# Patient Record
Sex: FEMALE | Race: WHITE | NOT HISPANIC OR LATINO | Employment: OTHER | URBAN - METROPOLITAN AREA
[De-identification: names, ages, dates, MRNs, and addresses within clinical notes are randomized per-mention and may not be internally consistent; named-entity substitution may affect disease eponyms.]

---

## 2017-05-22 ENCOUNTER — GENERIC CONVERSION - ENCOUNTER (OUTPATIENT)
Dept: OTHER | Facility: OTHER | Age: 82
End: 2017-05-22

## 2017-06-23 ENCOUNTER — GENERIC CONVERSION - ENCOUNTER (OUTPATIENT)
Dept: OTHER | Facility: OTHER | Age: 82
End: 2017-06-23

## 2017-09-20 ENCOUNTER — GENERIC CONVERSION - ENCOUNTER (OUTPATIENT)
Dept: OTHER | Facility: OTHER | Age: 82
End: 2017-09-20

## 2017-09-21 ENCOUNTER — GENERIC CONVERSION - ENCOUNTER (OUTPATIENT)
Dept: OTHER | Facility: OTHER | Age: 82
End: 2017-09-21

## 2017-11-27 ENCOUNTER — GENERIC CONVERSION - ENCOUNTER (OUTPATIENT)
Dept: OTHER | Facility: OTHER | Age: 82
End: 2017-11-27

## 2017-12-01 ENCOUNTER — GENERIC CONVERSION - ENCOUNTER (OUTPATIENT)
Dept: OTHER | Facility: OTHER | Age: 82
End: 2017-12-01

## 2018-05-10 ENCOUNTER — OFFICE VISIT (OUTPATIENT)
Dept: INTERNAL MEDICINE CLINIC | Facility: CLINIC | Age: 83
End: 2018-05-10
Payer: MEDICARE

## 2018-05-10 VITALS
HEIGHT: 59 IN | SYSTOLIC BLOOD PRESSURE: 140 MMHG | TEMPERATURE: 98.1 F | WEIGHT: 118.2 LBS | HEART RATE: 68 BPM | OXYGEN SATURATION: 97 % | BODY MASS INDEX: 23.83 KG/M2 | DIASTOLIC BLOOD PRESSURE: 60 MMHG | RESPIRATION RATE: 14 BRPM

## 2018-05-10 DIAGNOSIS — I10 BENIGN ESSENTIAL HYPERTENSION: ICD-10-CM

## 2018-05-10 DIAGNOSIS — I50.31 ACUTE DIASTOLIC CONGESTIVE HEART FAILURE (HCC): Primary | ICD-10-CM

## 2018-05-10 DIAGNOSIS — I35.0 AORTIC STENOSIS, MODERATE: ICD-10-CM

## 2018-05-10 DIAGNOSIS — R60.0 LOCALIZED EDEMA: ICD-10-CM

## 2018-05-10 DIAGNOSIS — R53.83 OTHER FATIGUE: ICD-10-CM

## 2018-05-10 DIAGNOSIS — S81.802A LEG WOUND, LEFT, INITIAL ENCOUNTER: ICD-10-CM

## 2018-05-10 PROCEDURE — 99215 OFFICE O/P EST HI 40 MIN: CPT | Performed by: INTERNAL MEDICINE

## 2018-05-10 RX ORDER — MULTIVIT WITH MINERALS/LUTEIN
1 TABLET ORAL DAILY
COMMUNITY
Start: 2013-08-13 | End: 2019-07-22 | Stop reason: CLARIF

## 2018-05-10 RX ORDER — ACYCLOVIR 50 MG/G
OINTMENT TOPICAL
COMMUNITY
Start: 2016-09-30 | End: 2019-07-22

## 2018-05-10 RX ORDER — ASPIRIN 325 MG
TABLET ORAL
COMMUNITY
End: 2019-02-22 | Stop reason: SDUPTHER

## 2018-05-10 RX ORDER — VERAPAMIL HYDROCHLORIDE 240 MG/1
240 CAPSULE, EXTENDED RELEASE ORAL DAILY
Refills: 3 | COMMUNITY
Start: 2018-03-01 | End: 2018-10-19 | Stop reason: SDUPTHER

## 2018-05-10 RX ORDER — CHOLECALCIFEROL (VITAMIN D3) 125 MCG
CAPSULE ORAL
COMMUNITY
Start: 2013-08-13 | End: 2019-07-22 | Stop reason: CLARIF

## 2018-05-10 RX ORDER — FUROSEMIDE 40 MG/1
40 TABLET ORAL DAILY
Qty: 30 TABLET | Refills: 4 | Status: SHIPPED | OUTPATIENT
Start: 2018-05-10 | End: 2018-06-12 | Stop reason: SDUPTHER

## 2018-05-10 RX ORDER — PYRIDOXINE HCL (VITAMIN B6) 100 MG
1 TABLET ORAL DAILY
COMMUNITY
Start: 2013-08-13 | End: 2022-04-21

## 2018-05-10 NOTE — ASSESSMENT & PLAN NOTE
History of moderate aortic stenosis  Of the patient's murmurs still rather loud so I suspect that she is probably not in a critical stenosis situation at this point  I have ordered an echocardiogram to evaluate the aortic stenosis all along with her mitral and tricuspid insufficiency and left ventricular function

## 2018-05-10 NOTE — ASSESSMENT & PLAN NOTE
Localized leg edema without evidence of pulmonary edema will switch to furosemide 40 milligrams daily request blood work as well as a follow-up in 2 weeks  Patient has compression socks and should elevate her feet as much as possible  Low-salt diet was reviewed with the patient

## 2018-05-10 NOTE — ASSESSMENT & PLAN NOTE
Small approximately 1 inch laceration is noted in the left lower leg I suspect this may simply be a split in the skin due to the 3+ edema of her lower leg  There is no evidence of any for infection at this time the fluid that is emanating from the laceration is clear    Clean dressing was applied will re-evaluate in 2 weeks

## 2018-05-10 NOTE — ASSESSMENT & PLAN NOTE
Significant fatigue without lightheadedness and no chest pain suspect that her congestive heart failure is the primary reason for this but she does have aortic stenosis will evaluate the severity that aortic stenosis with an echocardiogram

## 2018-05-10 NOTE — PROGRESS NOTES
Assessment/Plan:    Benign essential hypertension  Elevated blood pressure in the presence of right-sided diastolic congestive heart failure with a history of aortic stenosis  Will try to put the patient on to 40 milligrams of furosemide daily will discontinue her hydrochlorothiazide dose  She will continue on her verapamil 240 milligrams daily  I have asked her to have a metabolic profile along with CBC and an echocardiogram to further evaluate her metabolic status kidney status especially and the status of her aortic stenosis as well as left ventricular function  Aortic stenosis, moderate  History of moderate aortic stenosis  Of the patient's murmurs still rather loud so I suspect that she is probably not in a critical stenosis situation at this point  I have ordered an echocardiogram to evaluate the aortic stenosis all along with her mitral and tricuspid insufficiency and left ventricular function  Acute diastolic congestive heart failure (HCC)  Acute diastolic congestive heart failure with peripheral edema  The patient has no symptoms of chest pain or palpitations  She does have fatigue along with significant swelling +3 of both lower legs  Will start start her on furosemide 40 milligrams daily and also order CBC as well as a metabolic profile and brain natriuretic peptide study  Echocardiogram was requested to further evaluate left ventricular function  She will return in 2 weeks for re-evaluation she knows to call me sooner if her symptoms worsen  Localized edema  Localized leg edema without evidence of pulmonary edema will switch to furosemide 40 milligrams daily request blood work as well as a follow-up in 2 weeks  Patient has compression socks and should elevate her feet as much as possible  Low-salt diet was reviewed with the patient      Leg wound, left, initial encounter  Small approximately 1 inch laceration is noted in the left lower leg I suspect this may simply be a split in the skin due to the 3+ edema of her lower leg  There is no evidence of any for infection at this time the fluid that is emanating from the laceration is clear  Clean dressing was applied will re-evaluate in 2 weeks    Other fatigue  Significant fatigue without lightheadedness and no chest pain suspect that her congestive heart failure is the primary reason for this but she does have aortic stenosis will evaluate the severity that aortic stenosis with an echocardiogram        Diagnoses and all orders for this visit:    Acute diastolic congestive heart failure (HCC)  -     furosemide (LASIX) 40 mg tablet; Take 1 tablet (40 mg total) by mouth daily  -     Comprehensive metabolic panel; Future  -     CBC and differential; Future  -     NT-BNP PRO; Future  -     Echo complete with contrast if indicated; Future    Aortic stenosis, moderate    Benign essential hypertension    Localized edema    Leg wound, left, initial encounter    Other fatigue    Other orders  -     acyclovir (ZOVIRAX) 5 % ointment; Apply topically  -     aspirin 325 mg tablet; Take by mouth  -     Calcium Carb-Cholecalciferol (CALCIUM 600 + D) 600-200 MG-UNIT TABS; Take 1 tablet by mouth daily  -     Flaxseed, Linseed, (EQL FLAX SEED OIL) 1000 MG CAPS; Take by mouth  -     Multiple Vitamin (MULTIVITAMINS PO); Take 1 capsule by mouth daily  -     verapamil (VERELAN) 240 MG 24 hr capsule; Take 240 mg by mouth daily  -     Cholecalciferol (VITAMIN D3) 2000 units TABS; Take by mouth  -     vitamin E, tocopherol, 1,000 units capsule; Take 1 capsule by mouth daily        Subjective:      Patient ID: Saeid Santos is a 80 y o  female  This 51-year-old female patient presents today after no contact for the past 2 years  She presents with symptoms of increasing swelling in both legs elevated blood pressure as well as fatigue and serous drainage from a small wound in her left lower leg    She indicates that the leg swelling has been gradually increasing over the past several weeks  She denies any chest pains or palpitations or shortness of breath  She does note that even though she takes hydrochlorothiazide as part of her blood pressure medication her urine output has been decreasing over the past several weeks  She has a history of aortic valve stenosis, mitral and tricuspid insufficiency, biatrial enlargement  She also has a history of hypertension  The following portions of the patient's history were reviewed and updated as appropriate:   She  has a past medical history of Anesthesia complication; Anxiety; Arthritis; Cataract, right; Eye hemorrhage; History of shingles (05/2015); Hypertension; and Mitral valve stenosis, mild  She   Patient Active Problem List    Diagnosis Date Noted    Acute diastolic congestive heart failure ([facility] [address] ) 05/10/2018    Localized edema 05/10/2018    Leg wound, left, initial encounter 05/10/2018    Other fatigue 05/10/2018    Cataract 05/13/2016    Aortic stenosis, moderate 05/11/2016    Atrial dilatation, bilateral 05/11/2016    Mild tricuspid insufficiency 05/11/2016    Non-rheumatic mitral regurgitation 05/11/2016    Benign essential hypertension 08/22/2013     She  has a past surgical history that includes Tonsillectomy; Eye surgery (Left); Carpal tunnel release (Left); Cervical cone biopsy; Colonoscopy; Cataract extraction w/ intraocular lens implant (Left, 10/11/2016); and pr remv cataract extracap,insert lens (Right, 5/17/2016)  Her family history includes Arthritis in her mother and sister; Cancer in her brother and sister; GI problems in her father; Heart disease in her brother, maternal aunt, and mother; Irritable bowel syndrome in her sister; Sleep apnea in her sister  She  reports that she has never smoked  She does not have any smokeless tobacco history on file  She reports that she drinks alcohol  She reports that she does not use drugs       Review of Systems   Constitutional: Positive for fatigue  HENT: Negative  Eyes: Negative  Respiratory: Positive for shortness of breath  Cardiovascular: Positive for leg swelling  Gastrointestinal: Negative  Endocrine: Negative  Musculoskeletal: Positive for arthralgias and myalgias  Skin: Positive for wound  Small proximally 1 inch wound of the left lower leg with serous drainage   Allergic/Immunologic: Negative  Neurological: Negative  Hematological: Negative  Psychiatric/Behavioral: Negative  Objective:      /60   Pulse 68   Temp 98 1 °F (36 7 °C)   Resp 14   Ht 4' 11" (1 499 m)   Wt 53 6 kg (118 lb 3 2 oz)   SpO2 97%   BMI 23 87 kg/m²          Physical Exam   Constitutional: She is oriented to person, place, and time  She appears well-developed and well-nourished  No distress  HENT:   Head: Normocephalic and atraumatic  Right Ear: Tympanic membrane and external ear normal    Left Ear: Tympanic membrane and external ear normal    Nose: Nose normal    Mouth/Throat: Uvula is midline, oropharynx is clear and moist and mucous membranes are normal    Eyes: Conjunctivae and EOM are normal  Pupils are equal, round, and reactive to light  Neck: No JVD present  No thyromegaly present  Cardiovascular: Normal rate, regular rhythm and intact distal pulses  Murmur heard  4/6 systolic murmur   Pulmonary/Chest: Effort normal and breath sounds normal  No respiratory distress  She has no wheezes  She has no rales  She exhibits no tenderness  Abdominal: Soft  Bowel sounds are normal    Musculoskeletal: Normal range of motion  She exhibits edema, tenderness and deformity  Arthritic changes of both hands   Lymphadenopathy:     She has no cervical adenopathy  Neurological: She is alert and oriented to person, place, and time  She has normal reflexes  Skin: Skin is warm, dry and intact  No rash noted  She is not diaphoretic  No erythema  No pallor  Psychiatric: She has a normal mood and affect   Her speech is normal and behavior is normal  Judgment and thought content normal

## 2018-05-10 NOTE — ASSESSMENT & PLAN NOTE
Acute diastolic congestive heart failure with peripheral edema  The patient has no symptoms of chest pain or palpitations  She does have fatigue along with significant swelling +3 of both lower legs  Will start start her on furosemide 40 milligrams daily and also order CBC as well as a metabolic profile and brain natriuretic peptide study  Echocardiogram was requested to further evaluate left ventricular function  She will return in 2 weeks for re-evaluation she knows to call me sooner if her symptoms worsen

## 2018-05-22 ENCOUNTER — TRANSCRIBE ORDERS (OUTPATIENT)
Dept: ADMINISTRATIVE | Facility: HOSPITAL | Age: 83
End: 2018-05-22

## 2018-05-22 ENCOUNTER — APPOINTMENT (OUTPATIENT)
Dept: LAB | Facility: HOSPITAL | Age: 83
End: 2018-05-22
Payer: MEDICARE

## 2018-05-22 DIAGNOSIS — I50.31 ACUTE DIASTOLIC CONGESTIVE HEART FAILURE (HCC): ICD-10-CM

## 2018-05-22 LAB
ALBUMIN SERPL BCP-MCNC: 3.6 G/DL (ref 3.5–5)
ALP SERPL-CCNC: 103 U/L (ref 46–116)
ALT SERPL W P-5'-P-CCNC: 22 U/L (ref 12–78)
ANION GAP SERPL CALCULATED.3IONS-SCNC: 8 MMOL/L (ref 4–13)
AST SERPL W P-5'-P-CCNC: 24 U/L (ref 5–45)
BASOPHILS # BLD AUTO: 0.06 THOUSANDS/ΜL (ref 0–0.1)
BASOPHILS NFR BLD AUTO: 1 % (ref 0–1)
BILIRUB SERPL-MCNC: 0.5 MG/DL (ref 0.2–1)
BUN SERPL-MCNC: 29 MG/DL (ref 5–25)
CALCIUM SERPL-MCNC: 9.1 MG/DL (ref 8.3–10.1)
CHLORIDE SERPL-SCNC: 105 MMOL/L (ref 100–108)
CO2 SERPL-SCNC: 28 MMOL/L (ref 21–32)
CREAT SERPL-MCNC: 0.77 MG/DL (ref 0.6–1.3)
EOSINOPHIL # BLD AUTO: 0.09 THOUSAND/ΜL (ref 0–0.61)
EOSINOPHIL NFR BLD AUTO: 2 % (ref 0–6)
ERYTHROCYTE [DISTWIDTH] IN BLOOD BY AUTOMATED COUNT: 13.1 % (ref 11.6–15.1)
GFR SERPL CREATININE-BSD FRML MDRD: 72 ML/MIN/1.73SQ M
GLUCOSE P FAST SERPL-MCNC: 96 MG/DL (ref 65–99)
HCT VFR BLD AUTO: 42.2 % (ref 34.8–46.1)
HGB BLD-MCNC: 13.3 G/DL (ref 11.5–15.4)
IMM GRANULOCYTES # BLD AUTO: 0.01 THOUSAND/UL (ref 0–0.2)
IMM GRANULOCYTES NFR BLD AUTO: 0 % (ref 0–2)
LYMPHOCYTES # BLD AUTO: 1.27 THOUSANDS/ΜL (ref 0.6–4.47)
LYMPHOCYTES NFR BLD AUTO: 21 % (ref 14–44)
MCH RBC QN AUTO: 28.2 PG (ref 26.8–34.3)
MCHC RBC AUTO-ENTMCNC: 31.5 G/DL (ref 31.4–37.4)
MCV RBC AUTO: 90 FL (ref 82–98)
MONOCYTES # BLD AUTO: 0.5 THOUSAND/ΜL (ref 0.17–1.22)
MONOCYTES NFR BLD AUTO: 8 % (ref 4–12)
NEUTROPHILS # BLD AUTO: 4.25 THOUSANDS/ΜL (ref 1.85–7.62)
NEUTS SEG NFR BLD AUTO: 68 % (ref 43–75)
NRBC BLD AUTO-RTO: 0 /100 WBCS
NT-PROBNP SERPL-MCNC: 820 PG/ML
PLATELET # BLD AUTO: 196 THOUSANDS/UL (ref 149–390)
PMV BLD AUTO: 10.6 FL (ref 8.9–12.7)
POTASSIUM SERPL-SCNC: 3.9 MMOL/L (ref 3.5–5.3)
PROT SERPL-MCNC: 6.8 G/DL (ref 6.4–8.2)
RBC # BLD AUTO: 4.71 MILLION/UL (ref 3.81–5.12)
SODIUM SERPL-SCNC: 141 MMOL/L (ref 136–145)
WBC # BLD AUTO: 6.18 THOUSAND/UL (ref 4.31–10.16)

## 2018-05-22 PROCEDURE — 80053 COMPREHEN METABOLIC PANEL: CPT

## 2018-05-22 PROCEDURE — 85025 COMPLETE CBC W/AUTO DIFF WBC: CPT

## 2018-05-22 PROCEDURE — 36415 COLL VENOUS BLD VENIPUNCTURE: CPT

## 2018-05-22 PROCEDURE — 83880 ASSAY OF NATRIURETIC PEPTIDE: CPT

## 2018-05-25 ENCOUNTER — HOSPITAL ENCOUNTER (OUTPATIENT)
Dept: NON INVASIVE DIAGNOSTICS | Facility: HOSPITAL | Age: 83
Discharge: HOME | End: 2018-05-25
Payer: MEDICARE

## 2018-05-25 DIAGNOSIS — I50.31 ACUTE DIASTOLIC CONGESTIVE HEART FAILURE (HCC): ICD-10-CM

## 2018-05-25 PROCEDURE — 93306 TTE W/DOPPLER COMPLETE: CPT | Performed by: INTERNAL MEDICINE

## 2018-05-25 PROCEDURE — 93306 TTE W/DOPPLER COMPLETE: CPT

## 2018-06-12 ENCOUNTER — OFFICE VISIT (OUTPATIENT)
Dept: INTERNAL MEDICINE CLINIC | Facility: CLINIC | Age: 83
End: 2018-06-12
Payer: MEDICARE

## 2018-06-12 VITALS
RESPIRATION RATE: 14 BRPM | SYSTOLIC BLOOD PRESSURE: 148 MMHG | OXYGEN SATURATION: 97 % | WEIGHT: 114.6 LBS | HEIGHT: 59 IN | HEART RATE: 69 BPM | DIASTOLIC BLOOD PRESSURE: 66 MMHG | TEMPERATURE: 98.9 F | BODY MASS INDEX: 23.1 KG/M2

## 2018-06-12 DIAGNOSIS — I50.31 ACUTE DIASTOLIC CONGESTIVE HEART FAILURE (HCC): ICD-10-CM

## 2018-06-12 DIAGNOSIS — I10 BENIGN ESSENTIAL HYPERTENSION: ICD-10-CM

## 2018-06-12 DIAGNOSIS — R60.0 LOCALIZED EDEMA: ICD-10-CM

## 2018-06-12 DIAGNOSIS — Z12.39 BREAST CANCER SCREENING: Primary | ICD-10-CM

## 2018-06-12 DIAGNOSIS — I35.0 AORTIC STENOSIS, MODERATE: ICD-10-CM

## 2018-06-12 DIAGNOSIS — I51.7 ATRIAL DILATATION, BILATERAL: ICD-10-CM

## 2018-06-12 PROCEDURE — 99215 OFFICE O/P EST HI 40 MIN: CPT | Performed by: INTERNAL MEDICINE

## 2018-06-12 RX ORDER — HYDROCHLOROTHIAZIDE 25 MG/1
TABLET ORAL
COMMUNITY
Start: 2018-06-04 | End: 2018-06-12 | Stop reason: ALTCHOICE

## 2018-06-12 RX ORDER — FUROSEMIDE 40 MG/1
80 TABLET ORAL DAILY
Qty: 180 TABLET | Refills: 3 | Status: SHIPPED | OUTPATIENT
Start: 2018-06-12 | End: 2019-06-14 | Stop reason: SDUPTHER

## 2018-06-12 NOTE — ASSESSMENT & PLAN NOTE
Persistent edema I wrapped her legs today in Ace bandages to try to her gently Cokes the fluid back into the vascular space we have also increased her furosemide from 40 milligrams a day to 80 milligrams a day    Will follow up in approximately 4 weeks

## 2018-06-12 NOTE — ASSESSMENT & PLAN NOTE
Hypertension will continue to monitor at home as well as follow-up visit here in 1 month  Will be increasing her diuretic therapy which should decrease her blood pressure reading somewhat  She has no symptoms associated with the blood pressure elevation

## 2018-06-12 NOTE — PROGRESS NOTES
Assessment/Plan:    Benign essential hypertension  Hypertension will continue to monitor at home as well as follow-up visit here in 1 month  Will be increasing her diuretic therapy which should decrease her blood pressure reading somewhat  She has no symptoms associated with the blood pressure elevation  Aortic stenosis, moderate  Echocardiogram shows moderate aortic stenosis which is progressed from her previous echocardiogram several years ago  She has had no syncope nor any shortness of breath but she does have peripheral neuropathy     Acute diastolic congestive heart failure (HCC)  Acute diastolic congestive heart failure with peripheral edema 4 pound weight loss since her last visit but still significant swelling of the legs bilaterally we have doubled her furosemide to 80 milligrams a day and asked her to return in 1 month for follow-up assessment of her symptoms  Localized edema  Persistent edema I wrapped her legs today in Ace bandages to try to her gently Cokes the fluid back into the vascular space we have also increased her furosemide from 40 milligrams a day to 80 milligrams a day  Will follow up in approximately 4 weeks       Diagnoses and all orders for this visit:    Breast cancer screening  -     Mammo screening bilateral w cad; Future    Acute diastolic congestive heart failure (HCC)  -     furosemide (LASIX) 40 mg tablet; Take 2 tablets (80 mg total) by mouth daily    Localized edema    Benign essential hypertension    Aortic stenosis, moderate    Atrial dilatation, bilateral    Other orders  -     Discontinue: hydrochlorothiazide (HYDRODIURIL) 25 mg tablet;         Subjective:      Patient ID: Lucas Garrett is a 80 y o  female      This 77-year-old female patient returns today for follow-up assessment of blood pressure as well as peripheral edema and review of recent blood work and echocardiogram   She continues to have significant bilateral lower leg edema and she is unable to wear her support stockings because of the edema  The previous area of wound on the left leg has healed completely at this time  Her weight is 4 pounds lower than her last visit with us  A review of her home blood pressure readings that she provided us with indicates that her average blood pressure systolic pre is running in the 120s to 697M and diastolically she is running in the 50s to 60s  She does not appear to have any evidence of kidney disease based upon her by sick metabolic profile  She does have a significantly elevated BNP consistent with her congestive heart failure  A review of her echocardiogram shows that she does have moderate aortic valve stenosis as well as milder degrees of valvular insufficiency for the mitral tricuspid and pulmonic valves  She does have grade 1 diastolic dysfunction as well as a left ventricular ejection fraction of approximately 45 percent  The left atrium is mildly enlarged secondary to her mitral valve regurgitation  The following portions of the patient's history were reviewed and updated as appropriate:   She  has a past medical history of Anesthesia complication; Anxiety; Arthritis; Cataract, right; Eye hemorrhage; History of shingles (05/2015); Hypertension; and Mitral valve stenosis, mild  She   Patient Active Problem List    Diagnosis Date Noted    Acute diastolic congestive heart failure ([address] ) 05/10/2018    Localized edema 05/10/2018    Leg wound, left, initial encounter 05/10/2018    Other fatigue 05/10/2018    Cataract 05/13/2016    Aortic stenosis, moderate 05/11/2016    Atrial dilatation, bilateral 05/11/2016    Mild tricuspid insufficiency 05/11/2016    Non-rheumatic mitral regurgitation 05/11/2016    Benign essential hypertension 08/22/2013     She  has a past surgical history that includes Tonsillectomy; Eye surgery (Left); Carpal tunnel release (Left); Cervical cone biopsy; Colonoscopy;  Cataract extraction w/ intraocular lens implant (Left, 10/11/2016); and pr remv cataract extracap,insert lens (Right, 5/17/2016)  Her family history includes Arthritis in her mother and sister; Cancer in her brother and sister; GI problems in her father; Heart disease in her brother, maternal aunt, and mother; Irritable bowel syndrome in her sister; Sleep apnea in her sister  She  reports that she has never smoked  She does not have any smokeless tobacco history on file  She reports that she drinks alcohol  She reports that she does not use drugs       Review of Systems   Constitutional: Positive for fatigue  Respiratory: Negative for cough and shortness of breath  Cardiovascular: Positive for leg swelling  Negative for chest pain and palpitations  Skin: Positive for color change  Inflamed skin over the lower extremities secondary to fluid retention   All other systems reviewed and are negative  Objective:      /66   Pulse 69   Temp 98 9 °F (37 2 °C)   Resp 14   Ht 4' 11" (1 499 m)   Wt 52 kg (114 lb 9 6 oz)   SpO2 97%   BMI 23 15 kg/m²          Physical Exam   Constitutional: She is oriented to person, place, and time  Vital signs are normal  She appears well-developed and well-nourished  She is cooperative  No distress  HENT:   Right Ear: Hearing, tympanic membrane, external ear and ear canal normal    Left Ear: Hearing, tympanic membrane, external ear and ear canal normal    Nose: Nose normal  No mucosal edema  Mouth/Throat: Uvula is midline, oropharynx is clear and moist and mucous membranes are normal    Eyes: Conjunctivae and lids are normal  Pupils are equal, round, and reactive to light  Right eye exhibits no discharge  Left eye exhibits no discharge  Neck: No JVD present  Carotid bruit is not present  No tracheal deviation present  No thyromegaly present  Cardiovascular: Normal rate, regular rhythm and intact distal pulses  Murmur heard    Grade 4-2/0 systolic murmur   Pulmonary/Chest: Effort normal and breath sounds normal  No respiratory distress  She has no wheezes  She has no rales  She exhibits no tenderness  Abdominal: Soft  Normal appearance and bowel sounds are normal  She exhibits no distension and no mass  There is no tenderness  There is no rebound and no guarding  Musculoskeletal: Normal range of motion  She exhibits edema  Lymphadenopathy:     She has no cervical adenopathy  Neurological: She is alert and oriented to person, place, and time  She has normal reflexes  No cranial nerve deficit  Skin: Skin is warm, dry and intact  No rash noted  She is not diaphoretic  There is erythema  No pallor  Skin over the lower legs is red but not warm has suspected is secondary to distention of the skin tissue by the edema  Psychiatric: She has a normal mood and affect  Her speech is normal and behavior is normal  Judgment and thought content normal  Cognition and memory are normal    Vitals reviewed

## 2018-06-12 NOTE — ASSESSMENT & PLAN NOTE
Acute diastolic congestive heart failure with peripheral edema 4 pound weight loss since her last visit but still significant swelling of the legs bilaterally we have doubled her furosemide to 80 milligrams a day and asked her to return in 1 month for follow-up assessment of her symptoms

## 2018-06-12 NOTE — ASSESSMENT & PLAN NOTE
Echocardiogram shows moderate aortic stenosis which is progressed from her previous echocardiogram several years ago    She has had no syncope nor any shortness of breath but she does have peripheral neuropathy

## 2018-06-28 ENCOUNTER — OFFICE VISIT (OUTPATIENT)
Dept: INTERNAL MEDICINE CLINIC | Facility: CLINIC | Age: 83
End: 2018-06-28
Payer: MEDICARE

## 2018-06-28 VITALS
SYSTOLIC BLOOD PRESSURE: 134 MMHG | WEIGHT: 112 LBS | HEART RATE: 66 BPM | OXYGEN SATURATION: 97 % | BODY MASS INDEX: 22.58 KG/M2 | HEIGHT: 59 IN | DIASTOLIC BLOOD PRESSURE: 78 MMHG | RESPIRATION RATE: 12 BRPM | TEMPERATURE: 98.2 F

## 2018-06-28 DIAGNOSIS — I50.31 ACUTE DIASTOLIC CONGESTIVE HEART FAILURE (HCC): ICD-10-CM

## 2018-06-28 DIAGNOSIS — R60.0 LOCALIZED EDEMA: ICD-10-CM

## 2018-06-28 DIAGNOSIS — I35.0 AORTIC STENOSIS, MODERATE: ICD-10-CM

## 2018-06-28 DIAGNOSIS — R53.83 OTHER FATIGUE: ICD-10-CM

## 2018-06-28 DIAGNOSIS — I10 BENIGN ESSENTIAL HYPERTENSION: ICD-10-CM

## 2018-06-28 DIAGNOSIS — I50.33 ACUTE ON CHRONIC DIASTOLIC CONGESTIVE HEART FAILURE (HCC): Primary | ICD-10-CM

## 2018-06-28 PROCEDURE — 99214 OFFICE O/P EST MOD 30 MIN: CPT | Performed by: INTERNAL MEDICINE

## 2018-06-28 NOTE — PROGRESS NOTES
Assessment/Plan:    Acute diastolic congestive heart failure (HCC)  Acute diastolic congestive heart failure gradually improving weight is gradually going down  The patient continues on furosemide 80 mg daily  I have asked her to continue on this dose of diuretic therapy with follow-up visit in 1 month  Will ask her to have a comprehensive metabolic profile prior to her visit to review her electrolytes and kidney function  Aortic stenosis, moderate  Moderate aortic stenosis murmur is unchanged likely a cause of her congestive heart failure as well as her fatigue symptoms explained this to the patient today during her visit that well he can week work on treating the congestive heart failure the fatigue symptoms may not completely resolve in view of this aortic valve stenosis  Benign essential hypertension  History of hypertension with adequate control of the blood pressure continue present medication  Localized edema  Localized edema both lower legs improved somewhat since her last visit weight decrease 2 lb since last visit continue diuretic therapy with reassessment in 1 month she continues to use compression stockings to the knee and has been advised to try to limit her fluid consumption to 32 oz daily  Other fatigue  Fatigue symptoms likely secondary to combination of congestive heart failure as well as aortic stenosis  Discussed with the patient the meaning of both of these diagnosis on the prognosis with both  Diagnoses and all orders for this visit:    Acute on chronic diastolic congestive heart failure ([address] )  -     Comprehensive metabolic panel; Future    Aortic stenosis, moderate    Acute diastolic congestive heart failure (HCC)    Benign essential hypertension    Localized edema        Subjective:      Patient ID: Elvin Perla is a 80 y o  female  This 42-year-old female patient returns today for a follow-up assessment of her congestive heart failure    She continues to have swelling of both legs but denies any chest pain palpitations or shortness of breath  Her weight has declined 2 lb since the 12th of June and 6 lb since the 10th of May  She does note fatigue symptoms indicating that her energy level is sec significantly reduced from what it use to be  The following portions of the patient's history were reviewed and updated as appropriate: She  has a past medical history of Anesthesia complication; Anxiety; Arthritis; Cataract, right; Eye hemorrhage; History of shingles (05/2015); Hypertension; and Mitral valve stenosis, mild  She  has a past surgical history that includes Tonsillectomy; Eye surgery (Left); Carpal tunnel release (Left); Cervical cone biopsy; Colonoscopy; Cataract extraction w/ intraocular lens implant (Left, 10/11/2016); and pr remv cataract extracap,insert lens (Right, 5/17/2016)  Her family history includes Arthritis in her mother and sister; Cancer in her brother and sister; GI problems in her father; Heart disease in her brother, maternal aunt, and mother; Irritable bowel syndrome in her sister; Sleep apnea in her sister  She  reports that she has never smoked  She does not have any smokeless tobacco history on file  She reports that she drinks alcohol  She reports that she does not use drugs       Review of Systems   Constitutional: Positive for fatigue  Negative for chills, diaphoresis and fever  HENT: Negative  Eyes: Negative  Respiratory: Negative for cough, choking and shortness of breath  Cardiovascular: Positive for leg swelling  Negative for chest pain and palpitations  Gastrointestinal: Negative  Endocrine: Negative  Genitourinary: Negative  Musculoskeletal: Negative  Skin: Negative  Allergic/Immunologic: Negative  Neurological: Negative  Hematological: Negative  Psychiatric/Behavioral: Negative            Objective:      /78 (BP Location: Left arm, Patient Position: Sitting, Cuff Size: Large) Pulse 66   Temp 98 2 °F (36 8 °C)   Resp 12   Ht 4' 11" (1 499 m)   Wt 50 8 kg (112 lb)   SpO2 97%   BMI 22 62 kg/m²          Physical Exam   Constitutional: She is oriented to person, place, and time  Vital signs are normal  She appears well-developed and well-nourished  She is cooperative  No distress  HENT:   Head: Normocephalic and atraumatic  Right Ear: Hearing, tympanic membrane, external ear and ear canal normal    Left Ear: Hearing, tympanic membrane, external ear and ear canal normal    Nose: Nose normal  No mucosal edema  Mouth/Throat: Uvula is midline, oropharynx is clear and moist and mucous membranes are normal    Eyes: Conjunctivae and lids are normal  Pupils are equal, round, and reactive to light  Right eye exhibits no discharge  Left eye exhibits no discharge  Neck: No JVD present  Carotid bruit is not present  No thyromegaly present  Cardiovascular: Normal rate, regular rhythm and intact distal pulses  Murmur heard  3/4 systolic   Pulmonary/Chest: Effort normal and breath sounds normal  No respiratory distress  She has no wheezes  She has no rales  She exhibits no tenderness  Abdominal: Soft  Normal appearance and bowel sounds are normal  She exhibits no distension and no mass  There is no tenderness  There is no rebound and no guarding  Musculoskeletal: Normal range of motion  She exhibits edema  Lymphadenopathy:     She has no cervical adenopathy  Neurological: She is alert and oriented to person, place, and time  She has normal reflexes  No cranial nerve deficit  Skin: Skin is warm, dry and intact  No rash noted  She is not diaphoretic  No erythema  No pallor  Psychiatric: She has a normal mood and affect  Her speech is normal and behavior is normal  Judgment and thought content normal  Cognition and memory are normal    Vitals reviewed

## 2018-06-28 NOTE — ASSESSMENT & PLAN NOTE
Acute diastolic congestive heart failure gradually improving weight is gradually going down  The patient continues on furosemide 80 mg daily  I have asked her to continue on this dose of diuretic therapy with follow-up visit in 1 month  Will ask her to have a comprehensive metabolic profile prior to her visit to review her electrolytes and kidney function

## 2018-06-28 NOTE — ASSESSMENT & PLAN NOTE
Localized edema both lower legs improved somewhat since her last visit weight decrease 2 lb since last visit continue diuretic therapy with reassessment in 1 month she continues to use compression stockings to the knee and has been advised to try to limit her fluid consumption to 32 oz daily

## 2018-06-28 NOTE — ASSESSMENT & PLAN NOTE
Moderate aortic stenosis murmur is unchanged likely a cause of her congestive heart failure as well as her fatigue symptoms explained this to the patient today during her visit that well he can week work on treating the congestive heart failure the fatigue symptoms may not completely resolve in view of this aortic valve stenosis

## 2018-10-19 ENCOUNTER — OFFICE VISIT (OUTPATIENT)
Dept: INTERNAL MEDICINE CLINIC | Facility: CLINIC | Age: 83
End: 2018-10-19
Payer: MEDICARE

## 2018-10-19 VITALS
DIASTOLIC BLOOD PRESSURE: 80 MMHG | BODY MASS INDEX: 21.97 KG/M2 | RESPIRATION RATE: 14 BRPM | HEIGHT: 59 IN | SYSTOLIC BLOOD PRESSURE: 138 MMHG | OXYGEN SATURATION: 96 % | TEMPERATURE: 98.2 F | WEIGHT: 109 LBS | HEART RATE: 67 BPM

## 2018-10-19 DIAGNOSIS — Z23 NEED FOR INFLUENZA VACCINATION: Primary | ICD-10-CM

## 2018-10-19 DIAGNOSIS — I10 ESSENTIAL HYPERTENSION: ICD-10-CM

## 2018-10-19 DIAGNOSIS — M19.90 ARTHRITIS: ICD-10-CM

## 2018-10-19 DIAGNOSIS — I35.0 AORTIC STENOSIS, MODERATE: ICD-10-CM

## 2018-10-19 DIAGNOSIS — G44.89 OTHER HEADACHE SYNDROME: ICD-10-CM

## 2018-10-19 DIAGNOSIS — I50.31 ACUTE DIASTOLIC CONGESTIVE HEART FAILURE (HCC): ICD-10-CM

## 2018-10-19 PROCEDURE — G0008 ADMIN INFLUENZA VIRUS VAC: HCPCS | Performed by: INTERNAL MEDICINE

## 2018-10-19 PROCEDURE — 90662 IIV NO PRSV INCREASED AG IM: CPT | Performed by: INTERNAL MEDICINE

## 2018-10-19 PROCEDURE — 99215 OFFICE O/P EST HI 40 MIN: CPT | Performed by: INTERNAL MEDICINE

## 2018-10-19 RX ORDER — VERAPAMIL HYDROCHLORIDE 240 MG/1
240 CAPSULE, EXTENDED RELEASE ORAL DAILY
Qty: 90 CAPSULE | Refills: 3 | Status: SHIPPED | OUTPATIENT
Start: 2018-10-19 | End: 2019-02-22 | Stop reason: CLARIF

## 2018-10-24 ENCOUNTER — TRANSCRIBE ORDERS (OUTPATIENT)
Dept: ADMINISTRATIVE | Facility: HOSPITAL | Age: 83
End: 2018-10-24

## 2018-10-24 ENCOUNTER — HOSPITAL ENCOUNTER (OUTPATIENT)
Dept: RADIOLOGY | Facility: HOSPITAL | Age: 83
Discharge: HOME | End: 2018-10-24
Payer: MEDICARE

## 2018-10-24 DIAGNOSIS — M19.90 ARTHRITIS: ICD-10-CM

## 2018-10-24 PROCEDURE — 73562 X-RAY EXAM OF KNEE 3: CPT

## 2018-10-24 PROCEDURE — 73030 X-RAY EXAM OF SHOULDER: CPT

## 2018-10-31 ENCOUNTER — OFFICE VISIT (OUTPATIENT)
Dept: OBGYN CLINIC | Facility: CLINIC | Age: 83
End: 2018-10-31
Payer: MEDICARE

## 2018-10-31 VITALS
WEIGHT: 111.4 LBS | HEART RATE: 65 BPM | SYSTOLIC BLOOD PRESSURE: 136 MMHG | HEIGHT: 59 IN | BODY MASS INDEX: 22.46 KG/M2 | DIASTOLIC BLOOD PRESSURE: 69 MMHG

## 2018-10-31 DIAGNOSIS — M19.90 ARTHRITIS: ICD-10-CM

## 2018-10-31 DIAGNOSIS — M19.012 PRIMARY OSTEOARTHRITIS OF LEFT SHOULDER: ICD-10-CM

## 2018-10-31 DIAGNOSIS — M17.11 PRIMARY OSTEOARTHRITIS OF RIGHT KNEE: Primary | ICD-10-CM

## 2018-10-31 DIAGNOSIS — M12.812 ROTATOR CUFF ARTHROPATHY OF LEFT SHOULDER: ICD-10-CM

## 2018-10-31 PROBLEM — G44.89 OTHER HEADACHE SYNDROME: Status: ACTIVE | Noted: 2018-10-31

## 2018-10-31 PROBLEM — S81.802A LEG WOUND, LEFT, INITIAL ENCOUNTER: Status: RESOLVED | Noted: 2018-05-10 | Resolved: 2018-10-31

## 2018-10-31 PROCEDURE — 99204 OFFICE O/P NEW MOD 45 MIN: CPT | Performed by: ORTHOPAEDIC SURGERY

## 2018-10-31 PROCEDURE — 20610 DRAIN/INJ JOINT/BURSA W/O US: CPT | Performed by: ORTHOPAEDIC SURGERY

## 2018-10-31 RX ORDER — DEXAMETHASONE SODIUM PHOSPHATE 100 MG/10ML
40 INJECTION INTRAMUSCULAR; INTRAVENOUS
Status: COMPLETED | OUTPATIENT
Start: 2018-10-31 | End: 2018-10-31

## 2018-10-31 RX ORDER — BUPIVACAINE HYDROCHLORIDE 2.5 MG/ML
4 INJECTION, SOLUTION INFILTRATION; PERINEURAL
Status: COMPLETED | OUTPATIENT
Start: 2018-10-31 | End: 2018-10-31

## 2018-10-31 RX ADMIN — BUPIVACAINE HYDROCHLORIDE 4 ML: 2.5 INJECTION, SOLUTION INFILTRATION; PERINEURAL at 10:01

## 2018-10-31 RX ADMIN — DEXAMETHASONE SODIUM PHOSPHATE 40 MG: 100 INJECTION INTRAMUSCULAR; INTRAVENOUS at 10:01

## 2018-10-31 NOTE — ASSESSMENT & PLAN NOTE
Arthritic complaints in both the right knee as well as left shoulder  On examination today there appear to be changes consistent with arthritis of both these locations of requested off x-rays of both the right knee as well as left shoulder to evaluate further  In addition we have provided the patient with a referral to orthopedic services at [facility] for further evaluation of the symptoms

## 2018-10-31 NOTE — ASSESSMENT & PLAN NOTE
Patient has a history of acute diastolic congestive heart failure which appears to be stable on today's examination  She is not have any significant peripheral edema she denies any chest pains palpitations or shortness of breath  She continues on furosemide 80 mg daily for control of her right-sided congestive heart failure

## 2018-10-31 NOTE — ASSESSMENT & PLAN NOTE
Headache frontal in location is seems to be allergic in nature  She does have some increased sinus congestion as well as postnasal drainage  The mucus in her sinuses is clear  We have recommended the use of Ocean spray as well as antihistamine such as Claritin for symptomatic relief

## 2018-10-31 NOTE — ASSESSMENT & PLAN NOTE
History of moderate aortic stenosis  Murmur appears to be stable she has no symptoms to indicate worsening aortic valve stenosis recommend continued follow-up with Cardiology as well as the this this office

## 2018-10-31 NOTE — PROGRESS NOTES
Assessment/Plan:  1  Primary osteoarthritis of right knee  Ambulatory referral to Physical Therapy    Large joint arthrocentesis    CANCELED: Ambulatory referral to Physical Therapy   2  Rotator cuff arthropathy of left shoulder  Ambulatory referral to Physical Therapy   3  Primary osteoarthritis of left shoulder  Ambulatory referral to Physical Therapy    CANCELED: Ambulatory referral to Physical Therapy   4  Arthritis  Ambulatory referral to Orthopedic Surgery       Scribe Attestation    I,:   [place] am acting as a scribe while in the presence of the attending physician :        I,:   Sebastian Read MD personally performed the services described in this documentation    as scribed in my presence :              Wallace Cordero upon examination and x-rays of her left shoulder demonstrates signs and symptoms rotator cuff arthropathy  I do have concern that she does have a tear to the rotator cuff as she demonstrates significant weakness and pain with abduction  X-rays also demonstrate a high riding humeral head indicating a tear to the rotator cuff  I did suggest conservative measures as she noted that she cares for her  such as cortisone injections and physical therapy  I would like to try physical therapy prior to injection to help her regain strength range of motion her shoulder  In regards to her right knee she does demonstrate tricompartmental osteoarthritis  I also discussed conservative measures such as cortisone injection, Euflexxa injections, and physical therapy  Still opted for the cortisone injection and physical therapy today  I provided cortisone injection to the anterior lateral aspect of the knee she tolerated this well  I did provide her with a physical therapy prescription for her left shoulder as well as her right knee  I would like Wallace Cordero to try to get the physical therapy at least 2 times a week over the next 4-6 weeks    Wallace Cordero is to follow up with me in 6 weeks for repeat evaluation  Large joint arthrocentesis  Date/Time: 10/31/2018 10:01 AM  Consent given by: patient  Site marked: site marked  Timeout: Immediately prior to procedure a time out was called to verify the correct patient, procedure, equipment, support staff and site/side marked as required   Supporting Documentation  Indications: pain   Procedure Details  Location: knee - R knee  Needle size: 22 G  Ultrasound guidance: no  Approach: anterolateral  Medications administered: 4 mL bupivacaine 0 25 %; 40 mg dexamethasone 100 mg/10 mL              Subjective:   Jean Paul Peguero is a 80 y o  female who presents for initial evaluation of her left shoulder and right knee  She states that she began to experience intermittent and moderate sharp pain about the superior aspect of her left shoulder  She denies any trauma to her shoulder  However notes that she is caring for her disabled  does lift him often  She states that her pain is exacerbated with overhead activities and is better at rest   She has not undergone any formal treatment for her shoulder  In regards to her right knee she states that she was experiencing pain discomfort with knee flexion the posterior aspect of her knee this was a few years ago  She states that that pain in the posterior aspect of her knee has subsided however she states that she is experiencing intermittent and mild to moderate sharp pain about the anterior medial aspect of her knee she states that this pain is exacerbated with weight-bearing activities and is pain-free at rest   She does note intermittent swelling in the anterior aspect of her knee however she also notes swelling into the lower extremities due to a heart condition  Today she denies any distal paresthesias  Review of Systems   Constitutional: Negative for chills, fever and unexpected weight change  HENT: Negative for hearing loss, nosebleeds and sore throat      Eyes: Negative for pain, redness and visual disturbance  Respiratory: Negative for cough, shortness of breath and wheezing  Cardiovascular: Negative for chest pain, palpitations and leg swelling  Gastrointestinal: Negative for abdominal pain, nausea and vomiting  Endocrine: Negative for polydipsia and polyuria  Genitourinary: Negative for dysuria and hematuria  Musculoskeletal: Positive for arthralgias and myalgias  See HPI   Skin: Negative for rash and wound  Neurological: Negative for dizziness, numbness and headaches  Psychiatric/Behavioral: Negative for decreased concentration and suicidal ideas  The patient is not nervous/anxious  Past Medical History:   Diagnosis Date    Anesthesia complication       Yrs ago had "anxiety" reaction not sure while sedated, pt states sensitive to anesthesia effects    Anxiety     stress at home    Arthritis     Cataract, right     Last Assessed:16    Eye hemorrhage     left eye currently 16    History of shingles 2015    Hypertension     Mitral valve stenosis, mild        Past Surgical History:   Procedure Laterality Date    CARPAL TUNNEL RELEASE Left     CATARACT EXTRACTION W/ INTRAOCULAR LENS IMPLANT Left 10/11/2016    Procedure: EXTRACTION EXTRACAPSULAR CATARACT PHACO INTRAOCULAR LENS (IOL); Surgeon: Kin Preston MD;  Location: [facility] MAIN OR;  Service:     CERVICAL CONE BIOPSY      COLONOSCOPY      EYE SURGERY Left     muscle repair    NE REMV CATARACT EXTRACAP,INSERT LENS Right 2016    Procedure: EXTRACTION EXTRACAPSULAR CATARACT PHACO INTRAOCULAR LENS (IOL);   Surgeon: Kin Preston MD;  Location: [facility] MAIN OR;  Service: Ophthalmology    TONSILLECTOMY         Family History   Problem Relation Age of Onset    Heart disease Mother         MI  at age 71   Georgie Mcrae Arthritis Mother     GI problems Father         bleeding ulcer    Arthritis Sister     Sleep apnea Sister     Irritable bowel syndrome Sister     Cancer Sister         skin cancer  Heart disease Brother         CABG (5)    Cancer Brother         skin cancer    Heart disease Maternal Aunt        Social History     Occupational History    Not on file  Social History Main Topics    Smoking status: Never Smoker    Smokeless tobacco: Never Used    Alcohol use Yes      Comment: rarely/occasional glass of wine    Drug use: No    Sexual activity: Not on file         Current Outpatient Prescriptions:     acyclovir (ZOVIRAX) 5 % ointment, Apply topically, Disp: , Rfl:     Ascorbic Acid (VITAMIN C) 1000 MG tablet, Take 1,000 mg by mouth every morning , Disp: , Rfl:     aspirin 325 mg tablet, Take by mouth, Disp: , Rfl:     BIOTIN PO, Take 1,000 mcg by mouth every morning , Disp: , Rfl:     Calcium Carb-Cholecalciferol (CALCIUM 600 + D) 600-200 MG-UNIT TABS, Take 1 tablet by mouth daily, Disp: , Rfl:     Cholecalciferol (VITAMIN D3) 2000 units TABS, Take by mouth, Disp: , Rfl:     CINNAMON PO, Take 1,000 mg by mouth every morning , Disp: , Rfl:     Flax OIL, Take by mouth every morning , Disp: , Rfl:     Flaxseed, Linseed, (EQL FLAX SEED OIL) 1000 MG CAPS, Take by mouth, Disp: , Rfl:     folic acid (FOLVITE) 114 MCG tablet, Take 400 mcg by mouth every morning , Disp: , Rfl:     furosemide (LASIX) 40 mg tablet, Take 2 tablets (80 mg total) by mouth daily, Disp: 180 tablet, Rfl: 3    glucosamine-chondroitin 500-400 MG tablet, Take 1 tablet by mouth every morning , Disp: , Rfl:     Multiple Vitamin (MULTIVITAMINS PO), Take 1 capsule by mouth daily, Disp: , Rfl:     multivitamin (THERAGRAN) TABS, Take 1 tablet by mouth every morning , Disp: , Rfl:     Naproxen Sodium (ALEVE PO), Take 2 tablets by mouth daily at bedtime  , Disp: , Rfl:     Omega-3 Fatty Acids (FISH OIL PO), Take 1,400 mg by mouth every morning , Disp: , Rfl:     verapamil (VERELAN) 240 MG 24 hr capsule, Take 1 capsule (240 mg total) by mouth daily, Disp: 90 capsule, Rfl: 3    Vitamin D, Cholecalciferol, 1000 UNITS CAPS, Take by mouth every morning , Disp: , Rfl:     vitamin E, tocopherol, 1,000 units capsule, Take 1 capsule by mouth daily, Disp: , Rfl:     vitamin E, tocopherol, 400 units capsule, Take 400 Units by mouth every morning , Disp: , Rfl:     aspirin 325 mg tablet, Take 325 mg by mouth as needed for mild pain , Disp: , Rfl:     Allergies   Allergen Reactions    Indocin [Indomethacin]      hypertension       Objective:  Vitals:    10/31/18 0912   BP: 136/69   Pulse: 65       Right Knee Exam     Tenderness   The patient is experiencing tenderness in the medial joint line  Range of Motion   Right knee extension: -3    Flexion: 140     Tests   Drawer:       Anterior - negative    Posterior - negative  Varus: negative  Valgus: negative    Other   Erythema: absent  Scars: absent  Sensation: normal  Pulse: present  Swelling: mild      Left Shoulder Exam     Tenderness   The patient is experiencing no tenderness  Range of Motion   Active Abduction: 150   Passive Abduction: 160   External Rotation: 60   Internal Rotation 0 degrees: L3     Muscle Strength   Abduction: 3/5   Internal Rotation: 5/5   External Rotation: 4/5     Tests   Drop Arm: positive  Hawkin's test: negative    Other   Erythema: absent  Scars: absent  Sensation: normal  Pulse: present             Physical Exam   Constitutional: She is oriented to person, place, and time  She appears well-developed and well-nourished  HENT:   Head: Normocephalic and atraumatic  Eyes: Conjunctivae are normal    Neck: Normal range of motion  Cardiovascular: Normal rate  Pulmonary/Chest: Effort normal    Musculoskeletal:   As noted in HPI   Neurological: She is alert and oriented to person, place, and time  Skin: Skin is warm and dry  Psychiatric: She has a normal mood and affect  Her behavior is normal  Judgment and thought content normal    Nursing note and vitals reviewed        I have personally reviewed pertinent films in PACS and my interpretation is as follows:  X-rays of both the left shoulder demonstrates severe glenohumeral joint osteoarthritis  The humeral head appears to ride high indicating rotator cuff arthropathy    X-rays of the right knee demonstrates severe tricompartmental osteoarthritis  With calcific formations medial to the medial femoral condyle

## 2018-10-31 NOTE — PROGRESS NOTES
Assessment/Plan:    Acute diastolic congestive heart failure ([address] [address] [address] )  Patient has a history of acute diastolic congestive heart failure which appears to be stable on today's examination  She is not have any significant peripheral edema she denies any chest pains palpitations or shortness of breath  She continues on furosemide 80 mg daily for control of her right-sided congestive heart failure  Aortic stenosis, moderate  History of moderate aortic stenosis  Murmur appears to be stable she has no symptoms to indicate worsening aortic valve stenosis recommend continued follow-up with Cardiology as well as the this this office  Essential hypertension  History of hypertension is noted with this patient her blood pressure today is 138/80 will continue on present blood pressure management follow-up requested in 4 months  Other headache syndrome  Headache frontal in location is seems to be allergic in nature  She does have some increased sinus congestion as well as postnasal drainage  The mucus in her sinuses is clear  We have recommended the use of Ocean spray as well as antihistamine such as Claritin for symptomatic relief  Arthritis  Arthritic complaints in both the right knee as well as left shoulder  On examination today there appear to be changes consistent with arthritis of both these locations of requested off x-rays of both the right knee as well as left shoulder to evaluate further  In addition we have provided the patient with a referral to orthopedic services at [facility] for further evaluation of the symptoms  Diagnoses and all orders for this visit:    Need for influenza vaccination  -     influenza vaccine, [id number], high-dose, PF 0 5 mL, for patients 65 yr+ (FLUZONE HIGH-DOSE)    Arthritis  -     XR knee 3 vw right non injury; Future  -     XR shoulder 2+ vw left; Future  -     Ambulatory referral to Orthopedic Surgery;  Future    Essential hypertension  -     verapamil (VERELAN) 240 MG 24 hr capsule; Take 1 capsule (240 mg total) by mouth daily    Acute diastolic congestive heart failure (HCC)    Aortic stenosis, moderate    Other headache syndrome        Subjective:      Patient ID: Pop Lennon is a 80 y o  female  This is a routine follow-up visit for this 25-year-old female patient  She presents today with several concerns  Her 1st is that regarding persistent arthritic type discomfort in her right knee  She has stiffness swelling and increased warmth emanating from the right knee  She denies any recent falls or trauma to the knee  She also has concerns about arthritic type symptoms in her left shoulder with decreased range of motion and increased discomfort in the shoulder again she denies any history of trauma to the shoulder region  She also has symptoms of mild frontal headache with sinus congestion and drainage and postnasal drainage  The symptoms have been present for several weeks  She denies any fevers or chills associated with the symptoms  The following portions of the patient's history were reviewed and updated as appropriate:   She  has a past medical history of Anesthesia complication; Anxiety; Arthritis; Cataract, right; Eye hemorrhage; History of shingles (05/2015); Hypertension; and Mitral valve stenosis, mild  She   Patient Active Problem List    Diagnosis Date Noted    Other headache syndrome 10/31/2018    Arthritis 10/19/2018    Acute diastolic congestive heart failure ([address] ) 05/10/2018    Localized edema 05/10/2018    Other fatigue 05/10/2018    Cataract 05/13/2016    Aortic stenosis, moderate 05/11/2016    Atrial dilatation, bilateral 05/11/2016    Mild tricuspid insufficiency 05/11/2016    Non-rheumatic mitral regurgitation 05/11/2016    Essential hypertension 08/22/2013     She  has a past surgical history that includes Tonsillectomy; Eye surgery (Left); Carpal tunnel release (Left); Cervical cone biopsy; Colonoscopy;  Cataract extraction w/ intraocular lens implant (Left, 10/11/2016); and pr remv cataract extracap,insert lens (Right, 5/17/2016)  Her family history includes Arthritis in her mother and sister; Cancer in her brother and sister; GI problems in her father; Heart disease in her brother, maternal aunt, and mother; Irritable bowel syndrome in her sister; Sleep apnea in her sister  She  reports that she has never smoked  She has never used smokeless tobacco  She reports that she drinks alcohol  She reports that she does not use drugs  Current Outpatient Prescriptions   Medication Sig Dispense Refill    acyclovir (ZOVIRAX) 5 % ointment Apply topically      Ascorbic Acid (VITAMIN C) 1000 MG tablet Take 1,000 mg by mouth every morning   aspirin 325 mg tablet Take 325 mg by mouth as needed for mild pain   aspirin 325 mg tablet Take by mouth      BIOTIN PO Take 1,000 mcg by mouth every morning   Calcium Carb-Cholecalciferol (CALCIUM 600 + D) 600-200 MG-UNIT TABS Take 1 tablet by mouth daily      Cholecalciferol (VITAMIN D3) 2000 units TABS Take by mouth      CINNAMON PO Take 1,000 mg by mouth every morning   Flax OIL Take by mouth every morning   Flaxseed, Linseed, (EQL FLAX SEED OIL) 1000 MG CAPS Take by mouth      folic acid (FOLVITE) 073 MCG tablet Take 400 mcg by mouth every morning   furosemide (LASIX) 40 mg tablet Take 2 tablets (80 mg total) by mouth daily 180 tablet 3    glucosamine-chondroitin 500-400 MG tablet Take 1 tablet by mouth every morning   Multiple Vitamin (MULTIVITAMINS PO) Take 1 capsule by mouth daily      multivitamin (THERAGRAN) TABS Take 1 tablet by mouth every morning   Naproxen Sodium (ALEVE PO) Take 2 tablets by mouth daily at bedtime   Omega-3 Fatty Acids (FISH OIL PO) Take 1,400 mg by mouth every morning        verapamil (VERELAN) 240 MG 24 hr capsule Take 1 capsule (240 mg total) by mouth daily 90 capsule 3    Vitamin D, Cholecalciferol, 1000 UNITS CAPS Take by mouth every morning   vitamin E, tocopherol, 1,000 units capsule Take 1 capsule by mouth daily      vitamin E, tocopherol, 400 units capsule Take 400 Units by mouth every morning  No current facility-administered medications for this visit       Review of Systems   Constitutional: Negative for chills, diaphoresis, fatigue and fever  HENT: Positive for rhinorrhea and sinus pressure  Musculoskeletal: Positive for arthralgias and myalgias  All other systems reviewed and are negative  Objective:      /80 (BP Location: Right arm, Patient Position: Sitting, Cuff Size: Large)   Pulse 67   Temp 98 2 °F (36 8 °C)   Resp 14   Ht 4' 11" (1 499 m)   Wt 49 4 kg (109 lb)   SpO2 96%   BMI 22 02 kg/m²          Physical Exam   Constitutional: She is oriented to person, place, and time  Vital signs are normal  She appears well-developed and well-nourished  She is cooperative  HENT:   Head: Atraumatic  Right Ear: Hearing, tympanic membrane, external ear and ear canal normal    Left Ear: Hearing, tympanic membrane, external ear and ear canal normal    Nose: Mucosal edema and rhinorrhea present  Right sinus exhibits no maxillary sinus tenderness and no frontal sinus tenderness  Left sinus exhibits no maxillary sinus tenderness and no frontal sinus tenderness  Mouth/Throat: Uvula is midline and mucous membranes are normal  Posterior oropharyngeal erythema present  No oropharyngeal exudate, posterior oropharyngeal edema or tonsillar abscesses  Eyes: Pupils are equal, round, and reactive to light  Conjunctivae and lids are normal    Neck: No JVD present  Carotid bruit is not present  No thyromegaly present  Cardiovascular: Normal rate, regular rhythm and intact distal pulses  Murmur heard  A grade 3/6 systolic murmur   Pulmonary/Chest: Effort normal and breath sounds normal  No respiratory distress  Abdominal: Soft   Normal appearance and bowel sounds are normal  Musculoskeletal: Normal range of motion  She exhibits no edema  Mild decrease in range of motion of the left shoulder no point tenderness in the rotator cuff region nor any tenderness in the biceps or type triceps tendon region  No increased swelling or warmth emanating from the shoulder joint on the left side  Typical arthritic changes of the right knee with her mild effusion as well as tenderness  Mild increase in warmth emanating from the joint  Lymphadenopathy:     She has no cervical adenopathy  Neurological: She is alert and oriented to person, place, and time  She has normal reflexes  Skin: Skin is warm, dry and intact  Psychiatric: She has a normal mood and affect  Her speech is normal and behavior is normal  Judgment and thought content normal  Cognition and memory are normal    Vitals reviewed

## 2018-10-31 NOTE — ASSESSMENT & PLAN NOTE
History of hypertension is noted with this patient her blood pressure today is 138/80 will continue on present blood pressure management follow-up requested in 4 months

## 2018-11-09 ENCOUNTER — TELEPHONE (OUTPATIENT)
Dept: INTERNAL MEDICINE CLINIC | Facility: CLINIC | Age: 83
End: 2018-11-09

## 2018-11-09 NOTE — TELEPHONE ENCOUNTER
Verapamil is no longer on her formulary, should a prior auth be done to see if they will approve it or should she substitute it with something else

## 2018-11-09 NOTE — TELEPHONE ENCOUNTER
Spoke with patient  She has been on verapamil for many years, believe she was tried on another medication in the past prior to verapamil but does not recall the name  Please push for prior auth and will see what insurance says    Thanks

## 2018-11-12 NOTE — TELEPHONE ENCOUNTER
Does she need a prior auth then? Vicenta, I thought you said it was not a prior auth issue, it was just ordered to early  Ill start one if needed

## 2018-11-12 NOTE — TELEPHONE ENCOUNTER
JEIMY for patient to call back, I called the pharmacy and the patient received her Verapamil on September 19, which was a 90 day supply, she is not due to get a refill until December 3, she should only be taking one pill per day please verify this with the patient and let her know that it is way too early for the refill

## 2018-11-13 ENCOUNTER — EVALUATION (OUTPATIENT)
Dept: PHYSICAL THERAPY | Facility: CLINIC | Age: 83
End: 2018-11-13
Payer: MEDICARE

## 2018-11-13 DIAGNOSIS — M19.012 PRIMARY OSTEOARTHRITIS OF LEFT SHOULDER: Primary | ICD-10-CM

## 2018-11-13 DIAGNOSIS — M17.11 PRIMARY OSTEOARTHRITIS OF RIGHT KNEE: ICD-10-CM

## 2018-11-13 DIAGNOSIS — M12.812 ROTATOR CUFF ARTHROPATHY OF LEFT SHOULDER: ICD-10-CM

## 2018-11-13 PROCEDURE — G8985 CARRY GOAL STATUS: HCPCS | Performed by: PHYSICAL THERAPIST

## 2018-11-13 PROCEDURE — G8984 CARRY CURRENT STATUS: HCPCS | Performed by: PHYSICAL THERAPIST

## 2018-11-13 PROCEDURE — 97162 PT EVAL MOD COMPLEX 30 MIN: CPT | Performed by: PHYSICAL THERAPIST

## 2018-11-19 ENCOUNTER — OFFICE VISIT (OUTPATIENT)
Dept: PHYSICAL THERAPY | Facility: CLINIC | Age: 83
End: 2018-11-19
Payer: MEDICARE

## 2018-11-19 DIAGNOSIS — M17.11 PRIMARY OSTEOARTHRITIS OF RIGHT KNEE: ICD-10-CM

## 2018-11-19 DIAGNOSIS — M12.812 ROTATOR CUFF ARTHROPATHY OF LEFT SHOULDER: ICD-10-CM

## 2018-11-19 DIAGNOSIS — M19.012 PRIMARY OSTEOARTHRITIS OF LEFT SHOULDER: Primary | ICD-10-CM

## 2018-11-19 PROCEDURE — 97110 THERAPEUTIC EXERCISES: CPT

## 2018-11-19 NOTE — PROGRESS NOTES
Daily Note     Today's date: 2018  Patient name: Kaleigh Baxter  : 1935  MRN: [id number]  Referring provider: Shameka Early MD  Dx:   Encounter Diagnosis     ICD-10-CM    1  Primary osteoarthritis of left shoulder M19 012    2  Primary osteoarthritis of right knee M17 11    3  Rotator cuff arthropathy of left shoulder M12 812        Start Time: 1400  Stop Time: 1445  Total time in clinic (min): 45 minutes    Subjective: Pt reports no pain in L shld prior to session  Pt reports 5/10 pain in R knee, she reports her pain is increased with weight bearing  Objective: See treatment diary below    Precautions: Standard  B shoulder and knee pain    Daily Treatment Diary     Manual             L shld PROM  5'                                                                   Exercise Diary  1 2            IE Only            Nustep  6'           Pulleys  3'           Sup OH str c stick  20x           Shld shrugs  20x           Scap ret  20x                                     LAQ  2x10           Hip abd c band  Red  20x           Hip add c ball  20x           Gastroc str c SOS  Seated 5x10s           QS  5s10x                                                                                                      HEP TBI Rev           Time  40'               Modalities                                                             Assessment: Tolerated treatment well  Patient demonstrated fatigue post treatment, exhibited good technique with therapeutic exercises and would benefit from continued PT  Pt able to perform exercises with good form, she did not experience any inc pain with off loading exercises  Plan: Continue per plan of care  Progress treatment as tolerated

## 2018-11-21 ENCOUNTER — OFFICE VISIT (OUTPATIENT)
Dept: PHYSICAL THERAPY | Facility: CLINIC | Age: 83
End: 2018-11-21
Payer: MEDICARE

## 2018-11-21 DIAGNOSIS — M17.11 PRIMARY OSTEOARTHRITIS OF RIGHT KNEE: ICD-10-CM

## 2018-11-21 DIAGNOSIS — M19.012 PRIMARY OSTEOARTHRITIS OF LEFT SHOULDER: Primary | ICD-10-CM

## 2018-11-21 DIAGNOSIS — M12.812 ROTATOR CUFF ARTHROPATHY OF LEFT SHOULDER: ICD-10-CM

## 2018-11-21 PROCEDURE — 97110 THERAPEUTIC EXERCISES: CPT

## 2018-11-21 NOTE — PROGRESS NOTES
Daily Note     Today's date: 2018  Patient name: Ashok Perera  : 1935  MRN: [id number]  Referring provider: Andreina Spencer MD  Dx:   Encounter Diagnosis     ICD-10-CM    1  Primary osteoarthritis of left shoulder M19 012    2  Primary osteoarthritis of right knee M17 11    3  Rotator cuff arthropathy of left shoulder M12 812        Start Time: 1730  Stop Time: 1815  Total time in clinic (min): 45 minutes    Subjective: Pt reports her L shld "has not been bothersome"  She reports 8-9/10 pain in R knee with weight bearing  Objective: See treatment diary below    Precautions: Standard  B shoulder and knee pain    Daily Treatment Diary     Manual            L shld PROM  5' 5'                                                                  Exercise Diary  1 2 3           IE Only            Nustep  6' 6'          Pulleys  3' 3'          Sup OH str c stick  20x 20x          Shld shrugs  20x 20x          Scap ret  20x 20x                                    LAQ  2x10 2x10          Hip abd c band  Red  20x Red  20x          Hip add c ball  20x 20x          Gastroc str c SOS  Seated 5x10s seated  5x10s          QS  5s10x 5s10x                                                                                                     HEP TBI Rev Rev          Time  36' 40'              Modalities                                                             Assessment: Tolerated treatment well  Patient demonstrated fatigue post treatment, exhibited good technique with therapeutic exercises and would benefit from continued PT  Pt demo good activity tolerance with shoulder exercises  She reports difficulty during LE exercises due to inc knee pain  Plan: Continue per plan of care  Progress treatment as tolerated

## 2018-11-28 ENCOUNTER — OFFICE VISIT (OUTPATIENT)
Dept: PHYSICAL THERAPY | Facility: CLINIC | Age: 83
End: 2018-11-28
Payer: MEDICARE

## 2018-11-28 DIAGNOSIS — M17.11 PRIMARY OSTEOARTHRITIS OF RIGHT KNEE: ICD-10-CM

## 2018-11-28 DIAGNOSIS — M12.812 ROTATOR CUFF ARTHROPATHY OF LEFT SHOULDER: ICD-10-CM

## 2018-11-28 DIAGNOSIS — M19.012 PRIMARY OSTEOARTHRITIS OF LEFT SHOULDER: Primary | ICD-10-CM

## 2018-11-28 PROCEDURE — 97110 THERAPEUTIC EXERCISES: CPT

## 2018-11-28 NOTE — PROGRESS NOTES
Daily Note     Today's date: 2018  Patient name: Antonio Mcghee  : 1935  MRN: [id number]  Referring provider: Tori Ferrara MD  Dx:   Encounter Diagnosis     ICD-10-CM    1  Primary osteoarthritis of left shoulder M19 012    2  Primary osteoarthritis of right knee M17 11    3  Rotator cuff arthropathy of left shoulder M12 812        Start Time: 1400  Stop Time: 1445  Total time in clinic (min): 45 minutes    Subjective: Pt reports her L shld "is pretty much the same, doesn't bother me as much as the knee "      Objective: See treatment diary below    Precautions: Standard  B shoulder and knee pain    Daily Treatment Diary     Manual           L tarasld PROM  5' 5' 5'                                                                 Exercise Diary  1 2 3 4          IE Only            Nustep  6' 6' 8'         Pulleys  3' 3' 3' flex         Sup OH str c stick  20x 20x 20x         Shld shrugs  20x 20x 20x         Scap ret  20x 20x 20x                                   LAQ  2x10 2x10 2lb  2x10         Hip abd c band  Red  20x Red  20x Red  20x         Hip add c ball  20x 20x 20x         Gastroc str c SOS  Seated 5x10s seated  5x10s Seated  5x10s         QS  5s10x 5s10x --         SAQ    2lb  20x                                                                                       HEP TBI Rev Rev Rev         Time  36' 40' 45'             Modalities                                                             Assessment: Tolerated treatment well  Patient demonstrated fatigue post treatment, exhibited good technique with therapeutic exercises and would benefit from continued PT  Pt demo good activity tolerance with shoulder exercises  She was able to perform SAQ this session with no inc pain  Pt responded well to manual PROM to L shld  Plan: Continue per plan of care  Progress treatment as tolerated

## 2018-12-03 ENCOUNTER — APPOINTMENT (OUTPATIENT)
Dept: PHYSICAL THERAPY | Facility: CLINIC | Age: 83
End: 2018-12-03
Payer: MEDICARE

## 2018-12-05 ENCOUNTER — OFFICE VISIT (OUTPATIENT)
Dept: PHYSICAL THERAPY | Facility: CLINIC | Age: 83
End: 2018-12-05
Payer: MEDICARE

## 2018-12-05 DIAGNOSIS — M12.812 ROTATOR CUFF ARTHROPATHY OF LEFT SHOULDER: ICD-10-CM

## 2018-12-05 DIAGNOSIS — M17.11 PRIMARY OSTEOARTHRITIS OF RIGHT KNEE: ICD-10-CM

## 2018-12-05 DIAGNOSIS — M19.012 PRIMARY OSTEOARTHRITIS OF LEFT SHOULDER: Primary | ICD-10-CM

## 2018-12-05 PROCEDURE — 97110 THERAPEUTIC EXERCISES: CPT

## 2018-12-05 NOTE — PROGRESS NOTES
Daily Note     Today's date: 2018  Patient name: Bryson Iverson  : 1935  MRN: [id number]  Referring provider: Kiana Michele MD  Dx:   Encounter Diagnosis     ICD-10-CM    1  Primary osteoarthritis of left shoulder M19 012    2  Primary osteoarthritis of right knee M17 11    3  Rotator cuff arthropathy of left shoulder M12 812        Start Time: 1355  Stop Time: 1450  Total time in clinic (min): 55 minutes    Subjective: Pt reports she has been sleeping on her L side and wakes up with shld pain  She was educated to attempt to sleep on R, however she states "Its hard for me to do"      Objective: See treatment diary below    Precautions: Standard  B shoulder and knee pain    Daily Treatment Diary     Manual          L shld PROM  5' 5' 5'                                                                 Exercise Diary  1 2 3 4 5         IE Only            Nustep  6' 6' 8' 5'        Pulleys  3' 3' 3' flex 5' flex        Sup OH str c stick  20x 20x 20x         Shld shrugs  20x 20x 20x 30x        Scap ret  20x 20x 20x 30x        Rows     Red 20x                                               LAQ  2x10 2x10 2lb  2x10 2lb  2x10        Hip abd c band  Red  20x Red  20x Red  20x Red  20x        Hip add c ball  20x 20x 20x 20x        Gastroc str c SOS  Seated 5x10s seated  5x10s Seated  5x10s Seated  5x10s        QS  5s10x 5s10x --         SAQ    2lb  20x                                                                                       HEP TBI Rev Rev Rev Rev        Time  36' 40' 45' 45'            Modalities                                                             Assessment: Tolerated treatment well  Patient demonstrated fatigue post treatment, exhibited good technique with therapeutic exercises and would benefit from continued PT  Pt performed seated exercises this session due to inc knee pain  Plan: Continue per plan of care  Progress treatment as tolerated

## 2018-12-11 ENCOUNTER — EVALUATION (OUTPATIENT)
Dept: PHYSICAL THERAPY | Facility: CLINIC | Age: 83
End: 2018-12-11
Payer: MEDICARE

## 2018-12-11 DIAGNOSIS — M17.11 PRIMARY OSTEOARTHRITIS OF RIGHT KNEE: ICD-10-CM

## 2018-12-11 DIAGNOSIS — M19.012 PRIMARY OSTEOARTHRITIS OF LEFT SHOULDER: Primary | ICD-10-CM

## 2018-12-11 DIAGNOSIS — M12.812 ROTATOR CUFF ARTHROPATHY OF LEFT SHOULDER: ICD-10-CM

## 2018-12-11 PROCEDURE — 97110 THERAPEUTIC EXERCISES: CPT

## 2018-12-11 PROCEDURE — G8986 CARRY D/C STATUS: HCPCS | Performed by: PHYSICAL THERAPIST

## 2018-12-11 PROCEDURE — G8984 CARRY CURRENT STATUS: HCPCS | Performed by: PHYSICAL THERAPIST

## 2018-12-11 PROCEDURE — G8985 CARRY GOAL STATUS: HCPCS | Performed by: PHYSICAL THERAPIST

## 2018-12-11 NOTE — PROGRESS NOTES
Daily Note     Today's date: 2018  Patient name: Rosa Palafox  : 1935  MRN: [id number]  Referring provider: Maninder Cisneros MD  Dx:   Encounter Diagnosis     ICD-10-CM    1  Primary osteoarthritis of left shoulder M19 012    2  Primary osteoarthritis of right knee M17 11    3  Rotator cuff arthropathy of left shoulder M12 812        Start Time: 1100  Stop Time: 1145  Total time in clinic (min): 45 minutes    Subjective: Pt reports 8/10 pain in R knee with weight bearing, she reports no pain when sitting  She reports her L shld has improved however she reports she is still sleeping on L side  Objective: See treatment diary below    Precautions: Standard  B shoulder and knee pain    Daily Treatment Diary     Manual         L shld PROM  5' 5' 5'                                                                 Exercise Diary  1 2 3 4 5 6        IE Only            Nustep  6' 6' 8' 5' 8'       Pulleys  3' 3' 3' flex 5' flex 5' flex       Sup OH str c stick  20x 20x 20x         Shld shrugs  20x 20x 20x 30x 30x       Scap ret  20x 20x 20x 30x 30x         Rows     Red 20x Red  20x                                              LAQ  2x10 2x10 2lb  2x10 2lb  2x10 2lb  2x10       Hip abd c band  Red  20x Red  20x Red  20x Red  20x Red  20x       Hip add c ball  20x 20x 20x 20x 20x       Gastroc str c SOS  Seated 5x10s seated  5x10s Seated  5x10s Seated  5x10s Seated  5x10s       QS  5s10x 5s10x --         SAQ    2lb  20x                                                                                       HEP TBI Rev Rev Rev Rev Rev       Time  36' 40' 45' 45' 45'           Modalities                                                             Assessment: Tolerated treatment well  Patient demonstrated fatigue post treatment, exhibited good technique with therapeutic exercises and would benefit from continued PT   Pt performed seated exercises this session due to inc knee pain        Plan: Continue per plan of care  Progress treatment as tolerated

## 2018-12-11 NOTE — PROGRESS NOTES
PT Re-Evaluation /Discharge    Today's date: 2019  Patient name: Jareth Guadalupe  : 1935  MRN: [id number]  Referring provider: Yudy Graf MD  Dx:   Encounter Diagnosis     ICD-10-CM    1  Primary osteoarthritis of left shoulder M19 012 PT plan of care cert/re-cert   2  Primary osteoarthritis of right knee M17 11    3  Rotator cuff arthropathy of left shoulder M12 812        Start Time: 1100  Stop Time: 1145  Total time in clinic (min): 45 minutes    Assessment  Assessment details: Jareth Guadalupe is a 80 y o  female who has been participating in skilled PT since time of IE and while she remains below her PLOF she demo improvements toward LTGs primarily pertaining to her shoulder status  Pt denies any improvements in her knee at this time despite therapy and not having to transfer her ill  at home (he has been hospitalized since beginning of her care)  Pt demo inc in shoulder motion and strength as well as pain despite sleep disturbance  Knee status remains unchanged at this time and may benefit from further MD observation  At this time pt would benefit from ongoing skilled PT to maximize her return to [facility] and promote self management of HEP    Impairments: abnormal gait, abnormal or restricted ROM, activity intolerance, impaired physical strength, lacks appropriate home exercise program, pain with function, weight-bearing intolerance, poor posture  and poor body mechanics    Symptom irritability: moderateUnderstanding of Dx/Px/POC: good   Prognosis: good    Goals  Short Term Goals to be accomplished in 3 weeks: -partially met/ongoing  STG1: Pt will be I with HEP  STG2: Pt will demo 50% inc in knee AROM  STG3: Pt will demo 1/2 MMT strength in knee   STG4: Pt will amb community distance without gait deviates due to pain     Long Term Goals to be accomplished in 6 weeks: -ongoing  LTG1: Pt will demo knee strength WNL to return to PLOF  LTG2: Pt will demo knee AROM WNL to minimize gait deviations  LTG3: Pt will return to household ADLs and work duties pain free as per [address] details: Cont HEP development, stretching, strengthening, A/AA/PROM, joint mobilizations, posture education, STM/MI as needed to reduce muscle tension, muscle reeducation, PLOC discussed and agreed upon with patient  Patient would benefit from: PT eval and skilled physical therapy  Planned modality interventions: cryotherapy and thermotherapy: hydrocollator packs  Planned therapy interventions: manual therapy, neuromuscular re-education, self care, therapeutic activities, therapeutic exercise and home exercise program  Frequency: 2x week (1-2x/week)  Duration in weeks: 6  Treatment plan discussed with: patient        Subjective Evaluation    History of Present Illness  Mechanism of injury: Pt reports primary concerns with L shoulder and R knee  Pertaining to R knee, chronic in nature  Knee pain only felt when in weight bearing, 0/10 in sitting or sleeping in knee  Pt reports she is unable to have surgery as she has a disabled  who requires extensive care  She had an injection, she reports nil relief  Pt is primarily responsible for community errands and shopping, household cleaning and cooking  Pt is also driving herself  Pain at worst in knee 8/10 when walking  "I feel like I can't walk normally " Pt reports she has a rap in back of home, 1 FOS to basement and second floor which she negotiates regularly, however primarily "going up on all fours" however "I go down one at a time "    Pertaining to shoulder pain, she notices her symptoms fluctuate with "good days and bad days" and indicates a "knot" which she noticed previously in her life when she "had a neck problem" and that is primarily her limited pain source  Certain days she feels she is lacking strength  Reaching is primary difficulty  Pt's  utilizes wheelchair and sit to stand jackson lift so she is required to push/pull heavy loads  She does feel her symptoms have worsened in last several weeks  Shoulder pain 0/10 at best while resting, however intermittent disturbed sleep due to pain in left shoulder  Pain at worst reaches 8/10  Pt reports numbness down entirety of L UE and hand occasionally, she finds this while driving with hand son steering wheel  Pt reports her R shoulder is less problematic than L  Pt is hopeful to be able to "set" her hair with curlers regularly wihtout having to utilize R UE to support the L  Pt reports last several weeks her  has been in rehabilitation care and she has not been working as hard ot lift and carry him for his assistance needs and is wondering if her lack of "exercise" is what's provoking her pain increase  At time of RE-EVAL 18: Pt reports she only has knee pain when she is "on it" however denies any knee pain while resting  Pt reports shoulder discomfort only felt when she is sleeping as she lays on that shoulder, however does not feel pain, only a "numbness " Pt is pleased with progress of her shoulder and feels that is nearly resolved (>80% better reportedly) however feels her knee pain and limitations have not changed  Pt reports very intermittent performance of HEP  Quality of life: good    Pain  Current pain ratin  At best pain ratin  At worst pain ratin    Treatments  Previous treatment: physical therapy  Patient Goals  Patient goals for therapy: increased strength, independence with ADLs/IADLs, increased motion and decreased pain          Objective     Observations     Additional Observation Details  Mild swelling R knee joint    Palpation     Additional Palpation Details  Major sensitivity to touch, discomfort with light palpation R knee joint    Cervical/Thoracic Screen   Cervical range of motion within normal limits with the following exceptions: C/S AROM:    Flexion: WNL  Extension: Min loss  R rot:  Mod loss  L rot: Min loss   R SB: Major loss painfree  L SB: WNL pain free    Active Range of Motion   Left Shoulder   Flexion: 165 degrees   Abduction: 160 degrees   External rotation 0°: 40 degrees   Internal rotation BTB: L5     Right Shoulder   Flexion: 170 degrees   Abduction: 170 degrees   External rotation 0°: 40 degrees   Internal rotation BTB: L2   Left Knee   Flexion: 130 degrees   Extension: 0 degrees     Right Knee   Flexion: 115 degrees with pain  Extensor la degrees     Strength/Myotome Testing     Left Knee   Flexion: 4  Extension: 4    Right Knee   Flexion: 3+  Extension: 3+    Additional Strength Details  3+ to 4-/5 MMT throughout B shoulders and elbows    General Comments     Knee Comments  Gait: Nil TKE in stance with major inc trunk sway and slow addy  Dec push off and heel strike with inc knee flexion throughout    Stairs: Crawling up stairs, Step to stair pattern with B hands on railing to escend      Flowsheet Rows      Most Recent Value   PT/OT G-Codes   Current Score  34   Projected Score  47   Assessment Type  Discharge   G code set  Carrying, [address], Moving and Handling Objects Current Status ()  CJ   Carrying, Moving and Handling Objects Goal Status ()  CI   Carrying, Moving and Handling Objects Discharge Status ()  CJ          Precautions: Standard   B shoulder and knee pain

## 2018-12-12 ENCOUNTER — OFFICE VISIT (OUTPATIENT)
Dept: OBGYN CLINIC | Facility: CLINIC | Age: 83
End: 2018-12-12
Payer: MEDICARE

## 2018-12-12 VITALS
DIASTOLIC BLOOD PRESSURE: 63 MMHG | HEART RATE: 68 BPM | HEIGHT: 59 IN | BODY MASS INDEX: 22.18 KG/M2 | WEIGHT: 110 LBS | SYSTOLIC BLOOD PRESSURE: 145 MMHG

## 2018-12-12 DIAGNOSIS — M17.11 PRIMARY OSTEOARTHRITIS OF RIGHT KNEE: Primary | ICD-10-CM

## 2018-12-12 PROCEDURE — 20610 DRAIN/INJ JOINT/BURSA W/O US: CPT | Performed by: ORTHOPAEDIC SURGERY

## 2018-12-12 PROCEDURE — 99214 OFFICE O/P EST MOD 30 MIN: CPT | Performed by: ORTHOPAEDIC SURGERY

## 2018-12-12 RX ORDER — HYALURONATE SODIUM 10 MG/ML
20 SYRINGE (ML) INTRAARTICULAR
Status: COMPLETED | OUTPATIENT
Start: 2018-12-12 | End: 2018-12-12

## 2018-12-12 RX ADMIN — Medication 20 MG: at 15:10

## 2018-12-12 NOTE — PROGRESS NOTES
Assessment/Plan:  1  Primary osteoarthritis of right knee         Scribe Attestation    I,:   Jose De Jesus Duenas am acting as a scribe while in the presence of the attending physician :        I,:   Sarah Watts MD personally performed the services described in this documentation    as scribed in my presence :              Diandra Gaines upon examination of her right knee does appear to continue to suffer from painful symptoms of tricompartmental osteoarthritis  However previous treatment of physical therapy and cortisone injection did not provide her with relief  She is still experiencing intermittent mild to moderate sharp pain about the anterior medial aspect of her right knee with weight-bearing and is better at rest   I did discuss with her the next step in conservative treatments such as Euflexxa series injections  I noted to her that the goal of these injections is to provide hyaluronate acid to help hydrate the knee joint  I noted that as osteoarthritis progresses in the joint hydration and cushioning is decreased thus resulting in advancement and increase in painful arthritic symptoms  Diandra Gaines opted for this option today as she notes that she is unable to have her knees replaced she does provide care for her   I did provide the 1st of 3 injections today in the office  She tolerated the injection well  I would like to see Diandra Gaines back next week for her 2nd injection  I will see Diandra Eder back in 1 week  She does also demonstrate osteoarthritis of the hands  I do not believe that her deformity in her hands is a result of rheumatoid arthritis as she demonstrates heberdens nodes and a small [place]'s on the right PIP joint of the long finger  She does demonstrate ulnar drift of the fingers in both hands      Large joint arthrocentesis  Date/Time: 12/12/2018 3:10 PM  Consent given by: patient  Supporting Documentation  Indications: pain   Procedure Details  Location: knee - R knee  Preparation: Patient was prepped and draped in the usual sterile fashion  Needle size: 22 G  Ultrasound guidance: no  Approach: anterolateral  Medications administered: 20 mg Sodium Hyaluronate 20 MG/2ML    Patient tolerance: patient tolerated the procedure well with no immediate complications  Dressing:  Sterile dressing applied        Subjective:   Jean Paul Peguero is a 80 y o  female who presents for follow-up evaluation of her right knee  She states she has not seen any significant improvement with the cortisone injection or physical therapy  She note that the cortisone injection did not provide any relief at all  She is still experiencing intermittent mild moderate discomfort about the anterior medial aspect of her knee while weight-bearing  She notes that she is pain-free while at rest   She does appreciate crepitus with range of motion of the knee  However, does not appreciate any instability in the knee  She does note mild swelling in the anterior aspect of her knee when compared to her left knee  She also notes she does have a heart condition does have lower extremity swelling  Today she denies any distal paresthesias  Review of Systems   Constitutional: Negative for chills, fever and unexpected weight change  HENT: Negative for hearing loss, nosebleeds and sore throat  Eyes: Negative for pain, redness and visual disturbance  Respiratory: Negative for cough, shortness of breath and wheezing  Cardiovascular: Negative for chest pain, palpitations and leg swelling  Gastrointestinal: Negative for abdominal pain, nausea and vomiting  Endocrine: Negative for polydipsia and polyuria  Genitourinary: Negative for dysuria and hematuria  Musculoskeletal: Positive for arthralgias, joint swelling and myalgias  See HPI   Skin: Negative for rash and wound  Neurological: Positive for numbness  Negative for dizziness and headaches     Psychiatric/Behavioral: Negative for decreased concentration and suicidal ideas  The patient is not nervous/anxious  Past Medical History:   Diagnosis Date    Anesthesia complication       Yrs ago had "anxiety" reaction not sure while sedated, pt states sensitive to anesthesia effects    Anxiety     stress at home    Arthritis     Cataract, right     Last Assessed:16    Eye hemorrhage     left eye currently 16    History of shingles 2015    Hypertension     Mitral valve stenosis, mild        Past Surgical History:   Procedure Laterality Date    CARPAL TUNNEL RELEASE Left     CATARACT EXTRACTION W/ INTRAOCULAR LENS IMPLANT Left 10/11/2016    Procedure: EXTRACTION EXTRACAPSULAR CATARACT PHACO INTRAOCULAR LENS (IOL); Surgeon: Celestine Gutierrez MD;  Location: [facility] MAIN OR;  Service:     CERVICAL CONE BIOPSY      COLONOSCOPY      EYE SURGERY Left     muscle repair    OR REMV CATARACT EXTRACAP,INSERT LENS Right 2016    Procedure: EXTRACTION EXTRACAPSULAR CATARACT PHACO INTRAOCULAR LENS (IOL); Surgeon: Celestine Gutierrez MD;  Location: [facility] MAIN OR;  Service: Ophthalmology    TONSILLECTOMY         Family History   Problem Relation Age of Onset    Heart disease Mother         MI  at age 71   [employer] Arthritis Mother     GI problems Father         bleeding ulcer    Arthritis Sister     Sleep apnea Sister     Irritable bowel syndrome Sister     Cancer Sister         skin cancer    Heart disease Brother         CABG (5)    Cancer Brother         skin cancer    Heart disease Maternal Aunt        Social History     Occupational History    Not on file       Social History Main Topics    Smoking status: Never Smoker    Smokeless tobacco: Never Used    Alcohol use Yes      Comment: rarely/occasional glass of wine    Drug use: No    Sexual activity: Not on file         Current Outpatient Prescriptions:     acyclovir (ZOVIRAX) 5 % ointment, Apply topically, Disp: , Rfl:     Ascorbic Acid (VITAMIN C) 1000 MG tablet, Take 1,000 mg by mouth every morning , Disp: , Rfl:     aspirin 325 mg tablet, Take 325 mg by mouth as needed for mild pain , Disp: , Rfl:     aspirin 325 mg tablet, Take by mouth, Disp: , Rfl:     BIOTIN PO, Take 1,000 mcg by mouth every morning , Disp: , Rfl:     Calcium Carb-Cholecalciferol (CALCIUM 600 + D) 600-200 MG-UNIT TABS, Take 1 tablet by mouth daily, Disp: , Rfl:     Cholecalciferol (VITAMIN D3) 2000 units TABS, Take by mouth, Disp: , Rfl:     CINNAMON PO, Take 1,000 mg by mouth every morning , Disp: , Rfl:     Flax OIL, Take by mouth every morning , Disp: , Rfl:     Flaxseed, Linseed, (EQL FLAX SEED OIL) 1000 MG CAPS, Take by mouth, Disp: , Rfl:     folic acid (FOLVITE) 009 MCG tablet, Take 400 mcg by mouth every morning , Disp: , Rfl:     furosemide (LASIX) 40 mg tablet, Take 2 tablets (80 mg total) by mouth daily, Disp: 180 tablet, Rfl: 3    glucosamine-chondroitin 500-400 MG tablet, Take 1 tablet by mouth every morning , Disp: , Rfl:     Multiple Vitamin (MULTIVITAMINS PO), Take 1 capsule by mouth daily, Disp: , Rfl:     multivitamin (THERAGRAN) TABS, Take 1 tablet by mouth every morning , Disp: , Rfl:     Naproxen Sodium (ALEVE PO), Take 2 tablets by mouth daily at bedtime  , Disp: , Rfl:     Omega-3 Fatty Acids (FISH OIL PO), Take 1,400 mg by mouth every morning , Disp: , Rfl:     verapamil (VERELAN) 240 MG 24 hr capsule, Take 1 capsule (240 mg total) by mouth daily, Disp: 90 capsule, Rfl: 3    Vitamin D, Cholecalciferol, 1000 UNITS CAPS, Take by mouth every morning , Disp: , Rfl:     vitamin E, tocopherol, 1,000 units capsule, Take 1 capsule by mouth daily, Disp: , Rfl:     vitamin E, tocopherol, 400 units capsule, Take 400 Units by mouth every morning , Disp: , Rfl:     Allergies   Allergen Reactions    Indocin [Indomethacin]      hypertension       Objective:  Vitals:    12/12/18 1432   BP: 145/63   Pulse: 68       Right Knee Exam     Tenderness   The patient is experiencing no tenderness           Range of Motion   Extension: 0   Flexion: 150     Tests   Drawer:       Anterior - negative    Posterior - negative  Varus: negative  Valgus: positive    Other   Erythema: absent  Scars: absent  Sensation: normal  Pulse: present  Swelling: moderate      Right Hand Exam     Comments:  Ulnar drift of the fingers  heberdens nodes        Left Hand Exam     Comments:  Ulnar drift of the fingers  heberden's nodes            Physical Exam   Constitutional: She is oriented to person, place, and time  She appears well-developed and well-nourished  HENT:   Head: Normocephalic and atraumatic  Eyes: Conjunctivae are normal  Right eye exhibits no discharge  Left eye exhibits no discharge  Neck: Normal range of motion  Neck supple  Cardiovascular: Normal rate  Pulmonary/Chest: Effort normal  No respiratory distress  Musculoskeletal:   As noted in HPI   Neurological: She is alert and oriented to person, place, and time  Skin: Skin is warm and dry  Psychiatric: She has a normal mood and affect  Her behavior is normal  Judgment and thought content normal    Nursing note and vitals reviewed

## 2018-12-13 ENCOUNTER — APPOINTMENT (OUTPATIENT)
Dept: PHYSICAL THERAPY | Facility: CLINIC | Age: 83
End: 2018-12-13
Payer: MEDICARE

## 2018-12-19 ENCOUNTER — OFFICE VISIT (OUTPATIENT)
Dept: OBGYN CLINIC | Facility: CLINIC | Age: 83
End: 2018-12-19
Payer: MEDICARE

## 2018-12-19 VITALS
HEART RATE: 64 BPM | DIASTOLIC BLOOD PRESSURE: 70 MMHG | BODY MASS INDEX: 22.18 KG/M2 | HEIGHT: 59 IN | WEIGHT: 110 LBS | SYSTOLIC BLOOD PRESSURE: 148 MMHG

## 2018-12-19 DIAGNOSIS — M17.11 PRIMARY OSTEOARTHRITIS OF RIGHT KNEE: Primary | ICD-10-CM

## 2018-12-19 PROCEDURE — 20610 DRAIN/INJ JOINT/BURSA W/O US: CPT | Performed by: PHYSICIAN ASSISTANT

## 2018-12-19 RX ORDER — HYALURONATE SODIUM 10 MG/ML
20 SYRINGE (ML) INTRAARTICULAR
Status: COMPLETED | OUTPATIENT
Start: 2018-12-19 | End: 2018-12-19

## 2018-12-19 RX ADMIN — Medication 20 MG: at 14:00

## 2018-12-19 NOTE — PROGRESS NOTES
Assessment/Plan:  1  Primary osteoarthritis of right knee         Follow-up 1 week  Subjective:   Pop Lennon is a 80 y o  female who presents today for euflexxa #2 right knee  Review of Systems      Past Medical History:   Diagnosis Date    Anesthesia complication       Yrs ago had "anxiety" reaction not sure while sedated, pt states sensitive to anesthesia effects    Anxiety     stress at home    Arthritis     Cataract, right     Last Assessed:16    Eye hemorrhage     left eye currently 16    History of shingles 2015    Hypertension     Mitral valve stenosis, mild        Past Surgical History:   Procedure Laterality Date    CARPAL TUNNEL RELEASE Left     CATARACT EXTRACTION W/ INTRAOCULAR LENS IMPLANT Left 10/11/2016    Procedure: EXTRACTION EXTRACAPSULAR CATARACT PHACO INTRAOCULAR LENS (IOL); Surgeon: Mary Gray MD;  Location: [facility] MAIN OR;  Service:     CERVICAL CONE BIOPSY      COLONOSCOPY      EYE SURGERY Left     muscle repair    NC REMV CATARACT EXTRACAP,INSERT LENS Right 2016    Procedure: EXTRACTION EXTRACAPSULAR CATARACT PHACO INTRAOCULAR LENS (IOL); Surgeon: Mary Gray MD;  Location: [facility] MAIN OR;  Service: Ophthalmology    TONSILLECTOMY         Family History   Problem Relation Age of Onset    Heart disease Mother         MI  at age 71   Tomsandra Coop Arthritis Mother     GI problems Father         bleeding ulcer    Arthritis Sister     Sleep apnea Sister     Irritable bowel syndrome Sister     Cancer Sister         skin cancer    Heart disease Brother         CABG (5)    Cancer Brother         skin cancer    Heart disease Maternal Aunt        Social History     Occupational History    Not on file       Social History Main Topics    Smoking status: Never Smoker    Smokeless tobacco: Never Used    Alcohol use Yes      Comment: rarely/occasional glass of wine    Drug use: No    Sexual activity: Not on file         Current Outpatient Prescriptions:     acyclovir (ZOVIRAX) 5 % ointment, Apply topically, Disp: , Rfl:     Ascorbic Acid (VITAMIN C) 1000 MG tablet, Take 1,000 mg by mouth every morning , Disp: , Rfl:     aspirin 325 mg tablet, Take 325 mg by mouth as needed for mild pain , Disp: , Rfl:     aspirin 325 mg tablet, Take by mouth, Disp: , Rfl:     BIOTIN PO, Take 1,000 mcg by mouth every morning , Disp: , Rfl:     Calcium Carb-Cholecalciferol (CALCIUM 600 + D) 600-200 MG-UNIT TABS, Take 1 tablet by mouth daily, Disp: , Rfl:     Cholecalciferol (VITAMIN D3) 2000 units TABS, Take by mouth, Disp: , Rfl:     CINNAMON PO, Take 1,000 mg by mouth every morning , Disp: , Rfl:     Flax OIL, Take by mouth every morning , Disp: , Rfl:     Flaxseed, Linseed, (EQL FLAX SEED OIL) 1000 MG CAPS, Take by mouth, Disp: , Rfl:     folic acid (FOLVITE) 162 MCG tablet, Take 400 mcg by mouth every morning , Disp: , Rfl:     furosemide (LASIX) 40 mg tablet, Take 2 tablets (80 mg total) by mouth daily, Disp: 180 tablet, Rfl: 3    glucosamine-chondroitin 500-400 MG tablet, Take 1 tablet by mouth every morning , Disp: , Rfl:     Multiple Vitamin (MULTIVITAMINS PO), Take 1 capsule by mouth daily, Disp: , Rfl:     multivitamin (THERAGRAN) TABS, Take 1 tablet by mouth every morning , Disp: , Rfl:     Naproxen Sodium (ALEVE PO), Take 2 tablets by mouth daily at bedtime  , Disp: , Rfl:     Omega-3 Fatty Acids (FISH OIL PO), Take 1,400 mg by mouth every morning , Disp: , Rfl:     verapamil (VERELAN) 240 MG 24 hr capsule, Take 1 capsule (240 mg total) by mouth daily, Disp: 90 capsule, Rfl: 3    Vitamin D, Cholecalciferol, 1000 UNITS CAPS, Take by mouth every morning , Disp: , Rfl:     vitamin E, tocopherol, 1,000 units capsule, Take 1 capsule by mouth daily, Disp: , Rfl:     vitamin E, tocopherol, 400 units capsule, Take 400 Units by mouth every morning , Disp: , Rfl:     Allergies   Allergen Reactions    Indocin [Indomethacin]      hypertension Objective:  Vitals:    12/19/18 1347   BP: 148/70   Pulse: 64       Ortho Exam    Physical Exam    Large joint arthrocentesis  Date/Time: 12/19/2018 2:00 PM  Consent given by: patient  Site marked: site marked  Supporting Documentation  Indications: pain   Procedure Details  Location: knee - R knee  Needle size: 22 G  Ultrasound guidance: no  Approach: anterolateral  Medications administered: 20 mg Sodium Hyaluronate 20 MG/2ML    Patient tolerance: patient tolerated the procedure well with no immediate complications  Dressing:  Sterile dressing applied

## 2019-01-02 ENCOUNTER — OFFICE VISIT (OUTPATIENT)
Dept: OBGYN CLINIC | Facility: CLINIC | Age: 84
End: 2019-01-02
Payer: MEDICARE

## 2019-01-02 DIAGNOSIS — M17.11 PRIMARY OSTEOARTHRITIS OF RIGHT KNEE: Primary | ICD-10-CM

## 2019-01-02 PROCEDURE — 20610 DRAIN/INJ JOINT/BURSA W/O US: CPT | Performed by: ORTHOPAEDIC SURGERY

## 2019-01-02 RX ORDER — HYALURONATE SODIUM 10 MG/ML
20 SYRINGE (ML) INTRAARTICULAR
Status: COMPLETED | OUTPATIENT
Start: 2019-01-02 | End: 2019-01-02

## 2019-01-02 RX ADMIN — Medication 20 MG: at 13:12

## 2019-01-02 NOTE — PROGRESS NOTES
Assessment/Plan:  1  Primary osteoarthritis of right knee         Follow-up prn  Discussed repeat injections in 6 months if she gets improvement with this series  Subjective:   Claudeen Berry is a 80 y o  female who presents today for euflexxa #3 right knee  Review of Systems      Past Medical History:   Diagnosis Date    Anesthesia complication       Yrs ago had "anxiety" reaction not sure while sedated, pt states sensitive to anesthesia effects    Anxiety     stress at home    Arthritis     Cataract, right     Last Assessed:16    Eye hemorrhage     left eye currently 16    History of shingles 2015    Hypertension     Mitral valve stenosis, mild        Past Surgical History:   Procedure Laterality Date    CARPAL TUNNEL RELEASE Left     CATARACT EXTRACTION W/ INTRAOCULAR LENS IMPLANT Left 10/11/2016    Procedure: EXTRACTION EXTRACAPSULAR CATARACT PHACO INTRAOCULAR LENS (IOL); Surgeon: Ben Diaz MD;  Location: [facility] MAIN OR;  Service:     CERVICAL CONE BIOPSY      COLONOSCOPY      EYE SURGERY Left     muscle repair    MO REMV CATARACT EXTRACAP,INSERT LENS Right 2016    Procedure: EXTRACTION EXTRACAPSULAR CATARACT PHACO INTRAOCULAR LENS (IOL); Surgeon: Ben Diaz MD;  Location: [facility] MAIN OR;  Service: Ophthalmology    TONSILLECTOMY         Family History   Problem Relation Age of Onset    Heart disease Mother         MI  at age 71   [facility] Arthritis Mother     GI problems Father         bleeding ulcer    Arthritis Sister     Sleep apnea Sister     Irritable bowel syndrome Sister     Cancer Sister         skin cancer    Heart disease Brother         CABG (5)    Cancer Brother         skin cancer    Heart disease Maternal Aunt        Social History     Occupational History    Not on file       Social History Main Topics    Smoking status: Never Smoker    Smokeless tobacco: Never Used    Alcohol use Yes      Comment: rarely/occasional glass of wine  Drug use: No    Sexual activity: Not on file         Current Outpatient Prescriptions:     acyclovir (ZOVIRAX) 5 % ointment, Apply topically, Disp: , Rfl:     Ascorbic Acid (VITAMIN C) 1000 MG tablet, Take 1,000 mg by mouth every morning , Disp: , Rfl:     aspirin 325 mg tablet, Take 325 mg by mouth as needed for mild pain , Disp: , Rfl:     aspirin 325 mg tablet, Take by mouth, Disp: , Rfl:     BIOTIN PO, Take 1,000 mcg by mouth every morning , Disp: , Rfl:     Calcium Carb-Cholecalciferol (CALCIUM 600 + D) 600-200 MG-UNIT TABS, Take 1 tablet by mouth daily, Disp: , Rfl:     Cholecalciferol (VITAMIN D3) 2000 units TABS, Take by mouth, Disp: , Rfl:     CINNAMON PO, Take 1,000 mg by mouth every morning , Disp: , Rfl:     Flax OIL, Take by mouth every morning , Disp: , Rfl:     Flaxseed, Linseed, (EQL FLAX SEED OIL) 1000 MG CAPS, Take by mouth, Disp: , Rfl:     folic acid (FOLVITE) 309 MCG tablet, Take 400 mcg by mouth every morning , Disp: , Rfl:     furosemide (LASIX) 40 mg tablet, Take 2 tablets (80 mg total) by mouth daily, Disp: 180 tablet, Rfl: 3    glucosamine-chondroitin 500-400 MG tablet, Take 1 tablet by mouth every morning , Disp: , Rfl:     Multiple Vitamin (MULTIVITAMINS PO), Take 1 capsule by mouth daily, Disp: , Rfl:     multivitamin (THERAGRAN) TABS, Take 1 tablet by mouth every morning , Disp: , Rfl:     Naproxen Sodium (ALEVE PO), Take 2 tablets by mouth daily at bedtime  , Disp: , Rfl:     Omega-3 Fatty Acids (FISH OIL PO), Take 1,400 mg by mouth every morning , Disp: , Rfl:     verapamil (VERELAN) 240 MG 24 hr capsule, Take 1 capsule (240 mg total) by mouth daily, Disp: 90 capsule, Rfl: 3    Vitamin D, Cholecalciferol, 1000 UNITS CAPS, Take by mouth every morning , Disp: , Rfl:     vitamin E, tocopherol, 1,000 units capsule, Take 1 capsule by mouth daily, Disp: , Rfl:     vitamin E, tocopherol, 400 units capsule, Take 400 Units by mouth every morning , Disp: , Rfl: Allergies   Allergen Reactions    Indocin [Indomethacin]      hypertension       Objective: There were no vitals filed for this visit      Ortho Exam    Physical Exam    Large joint arthrocentesis  Date/Time: 1/2/2019 1:12 PM  Consent given by: patient  Site marked: site marked  Supporting Documentation  Indications: pain   Procedure Details  Location: knee - R knee  Needle size: 22 G  Ultrasound guidance: no  Approach: anterolateral  Medications administered: 20 mg Sodium Hyaluronate 20 MG/2ML    Patient tolerance: patient tolerated the procedure well with no immediate complications  Dressing:  Sterile dressing applied

## 2019-02-22 ENCOUNTER — OFFICE VISIT (OUTPATIENT)
Dept: INTERNAL MEDICINE CLINIC | Facility: CLINIC | Age: 84
End: 2019-02-22
Payer: MEDICARE

## 2019-02-22 VITALS
HEART RATE: 65 BPM | BODY MASS INDEX: 22.46 KG/M2 | DIASTOLIC BLOOD PRESSURE: 62 MMHG | SYSTOLIC BLOOD PRESSURE: 148 MMHG | HEIGHT: 59 IN | TEMPERATURE: 98.3 F | OXYGEN SATURATION: 98 % | RESPIRATION RATE: 14 BRPM | WEIGHT: 111.4 LBS

## 2019-02-22 DIAGNOSIS — I10 ESSENTIAL HYPERTENSION: ICD-10-CM

## 2019-02-22 DIAGNOSIS — M19.90 ARTHRITIS: ICD-10-CM

## 2019-02-22 DIAGNOSIS — R60.0 LOCALIZED EDEMA: ICD-10-CM

## 2019-02-22 DIAGNOSIS — Z23 ENCOUNTER FOR IMMUNIZATION: Primary | ICD-10-CM

## 2019-02-22 DIAGNOSIS — I50.32 CHRONIC DIASTOLIC CONGESTIVE HEART FAILURE (HCC): ICD-10-CM

## 2019-02-22 DIAGNOSIS — E78.5 HYPERLIPIDEMIA, UNSPECIFIED HYPERLIPIDEMIA TYPE: ICD-10-CM

## 2019-02-22 DIAGNOSIS — I35.0 AORTIC STENOSIS, MODERATE: ICD-10-CM

## 2019-02-22 DIAGNOSIS — R53.83 OTHER FATIGUE: ICD-10-CM

## 2019-02-22 PROBLEM — G44.89 OTHER HEADACHE SYNDROME: Status: RESOLVED | Noted: 2018-10-31 | Resolved: 2019-02-22

## 2019-02-22 PROCEDURE — 99215 OFFICE O/P EST HI 40 MIN: CPT | Performed by: INTERNAL MEDICINE

## 2019-02-22 PROCEDURE — 90670 PCV13 VACCINE IM: CPT | Performed by: INTERNAL MEDICINE

## 2019-02-22 PROCEDURE — G0009 ADMIN PNEUMOCOCCAL VACCINE: HCPCS | Performed by: INTERNAL MEDICINE

## 2019-02-22 RX ORDER — METOPROLOL SUCCINATE 50 MG/1
50 TABLET, EXTENDED RELEASE ORAL DAILY
Qty: 90 TABLET | Refills: 2 | Status: SHIPPED | OUTPATIENT
Start: 2019-02-22 | End: 2019-07-22 | Stop reason: CLARIF

## 2019-02-22 NOTE — PROGRESS NOTES
Assessment/Plan: Aortic stenosis, moderate  History of moderate aortic stenosis  The patient's murmur has not changed significantly  She does have some fatigue symptoms which I suspect are related to her ear Eliel stenosis she denies any lightheadedness or syncopal episodes  Will continue to monitor this moving forward  Chronic diastolic congestive heart failure (HCC)  Chronic diastolic congestive heart failure with peripheral edema but no shortness of breath  The patient is presently on 80 mg of furosemide daily recommend continuation of this dosing of medication with a follow-up comprehensive metabolic requested  Limiting salt intake and excessive fluid intake was reviewed with the patient today  Essential hypertension  Blood pressure reading today is mildly elevated with a reading of 148/62  Her home blood pressures reflects some variability ranging from the 907P to 271 systolic we  She informs me that her prescription plan will no longer cover her verapamil medication and we will change that to metoprolol succinate 50 mg daily  A follow-up evaluation of her heart rate as well as blood pressure has been requested in 3-4 weeks after initiating her metoprolol medication  Arthritis  Advanced arthritis symptoms of the right knee  The patient is not able to consider surgical replacement of the joint due to the medical condition of her   We discussed the fact that she has been through all the conservative medical management and injection management as possible  I did review with the patient today her x-ray of the knee indicating the areas where she has most advanced arthritis  She is taking glucosamine chondroitin to try to re-establish some cartilage in the joint and also she is taking Aleve 2 tablets on an as-needed basis to reduce her discomfort  She is cautioned about constant use of this medication and also cautioned to take it with food at all times         Diagnoses and all orders for this visit:    Encounter for immunization  -     PNEUMOCOCCAL CONJUGATE VACCINE 13-VALENT GREATER THAN 6 MONTHS    Essential hypertension  -     metoprolol succinate (TOPROL-XL) 50 mg 24 hr tablet; Take 1 tablet (50 mg total) by mouth daily  -     Comprehensive metabolic panel; Future    Acute diastolic congestive heart failure (HCC)    Hyperlipidemia, unspecified hyperlipidemia type  -     Lipid panel; Future    Aortic stenosis, moderate    Arthritis    Localized edema    Other fatigue        Subjective:      Patient ID: Charlie Osborne is a 80 y o  female  This 55-year-old female patient returns today for routine visit  She has several issues to discuss on today's visit  Her 1st concern is regarding her persistent right knee pain  She has seen Orthopedics and undergone injections with steroids as well as highly again  Unfortunately these treatments have not proven to be very beneficial for the patient's pain  We reviewed the x-rays that her previously been performed of her right knee  It does show advanced osteoarthritis changes  The patient also has symptoms of pins and needles and burning sensation that radiates from her left shoulder down into her arm  Indicated this is likely due to cervical nerve irritation  The patient does have a history of discomfort in her left shoulder of arthritic nature  She has some tenderness across the top of her shoulder on the left side  She declines any x-rays of her neck to evaluate the condition of her discs and cervical spine at this time  She also has of persistent peripheral edema but no symptoms of shortness of breath or chest pain  The patient informs me today that her prescription plan will no longer cover her verapamil extended release medication which is part of our blood pressure control program for this patient        The following portions of the patient's history were reviewed and updated as appropriate:   She  has a past medical history of Anesthesia complication, Anxiety, Arthritis, Cataract, right, Eye hemorrhage, History of shingles (05/2015), Hypertension, and Mitral valve stenosis, mild  She   Patient Active Problem List    Diagnosis Date Noted    Arthritis 10/19/2018    Chronic diastolic congestive heart failure ([address] ) 05/10/2018    Localized edema 05/10/2018    Other fatigue 05/10/2018    Cataract 05/13/2016    Aortic stenosis, moderate 05/11/2016    Atrial dilatation, bilateral 05/11/2016    Mild tricuspid insufficiency 05/11/2016    Non-rheumatic mitral regurgitation 05/11/2016    Essential hypertension 08/22/2013     She  has a past surgical history that includes Tonsillectomy; Eye surgery (Left); Carpal tunnel release (Left); Cervical cone biopsy; Colonoscopy; Cataract extraction w/ intraocular lens implant (Left, 10/11/2016); and pr remv cataract extracap,insert lens (Right, 5/17/2016)  Her family history includes Arthritis in her mother and sister; Cancer in her brother and sister; GI problems in her father; Heart disease in her brother, maternal aunt, and mother; Irritable bowel syndrome in her sister; Sleep apnea in her sister  She  reports that she has never smoked  She has never used smokeless tobacco  She reports that she drinks alcohol  She reports that she does not use drugs  Current Outpatient Medications   Medication Sig Dispense Refill    Ascorbic Acid (VITAMIN C) 1000 MG tablet Take 1,000 mg by mouth every morning   BIOTIN PO Take 1,000 mcg by mouth every morning   Calcium Carb-Cholecalciferol (CALCIUM 600 + D) 600-200 MG-UNIT TABS Take 1 tablet by mouth daily      Cholecalciferol (VITAMIN D3) 2000 units TABS Take by mouth      CINNAMON PO Take 1,000 mg by mouth every morning   Flax OIL Take by mouth every morning   Flaxseed, Linseed, (EQL FLAX SEED OIL) 1000 MG CAPS Take by mouth      folic acid (FOLVITE) 939 MCG tablet Take 400 mcg by mouth every morning        furosemide (LASIX) 40 mg tablet Take 2 tablets (80 mg total) by mouth daily 180 tablet 3    glucosamine-chondroitin 500-400 MG tablet Take 1 tablet by mouth every morning   Multiple Vitamin (MULTIVITAMINS PO) Take 1 capsule by mouth daily      Naproxen Sodium (ALEVE PO) Take 2 tablets by mouth daily at bedtime   Omega-3 Fatty Acids (FISH OIL PO) Take 1,400 mg by mouth every morning   Vitamin D, Cholecalciferol, 1000 UNITS CAPS Take by mouth every morning   vitamin E, tocopherol, 1,000 units capsule Take 1 capsule by mouth daily      acyclovir (ZOVIRAX) 5 % ointment Apply topically      aspirin 325 mg tablet Take 325 mg by mouth as needed for mild pain   metoprolol succinate (TOPROL-XL) 50 mg 24 hr tablet Take 1 tablet (50 mg total) by mouth daily 90 tablet 2     No current facility-administered medications for this visit       Review of Systems   Constitutional: Positive for fatigue  Respiratory: Positive for shortness of breath  Cardiovascular: Positive for leg swelling  Musculoskeletal: Positive for arthralgias, joint swelling and myalgias  Neurological: Positive for numbness  Pins and needle sensation in a left cervical radicular pattern radiating from the left shoulder down into the arm  All other systems reviewed and are negative  Objective:      /62   Pulse 65   Temp 98 3 °F (36 8 °C)   Resp 14   Ht 4' 11" (1 499 m)   Wt 50 5 kg (111 lb 6 4 oz)   SpO2 98%   BMI 22 50 kg/m²          Physical Exam   Constitutional: She is oriented to person, place, and time  Vital signs are normal  She appears well-developed and well-nourished  She is cooperative  HENT:   Right Ear: Hearing, tympanic membrane, external ear and ear canal normal    Left Ear: Hearing, tympanic membrane, external ear and ear canal normal    Nose: Nose normal  No mucosal edema     Mouth/Throat: Uvula is midline, oropharynx is clear and moist and mucous membranes are normal    Eyes: Pupils are equal, round, and reactive to light  Conjunctivae and lids are normal    Neck: No JVD present  Carotid bruit is not present  No thyromegaly present  Cardiovascular: Normal rate, regular rhythm and intact distal pulses  Murmur heard  3/6 systolic murmur   Pulmonary/Chest: Effort normal and breath sounds normal  No stridor  No respiratory distress  She has no wheezes  She has no rales  She exhibits no tenderness  Abdominal: Soft  Normal appearance and bowel sounds are normal    Musculoskeletal: Normal range of motion  She exhibits edema  Plus one pitting edema both lower legs, arthritic changes of the right knee including small effusion and increased warmth  Lymphadenopathy:     She has no cervical adenopathy  Neurological: She is alert and oriented to person, place, and time  She has normal reflexes  She displays normal reflexes  Skin: Skin is warm, dry and intact  Psychiatric: She has a normal mood and affect  Her speech is normal and behavior is normal  Judgment and thought content normal  Cognition and memory are normal    Vitals reviewed

## 2019-02-23 NOTE — ASSESSMENT & PLAN NOTE
History of moderate aortic stenosis  The patient's murmur has not changed significantly  She does have some fatigue symptoms which I suspect are related to her ear Eliel stenosis she denies any lightheadedness or syncopal episodes  Will continue to monitor this moving forward

## 2019-02-23 NOTE — ASSESSMENT & PLAN NOTE
Chronic diastolic congestive heart failure with peripheral edema but no shortness of breath  The patient is presently on 80 mg of furosemide daily recommend continuation of this dosing of medication with a follow-up comprehensive metabolic requested  Limiting salt intake and excessive fluid intake was reviewed with the patient today

## 2019-02-23 NOTE — ASSESSMENT & PLAN NOTE
Advanced arthritis symptoms of the right knee  The patient is not able to consider surgical replacement of the joint due to the medical condition of her   We discussed the fact that she has been through all the conservative medical management and injection management as possible  I did review with the patient today her x-ray of the knee indicating the areas where she has most advanced arthritis  She is taking glucosamine chondroitin to try to re-establish some cartilage in the joint and also she is taking Aleve 2 tablets on an as-needed basis to reduce her discomfort  She is cautioned about constant use of this medication and also cautioned to take it with food at all times

## 2019-02-23 NOTE — ASSESSMENT & PLAN NOTE
Blood pressure reading today is mildly elevated with a reading of 148/62  Her home blood pressures reflects some variability ranging from the 963F to 713 systolic we  She informs me that her prescription plan will no longer cover her verapamil medication and we will change that to metoprolol succinate 50 mg daily  A follow-up evaluation of her heart rate as well as blood pressure has been requested in 3-4 weeks after initiating her metoprolol medication

## 2019-03-25 ENCOUNTER — APPOINTMENT (OUTPATIENT)
Dept: LAB | Facility: HOSPITAL | Age: 84
End: 2019-03-25
Payer: MEDICARE

## 2019-03-25 ENCOUNTER — TRANSCRIBE ORDERS (OUTPATIENT)
Dept: ADMINISTRATIVE | Facility: HOSPITAL | Age: 84
End: 2019-03-25

## 2019-03-25 DIAGNOSIS — I10 ESSENTIAL HYPERTENSION: ICD-10-CM

## 2019-03-25 DIAGNOSIS — E78.5 HYPERLIPIDEMIA, UNSPECIFIED HYPERLIPIDEMIA TYPE: ICD-10-CM

## 2019-03-25 DIAGNOSIS — I10 ESSENTIAL HYPERTENSION, MALIGNANT: Primary | ICD-10-CM

## 2019-03-25 LAB
CHOLEST SERPL-MCNC: 178 MG/DL (ref 50–200)
HDLC SERPL-MCNC: 53 MG/DL (ref 40–60)
LDLC SERPL CALC-MCNC: 114 MG/DL (ref 0–100)
NONHDLC SERPL-MCNC: 125 MG/DL
TRIGL SERPL-MCNC: 55 MG/DL

## 2019-03-25 PROCEDURE — 80061 LIPID PANEL: CPT

## 2019-03-25 PROCEDURE — 36415 COLL VENOUS BLD VENIPUNCTURE: CPT

## 2019-04-02 ENCOUNTER — OFFICE VISIT (OUTPATIENT)
Dept: INTERNAL MEDICINE CLINIC | Facility: CLINIC | Age: 84
End: 2019-04-02
Payer: MEDICARE

## 2019-04-02 VITALS
OXYGEN SATURATION: 97 % | RESPIRATION RATE: 14 BRPM | BODY MASS INDEX: 22.78 KG/M2 | TEMPERATURE: 98.1 F | SYSTOLIC BLOOD PRESSURE: 126 MMHG | HEIGHT: 59 IN | DIASTOLIC BLOOD PRESSURE: 64 MMHG | WEIGHT: 113 LBS | HEART RATE: 64 BPM

## 2019-04-02 DIAGNOSIS — I35.0 AORTIC STENOSIS, MODERATE: Primary | ICD-10-CM

## 2019-04-02 DIAGNOSIS — R53.83 OTHER FATIGUE: ICD-10-CM

## 2019-04-02 DIAGNOSIS — I10 ESSENTIAL HYPERTENSION: ICD-10-CM

## 2019-04-02 DIAGNOSIS — I50.32 CHRONIC DIASTOLIC CONGESTIVE HEART FAILURE (HCC): ICD-10-CM

## 2019-04-02 PROCEDURE — 99215 OFFICE O/P EST HI 40 MIN: CPT | Performed by: INTERNAL MEDICINE

## 2019-04-02 RX ORDER — VERAPAMIL HYDROCHLORIDE 240 MG/1
240 CAPSULE, EXTENDED RELEASE ORAL DAILY
Refills: 3 | COMMUNITY
Start: 2019-03-26 | End: 2019-04-02 | Stop reason: ALTCHOICE

## 2019-04-17 ENCOUNTER — APPOINTMENT (OUTPATIENT)
Dept: LAB | Facility: HOSPITAL | Age: 84
End: 2019-04-17
Payer: MEDICARE

## 2019-04-17 LAB
ALBUMIN SERPL BCP-MCNC: 3.9 G/DL (ref 3.5–5)
ALP SERPL-CCNC: 102 U/L (ref 46–116)
ALT SERPL W P-5'-P-CCNC: 12 U/L (ref 12–78)
ANION GAP SERPL CALCULATED.3IONS-SCNC: 8 MMOL/L (ref 4–13)
AST SERPL W P-5'-P-CCNC: 26 U/L (ref 5–45)
BILIRUB SERPL-MCNC: 0.7 MG/DL (ref 0.2–1)
BUN SERPL-MCNC: 26 MG/DL (ref 5–25)
CALCIUM SERPL-MCNC: 9.5 MG/DL (ref 8.3–10.1)
CHLORIDE SERPL-SCNC: 104 MMOL/L (ref 100–108)
CO2 SERPL-SCNC: 30 MMOL/L (ref 21–32)
CREAT SERPL-MCNC: 0.78 MG/DL (ref 0.6–1.3)
GFR SERPL CREATININE-BSD FRML MDRD: 71 ML/MIN/1.73SQ M
GLUCOSE SERPL-MCNC: 90 MG/DL (ref 65–140)
POTASSIUM SERPL-SCNC: 3.8 MMOL/L (ref 3.5–5.3)
PROT SERPL-MCNC: 7.2 G/DL (ref 6.4–8.2)
SODIUM SERPL-SCNC: 142 MMOL/L (ref 136–145)

## 2019-04-17 PROCEDURE — 36415 COLL VENOUS BLD VENIPUNCTURE: CPT

## 2019-04-17 PROCEDURE — 80053 COMPREHEN METABOLIC PANEL: CPT

## 2019-04-29 ENCOUNTER — TELEPHONE (OUTPATIENT)
Dept: INTERNAL MEDICINE CLINIC | Facility: CLINIC | Age: 84
End: 2019-04-29

## 2019-04-29 DIAGNOSIS — I35.0 AORTIC STENOSIS, MODERATE: Primary | ICD-10-CM

## 2019-05-14 ENCOUNTER — HOSPITAL ENCOUNTER (OUTPATIENT)
Dept: NON INVASIVE DIAGNOSTICS | Facility: HOSPITAL | Age: 84
Discharge: HOME | End: 2019-05-14
Payer: MEDICARE

## 2019-05-14 DIAGNOSIS — I35.0 AORTIC STENOSIS, MODERATE: ICD-10-CM

## 2019-05-14 PROCEDURE — 93306 TTE W/DOPPLER COMPLETE: CPT

## 2019-05-15 PROCEDURE — 93306 TTE W/DOPPLER COMPLETE: CPT | Performed by: INTERNAL MEDICINE

## 2019-06-14 DIAGNOSIS — I50.31 ACUTE DIASTOLIC CONGESTIVE HEART FAILURE (HCC): ICD-10-CM

## 2019-06-14 RX ORDER — FUROSEMIDE 40 MG/1
TABLET ORAL
Qty: 180 TABLET | Refills: 3 | Status: SHIPPED | OUTPATIENT
Start: 2019-06-14 | End: 2019-09-17

## 2019-07-22 ENCOUNTER — HOSPITAL ENCOUNTER (INPATIENT)
Facility: HOSPITAL | Age: 84
LOS: 3 days | DRG: 481 | End: 2019-07-25
Attending: EMERGENCY MEDICINE | Admitting: INTERNAL MEDICINE
Payer: MEDICARE

## 2019-07-22 ENCOUNTER — APPOINTMENT (EMERGENCY)
Dept: RADIOLOGY | Facility: HOSPITAL | Age: 84
DRG: 481 | End: 2019-07-22
Payer: MEDICARE

## 2019-07-22 DIAGNOSIS — S72.001A CLOSED RIGHT HIP FRACTURE, INITIAL ENCOUNTER (HCC): Primary | ICD-10-CM

## 2019-07-22 DIAGNOSIS — L60.2 OVERGROWN TOENAILS: ICD-10-CM

## 2019-07-22 PROBLEM — S72.141A INTERTROCHANTERIC FRACTURE OF RIGHT FEMUR (HCC): Status: ACTIVE | Noted: 2019-07-22

## 2019-07-22 PROBLEM — D72.829 LEUKOCYTOSIS: Status: ACTIVE | Noted: 2019-07-22

## 2019-07-22 LAB
ALBUMIN SERPL BCP-MCNC: 3.8 G/DL (ref 3.5–5)
ALP SERPL-CCNC: 98 U/L (ref 46–116)
ALT SERPL W P-5'-P-CCNC: 20 U/L (ref 12–78)
ANION GAP SERPL CALCULATED.3IONS-SCNC: 12 MMOL/L (ref 4–13)
APTT PPP: 25 SECONDS (ref 23–37)
AST SERPL W P-5'-P-CCNC: 28 U/L (ref 5–45)
BASOPHILS # BLD AUTO: 0.05 THOUSANDS/ΜL (ref 0–0.1)
BASOPHILS NFR BLD AUTO: 0 % (ref 0–1)
BILIRUB SERPL-MCNC: 0.8 MG/DL (ref 0.2–1)
BILIRUB UR QL STRIP: NEGATIVE
BUN SERPL-MCNC: 30 MG/DL (ref 5–25)
CALCIUM SERPL-MCNC: 9.2 MG/DL (ref 8.3–10.1)
CHLORIDE SERPL-SCNC: 102 MMOL/L (ref 100–108)
CLARITY UR: CLEAR
CO2 SERPL-SCNC: 28 MMOL/L (ref 21–32)
COLOR UR: NORMAL
CREAT SERPL-MCNC: 1.06 MG/DL (ref 0.6–1.3)
EOSINOPHIL # BLD AUTO: 0 THOUSAND/ΜL (ref 0–0.61)
EOSINOPHIL NFR BLD AUTO: 0 % (ref 0–6)
ERYTHROCYTE [DISTWIDTH] IN BLOOD BY AUTOMATED COUNT: 12.8 % (ref 11.6–15.1)
GFR SERPL CREATININE-BSD FRML MDRD: 48 ML/MIN/1.73SQ M
GLUCOSE SERPL-MCNC: 145 MG/DL (ref 65–140)
GLUCOSE UR STRIP-MCNC: NEGATIVE MG/DL
HCT VFR BLD AUTO: 37.7 % (ref 34.8–46.1)
HGB BLD-MCNC: 12 G/DL (ref 11.5–15.4)
HGB UR QL STRIP.AUTO: NEGATIVE
IMM GRANULOCYTES # BLD AUTO: 0.1 THOUSAND/UL (ref 0–0.2)
IMM GRANULOCYTES NFR BLD AUTO: 1 % (ref 0–2)
INR PPP: 1.02 (ref 0.86–1.16)
KETONES UR STRIP-MCNC: NEGATIVE MG/DL
LEUKOCYTE ESTERASE UR QL STRIP: NEGATIVE
LYMPHOCYTES # BLD AUTO: 0.71 THOUSANDS/ΜL (ref 0.6–4.47)
LYMPHOCYTES NFR BLD AUTO: 4 % (ref 14–44)
MCH RBC QN AUTO: 28.6 PG (ref 26.8–34.3)
MCHC RBC AUTO-ENTMCNC: 31.8 G/DL (ref 31.4–37.4)
MCV RBC AUTO: 90 FL (ref 82–98)
MONOCYTES # BLD AUTO: 1.01 THOUSAND/ΜL (ref 0.17–1.22)
MONOCYTES NFR BLD AUTO: 5 % (ref 4–12)
NEUTROPHILS # BLD AUTO: 17.36 THOUSANDS/ΜL (ref 1.85–7.62)
NEUTS SEG NFR BLD AUTO: 90 % (ref 43–75)
NITRITE UR QL STRIP: NEGATIVE
NRBC BLD AUTO-RTO: 0 /100 WBCS
PH UR STRIP.AUTO: 6.5 [PH]
PLATELET # BLD AUTO: 219 THOUSANDS/UL (ref 149–390)
PMV BLD AUTO: 11 FL (ref 8.9–12.7)
POTASSIUM SERPL-SCNC: 3.5 MMOL/L (ref 3.5–5.3)
PROT SERPL-MCNC: 6.9 G/DL (ref 6.4–8.2)
PROT UR STRIP-MCNC: NEGATIVE MG/DL
PROTHROMBIN TIME: 10.7 SECONDS (ref 9.4–11.7)
RBC # BLD AUTO: 4.2 MILLION/UL (ref 3.81–5.12)
SODIUM SERPL-SCNC: 142 MMOL/L (ref 136–145)
SP GR UR STRIP.AUTO: 1.01 (ref 1–1.03)
UROBILINOGEN UR QL STRIP.AUTO: 0.2 E.U./DL
WBC # BLD AUTO: 19.23 THOUSAND/UL (ref 4.31–10.16)

## 2019-07-22 PROCEDURE — 36415 COLL VENOUS BLD VENIPUNCTURE: CPT | Performed by: EMERGENCY MEDICINE

## 2019-07-22 PROCEDURE — 1124F ACP DISCUSS-NO DSCNMKR DOCD: CPT

## 2019-07-22 PROCEDURE — 93005 ELECTROCARDIOGRAM TRACING: CPT

## 2019-07-22 PROCEDURE — 87147 CULTURE TYPE IMMUNOLOGIC: CPT | Performed by: NURSE PRACTITIONER

## 2019-07-22 PROCEDURE — 71045 X-RAY EXAM CHEST 1 VIEW: CPT

## 2019-07-22 PROCEDURE — 73502 X-RAY EXAM HIP UNI 2-3 VIEWS: CPT

## 2019-07-22 PROCEDURE — 96374 THER/PROPH/DIAG INJ IV PUSH: CPT

## 2019-07-22 PROCEDURE — 80053 COMPREHEN METABOLIC PANEL: CPT | Performed by: EMERGENCY MEDICINE

## 2019-07-22 PROCEDURE — 85610 PROTHROMBIN TIME: CPT | Performed by: EMERGENCY MEDICINE

## 2019-07-22 PROCEDURE — 99285 EMERGENCY DEPT VISIT HI MDM: CPT

## 2019-07-22 PROCEDURE — 85730 THROMBOPLASTIN TIME PARTIAL: CPT | Performed by: EMERGENCY MEDICINE

## 2019-07-22 PROCEDURE — 99222 1ST HOSP IP/OBS MODERATE 55: CPT | Performed by: NURSE PRACTITIONER

## 2019-07-22 PROCEDURE — 81003 URINALYSIS AUTO W/O SCOPE: CPT | Performed by: NURSE PRACTITIONER

## 2019-07-22 PROCEDURE — 85025 COMPLETE CBC W/AUTO DIFF WBC: CPT | Performed by: EMERGENCY MEDICINE

## 2019-07-22 PROCEDURE — 87081 CULTURE SCREEN ONLY: CPT | Performed by: NURSE PRACTITIONER

## 2019-07-22 RX ORDER — MORPHINE SULFATE 4 MG/ML
4 INJECTION, SOLUTION INTRAMUSCULAR; INTRAVENOUS ONCE
Status: COMPLETED | OUTPATIENT
Start: 2019-07-22 | End: 2019-07-22

## 2019-07-22 RX ORDER — ONDANSETRON 2 MG/ML
4 INJECTION INTRAMUSCULAR; INTRAVENOUS EVERY 6 HOURS PRN
Status: DISCONTINUED | OUTPATIENT
Start: 2019-07-22 | End: 2019-07-25 | Stop reason: HOSPADM

## 2019-07-22 RX ORDER — HEPARIN SODIUM 5000 [USP'U]/ML
5000 INJECTION, SOLUTION INTRAVENOUS; SUBCUTANEOUS EVERY 8 HOURS SCHEDULED
Status: DISCONTINUED | OUTPATIENT
Start: 2019-07-22 | End: 2019-07-23

## 2019-07-22 RX ORDER — FUROSEMIDE 80 MG
80 TABLET ORAL DAILY
Status: DISCONTINUED | OUTPATIENT
Start: 2019-07-23 | End: 2019-07-25 | Stop reason: HOSPADM

## 2019-07-22 RX ADMIN — MORPHINE SULFATE 2 MG: 2 INJECTION, SOLUTION INTRAMUSCULAR; INTRAVENOUS at 22:56

## 2019-07-22 RX ADMIN — MORPHINE SULFATE 4 MG: 4 INJECTION INTRAVENOUS at 17:22

## 2019-07-22 RX ADMIN — HEPARIN SODIUM 5000 UNITS: 5000 INJECTION INTRAVENOUS; SUBCUTANEOUS at 22:50

## 2019-07-22 NOTE — ED PROVIDER NOTES
History  Chief Complaint   Patient presents with    Fall     patient slipped and fell in the shower  C/o right hip pain, noted to be externally rotated  Denies hitting head, no blood thinners  S/p slip/fall  No head trauma  C/o rt hip pain  Rt leg shortened      Hip Pain   Location:  RT HIP  Onset quality:  Sudden  Duration:  1 hour  Timing:  Constant  Chronicity:  New      Prior to Admission Medications   Prescriptions Last Dose Informant Patient Reported? Taking? Ascorbic Acid (VITAMIN C) 1000 MG tablet Not Taking at Unknown time  Yes No   Sig: Take 1,000 mg by mouth every morning  BIOTIN PO Not Taking at Unknown time  Yes No   Sig: Take 1,000 mcg by mouth every morning  CINNAMON PO Not Taking at Unknown time  Yes No   Sig: Take 1,000 mg by mouth every morning  Calcium Carb-Cholecalciferol (CALCIUM 600 + D) 600-200 MG-UNIT TABS Not Taking at Unknown time  Yes No   Sig: Take 1 tablet by mouth daily   Cholecalciferol (VITAMIN D3) 2000 units TABS Not Taking at Unknown time  Yes No   Sig: Take by mouth   Flax OIL Not Taking at Unknown time  Yes No   Sig: Take by mouth every morning  Flaxseed, Linseed, (EQL FLAX SEED OIL) 1000 MG CAPS Not Taking at Unknown time  Yes No   Sig: Take by mouth   Multiple Vitamin (MULTIVITAMINS PO) Not Taking at Unknown time  Yes No   Sig: Take 1 capsule by mouth daily   Naproxen Sodium (ALEVE PO) 7/22/2019 at 0830  Yes Yes   Sig: Take 2 tablets by mouth daily    Omega-3 Fatty Acids (FISH OIL PO) Not Taking at Unknown time  Yes No   Sig: Take 1,400 mg by mouth every morning  VERAPAMIL HCL PO 7/22/2019 at 0830  Yes Yes   Sig: Take by mouth   Vitamin D, Cholecalciferol, 1000 UNITS CAPS Not Taking at Unknown time  Yes No   Sig: Take by mouth every morning  folic acid (FOLVITE) 199 MCG tablet Not Taking at Unknown time  Yes No   Sig: Take 400 mcg by mouth every morning     furosemide (LASIX) 40 mg tablet 7/22/2019 at 0830  No Yes   Sig: TAKE 2 TABLETS BY MOUTH EVERY DAY glucosamine-chondroitin 500-400 MG tablet Not Taking at Unknown time  Yes No   Sig: Take 1 tablet by mouth every morning  metoprolol succinate (TOPROL-XL) 50 mg 24 hr tablet Not Taking at Unknown time  No No   Sig: Take 1 tablet (50 mg total) by mouth daily   Patient not taking: Reported on 2019   vitamin E, tocopherol, 1,000 units capsule Not Taking at Unknown time  Yes No   Sig: Take 1 capsule by mouth daily      Facility-Administered Medications: None       Past Medical History:   Diagnosis Date    Anesthesia complication       Yrs ago had "anxiety" reaction not sure while sedated, pt states sensitive to anesthesia effects    Anxiety     stress at home    Arthritis     Cataract, right     Last Assessed:16    Eye hemorrhage     left eye currently 16    History of shingles 2015    Hypertension     Mitral valve stenosis, mild        Past Surgical History:   Procedure Laterality Date    CARPAL TUNNEL RELEASE Left     CATARACT EXTRACTION W/ INTRAOCULAR LENS IMPLANT Left 10/11/2016    Procedure: EXTRACTION EXTRACAPSULAR CATARACT PHACO INTRAOCULAR LENS (IOL); Surgeon: Ashok Taylor MD;  Location: [facility] MAIN OR;  Service:     CERVICAL CONE BIOPSY      COLONOSCOPY      EYE SURGERY Left     muscle repair    ME REMV CATARACT EXTRACAP,INSERT LENS Right 2016    Procedure: EXTRACTION EXTRACAPSULAR CATARACT PHACO INTRAOCULAR LENS (IOL);   Surgeon: Ashok Taylor MD;  Location: [facility] MAIN OR;  Service: Ophthalmology    TONSILLECTOMY         Family History   Problem Relation Age of Onset    Heart disease Mother         MI  at age 71   Ericjermain Patinoscott Arthritis Mother     GI problems Father         bleeding ulcer    Arthritis Sister     Sleep apnea Sister     Irritable bowel syndrome Sister     Cancer Sister         skin cancer    Heart disease Brother         CABG (5)    Cancer Brother         skin cancer    Heart disease Maternal Aunt      I have reviewed and agree with the history as documented  Social History     Tobacco Use    Smoking status: Never Smoker    Smokeless tobacco: Never Used   Substance Use Topics    Alcohol use: Yes     Comment: rarely    Drug use: No        Review of Systems   Musculoskeletal: Positive for arthralgias  All other systems reviewed and are negative  Physical Exam  Physical Exam   Constitutional: She is oriented to person, place, and time  She appears well-developed and well-nourished  No distress  HENT:   Head: Normocephalic and atraumatic  Neck: Normal range of motion  Cardiovascular: Normal rate and regular rhythm  Pulmonary/Chest: Effort normal and breath sounds normal  She exhibits no tenderness  Abdominal: Soft  There is no tenderness  Musculoskeletal: She exhibits tenderness and deformity  Right hip: She exhibits decreased range of motion, tenderness, bony tenderness and deformity  Neurological: She is alert and oriented to person, place, and time  Skin: Skin is warm  She is not diaphoretic  Nursing note and vitals reviewed        Vital Signs  ED Triage Vitals   Temperature Pulse Respirations Blood Pressure SpO2   07/22/19 1643 07/22/19 1643 07/22/19 1646 07/22/19 1646 07/22/19 1643   (!) 97 4 °F (36 3 °C) 67 16 (!) 207/84 100 %      Temp Source Heart Rate Source Patient Position - Orthostatic VS BP Location FiO2 (%)   07/22/19 1643 07/22/19 1643 07/22/19 1646 07/22/19 1646 --   Oral Monitor Lying Right arm       Pain Score       07/22/19 1643       5           Vitals:    07/22/19 1643 07/22/19 1646   BP:  (!) 207/84   Pulse: 67    Patient Position - Orthostatic VS:  Lying         Visual Acuity      ED Medications  Medications   morphine (PF) 4 mg/mL injection 4 mg (4 mg Intravenous Given 7/22/19 1722)       Diagnostic Studies  Results Reviewed     Procedure Component Value Units Date/Time    CBC and differential [[id number]]  (Abnormal) Collected:  07/22/19 1721    Lab Status:  Preliminary result Specimen: Blood from Arm, Right Updated:  07/22/19 1802     WBC 19 23 Thousand/uL      RBC 4 20 Million/uL      Hemoglobin 12 0 g/dL      Hematocrit 37 7 %      MCV 90 fL      MCH 28 6 pg      MCHC 31 8 g/dL      RDW 12 8 %      MPV 11 0 fL      Platelets 163 Thousands/uL     APTT [[id number]] Collected:  07/22/19 1721    Lab Status: In process Specimen:  Blood from Arm, Right Updated:  07/22/19 1758    Protime-INR [[id number]] Collected:  07/22/19 1721    Lab Status: In process Specimen:  Blood from Arm, Right Updated:  07/22/19 1758    Comprehensive metabolic panel [[id number]] Collected:  07/22/19 1721    Lab Status: In process Specimen:  Blood from Arm, Right Updated:  07/22/19 1758                 XR hip/pelv 2-3 vws right if performed    (Results Pending)   XR chest 1 view    (Results Pending)              Procedures  Procedures       ED Course                               MDM    Disposition  Final diagnoses:   Closed right hip fracture, initial encounter [facility])     Time reflects when diagnosis was documented in both MDM as applicable and the Disposition within this note     Time User Action Codes Description Comment    7/22/2019  6:16 PM Jose G Wall Add [[id number]] Closed right hip fracture, initial encounter [facility])       ED Disposition     ED Disposition Condition Date/Time Comment    Admit Stable Mon Jul 22, 2019  6:16 PM Case was discussed with HOSPITALIST and the patient's admission status was agreed to be Admission Status: inpatient status to the service of Dr Jimmie Morales   Follow-up Information    None         Patient's Medications   Discharge Prescriptions    No medications on file     No discharge procedures on file      ED Provider  Electronically Signed by           Dorota Diaz MD  07/22/19 5577

## 2019-07-22 NOTE — ASSESSMENT & PLAN NOTE
Patient presented to the ED post mechnical fall in the shower  She fell on her right side, no head injury or LOC  Xray in the ER revealed an acute right femoral intertrochanteric fracture with varus angulation and mild displacement      · Admit to medicine  · Pain management  · Ortho consult  · Cardio consult for clearance  · NPO after midnight  · Echo 5/19 revealed an EF of 60%, moderate concentric hypertrophy, elevated mean left filling pressures

## 2019-07-22 NOTE — ASSESSMENT & PLAN NOTE
· Continue lasix  · Echo revealed a normal EF, moderate concentric hypertrophy, mild MR, moderate aortic stenosis, moderate TR  · Cardiology consultation pending  · Daily Weight  · Intake and output  · Low sodium diet

## 2019-07-22 NOTE — ASSESSMENT & PLAN NOTE
WBC 19 2, likely reactive, no signs of infection  Denies cough, fevers, chills, nausea, vomiting, abdominal pain, hematuria, dysuria      · Urinalysis and CXR pending  · Repeat CBC in AM

## 2019-07-22 NOTE — ED NOTES
Pt voids approx 350cc clear yellow urine  Urine sent for UA as ordered         Mojgan Medrano, JUSTUS  07/22/19 1942

## 2019-07-22 NOTE — H&P
H&P- Zaire Runner 1935, 80 y o  female MRN: [id number]    Unit/Bed#: ED 12 Encounter: [id number]    Primary Care Provider: Guille Solitairo MD   Date and time admitted to hospital: 7/22/2019  4:41 PM        * Intertrochanteric fracture of right femur [facility])  Assessment & Plan  Patient presented to the ED post mechnical fall in the shower  She fell on her right side, no head injury or LOC  Xray in the ER revealed an acute right femoral intertrochanteric fracture with varus angulation and mild displacement  · Admit to medicine  · Pain management  · Ortho consult  · Cardio consult for clearance  · NPO after midnight  · Echo 5/19 revealed an EF of 60%, moderate concentric hypertrophy, elevated mean left filling pressures     Leukocytosis  Assessment & Plan  WBC 19 2, likely reactive, no signs of infection  Denies cough, fevers, chills, nausea, vomiting, abdominal pain, hematuria, dysuria  · Urinalysis and CXR pending  · Repeat CBC in AM    Chronic diastolic congestive heart failure (HCC)  Assessment & Plan  · Continue lasix  · Echo revealed a normal EF, moderate concentric hypertrophy, mild MR, moderate aortic stenosis, moderate TR  · Cardiology consultation pending  · Daily Weight  · Intake and output  · Low sodium diet    Aortic stenosis, moderate  Assessment & Plan  Mildly worsened from prior echo  Cardiology clearance pending    Essential hypertension  Assessment & Plan  Continue verapamil     Hypertensionresolved as of 7/22/2019  Assessment & Plan  Continue verapamil    VTE Prophylaxis: Heparin  / sequential compression device   Code Status: No Order    Anticipated Length of Stay:  Patient will be admitted on an Inpatient basis with an anticipated length of stay of greater than 2 midnights  Justification for Hospital Stay: right femur fracture    Total Time for Visit, including Counseling / Coordination of Care: 20 minutes    Greater than 50% of this total time spent on direct patient counseling and coordination of care  Chief Complaint:   Fall (patient slipped and fell in the shower  C/o right hip pain, noted to be externally rotated  Denies hitting head, no blood thinners )      History of Present Illness:    Joe Cloud is a 80 y o  female with a PMH of congestive heart failure, hypertension, aortic stenosis who presents with mechanical fall  Patient states she has been in her usual state of health over the past couple of days  This evening she was taking a shower and she thinks there was some sub on the ground  She sustained a mechanical fall falling on her right side  She denies any head injury or LOC  Her only pain is to her right hip  She came to the emergency room where she was found to have an acute right femoral intertrochanteric fracture  Labs were significant for a white count of 57049  Chest x-ray was negative  Urinalysis pending  Patient is admitted for further management  Review of Systems:    Review of Systems   Constitutional: Negative  HENT: Negative  Eyes: Negative  Respiratory: Negative  Cardiovascular: Negative  Gastrointestinal: Negative  Endocrine: Negative  Genitourinary: Negative  Musculoskeletal: Positive for gait problem  Skin: Negative  Allergic/Immunologic: Negative  Hematological: Negative  Psychiatric/Behavioral: Negative  Past Medical and Surgical History:     Past Medical History:   Diagnosis Date    Anesthesia complication       Yrs ago had "anxiety" reaction not sure while sedated, pt states sensitive to anesthesia effects    Anxiety     stress at home    Arthritis     Cataract, right     Last Assessed:5/11/16    Eye hemorrhage     left eye currently 5/12/16    History of shingles 05/2015    Hypertension     Mitral valve stenosis, mild        Past Surgical History:   Procedure Laterality Date    CARPAL TUNNEL RELEASE Left     CATARACT EXTRACTION W/ INTRAOCULAR LENS IMPLANT Left 10/11/2016 Procedure: EXTRACTION EXTRACAPSULAR CATARACT PHACO INTRAOCULAR LENS (IOL); Surgeon: Mark Loja MD;  Location: [facility] MAIN OR;  Service:     CERVICAL CONE BIOPSY      COLONOSCOPY      EYE SURGERY Left     muscle repair    [place] REMV CATARACT EXTRACAP,INSERT LENS Right 5/17/2016    Procedure: EXTRACTION EXTRACAPSULAR CATARACT PHACO INTRAOCULAR LENS (IOL); Surgeon: Mark Loja MD;  Location: [facility] MAIN OR;  Service: Ophthalmology    TONSILLECTOMY         Meds/Allergies:    Prior to Admission medications    Medication Sig Start Date End Date Taking? Authorizing Provider   furosemide (LASIX) 40 mg tablet TAKE 2 TABLETS BY MOUTH EVERY DAY 6/14/19  Yes Kolby Whittaker MD   Multiple Vitamins-Minerals (PRESERVISION AREDS PO) Take by mouth   Yes Historical Provider, MD   Naproxen Sodium (ALEVE PO) Take 2 tablets by mouth daily    Yes Historical Provider, MD   VERAPAMIL HCL PO Take by mouth   Yes Historical Provider, MD   Calcium Carb-Cholecalciferol (CALCIUM 600 + D) 600-200 MG-UNIT TABS Take 1 tablet by mouth daily 8/13/13   Historical Provider, MD   acyclovir (ZOVIRAX) 5 % ointment Apply topically 9/30/16 7/22/19  Historical Provider, MD   Ascorbic Acid (VITAMIN C) 1000 MG tablet Take 1,000 mg by mouth every morning   7/22/19  Historical Provider, MD   aspirin 325 mg tablet Take 325 mg by mouth as needed for mild pain    7/22/19  Historical Provider, MD   BIOTIN PO Take 1,000 mcg by mouth every morning   7/22/19  Historical Provider, MD   Cholecalciferol (VITAMIN D3) 2000 units TABS Take by mouth 8/13/13 7/22/19  Historical Provider, MD   CINNAMON PO Take 1,000 mg by mouth every morning   7/22/19  Historical Provider, MD   Flax OIL Take by mouth every morning   7/22/19  Historical Provider, MD   Flaxseed, Linseed, (EQL FLAX SEED OIL) 1000 MG CAPS Take by mouth 8/13/13 7/22/19  Historical Provider, MD   folic acid (FOLVITE) 016 MCG tablet Take 400 mcg by mouth every morning   7/22/19  Historical Provider, MD   glucosamine-chondroitin 500-400 MG tablet Take 1 tablet by mouth every morning   19  Historical Provider, MD   metoprolol succinate (TOPROL-XL) 50 mg 24 hr tablet Take 1 tablet (50 mg total) by mouth daily  Patient not taking: Reported on 2019  Red Lopez MD   Multiple Vitamin (MULTIVITAMINS PO) Take 1 capsule by mouth daily 13  Historical Provider, MD   Omega-3 Fatty Acids (FISH OIL PO) Take 1,400 mg by mouth every morning   19  Historical Provider, MD   Vitamin D, Cholecalciferol, 1000 UNITS CAPS Take by mouth every morning   19  Historical Provider, MD   vitamin E, tocopherol, 1,000 units capsule Take 1 capsule by mouth daily 13  Historical Provider, MD       Allergies:    Allergies   Allergen Reactions    Indocin [Indomethacin]      hypertension       Social History:     Marital Status: /Civil Union   Substance Use History:   Social History     Substance and Sexual Activity   Alcohol Use Yes    Comment: rarely     Social History     Tobacco Use   Smoking Status Never Smoker   Smokeless Tobacco Never Used     Social History     Substance and Sexual Activity   Drug Use No       Family History:    Family History   Problem Relation Age of Onset    Heart disease Mother         MI  at age 71   Winsome Kowalski Arthritis Mother     GI problems Father         bleeding ulcer    Arthritis Sister     Sleep apnea Sister     Irritable bowel syndrome Sister     Cancer Sister         skin cancer    Heart disease Brother         CABG (5)    Cancer Brother         skin cancer    Heart disease Maternal Aunt        Physical Exam:     Vitals:   Blood Pressure: 153/72 (19)  Pulse: 69 (19)  Temperature: 98 3 °F (36 8 °C) (19)  Temp Source: Oral (19)  Respirations: 16 (19)  Height: 5' 4" (162 6 cm) (19)  Weight - Scale: 50 8 kg (112 lb) (19)  SpO2: 98 % (19 46)    Physical Exam   Constitutional: She is oriented to person, place, and time  She appears well-developed and well-nourished  HENT:   Head: Normocephalic and atraumatic  Eyes: Pupils are equal, round, and reactive to light  Neck: Normal range of motion  Cardiovascular: Normal rate and regular rhythm  Murmur heard  Pulmonary/Chest: Effort normal and breath sounds normal  No respiratory distress  Abdominal: Soft  Bowel sounds are normal  She exhibits no distension  There is no tenderness  Musculoskeletal: She exhibits edema (2+ edema bilaterally)  Right foot short and internally rotated, no sensation disturbance, 2+ pulses   Neurological: She is alert and oriented to person, place, and time  Skin: Skin is warm and dry  Psychiatric: She has a normal mood and affect  Her behavior is normal    Nursing note and vitals reviewed  Additional Data:     Lab Results: I have personally reviewed pertinent reports  Results from last 7 days   Lab Units 07/22/19  1721   WBC Thousand/uL 19 23*   HEMOGLOBIN g/dL 12 0   HEMATOCRIT % 37 7   PLATELETS Thousands/uL 219   NEUTROS PCT % 90*     Results from last 7 days   Lab Units 07/22/19  1721   SODIUM mmol/L 142   POTASSIUM mmol/L 3 5   CHLORIDE mmol/L 102   CO2 mmol/L 28   BUN mg/dL 30*   CREATININE mg/dL 1 06   CALCIUM mg/dL 9 2   TOTAL BILIRUBIN mg/dL 0 80   ALK PHOS U/L 98   ALT U/L 20   AST U/L 28     Results from last 7 days   Lab Units 07/22/19  1721   INR  1 02                   Imaging: I have personally reviewed pertinent reports  XR hip/pelv 2-3 vws right if performed   Final Result by Katherine La MD (07/22 1831)      Acute right femoral intertrochanteric fracture with varus angulation and mild displacement  The study was marked in [facility] for immediate notification  Workstation performed: [id number]         XR chest 1 view   Final Result by Katherine La MD (07/22 1831)      No acute cardiopulmonary disease  Workstation performed: [id number]             XR hip/pelv 2-3 vws right if performed   Final Result      Acute right femoral intertrochanteric fracture with varus angulation and mild displacement  The study was marked in [facility] for immediate notification  Workstation performed: [id number]         XR chest 1 view   Final Result      No acute cardiopulmonary disease  Workstation performed: [id number]             EKG, Pathology, and Other Studies Reviewed on Admission:   · EKG: NSR with septal q wave    Allscripts / Epic Records Reviewed: Yes     ** Please Note: This note has been constructed using a voice recognition system   **

## 2019-07-23 ENCOUNTER — ANESTHESIA (INPATIENT)
Dept: PERIOP | Facility: HOSPITAL | Age: 84
DRG: 481 | End: 2019-07-23
Payer: MEDICARE

## 2019-07-23 ENCOUNTER — ANESTHESIA EVENT (INPATIENT)
Dept: PERIOP | Facility: HOSPITAL | Age: 84
DRG: 481 | End: 2019-07-23
Payer: MEDICARE

## 2019-07-23 ENCOUNTER — APPOINTMENT (INPATIENT)
Dept: RADIOLOGY | Facility: HOSPITAL | Age: 84
DRG: 481 | End: 2019-07-23
Payer: MEDICARE

## 2019-07-23 PROBLEM — D72.829 LEUKOCYTOSIS: Status: RESOLVED | Noted: 2019-07-22 | Resolved: 2019-07-23

## 2019-07-23 LAB
ABO GROUP BLD: NORMAL
ANION GAP SERPL CALCULATED.3IONS-SCNC: 4 MMOL/L (ref 4–13)
BLD GP AB SCN SERPL QL: NEGATIVE
BUN SERPL-MCNC: 29 MG/DL (ref 5–25)
CALCIUM SERPL-MCNC: 8.7 MG/DL (ref 8.3–10.1)
CHLORIDE SERPL-SCNC: 104 MMOL/L (ref 100–108)
CO2 SERPL-SCNC: 33 MMOL/L (ref 21–32)
CREAT SERPL-MCNC: 0.98 MG/DL (ref 0.6–1.3)
ERYTHROCYTE [DISTWIDTH] IN BLOOD BY AUTOMATED COUNT: 13 % (ref 11.6–15.1)
GFR SERPL CREATININE-BSD FRML MDRD: 53 ML/MIN/1.73SQ M
GLUCOSE SERPL-MCNC: 117 MG/DL (ref 65–140)
HCT VFR BLD AUTO: 30.8 % (ref 34.8–46.1)
HGB BLD-MCNC: 9.7 G/DL (ref 11.5–15.4)
MCH RBC QN AUTO: 28.8 PG (ref 26.8–34.3)
MCHC RBC AUTO-ENTMCNC: 31.5 G/DL (ref 31.4–37.4)
MCV RBC AUTO: 91 FL (ref 82–98)
PLATELET # BLD AUTO: 172 THOUSANDS/UL (ref 149–390)
PMV BLD AUTO: 11 FL (ref 8.9–12.7)
POTASSIUM SERPL-SCNC: 3.9 MMOL/L (ref 3.5–5.3)
RBC # BLD AUTO: 3.37 MILLION/UL (ref 3.81–5.12)
RH BLD: NEGATIVE
SODIUM SERPL-SCNC: 141 MMOL/L (ref 136–145)
SPECIMEN EXPIRATION DATE: NORMAL
WBC # BLD AUTO: 8.33 THOUSAND/UL (ref 4.31–10.16)

## 2019-07-23 PROCEDURE — 86901 BLOOD TYPING SEROLOGIC RH(D): CPT | Performed by: ORTHOPAEDIC SURGERY

## 2019-07-23 PROCEDURE — 73551 X-RAY EXAM OF FEMUR 1: CPT

## 2019-07-23 PROCEDURE — 88304 TISSUE EXAM BY PATHOLOGIST: CPT | Performed by: PATHOLOGY

## 2019-07-23 PROCEDURE — 0QS636Z REPOSITION RIGHT UPPER FEMUR WITH INTRAMEDULLARY INTERNAL FIXATION DEVICE, PERCUTANEOUS APPROACH: ICD-10-PCS | Performed by: ORTHOPAEDIC SURGERY

## 2019-07-23 PROCEDURE — 80048 BASIC METABOLIC PNL TOTAL CA: CPT | Performed by: NURSE PRACTITIONER

## 2019-07-23 PROCEDURE — 94664 DEMO&/EVAL PT USE INHALER: CPT

## 2019-07-23 PROCEDURE — 86850 RBC ANTIBODY SCREEN: CPT | Performed by: ORTHOPAEDIC SURGERY

## 2019-07-23 PROCEDURE — C1713 ANCHOR/SCREW BN/BN,TIS/BN: HCPCS | Performed by: ORTHOPAEDIC SURGERY

## 2019-07-23 PROCEDURE — C1769 GUIDE WIRE: HCPCS | Performed by: ORTHOPAEDIC SURGERY

## 2019-07-23 PROCEDURE — 99233 SBSQ HOSP IP/OBS HIGH 50: CPT | Performed by: STUDENT IN AN ORGANIZED HEALTH CARE EDUCATION/TRAINING PROGRAM

## 2019-07-23 PROCEDURE — 85027 COMPLETE CBC AUTOMATED: CPT | Performed by: NURSE PRACTITIONER

## 2019-07-23 PROCEDURE — 99223 1ST HOSP IP/OBS HIGH 75: CPT | Performed by: ORTHOPAEDIC SURGERY

## 2019-07-23 PROCEDURE — 27245 TREAT THIGH FRACTURE: CPT | Performed by: ORTHOPAEDIC SURGERY

## 2019-07-23 PROCEDURE — 99024 POSTOP FOLLOW-UP VISIT: CPT | Performed by: ORTHOPAEDIC SURGERY

## 2019-07-23 PROCEDURE — 86900 BLOOD TYPING SEROLOGIC ABO: CPT | Performed by: ORTHOPAEDIC SURGERY

## 2019-07-23 PROCEDURE — 27245 TREAT THIGH FRACTURE: CPT | Performed by: PHYSICIAN ASSISTANT

## 2019-07-23 DEVICE — 11MM/130 DEG TI CANN TROCH FIXATION NAIL 380MM/RIGHT-STER: Type: IMPLANTABLE DEVICE | Site: HIP | Status: FUNCTIONAL

## 2019-07-23 DEVICE — 11.0MM TI HELICAL BLADE 95MM-STERILE: Type: IMPLANTABLE DEVICE | Site: HIP | Status: FUNCTIONAL

## 2019-07-23 DEVICE — 5.0MM TI LOCKING SCREW W/T25 STARDRIVE 42MM F/IM NAIL-STER: Type: IMPLANTABLE DEVICE | Site: HIP | Status: FUNCTIONAL

## 2019-07-23 RX ORDER — FENTANYL CITRATE 50 UG/ML
INJECTION, SOLUTION INTRAMUSCULAR; INTRAVENOUS AS NEEDED
Status: DISCONTINUED | OUTPATIENT
Start: 2019-07-23 | End: 2019-07-23 | Stop reason: SURG

## 2019-07-23 RX ORDER — ROCURONIUM BROMIDE 10 MG/ML
INJECTION, SOLUTION INTRAVENOUS AS NEEDED
Status: DISCONTINUED | OUTPATIENT
Start: 2019-07-23 | End: 2019-07-23 | Stop reason: SURG

## 2019-07-23 RX ORDER — SODIUM CHLORIDE, SODIUM LACTATE, POTASSIUM CHLORIDE, CALCIUM CHLORIDE 600; 310; 30; 20 MG/100ML; MG/100ML; MG/100ML; MG/100ML
INJECTION, SOLUTION INTRAVENOUS CONTINUOUS PRN
Status: DISCONTINUED | OUTPATIENT
Start: 2019-07-23 | End: 2019-07-23 | Stop reason: SURG

## 2019-07-23 RX ORDER — LIDOCAINE HYDROCHLORIDE 10 MG/ML
INJECTION, SOLUTION INFILTRATION; PERINEURAL AS NEEDED
Status: DISCONTINUED | OUTPATIENT
Start: 2019-07-23 | End: 2019-07-23 | Stop reason: SURG

## 2019-07-23 RX ORDER — OXYCODONE HYDROCHLORIDE 5 MG/1
2.5 TABLET ORAL EVERY 4 HOURS PRN
Status: DISCONTINUED | OUTPATIENT
Start: 2019-07-23 | End: 2019-07-25 | Stop reason: HOSPADM

## 2019-07-23 RX ORDER — SODIUM CHLORIDE, SODIUM LACTATE, POTASSIUM CHLORIDE, CALCIUM CHLORIDE 600; 310; 30; 20 MG/100ML; MG/100ML; MG/100ML; MG/100ML
80 INJECTION, SOLUTION INTRAVENOUS CONTINUOUS
Status: CANCELLED | OUTPATIENT
Start: 2019-07-23

## 2019-07-23 RX ORDER — OXYCODONE HYDROCHLORIDE 5 MG/1
5 TABLET ORAL EVERY 4 HOURS PRN
Status: DISCONTINUED | OUTPATIENT
Start: 2019-07-23 | End: 2019-07-25 | Stop reason: HOSPADM

## 2019-07-23 RX ORDER — POLYETHYLENE GLYCOL 3350 17 G/17G
17 POWDER, FOR SOLUTION ORAL DAILY PRN
Status: DISCONTINUED | OUTPATIENT
Start: 2019-07-23 | End: 2019-07-25 | Stop reason: HOSPADM

## 2019-07-23 RX ORDER — GLYCOPYRROLATE 0.2 MG/ML
INJECTION INTRAMUSCULAR; INTRAVENOUS AS NEEDED
Status: DISCONTINUED | OUTPATIENT
Start: 2019-07-23 | End: 2019-07-23 | Stop reason: SURG

## 2019-07-23 RX ORDER — HYDROMORPHONE HYDROCHLORIDE 2 MG/ML
INJECTION, SOLUTION INTRAMUSCULAR; INTRAVENOUS; SUBCUTANEOUS AS NEEDED
Status: DISCONTINUED | OUTPATIENT
Start: 2019-07-23 | End: 2019-07-23 | Stop reason: SURG

## 2019-07-23 RX ORDER — PANTOPRAZOLE SODIUM 40 MG/1
40 TABLET, DELAYED RELEASE ORAL
Status: DISCONTINUED | OUTPATIENT
Start: 2019-07-24 | End: 2019-07-25 | Stop reason: HOSPADM

## 2019-07-23 RX ORDER — CEFAZOLIN SODIUM 2 G/50ML
2000 SOLUTION INTRAVENOUS EVERY 8 HOURS
Status: COMPLETED | OUTPATIENT
Start: 2019-07-23 | End: 2019-07-24

## 2019-07-23 RX ORDER — ONDANSETRON 2 MG/ML
INJECTION INTRAMUSCULAR; INTRAVENOUS AS NEEDED
Status: DISCONTINUED | OUTPATIENT
Start: 2019-07-23 | End: 2019-07-23 | Stop reason: SURG

## 2019-07-23 RX ORDER — MAGNESIUM HYDROXIDE 1200 MG/15ML
LIQUID ORAL AS NEEDED
Status: DISCONTINUED | OUTPATIENT
Start: 2019-07-23 | End: 2019-07-23 | Stop reason: HOSPADM

## 2019-07-23 RX ORDER — DIPHENHYDRAMINE HYDROCHLORIDE 50 MG/ML
12.5 INJECTION INTRAMUSCULAR; INTRAVENOUS ONCE AS NEEDED
Status: DISCONTINUED | OUTPATIENT
Start: 2019-07-23 | End: 2019-07-23 | Stop reason: HOSPADM

## 2019-07-23 RX ORDER — ACETAMINOPHEN 325 MG/1
975 TABLET ORAL EVERY 8 HOURS SCHEDULED
Status: DISCONTINUED | OUTPATIENT
Start: 2019-07-23 | End: 2019-07-25 | Stop reason: HOSPADM

## 2019-07-23 RX ORDER — EPHEDRINE SULFATE 50 MG/ML
INJECTION INTRAVENOUS AS NEEDED
Status: DISCONTINUED | OUTPATIENT
Start: 2019-07-23 | End: 2019-07-23 | Stop reason: SURG

## 2019-07-23 RX ORDER — ONDANSETRON 2 MG/ML
4 INJECTION INTRAMUSCULAR; INTRAVENOUS ONCE AS NEEDED
Status: DISCONTINUED | OUTPATIENT
Start: 2019-07-23 | End: 2019-07-23 | Stop reason: HOSPADM

## 2019-07-23 RX ORDER — NEOSTIGMINE METHYLSULFATE 1 MG/ML
INJECTION INTRAVENOUS AS NEEDED
Status: DISCONTINUED | OUTPATIENT
Start: 2019-07-23 | End: 2019-07-23 | Stop reason: SURG

## 2019-07-23 RX ORDER — HYDROMORPHONE HCL/PF 1 MG/ML
0.2 SYRINGE (ML) INJECTION EVERY 4 HOURS PRN
Status: DISCONTINUED | OUTPATIENT
Start: 2019-07-23 | End: 2019-07-25 | Stop reason: HOSPADM

## 2019-07-23 RX ORDER — HYDROMORPHONE HCL/PF 1 MG/ML
0.5 SYRINGE (ML) INJECTION
Status: DISCONTINUED | OUTPATIENT
Start: 2019-07-23 | End: 2019-07-23 | Stop reason: HOSPADM

## 2019-07-23 RX ORDER — CEFAZOLIN SODIUM 2 G/50ML
2000 SOLUTION INTRAVENOUS ONCE
Status: COMPLETED | OUTPATIENT
Start: 2019-07-23 | End: 2019-07-23

## 2019-07-23 RX ORDER — PROPOFOL 10 MG/ML
INJECTION, EMULSION INTRAVENOUS AS NEEDED
Status: DISCONTINUED | OUTPATIENT
Start: 2019-07-23 | End: 2019-07-23 | Stop reason: SURG

## 2019-07-23 RX ORDER — HEPARIN SODIUM 5000 [USP'U]/ML
5000 INJECTION, SOLUTION INTRAVENOUS; SUBCUTANEOUS EVERY 8 HOURS SCHEDULED
Status: DISCONTINUED | OUTPATIENT
Start: 2019-07-23 | End: 2019-07-23

## 2019-07-23 RX ORDER — DEXAMETHASONE SODIUM PHOSPHATE 10 MG/ML
INJECTION, SOLUTION INTRAMUSCULAR; INTRAVENOUS AS NEEDED
Status: DISCONTINUED | OUTPATIENT
Start: 2019-07-23 | End: 2019-07-23 | Stop reason: SURG

## 2019-07-23 RX ORDER — SODIUM CHLORIDE 9 MG/ML
INJECTION, SOLUTION INTRAVENOUS CONTINUOUS PRN
Status: DISCONTINUED | OUTPATIENT
Start: 2019-07-23 | End: 2019-07-23 | Stop reason: SURG

## 2019-07-23 RX ORDER — BUPIVACAINE HYDROCHLORIDE 5 MG/ML
INJECTION, SOLUTION PERINEURAL AS NEEDED
Status: DISCONTINUED | OUTPATIENT
Start: 2019-07-23 | End: 2019-07-23 | Stop reason: HOSPADM

## 2019-07-23 RX ORDER — DOCUSATE SODIUM 100 MG/1
100 CAPSULE, LIQUID FILLED ORAL 2 TIMES DAILY
Status: DISCONTINUED | OUTPATIENT
Start: 2019-07-23 | End: 2019-07-25 | Stop reason: HOSPADM

## 2019-07-23 RX ORDER — SODIUM CHLORIDE 9 MG/ML
75 INJECTION, SOLUTION INTRAVENOUS CONTINUOUS
Status: DISPENSED | OUTPATIENT
Start: 2019-07-23 | End: 2019-07-24

## 2019-07-23 RX ADMIN — FUROSEMIDE 80 MG: 80 TABLET ORAL at 14:19

## 2019-07-23 RX ADMIN — EPHEDRINE SULFATE 10 MG: 50 INJECTION, SOLUTION INTRAVENOUS at 10:35

## 2019-07-23 RX ADMIN — HYDROMORPHONE HYDROCHLORIDE 0.4 MG: 2 INJECTION, SOLUTION INTRAMUSCULAR; INTRAVENOUS; SUBCUTANEOUS at 10:44

## 2019-07-23 RX ADMIN — SODIUM CHLORIDE: 0.9 INJECTION, SOLUTION INTRAVENOUS at 09:20

## 2019-07-23 RX ADMIN — PROPOFOL 70 MG: 10 INJECTION, EMULSION INTRAVENOUS at 09:13

## 2019-07-23 RX ADMIN — VERAPAMIL HYDROCHLORIDE 240 MG: 120 TABLET, FILM COATED, EXTENDED RELEASE ORAL at 14:18

## 2019-07-23 RX ADMIN — ACETAMINOPHEN 975 MG: 325 TABLET, FILM COATED ORAL at 23:07

## 2019-07-23 RX ADMIN — ENOXAPARIN SODIUM 40 MG: 40 INJECTION SUBCUTANEOUS at 23:07

## 2019-07-23 RX ADMIN — CEFAZOLIN SODIUM 2000 MG: 2 SOLUTION INTRAVENOUS at 17:41

## 2019-07-23 RX ADMIN — DOCUSATE SODIUM 100 MG: 100 CAPSULE, LIQUID FILLED ORAL at 17:42

## 2019-07-23 RX ADMIN — CEFAZOLIN SODIUM 2000 MG: 2 SOLUTION INTRAVENOUS at 09:44

## 2019-07-23 RX ADMIN — EPHEDRINE SULFATE 10 MG: 50 INJECTION, SOLUTION INTRAVENOUS at 10:22

## 2019-07-23 RX ADMIN — ROCURONIUM BROMIDE 30 MG: 10 INJECTION, SOLUTION INTRAVENOUS at 09:13

## 2019-07-23 RX ADMIN — MORPHINE SULFATE 2 MG: 2 INJECTION, SOLUTION INTRAMUSCULAR; INTRAVENOUS at 03:23

## 2019-07-23 RX ADMIN — EPHEDRINE SULFATE 10 MG: 50 INJECTION, SOLUTION INTRAVENOUS at 09:40

## 2019-07-23 RX ADMIN — FENTANYL CITRATE 50 MCG: 50 INJECTION, SOLUTION INTRAMUSCULAR; INTRAVENOUS at 09:13

## 2019-07-23 RX ADMIN — GLYCOPYRROLATE 0.8 MG: 0.2 INJECTION, SOLUTION INTRAMUSCULAR; INTRAVENOUS at 11:04

## 2019-07-23 RX ADMIN — ROCURONIUM BROMIDE 20 MG: 10 INJECTION, SOLUTION INTRAVENOUS at 09:52

## 2019-07-23 RX ADMIN — FENTANYL CITRATE 50 MCG: 50 INJECTION, SOLUTION INTRAMUSCULAR; INTRAVENOUS at 09:09

## 2019-07-23 RX ADMIN — EPHEDRINE SULFATE 10 MG: 50 INJECTION, SOLUTION INTRAVENOUS at 10:24

## 2019-07-23 RX ADMIN — ACETAMINOPHEN 975 MG: 325 TABLET, FILM COATED ORAL at 14:18

## 2019-07-23 RX ADMIN — SODIUM CHLORIDE, SODIUM LACTATE, POTASSIUM CHLORIDE, AND CALCIUM CHLORIDE: .6; .31; .03; .02 INJECTION, SOLUTION INTRAVENOUS at 09:00

## 2019-07-23 RX ADMIN — SODIUM CHLORIDE 75 ML/HR: 0.9 INJECTION, SOLUTION INTRAVENOUS at 14:17

## 2019-07-23 RX ADMIN — NEOSTIGMINE METHYLSULFATE 4 MG: 1 INJECTION INTRAVENOUS at 11:04

## 2019-07-23 RX ADMIN — DEXAMETHASONE SODIUM PHOSPHATE 4 MG: 10 INJECTION, SOLUTION INTRAMUSCULAR; INTRAVENOUS at 09:30

## 2019-07-23 RX ADMIN — LIDOCAINE HYDROCHLORIDE 50 MG: 10 INJECTION, SOLUTION INFILTRATION; PERINEURAL at 09:13

## 2019-07-23 RX ADMIN — HYDROMORPHONE HYDROCHLORIDE 0.6 MG: 2 INJECTION, SOLUTION INTRAMUSCULAR; INTRAVENOUS; SUBCUTANEOUS at 09:54

## 2019-07-23 RX ADMIN — ONDANSETRON 4 MG: 2 INJECTION INTRAMUSCULAR; INTRAVENOUS at 09:30

## 2019-07-23 RX ADMIN — EPHEDRINE SULFATE 10 MG: 50 INJECTION, SOLUTION INTRAVENOUS at 09:37

## 2019-07-23 NOTE — CONSULTS
Consultation - Katerina Urbina 80 y o  female MRN: [id number]  Unit/Bed#: 2 [address] Encounter: [id number]      Assessment/Plan     Assessment:  1) closed intertrochanteric fracture right hip    Plan:  Patient seen examined this morning  Treatment options were discussed with the patient  Both non operative and operative treatment options were discussed and the patient chose to pursue surgical intervention  Surgical intervention the form of cephalomedullary nailing of the right hip was discussed  The risks pertaining to this procedure were discussed at length and consents were signed and placed into the chart  Please see risk discussion below  All of her questions were addressed this morning  We will proceed to OR for surgical fixation of her right hip once medically cleared  NPO  Labs reviewed, hemoglobin 9 7 however likely hemodilution after fluid intervention overnight  Stat type and screen ordered in preparation for OR this morning  X-rays reviewed  Bed rest  Analgesia p r n  Cardiology consult pending, last echo was performed on 05/19 and demonstrated an EF of 60 - discussed need for cardiovascular clearance with the hospitalist with above data already available  Hospitalist will evaluate the patient and determined the need for cardiovascular consultation preoperatively  Consents signed and placed into the chart  Medical management per primary team  Will proceed to OR for right hip TFN once patient is cleared  Risks and benefits of the surgery were discussed  Risks such as: bleeding, infection, neurovascular damage, non-union, malunion, avascular necrosis, fracture, need for reoperation, persistent pain, persistent limp, scaring, hardware failure, blood clot, pulmonary embolism, death, heart attack, and stroke were discussed  Pt demonstrated understanding of these risks and wished to proceed to surgery  Consents were signed and placed into the chart       History of Present Illness   Physician Requesting Consult: Gwen Acevedo MD  Reason for Consult / Principal Problem: Right hip pain  HPI: Anais Murcia is a 80y o  year old female who presents with right hip pain status post fall  Patient states that on 7/22/2019 the patient was taking a shower when she slipped and fell and shower and fell onto her right side  She had a sudden increase in right lower extremity pain associated with inability to weightbear  She denies hitting her head or loss of consciousness  She denies any other complaints of pain or trauma to her other extremities  She was transported to the emergency department and found to have a intertrochanteric fracture of her right hip  She states the pain is severe and the right hip and groin area  She is unable to actively range her right hip due to pain  She denies any paresthesias down the right lower extremity  Currently she denies pain in her other extremities  Inpatient consult to Orthopedic Surgery  Consult performed by: Lauren Kimball DO  Consult ordered by: JUAN Hough          Review of Systems   Constitutional: Positive for activity change  Negative for fatigue and fever  HENT: Negative  Negative for congestion, facial swelling and sore throat  Eyes: Negative  Negative for pain and itching  Respiratory: Negative for apnea, chest tightness and shortness of breath  Cardiovascular: Negative  Negative for chest pain and leg swelling  Gastrointestinal: Negative  Negative for nausea and vomiting  Endocrine: Negative for cold intolerance and heat intolerance  Musculoskeletal: Positive for arthralgias and gait problem  Skin: Negative  Negative for color change and pallor  Neurological: Negative for dizziness, light-headedness and numbness  Psychiatric/Behavioral: Negative  Negative for agitation and confusion  Historical Information   Past Medical History:   Diagnosis Date    Anesthesia complication       Yrs ago had "anxiety" reaction not sure while sedated, pt states sensitive to anesthesia effects    Anxiety     stress at home    Arthritis     Cataract, right     Last Assessed:5/11/16    Eye hemorrhage     left eye currently 5/12/16    History of shingles 05/2015    Hypertension     Mitral valve stenosis, mild      Past Surgical History:   Procedure Laterality Date    CARPAL TUNNEL RELEASE Left     CATARACT EXTRACTION W/ INTRAOCULAR LENS IMPLANT Left 10/11/2016    Procedure: EXTRACTION EXTRACAPSULAR CATARACT PHACO INTRAOCULAR LENS (IOL); Surgeon: Tammie Pantoja MD;  Location: [facility] MAIN OR;  Service:     CERVICAL CONE BIOPSY      COLONOSCOPY      EYE SURGERY Left     muscle repair    [place] REMV CATARACT EXTRACAP,INSERT LENS Right 5/17/2016    Procedure: EXTRACTION EXTRACAPSULAR CATARACT PHACO INTRAOCULAR LENS (IOL); Surgeon: Tammie Pantoja MD;  Location: [facility] MAIN OR;  Service: Ophthalmology    TONSILLECTOMY       Social History   Social History     Substance and Sexual Activity   Alcohol Use Yes    Comment: rarely     Social History     Substance and Sexual Activity   Drug Use No     Social History     Tobacco Use   Smoking Status Never Smoker   Smokeless Tobacco Never Used     Family History: non-contributory    Meds/Allergies   current meds:   Current Facility-Administered Medications   Medication Dose Route Frequency    furosemide (LASIX) tablet 80 mg  80 mg Oral Daily    morphine injection 2 mg  2 mg Intravenous Q3H PRN    ondansetron (ZOFRAN) injection 4 mg  4 mg Intravenous Q6H PRN    verapamil (CALAN-SR) CR tablet 240 mg  240 mg Oral Daily     Allergies   Allergen Reactions    Indocin [Indomethacin]      hypertension       Objective   Vitals: Blood pressure (!) 121/48, pulse 62, temperature 98 4 °F (36 9 °C), temperature source Oral, resp  rate 18, height 5' (1 524 m), weight 50 8 kg (111 lb 15 9 oz), SpO2 99 %  ,Body mass index is 21 87 kg/m²      No intake or output data in the 24 hours ending 07/23/19 0711  No intake/output data recorded  Invasive Devices     Peripheral Intravenous Line            Peripheral IV 07/22/19 Right Forearm less than 1 day                Physical Exam   Constitutional: She is oriented to person, place, and time  She appears well-developed and well-nourished  HENT:   Head: Normocephalic and atraumatic  Eyes: Pupils are equal, round, and reactive to light  EOM are normal    Neck: Neck supple  Cardiovascular: Normal rate  Pulmonary/Chest: Effort normal    Abdominal: Soft  Musculoskeletal:   See orthopedic exam   Neurological: She is alert and oriented to person, place, and time  Skin: Skin is warm and dry  Psychiatric: She has a normal mood and affect  Nursing note and vitals reviewed  Right Hip Exam     Tenderness   The patient is experiencing tenderness in the anterior, lateral and greater trochanter  Range of Motion   Abduction: abnormal   Adduction: abnormal   Extension: abnormal   Flexion: abnormal   External rotation: abnormal   Internal rotation: abnormal     Muscle Strength   Right hip normal muscle strength: Deferred secondary to injury  Other   Erythema: absent  Scars: absent  Sensation: normal  Pulse: present    Comments:  Secondary survey:  All other extremities without pain on active and passive range of motion and no sign of acute trauma      Right Lower extremity:  Skin diffusely intact around hip, thigh soft compressible, + shortened externally rotated, + logroll, no tenderness to palpation around knee, +L2-S1 SILT, +PF/DF/EHL, DP 2+,  no tenderness palpation right knee              Lab Results:   CBC:   Lab Results   Component Value Date    WBC 8 33 07/23/2019    HGB 9 7 (L) 07/23/2019    HCT 30 8 (L) 07/23/2019    MCV 91 07/23/2019     07/23/2019    MCH 28 8 07/23/2019    MCHC 31 5 07/23/2019    RDW 13 0 07/23/2019    MPV 11 0 07/23/2019    NRBC 0 07/22/2019     CMP:   Lab Results   Component Value Date    SODIUM 142 07/22/2019  07/22/2019    CO2 28 07/22/2019    BUN 30 (H) 07/22/2019    CREATININE 1 06 07/22/2019    CALCIUM 9 2 07/22/2019    AST 28 07/22/2019    ALT 20 07/22/2019    ALKPHOS 98 07/22/2019    EGFR 48 07/22/2019     PT/INR:   Lab Results   Component Value Date    INR 1 02 07/22/2019     Imaging Studies: I have personally reviewed pertinent films in PACS XR pelvis and right hip demonstrate stable 2 part intertrochanteric fracture of the right hip  No lytic or blastic lesion  No other fracture appreciated  VTE Prophylaxis: on hold of OR    Code Status: Level 1 - Full Code  Advance Directive and Living Will:      Power of :    POLST:      Counseling / Coordination of Care  Total floor / unit time spent today 30 minutes  Greater than 50% of total time was spent with the patient and / or family counseling and / or coordination of care  A description of the counseling / coordination of care:  Chart review, review of imaging, history and physical, discussion of plan of care, obtaining verbal consent  Marco LOGAN    Division of Adult Reconstruction  Department of Orthopaedic Surgery  [facility]

## 2019-07-23 NOTE — ANESTHESIA POSTPROCEDURE EVALUATION
Post-Op Assessment Note    CV Status:  Stable  Pain Score: 0    Pain management: adequate     Mental Status:  Sleepy and arousable   Hydration Status:  Stable   PONV Controlled:  None   Airway Patency:  Patent   Post Op Vitals Reviewed: Yes      Staff: Anesthesiologist, CRNA   Comments: HOB @ 30 degrees, spontaneously breathing, vss, fully endorsed to recovery RN without any AC          BP   144/67   Temp      Pulse  59   Resp   12   SpO2   100

## 2019-07-23 NOTE — PLAN OF CARE
Problem: Prexisting or High Potential for Compromised Skin Integrity  Goal: Skin integrity is maintained or improved  Description  INTERVENTIONS:  - Identify patients at risk for skin breakdown  - Assess and monitor skin integrity  - Assess and monitor nutrition and hydration status  - Monitor labs (i e  albumin)  - Assess for incontinence   - Turn and reposition patient  - Assist with mobility/ambulation  - Relieve pressure over bony prominences  - Avoid friction and shearing  - Provide appropriate hygiene as needed including keeping skin clean and dry  - Evaluate need for skin moisturizer/barrier cream  - Collaborate with interdisciplinary team (i e  Nutrition, Rehabilitation, etc )   - Patient/family teaching  Note:   Patient admitted without evidence of skin breakdown but limited mobility due to right femoral fracture makes patient a high risk to develop skin breakdown  Problem: PAIN - ADULT  Goal: Verbalizes/displays adequate comfort level or baseline comfort level  Description  Interventions:  - Encourage patient to monitor pain and request assistance  - Assess pain using appropriate pain scale - numeric pain rating scale  - Administer analgesics based on type and severity of pain and evaluate response  - Implement non-pharmacological measures as appropriate and evaluate response  - Consider cultural and social influences on pain and pain management  - Notify physician/advanced practitioner if interventions unsuccessful or patient reports new pain   Note:   Pain managed with IV morphine and pillow/foam wedge support to right leg

## 2019-07-23 NOTE — PLAN OF CARE
Problem: Prexisting or High Potential for Compromised Skin Integrity  Goal: Skin integrity is maintained or improved  Description  INTERVENTIONS:  - Identify patients at risk for skin breakdown  - Assess and monitor skin integrity  - Assess and monitor nutrition and hydration status  - Monitor labs (i e  albumin)  - Assess for incontinence   - Turn and reposition patient  - Assist with mobility/ambulation  - Relieve pressure over bony prominences  - Avoid friction and shearing  - Provide appropriate hygiene as needed including keeping skin clean and dry  - Evaluate need for skin moisturizer/barrier cream  - Collaborate with interdisciplinary team (i e  Nutrition, Rehabilitation, etc )   - Patient/family teaching  Outcome: Progressing     Problem: PAIN - ADULT  Goal: Verbalizes/displays adequate comfort level or baseline comfort level  Description  Interventions:  - Encourage patient to monitor pain and request assistance  - Assess pain using appropriate pain scale - numeric pain rating scale  - Administer analgesics based on type and severity of pain and evaluate response  - Implement non-pharmacological measures as appropriate and evaluate response  - Consider cultural and social influences on pain and pain management  - Notify physician/advanced practitioner if interventions unsuccessful or patient reports new pain   Outcome: Progressing     Problem: SAFETY ADULT  Goal: Patient will remain free of falls  Description  INTERVENTIONS:  - Assess patient frequently for physical needs  -  Identify cognitive and physical deficits and behaviors that affect risk of falls    -  [place] fall precautions as indicated by assessment   - Educate patient/family on patient safety including physical limitations  - Instruct patient to call for assistance with activity based on assessment  - Modify environment to reduce risk of injury  - Consider OT/PT consult to assist with strengthening/mobility  Outcome: Progressing  Goal: Maintain or return to baseline ADL function  Description  INTERVENTIONS:  -  Assess patient's ability to carry out ADLs; assess patient's baseline for ADL function and identify physical deficits which impact ability to perform ADLs (bathing, care of mouth/teeth, toileting, grooming, dressing, etc )  - Assess/evaluate cause of self-care deficits   - Assess range of motion  - Assess patient's mobility; develop plan if impaired  - Assess patient's need for assistive devices and provide as appropriate  - Encourage maximum independence but intervene and supervise when necessary  ¯ Involve family in performance of ADLs  ¯ Assess for home care needs following discharge   ¯ Request OT consult to assist with ADL evaluation and planning for discharge  ¯ Provide patient education as appropriate  Outcome: Progressing  Goal: Maintain or return mobility status to optimal level  Description  INTERVENTIONS:  - Assess patient's baseline mobility status (ambulation, transfers, stairs, etc )    - Identify cognitive and physical deficits and behaviors that affect mobility  - Identify mobility aids required to assist with transfers and/or ambulation (gait belt, sit-to-stand, lift, walker, cane, etc )  - [place] fall precautions as indicated by assessment  - Record patient progress and toleration of activity level on Mobility SBAR; progress patient to next Phase/Stage  - Instruct patient to call for assistance with activity based on assessment  - Request Rehabilitation consult to assist with strengthening/weightbearing, etc   Outcome: Progressing

## 2019-07-23 NOTE — ASSESSMENT & PLAN NOTE
WBC 19 2, likely reactive, no signs of infection  Denies cough, fevers, chills, nausea, vomiting, abdominal pain, hematuria, dysuria      · UA and CXR with no signs of infection  · resolved

## 2019-07-23 NOTE — ASSESSMENT & PLAN NOTE
Echo showed EF 60%, moderate AS  Patient with some pitting edema but no evidence of acute overload  Clinically stable  Ok to proceed with surgery, moderate cardiac risk  Patient aware     Monitor I/Os intraop/postop  Will need to establish care with cardio after discharge

## 2019-07-23 NOTE — OP NOTE
OPERATIVE REPORT  PATIENT NAME: Lucas Garrett    :  1935  MRN: [id number]  Pt Location: [facility] OR ROOM 03    SURGERY DATE: 2019    Surgeon(s) and Role:     * Christopher Hopkins DO - Primary     * Marely Gallego PA-C - Assisting, no qualified resident was available an assistant was needed for patient positioning, soft tissue retraction, and to complete the case safely  Preop Diagnosis:  Closed displaced intertrochanteric fracture right hip    Post-Op Diagnosis Codes:  Closed displaced intertrochanteric fracture right hip    Procedure:  Cephalomedullary nailing right hip    Specimen(s):  Proximal femoral bone reamings sent for specimen  ID Type Source Tests Collected by Time Destination   1 : FEMORAL REAMINGS RIGHT HIP Tissue Soft Tissue, Other TISSUE EXAM Christopher Hopkins DO 2019 1014        Estimated Blood Loss:   100 mL    Drains:  None  Urethral Catheter Latex 16 Fr  (Active)   Site Assessment Clean;Skin intact 2019 11:45 AM   Collection Container Standard drainage bag 2019 11:45 AM   Securement Method Securing device (Describe) 2019 11:45 AM   Number of days: 0       Anesthesia Type:   General    Antibiotics:  Ancef 2 g    Intravenous fluids:  700 cc    Urine output:  Hackett:  450cc    Implants:  Synthes 380 mm x 11 mm 130 degree titanium trochanteric fixation nail, 11 mm by 95 mm helical blade, 5 mm x 42 mm distal locking screw    Operative Indications:  [facility] right [facility] [S72 001A]  Patient is a very pleasant 80-year-old female that presents emergency department status post fall at home in her shower  After thorough evaluation she was found to have a displaced closed intertrochanteric fracture of her right hip  She was admitted for medical optimization in preparation for further treatment  She was evaluated by the orthopedic team and treatment options were discussed    For her displaced closed intertrochanteric fracture of her right hip she was explained operative and non operative treatment options  The patient elected to pursue operative intervention  The risks and benefits of undergoing operative intervention were explained and consents were signed and placed into the chart  Based off of her x-ray findings she would be a candidate for cephalomedullary nailing of her right hip  The patient was seen evaluated by the medical service and deemed clear for the intended procedure  She was taken for right hip cephalomedullary nailing on 7/23/2019  Operative Findings:  Once patient was positioned on the fracture table and fluoroscopy was used to examine the site of injury the patient had a comminuted 3 part unstable intertrochanteric fracture of the right hip with subtroch extension  It was decided that a long nail would be needed to stabilize her proximal femoral injury  Ultimately a long cephalomedullary nail was successfully implanted without complication  Helical blade was inserted into the femoral head neck in the center center position with a tip to apex distance less than 25 mm the nail construct was then locked distally in place with a distal locking screw through the static hole of the nail  Patient tolerated the procedure well  There were no complications      Complications:   None    Procedure and Technique:  Patient was identified in the preoperative holding area and the right lower extremity was identified and marked by the orthopedic staff  The patient's questions final questions were addressed prior to surgery  The consents were visualized to ensure correct laterality and intended procedure and were deemed to be correct and replaced in the chart  The patient was taken back to the operating room where general anesthesia was administered by the anesthesia staff  The patient was then positioned on the fracture table for the intended procedure  Preoperative antibiotics in the form of Ancef 2 g were administered   The patient was positioned on the fracture table with the right lower extremity in extension and placed in the traction boot  The nonoperative leg was placed into the well leg valenzuela in the flexed and abducted position with all bony prominences well padded  After the patient was positioned, reduction using the fracture table was performed  Appropriate reduction of the fracture in the AP and lateral planes was confirmed using fluoroscopy  During the evaluation reduction of fluoroscopy was found that the patient had an unstable 3 part intertrochanteric fracture of her right hip and there was a subtroch extension making this an unstable fracture pattern  The proximal femoral anatomy was able to be reduced and restored using closed methods  The right lower extremity was prepped and draped in the normal sterile fashion  A timeout was performed  The borders of the proximal femur were delineated using a marking pen and the long axis of the femur was drawn with a marking pen extending proximal to the greater troch  Two finger breaths above the greater trochanter a longitudinal skin incision was made using a scalpel in line with the longitudinal axis of the femur  Deep dissection was carried down through the subcutaneous tissue using a Bovie to ensure hemostasis  The deep fascia was identified and incised using a Bovie  The tip of the greater trochanter was palpated digitally and the starting guidewire was placed upon the tip of the greater trochanter  Using AP and lateral fluoroscopy the appropriate starting position was obtained and the guidewire was inserted down to the level of lesser troch  The opening reamer was then used and carried down through the intratrochanteric region down to the lesser troch while using a tissue protector  The guidewire and the reamer were removed  A long ball-tipped guidewire was then inserted through the proximal femur down past the fracture site in the intramedullary canal to the superior pole of the patella  The length of of the nail was measured off the guidewire was found to be approximately 380 mm  Next reaming began over the ball-tip guidewire started with a 9 was carried up sequentially until a size 12 5  This demonstrated mild chatter at the isthmus of the femur  This was dictated the size of the nail and a 11 mm x 138 mm titanium trochanteric fixation nail was selected and inserted into the proximal femur into position  The nail was impacted into its final position in the proximal femur to allow placement of the helical blade into the appropriate position in the femoral head  The 113 ° helical blade targeting guide was assembled and attached to the insertion arm and the correct area for the distal incision was delineated  Sharp dissection was carried down through the skin  Deep dissection was done with a Bovie to ensure hemostasis  The IT band was identified and incised using a Bovie and the lateral border of the femur was palpated  The targeting triple sleeve guide was inserted and its inner sleeve was carried down to the lateral border of the femur and a guidewire was inserted  The guidewire was inserted into the apex position of the femoral head  The guidewire was placed just below the subchondral bone in both the AP and lateral views in the apex position of the femoral head  Once in the correct position was the guidepin was measured for the length of the helical blade  The lateral cortex was then opened and the helical blade reamer was then set to 95 mm and inserted up through the targeting sleeve to prepare the femoral neck and head for the helical blade  This was done without penetration of the guidewire through the femoral chondral surface and into the acetabulum  The reamer was removed and the 95 mm x 11 mm helical blade was then inserted through the targeting guide and impacted up into its final position into the femoral neck and head    The helical blade was inserted into the center center position with a tip to apex distance less than 25 mm  Is important to note that reamings from the insertion of the helical blade as well as preparation from the long cephalomedullary nail and opening Reamer wall sent for pathology and specimen as she had significantly soft bone  The helical blade was appropriately positioned on both AP and lateral views using fluoroscopy  The proximal locking screw was then tightened down to secure the position of the helical blade  At this point the helical blade insertion device was removed and the 130 ° trochars were also removed  Preparations for the distal locking screw were made by utilizing the perfect [place] technique with fluoroscopy  Once perfect circles were obtained over the static distal locking screw a stab incision was made over the lateral aspect of the distal femur and carried down to bone  The hole for the distal static locking screw was drilled measured and the appropriate length screw was then inserted and appeared to be the appropriate length  This demonstrated good purchase in the distal femur and was confirmed to be within the static locking hole knee AP and lateral views  Final images of the trochanteric nail was obtained in both AP and lateral planes demonstrating an appropriate position  The 3 incisions were copiously irrigated with normal saline solution  The deep fascia of the 2 proximal incisions was closed using an 0 Vicryl in a running locked fashion  The deep subcutaneous tissues of both incisions were reapproximated using 0 Vicryl sutures, the superficial subcutaneous tissues of all 3 incisions were reapproximated using an undyed 2-0 Vicryl, and the skin edges were reapproximated using staples  The leg was cleaned and dried and sterile dressings were applied to both incisions  The drapes were removed and the patient was taken out of positioning on the fracture table  General anesthesia was reversed and the patient was awakened without complication   The patient was transferred back to the hospital bed and was transferred to PACU in stable condition       I was present for the entire procedure    Patient Disposition:  PACU     SIGNATURE: Aamir Carmona DO  DATE: July 23, 2019  TIME: 1:02 PM

## 2019-07-23 NOTE — PROGRESS NOTES
Progress Note - Ruben Mortensen 1935, 80 y o  female MRN: [id number]    Unit/Bed#: 2 [address] Encounter: [id number]    Primary Care Provider: Montana Irwin MD   Date and time admitted to hospital: 7/22/2019  4:41 PM        Closed right hip fracture, initial encounter [facility])  Assessment & Plan  Plan for nailing today      Chronic diastolic congestive heart failure (HCC)  Assessment & Plan  · Continue lasix  · Echo revealed a normal EF, moderate concentric hypertrophy, mild MR, moderate aortic stenosis, moderate TR  · Daily Weight  · Intake and output  · Low sodium diet    Aortic stenosis, moderate  Assessment & Plan  Echo showed EF 60%, moderate AS  Patient with some pitting edema but no evidence of acute overload  Clinically stable  Ok to proceed with surgery, moderate cardiac risk  Patient aware  Monitor I/Os intraop/postop  Will need to establish care with cardio after discharge    Essential hypertension  Assessment & Plan  Continue verapamil     Leukocytosisresolved as of 7/23/2019  Assessment & Plan  WBC 19 2, likely reactive, no signs of infection  Denies cough, fevers, chills, nausea, vomiting, abdominal pain, hematuria, dysuria  · UA and CXR with no signs of infection  · resolved        VTE Pharmacologic Prophylaxis:   Pharmacologic: per ortho post op  Mechanical VTE Prophylaxis in Place: Yes    Patient Centered Rounds: I have performed bedside rounds with nursing staff today  Discussions with Specialists or Other Care Team Provider: Yes  Education and Discussions with Family / Patient:Yes  Time Spent for Care: 30 minutes  More than 50% of total time spent on counseling and coordination of care as described above  Current Length of Stay: 1 day(s)  Current Patient Status: Inpatient     Discharge Plan: pending    Code Status: Level 1 - Full Code      Subjective:   Has some hip pain  No other complaints  Patient laying flat, speaking in full sentenses   Denies dyspnea, orthopnea, chest pain with exertion  Patient takes care of her  and is very active at home  Her only concern with going up and down stairs is pain in the knee  She denies any issues otherwise  Has never had an MI or stroke  Has chronic lower extremity edema which is unchanged for several years  Improves when she elevates her legs  Objective:     Vitals:   Temp (24hrs), Av 2 °F (36 8 °C), Min:97 4 °F (36 3 °C), Max:98 4 °F (36 9 °C)    Temp:  [97 4 °F (36 3 °C)-98 4 °F (36 9 °C)] 98 4 °F (36 9 °C)  HR:  [61-69] 62  Resp:  [16-18] 18  BP: (116-207)/(48-84) 121/48  SpO2:  [98 %-100 %] 99 %  Body mass index is 21 87 kg/m²  Input and Output Summary (last 24 hours):   No intake or output data in the 24 hours ending 19 0578     Physical Exam:     Physical Exam   Constitutional: She is oriented to person, place, and time  She appears well-developed  No distress  HENT:   Head: Normocephalic and atraumatic  Cardiovascular: Normal rate, regular rhythm and normal heart sounds  Pulmonary/Chest: Effort normal and breath sounds normal  No respiratory distress  She has no wheezes  She has no rales  Abdominal: Soft  Bowel sounds are normal  She exhibits no distension  There is no tenderness  There is no rebound  Neurological: She is alert and oriented to person, place, and time  Skin: Skin is warm and dry  Psychiatric: She has a normal mood and affect  Her behavior is normal    Nursing note and vitals reviewed        Additional Data:     Labs:    Results from last 7 days   Lab Units 19  0650 19  1721   WBC Thousand/uL 8 33 19 23*   HEMOGLOBIN g/dL 9 7* 12 0   HEMATOCRIT % 30 8* 37 7   PLATELETS Thousands/uL 172 219   NEUTROS PCT %  --  90*     Results from last 7 days   Lab Units 19  0650 19  1721   SODIUM mmol/L 141 142   POTASSIUM mmol/L 3 9 3 5   CHLORIDE mmol/L 104 102   CO2 mmol/L 33* 28   BUN mg/dL 29* 30*   CREATININE mg/dL 0 98 1 06   CALCIUM mg/dL 8 7 9 2   TOTAL BILIRUBIN mg/dL  --  0 80   ALK PHOS U/L  --  98   ALT U/L  --  20   AST U/L  --  28     Results from last 7 days   Lab Units 07/22/19  1721   INR  1 02         No results found for: HGBA1C            * I Have Reviewed All Lab Data Listed Above  * Additional Pertinent Lab Tests Reviewed: All Labs Within Last 24 Hours Reviewed    Imaging:     XR hip/pelv 2-3 vws right if performed   Final Result by Kwesi Butler MD (07/22 1831)      Acute right femoral intertrochanteric fracture with varus angulation and mild displacement  The study was marked in [facility] for immediate notification  Workstation performed: [id number]         XR chest 1 view   Final Result by Kwesi Butler MD (07/22 1831)      No acute cardiopulmonary disease  Workstation performed: [id number]           Imaging Reports Reviewed by myself    Cultures:   Blood Culture: No results found for: BLOODCX  Urine Culture: No results found for: URINECX  Sputum Culture: No components found for: SPUTUMCX  Wound Culture: No results found for: WOUNDCULT    Last 24 Hours Medication List:     Current Facility-Administered Medications:  furosemide 80 mg Oral Daily Waylan Molt, CRNP   morphine injection 2 mg Intravenous Q3H PRN Waylan Molt, CRNP   ondansetron 4 mg Intravenous Q6H PRN Waylan Molt, CRNP   verapamil 240 mg Oral Daily Waylan Molt, CRNP        Today, Patient Was Seen By: Ovidio Rubio MD    ** Please Note: Dragon 360 Dictation voice to text software may have been used in the creation of this document   **

## 2019-07-23 NOTE — UTILIZATION REVIEW
Initial Clinical Review    Admission: Date/Time/Statement: 7/22/19 @ 1817     Orders Placed This Encounter   Procedures    Inpatient Admission (expected length of stay for this patient Order details is greater than two midnights)     Standing Status:   Standing     Number of Occurrences:   1     Order Specific Question:   Admitting Physician     Answer:   Tika Beckwith [[id number]]     Order Specific Question:   Level of Care     Answer:   Med Surg [16]     Order Specific Question:   Estimated length of stay     Answer:   More than 2 Midnights     Order Specific Question:   Certification     Answer:   I certify that inpatient services are medically necessary for this patient for a duration of greater than two midnights  See H&P and MD Progress Notes for additional information about the patient's course of treatment  ED Arrival Information     Expected Arrival Acuity Means of Arrival Escorted By Service Admission Type    - [facility]22[facility]2019 16:[facility]    Arrival Complaint    -        Chief Complaint   Patient presents with    Fall     patient slipped and fell in the shower  C/o right hip pain, noted to be externally rotated  Denies hitting head, no blood thinners  Assessment/Plan:   S/p slip/fall  No head trauma  C/o rt hip pain  Rt leg shortened  Intertrochanteric fracture of right femur [facility])  Assessment & Plan  Patient presented to the ED post mechnical fall in the shower  She fell on her right side, no head injury or LOC  Xray in the ER revealed an acute right femoral intertrochanteric fracture with varus angulation and mild displacement  · Admit to medicine  · Pain management  · Ortho consult  · Cardio consult for clearance  · NPO after midnight  · Echo 5/19 revealed an EF of 60%, moderate concentric hypertrophy, elevated mean left filling pressures      Leukocytosis  Assessment & Plan  WBC 19 2, likely reactive, no signs of infection   Denies cough, fevers, chills, nausea, vomiting, abdominal pain, hematuria, dysuria      · Urinalysis and CXR pending  · Repeat CBC in AM    ED Triage Vitals   Temperature Pulse Respirations Blood Pressure SpO2   07/22/19 1643 07/22/19 1643 07/22/19 1646 07/22/19 1646 07/22/19 1643   (!) 97 4 °F (36 3 °C) 67 16 (!) 207/84 100 %      Temp Source Heart Rate Source Patient Position - Orthostatic VS BP Location FiO2 (%)   07/22/19 1643 07/22/19 1643 07/22/19 1646 07/22/19 1646 --   Oral Monitor Lying Right arm       Pain Score       07/22/19 1643       5        Wt Readings from Last 1 Encounters:   07/23/19 50 8 kg (111 lb 15 9 oz)     Additional Vital Signs:   07/23/19 0313  98 4 °F (36 9 °C)  62  18  121/48Abnormal   99 %  None (Room air)    Lying   07/23/19 0005  98 4 °F (36 9 °C)    17  116/49Abnormal         Lying   07/22/19 2206  98 4 °F (36 9 °C)  61  17  132/50  98 %  None (Room air)    Lying   07/22/19 1915    62      100 %         07/22/19 1847  98 3 °F (36 8 °C)  69  16  153/72  98 %  None (Room air)    Lying   07/22/19 1646      16  207/84Abnormal         Lying   07/22/19 1643  97 4 °F (36 3 °C)Abnormal   67      100 %         Pertinent Labs/Diagnostic Test Results:   Results from last 7 days   Lab Units 07/23/19  0650 07/22/19  1721   WBC Thousand/uL 8 33 19 23*   HEMOGLOBIN g/dL 9 7* 12 0   HEMATOCRIT % 30 8* 37 7   PLATELETS Thousands/uL 172 219   NEUTROS ABS Thousands/µL  --  17 36*     Results from last 7 days   Lab Units 07/23/19  0650 07/22/19  1721   SODIUM mmol/L 141 142   POTASSIUM mmol/L 3 9 3 5   CHLORIDE mmol/L 104 102   CO2 mmol/L 33* 28   ANION GAP mmol/L 4 12   BUN mg/dL 29* 30*   CREATININE mg/dL 0 98 1 06   EGFR ml/min/1 73sq m 53 48   CALCIUM mg/dL 8 7 9 2     Results from last 7 days   Lab Units 07/22/19  1721   AST U/L 28   ALT U/L 20   ALK PHOS U/L 98   TOTAL PROTEIN g/dL 6 9   ALBUMIN g/dL 3 8   TOTAL BILIRUBIN mg/dL 0 80         Results from last 7 days   Lab Units 07/23/19  0650 07/22/19  1721   GLUCOSE RANDOM mg/dL 117 145*     Results from last 7 days   Lab Units 07/22/19  1721   PROTIME seconds 10 7   INR  1 02   PTT seconds 25     Results from last 7 days   Lab Units 07/22/19  1943   CLARITY UA  Clear   COLOR UA  Light Yellow   SPEC GRAV UA  1 010   PH UA  6 5   GLUCOSE UA mg/dl Negative   KETONES UA mg/dl Negative   BLOOD UA  Negative   PROTEIN UA mg/dl Negative   NITRITE UA  Negative   BILIRUBIN UA  Negative   UROBILINOGEN UA E U /dl 0 2   LEUKOCYTES UA  Negative     Cxr=No acute cardiopulmonary disease  r hip/pelvis xray=Acute right femoral intertrochanteric fracture with varus angulation and mild displacement  r femur xray= Fluoroscopic guidance for ORIF of intertrochanteric fracture with near-anatomic alignment  Please refer to the separate procedure notes for additional details  ED Treatment:   Medication Administration from 07/22/2019 1638 to 07/22/2019 2023       Date/Time Order Dose Route Action Action by Comments     07/22/2019 1722 morphine (PF) 4 mg/mL injection 4 mg 4 mg Intravenous Given Jazmyn Ruelas RN         Past Medical History:   Diagnosis Date    Anesthesia complication       Yrs ago had "anxiety" reaction not sure while sedated, pt states sensitive to anesthesia effects    Anxiety     stress at home    Arthritis     Cataract, right     Last Assessed:5/11/16    Eye hemorrhage     left eye currently 5/12/16    History of shingles 05/2015    Hypertension     Mitral valve stenosis, mild      Present on Admission:   Intertrochanteric fracture of right femur (HCC)   (Resolved) Leukocytosis   (Resolved) Hypertension   Essential hypertension   Aortic stenosis, moderate   Chronic diastolic congestive heart failure (HCC)  Admitting Diagnosis: Leg injury [S89 90XA]  [facility] [S72 001A]  Age/Sex: 80 y o  female  Admission Orders:  MED SURG  CONSULT ORHTO  PT/OT EVAL & [address]    Current Facility-Administered Medications:  acetaminophen 975 mg Oral Q8H [address] [facility], RAL[facility]   cefazolin 2,000 mg Intravenous Q8H Yogesh Liz, RALF   docusate sodium 100 mg Oral BID [facility], [facility]   enoxaparin 40 mg Subcutaneous Daily [facility], [facility]   furosemide 80 mg Oral Daily [facility] [place]   HYDROmorphone 0 2 mg Intravenous Q4H PRN Orien Nhan, RALF   naloxone 0 04 mg Intravenous Q1MIN PRN Orien [place], RALF   ondansetron 4 mg Intravenous Q6H PRN [facility], [facility]   oxyCODONE 2 5 mg Oral Q4H PRN [facility], [facility]   [facility]ODONE 5 [facility]   [START ON 7/24/2019] pantoprazole 40 mg Oral Daily Before Breakfast [facility]   polyethylene glycol 17 g Oral Daily PRN [facility], RAL[facility]   sodium chloride 75 mL/hr Intravenous Continuous [facility], RAL[facility]   verapamil 240 mg [facility]     Network Utilization Review Department  Phone: [phone]; Fax [phone]  [email or web address]  ATTENTION: Please call with any questions or concerns to [phone]  and carefully listen to the prompts so that you are directed to the right person  Send all requests for admission clinical reviews, approved or denied determinations and any other requests to fax [phone]   All voicemails are confidential

## 2019-07-23 NOTE — ANESTHESIA PREPROCEDURE EVALUATION
Review of Systems/Medical History  Patient summary reviewed  Chart reviewed      Cardiovascular  Hypertension , Valvular heart disease , aortic valve stenosis and tricuspid regurgitation, CHF heart failure unspecified,   Pulmonary hypertension (Estimated peak PA pressure was 34 mmHg ) Pulmonary       GI/Hepatic            Endo/Other     GYN       Hematology   Musculoskeletal  Osteoarthritis,   Arthritis     Neurology   Psychology   Anxiety,              Physical Exam    Airway    Mallampati score: II  TM Distance: >3 FB  Neck ROM: full     Dental       Cardiovascular      Pulmonary      Other Findings        Anesthesia Plan  ASA Score- 3 Emergent    Anesthesia Type- general and regional with ASA Monitors  Additional Monitors: arterial line  Airway Plan: ETT  Plan Factors-    Induction- intravenous  Postoperative Plan-     Informed Consent- Anesthetic plan and risks discussed with patient  I personally reviewed this patient with the CRNA  Discussed and agreed on the Anesthesia Plan with the CRNA  Tom Beckwith

## 2019-07-23 NOTE — ASSESSMENT & PLAN NOTE
· Continue lasix  · Echo revealed a normal EF, moderate concentric hypertrophy, mild MR, moderate aortic stenosis, moderate TR  · Daily Weight  · Intake and output  · Low sodium diet

## 2019-07-23 NOTE — ANESTHESIA PROCEDURE NOTES
Arterial Line Insertion  Performed by: Landon Headley CRNA  Authorized by: Andrea Cancino MD   Consent: Verbal consent obtained  Written consent obtained  Risks and benefits discussed: discussed with patient by Dr Shaun Hunter  Consent given by: patient  Patient understanding: patient states understanding of the procedure being performed  Patient consent: the patient's understanding of the procedure matches consent given  Procedure consent: procedure consent matches procedure scheduled  Relevant documents: relevant documents present and verified  Required items: required blood products, implants, devices, and special equipment available  Time out: Immediately prior to procedure a "time out" was called to verify the correct patient, procedure, equipment, support staff and site/side marked as required  Preparation: Patient was prepped and draped in the usual sterile fashion  Indications: hemodynamic monitoring  Orientation:  Left  Location: radial artery  Sedation:  Patient sedated: yes  Vitals: Vital signs were monitored during sedation      Procedure Details:  Lewis's test normal: yes  Needle gauge: 20  Seldinger technique: Seldinger technique used  Number of attempts: 1    Post-procedure:  Post-procedure: dressing applied  Waveform: good waveform and waveform confirmed  Patient tolerance: Patient tolerated the procedure well with no immediate complications

## 2019-07-23 NOTE — PROGRESS NOTES
Progress Note - Orthopedics   Saeid Santos 80 y o  female MRN: [id number]  Unit/Bed#: [address] [place] [address] Encounter: [id number]    Assessment:  1) POD#0 s/p R hip TFN    Plan:  Ancef 2 g IV x 2 for 24 hours postop  DVT prophylaxis:  Lovenox 40 mg subcu q day/SCD's/Ambulation  WBAT  PT/OT- WBAT  Analgesia PRN  Follow up AM labs  May discontinue Hackett at the discretion of medical team once medically stable postoperatively  Medical management per primary team  Dressing- monitor for drainage  Discharge planning - disposition pending progress with postoperative physical therapy    Weight bearing: WBAT RLE    VTE Pharmacologic Prophylaxis: Enoxaparin (Lovenox)  VTE Mechanical Prophylaxis: sequential compression device    Subjective:  Patient seen examined  Patient resting comfortably in her bed  Pain controlled  Events of surgery discussed with the patient  The patient's son Danish Barbosa was called postoperatively in notified of the outcome of surgery and that all went well  Vitals: Blood pressure 134/56, pulse 60, temperature 97 7 °F (36 5 °C), temperature source Oral, resp  rate 18, height 5' (1 524 m), weight 50 8 kg (111 lb 15 9 oz), SpO2 98 %  ,Body mass index is 21 87 kg/m²  Intake/Output Summary (Last 24 hours) at 7/23/2019 1338  Last data filed at 7/23/2019 1307  Gross per 24 hour   Intake 650 ml   Output 675 ml   Net -25 ml       Invasive Devices     Peripheral Intravenous Line            Peripheral IV 07/22/19 Right Forearm less than 1 day    Peripheral IV 07/23/19 Left Arm less than 1 day          Arterial Line            Arterial Line 07/23/19 Left Radial less than 1 day          Drain            Urethral Catheter Latex 16 Fr  less than 1 day                Physical Exam: NAD  Ortho Exam: RLE: Dsg c/d/i, thigh soft, +DF/PF/EHL, +L3-S1 SILT, DP2+, foot warm    Lab, Imaging and other studies:  Intraoperative fluoroscopy images of the right femur demonstrate adequate placement of a long cephalomedullary nail    No intraoperative fractures appreciated  Chrissie LOGAN    Division of Adult Reconstruction  Department of Orthopaedic Surgery  [facility]

## 2019-07-24 LAB
ANION GAP SERPL CALCULATED.3IONS-SCNC: 4 MMOL/L (ref 4–13)
BASOPHILS # BLD AUTO: 0.01 THOUSANDS/ΜL (ref 0–0.1)
BASOPHILS NFR BLD AUTO: 0 % (ref 0–1)
BUN SERPL-MCNC: 24 MG/DL (ref 5–25)
CALCIUM SERPL-MCNC: 7.9 MG/DL (ref 8.3–10.1)
CHLORIDE SERPL-SCNC: 105 MMOL/L (ref 100–108)
CO2 SERPL-SCNC: 31 MMOL/L (ref 21–32)
CREAT SERPL-MCNC: 0.91 MG/DL (ref 0.6–1.3)
EOSINOPHIL # BLD AUTO: 0 THOUSAND/ΜL (ref 0–0.61)
EOSINOPHIL NFR BLD AUTO: 0 % (ref 0–6)
ERYTHROCYTE [DISTWIDTH] IN BLOOD BY AUTOMATED COUNT: 13.2 % (ref 11.6–15.1)
GFR SERPL CREATININE-BSD FRML MDRD: 58 ML/MIN/1.73SQ M
GLUCOSE SERPL-MCNC: 121 MG/DL (ref 65–140)
HCT VFR BLD AUTO: 26.2 % (ref 34.8–46.1)
HGB BLD-MCNC: 8.2 G/DL (ref 11.5–15.4)
IMM GRANULOCYTES # BLD AUTO: 0.05 THOUSAND/UL (ref 0–0.2)
IMM GRANULOCYTES NFR BLD AUTO: 1 % (ref 0–2)
LYMPHOCYTES # BLD AUTO: 0.99 THOUSANDS/ΜL (ref 0.6–4.47)
LYMPHOCYTES NFR BLD AUTO: 10 % (ref 14–44)
MCH RBC QN AUTO: 28.8 PG (ref 26.8–34.3)
MCHC RBC AUTO-ENTMCNC: 31.3 G/DL (ref 31.4–37.4)
MCV RBC AUTO: 92 FL (ref 82–98)
MONOCYTES # BLD AUTO: 0.8 THOUSAND/ΜL (ref 0.17–1.22)
MONOCYTES NFR BLD AUTO: 8 % (ref 4–12)
MRSA NOSE QL CULT: ABNORMAL
MRSA NOSE QL CULT: ABNORMAL
NEUTROPHILS # BLD AUTO: 8.57 THOUSANDS/ΜL (ref 1.85–7.62)
NEUTS SEG NFR BLD AUTO: 81 % (ref 43–75)
NRBC BLD AUTO-RTO: 0 /100 WBCS
PLATELET # BLD AUTO: 154 THOUSANDS/UL (ref 149–390)
PMV BLD AUTO: 11.1 FL (ref 8.9–12.7)
POTASSIUM SERPL-SCNC: 3.7 MMOL/L (ref 3.5–5.3)
RBC # BLD AUTO: 2.85 MILLION/UL (ref 3.81–5.12)
SODIUM SERPL-SCNC: 140 MMOL/L (ref 136–145)
WBC # BLD AUTO: 10.42 THOUSAND/UL (ref 4.31–10.16)

## 2019-07-24 PROCEDURE — 85025 COMPLETE CBC W/AUTO DIFF WBC: CPT | Performed by: PHYSICIAN ASSISTANT

## 2019-07-24 PROCEDURE — 97163 PT EVAL HIGH COMPLEX 45 MIN: CPT

## 2019-07-24 PROCEDURE — 0HTRXZZ RESECTION OF TOE NAIL, EXTERNAL APPROACH: ICD-10-PCS | Performed by: PODIATRIST

## 2019-07-24 PROCEDURE — G8987 SELF CARE CURRENT STATUS: HCPCS

## 2019-07-24 PROCEDURE — 80048 BASIC METABOLIC PNL TOTAL CA: CPT | Performed by: PHYSICIAN ASSISTANT

## 2019-07-24 PROCEDURE — G8979 MOBILITY GOAL STATUS: HCPCS

## 2019-07-24 PROCEDURE — 0HBRXZZ EXCISION OF TOE NAIL, EXTERNAL APPROACH: ICD-10-PCS | Performed by: PODIATRIST

## 2019-07-24 PROCEDURE — 97167 OT EVAL HIGH COMPLEX 60 MIN: CPT

## 2019-07-24 PROCEDURE — G8988 SELF CARE GOAL STATUS: HCPCS

## 2019-07-24 PROCEDURE — G8978 MOBILITY CURRENT STATUS: HCPCS

## 2019-07-24 PROCEDURE — 97110 THERAPEUTIC EXERCISES: CPT

## 2019-07-24 PROCEDURE — 99232 SBSQ HOSP IP/OBS MODERATE 35: CPT | Performed by: PODIATRIST

## 2019-07-24 PROCEDURE — 99024 POSTOP FOLLOW-UP VISIT: CPT | Performed by: ORTHOPAEDIC SURGERY

## 2019-07-24 PROCEDURE — 99232 SBSQ HOSP IP/OBS MODERATE 35: CPT | Performed by: STUDENT IN AN ORGANIZED HEALTH CARE EDUCATION/TRAINING PROGRAM

## 2019-07-24 RX ADMIN — ACETAMINOPHEN 975 MG: 325 TABLET, FILM COATED ORAL at 22:03

## 2019-07-24 RX ADMIN — PANTOPRAZOLE SODIUM 40 MG: 40 TABLET, DELAYED RELEASE ORAL at 06:30

## 2019-07-24 RX ADMIN — CEFAZOLIN SODIUM 2000 MG: 2 SOLUTION INTRAVENOUS at 01:33

## 2019-07-24 RX ADMIN — OXYCODONE HYDROCHLORIDE 5 MG: 5 TABLET ORAL at 20:35

## 2019-07-24 RX ADMIN — ACETAMINOPHEN 975 MG: 325 TABLET, FILM COATED ORAL at 06:30

## 2019-07-24 RX ADMIN — ACETAMINOPHEN 975 MG: 325 TABLET, FILM COATED ORAL at 14:57

## 2019-07-24 RX ADMIN — ENOXAPARIN SODIUM 40 MG: 40 INJECTION SUBCUTANEOUS at 22:06

## 2019-07-24 NOTE — ASSESSMENT & PLAN NOTE
Patient presented to the ED post mechnical fall in the shower  She fell on her right side, no head injury or LOC  Xray in the ER revealed an acute right femoral intertrochanteric fracture with varus angulation and mild displacement  S/p right hip TFN, POD#2  DVT proph with lovenox 40mg daily  WBAT  DC haskins, void trial done sucessfully  Hemoglobin stable at 8 6, will have follow-up lab work at acute rehab  FU ortho 1 week post op for wound check, dressing to remain on until then  Patient to be discharged to a acute rehab today

## 2019-07-24 NOTE — ASSESSMENT & PLAN NOTE
· Continue lasix  · Echo revealed a normal EF, moderate concentric hypertrophy, mild MR, moderate aortic stenosis, moderate TR  · Daily Weight  · Intake and output  · Low sodium diet  · Discharge to acute rehab today

## 2019-07-24 NOTE — PROGRESS NOTES
Progress Note - Orthopedics   Jeronimo More 80 y o  female MRN: [id number]  Unit/Bed#: 2 [place] [address] Encounter: [id number]    Assessment:  1) POD#1 s/p long Right hip TFN    Plan:  Ancef 2 g IV x 2 for 24 hours postop - completed  DVT prophylaxis:  Lovenox 40 mg subcu q day/SCD's/Ambulation  WBAT  PT/OT- WBAT  Analgesia PRN  Follow up AM labs - H/H 8 2/26 2, no evidence of hematoma, asymptomatic but has not yet worked with PT  May discontinue Hackett at the discretion of medical team once medically stable postoperatively  Medical management per primary team  Dressing- monitor for drainage, Mepilex may remain in place for 7 days post-operatively, or changed if over 50% saturated  Discharge planning - disposition pending progress with postoperative physical therapy    Weight bearing: WBAT RLE    VTE Pharmacologic Prophylaxis: Enoxaparin (Lovenox)  VTE Mechanical Prophylaxis: sequential compression device    Subjective:  Patient seen and examined in bed this morning  Patient resting comfortably in her bed  Pain controlled  Has not yet worked with PT  No overnight events  Denies CP/SOB, parasthesias, dizziness, or lightheadedness    Vitals: Blood pressure (!) 109/41, pulse 68, temperature 98 1 °F (36 7 °C), temperature source Oral, resp  rate 18, height 5' (1 524 m), weight 50 6 kg (111 lb 8 8 oz), SpO2 98 %  ,Body mass index is 21 79 kg/m²        Intake/Output Summary (Last 24 hours) at 7/24/2019 0918  Last data filed at 7/24/2019 0601  Gross per 24 hour   Intake 650 ml   Output 1825 ml   Net -1175 ml       Invasive Devices     Peripheral Intravenous Line            Peripheral IV 07/22/19 Right Forearm 1 day    Peripheral IV 07/23/19 Left Arm 1 day          Arterial Line            Arterial Line 07/23/19 Left Radial less than 1 day                Physical Exam: NAD, A&Ox3, sitting comfortably in bed eating breakfast  Ortho Exam: RLE: right lateral leg dsgs c/d/i, thigh/calf soft, +DF/PF/EHL, +L3-S1 SILT, DP2+, foot warm, SCDs in place, no evidence of hematoma    Lab, Imaging and other studies:      Lab Results   Component Value Date    WBC 10 42 (H) 07/24/2019    HGB 8 2 (L) 07/24/2019    HCT 26 2 (L) 07/24/2019    MCV 92 07/24/2019     07/24/2019     Lab Results   Component Value Date     10/13/2015    SODIUM 140 07/24/2019    K 3 7 07/24/2019     07/24/2019    CO2 31 07/24/2019    ANIONGAP 13 0 10/13/2015    AGAP 4 07/24/2019    BUN 24 07/24/2019    CREATININE 0 91 07/24/2019    GLUC 121 07/24/2019    GLUF 96 05/22/2018    CALCIUM 7 9 (L) 07/24/2019    AST 28 07/22/2019    ALT 20 07/22/2019    ALKPHOS 98 07/22/2019    PROT 6 8 10/13/2015    TP 6 9 07/22/2019    BILITOT 0 6 10/13/2015    TBILI 0 80 07/22/2019    EGFR 58 07/24/2019     Sheldon Noyola PA-C Orthopedics

## 2019-07-24 NOTE — PHYSICAL THERAPY NOTE
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 , who is non-amb  Pt uses a sit to stand lift for her  for all transfers  Aides come in to home 5-7x/wk for 1 5 to 2 hrs to assist with pt's   Pt is w/c bound  Prior Function   Level of [place] Independent with ADLs and functional mobility  (pt amb w/out AD PTA)   Lives With Spouse   Receives Help From Family  ( receives assist from aides)   ADL Assistance Independent   IADLs Independent   Falls in the last 6 months 1 to 4   Restrictions/Precautions   RLE Weight Bearing Per Order WBAT   Other Precautions Pain; Fall Risk;Bed Alarm; Chair Alarm   General   Additional Pertinent History Pt adm with a fall, right intertrochanteric Fx and is s/p TFN 7/23/19     Family/Caregiver Present No   Cognition   Overall Cognitive Status WFL   Arousal/Participation Cooperative   Orientation Level Oriented X4   Following Commands Follows all commands and directions without difficulty   RLE Assessment   RLE Assessment WFL  (hip and knee 2+ to 3-/5, ankle 3+/5)   LLE Assessment   LLE Assessment WFL  (3+ to 4-/5)   Bed Mobility   Supine to Sit   (mod/max A)   Additional items Assist x 1;Verbal cues   Transfers   Sit to Stand 2  Maximal assistance   Additional items Assist x 1;Verbal cues   Stand to Sit 2  Maximal assistance   Additional items Assist x 1;Verbal cues   Ambulation/Elevation   Gait pattern Antalgic   Gait Assistance 3  Moderate assist   Additional items Assist x 2;Verbal cues;Tactile cues   Assistive Device Rolling walker   Distance 5 feet and chair brought up to pt  Pt sat OOB in chair with all needs in reach  CNA aware  Balance   Static Sitting Fair   Static Standing Fair -   Dynamic Standing Poor +   Ambulatory Poor   Activity Tolerance   Activity Tolerance Patient limited by fatigue;Patient limited by pain  (weakness)   Assessment   Prognosis Good   Problem List Decreased strength;Decreased range of motion;Decreased endurance; Impaired balance;Decreased mobility; Decreased coordination;Decreased safety awareness;Pain   Assessment Patient seen for Physical Therapy evaluation  Patient admitted with Intertrochanteric fracture of right femur ([facility] [address] )  Comorbidities affecting patient's physical performance include: HTN, aortic stenosis, CHF, arthritis, mitral valve stenosis  Personal factors affecting patient at time of initial evaluation include: lives in two story house, stairs to enter home, inability to ambulate household distances, inability to navigate community distances, inability to navigate level surfaces without external assistance, inability to perform dynamic tasks in community, limited home support, positive fall history, inability to perform ADLS and inability to perform IADLS   Prior to admission, patient was independent with functional mobility without assistive device, independent with ADLS, independent with IADLS, living with spouse whom pt cares for in a two level home with 4-5 steps to enter, ambulating household distance and ambulating community distances  Please find objective findings from Physical Therapy assessment regarding body systems outlined above with impairments and limitations including weakness, decreased ROM, impaired balance, decreased endurance, impaired coordination, gait deviations, pain, decreased activity tolerance, decreased functional mobility tolerance, impaired judgement and fall risk    The Barthel Index was used as a functional outcome tool presenting with a score of 40 today indicating marked limitations of functional mobility and ADLS  Patient's clinical presentation is currently unstable/unpredictable as seen in patient's presentation of vital sign response, changing level of pain, increased fall risk, new onset of impairment of functional mobility, decreased endurance and new onset of weakness  Pt would benefit from continued Physical Therapy treatment to address deficits as defined above and maximize level of functional mobility  As demonstrated by objective findings, the assigned level of complexity for this evaluation is high  Goals   Patient Goals go to rehab   STG Expiration Date 07/31/19   Short Term Goal #1 bed mob - mod/min A; trans - mod/min A   Short Term Goal #2 pt will amb with RW functional distances at rehab - min A; balance with RW - increase to F-/F for safe mobility and to decrease fall risk   LTG Expiration Date 08/07/19   Long Term Goal #1 bed mob - I; trans - I; up/down 4-5 steps with rail - S/I   Long Term Goal #2 pt will amb with RW functional rehab distances - I; balance with RW - F+/G for safe mobility and to decrease fall risk; strength RLE - increase to 3 to 3+/5   Plan   Treatment/Interventions ADL retraining;Functional transfer training;LE strengthening/ROM; Elevations; Therapeutic exercise; Endurance training;Patient/family training;Equipment eval/education; Bed mobility;Gait training; Compensatory technique education   PT Frequency Once a day   Recommendation   Recommendation Short-term skilled PT   Equipment Recommended   (pt will need a RW for dc)   Barthel Index   Feeding 10   Bathing 0   Grooming Score 0   Dressing Score 0   Bladder Score 10   Bowels Score 10   Toilet Use Score 5   Transfers (Bed/Chair) Score 5   Mobility (Level Surface) Score 0   Stairs Score 0   Barthel Index Score 36   Licensure   [place] License Number  Taras Sanchez [id number]       Time AB:3440  Time XAW:7420  Total Time: 15 mins      S:  7/10 RLE pain  O:  Pt performed AA heel slides, hip abd, arom APs, quad and glut sets x 10 reps supine  A:  Good tolerance to RLE ex  Pt will cont to benefit from skilled PT services to increase pt's strength and mobility  P:  Cont per PT POC  DCP - short term skilled PT services      Criss Gamino, [place]   [id number]

## 2019-07-24 NOTE — PLAN OF CARE
Problem: Prexisting or High Potential for Compromised Skin Integrity  Goal: Skin integrity is maintained or improved  Description  INTERVENTIONS:  - Identify patients at risk for skin breakdown  - Assess and monitor skin integrity  - Assess and monitor nutrition and hydration status  - Monitor labs (i e  albumin)  - Assess for incontinence   - Turn and reposition patient  - Assist with mobility/ambulation  - Relieve pressure over bony prominences  - Avoid friction and shearing  - Provide appropriate hygiene as needed including keeping skin clean and dry  - Evaluate need for skin moisturizer/barrier cream  - Collaborate with interdisciplinary team (i e  Nutrition, Rehabilitation, etc )   - Patient/family teaching  Outcome: Progressing     Problem: PAIN - ADULT  Goal: Verbalizes/displays adequate comfort level or baseline comfort level  Description  Interventions:  - Encourage patient to monitor pain and request assistance  - Assess pain using appropriate pain scale - numeric pain rating scale  - Administer analgesics based on type and severity of pain and evaluate response  - Implement non-pharmacological measures as appropriate and evaluate response  - Consider cultural and social influences on pain and pain management  - Notify physician/advanced practitioner if interventions unsuccessful or patient reports new pain   Outcome: Progressing     Problem: SAFETY ADULT  Goal: Patient will remain free of falls  Description  INTERVENTIONS:  - Assess patient frequently for physical needs  -  Identify cognitive and physical deficits and behaviors that affect risk of falls    -  [place] fall precautions as indicated by assessment   - Educate patient/family on patient safety including physical limitations  - Instruct patient to call for assistance with activity based on assessment  - Modify environment to reduce risk of injury  - Consider OT/PT consult to assist with strengthening/mobility  Outcome: Progressing  Goal: Maintain or return to baseline ADL function  Description  INTERVENTIONS:  -  Assess patient's ability to carry out ADLs; assess patient's baseline for ADL function and identify physical deficits which impact ability to perform ADLs (bathing, care of mouth/teeth, toileting, grooming, dressing, etc )  - Assess/evaluate cause of self-care deficits   - Assess range of motion  - Assess patient's mobility; develop plan if impaired  - Assess patient's need for assistive devices and provide as appropriate  - Encourage maximum independence but intervene and supervise when necessary  ¯ Involve family in performance of ADLs  ¯ Assess for home care needs following discharge   ¯ Request OT consult to assist with ADL evaluation and planning for discharge  ¯ Provide patient education as appropriate  Outcome: Progressing  Goal: Maintain or return mobility status to optimal level  Description  INTERVENTIONS:  - Assess patient's baseline mobility status (ambulation, transfers, stairs, etc )    - Identify cognitive and physical deficits and behaviors that affect mobility  - Identify mobility aids required to assist with transfers and/or ambulation (gait belt, sit-to-stand, lift, walker, cane, etc )  - [place] fall precautions as indicated by assessment  - Record patient progress and toleration of activity level on Mobility SBAR; progress patient to next Phase/Stage  - Instruct patient to call for assistance with activity based on assessment  - Request Rehabilitation consult to assist with strengthening/weightbearing, etc   Outcome: Progressing     Problem: Potential for Falls  Goal: Patient will remain free of falls  Description  INTERVENTIONS:  - Assess patient frequently for physical needs  -  Identify cognitive and physical deficits and behaviors that affect risk of falls    -  [place] fall precautions as indicated by assessment   - Educate patient/family on patient safety including physical limitations  - Instruct patient to call for assistance with activity based on assessment  - Modify environment to reduce risk of injury  - Consider OT/PT consult to assist with strengthening/mobility  Outcome: Progressing

## 2019-07-24 NOTE — SOCIAL WORK
LOS - 3 days    SW met with pt to assess needs and discuss plans  Discussed goals of making sure pt's needs are met upon discharge  Pt lives at home with her  and pt is 's primary caregiver  Pt has home care services and private care for  as well but not 24/7 care  Pt did not need to use any assistive device prior to now  STR placement is being recommended for pt  Pt acknowledges her need for rehab in order to recover to the point of being able to help her  again  However she is overwhelmed with trying to arrange help for him while she is admitted  Pt is currently relying on her son and her sister to fill in gaps of the home care and private duty care  SW discussed rehab plans and facility options with pt  Discussed both acute and skilled level of rehab  Provided list of local rehab facilities to consider  Pt chose to have referrals made to [address] [place] [address] [facility]  Pt's  was in [facility] before with good results  Referrals will be made  Pt will continue reviewing list and will discuss options with son later today  SW will continue to follow to assist with planning as needed

## 2019-07-24 NOTE — OCCUPATIONAL THERAPY NOTE
OT EVALUATION       07/24/19 0940   Note Type   Note type Eval only   Restrictions/Precautions   Weight Bearing Precautions Per Order Yes   RLE Weight Bearing Per Order WBAT   Other Precautions Chair Alarm; Bed Alarm; Fall Risk;Pain   Pain Assessment   Pain Assessment 0-10   Pain Score 7   Pain Type Acute pain;Surgical pain   Pain Location Leg;Hip   Pain Orientation Right   Home Living   Type of Home House  (4-5 CB in front, ramped entrance in back)   Home Layout Two level;Bed/bath upstairs;1/2 bath on main level   Bathroom Shower/Tub Tub/shower unit   [employer]   (None)   Home Equipment   (None)   Additional Comments Pt ambulating independently without AD prior to admission  Prior Function   Level of [place] Independent with ADLs and functional mobility   Lives With Spouse   Receives Help From Family   ADL Assistance Independent   IADLs Independent   Falls in the last 6 months 1 to 4   Comments Pt reports that she was independent with ADLs and IADLs prior to admission  Pt does not drive but is able to manage transportation  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 , who is non-ambulatory  Pt uses a sit to stand lift for her  for all transfers  Aides come in to home 5-7x/wk for 1 5 to 2 hrs to assist with pt's   Subjective   Subjective "I am going to need rehab "   ADL   Eating Assistance 7  Independent   Grooming Assistance 5  Supervision/Setup  (Seated at sink)   UB Bathing Assistance 4  Minimal Assistance  (Seated on shower chair)   LB Bathing Assistance 2  Maximal Assistance  (Seated on shower chair)   [address] 5  Supervision/Setup   LB Dressing Assistance 2  Maximal [address]  3  Moderate Assistance   Bed Mobility   Supine to Sit 2  Maximal assistance   Additional items Assist x 1;Verbal cues;LE management   Transfers   Sit to Stand 2  Maximal assistance   Additional items Assist x 1;Verbal cues; Increased time required  (RW, verbal cues for hand placement)   Stand to Sit 2  Maximal assistance   Additional items Assist x 1;Verbal cues; Increased time required  (RW, verbal cues for hand placement)   Functional Mobility   Functional Mobility 3  Moderate assistance   Additional Comments Pt required mod a x2 to take 5 steps forward  Required mod a to advance R leg with verbal cues to put weight through arm when stepping with right  Additional items Rolling walker   Balance   Static Sitting Fair   Dynamic Sitting Fair -   Static Standing Fair -   Dynamic Standing Poor +   Activity Tolerance   Activity Tolerance Patient limited by fatigue;Patient limited by pain   Medical Staff Made Aware Pt left with Dr Jeff Hahn present in room  RUE Assessment   RUE Assessment WFL  (3+/5 grossly)   LUE Assessment   LUE Assessment WFL  (3+/5 grossly)   Hand Function   Gross Motor Coordination Functional   Fine Motor Coordination Functional   Cognition   Overall Cognitive Status WFL   Arousal/Participation Alert; Responsive; Cooperative   Attention Within functional limits   Orientation Level Oriented X4   Memory Within functional limits   Following Commands Follows all commands and directions without difficulty   Assessment   Limitation Decreased ADL status; Decreased UE strength;Decreased endurance;Decreased self-care trans;Decreased high-level ADLs   Prognosis Good   Assessment Patient evaluated by Occupational Therapy  Patient admitted with Intertrochanteric fracture of right femur ([facility] [address] )  The patients occupational profile, medical and therapy history includes a extensive additional review of physical, cognitive, or psychosocial history related to current functional performance  Comorbidities affecting functional mobility and ADLS include: aortic stenosis, chronic diastolic congestive heart failure, and closed R hip fracture  Prior to admission, patient was independent with ADLS and independent with IADLS    The evaluation identifies the following performance deficits: weakness, impaired balance, decreased endurance, increased fall risk, new onset of impairment of functional mobility, decreased ADLS, decreased IADLS, decreased activity tolerance, decreased safety awareness and ortheopedic restrictions, that result in activity limitations and/or participation restrictions  This evaluation requires clinical decision making of high complexity, because the patient presents with comorbidites that affect occupational performance and required significant modification of tasks or assistance with consideration of multiple treatment options  The Barthel Index was used as a functional outcome tool presenting with a score of 40, indicating marked limitations of functional mobility and ADLS  Patient will benefit from skilled Occupational Therapy services to address above deficits and facilitate a safe return to prior level of function  Goals   Patient Goals To go to rehab   STG Time Frame   (1-7 days)   Short Term Goal  Goals established to promote patient goal of going to rehab:  Patient will increase standing tolerance to 3 minutes during ADL task to decrease assistance level and decrease fall risk; Patient will increase bed mobility to mod assist in preparation for ADLS and transfers;  Patient will increase functional mobility to and from bedside commode with rolling walker with mod assist to increase performance with ADLS and to use a toilet; Patient will tolerate 10 minutes of UE ROM/strengthening to increase general activity tolerance and performance in ADLS/IADLS; Patient will improve functional activity tolerance to 15 minutes of sustained functional tasks to increase participation in basic self-care and decrease assistance level;  Patient will be able to to verbalize understanding and perform proper body mechanics during ADLS and functional mobility at least 90% of the time with minimal cueing to decrease signs of fatigue and increase stamina to return to prior level of function; Patient will increase static sitting balance to fair+ and dynamic sitting balance to fair to improve the ability to sit at edge of bed or on a chair for ADLS;  Patient will increase static standing balance to fair and dynamic standing balance to fair- to improve postural stability and decrease fall risk during standing ADLS and transfers  LTG Time Frame   (8-14 days)   Long Term Goal Goals established to promote patient goal of going to rehab:  Patient will increase standing tolerance to 5 minutes during ADL task to decrease assistance level and decrease fall risk; Patient will increase bed mobility to min assist in preparation for ADLS and transfers; Patient will increase functional mobility to and from bedside commode with rolling walker with min assist to increase performance with ADLS and to use a toilet; Patient will tolerate 15 minutes of UE ROM/strengthening to increase general activity tolerance and performance in ADLS/IADLS; Patient will improve functional activity tolerance to 20 minutes of sustained functional tasks to increase participation in basic self-care and decrease assistance level;  Patient will be able to to verbalize understanding and perform proper body mechanics during ADLS and functional mobility at least 90% of the time with no cueing to decrease signs of fatigue and increase stamina to return to prior level of function; Patient will increase static sitting balance to good and dynamic sitting balance to fair+ to improve the ability to sit at edge of bed or on a chair for ADLS;  Patient will increase static standing balance to fair+ and dynamic standing balance to fair to improve postural stability and decrease fall risk during standing ADLS and transfers       Functional Transfer Goals   Pt Will Perform All Functional Transfers   (STG mod assist, LTG min assist with RW)   ADL Goals   Pt Will Perform Grooming   (STG independent seated at sink)   Pt Will Perform Bathing   (STG mod assist, LTG min assist)   Pt Will Perform UE Dressing   (STG independent seated EOB)   Pt Will Perform LE Dressing   (STG mod assist  LTG min assist with LRAD)   Pt Will Perform Toileting   (STG min assist, LTG supervision)   Plan   Treatment Interventions ADL retraining;Functional transfer training;UE strengthening/ROM; Endurance training;Patient/family training;Equipment evaluation/education; Compensatory technique education; Energy conservation; Activityengagement   Goal Expiration Date 08/07/19   OT Frequency 3-5x/wk   Recommendation   OT Discharge Recommendation Short Term Rehab   Barthel Index   Feeding 10   Bathing 0   Grooming Score 0   Dressing Score 0   Bladder Score 10   Bowels Score 10   Toilet Use Score 5   Transfers (Bed/Chair) Score 5   Mobility (Level Surface) Score 0   Stairs Score 0   Barthel Index Score 36   Licensure   [place] License Number  Domo Siu Yonatan [address], OTR/L [id number]

## 2019-07-24 NOTE — ASSESSMENT & PLAN NOTE
Echo showed EF 60%, moderate AS  Patient with some pitting edema but no evidence of acute overload  Clinically stable  Monitor I/Os  Will need to establish care with cardio after discharge  Discharged to acute rehab today

## 2019-07-24 NOTE — PROGRESS NOTES
Progress Note - Luisa Kilpatrick 1935, 80 y o  female MRN: [id number]    Unit/Bed#: [address] [place] [address] Encounter: [id number]    Primary Care Provider: Snehal Anna MD   Date and time admitted to hospital: 7/22/2019  4:41 PM        Intertrochanteric fracture of right femur [facility])  Assessment & Plan        Closed right hip fracture, initial encounter [facility])  Assessment & Plan  Patient presented to the ED post mechnical fall in the shower  She fell on her right side, no head injury or LOC  Xray in the ER revealed an acute right femoral intertrochanteric fracture with varus angulation and mild displacement  S/p right hip TFN  DVT proph with lovenox 40mg daily  WBAT  DC haskins, void trial  FU ortho 1 week post op for wound check, dressing to remain on until then      Leg foot wound- 3rd digit  Assessment & Plan  Open wound of the left 3rd digit  Consulted podiatry  S/p debridement  Local wound care daily  Follow up OP podiatry     Chronic diastolic congestive heart failure (HCC)  Assessment & Plan  · Continue lasix  · Echo revealed a normal EF, moderate concentric hypertrophy, mild MR, moderate aortic stenosis, moderate TR  · Daily Weight  · Intake and output  · Low sodium diet    Aortic stenosis, moderate  Assessment & Plan  Echo showed EF 60%, moderate AS  Patient with some pitting edema but no evidence of acute overload  Clinically stable  Monitor I/Os  Will need to establish care with cardio after discharge    Essential hypertension  Assessment & Plan  Continue verapamil, lasix  BP borderline, holding parameters on antihypertensives        VTE Pharmacologic Prophylaxis:   Pharmacologic: per ortho post op  Mechanical VTE Prophylaxis in Place: Yes    Patient Centered Rounds: I have performed bedside rounds with nursing staff today  Discussions with Specialists or Other Care Team Provider: Yes  Education and Discussions with Family / Patient:Yes  Time Spent for Care: 30 minutes    More than 50% of total time spent on counseling and coordination of care as described above  Current Length of Stay: 2 day(s)  Current Patient Status: Inpatient     Discharge Plan: pending    Code Status: Level 1 - Full Code      Subjective:   Has some hip pain post op, but not bad  Walked with PT  Denies SOB  Appetite is good    Objective:     Vitals:   Temp (24hrs), Av 8 °F (36 6 °C), Min:97 7 °F (36 5 °C), Max:98 1 °F (36 7 °C)    Temp:  [97 7 °F (36 5 °C)-98 1 °F (36 7 °C)] 98 1 °F (36 7 °C)  HR:  [61-68] 68  Resp:  [18] 18  BP: (109-110)/(41) 109/41  SpO2:  [94 %-98 %] 98 %  Body mass index is 21 79 kg/m²  Input and Output Summary (last 24 hours): Intake/Output Summary (Last 24 hours) at 2019 1641  Last data filed at 2019 0601  Gross per 24 hour   Intake    Output 1150 ml   Net -1150 ml        Physical Exam:     Physical Exam   Constitutional: She is oriented to person, place, and time  She appears well-developed  No distress  HENT:   Head: Normocephalic and atraumatic  Cardiovascular: Normal rate, regular rhythm and normal heart sounds  Pulmonary/Chest: Effort normal and breath sounds normal  No respiratory distress  She has no wheezes  She has no rales  Abdominal: Soft  Bowel sounds are normal  She exhibits no distension  There is no tenderness  There is no rebound  Musculoskeletal:   Right hip TTP, dressing in place C/D   Neurological: She is alert and oriented to person, place, and time  Skin: Skin is warm and dry  Psychiatric: She has a normal mood and affect  Her behavior is normal    Nursing note and vitals reviewed        Additional Data:     Labs:    Results from last 7 days   Lab Units 19  0647 19  0650 19  1721   WBC Thousand/uL 10 42* 8 33 19 23*   HEMOGLOBIN g/dL 8 2* 9 7* 12 0   HEMATOCRIT % 26 2* 30 8* 37 7   PLATELETS Thousands/uL 154 172 219   NEUTROS PCT % 81*  --  90*     Results from last 7 days   Lab Units 19  0647 19  0650 19  1721 SODIUM mmol/L 140 141 142   POTASSIUM mmol/L 3 7 3 9 3 5   CHLORIDE mmol/L 105 104 102   CO2 mmol/L 31 33* 28   BUN mg/dL 24 29* 30*   CREATININE mg/dL 0 91 0 98 1 06   CALCIUM mg/dL 7 9* 8 7 9 2   TOTAL BILIRUBIN mg/dL  --   --  0 80   ALK PHOS U/L  --   --  98   ALT U/L  --   --  20   AST U/L  --   --  28     Results from last 7 days   Lab Units 07/22/19  1721   INR  1 02         No results found for: HGBA1C            * I Have Reviewed All Lab Data Listed Above  * Additional Pertinent Lab Tests Reviewed: All Labs Within Last 24 Hours Reviewed    Imaging:     XR femur 1 vw right   Final Result by Sara Garcia MD (07/23 1201)      Fluoroscopic guidance for ORIF of intertrochanteric fracture with near-anatomic alignment  Please refer to the separate procedure notes for additional details  Workstation performed: [id number]         XR hip/pelv 2-3 vws right if performed   Final Result by Baker Osgood, MD (07/22 1831)      Acute right femoral intertrochanteric fracture with varus angulation and mild displacement  The study was marked in [facility] for immediate notification  Workstation performed: [id number]         XR chest 1 view   Final Result by Baker Osgood, MD (07/22 1831)      No acute cardiopulmonary disease              Workstation performed: [id number]           Imaging Reports Reviewed by myself    Cultures:   Blood Culture: No results found for: BLOODCX  Urine Culture: No results found for: URINECX  Sputum Culture: No components found for: SPUTUMCX  Wound Culture: No results found for: WOUNDCULT    Last 24 Hours Medication List:     Current Facility-Administered Medications:  acetaminophen 975 mg Oral Q8H [address] Yogesh DIMITRIOS Martell, PA-C   docusate sodium 100 mg Oral BID Carey Franco PA-C   enoxaparin 40 mg Subcutaneous Daily [place] FARHAD Franco-DIMITRIOS   furosemide 80 mg Oral Daily FARHAD Goldsmith-DIMITRIOS   HYDROmorphone 0 2 mg Intravenous Q4H PRN FARHAD Goldsmith-DIMITRIOS   naloxone 0 04 mg Intravenous Q1MIN PRN Lisa Grabiel, PA-C   ondansetron 4 mg Intravenous Q6H PRN Lisa Grabiel, PA-C   oxyCODONE 2 5 mg Oral Q4H PRN Lisa Grabiel, PA-C   oxyCODONE 5 mg Oral Q4H PRN Lisa Grabiel, PA-C   pantoprazole 40 mg Oral Daily Before Breakfast Lisa Spain, PA-DIMITRIOS   polyethylene glycol 17 g Oral Daily PRN Lisa Grabiel, PA-C   verapamil 240 mg Oral Daily Lisa Spain, RALF        Today, Patient Was Seen By: Precious Echols MD    ** Please Note: Dragon 360 Dictation voice to text software may have been used in the creation of this document   **

## 2019-07-24 NOTE — ASSESSMENT & PLAN NOTE
Open wound of the left 3rd digit  Consulted podiatry  S/p debridement  Local wound care daily  Follow up OP podiatry   Discharge to acute rehab today

## 2019-07-24 NOTE — CONSULTS
Consult - Podiatry   Soumya Briones 80 y o  female MRN: [id number]  Unit/Bed#: [address] [place] [address] Encounter: [id number]    Assessment/Plan     Assessment:  Open wound left 3rd digit, subungual hematoma right hallux, onychomycosis, foot pain  Plan:  Aseptic debridement and planning of nails x10 and manually and mechanically  Discussed likelihood that patient will lose the right big toenail  Removal of ingrown nail of the 3rd digit left wound from the nail digging in  Applied Betadine and dressing  Bacitracin and dressing daily  Patient will follow-up after discharge for routine foot care    History of Present Illness     HPI:  Soumya Briones is a 80 y o  female who presents with patient slipped in the shower 2 days ago  Patient fractured her right hip and had surgical repair of right hip yesterday  Right big toe and the right big toenail is loose but there is no pain in the toe itself or the rest of the foot  Patient does have extremely elongated nails nails have not been cut in many months    Patient has a wound on the base of the toenail of the left 3rd digit where the nail is digging into the skin and there is dried blood underneath the skin there is no redness or drainage no signs of infection  Patient is having some pain in surgery site but denies any calf tenderness or shortness of breath  Patient is mostly homebound patient was at home for several hours before she was able to make a call and may have bumped her feet when she was dragging herself in order to get to the phone    Consults  DP and PT pulses palpable bilateral, capillary fill time 3 seconds times 10 temperature grade within normal limits  All nails are thick, discolored, dystrophic  Nails are extremely elongated incurvated  Right hallux nail is loose there is some blood beneath the nail but no open wound or signs of infection  Left 3rd digit has curved back into the skin at the proximal base of the nail and there is dried blood and eschar no exudate no signs of infection no redness no drainage  Plus one edema bilateral feet and ankles  Negative calf tenderness  Negative erythema no signs of infection    Historical Information   Past Medical History:   Diagnosis Date    Anesthesia complication       Yrs ago had "anxiety" reaction not sure while sedated, pt states sensitive to anesthesia effects    Anxiety     stress at home    Arthritis     Cataract, right     Last Assessed:16    Eye hemorrhage     left eye currently 16    History of shingles 2015    Hypertension     Mitral valve stenosis, mild      Past Surgical History:   Procedure Laterality Date    CARPAL TUNNEL RELEASE Left     CATARACT EXTRACTION W/ INTRAOCULAR LENS IMPLANT Left 10/11/2016    Procedure: EXTRACTION EXTRACAPSULAR CATARACT PHACO INTRAOCULAR LENS (IOL); Surgeon: Daniel Hanson MD;  Location: [facility] MAIN OR;  Service:     CERVICAL CONE BIOPSY      COLONOSCOPY      EYE SURGERY Left     muscle repair    MT REMV CATARACT EXTRACAP,INSERT LENS Right 2016    Procedure: EXTRACTION EXTRACAPSULAR CATARACT PHACO INTRAOCULAR LENS (IOL);   Surgeon: Daniel Hanson MD;  Location: [facility] MAIN OR;  Service: Ophthalmology    TONSILLECTOMY       Social History   Social History     Substance and Sexual Activity   Alcohol Use Yes    Comment: rarely     Social History     Substance and Sexual Activity   Drug Use No     Social History     Tobacco Use   Smoking Status Never Smoker   Smokeless Tobacco Never Used     Family History:   Family History   Problem Relation Age of Onset    Heart disease Mother         MI  at age 71   [employer] Arthritis Mother     GI problems Father         bleeding ulcer    Arthritis Sister     Sleep apnea Sister     Irritable bowel syndrome Sister     Cancer Sister         skin cancer    Heart disease Brother         CABG (5)    Cancer Brother         skin cancer    Heart disease Maternal Aunt        Meds/Allergies   Medications Prior to Admission Medication    furosemide (LASIX) 40 mg tablet    Multiple Vitamins-Minerals (PRESERVISION AREDS PO)    Naproxen Sodium (ALEVE PO)    VERAPAMIL HCL PO    Calcium Carb-Cholecalciferol (CALCIUM 600 + D) 600-200 MG-UNIT TABS     Allergies   Allergen Reactions    Indocin [Indomethacin]      hypertension       Objective   First Vitals:   Blood Pressure: (!) 207/84 (07/22/19 1646)  Pulse: 67 (07/22/19 1643)  Temperature: (!) 97 4 °F (36 3 °C) (07/22/19 1643)  Temp Source: Oral (07/22/19 1643)  Respirations: 16 (07/22/19 1646)  Height: 5' 4" (162 6 cm) (07/22/19 1643)  Weight - Scale: 50 8 kg (112 lb) (07/22/19 1643)  SpO2: 100 % (07/22/19 1643)    Current Vitals:   Blood Pressure: (!) 109/41 (07/24/19 0831)  Pulse: 68 (07/24/19 0831)  Temperature: 98 1 °F (36 7 °C) (07/24/19 0831)  Temp Source: Oral (07/24/19 0831)  Respirations: 18 (07/24/19 0831)  Height: 5' (152 4 cm) (07/22/19 2210)  Weight - Scale: 50 6 kg (111 lb 8 8 oz) (07/24/19 0600)  SpO2: 98 % (07/24/19 0831)        BP (!) 109/41 (BP Location: Right arm)   Pulse 68   Temp 98 1 °F (36 7 °C) (Oral)   Resp 18   Ht 5' (1 524 m)   Wt 50 6 kg (111 lb 8 8 oz)   SpO2 98%   BMI 21 79 kg/m²   Physical Exam:  General:    Alert, cooperative, no distress   Head:     Normocephalic, without obvious abnormality, atraumatic                   Skin:          Lungs:     Respirations unlabored   Chest wall:    No tenderness or deformity   Heart/vasc:    Regular rate and rhythm      Abdomen:     Soft, non-tender, no distention           Lower MSK:      Psychiatric:  AAOx3, no depression           Neurologic:        Lab Results:   Admission on 07/22/2019   Component Date Value    WBC 07/22/2019 19 23*    RBC 07/22/2019 4 20     Hemoglobin 07/22/2019 12 0     Hematocrit 07/22/2019 37 7     MCV 07/22/2019 90     MCH 07/22/2019 28 6     MCHC 07/22/2019 31 8     RDW 07/22/2019 12 8     MPV 07/22/2019 11 0     Platelets 71/67/8261 219     nRBC 07/22/2019 0     Neutrophils Relative 07/22/2019 90*    Immat GRANS % 07/22/2019 1     Lymphocytes Relative 07/22/2019 4*    Monocytes Relative 07/22/2019 5     Eosinophils Relative 07/22/2019 0     Basophils Relative 07/22/2019 0     Neutrophils Absolute 07/22/2019 17 36*    Immature Grans Absolute 07/22/2019 0 10     Lymphocytes Absolute 07/22/2019 0 71     Monocytes Absolute 07/22/2019 1 01     Eosinophils Absolute 07/22/2019 0 00     Basophils Absolute 07/22/2019 0 05     Sodium 07/22/2019 142     Potassium 07/22/2019 3 5     Chloride 07/22/2019 102     CO2 07/22/2019 28     ANION GAP 07/22/2019 12     BUN 07/22/2019 30*    Creatinine 07/22/2019 1 06     Glucose 07/22/2019 145*    Calcium 07/22/2019 9 2     AST 07/22/2019 28     ALT 07/22/2019 20     Alkaline Phosphatase 07/22/2019 98     Total Protein 07/22/2019 6 9     Albumin 07/22/2019 3 8     Total Bilirubin 07/22/2019 0 80     eGFR 07/22/2019 48     PTT 07/22/2019 25     Protime 07/22/2019 10 7     INR 07/22/2019 1 02     Color, UA 07/22/2019 Light Yellow     Clarity, UA 07/22/2019 Clear     Specific Gravity, UA 07/22/2019 1 010     pH, UA 07/22/2019 6 5     Leukocytes, UA 07/22/2019 Negative     Nitrite, UA 07/22/2019 Negative     Protein, UA 07/22/2019 Negative     Glucose, UA 07/22/2019 Negative     Ketones, UA 07/22/2019 Negative     Urobilinogen, UA 07/22/2019 0 2     Bilirubin, UA 07/22/2019 Negative     Blood, UA 07/22/2019 Negative     MRSA Culture Only 07/22/2019 Methicillin Resistant Staphylococcus aureus isolated*    MRSA Culture Only 07/22/2019 Please note: This patient requires contact precautions       Sodium 07/23/2019 141     Potassium 07/23/2019 3 9     Chloride 07/23/2019 104     CO2 07/23/2019 33*    ANION GAP 07/23/2019 4     BUN 07/23/2019 29*    Creatinine 07/23/2019 0 98     Glucose 07/23/2019 117     Calcium 07/23/2019 8 7     eGFR 07/23/2019 53     WBC 07/23/2019 8 33     RBC 07/23/2019 3 37*  Hemoglobin 07/23/2019 9 7*    Hematocrit 07/23/2019 30 8*    MCV 07/23/2019 91     MCH 07/23/2019 28 8     MCHC 07/23/2019 31 5     RDW 07/23/2019 13 0     Platelets 41/46/4413 172     MPV 07/23/2019 11 0     ABO Grouping 07/23/2019 O     Rh Factor 07/23/2019 Negative     Antibody Screen 07/23/2019 Negative     Specimen Expiration Date 07/23/2019 [id number]     Sodium 07/24/2019 140     Potassium 07/24/2019 3 7     Chloride 07/24/2019 105     CO2 07/24/2019 31     ANION GAP 07/24/2019 4     BUN 07/24/2019 24     Creatinine 07/24/2019 0 91     Glucose 07/24/2019 121     Calcium 07/24/2019 7 9*    eGFR 07/24/2019 58     WBC 07/24/2019 10 42*    RBC 07/24/2019 2 85*    Hemoglobin 07/24/2019 8 2*    Hematocrit 07/24/2019 26 2*    MCV 07/24/2019 92     MCH 07/24/2019 28 8     MCHC 07/24/2019 31 3*    RDW 07/24/2019 13 2     MPV 07/24/2019 11 1     Platelets 17/28/6139 154     nRBC 07/24/2019 0     Neutrophils Relative 07/24/2019 81*    Immat GRANS % 07/24/2019 1     Lymphocytes Relative 07/24/2019 10*    Monocytes Relative 07/24/2019 8     Eosinophils Relative 07/24/2019 0     Basophils Relative 07/24/2019 0     Neutrophils Absolute 07/24/2019 8 57*    Immature Grans Absolute 07/24/2019 0 05     Lymphocytes Absolute 07/24/2019 0 99     Monocytes Absolute 07/24/2019 0 80     Eosinophils Absolute 07/24/2019 0 00     Basophils Absolute 07/24/2019 0 01                            Imaging: I have personally reviewed pertinent films in PACS      Code Status: Level 1 - Full Code  Advance Directive and Living Will:      Power of :    POLST:        Portions of the record may have been created with voice recognition software  Occasional wrong word or "sound a like" substitutions may have occurred due to the inherent limitations of voice recognition software  Read the chart carefully and recognize, using context, where substitutions have occurred

## 2019-07-25 ENCOUNTER — HOSPITAL ENCOUNTER (INPATIENT)
Facility: HOSPITAL | Age: 84
LOS: 17 days | Discharge: HOME HEALTH | DRG: 560 | End: 2019-08-11
Attending: PHYSICAL MEDICINE & REHABILITATION | Admitting: PHYSICAL MEDICINE & REHABILITATION
Payer: MEDICARE

## 2019-07-25 VITALS
OXYGEN SATURATION: 99 % | BODY MASS INDEX: 21.77 KG/M2 | HEIGHT: 60 IN | SYSTOLIC BLOOD PRESSURE: 135 MMHG | DIASTOLIC BLOOD PRESSURE: 61 MMHG | RESPIRATION RATE: 14 BRPM | TEMPERATURE: 97.8 F | HEART RATE: 80 BPM | WEIGHT: 110.89 LBS

## 2019-07-25 DIAGNOSIS — S72.001A CLOSED RIGHT HIP FRACTURE, INITIAL ENCOUNTER (HCC): Primary | ICD-10-CM

## 2019-07-25 DIAGNOSIS — R60.9 SWELLING: ICD-10-CM

## 2019-07-25 DIAGNOSIS — E87.6 HYPOKALEMIA: ICD-10-CM

## 2019-07-25 LAB
ANION GAP SERPL CALCULATED.3IONS-SCNC: 4 MMOL/L (ref 4–13)
BUN SERPL-MCNC: 21 MG/DL (ref 5–25)
CALCIUM SERPL-MCNC: 8.2 MG/DL (ref 8.3–10.1)
CHLORIDE SERPL-SCNC: 106 MMOL/L (ref 100–108)
CO2 SERPL-SCNC: 32 MMOL/L (ref 21–32)
CREAT SERPL-MCNC: 0.84 MG/DL (ref 0.6–1.3)
ERYTHROCYTE [DISTWIDTH] IN BLOOD BY AUTOMATED COUNT: 13.2 % (ref 11.6–15.1)
GFR SERPL CREATININE-BSD FRML MDRD: 64 ML/MIN/1.73SQ M
GLUCOSE SERPL-MCNC: 94 MG/DL (ref 65–140)
HCT VFR BLD AUTO: 27.8 % (ref 34.8–46.1)
HGB BLD-MCNC: 8.6 G/DL (ref 11.5–15.4)
MAGNESIUM SERPL-MCNC: 2.2 MG/DL (ref 1.6–2.6)
MCH RBC QN AUTO: 28.8 PG (ref 26.8–34.3)
MCHC RBC AUTO-ENTMCNC: 30.9 G/DL (ref 31.4–37.4)
MCV RBC AUTO: 93 FL (ref 82–98)
PLATELET # BLD AUTO: 169 THOUSANDS/UL (ref 149–390)
PMV BLD AUTO: 11.5 FL (ref 8.9–12.7)
POTASSIUM SERPL-SCNC: 3.7 MMOL/L (ref 3.5–5.3)
RBC # BLD AUTO: 2.99 MILLION/UL (ref 3.81–5.12)
SODIUM SERPL-SCNC: 142 MMOL/L (ref 136–145)
WBC # BLD AUTO: 8.23 THOUSAND/UL (ref 4.31–10.16)

## 2019-07-25 PROCEDURE — NC001 PR NO CHARGE

## 2019-07-25 PROCEDURE — 83735 ASSAY OF MAGNESIUM: CPT | Performed by: STUDENT IN AN ORGANIZED HEALTH CARE EDUCATION/TRAINING PROGRAM

## 2019-07-25 PROCEDURE — 97530 THERAPEUTIC ACTIVITIES: CPT

## 2019-07-25 PROCEDURE — 99239 HOSP IP/OBS DSCHRG MGMT >30: CPT | Performed by: NURSE PRACTITIONER

## 2019-07-25 PROCEDURE — 99024 POSTOP FOLLOW-UP VISIT: CPT | Performed by: ORTHOPAEDIC SURGERY

## 2019-07-25 PROCEDURE — 97116 GAIT TRAINING THERAPY: CPT

## 2019-07-25 PROCEDURE — 80048 BASIC METABOLIC PNL TOTAL CA: CPT | Performed by: PHYSICIAN ASSISTANT

## 2019-07-25 PROCEDURE — 85027 COMPLETE CBC AUTOMATED: CPT | Performed by: STUDENT IN AN ORGANIZED HEALTH CARE EDUCATION/TRAINING PROGRAM

## 2019-07-25 RX ORDER — OXYCODONE HYDROCHLORIDE 5 MG/1
5 TABLET ORAL EVERY 4 HOURS PRN
Qty: 30 TABLET | Refills: 0 | Status: SHIPPED | OUTPATIENT
Start: 2019-07-25 | End: 2019-08-11 | Stop reason: HOSPADM

## 2019-07-25 RX ORDER — OXYCODONE HYDROCHLORIDE 5 MG/1
2.5 TABLET ORAL EVERY 4 HOURS PRN
Qty: 30 TABLET | Refills: 0 | Status: SHIPPED | OUTPATIENT
Start: 2019-07-25 | End: 2019-08-11 | Stop reason: HOSPADM

## 2019-07-25 RX ORDER — OXYCODONE HYDROCHLORIDE 5 MG/1
5 TABLET ORAL EVERY 4 HOURS PRN
Status: DISCONTINUED | OUTPATIENT
Start: 2019-07-25 | End: 2019-07-26

## 2019-07-25 RX ORDER — PANTOPRAZOLE SODIUM 40 MG/1
40 TABLET, DELAYED RELEASE ORAL
Qty: 30 TABLET | Refills: 0
Start: 2019-07-26 | End: 2019-08-12 | Stop reason: ALTCHOICE

## 2019-07-25 RX ORDER — ACETAMINOPHEN 325 MG/1
975 TABLET ORAL EVERY 8 HOURS SCHEDULED
Status: DISCONTINUED | OUTPATIENT
Start: 2019-07-25 | End: 2019-08-11 | Stop reason: HOSPADM

## 2019-07-25 RX ORDER — PANTOPRAZOLE SODIUM 40 MG/1
40 TABLET, DELAYED RELEASE ORAL
Status: DISCONTINUED | OUTPATIENT
Start: 2019-07-26 | End: 2019-08-11 | Stop reason: HOSPADM

## 2019-07-25 RX ORDER — FUROSEMIDE 80 MG
80 TABLET ORAL DAILY
Status: DISCONTINUED | OUTPATIENT
Start: 2019-07-26 | End: 2019-08-11 | Stop reason: HOSPADM

## 2019-07-25 RX ORDER — POLYETHYLENE GLYCOL 3350 17 G/17G
17 POWDER, FOR SOLUTION ORAL DAILY PRN
Status: DISCONTINUED | OUTPATIENT
Start: 2019-07-25 | End: 2019-08-11 | Stop reason: HOSPADM

## 2019-07-25 RX ORDER — DOCUSATE SODIUM 100 MG/1
100 CAPSULE, LIQUID FILLED ORAL 2 TIMES DAILY
Qty: 10 CAPSULE | Refills: 0 | Status: SHIPPED | OUTPATIENT
Start: 2019-07-25 | End: 2019-08-11 | Stop reason: HOSPADM

## 2019-07-25 RX ORDER — ACETAMINOPHEN 325 MG/1
975 TABLET ORAL EVERY 8 HOURS SCHEDULED
Qty: 30 TABLET | Refills: 0 | Status: SHIPPED | OUTPATIENT
Start: 2019-07-25 | End: 2020-07-07 | Stop reason: ALTCHOICE

## 2019-07-25 RX ORDER — VERAPAMIL HYDROCHLORIDE 240 MG/1
240 TABLET, FILM COATED, EXTENDED RELEASE ORAL DAILY
Status: DISCONTINUED | OUTPATIENT
Start: 2019-07-26 | End: 2019-08-11 | Stop reason: HOSPADM

## 2019-07-25 RX ORDER — POLYETHYLENE GLYCOL 3350 17 G/17G
17 POWDER, FOR SOLUTION ORAL DAILY PRN
Qty: 14 EACH | Refills: 0
Start: 2019-07-25 | End: 2019-08-11 | Stop reason: HOSPADM

## 2019-07-25 RX ORDER — DOCUSATE SODIUM 100 MG/1
100 CAPSULE, LIQUID FILLED ORAL 2 TIMES DAILY
Status: DISCONTINUED | OUTPATIENT
Start: 2019-07-25 | End: 2019-08-11 | Stop reason: HOSPADM

## 2019-07-25 RX ORDER — OXYCODONE HYDROCHLORIDE 5 MG/1
2.5 TABLET ORAL EVERY 4 HOURS PRN
Status: DISCONTINUED | OUTPATIENT
Start: 2019-07-25 | End: 2019-07-26

## 2019-07-25 RX ADMIN — PANTOPRAZOLE SODIUM 40 MG: 40 TABLET, DELAYED RELEASE ORAL at 06:20

## 2019-07-25 RX ADMIN — ENOXAPARIN SODIUM 40 MG: 40 INJECTION SUBCUTANEOUS at 22:04

## 2019-07-25 RX ADMIN — ACETAMINOPHEN 975 MG: 325 TABLET, FILM COATED ORAL at 13:35

## 2019-07-25 RX ADMIN — OXYCODONE HYDROCHLORIDE 5 MG: 5 TABLET ORAL at 13:45

## 2019-07-25 RX ADMIN — VERAPAMIL HYDROCHLORIDE 240 MG: 120 TABLET, FILM COATED, EXTENDED RELEASE ORAL at 08:53

## 2019-07-25 RX ADMIN — ACETAMINOPHEN 975 MG: 325 TABLET, FILM COATED ORAL at 06:20

## 2019-07-25 RX ADMIN — ACETAMINOPHEN 975 MG: 325 TABLET ORAL at 22:02

## 2019-07-25 RX ADMIN — FUROSEMIDE 80 MG: 80 TABLET ORAL at 08:53

## 2019-07-25 NOTE — PLAN OF CARE
Problem: Prexisting or High Potential for Compromised Skin Integrity  Goal: Skin integrity is maintained or improved  Description  INTERVENTIONS:  - Identify patients at risk for skin breakdown  - Assess and monitor skin integrity  - Assess and monitor nutrition and hydration status  - Monitor labs (i e  albumin)  - Assess for incontinence   - Turn and reposition patient  - Assist with mobility/ambulation  - Relieve pressure over bony prominences  - Avoid friction and shearing  - Provide appropriate hygiene as needed including keeping skin clean and dry  - Evaluate need for skin moisturizer/barrier cream  - Collaborate with interdisciplinary team (i e  Nutrition, Rehabilitation, etc )   - Patient/family teaching  Outcome: Progressing     Problem: PAIN - ADULT  Goal: Verbalizes/displays adequate comfort level or baseline comfort level  Description  Interventions:  - Encourage patient to monitor pain and request assistance  - Assess pain using appropriate pain scale  - Administer analgesics based on type and severity of pain and evaluate response  - Implement non-pharmacological measures as appropriate and evaluate response  - Consider cultural and social influences on pain and pain management  - Notify physician/advanced practitioner if interventions unsuccessful or patient reports new pain  Outcome: Progressing     Problem: Potential for Falls  Goal: Patient will remain free of falls  Description  INTERVENTIONS:  - Assess patient frequently for physical needs  -  Identify cognitive and physical deficits and behaviors that affect risk of falls    -  [place] fall precautions as indicated by assessment   - Educate patient/family on patient safety including physical limitations  - Instruct patient to call for assistance with activity based on assessment  - Modify environment to reduce risk of injury  - Consider OT/PT consult to assist with strengthening/mobility  Outcome: Progressing     Problem: INFECTION - ADULT  Goal: Absence or prevention of progression during hospitalization  Description  INTERVENTIONS:  - Assess and monitor for signs and symptoms of infection  - Monitor lab/diagnostic results  - Monitor all insertion sites, i e  indwelling lines, tubes, and drains  - Monitor endotracheal (as able) and nasal secretions for changes in amount and color  - [place] appropriate cooling/warming therapies per order  - Administer medications as ordered  - Instruct and encourage patient and family to use good hand hygiene technique  - Identify and instruct in appropriate isolation precautions for identified infection/condition  Outcome: Progressing  Goal: Absence of fever/infection during neutropenic period  Description  INTERVENTIONS:  - Monitor WBC  - Implement neutropenic guidelines  Outcome: Progressing     Problem: SAFETY ADULT  Goal: Maintain or return to baseline ADL function  Description  INTERVENTIONS:  -  Assess patient's ability to carry out ADLs; assess patient's baseline for ADL function and identify physical deficits which impact ability to perform ADLs (bathing, care of mouth/teeth, toileting, grooming, dressing, etc )  - Assess/evaluate cause of self-care deficits   - Assess range of motion  - Assess patient's mobility; develop plan if impaired  - Assess patient's need for assistive devices and provide as appropriate  - Encourage maximum independence but intervene and supervise when necessary  ¯ Involve family in performance of ADLs  ¯ Assess for home care needs following discharge   ¯ Request OT consult to assist with ADL evaluation and planning for discharge  ¯ Provide patient education as appropriate  Outcome: Progressing  Goal: Maintain or return mobility status to optimal level  Description  INTERVENTIONS:  - Assess patient's baseline mobility status (ambulation, transfers, stairs, etc )    - Identify cognitive and physical deficits and behaviors that affect mobility  - Identify mobility aids required to assist with transfers and/or ambulation (gait belt, sit-to-stand, lift, walker, cane, etc )  - [place] fall precautions as indicated by assessment  - Record patient progress and toleration of activity level on Mobility SBAR; progress patient to next Phase/Stage  - Instruct patient to call for assistance with activity based on assessment  - Request Rehabilitation consult to assist with strengthening/weightbearing, etc   Outcome: Progressing     Problem: DISCHARGE PLANNING  Goal: Discharge to home or other facility with appropriate resources  Description  INTERVENTIONS:  - Identify barriers to discharge w/patient and caregiver  - Arrange for needed discharge resources and transportation as appropriate  - Identify discharge learning needs (meds, wound care, etc )  - Arrange for interpretive services to assist at discharge as needed  - Refer to Case Management Department for coordinating discharge planning if the patient needs post-hospital services based on physician/advanced practitioner order or complex needs related to functional status, cognitive ability, or social support system  Outcome: Progressing     Problem: Knowledge Deficit  Goal: Patient/family/caregiver demonstrates understanding of disease process, treatment plan, medications, and discharge instructions  Description  Complete learning assessment and assess knowledge base    Interventions:  - Provide teaching at level of understanding  - Provide teaching via preferred learning methods  Outcome: Progressing

## 2019-07-25 NOTE — PROGRESS NOTES
Progress Note - Orthopedics   Brian Veloz 80 y o  female MRN: [id number]  Unit/Bed#: [address] [address] Encounter: [id number]    Assessment:  1) POD#2 s/p right hip long TFN    Plan:  Ancef 2 g IV x 2 for 24 hours postop-completed  DVT prophylaxis:  Lovenox 40 milligram subcu q day/SCD's/Ambulation  WBAT  PT/OT- WBAT-continue to mobilize with PT  Analgesia PRN  Follow up AM labs:  H/H:  8 6/27 8 this morning  Hemoglobin is stabilizing  Patient does feel shaky while mobilizing with physical therapy however her vital signs remained stable  Medical management per primary team  Dressing- monitor for drainage, dressings are clean dry and intact  Dressings may stay in place for 7 days postop and then may be removed  Dressings may be changed if they are greater than 50 percent saturated  Discharge planning - patient is progressing slowly with physical therapy  Patient may need short-term rehab after discharge from the hospital     Weight bearing: WBAT RLE    VTE Pharmacologic Prophylaxis: Enoxaparin (Lovenox)  VTE Mechanical Prophylaxis: sequential compression device    Subjective:  Patient seen examined at bedside today  Patient is sitting in a chair comfortably  Patient does report some discomfort while mobilizing with physical therapy and states that she feels shaky while doing so  She denies trouble breathing, chest pain, headache, dizziness, or lightheadedness  Vitals: Blood pressure 140/54, pulse 73, temperature 97 9 °F (36 6 °C), temperature source Oral, resp  rate 16, height 5' (1 524 m), weight 50 3 kg (110 lb 14 3 oz), SpO2 98 %  ,Body mass index is 21 66 kg/m²      No intake or output data in the 24 hours ending 07/25/19 1228    Invasive Devices     Peripheral Intravenous Line            Peripheral IV 07/22/19 Right Forearm 2 days    Peripheral IV 07/23/19 Left Arm 2 days                Physical Exam: NAD  Ortho Exam: RLE: Dsg c/d/i, thigh soft, swelling and right thigh stable and appropriate for stage postoperative course, all compartments soft, +DF/PF/EHL, +L3-S1 SILT, DP2+, foot warm    Yolis LOGAN    Division of Adult Reconstruction  Department of Orthopaedic Surgery  [facility]

## 2019-07-25 NOTE — PHYSICAL THERAPY NOTE
PT TREATMENT     07/25/19 1230   Pain Assessment   Pain Assessment 0-10   Pain Score 7   Pain Location Hip;Leg   Pain Orientation Right   Restrictions/Precautions   Weight Bearing Precautions Per Order Yes   RLE Weight Bearing Per Order WBAT   Other Precautions Chair Alarm; Bed Alarm; Fall Risk;Pain   General   Chart Reviewed Yes   Family/Caregiver Present No   Cognition   Overall Cognitive Status WFL   Arousal/Participation Cooperative   Attention Attends with cues to redirect   Orientation Level Oriented X4   Following Commands Follows multistep commands with increased time or repetition   Subjective   Subjective Pt is concerned about her 's care while she is in rehab   Bed Mobility   Additional Comments presents in chair   Transfers   Sit to Stand 3  Moderate assistance   Additional items Assist x 1;Verbal cues; Increased time required   Stand to Sit 3  Moderate assistance   Additional items Assist x 1;Verbal cues   Stand pivot 3  Moderate assistance   Additional items Assist x 1;Verbal cues; Increased time required   Ambulation/Elevation   Gait pattern Forward Flexion; Short stride  (sliding left foot instead of lifting to step)   Gait Assistance 3  Moderate assist   Additional items Assist x 1;Verbal cues   Assistive Device Rolling walker   Distance 3 feet x 2   Balance   Ambulatory Poor   Activity Tolerance   Activity Tolerance Patient limited by pain; Patient limited by fatigue   Medical Staff Made Aware yes: [facility] training  with use of RW for dynamic and static standing   Assessment   Prognosis Good   Problem List Decreased strength;Decreased endurance; Impaired balance;Decreased mobility; Decreased coordination;Decreased cognition;Decreased skin integrity;Pain   Assessment Pt presents in chair  PT entered to see when therapy could return, however pt states that she needs to use the bathroom urgently, so PT assisted pt     (was unable to premedicate prior to session ), PT prepared room for transfer to commode  Pt transferred sit > stand with Mod A to a RW  Pt stood for a few seconds to get accommodated to standing  Pt amb  3 feet from chair to commode with Mod A and frequent verbal cues as to how to use RW with step sequence to achieve WBAT RLE  Pt stand to sit on commode with Mod A and verbal cues  Close supervision while pt on commode due to uncomfortable sitting on commode with right leg  Pt took extended time to urinate due to "I urinate in intervals"  Pt needs to flex trunk forward to force urine to leave her body   (pre morbid)  Pt given verbal instructions so pt can begin to complete hygiene herself  "Yesterday, they did it for me"  Pt was able to complete her own hygiene with close supervision and verbal cues  Pt transferred sit > stand again with Mod A and took several steps back to chair with mod A and verbal cues   Pt was able to use UEs effectively to allow pt to lift her left foot off of floor to advance herself  Pt positoned in chair; vitals remained stable per Kayleen, but pt reported feeling "funny"  With Pursed lip breathing and cues to relax   pt felt better  RN made aware of same and will check on her again in few minutes  Cont  to progress with PT  Goals   Patient Goals to walk better and take care of   Plan   Treatment/Interventions ADL retraining;Functional transfer training;LE strengthening/ROM; Elevations; Therapeutic exercise; Endurance training;Cognitive reorientation;Equipment eval/education; Bed mobility;Gait training;OT   PT Frequency Once a day   Recommendation   Recommendation Short-term skilled PT   Equipment Recommended   (RW)   Licensure   [place] License Number  [facility] PT  [id number]

## 2019-07-25 NOTE — PROGRESS NOTES
PHYSICAL MEDICINE AND REHABILITATION   PREADMISSION ASSESSMENT     Projected [facility] Diagnoses:  Impairment of mobility, safety and Activities of Daily Living (ADLs) due to Orthopedic Disorders:  08 11  Unilateral Hip Fracture   Etiologic: Closed displaced intertrochanteric fracture right hip  Date of Onset: 7/22/19  Date of surgery: 7/23/19 Cephalomedullary nailing right hip    PATIENT INFORMATION  Name: Lara Marquez Phone #: [phone] (home)   Address: [place] [address]  YOB: 1935 Age: 80 y o  SS#   Marital Status: /Civil Union  Ethnicity:    Employment Status: retired  Extended Emergency Contact Information  Primary Emergency Contact: [address] of [facility] Phone: [phone]  Relation: Son  Secondary Emergency Contact: Deng Salas Giovanna Phone: [phone]  Relation: Sister  Advance Directive: Level 1 Full Code, Unknown Advanced Directive     INSURANCE/COVERAGE:     Primary Payor: MEDICARE / Plan: MEDICARE A AND B / Product Type: Medicare A & B Fee for Service /   Secondary Payer: [employer] PLAN 280   Payer Contact:  Payer Contact:   Contact Phone:  Contact Phone:   Authorization #:   Coverage Dates:  LCD:   MEDICARE #: [id number]  Medicare Days: 60/30/60  Medical Record #: [id number]    REFERRAL SOURCE:   Referring provider: Valentin Morrow MD  Referring facility: [address] [place] [address]   Room: [address] [place] [address]  PCP: Sommer Diaz MD PCP phone number: [phone]    MEDICAL INFORMATION  HPI: Patient is an 80year old female who originally presented to the [address] [place] [address] on 7/22/19 after she slipped and fell in the shower  The patient offered complaints of right hip pain and her RLE was noted to be externally rotated  The patient denies hitting her head, and was not on any blood thinners, however she was unable to bear any weight    X-ray in the ER revealed an acute right femoral intertrochanteric fracture with varus angulation and mild displacement  Upon admission the patients WBC count was noted to be 19 2 however it was felt to likely be reactive as the patient did not display any signs of infection  A chest x-ray was done which was negative  Ortho was consulted and was recommended for surgical intervention  Prior to admission the patients hemoglobin was noted to be 9 7, however it was felt that this was hemodiluated after fluid intervention  The patient did go to the OR on 7/23/19 with Dr Richard Mars for a cephalomedullary nailing of her right hip  Post op the patients hemoglobin was noted to be 8 2  The patient was also placed on Ancef 2g IV x 2 for 24 hours postop  Podiatry was consulted on 7/24/19 as she was noted to have an open would to the left 3rd digit, subungual hematoma of the right hallux, onychomycosis, and foot pain  Per Podiatry, the patient will likely lose her right big toenail  In addition, there was removal of the ingrown nail of the left 3rd digit  Per Podiatry, the patient had extremely elongated nails that have not been cut in many months  At this time the patients attending is clearing her for discharge to rehab and both PT/OT are recommending inpatient acute rehab at this time  The patient will transfer to [address] [place] [address] in [facility] later on today  Past Medical History:   Past Surgical History: Allergies:     Past Medical History:   Diagnosis Date    Anesthesia complication       Yrs ago had "anxiety" reaction not sure while sedated, pt states sensitive to anesthesia effects    Anxiety     stress at home    Arthritis     Cataract, right     Last Assessed:5/11/16    Eye hemorrhage     left eye currently 5/12/16    History of shingles 05/2015    Hypertension     Mitral valve stenosis, mild     Past Surgical History:   Procedure Laterality Date    CARPAL TUNNEL RELEASE Left     CATARACT EXTRACTION W/ INTRAOCULAR LENS IMPLANT Left 10/11/2016    Procedure: EXTRACTION EXTRACAPSULAR CATARACT PHACO INTRAOCULAR LENS (IOL); Surgeon: Cheyanne Trammell MD;  Location: [facility] MAIN OR;  Service:     CERVICAL CONE BIOPSY      COLONOSCOPY      EYE SURGERY Left     muscle repair    TN OPEN RX FEMUR FX+INTRAMED NINI Right 7/23/2019    Procedure: INSERTION NAIL IM FEMUR ANTEGRADE (TROCHANTERIC); Surgeon: Meagan Hernández DO;  Location: [address] [place] [address];  Service: Orthopedics    TN [address] EXTRACAP,INSERT LENS Right 5/17/2016    Procedure: EXTRACTION EXTRACAPSULAR CATARACT PHACO INTRAOCULAR LENS (IOL); Surgeon: Cheyanne Trammell MD;  Location: [facility] MAIN OR;  Service: Ophthalmology    TONSILLECTOMY       Allergies   Allergen Reactions    Indocin [Indomethacin]      hypertension         Comorbidities: hypertension, chronic diastolic congestive heart failure, CONTACT MRSA, moderate aortic stenosis, left foot woud - 3rd digit, leukocytosis    CURRENT VITAL SIGNS:   Temp:  [97 7 °F (36 5 °C)-97 9 °F (36 6 °C)] 97 9 °F (36 6 °C)  HR:  [70-73] 73  Resp:  [16-18] 16  BP: (139-140)/(54) 140/54 No intake or output data in the 24 hours ending 07/25/19 1448     LABORATORY RESULTS:      Lab Results   Component Value Date    HGB 8 6 (L) 07/25/2019    HGB 13 0 10/13/2015    HCT 27 8 (L) 07/25/2019    HCT 40 3 10/13/2015    WBC 8 23 07/25/2019    WBC 6 0 10/13/2015     Lab Results   Component Value Date    BUN 21 07/25/2019    BUN 26 (H) 10/13/2015     10/13/2015    K 3 7 07/25/2019    K 3 8 10/13/2015     07/25/2019     10/13/2015    GLUCOSE 87 10/13/2015    CREATININE 0 84 07/25/2019    CREATININE 0 8 10/13/2015     Lab Results   Component Value Date    PROTIME 10 7 07/22/2019    INR 1 02 07/22/2019        DIAGNOSTIC STUDIES:  Xr Hip/pelv 2-3 Vws Right If Performed    Result Date: 7/22/2019  Impression: Acute right femoral intertrochanteric fracture with varus angulation and mild displacement   The study was marked in [facility] for immediate notification  Workstation performed: [id number]     Xr Chest 1 View    Result Date: 7/22/2019  Impression: No acute cardiopulmonary disease  Workstation performed: [id number]     Xr Femur 1 Vw Right    Result Date: 7/23/2019  Impression: Fluoroscopic guidance for ORIF of intertrochanteric fracture with near-anatomic alignment  Please refer to the separate procedure notes for additional details   Workstation performed: [id number]       PRECAUTIONS/SPECIAL NEEDS:  Tobacco:   Social History     Tobacco Use   Smoking Status Never Smoker   Smokeless Tobacco Never Used   , Alcohol:    Social History     Substance and Sexual Activity   Alcohol Use Yes    Comment: rarely   , Weight Bearing Precautions:  WBAT RLE, Anticoagulation:  lovenox, Edema Management, Safety Concerns, Pain Management, IV: Type: peripheral Location: left arm and right forearm Reason: medications and fluids, Language Preference: English and fall precautions    MEDICATIONS:     Current Facility-Administered Medications:     acetaminophen (TYLENOL) tablet 975 mg, 975 mg, Oral, Q8H [address], Yogesh C FARHAD Martell-DIMITRIOS, 975 mg at 07/25/19 1335    docusate sodium (COLACE) capsule 100 mg, 100 mg, Oral, BID, Juana Justin PA-C, Stopped at 07/24/19 1006    enoxaparin (LOVENOX) subcutaneous injection 40 mg, 40 mg, Subcutaneous, Daily, Juana Justin PA-C, 40 mg at 07/24/19 2206    furosemide (LASIX) tablet 80 mg, 80 mg, Oral, Daily, Juana Justin PA-C, 80 mg at 07/25/19 0853    HYDROmorphone (DILAUDID) injection 0 2 mg, 0 2 mg, Intravenous, Q4H PRN, Juana Justin PA-C    naloxone (NARCAN) 0 04 mg/mL syringe 0 04 mg, 0 04 mg, Intravenous, Q1MIN PRN, Juana Justin PA-C    ondansetron (ZOFRAN) injection 4 mg, 4 mg, Intravenous, Q6H PRN, Juana Justin PA-C    oxyCODONE (ROXICODONE) IR tablet 2 5 mg, 2 5 mg, Oral, Q4H PRN, Orien Allenhurst, PA-C    oxyCODONE (ROXICODONE) IR tablet 5 mg, 5 mg, Oral, Q4H PRN, Maple Mangle Martell, PA-C, 5 mg at 07/25/19 1345    pantoprazole (PROTONIX) EC tablet 40 mg, 40 mg, Oral, Daily Before Breakfast, Gordon Columbus, PA-C, 40 mg at 07/25/19 0620    polyethylene glycol (MIRALAX) packet 17 g, 17 g, Oral, Daily PRN, Gordon Paty, PA-C    verapamil (CALAN-SR) CR tablet 240 mg, 240 mg, Oral, Daily, [facility] Columbus, PA-C, 240 mg at 07/25/19 1626    SKIN INTEGRITY:   incision noted to the right leg - covered with silver dressing, open wound noted to the left 3rd digit per podiatry    PRIOR LEVEL OF FUNCTION:  She lives in a(n) single family home  Dania Maki is  and lives with their spouse (who is disabled and non-ambulatory)  Self Care: Independent, Indoor Mobility: Independent, Stairs (in/outdoor): Independent and Cognition: Independent    Prior to admission the patient was fully independent with both ADLS and IADLs as well as functional mobility  The patient is the primary caregiver to her disabled , who is non-ambulatory  The patient uses a sit to stand lift for her  for all of his transfers  FALLS IN THE LAST 6 MONTHS: 1-4    HOME ENVIRONMENT:  The living area: bedroom on 2nd floor and bathroom on 2nd floor  There is a 1/2 bath on the main level  There are 4-5 steps to enter the home from the front, and a ramp to enter from the back  The patient will not have 24 hour supervision/physical assistance available upon discharge  Aides come in to home 5-7x/wk for 1 5 to 2 hrs to assist with pt's   Currently the patient is relying on her son and her sister to fill in the gaps of home care and provide private duty care for her       PREVIOUS DME:  Equipment in home (previous DME): sit to stand lift - used for     FUNCTIONAL STATUS:  Physical Therapy Occupational Therapy Speech Therapy   7/25/19    Bed Mobility   Additional Comments presents in chair   Transfers   Sit to Stand 3  Moderate assistance   Additional items Assist x 1;Verbal cues;Increased time required   Stand to Sit 3  Moderate assistance   Additional items Assist x 1;Verbal cues   Stand pivot 3  Moderate assistance   Additional items Assist x 1;Verbal cues; Increased time required   Ambulation/Elevation   Gait pattern Forward Flexion; Short stride  (sliding left foot instead of lifting to step)   Gait Assistance 3  Moderate assist   Additional items Assist x 1;Verbal cues   Assistive Device Rolling walker   Distance 3 feet x 2   Balance   Ambulatory Poor   Activity Tolerance   Activity Tolerance Patient limited by pain; Patient limited by fatigue   Medical Staff Made Aware yes: [facility] training  with use of RW for dynamic and static standing   Assessment   Prognosis Good   Problem List Decreased strength;Decreased endurance; Impaired balance;Decreased mobility; Decreased coordination;Decreased cognition;Decreased skin integrity;Pain   Assessment Pt presents in chair  PT entered to see when therapy could return, however pt states that she needs to use the bathroom urgently, so PT assisted pt   (was unable to premedicate prior to session ), PT prepared room for transfer to commode  Pt transferred sit > stand with Mod A to a RW  Pt stood for a few seconds to get accommodated to standing  Pt amb  3 feet from chair to commode with Mod A and frequent verbal cues as to how to use RW with step sequence to achieve WBAT RLE  Pt stand to sit on commode with Mod A and verbal cues  Close supervision while pt on commode due to uncomfortable sitting on commode with right leg  Pt took extended time to urinate due to "I urinate in intervals"  Pt needs to flex trunk forward to force urine to leave her body   (pre morbid)  Pt given verbal instructions so pt can begin to complete hygiene herself  "Yesterday, they did it for me"  Pt was able to complete her own hygiene with close supervision and verbal cues    Pt transferred sit > stand again with Mod A and took several steps back to chair with mod A and verbal cues   Pt was able to use UEs effectively to allow pt to lift her left foot off of floor to advance herself  Pt positoned in chair; vitals remained stable per Kayleen, but pt reported feeling "funny"  With Pursed lip breathing and cues to relax   pt felt better  RN made aware of same and will check on her again in few minutes  Cont  to progress with PT         7/24/19    ADL   Eating Assistance 7  Independent   Grooming Assistance 5  Supervision/Setup  (Seated at sink)   UB Bathing Assistance 4  Minimal Assistance  (Seated on shower chair)   LB Bathing Assistance 2  Maximal Assistance  (Seated on shower chair)   [facility] 5  Supervision/Setup   LB Dressing Assistance 2  Maximal [address]  3  Moderate Assistance   Bed Mobility   Supine to Sit 2  Maximal assistance   Additional items Assist x 1;Verbal cues;LE management   Transfers   Sit to Stand 2  Maximal assistance   Additional items Assist x 1;Verbal cues; Increased time required  (RW, verbal cues for hand placement)   Stand to Sit 2  Maximal assistance   Additional items Assist x 1;Verbal cues; Increased time required  (RW, verbal cues for hand placement)   Functional Mobility   Functional Mobility 3  Moderate assistance   Additional Comments Pt required mod a x2 to take 5 steps forward  Required mod a to advance R leg with verbal cues to put weight through arm when stepping with right  Additional items Rolling walker   Balance   Static Sitting Fair   Dynamic Sitting Fair -   Static Standing Fair -   Dynamic Standing Poor +   Activity Tolerance   Activity Tolerance Patient limited by fatigue;Patient limited by pain   Medical Staff Made Aware Pt left with Dr Shannan Wright present in room     RUE Assessment   RUE Assessment WFL  (3+/5 grossly)   LUE Assessment   LUE Assessment WFL  (3+/5 grossly)   Hand Function   Gross Motor Coordination Functional   Fine Motor Coordination Functional   Cognition Overall Cognitive Status [facility]   Arousal/Participation Alert; Responsive; Cooperative   Attention Within functional limits   Orientation Level Oriented X4   Memory Within functional limits   Following Commands Follows all commands and directions without difficulty   Assessment   Limitation Decreased ADL status; Decreased UE strength;Decreased endurance;Decreased self-care trans;Decreased high-level ADLs   Prognosis Good   Assessment Patient evaluated by Occupational Therapy  Patient admitted with Intertrochanteric fracture of right femur ([facility] [address] )  The patients occupational profile, medical and therapy history includes a extensive additional review of physical, cognitive, or psychosocial history related to current functional performance  Comorbidities affecting functional mobility and ADLS include: aortic stenosis, chronic diastolic congestive heart failure, and closed R hip fracture  Prior to admission, patient was independent with ADLS and independent with IADLS  The evaluation identifies the following performance deficits: weakness, impaired balance, decreased endurance, increased fall risk, new onset of impairment of functional mobility, decreased ADLS, decreased IADLS, decreased activity tolerance, decreased safety awareness and ortheopedic restrictions, that result in activity limitations and/or participation restrictions  This evaluation requires clinical decision making of high complexity, because the patient presents with comorbidites that affect occupational performance and required significant modification of tasks or assistance with consideration of multiple treatment options  The Barthel Index was used as a functional outcome tool presenting with a score of 40, indicating marked limitations of functional mobility and ADLS  Patient will benefit from skilled Occupational Therapy services to address above deficits and facilitate a safe return to prior level of function             CURRENT GAP IN FUNCTION Prior to Admission:     Functional Status: Patient was independent with mobility/ambulation, transfers, ADL's, IADL's  Estimated length of stay: 10 to 14 days    Anticipated Post-Discharge Disposition/Treatment  Disposition: Return to previous home/apartment  Outpatient Services: Physical Therapy (PT) and Occupational Therapy (OT)    BARRIERS TO DISCHARGE  Lovenox, Weakness, Pain, Leukocytosis (elevated white blood count), Balance Difficulty, Fatigue, Home Accessibility, Caregiver Accessibility, Financial Resources, Equipment Needs and Resource Availability    INTERVENTIONS FOR DISCHARGE  Adaptive equipment, Patient/Family/Caregiver Education, [employer], Support Group, Financial Assistance, Arrange DME needs, Home Modifcations, Medication Changes per MD recommendations, Therapy exercises, Center of balance support  and Energy conservation education     REQUIRED THERAPY:  Patient will require PT and OT 90 minutes each per day, five days per week to achieve rehab goals  REQUIRED FUNCTIONAL AND MEDICAL MANAGEMENT FOR INPATIENT REHABILITATION:  Skin:  right hip incision, and open wound noted to the left 3rd digit, Pain Management: Overall pain is well controlled, Deep Vein Thrombosis (DVT) Prophylaxis:  lovenox, and further internal medicine management of additional medical conditions while on the ARC, PT/OT intervention, patient/family education and training and any needed consults PRN    RECOMMENDED LEVEL OF CARE: Patient is an 80year old female who originally presented to the [facility] on 7/22/19 after she slipped and fell in the shower  X-ray revealed an acute right femoral intertrochanteric fracture with varus angulation and mild displacement  The patient did require surgical intervention and rhys to the OR on 7/23/19 for a cephalomedullary nailing of her right hip  Prior to admission the patient was fully independent with both ADLS and IADLs as well as functional mobility    The patient is the primary caregiver to her disabled , who is non-ambulatory  The patient uses a sit to stand lift for her  for all of his transfers  At this time the patient is WBAT to the RLE  She currently requires supervision  min assist with UB ADLS and max assist with LB ADLS  In addition, the patient also requires mod assist x 1 for both transfers and ambulation  With the use of a rolling walker she is able to ambulate 3 feet x 2  At this time close medical management and PM&R management is recommended for the patient while on the ARC to help monitor labs as well as other medical conditions  Nursing management will be required to monitor bowel/bladder function to prevent incontinent episodes and skin breakdown as well as education on medication changes  Inpatient acute rehab is recommended at this time for the patient to maximize overall strength, endurance, self care, and mobility upon discharge to home with the support of her family and friends

## 2019-07-25 NOTE — SOCIAL WORK
SW following to assist with DCP  STR placement is planned  Pt has been accepted by [address] [place] [address] and bed is available  [facility] still considering  SW discussed above with pt  Pt chose to accept bed at [facility]  Notified CRNP of acceptance and availability  Discharge has been ordered  Pt will be transferred to [address] [place] [address] this afternoon  [facility] contacted to arrange stretcher transport  Fowler Danielito scheduled to transport around 3:30 pm   JUSTUS Chadwick), [facility] and pt aware of plan

## 2019-07-25 NOTE — DISCHARGE SUMMARY
Discharge- Alicia Delatorre 1935, 80 y o  female MRN: [id number]    Unit/Bed#: [address] [place] [place] [address] Encounter: [id number]    Primary Care Provider: Cornelius Frank MD   Date and time admitted to hospital: 7/22/2019  4:41 PM        * Closed right hip fracture, initial encounter [facility])  Assessment & Plan  Patient presented to the ED post mechnical fall in the shower  She fell on her right side, no head injury or LOC  Xray in the ER revealed an acute right femoral intertrochanteric fracture with varus angulation and mild displacement  S/p right hip TFN, POD#2  DVT proph with lovenox 40mg daily  WBAT  DC haskins, void trial done sucessfully  Hemoglobin stable at 8 6, will have follow-up lab work at acute rehab  FU ortho 1 week post op for wound check, dressing to remain on until then  Patient to be discharged to a acute rehab today  Leg foot wound- 3rd digit  Assessment & Plan  Open wound of the left 3rd digit  Consulted podiatry  S/p debridement  Local wound care daily  Follow up OP podiatry   Discharge to acute rehab today  Chronic diastolic congestive heart failure (HCC)  Assessment & Plan  · Continue lasix  · Echo revealed a normal EF, moderate concentric hypertrophy, mild MR, moderate aortic stenosis, moderate TR  · Daily Weight  · Intake and output  · Low sodium diet  · Discharge to acute rehab today  Essential hypertension  Assessment & Plan  Continue verapamil, lasix  BP borderline, holding parameters on antihypertensives    Aortic stenosis, moderate  Assessment & Plan  Echo showed EF 60%, moderate AS  Patient with some pitting edema but no evidence of acute overload  Clinically stable  Monitor I/Os  Will need to establish care with cardio after discharge  Discharged to acute rehab today      Intertrochanteric fracture of right femur [facility])  Assessment & Plan                Discharging Physician / Practitioner: JUAN Watson  PCP: Cornelius Frank MD  Admission Date: Admission Orders (From admission, onward)     Ordered        07/22/19 1817  Inpatient Admission (expected length of stay for this patient Order details is greater than two midnights)  Once                   Discharge Date: 07/25/19    Resolved Problems  Date Reviewed: 7/25/2019          Resolved    Hypertension 7/22/2019     Resolved by  JUAN Harmon    Leukocytosis 7/23/2019     Resolved by  Aspen Castro MD          Consultations During Hospital Stay:  · Orthopedics    Procedures Performed:     · Status post right hip long TFN  Significant Findings / Test Results:     · X-ray of right hip and pelvis performed 07/22/2019 shows acute right femoral intertrochanteric fracture with varus angulation and mild displacement  · Chest x-ray performed 07/22/2019 shows no acute cardiopulmonary disease  Incidental Findings:   · None  Test Results Pending at Discharge (will require follow up): · None  Outpatient Tests Requested:  · Repeat CBC at acute rehab  Complications:  None  Reason for Admission:  Mechanical fall in shower  Hospital Course:     Saeid Santos is a 80 y o  female patient past medical history of congestive heart failure, hypertension, aortic stenosis who originally presented to the hospital on 7/22/2019 due to mechanical fall  On the evening of admission patient was taking a shower and sustained a mechanical fall landing on her right side  She denied any head injury or loss of consciousness  The only pain that she had was to her right hip  She presented to the emergency department and there was found to have an acute right femoral intertrochanteric fracture  Patient was found to have a [place] 38[address] in the emergency department  This normalized to 8  23  Chest x-ray was negative  Urinalysis was negative  Orthopedics was consulted and patient was taken for repair of right hip with long TFN    Patient's hemoglobin had dropped to 8 2, remained stable at 8 6  No need for transfusion at this time  Patient also found to have open wound of left 3rd digit, subungual hematoma the right hallux  Podiatry was consulted  Aseptic debridement in planning of toes times 10 manually and mechanically was done  It also had removal of ingrown nail of 3rd digit  Continue with basal trace in and dressing daily  PT/OT evaluation treatment were performed and recommend acute or short-term rehab  Patient's preference is for acute rehab  Patient was accepted at acute rehab center at [facility]  This was discussed with the patient and her  and son  They are agreeable to acute rehab  Patient is being transferred to acute rehab today  Please see above list of diagnoses and related plan for additional information  Condition at Discharge: good     Discharge Day Visit / Exam:     Subjective:  Patient sitting up in chair, had just finished eating her breakfast   She denies headache, dizziness, chest pain, shortness of breath, abdominal pain, nausea, vomiting or diarrhea  She reports that the pain in her right hip is currently at a 6/10  Vitals: Blood Pressure: 140/54 (07/25/19 0851)  Pulse: 73 (07/25/19 0851)  Temperature: 97 9 °F (36 6 °C) (07/25/19 0851)  Temp Source: Oral (07/25/19 0851)  Respirations: 16 (07/25/19 0851)  Height: 5' (152 4 cm) (07/22/19 2210)  Weight - Scale: 50 3 kg (110 lb 14 3 oz) (07/25/19 0600)  SpO2: 98 % (07/25/19 0851)     Exam:   Physical Exam   Constitutional: She is oriented to person, place, and time  She appears well-nourished  No distress  HENT:   Head: Normocephalic  Eyes: Conjunctivae and EOM are normal    Neck: Normal range of motion  Cardiovascular: Normal rate and regular rhythm  Murmur heard  Pulmonary/Chest: Effort normal and breath sounds normal  No respiratory distress  Abdominal: Soft  Bowel sounds are normal  She exhibits no distension  There is no tenderness     Genitourinary:   Genitourinary Comments: Voids spontaneously   Musculoskeletal:   Right hip dressing intact, no drainage noted  Neurological: She is alert and oriented to person, place, and time  Skin: Skin is warm and dry  Capillary refill takes less than 2 seconds  Psychiatric: She has a normal mood and affect  Her behavior is normal  Judgment and thought content normal          Discussion with Family:  I spoke with patient, her  and her son  I have answered all questions to the best of my abilities  Discharge instructions/Information to patient and family:   See after visit summary for information provided to patient and family  Provisions for Follow-Up Care:  See after visit summary for information related to follow-up care and any pertinent home health orders  Disposition:     [address] (see below)    For Discharges to [address]:   · Acute rehab at [facility] - Not Applicable to this Patient    Planned Readmission:  No      Discharge Statement:  I spent greater than 30 minutes discharging the patient  This time was spent on the day of discharge  I had direct contact with the patient on the day of discharge  Greater than 50% of the total time was spent examining patient, answering all patient questions, arranging and discussing plan of care with patient as well as directly providing post-discharge instructions  Additional time then spent on discharge activities  Discharge Medications:  See after visit summary for reconciled discharge medications provided to patient and family        ** Please Note: This note has been constructed using a voice recognition system **

## 2019-07-25 NOTE — PLAN OF CARE
Problem: Prexisting or High Potential for Compromised Skin Integrity  Goal: Skin integrity is maintained or improved  Description  INTERVENTIONS:  - Identify patients at risk for skin breakdown  - Assess and monitor skin integrity  - Assess and monitor nutrition and hydration status  - Monitor labs (i e  albumin)  - Assess for incontinence   - Turn and reposition patient  - Assist with mobility/ambulation  - Relieve pressure over bony prominences  - Avoid friction and shearing  - Provide appropriate hygiene as needed including keeping skin clean and dry  - Evaluate need for skin moisturizer/barrier cream  - Collaborate with interdisciplinary team (i e  Nutrition, Rehabilitation, etc )   - Patient/family teaching  7/25/2019 1750 by Garrick Hernandez RN  Outcome: Completed  7/25/2019 1749 by Garrick Hernandez, RN  Outcome: Progressing  7/25/2019 0959 by Garrick Hernandez, RN  Outcome: Progressing

## 2019-07-25 NOTE — CONSULTS
Consultation - Danny Reilly 80 y o  female MRN: [id number]    Unit/Bed#: -64 Encounter: [id number]        History of Present Illness     HPI: Danny Reilly is a 80y o  year old female with a history of chronic diastolic heart failure, aortic stenosis, HTN and anxiety who was admitted with a right hip fracture after falling in the shower  On 7/23/19, she underwent a TFN with Dr Rosa Maria Mena  She had some acute post-operative blood loss anemia but did not require surgery  Podiatry saw her for left 3rd toe wound and right great toe subungual hematoma  They did remove an ingrown toenail left 3rd toe  They feel that she is likely to lose the nail on the right great toe  Currently, patient has no c/o CP, SOB, dizziness, N/V/D  Appetite is fair  Pain is controlled  She feels her leg edema is baseline  Says it never goes away completely  Last BM was yesterday    ROS:  As in HPI, otherwise negative 12 point ROS       Historical Information   Past Medical History:   Diagnosis Date    Anesthesia complication       Yrs ago had "anxiety" reaction not sure while sedated, pt states sensitive to anesthesia effects    Anxiety     stress at home    Arthritis     Cataract, right     Last Assessed:5/11/16    Eye hemorrhage     left eye currently 5/12/16    History of shingles 05/2015    Hypertension     Mitral valve stenosis, mild      Past Surgical History:   Procedure Laterality Date    CARPAL TUNNEL RELEASE Left     CATARACT EXTRACTION W/ INTRAOCULAR LENS IMPLANT Left 10/11/2016    Procedure: EXTRACTION EXTRACAPSULAR CATARACT PHACO INTRAOCULAR LENS (IOL); Surgeon: Stephanie Rubio MD;  Location: [facility] MAIN OR;  Service:     CERVICAL CONE BIOPSY      COLONOSCOPY      EYE SURGERY Left     muscle repair    MA OPEN RX FEMUR FX+INTRAMED NINI Right 7/23/2019    Procedure: INSERTION NAIL IM FEMUR ANTEGRADE (TROCHANTERIC);   Surgeon: Trudy Paris DO;  Location: [facility];  Service: Orthopedics     [facility] CATARACT EXTRACAP,INSERT LENS Right 2016    Procedure: EXTRACTION EXTRACAPSULAR CATARACT PHACO INTRAOCULAR LENS (IOL);   Surgeon: Pop Mcmahon MD;  Location: [facility] MAIN OR;  Service: Ophthalmology    TONSILLECTOMY       Social History   Social History     Substance and Sexual Activity   Alcohol Use Yes    Comment: rarely     Social History     Substance and Sexual Activity   Drug Use No     Social History     Tobacco Use   Smoking Status Never Smoker   Smokeless Tobacco Never Used     Family History   Problem Relation Age of Onset    Heart disease Mother         MI  at age 71   [de-identified] Arthritis Mother     GI problems Father         bleeding ulcer    Arthritis Sister     Sleep apnea Sister     Irritable bowel syndrome Sister     Cancer Sister         skin cancer    Heart disease Brother         CABG (5)   [de-identified] Cancer Brother         skin cancer    Heart disease Maternal Aunt        Meds/Allergies   current meds:  Current Facility-Administered Medications   Medication Dose Route Frequency    acetaminophen (TYLENOL) tablet 975 mg  975 mg Oral Q8H [address]    docusate sodium (COLACE) capsule 100 mg  100 mg Oral BID    enoxaparin (LOVENOX) subcutaneous injection 40 mg  40 mg Subcutaneous Daily    [START ON 2019] furosemide (LASIX) tablet 80 mg  80 mg Oral Daily    oxyCODONE (ROXICODONE) IR tablet 2 5 mg  2 5 mg Oral Q4H PRN    oxyCODONE (ROXICODONE) IR tablet 5 mg  5 mg Oral Q4H PRN    [START ON 2019] pantoprazole (PROTONIX) EC tablet 40 mg  40 mg Oral Daily Before Breakfast    polyethylene glycol (MIRALAX) packet 17 g  17 g Oral Daily PRN    [START ON 2019] verapamil (CALAN-SR) CR tablet 240 mg  240 mg Oral Daily       PTA meds:   Medications Prior to Admission   Medication    acetaminophen (TYLENOL) 325 mg tablet    Calcium Carb-Cholecalciferol (CALCIUM 600 + D) 600-200 MG-UNIT TABS    docusate sodium (COLACE) 100 mg capsule    furosemide (LASIX) 40 mg tablet    Multiple Vitamins-Minerals (PRESERVISION AREDS PO)    Naproxen Sodium (ALEVE PO)    oxyCODONE (ROXICODONE) 5 mg immediate release tablet    oxyCODONE (ROXICODONE) 5 mg immediate release tablet    [START ON 7/26/2019] pantoprazole (PROTONIX) 40 mg tablet    polyethylene glycol (MIRALAX) 17 g packet    VERAPAMIL HCL PO     Allergies   Allergen Reactions    Indocin [Indomethacin]      hypertension       Objective   Vitals: SpO2 96 %  Physical Exam      General Appearance: NAD, conversive  Eyes: No icterus; conjunctiva normal, PERRLA  HENT: oropharynx clear; mucous membranes moist  Neck: normal ROM  Lungs: CTA, normal respiratory effort, no retractions, expiratory effort normal  CV: regular rate, no rubs/gallops; +murmur  ABD: soft; ND/NT; +BS  EXT: mild or so edema of both lower legs equally; + mild edema of right thigh around incision; dressings on right lateral thigh are dry and appear to have no drainage  Skin: normal turgor, normal texture, no rashes  Psych: affect normal, No anxiety/depression   Neuro: AAOx3    Lab Results:   Results from last 7 days   Lab Units 07/25/19  0623 07/24/19  0647   WBC Thousand/uL 8 23 10 42*   HEMOGLOBIN g/dL 8 6* 8 2*   HEMATOCRIT % 27 8* 26 2*   PLATELETS Thousands/uL 169 154     Results from last 7 days   Lab Units 07/25/19  0623 07/24/19  0647   SODIUM mmol/L 142 140   POTASSIUM mmol/L 3 7 3 7   CHLORIDE mmol/L 106 105   CO2 mmol/L 32 31   BUN mg/dL 21 24   CREATININE mg/dL 0 84 0 91   CALCIUM mg/dL 8 2* 7 9*         Results from last 7 days   Lab Units 07/22/19  1721   INR  1 02             Labs reviewed    Imaging: reviewed  EKG, Pathology, and Other Studies: I have personally reviewed pertinent reports  VTE Prophylaxis: Enoxaparin (Lovenox)    Code Status: Level 1 - Full Code     Reviewed with patient their advanced directive wishes while being in hospital during this encounter   Patient demonstrates understanding of the directives they wish and these are in line with what is noted in the current hospital record  Level 1: Full Code    Assessment/Plan      Right hip fx; s/p TFN 7/23/19:  WBAT; watch incision    ABLA:  stable; will watch  Will get CBC in AM   May need Venofer    Chronic diastolic CHF with LVEF 92%, mild MR/moderate AS and AR/moderate TR:  she will need to go back to see Dr Deena Schmidt since he has been following her for this and can then refer to Cardiology if he and the patient wish  She is to remain on her home dose of Lasix 80mg daily = she feels edema is baseline and that she never has no edema    HTN:  at home, she had been on Verapamil but changed in April to Toprol 50mg daily 2/2 insurance would not pay for the Verapamil; however, since she has a lot of Verpamil left, she has been using them up and then is going to switch back to the Toprol  Will keep Verapamil 240mg qd for now    Left 3rd toe wound and right great toe subungual hematoma:  had been seen by Podiatry; they did remove an ingrown toenail left 3rd toe  They feel that she is likely to lose the nail on the right great toe  Continue local care      Counseling / Coordination of Care  Total time spent: At least 60 minutes, with more than 50% spent counseling/coordinating care  Counseling includes discussion with patient re: progress  and discussion with patient of his/her current medical state/information  Coordination of patient's care was performed in conjunction with primary service  Time invested included review of patient's labs, vitals, and management of their comorbidities with continued monitoring  In addition, this patient was discussed with medical team including physician and advanced extenders  The care of the patient was extensively discussed and appropriate treatment plan was formulated unique for this patient  ** Please Note: Dragon 360 Dictation voice to text software may have been used in the creation of this document   **

## 2019-07-26 PROBLEM — D64.9 ANEMIA: Status: ACTIVE | Noted: 2019-07-26

## 2019-07-26 LAB
ANION GAP SERPL CALCULATED.3IONS-SCNC: 2 MMOL/L (ref 4–13)
BASOPHILS # BLD AUTO: 0.07 THOUSANDS/ΜL (ref 0–0.1)
BASOPHILS NFR BLD AUTO: 1 % (ref 0–1)
BUN SERPL-MCNC: 19 MG/DL (ref 5–25)
CALCIUM SERPL-MCNC: 8.4 MG/DL (ref 8.3–10.1)
CHLORIDE SERPL-SCNC: 108 MMOL/L (ref 100–108)
CO2 SERPL-SCNC: 33 MMOL/L (ref 21–32)
CREAT SERPL-MCNC: 0.65 MG/DL (ref 0.6–1.3)
EOSINOPHIL # BLD AUTO: 0.22 THOUSAND/ΜL (ref 0–0.61)
EOSINOPHIL NFR BLD AUTO: 3 % (ref 0–6)
ERYTHROCYTE [DISTWIDTH] IN BLOOD BY AUTOMATED COUNT: 13.2 % (ref 11.6–15.1)
GFR SERPL CREATININE-BSD FRML MDRD: 82 ML/MIN/1.73SQ M
GLUCOSE P FAST SERPL-MCNC: 93 MG/DL (ref 65–99)
GLUCOSE SERPL-MCNC: 93 MG/DL (ref 65–140)
HCT VFR BLD AUTO: 24 % (ref 34.8–46.1)
HGB BLD-MCNC: 7.5 G/DL (ref 11.5–15.4)
IMM GRANULOCYTES # BLD AUTO: 0.05 THOUSAND/UL (ref 0–0.2)
IMM GRANULOCYTES NFR BLD AUTO: 1 % (ref 0–2)
LYMPHOCYTES # BLD AUTO: 1.5 THOUSANDS/ΜL (ref 0.6–4.47)
LYMPHOCYTES NFR BLD AUTO: 21 % (ref 14–44)
MCH RBC QN AUTO: 28.6 PG (ref 26.8–34.3)
MCHC RBC AUTO-ENTMCNC: 31.3 G/DL (ref 31.4–37.4)
MCV RBC AUTO: 92 FL (ref 82–98)
MONOCYTES # BLD AUTO: 0.67 THOUSAND/ΜL (ref 0.17–1.22)
MONOCYTES NFR BLD AUTO: 9 % (ref 4–12)
NEUTROPHILS # BLD AUTO: 4.79 THOUSANDS/ΜL (ref 1.85–7.62)
NEUTS SEG NFR BLD AUTO: 65 % (ref 43–75)
NRBC BLD AUTO-RTO: 0 /100 WBCS
PLATELET # BLD AUTO: 164 THOUSANDS/UL (ref 149–390)
PMV BLD AUTO: 11.3 FL (ref 8.9–12.7)
POTASSIUM SERPL-SCNC: 3.8 MMOL/L (ref 3.5–5.3)
RBC # BLD AUTO: 2.62 MILLION/UL (ref 3.81–5.12)
SODIUM SERPL-SCNC: 143 MMOL/L (ref 136–145)
WBC # BLD AUTO: 7.3 THOUSAND/UL (ref 4.31–10.16)

## 2019-07-26 PROCEDURE — 97535 SELF CARE MNGMENT TRAINING: CPT

## 2019-07-26 PROCEDURE — 85025 COMPLETE CBC W/AUTO DIFF WBC: CPT | Performed by: NURSE PRACTITIONER

## 2019-07-26 PROCEDURE — 97110 THERAPEUTIC EXERCISES: CPT

## 2019-07-26 PROCEDURE — 97166 OT EVAL MOD COMPLEX 45 MIN: CPT

## 2019-07-26 PROCEDURE — 97162 PT EVAL MOD COMPLEX 30 MIN: CPT

## 2019-07-26 PROCEDURE — 97530 THERAPEUTIC ACTIVITIES: CPT

## 2019-07-26 PROCEDURE — 99223 1ST HOSP IP/OBS HIGH 75: CPT | Performed by: PHYSICAL MEDICINE & REHABILITATION

## 2019-07-26 PROCEDURE — 80048 BASIC METABOLIC PNL TOTAL CA: CPT | Performed by: NURSE PRACTITIONER

## 2019-07-26 RX ORDER — GINSENG 100 MG
1 CAPSULE ORAL DAILY
Status: DISCONTINUED | OUTPATIENT
Start: 2019-07-26 | End: 2019-08-11 | Stop reason: HOSPADM

## 2019-07-26 RX ORDER — TRAMADOL HYDROCHLORIDE 50 MG/1
50 TABLET ORAL EVERY 4 HOURS PRN
Status: DISCONTINUED | OUTPATIENT
Start: 2019-07-26 | End: 2019-08-11 | Stop reason: HOSPADM

## 2019-07-26 RX ORDER — TRAMADOL HYDROCHLORIDE 50 MG/1
25 TABLET ORAL EVERY 6 HOURS PRN
Status: DISCONTINUED | OUTPATIENT
Start: 2019-07-26 | End: 2019-07-26

## 2019-07-26 RX ORDER — TRAMADOL HYDROCHLORIDE 50 MG/1
25 TABLET ORAL EVERY 4 HOURS PRN
Status: DISCONTINUED | OUTPATIENT
Start: 2019-07-26 | End: 2019-08-11 | Stop reason: HOSPADM

## 2019-07-26 RX ORDER — TRAMADOL HYDROCHLORIDE 50 MG/1
50 TABLET ORAL EVERY 6 HOURS PRN
Status: DISCONTINUED | OUTPATIENT
Start: 2019-07-26 | End: 2019-07-26

## 2019-07-26 RX ADMIN — TRAMADOL HYDROCHLORIDE 25 MG: 50 TABLET, FILM COATED ORAL at 17:20

## 2019-07-26 RX ADMIN — ENOXAPARIN SODIUM 40 MG: 40 INJECTION SUBCUTANEOUS at 22:01

## 2019-07-26 RX ADMIN — OXYCODONE HYDROCHLORIDE 5 MG: 5 TABLET ORAL at 06:38

## 2019-07-26 RX ADMIN — ACETAMINOPHEN 975 MG: 325 TABLET ORAL at 22:00

## 2019-07-26 RX ADMIN — BACITRACIN ZINC 1 SMALL APPLICATION: 500 OINTMENT TOPICAL at 17:21

## 2019-07-26 RX ADMIN — PANTOPRAZOLE SODIUM 40 MG: 40 TABLET, DELAYED RELEASE ORAL at 05:27

## 2019-07-26 RX ADMIN — VERAPAMIL HYDROCHLORIDE 240 MG: 240 TABLET, FILM COATED, EXTENDED RELEASE ORAL at 09:12

## 2019-07-26 RX ADMIN — FUROSEMIDE 80 MG: 80 TABLET ORAL at 09:11

## 2019-07-26 RX ADMIN — ACETAMINOPHEN 975 MG: 325 TABLET ORAL at 05:27

## 2019-07-26 RX ADMIN — TRAMADOL HYDROCHLORIDE 50 MG: 50 TABLET, FILM COATED ORAL at 23:24

## 2019-07-26 RX ADMIN — ACETAMINOPHEN 975 MG: 325 TABLET ORAL at 13:23

## 2019-07-26 NOTE — ASSESSMENT & PLAN NOTE
Results from last 7 days   Lab Units 08/08/19  1124 08/03/19  0623   HEMOGLOBIN g/dL 10 5* 8 3*     · Likely post-operative  · Monitor CBC intermittently  · Transfuse for Hgb <7   Did not require transfusion as H/h has improved

## 2019-07-26 NOTE — PROGRESS NOTES
Internal Medicine Progress Note  Patient: Angelica Nash  Age/sex: 80 y o  female  Medical Record #: [id number]      ASSESSMENT/PLAN: (Interval History)  Angelica Nash is seen and examined and management for following issues:    Right hip fx; s/p TFN 7/23/19:  WBAT; watch incision     ABLA:  prob could use one unit PRBCs but she wants to wait until tomorrow and have CBC rechecked      Chronic diastolic CHF with LVEF 18%, mild MR/moderate AS and AR/moderate TR:  she will need to go back to see Dr Creig Lefort since he has been following her for this and can then refer to Cardiology if he and the patient wish  She is to remain on her home dose of Lasix 80mg daily = she feels edema is baseline and that she never has no edema     HTN:  at home, she had been on Verapamil but changed in April to Toprol 50mg daily 2/2 insurance would not pay for the Verapamil; however, since she has a lot of Verpamil left, she has been using them up and then is going to switch back to the Toprol  Will keep Verapamil 240mg qd for now     Left 3rd toe wound and right great toe subungual hematoma:  had been seen by Podiatry; they did remove an ingrown toenail left 3rd toe  They feel that she is likely to lose the nail on the right great toe  Continue local care      Subjective/ HPI:  Patients overnight issues or events were reviewed with nursing or staff during rounds or morning huddle session  New or overnight issues  ABLA: for 1 unit PRBCs  HTN:  Stable on current tx  Chronic diastolic CHF: stable    Offers no complaints except feels "fuzzy headed" and tired    ROS:     GI: denies abdominal pain, change bowel habits or reflux symptoms  Neuro: Denies any headache, new vision changes, new neuropathies,new weaknesses   Respiratory: No Cough, SOB, denies wheeze  Cardiovascular: No CP, palpitations , denies perception of rapid heartbeat  : denies any new urinary burning or frequency    Review of Scheduled Meds:    Current Facility-Administered Medications:  acetaminophen 975 mg Oral Q8H [address] Balwinder Moran MD   docusate sodium 100 mg Oral BID Balwinder Moran MD   enoxaparin 40 mg Subcutaneous Daily Balwinder Moran MD   furosemide 80 mg Oral Daily Balwinder Moran MD   pantoprazole 40 mg Oral Daily Before Breakfast Balwinder Moran MD   polyethylene glycol 17 g Oral Daily PRN Balwinder Moran MD   traMADol 25 mg Oral Q4H PRN Balwinder Moran MD   traMADol 50 mg Oral Q4H PRN Balwinder Moran MD   verapamil 240 mg Oral Daily Eloy Henriquez MD       Labs:     Results from last 7 days   Lab Units 07/26/19  0640 07/25/19  0623   WBC Thousand/uL 7 30 8 23   HEMOGLOBIN g/dL 7 5* 8 6*   HEMATOCRIT % 24 0* 27 8*   PLATELETS Thousands/uL 164 169     Results from last 7 days   Lab Units 07/26/19  0640 07/25/19  0623   SODIUM mmol/L 143 142   POTASSIUM mmol/L 3 8 3 7   CHLORIDE mmol/L 108 106   CO2 mmol/L 33* 32   BUN mg/dL 19 21   CREATININE mg/dL 0 65 0 84   CALCIUM mg/dL 8 4 8 2*         Results from last 7 days   Lab Units 07/22/19  1721   INR  1 02              Imaging:     No orders to display       *Labs reviewed  *Radiology studies reviewed  *Medications reviewed and reconciled as needed  *Please refer to order section for additional ordered labs studies  *Case discussed with primary attending during morning huddle case rounds    Physical Examination:  Vitals:   Vitals:    07/25/19 1700 07/25/19 2100 07/26/19 0634 07/26/19 0911   BP: 127/51 136/61 130/52 132/54   BP Location: Right arm Left arm Left arm    Pulse: 64 64 66    Resp: 18 19 18    Temp: 98 °F (36 7 °C) 98 5 °F (36 9 °C) 98 3 °F (36 8 °C)    TempSrc: Oral Oral Oral    SpO2: 97% 96% 94%    Weight: 51 4 kg (113 lb 5 1 oz)  51 4 kg (113 lb 5 1 oz)    Height: 5' (1 524 m)          General Appearance: NAD, conversive  Eyes: No icterus; conjunctiva normal, PERRLA  HENT: oropharynx clear; mucous membranes moist  Neck: trachea midline, range of motion full     Lungs: CTA, normal respiratory effort, no retractions, expiratory effort normal  CV: regular rate, no rubs/murmurs/gallops  ABD: soft: NT/ND; +BS  EXT: mild-mod edema of lower legs as always edema  Skin: normal turgor, normal texture, no rashes  Psych: affect normal, no overt anxiety/depression   Neuro: AAOx3            Total time spent: At least 40 minutes, with more than 50% spent counseling/coordinating care  Counseling includes discussion with patient re: progress  and discussion with patient of his/her current medical state/information  Coordination of patient's care was performed in conjunction with primary service  Time invested included review of patient's labs, vitals, and management of their comorbidities with continued monitoring  In addition, this patient was discussed with medical team including physician and advanced extenders  The care of the patient was extensively discussed and appropriate treatment plan was formulated unique for this patient  ** Please Note: Dragon 360 Dictation voice to text software may have been used in the creation of this document   **

## 2019-07-26 NOTE — PLAN OF CARE
Problem: Prexisting or High Potential for Compromised Skin Integrity  Goal: Skin integrity is maintained or improved  Description  INTERVENTIONS:  - Identify patients at risk for skin breakdown  - Assess and monitor skin integrity  - Assess and monitor nutrition and hydration status  - Monitor labs (i e  albumin)  - Assess for incontinence   - Turn and reposition patient  - Assist with mobility/ambulation  - Relieve pressure over bony prominences  - Avoid friction and shearing  - Provide appropriate hygiene as needed including keeping skin clean and dry  - Evaluate need for skin moisturizer/barrier cream  - Collaborate with interdisciplinary team (i e  Nutrition, Rehabilitation, etc )   - Patient/family teaching  Outcome: Progressing     Problem: Potential for Falls  Goal: Patient will remain free of falls  Description  INTERVENTIONS:  - Assess patient frequently for physical needs  -  Identify cognitive and physical deficits and behaviors that affect risk of falls    -  [place] fall precautions as indicated by assessment   - Educate patient/family on patient safety including physical limitations  - Instruct patient to call for assistance with activity based on assessment  - Modify environment to reduce risk of injury  - Consider OT/PT consult to assist with strengthening/mobility  Outcome: Progressing     Problem: PAIN - ADULT  Goal: Verbalizes/displays adequate comfort level or baseline comfort level  Description  Interventions:  - Encourage patient to monitor pain and request assistance  - Assess pain using appropriate pain scale  - Administer analgesics based on type and severity of pain and evaluate response  - Implement non-pharmacological measures as appropriate and evaluate response  - Consider cultural and social influences on pain and pain management  - Notify physician/advanced practitioner if interventions unsuccessful or patient reports new pain  Outcome: Progressing     Problem: INFECTION - ADULT  Goal: Absence or prevention of progression during hospitalization  Description  INTERVENTIONS:  - Assess and monitor for signs and symptoms of infection  - Monitor lab/diagnostic results  - Monitor all insertion sites, i e  indwelling lines, tubes, and drains  - Monitor endotracheal (as able) and nasal secretions for changes in amount and color  - [place] appropriate cooling/warming therapies per order  - Administer medications as ordered  - Instruct and encourage patient and family to use good hand hygiene technique  - Identify and instruct in appropriate isolation precautions for identified infection/condition  Outcome: Progressing  Goal: Absence of fever/infection during neutropenic period  Description  INTERVENTIONS:  - Monitor WBC  - Implement neutropenic guidelines  Outcome: Progressing     Problem: SAFETY ADULT  Goal: Maintain or return to baseline ADL function  Description  INTERVENTIONS:  -  Assess patient's ability to carry out ADLs; assess patient's baseline for ADL function and identify physical deficits which impact ability to perform ADLs (bathing, care of mouth/teeth, toileting, grooming, dressing, etc )  - Assess/evaluate cause of self-care deficits   - Assess range of motion  - Assess patient's mobility; develop plan if impaired  - Assess patient's need for assistive devices and provide as appropriate  - Encourage maximum independence but intervene and supervise when necessary  ¯ Involve family in performance of ADLs  ¯ Assess for home care needs following discharge   ¯ Request OT consult to assist with ADL evaluation and planning for discharge  ¯ Provide patient education as appropriate  Outcome: Progressing  Goal: Maintain or return mobility status to optimal level  Description  INTERVENTIONS:  - Assess patient's baseline mobility status (ambulation, transfers, stairs, etc )    - Identify cognitive and physical deficits and behaviors that affect mobility  - Identify mobility aids required to assist with transfers and/or ambulation (gait belt, sit-to-stand, lift, walker, cane, etc )  - [place] fall precautions as indicated by assessment  - Record patient progress and toleration of activity level on Mobility SBAR; progress patient to next Phase/Stage  - Instruct patient to call for assistance with activity based on assessment  - Request Rehabilitation consult to assist with strengthening/weightbearing, etc   Outcome: Progressing     Problem: DISCHARGE PLANNING  Goal: Discharge to home or other facility with appropriate resources  Description  INTERVENTIONS:  - Identify barriers to discharge w/patient and caregiver  - Arrange for needed discharge resources and transportation as appropriate  - Identify discharge learning needs (meds, wound care, etc )  - Arrange for interpretive services to assist at discharge as needed  - Refer to Case Management Department for coordinating discharge planning if the patient needs post-hospital services based on physician/advanced practitioner order or complex needs related to functional status, cognitive ability, or social support system  Outcome: Progressing     Problem: Knowledge Deficit  Goal: Patient/family/caregiver demonstrates understanding of disease process, treatment plan, medications, and discharge instructions  Description  Complete learning assessment and assess knowledge base    Interventions:  - Provide teaching at level of understanding  - Provide teaching via preferred learning methods  Outcome: Progressing

## 2019-07-26 NOTE — PROGRESS NOTES
07/26/19 1500   Pain Assessment   Pain Assessment 0-10   Pain Score 7   Pain Type Acute pain;Surgical pain   Pain Location Hip;Leg   Pain Orientation Right   Pain Descriptors Sharp   Pain Frequency Intermittent   Pain Onset Ongoing   Clinical Progression Gradually worsening   Effect of Pain on Daily Activities INCREASED WITH ACTIVITY    Patient's Stated Pain Goal 4   Hospital Pain Intervention(s) Repositioned; Ambulation/increased activity   Response to Interventions tolerated AAROM at the end of session in supine    Restrictions/Precautions   Precautions Fall Risk;Pain;Contact/isolation   Weight Bearing Restrictions Yes   RLE Weight Bearing Per Order WBAT   Cognition   Overall Cognitive Status WFL   Arousal/Participation Cooperative   Attention Within functional limits   Orientation Level Oriented X4   Memory Within functional limits   Following Commands Follows all commands and directions without difficulty   Subjective   Subjective Patient with no new complaints  Reports RN/MD spoke with patient about receiving blood transfusion however she reports wanting to "hold off"    QI: Roll Left and Right   Assistance Needed Physical assistance   Assistance Provided by [place] 50%-74%   Roll Left and Right CARE Score 2   QI: Sit to Lying   Assistance Needed Physical assistance   Assistance Provided by [place] 50%-74%   Comment assist with BLE 2* increased pain    Sit to Lying CARE Score 2   QI: Sit to Stand   Assistance Needed Physical assistance   Assistance Provided by [place] 50%-74%   Sit to Stand CARE Score 2   QI: Chair/Bed-to-Chair Transfer   Assistance Needed Physical assistance   Assistance Provided by [place] 50%-74%   Chair/Bed-to-Chair Transfer CARE Score 2   Transfer Bed/Chair/Wheelchair   Limitations Noted In Balance;Confidence;Pain Management; Sequencing;LE Strength   Adaptive Equipment Roller Walker   Stand Pivot Moderate Assist;Maximum Assist   Sit to Stand Moderate Assist;Maximum Assist   Stand to Sit Minimal Assist   Sit to Supine Moderate Assist   Bed, Chair, Wheelchair Transfer (FIM) 2 - [place] needs to lift or boost to rise AND assist to sit   Therapeutic Interventions   Strengthening supine AAROM RLE 2 sets to fatigue: DF/PF, heel slides, SLR, hip ABD/ADD, IR/ER   Other STS, SPT   Assessment   Treatment Assessment Patient limited this session by reports of "wooziness" and pain  Patient with nausea and ? Light headedness in standing however was able to safely return to seated position and transfer back to bed at the end of session without any adverse reactions  Patient also without production of emesis  She continues to require max A for boosting to stand and occasional need for lowering onto surfaces with increased time needed and verbal instruction  Patient educated on changing positioning throughout the day and she reports a "pulling" in hip flexor region with standing and laying supine  Also reviewed with patient HEP for muscle contraction to increase joint lubrication, increase blood flow to area to encourage healing and reduce edema  She requires skilled PT intervention to progress functional mobility (I) and safety  Problem List Decreased strength;Decreased range of motion;Decreased endurance; Impaired balance;Decreased mobility; Decreased skin integrity;Orthopedic restrictions;Pain   Barriers to Discharge Inaccessible home environment;Decreased caregiver support   PT Barriers   Functional Limitation Car transfers;Stair negotiation;Standing;Transfers; Walking   Plan   Treatment/Interventions Functional transfer training; Therapeutic exercise; Endurance training;Patient/family training;Equipment eval/education; Bed mobility;LE strengthening/ROM; Compensatory technique education   Progress Progressing toward goals   Recommendation   Recommendation Other (Comment)  (TBD pending progress)   Equipment Recommended Walker   PT Therapy Minutes   PT Time In 1500   PT Time Out 1540   PT Total Time (minutes) 40   PT Mode of treatment - Individual (minutes) 40   PT Mode of treatment - Concurrent (minutes) 0   PT Mode of treatment - Group (minutes) 0   PT Mode of treatment - Co-treat (minutes) 0   PT Mode of Teatment - Total time(minutes) 40 minutes   Therapy Time missed   Time missed?  No

## 2019-07-26 NOTE — PROGRESS NOTES
07/26/19 0700   Patient Data   Rehab Impairment  Impairment of mobility, safety and Activities of Daily Living (ADLs) due to Orthopedic Disorders   Etiologic Diagnosis closed displaced intertrochangeric fracture of right hip s/p Cephalomedullary nailing right hip   Date of Onset 07/22/19   Home Setup   Type of Home Multi Level   Method of Entry Ramp;Stairs  (ramp from back entrance)   Number of Stairs 4   Number of Stairs in [facility] 73 Accessibility Commode   Second Floor Bathroom Full;Tub;Curtain   Second Floor Bathroom Accessibility   (none)   First Floor Setup Available Yes   Home Modification Comment Pt reports she lives in [facility] with either 4 steps or ramped entrance  Pt reports she lives with her spouse (who has been disabled  and pt assisted at baseline with ADL/IADL and transfers with use of sit<>stand lift)  Pts spouse stays on first floor on hospital bed with half bath in place and pt reports she sleeps and utilizes tubshower on 2nd floor  Pt personally has no prior use of DME  Spouse has commode  Available Equipment Bedside Commode   Baseline Information   Transportation    Prior Device(s) Used   (none)   Prior IADL Participation   Meal Preparation Full Participation   Laundry Full Participation   Home Cleaning Full Participation   Prior Level of Function   Self-Care 3  Independent - Patient completed the activities by him/herself, with or without an assistive device, with no assistance from a helper  Functional Cognition 3  Independent - Patient completed the activities by him/herself, with or without an assistive device, with no assistance from a helper     Psychosocial   Psychosocial (WDL) WDL  (pleasant and cooperative)   Restrictions/Precautions   Precautions Fall Risk;Pain;Contact/isolation   RLE Weight Bearing Per Order WBAT   Pain Assessment   Pain Assessment 0-10   Pain Score 6   Pain Location Hip   Pain Orientation Right QI: Eating   Assistance Needed Set-up / clean-up   Eating CARE Score 5   Eating Assessment   Findings A for container management 2* arthritis of b/l hands   Eating (FIM) 5 - Patient needs help to open contianers or set up tray   QI: Oral Hygiene   Assistance Needed Physical assistance   Assistance Provided by [place] 25%-49%   Oral Hygiene CARE Score 3   Grooming   Able To Initiate Tasks;Comb/Brush Hair;Wash/Dry Face;Brush/Clean Teeth;Wash/Dry Hands   Findings Pt initiated grooming tasks in stance for hair combing  mod A to maintain position with UE release  completed from seated level 2* dec standing tolerance and balance limiting indep and safety   Grooming (FIM) 3 - Patient completed 2/4  tasks   QI: Shower/Bathe Self   Assistance Needed Physical assistance   Assistance Provided by [place] 25%-49%   Shower/Bathe Self CARE Score 3   Bathing   Assessed Bath Style Sponge Bath   Anticipated D/C Bath Style Sponge Bath;Tub   Able to Gather/Transport No   Able to [employer] Temperature Yes   Able to Wash/Rinse/Dry (body part) Left Arm;Right Arm;L Upper Leg;R Upper Leg;Chest;Abdomen   Limitations Noted in Balance; Endurance;ROM;Strength   Positioning Seated;Standing   Findings  NO SHOWER ORDERS AT THIS TIME; pt req A to boost from seated position for bottom/adalberto hygiene   Bathing (FIM) 3 - Patient completes 5/10  6/10 or 7/10 parts   QI: Upper Body Dressing   Assistance Needed Physical assistance   Assistance Provided by [place] Less than 25%   Upper Body Dressing CARE Score 3   QI: Lower Body Dressing   Assistance Needed Physical assistance   Assistance Provided by [place] 75% or more   Lower Body Dressing CARE Score 2   QI: Putting On/Taking Off Footwear   Assistance Needed Physical assistance   Assistance Provided by [place] Total assistance   Putting On/Taking Off Footwear CARE Score 1   QI: Picking Up Object   Reason if not Attempted Safety concerns   Picking Up Object CARE Score 88   Dressing/Undressing Clothing Remove UB Clothes Pullover Shirt   Remove LB Clothes [facility] UB Clothes Pullover Shirt;Bra   Don LB Clothes Pants; Undergarment;Socks   Limitations Noted In Balance; Endurance; Safety;ROM;Strength   Positioning Supported Sit;Standing   Findings Pt completed sponge bathing routine seated and in stance at sink; Pt req A to thread R LE and A to don over hips when in stance  Pt req UE support on sinkside for dressing when in stance  Pt able to thread LLE to underwear with inc time    UB Dressing (FIM) 4 - Patient completes 75% of all tasks   LB Dressing (FIM) 1 - Patient completes less than 25% of all tasks   QI: [address] Needed Physical assistance   Assistance Provided by [place] Total assistance   Itz [place] 83 Score 1   Toileting   Able to [address] down no, up no  Able to Manage Clothing Closures No   Toileting (FIM) 1 - Patient completes less than 25% of all tasks   QI: Lying to Sitting on Side of Bed   Assistance Needed Physical assistance   Assistance Provided by [place] 50%-74%   Lying to Sitting on Side of Bed CARE Score 2   QI: Sit to Stand   Assistance Needed Physical assistance   Assistance Provided by [place] 50%-74%   Sit to Stand CARE Score 2   QI: Chair/Bed-to-Chair Transfer   Assistance Needed Physical assistance   Assistance Provided by [place] 50%-74%   Chair/Bed-to-Chair Transfer CARE Score 2   Transfer Bed/Chair/Wheelchair   Adaptive Equipment Roller Walker   Findings A to boost and guiding assistance to lower to chair   Pt presents with pain with sit>stand    Bed, Chair, Wheelchair Transfer (FIM) 2 - Patient completes 25 - 49% of all tasks   QI: Toilet Transfer   Assistance Needed Physical assistance   Assistance Provided by [place] 75% or more   Toilet Transfer CARE Score 2   Toilet Transfer   Findings standard BSC; SPT   Toilet Transfer (FIM) 2 - [place] needs to lift or boost to rise AND assist to sit   Tub/Shower Transfer   Not Assessed Sponge Bath;Medical Comprehension   QI: Comprehension 4  Undestands: Clear comprehension without cues or repetitions   Comprehension (FIM) 6 - Understands complex/abstract but requires  glasses for visual comp   Expression   QI: Expression 4  Express complex messages without difficulty and with speech that is clear and easy to [place]   Expression (FIM) 7 - Expresses complex/abstract ideas in a reasonable time w/o devices or helper  Social Interaction   Social Interaction (FIM) 7 - Interacts appropriately without assistive device, medication or helper   Problem Solving   Problem solving (FIM) 5 - Solves basic problems 90% of time   Memory   Memory (FIM) 5 - [place] cues patient   Cognition   Overall Cognitive Status WFL   Comments for bADL fxn, OT to assess with IADL tasks   Discharge Information   Vocational Plan   ([employer])   Barriers to Return to [employer] Access;Skill Set Limitation;Strength; Endurance   Patient's Discharge Plan to return home with family support   Patient's Rehab Expectations "to get moving, I want to get home"   Barriers to Discharge Home Limited Family Support;Decreased Strength;Decreased Endurance;Pain; Safety Considerations   Impressions Pt is an 79 y/o female admitted to [facility] s/p fall in bathtub at home  Pt admitted with rehab dx of  Impairment of mobility, safety and Activities of Daily Living (ADLs) due to closed displaced intertrochangeric fracture of right hip s/p Cephalomedullary nailing right hip  Pt reports she fell in the shower at home and states she slipped on soap  It took pt approximately 4-5 hours to get to bedroom phone to call for help  Pt states that PTA she was living in a [place] with her spouse  Pt reports she is the primary caregiver for her  and utilizes sit>stand lift for xfers for his mobility and performs all ADLs  Pt states she has a daily caregiver for her  who assists with his morning routine  Pt reports no prior use of DME or AE   Pt reports there is a first floor s/u for her spouse, unsure if she could accommodate  Pt at time of OT evaluation presents with deficits in areas of strength, balance, endurance, act tolerance, ROM limiting fxnl performance  Additional barriers include dec family support, fall risk, CB/ramp, and pain  Pt is WBAT and overall max A for xfers with A to boost and guiding to lower to surface  Pt reports supportive son and sister however unable to provide 24/7  Pt ELOS 2-3 weeks to achieve Mod I level fxnl goals with LRAD and AE  Pt may require inc caregiver support for spouse   Pt is motivated to return home and presents with G rehab potential   OT Therapy Minutes   OT Time In 0700   OT Time Out 0830   OT Total Time (minutes) 90   OT Mode of treatment - Individual (minutes) 60   OT Mode of treatment - Concurrent (minutes) 30   OT Mode of treatment - Group (minutes) 0   OT Mode of treatment - Co-treat (minutes) 0   OT Mode of Teatment - Total time(minutes) 90 minutes

## 2019-07-26 NOTE — ASSESSMENT & PLAN NOTE
Wt Readings from Last 3 Encounters:   08/07/19 49 5 kg (109 lb 2 oz)   07/25/19 50 3 kg (110 lb 14 3 oz)   04/02/19 51 3 kg (113 lb)     · Regular weight checks  · Lasix daily

## 2019-07-26 NOTE — PLAN OF CARE
Problem: Prexisting or High Potential for Compromised Skin Integrity  Goal: Skin integrity is maintained or improved  Description  INTERVENTIONS:  - Identify patients at risk for skin breakdown  - Assess and monitor skin integrity  - Assess and monitor nutrition and hydration status  - Monitor labs (i e  albumin)  - Assess for incontinence   - Turn and reposition patient  - Assist with mobility/ambulation  - Relieve pressure over bony prominences  - Avoid friction and shearing  - Provide appropriate hygiene as needed including keeping skin clean and dry  - Evaluate need for skin moisturizer/barrier cream  - Collaborate with interdisciplinary team (i e  Nutrition, Rehabilitation, etc )   - Patient/family teaching  Outcome: Progressing     Problem: Potential for Falls  Goal: Patient will remain free of falls  Description  INTERVENTIONS:  - Assess patient frequently for physical needs  -  Identify cognitive and physical deficits and behaviors that affect risk of falls    -  [place] fall precautions as indicated by assessment   - Educate patient/family on patient safety including physical limitations  - Instruct patient to call for assistance with activity based on assessment  - Modify environment to reduce risk of injury  - Consider OT/PT consult to assist with strengthening/mobility  Outcome: Progressing     Problem: PAIN - ADULT  Goal: Verbalizes/displays adequate comfort level or baseline comfort level  Description  Interventions:  - Encourage patient to monitor pain and request assistance  - Assess pain using appropriate pain scale  - Administer analgesics based on type and severity of pain and evaluate response  - Implement non-pharmacological measures as appropriate and evaluate response  - Consider cultural and social influences on pain and pain management  - Notify physician/advanced practitioner if interventions unsuccessful or patient reports new pain  Outcome: Progressing     Problem: INFECTION - ADULT  Goal: Absence or prevention of progression during hospitalization  Description  INTERVENTIONS:  - Assess and monitor for signs and symptoms of infection  - Monitor lab/diagnostic results  - Monitor all insertion sites, i e  indwelling lines, tubes, and drains  - Monitor endotracheal (as able) and nasal secretions for changes in amount and color  - [place] appropriate cooling/warming therapies per order  - Administer medications as ordered  - Instruct and encourage patient and family to use good hand hygiene technique  - Identify and instruct in appropriate isolation precautions for identified infection/condition  Outcome: Progressing  Goal: Absence of fever/infection during neutropenic period  Description  INTERVENTIONS:  - Monitor WBC  - Implement neutropenic guidelines  Outcome: Progressing     Problem: SAFETY ADULT  Goal: Maintain or return to baseline ADL function  Description  INTERVENTIONS:  -  Assess patient's ability to carry out ADLs; assess patient's baseline for ADL function and identify physical deficits which impact ability to perform ADLs (bathing, care of mouth/teeth, toileting, grooming, dressing, etc )  - Assess/evaluate cause of self-care deficits   - Assess range of motion  - Assess patient's mobility; develop plan if impaired  - Assess patient's need for assistive devices and provide as appropriate  - Encourage maximum independence but intervene and supervise when necessary  ¯ Involve family in performance of ADLs  ¯ Assess for home care needs following discharge   ¯ Request OT consult to assist with ADL evaluation and planning for discharge  ¯ Provide patient education as appropriate  Outcome: Progressing  Goal: Maintain or return mobility status to optimal level  Description  INTERVENTIONS:  - Assess patient's baseline mobility status (ambulation, transfers, stairs, etc )    - Identify cognitive and physical deficits and behaviors that affect mobility  - Identify mobility aids required to assist with transfers and/or ambulation (gait belt, sit-to-stand, lift, walker, cane, etc )  - [place] fall precautions as indicated by assessment  - Record patient progress and toleration of activity level on Mobility SBAR; progress patient to next Phase/Stage  - Instruct patient to call for assistance with activity based on assessment  - Request Rehabilitation consult to assist with strengthening/weightbearing, etc   Outcome: Progressing     Problem: DISCHARGE PLANNING  Goal: Discharge to home or other facility with appropriate resources  Description  INTERVENTIONS:  - Identify barriers to discharge w/patient and caregiver  - Arrange for needed discharge resources and transportation as appropriate  - Identify discharge learning needs (meds, wound care, etc )  - Arrange for interpretive services to assist at discharge as needed  - Refer to Case Management Department for coordinating discharge planning if the patient needs post-hospital services based on physician/advanced practitioner order or complex needs related to functional status, cognitive ability, or social support system  Outcome: Progressing     Problem: Knowledge Deficit  Goal: Patient/family/caregiver demonstrates understanding of disease process, treatment plan, medications, and discharge instructions  Description  Complete learning assessment and assess knowledge base    Interventions:  - Provide teaching at level of understanding  - Provide teaching via preferred learning methods  Outcome: Progressing

## 2019-07-26 NOTE — SOCIAL WORK
Met with Pt to review rehab routine, and CM role  Pt shared that she resides with her , who is disabled  Pt shared that their son lives nearby, but he does not work locally  Pt reports that other family members are unable to assist   Pt states that there are 4STE their home, but a ramp in the back, and that is the most frequently used entrance to the home  Although the home is two stories, there is a 1/2 bath on the first floor, and Pts  has a hospital bed on that floor also  Pt shared that there are roughly 14 steps to the second floor  Pt has not dealt with [facility] herself, but her  receives services from [place], for over 6yrs now  Pt would utilize them, if needed  Pt has dealt with outpt services, and believes it was through [facility], with a Dr Hollis Brown  Pt utilizes [address] [place] [address] for her medication needs, as well as [address]  Pt learned about the [facility] meds to beds program   Pt understands the team meeting process, as well as potential LOS  Following to assist with d/c planning needs         **Pt utilizes [facility] for appts for her **

## 2019-07-26 NOTE — ASSESSMENT & PLAN NOTE
Temp:  [98 °F (36 7 °C)-98 6 °F (37 °C)] 98 °F (36 7 °C)  HR:  [68-70] 69  Resp:  [18-19] 19  BP: (101-159)/(56-70) 142/56    · Verapamil

## 2019-07-26 NOTE — ASSESSMENT & PLAN NOTE
· Acute comprehensive interdisciplinary inpatient rehabilitation including PT, OT, RN, CM, SW, dietary, psychology, etc   · WBAT  · 2 week f/u performed by orthopedics, staples removed

## 2019-07-26 NOTE — PROGRESS NOTES
PT EVALUATION       07/26/19 5982   Patient Data   Rehab Impairment Impairment of mobility, safety and Activities of Daily Living (ADLs) due to Orthopedic Disorders   Etiologic Diagnosis closed displaced intertrochangeric fracture of right hip s/p Cephalomedullary nailing right hip   Date of Onset 07/22/19   Support System   Name Patient lives at home with her spouse whom she is the caregiver for  Patient's spouse requires assist for all mobility in which she has HHA and private care hired however, not 24/7  Patient with a local son who works however is a good support and assist as needed   Home Setup   Type of Home Multi Level   Method of Entry Ramp;Stairs  (ramp from back)   Number of Stairs 4   Number of Stairs in Home 12   In [address] Left  (for [facility] )   First Floor Bathroom Half   First Floor Bathroom Accessibility Commode   Second Floor Bathroom Full;Tub; Shower  (step over tub- stands to shower )   Second Floor Bathroom Accessibility   (denies )   First Floor Setup Available No   Home Modifications Necessary?   (full bathroom upstairs; bed upstairs )   Home Modification Comment Patient's spouse livess on the first floor of their home with a ramped entrance and a hospital bed in the living room  Patient utilizes a sit<- stand lift to asssit spouse with transfers  Spouse uses a wheelchair vs a power chair on the first floor  There is only a half bath located on the first floor  Patient reports 2* R knee pain PTA, patient has been "crawling" up the stairs  She admits to 1 addiitonal fall int he last 6 months   Patient does not have an personal equipment of her own   Available Equipment Bedside Commode   [address] Provider Patient has HHA 1 5 hours in AM 3x/wk and aides in PM to assist with spouse    Vocation Other  (not working )   Transportation    Prior Device(s) Used   (denies)   Prior IADL Participation   Meal Preparation Full Participation   Laundry Full Participation   Home Cleaning Full Participation   Prior Level of Function   Self-Care 3  Independent - Patient completed the activities by him/herself, with or without an assistive device, with no assistance from a helper  Indoor-Mobility (Ambulation) 3  Independent - Patient completed the activities by him/herself, with or without an assistive device, with no assistance from a helper  Stairs 3  Independent - Patient completed the activities by him/herself, with or without an assistive device, with no assistance from a helper  Functional Cognition 3  Independent - Patient completed the activities by him/herself, with or without an assistive device, with no assistance from a helper  Prior Assistance Needed for   (patient performs all )   Psychosocial   Psychosocial (WDL) WDL   Restrictions/Precautions   Precautions Fall Risk;Pain;Contact/isolation   Weight Bearing Restrictions Yes   RLE Weight Bearing Per Order WBAT   Pain Assessment   Pain Assessment 0-10   Pain Score 8   Pain Type Acute pain   Pain Location Hip;Leg   Pain Orientation Right   Pain Descriptors Discomfort   Pain Frequency Intermittent   Pain Onset Ongoing   Clinical Progression Gradually worsening   Effect of Pain on Daily Activities impaired tolerance to ROM and mobility    Patient's Stated Pain Goal No pain   Hospital Pain Intervention(s) Cold applied;Repositioned; Ambulation/increased activity   Response to Interventions ice provided    QI: Picking Up Object   Reason if not Attempted Safety concerns   Picking Up Object CARE Score 88   QI: Roll Left and Right   Assistance Needed Physical assistance   Assistance Provided by [place] 50%-74%   Roll Left and Right CARE Score 2   Bed Mobility   Findings inc difficulty rolling left    QI: Sit to Lying   Assistance Needed Physical assistance   Assistance Provided by [place] 25%-49%   Comment assist with RLE management    Sit to Lying CARE Score 3   QI: Lying to Sitting on Side of Bed   Assistance Needed Physical assistance   Assistance Provided by [place] 50%-74%   Lying to Sitting on Side of Bed CARE Score 2   QI: Sit to Stand   Assistance Needed Physical assistance   Assistance Provided by [place] 75% or more   Comment mod A for boost to stand from all surfaces   Sit to Stand CARE Score 2   QI: Chair/Bed-to-Chair Transfer   Assistance Needed Physical assistance   Assistance Provided by [place] 50%-74%   Comment increased time; verbal instruction for proper sequencing; patient with limited WB RLE    Chair/Bed-to-Chair Transfer CARE Score 2   QI: Car Transfer   Reason if not Attempted Safety concerns   Car Transfer CARE Score 88   Transfer Bed/Chair/Wheelchair   Positioning Concerns Other  (pain management )   Limitations Noted In Endurance;Pain Management;LE Strength   Adaptive Equipment Roller Walker   Stand Pivot Moderate Assist   Sit to Stand Moderate Assist;Maximum Assist   Stand to Sit Supervision  (inc time; verbal instruction )   Supine to Sit Moderate Assist   Sit to Supine Minimal   Findings difficulty with proper hand placement   Bed, Chair, Wheelchair Transfer (FIM) 2 - [place] needs to lift or boost to rise AND assist to sit   QI: Toilet Transfer   Assistance Needed Physical assistance   Assistance Provided by [place] 50%-74%   Comment SPT to [facility]    Toilet Transfer CARE Score 2   Toilet Transfer   Surface Assessed Standard Commode   Transfer Technique Standard   Limitations Noted In Balance; Endurance;LE Strength   Positioning Concerns Other  (pain control )   Toilet Transfer (FIM) 2 - [place] needs to lift or boost to rise AND assist to sit   QI: Walk 10 Feet   Reason if not Attempted Safety concerns   Walk 10 Feet CARE Score 88   QI: Walk 50 Feet with Two Turns   Reason if not Attempted Safety concerns   Walk 50 Feet with Two Turns CARE Score 88   QI: Walk 150 Feet   Reason if not Attempted Safety concerns   Walk 150 Feet CARE Score 88   QI: Walking 10 Feet on Uneven Surfaces   Reason if not Attempted Safety concerns   Walking 10 Feet on Uneven Surfaces CARE Score 88   QI: Wheel 50 Feet with Two Turns   Reason if not Attempted Activity not applicable   Wheel 50 Feet with Two Turns CARE Score 9   QI: Wheel 150 Feet   Reason if not Attempted Activity not applicable   Wheel 443 Feet CARE Score 9   Wheelchair mobility   QI: Does the patient use a wheelchair? 0  No   QI: 1 Step (Curb)   Reason if not Attempted Safety concerns   1 Step (Curb) CARE Score 88   QI: 4 Steps   Reason if not Attempted Safety concerns   4 Steps CARE Score 88   QI: 12 Steps   Reason if not Attempted Safety concerns   12 Steps CARE Score 88   Comprehension   QI: Comprehension 4  Undestands: Clear comprehension without cues or repetitions   Comprehension (FIM) 6 - Understands complex/abstract but requires more time   Expression   QI: Expression 4   Express complex messages without difficulty and with speech that is clear and easy to [place]   Expression (FIM) 6 - Expresses complex/abstract but requires:  more time   Social Interaction   Social Interaction (FIM) 6 - Interacts appropriately with others BUT requires extra  time   Problem Solving   Problem solving (FIM) 5 - Solves complex problems But requires cues from helper   Memory   Memory (FIM) 5 - Recalls/performs request 90% of time   RLE Assessment   RLE Assessment X   Strength RLE   R Hip Flexion 3-/5   R Hip Extension 3-/5   R Hip ABduction 3-/5   R Hip ADduction 3-/5   R Knee Flexion 3-/5   R Knee Extension 3-/5   R Ankle Dorsiflexion 4/5   R Ankle Plantar Flexion 4/5   LLE Assessment   LLE Assessment WFL  (4+/5)   RUE Assessment   RUE Assessment WFL   LUE Assessment   LUE Assessment   (h/o rotator cuff teat )   Coordination   Movements are Fluid and Coordinated 0   Coordination and Movement Description antalgic   Sensation   Light Touch Partial deficits in the RLE   Propioception Partial deficits in the RLE   Cognition   Overall Cognitive Status [facility]   Arousal/Participation Cooperative   Attention Attends with cues to redirect   Orientation Level Oriented X4   Memory Within functional limits   Following Commands Follows multistep commands with increased time or repetition   Comments Patient is pleasant and cooperative  Able to engage in appropriate conversation and express beyond basic needs    Discharge Information   Vocational Plan Retired/not working   Patient's Discharge Plan return home with family support    Patient's Rehab Expectations "to get moving and get back to normal"    Barriers to Discharge Home Limited Family Support; Unsafe Home Setup; Decreased Strength;Decreased Endurance;Pain; Safety Considerations   Impressions Patient is an 80year old female presenting s/p fall in which she sustained a closed right hip fracture and underwent INSERTION NAIL IM FEMUR ANTEGRADE (TROCHANTERIC) 7/23/19  Patient with an additional problem list which includes cataracts, HTN, aortic stenosis, Mild tricuspid insufficiency, arthritis and anxiety  See above for a more comprehensive list  PTA, patient living at home with her spouse whom she takes care of  She reports having support of HHA and son  Patient (I), driving and responsible for all (I)ADLs  On evaluation, patient presenting with deficits in strength, ROM, balance, weight shifting, weight bearing RLE and activity tolerance  She reports feeling "woozy" throughout session with BP WNL  HgB dropping from 8 6 7/25 to 7 5 7/26  Patient required overall max A for mobility and presents well below baseline  She is a GOOD rehab candidate at this time and will require skilled PT intervention to progress functional mobility (I) and safety      PT Therapy Minutes   PT Time In 0930   PT Time Out 1040   PT Total Time (minutes) 70   PT Mode of treatment - Individual (minutes) 70   PT Mode of treatment - Concurrent (minutes) 0   PT Mode of treatment - Group (minutes) 0   PT Mode of treatment - Co-treat (minutes) 0   PT Mode of Teatment - Total time(minutes) 70 minutes

## 2019-07-26 NOTE — NURSING NOTE
Pt is alert and oriented, lungs decreased on room air  Pt felt "funny " in her head-she thought maybe the oxy made her feel funny, pain medication changed to Tramadol  RN also explained after affects of anesthesia  Hemoglobin is 7 5, doctor was in to discuss blood transfusion or iron infusion  PT was a little hesitant but is in agreement after explanation given for the reason to want the hemoglobin higher  Pt has infrequent voiding, she voided 800 ml urine on 7-3 shift, she stated "At home I have to sit awhile and push it out"  Pt has call bell in reach

## 2019-07-26 NOTE — H&P
PHYSICAL MEDICINE AND REHABILITATION H&P/ADMISSION NOTE  Radha Bob 80 y o  female MRN: [id number]  Unit/Bed#: [facility] 529-02 Encounter: [id number]     Rehab Diagnosis: Impairment of mobility, safety and Activities of Daily Living (ADLs) due to Orthopedic Disorders:  08 11  Unilateral Hip Fracture    History of Present Illness:   Radha Bob is a 80 y o  female who [facility] [address] [facility] fall at home  Found to have right hip fracture  Now s/p IM nailing of right femur on 7/23/19  Patient WBAT  Postoperatively noted to have ABLA, mild leukocytosis  Also required podiatry consult for left 3rd toe and left large toe wound  Patient was evaluated by therapy services, found to be below functional baseline  She was accepted to the [facility] on 7/25/19  Subjective:  Patient seen examined at bedside  She denies any chest pain shortness of breath  She does report pain to her right lower extremity extending from her hip down to her knee anteriorly  She denies any paresthesias  She does report having a history of left upper greater than right upper weakness due to a left rotator cuff tear  She denies any bowel or bladder incontinence, retention  Patient endorsing queasiness, which she believes is secondary to pain medications  We discussed changing pain medications to tramadol, to which she is agreeable to trying  Review of Systems: A 10-point review of systems was performed  Negative except as listed above      Plan:     * Closed right hip fracture, initial encounter [facility])  Assessment & Plan  · Acute comprehensive interdisciplinary inpatient rehabilitation including PT, OT, RN, CM, SW, dietary, psychology, etc   · WBAT    Anemia  Assessment & Plan  Results from last 7 days   Lab Units 07/26/19  0640 07/25/19  0623 07/24/19  0647   HEMOGLOBIN g/dL 7 5* 8 6* 8 2*       · Likely post-operative  · Monitor CBC intermittently  · Transfuse for Hgb <7  Will obtain consent in event patient requires transfusion    Chronic diastolic congestive heart failure (HCC)  Assessment & Plan  Wt Readings from Last 3 Encounters:   07/26/19 51 4 kg (113 lb 5 1 oz)   07/25/19 50 3 kg (110 lb 14 3 oz)   04/02/19 51 3 kg (113 lb)     · Daily weights  · Lasix daily    Essential hypertension  Assessment & Plan  Temp:  [97 8 °F (36 6 °C)-98 5 °F (36 9 °C)] 98 3 °F (36 8 °C)  HR:  [64-80] 66  Resp:  [14-19] 18  BP: (127-136)/(51-61) 132/54    · Verapamil    # Skin  · Encourage regular turning as patient at risk for skin breakdown  · Staff to continue patient education on Q2h turning  · Rehabilitation team to perform skin checks regularly     # Bowel  · Patient reports no constipation  · To ensure regular BMs, bowel regimen consisting of:  colace , dulcolax suppository  and miralax     # Bladder  · Patient voiding spontaneously    # Pain  · Continue tylenol, for max of 3gm daily  · Discontinue oxycodone   · Start tramadol    # Rehab Psych   · There are no psychological or psychiatric problems identified    # Other  - Diet/Nutrition:        Diet Orders   (From admission, onward)             Start     Ordered    07/25/19 1647  Diet Cardiovascular; Sodium 2 GM  Diet effective now     Question Answer Comment   Diet Type Cardiovascular    Cardiac Sodium 2 GM    RD to adjust diet per protocol?  Yes        07/25/19 1647 07/25/19 1647  Room Service  Once     Question:  Type of Service  Answer:  Room Service - Appropriate with Assistance    07/25/19 1647              - DVT prophy: Sequential compression device (Venodyne)  and Enoxaparin (Lovenox)  - GI ppx: Pantaprazole  - Nausea: None  - Supplements: None  - Sleep: None    Disposition: TBD    CODE: Level 1: Full Code Drug regimen reviewed, all potential adverse effects identified and addressed:    Scheduled Meds:    Current Facility-Administered Medications:  acetaminophen 975 mg Oral Q8H [facility] Balwinder Moran MD   docusate sodium 100 mg Oral BID Meño Davis MD enoxaparin 40 mg Subcutaneous Daily Balwinder Moran MD   furosemide 80 mg Oral Daily Balwinder Moran MD   pantoprazole 40 mg Oral Daily Before Breakfast Balwinder Moran MD   polyethylene glycol 17 g Oral Daily PRN Balwinder Moran MD   traMADol 25 mg Oral Q4H PRN Balwinder Moran MD   traMADol 50 mg Oral Q4H PRN Balwinder Moran MD   verapamil 240 mg Oral Daily Balwinder Moran MD        Restrictions include:  right lower extremity weight bearing as tolerated Fall precautions      Functional History - Prior to Admission:      Functional Status: Patient was independent with mobility/ambulation, transfers, ADL's, IADL's  Functional Status Upon Admission to [facility]:  Mobility:reeval pending  Transfers: mod assist  ADLs: max assist lowers    Jeannette Pereira lives with their family  She lives in a(n) single family home  The living area: bedroom on 2nsd floor and bathroom on 2nd floor  Equipment in home: None  There 5 steps to enter the home  Patient/family's goals: Return to previous home/apartment  The patient will not have 24 hour supervision/physical assistance available upon discharge      Physical Exam:  Temp:  [97 8 °F (36 6 °C)-98 5 °F (36 9 °C)] 98 3 °F (36 8 °C)  HR:  [64-80] 66  Resp:  [14-19] 18  BP: (127-136)/(51-61) 132/54  SpO2:  [94 %-99 %] 94 %    General: alert, no apparent distress, cooperative and comfortable  HEENT:  Head: Normocephalic, no lesions, without obvious abnormality  LUNGS:  no abnormal respiratory pattern, no retractions noted, non-labored breathing   ABDOMEN:  soft, non-tender  Bowel sounds normal  No masses, no organomegaly  EXTREMITIES:  edema 2+, R>L  NEURO:   mental status, speech normal, alert and oriented x3  PSYCH:  Alert and oriented, appropriate affect    INCISION:  dressings present  MMT:   Strength:   Right  Left  Site  Right  Left  Site    5 5  S Ab: Shoulder Abductors  2 5  HF: Hip Flexors    5 5  EF: Elbow Flexors  2  5 KF: Knee Flexors    5  5  EE: Elbow Extensors 2 5  KE: Knee Extensors    5  5  WE: Wrist Extensors  4 5  DR: Dorsi Flexors    5  5  FF: Finger Flexors  5 5  PF: Plantar Flexors    5  5  HI: Hand Intrinsics  5  5  EHL: Extensor Hallucis Longus     Laboratory:    Results from last 7 days   Lab Units 07/26/19  0640 07/25/19  0623 07/24/19  0647   HEMOGLOBIN g/dL 7 5* 8 6* 8 2*   HEMATOCRIT % 24 0* 27 8* 26 2*   WBC Thousand/uL 7 30 8 23 10 42*     Results from last 7 days   Lab Units 07/26/19  0640 07/25/19  0623 07/24/19  0647  07/22/19  1721   BUN mg/dL 19 21 24   < > 30*   SODIUM mmol/L 143 142 140   < > 142   POTASSIUM mmol/L 3 8 3 7 3 7   < > 3 5   CHLORIDE mmol/L 108 106 105   < > 102   CREATININE mg/dL 0 65 0 84 0 91   < > 1 06   AST U/L  --   --   --   --  28   ALT U/L  --   --   --   --  20    < > = values in this interval not displayed  Results from last 7 days   Lab Units 07/22/19  1721   PROTIME seconds 10 7   INR  1 02        Wt Readings from Last 1 Encounters:   07/26/19 51 4 kg (113 lb 5 1 oz)     Estimated body mass index is 22 13 kg/m² as calculated from the following:    Height as of this encounter: 5' (1 524 m)  Weight as of this encounter: 51 4 kg (113 lb 5 1 oz)  Imaging: reviewed     Rehabilitation Prognosis: good     Tolerance for three hours of therapy a day: good     Family/Patient Goals:  Patient/family's goals: Return to previous home/apartment  Patient will receive PT and OT 90 minutes each per day, five days per week to achieve rehab goals or participate in 900 minutes of therapy within a 7 day week period  Mobility Goals: Modified [place]  Transfer Goals: Modified [place]  Activities of Daily Living (ADLs) Goals: Modified [place]    Discharge Planning:  Rehabilitation and discharge goals discussed with the patient and/or family  Case Managment and Social Work to review patient/family resources and to coordinate Discharge Planning      Estimated length of stay: 2 - 3 weeks    Patient and Family Education and Training:  Rehabilitation and discharge goals discussed with the patient and/or family  Patient/family education/training needs to be discussed in weekly team meeting  Equipment/DME needs: Therapy teams to assess and evaluate for additional equipment/DME needs throughout rehabilitation stay    Past Medical History:   Past Surgical History:   Family History:   Social history:   Past Medical History:   Diagnosis Date    Anesthesia complication       Yrs ago had "anxiety" reaction not sure while sedated, pt states sensitive to anesthesia effects    Anxiety     stress at home    Arthritis     Cataract, right     Last Assessed:16    Eye hemorrhage     left eye currently 16    History of shingles 2015    Hypertension     Mitral valve stenosis, mild     Past Surgical History:   Procedure Laterality Date    CARPAL TUNNEL RELEASE Left     CATARACT EXTRACTION W/ INTRAOCULAR LENS IMPLANT Left 10/11/2016    Procedure: EXTRACTION EXTRACAPSULAR CATARACT PHACO INTRAOCULAR LENS (IOL); Surgeon: Carolina Cervantes MD;  Location: [facility] MAIN OR;  Service:     CERVICAL CONE BIOPSY      COLONOSCOPY      EYE SURGERY Left     muscle repair    OH OPEN RX FEMUR FX+INTRAMED NINI Right 2019    Procedure: INSERTION NAIL IM FEMUR ANTEGRADE (TROCHANTERIC); Surgeon: Murray Vo DO;  Location: [address] [place] [address];  Service: Orthopedics    [place] [address] EXTRACAP,INSERT LENS Right 2016    Procedure: EXTRACTION EXTRACAPSULAR CATARACT PHACO INTRAOCULAR LENS (IOL);   Surgeon: Carolina eCrvantes MD;  Location: [facility] MAIN OR;  Service: Ophthalmology    TONSILLECTOMY       Family History   Problem Relation Age of Onset    Heart disease Mother         MI  at age 71   Tsai Arthritis Mother     GI problems Father         bleeding ulcer    Arthritis Sister     Sleep apnea Sister     Irritable bowel syndrome Sister     Cancer Sister         skin cancer    Heart disease Brother         CABG (5)   Quin Cancer Brother         skin cancer    Heart disease Maternal Aunt       Social History     Socioeconomic History    Marital status: /Civil Union     Spouse name: Not on file    Number of children: Not on file    Years of education: Not on file    Highest education level: Not on file   Occupational History    Not on file   Social Needs    Financial resource strain: Not on file    Food insecurity:     Worry: Not on file     Inability: Not on file    Transportation needs:     Medical: Not on file     Non-medical: Not on file   Tobacco Use    Smoking status: Never Smoker    Smokeless tobacco: Never Used   Substance and Sexual Activity    Alcohol use: Yes     Comment: rarely    Drug use: No    Sexual activity: Not on file   Lifestyle    Physical activity:     Days per week: Not on file     Minutes per session: Not on file    Stress: Not on file   Relationships    Social connections:     Talks on phone: Not on file     Gets together: Not on file     Attends Yazdanism service: Not on file     Active member of club or organization: Not on file     Attends meetings of clubs or organizations: Not on file     Relationship status: Not on file    Intimate partner violence:     Fear of current or ex partner: Not on file     Emotionally abused: Not on file     Physically abused: Not on file     Forced sexual activity: Not on file   Other Topics Concern    Not on file   Social History Narrative    Not on file          Current Medical Diagnosis Allergies   Patient Active Problem List   Diagnosis    Cataract    Essential hypertension    Aortic stenosis, moderate    Atrial dilatation, bilateral    Mild tricuspid insufficiency    Non-rheumatic mitral regurgitation    Chronic diastolic congestive heart failure (HCC)    Localized edema    Leg foot wound- 3rd digit    Other fatigue    Arthritis    Closed right hip fracture, initial encounter ([facility] )    Intertrochanteric fracture of right femur ([facility] )  Mitral valve stenosis, mild    Anemia    Allergies   Allergen Reactions    Indocin [Indomethacin]      hypertension           Medical Necessity Criteria for [facility] Admission: Anemia, Hypertension, Bowel/Bladder Management, Incision/Wound care and Leukocystosis  In addition, the preadmission screen, post-admission physical evaluation, overall plan of care and admissions order demonstrate a reasonable expectation that the following criteria were met at the time of admission to the [facility]  1  The patient requires active and ongoing therapeutic intervention of multiple therapy disciplines (physical therapy, occupational therapy, speech-language pathology, or prosthetics/orthotics), one of which is physical or occupational therapy  2  Patient requires an intensive rehabilitation therapy program, as defined in Chapter 1, section 110 2 2 of the CMS Medicare Policy Manual  This intensive rehabilitation therapy program will consist of at least 3 hours of therapy per day at least 5 days per week or at least 15 hours of intensive rehabilitation therapy within a 7 consecutive day period, beginning with the date of admission to the [facility]  3  The patient is reasonably expected to actively participate in, and benefit significantly from, the intensive rehabilitation therapy program as defined in Chapter 1, section 110 2 2 of the CMS Medicare Policy Manual at this time of admission to the [facility]  She can reasonably be expected to make measurable improvement (that will be of practical value to improve the patients functional capacity or adaptation to impairments) as a result of the rehabilitation treatment, as defined in section 110 3, and such improvement can be expected to be made within the prescribed period of time   As noted in the CMS Medicare Policy Manual, the patient need not be expected to achieve complete independence in the domain of self-care nor be expected to return to his or her prior level of functioning in order to meet this standard  4  The patient must require physician supervision by a rehabilitation physician  As such, a rehabilitation physician will conduct face-to-face visits with the patient at least 3 days per week throughout the patients stay in the [facility] to assess the patient both medically and functionally, as well as to modify the course of treatment as needed to maximize the patients capacity to benefit from the rehabilitation process  5  The patient requires an intensive and coordinated interdisciplinary approach to providing rehabilitation, as defined in Chapter 1, section 110 2 5 of the CMS Medicare Policy Manual  This will be achieved through periodic team conferences, conducted at least once in a 7-day period, and comprising of an interdisciplinary team of medical professionals consisting of: a rehabilitation physician, registered nurse,  and/or , and a licensed/certified therapist from each therapy discipline involved in treating the patient  Changes Since Pre-admission Assessment: None -This patient's participation in rehab continues to be reasonable, necessary and appropriate  CMS Required Post-Admission Physician Evaluation Elements  History and Physical, including medical history, functional history and active comorbidities as in above text      PostAdmission Physician Evaluation:  The patient has the potential to make improvement and is in need of physical, occupational, and/or therapy services  The patient may also need nutritional services  Given the patient's complex medical condition and risk of further medical complications, rehabilitative services cannot be safely provided at a lower level of care, such as a skilled nursing facility  I have reviewed the patient's functional and medical status at the time of the preadmission screening and they are the same as on the day of this admission   I acknowledge that I have personally performed a full physical examination on this patient within 24 hours of admission  The patient and/or family demonstrated understanding the rehabilitation program and the discharge process after we discussed them      Agree in entirety: yes  Minor adaptions: none    Major changes: none     Lina Prince MD  Physical Medicine and Rehabilitation    ** Please Note: Fluency Direct voice to text software may have been used in the creation of this document   **

## 2019-07-26 NOTE — TREATMENT PLAN
Individualized Plan of [place] [address] 80 y o  female MRN: [id number]  Unit/Bed#: -01 Encounter: [id number]     PATIENT INFORMATION  ADMISSION DATE: 7/25/2019  4:32 PM JOSE MIGUEL CATEGORY:Orthopedic Disorders:  08 11  Unilateral Hip Fracture   ADMISSION DIAGNOSIS: Closed right hip fracture ([address] ) [[id number]]  EXPECTED LOS: 2 - 3 weeks     MEDICAL/FUNCTIONAL PROGNOSIS  Based on my assessment of the patient's medical conditions and current functional status, the prognosis for attaining medical and functional goals or the IRF stay is:  Good    Medical Goals: Patient will be medically stable for discharge to [facility] restrictive envrionment upon completion of rehab program    [address]: Home - with supervision, although will need to go home at Mod-I level as she was caring for SO    ANTICIPATED FOLLOW-UP SERVICE:   Outpatient Therapy Services: PT and OT      Home Health Services: PT, OT and Nursing    DISCIPLINE SPECIFIC PLANS:  Required Disciplines & Services: Rehabillitation Nursing, Case Management, Dietay/Nutrition and Prosthetics/Orthostics    REQUIRED THERAPY:  Therapy Hours per Day Days per Week Total Days   Physical Therapy 1 5 5 5   Occupational Therapy 1 5 5 5   NOTE: Additional therapy time(s) may be added as appropriate to meet patient needs and to achieve functional goals      Patient will either participate in above therapy regimen or participate in 900 minutes of therapy within 7 day week consisting of PT and OT due to the following medical procedure/condition:Orthopedic Disorders:  08 11  Unilateral Hip Fracture    ANTICIPATED FUNCTIONAL OUTCOMES:  ADL: Patient will be independent with ADLs with least restrictive device upon completion of rehab program   Bladder/Bowel: Patient will be independent with bladder/bowel management with least restrictive device upon completion of rehab program   Transfers:  modified independent   Locomotion:   modified independent   Cognitive: Patient will be independent for basic and complex tasks upon completion of rehab program     DISCHARGE PLANNING NEEDS  Equipment needs: Discharge needs to be reviewed with team    REHAB ANTICIPATED PARTICIPATION RESTRICTIONS:  Assist with Mobility, Inability to Drive and Rquires Assist with ADLS

## 2019-07-27 LAB
ABO GROUP BLD: NORMAL
ATRIAL RATE: 61 BPM
BASOPHILS # BLD AUTO: 0.07 THOUSANDS/ΜL (ref 0–0.1)
BASOPHILS NFR BLD AUTO: 1 % (ref 0–1)
BLD GP AB SCN SERPL QL: NEGATIVE
EOSINOPHIL # BLD AUTO: 0.23 THOUSAND/ΜL (ref 0–0.61)
EOSINOPHIL NFR BLD AUTO: 3 % (ref 0–6)
ERYTHROCYTE [DISTWIDTH] IN BLOOD BY AUTOMATED COUNT: 13.4 % (ref 11.6–15.1)
HCT VFR BLD AUTO: 27.6 % (ref 34.8–46.1)
HGB BLD-MCNC: 8.5 G/DL (ref 11.5–15.4)
IMM GRANULOCYTES # BLD AUTO: 0.06 THOUSAND/UL (ref 0–0.2)
IMM GRANULOCYTES NFR BLD AUTO: 1 % (ref 0–2)
LYMPHOCYTES # BLD AUTO: 1.31 THOUSANDS/ΜL (ref 0.6–4.47)
LYMPHOCYTES NFR BLD AUTO: 15 % (ref 14–44)
MCH RBC QN AUTO: 28.2 PG (ref 26.8–34.3)
MCHC RBC AUTO-ENTMCNC: 30.8 G/DL (ref 31.4–37.4)
MCV RBC AUTO: 92 FL (ref 82–98)
MONOCYTES # BLD AUTO: 0.83 THOUSAND/ΜL (ref 0.17–1.22)
MONOCYTES NFR BLD AUTO: 10 % (ref 4–12)
NEUTROPHILS # BLD AUTO: 6.21 THOUSANDS/ΜL (ref 1.85–7.62)
NEUTS SEG NFR BLD AUTO: 70 % (ref 43–75)
NRBC BLD AUTO-RTO: 0 /100 WBCS
P AXIS: 65 DEGREES
PLATELET # BLD AUTO: 229 THOUSANDS/UL (ref 149–390)
PMV BLD AUTO: 10.7 FL (ref 8.9–12.7)
PR INTERVAL: 174 MS
QRS AXIS: 21 DEGREES
QRSD INTERVAL: 108 MS
QT INTERVAL: 454 MS
QTC INTERVAL: 457 MS
RBC # BLD AUTO: 3.01 MILLION/UL (ref 3.81–5.12)
RH BLD: NEGATIVE
SPECIMEN EXPIRATION DATE: NORMAL
T WAVE AXIS: 67 DEGREES
VENTRICULAR RATE: 61 BPM
WBC # BLD AUTO: 8.71 THOUSAND/UL (ref 4.31–10.16)

## 2019-07-27 PROCEDURE — 85025 COMPLETE CBC W/AUTO DIFF WBC: CPT | Performed by: NURSE PRACTITIONER

## 2019-07-27 PROCEDURE — 97530 THERAPEUTIC ACTIVITIES: CPT

## 2019-07-27 PROCEDURE — 86901 BLOOD TYPING SEROLOGIC RH(D): CPT | Performed by: NURSE PRACTITIONER

## 2019-07-27 PROCEDURE — 97535 SELF CARE MNGMENT TRAINING: CPT | Performed by: OCCUPATIONAL THERAPY ASSISTANT

## 2019-07-27 PROCEDURE — 97110 THERAPEUTIC EXERCISES: CPT

## 2019-07-27 PROCEDURE — 86900 BLOOD TYPING SEROLOGIC ABO: CPT | Performed by: NURSE PRACTITIONER

## 2019-07-27 PROCEDURE — 93010 ELECTROCARDIOGRAM REPORT: CPT | Performed by: INTERNAL MEDICINE

## 2019-07-27 PROCEDURE — 86850 RBC ANTIBODY SCREEN: CPT | Performed by: NURSE PRACTITIONER

## 2019-07-27 RX ADMIN — TRAMADOL HYDROCHLORIDE 50 MG: 50 TABLET, FILM COATED ORAL at 06:24

## 2019-07-27 RX ADMIN — ACETAMINOPHEN 975 MG: 325 TABLET ORAL at 14:09

## 2019-07-27 RX ADMIN — VERAPAMIL HYDROCHLORIDE 240 MG: 240 TABLET, FILM COATED, EXTENDED RELEASE ORAL at 09:03

## 2019-07-27 RX ADMIN — ACETAMINOPHEN 975 MG: 325 TABLET ORAL at 22:16

## 2019-07-27 RX ADMIN — BACITRACIN ZINC 1 SMALL APPLICATION: 500 OINTMENT TOPICAL at 09:02

## 2019-07-27 RX ADMIN — FUROSEMIDE 80 MG: 80 TABLET ORAL at 09:02

## 2019-07-27 RX ADMIN — PANTOPRAZOLE SODIUM 40 MG: 40 TABLET, DELAYED RELEASE ORAL at 06:17

## 2019-07-27 RX ADMIN — ACETAMINOPHEN 975 MG: 325 TABLET ORAL at 06:17

## 2019-07-27 RX ADMIN — ENOXAPARIN SODIUM 40 MG: 40 INJECTION SUBCUTANEOUS at 22:17

## 2019-07-27 NOTE — PROGRESS NOTES
Occupational Therapy Treatment Note:       07/27/19 0700   Pain Assessment   Pain Assessment 0-10   Pain Score 7   Pain Location Hip;Leg   Pain Orientation Right   Hospital Pain Intervention(s) Cold applied;Repositioned; Rest  (Post having had received medication from [facility] to alleviate  )   Restrictions/Precautions   Precautions Fall Risk;Contact/isolation;Pain   Weight Bearing Restrictions Yes   RLE Weight Bearing Per Order WBAT   QI: Eating   Assistance Needed Independent   Assistance Provided by [place] No physical assistance   Eating CARE Score 6   Eating Assessment   Food To Mouth Yes   Able To Cut Yes   Positioning Upright;Out of Bed   Meal Assessed Breakfast   Intake Mode PO;Self   Finishes Timely Yes   Opens Packages Yes   Eating (FIM) 6 - Patient requires increased time or safety concern   QI: Oral Hygiene   Assistance Needed Supervision   Assistance Provided by [place] No physical assistance   Comment Seated as pt c/o increased RLE pain while in prolonged stance  Oral Hygiene CARE Score 4   Grooming   Able To Initiate Tasks; Acquire Items;Comb/Brush Hair;Wash/Dry Face;Brush/Clean Teeth;Wash/Dry Hands   Limitation Noted In Safety;Strength   Findings Seated as pt c/o increased RLE pain while in prolonged stance  Grooming (FIM) 5 - Patient requires supervision/monitoring   QI: Shower/Bathe Self   Assistance Needed Adaptive equipment;Set-up / [place]; Verbal cues; Physical assistance   Assistance Provided by [place] 25%-49%   Comment Instructed pt on using reacher with washcloth as simulated long handled sponge to bathe lower legs/feet while seated, which pt able to complete with assist for thoroughness in between toes; discussed modification of cutting sponge into a "V" which may increase abilities to get in between toes  Pt completed adalberto-care while standing unilaterally supported with CGA and no noted LOB; pt noted to have difficulty WBing thorugh RLE and treated as TTWB while in stance due to c/o pain  Shower/Bathe Self CARE Score 3   Bathing   Assessed Bath Style Sponge Bath   Anticipated D/C Bath Style Sponge Bath;Tub   Able to Gather/Transport No   Able to [employer] Temperature Yes   Able to Wash/Rinse/Dry (body part) Left Arm;Right Arm;L Upper Leg;R Upper Leg;L Lower Leg/Foot;R Lower Leg/Foot;Chest;Abdomen;Perineal Area; Buttocks   Limitations Noted in Balance; Endurance;ROM;Strength   Positioning Seated;Standing   Adaptive Equipment Longhand Reacher   Findings  Pt continues to not have shower orders at this time; will continue to f/u when appropriate and approved by MD     Bathing ([facility]) 4 - Patient completes 8/10 or 9/10 parts   QI: Upper Body Dressing   Assistance Needed Set-up / clean-up;Supervision   Assistance Provided by [place] No physical assistance   Comment Seated; including donning of bra  Upper Body Dressing CARE Score 4   QI: Lower Body Dressing   Assistance Needed Adaptive equipment;Set-up / clean-up; Verbal cues; Incidental touching   Assistance Provided by [place] Less than 25%   Comment Instructed pt on option of utilizing [address] reacher for threading of undergarment/pants over feet, however pt able to complete without use of LHAE with CS when provided with increased time; recommended pt don RLE first for increased success  Pt able to retrieve clothing items from floor level once doffed using reacher with S and VCs for technique  Pt completed clothing mgmt over hips while in brief unsupported stance (post having had initially gained balance at RW prior) with CGA and no noted LOB, however with noted difficulty WBing through RLE and treated as TTWB; discussed option of alternating unilateral hand release at RW  Lower Body Dressing CARE Score 3   QI: Putting On/Taking Off Footwear   Assistance Needed Adaptive equipment;Set-up / clean-up;Supervision;Verbal cues   Assistance Provided by [place] No physical assistance   Comment Seated   Instructed pt on using reacher or dressing stick to doff socks, and sock aide to don, which pt able to complete with S and VCs for technique  Pt unable to successfully don shoes at this time due to BLE edema, however discussed elastic shoelaces and using reacher and/or [facility] for increased ease and independence with mgmt  Pt with noted pitting bilat at sock line; notified NSG to assess need for teds  Putting On/Taking Off Footwear CARE Score 4   QI: Picking Up Object   Comment Discussed using Ritu Lank for retrieving items not within easy reach/from floor level, as well as option of using RW bag and folding style reacher for easy transport  Dressing/Undressing Clothing   Remove UB Clothes Other  ([address]  )   Remove LB Clothes Undergarment;Socks   Don UB Clothes Jacket;Bra   [address]; Undergarment;Socks   Limitations Noted In Balance; Endurance; Safety;Strength;ROM; Timeliness   Adaptive Equipment Reacher;Dressing Stick; Sock Aide; Other  (RW)   Positioning Supported Sit;Standing   UB Dressing (FIM) 5 - Patient requires supervision/monitoring   LB Dressing (FIM) 4 - Patient completes 75% of all tasks   QI: Lying to Sitting on Side of Bed   Assistance Needed Set-up / Wandy Luli; Incidental touching   Assistance Provided by [place] Less than 25%   Comment HOB flat without use of bed rails  Pt able to complete with CGA when provided with increased time  Discussed leg /simulated version with bed sheet for RLE mgmt, however not warranted for supine > sit this AM    Lying to Sitting on Side of Bed CARE Score 3   QI: Sit to Stand   Assistance Needed Adaptive equipment;Set-up / clean-up; Verbal cues; Physical assistance   Assistance Provided by [place] 25%-49%   Comment RW; VCs for hand placement in order to ensure good safety; lifting assist warranted  Sit to Stand CARE Score 3   QI: Chair/Bed-to-Chair Transfer   Assistance Needed Adaptive equipment;Set-up / clean-up; Incidental touching   Assistance Provided by [place] Less than 25%   Comment SPT using RW; VCs to back up completely to surface transferring to in order to ensure good safety  Chair/Bed-to-Chair Transfer CARE Score 3   Transfer Bed/Chair/Wheelchair   Limitations Noted In Balance; Endurance;Pain Management; Sequencing;UE Strength;LE Strength   Adaptive Equipment Roller Walker   Stand Pivot Contact Guard   Sit to Stand Minimal;Moderate Assist   Stand to Sit Minimal  (CGA)   Supine to Sit Minimal  (CGA)   Bed, Chair, Wheelchair Transfer (FIM) 3 - [place] needs to lift, boost or assist to stand OR sit   QI: Toilet Transfer   Comment Pt reported she has a standard height toilet at home with no grab bars, therefore suggesting pt place commode over toilet for arm rests to reach back for/push off of and raised height; s/u pt bathroom accordinally in preparation for anticipated home environment  Cognition   Overall Cognitive Status WFL   Arousal/Participation Alert; Cooperative   Attention Within functional limits   Orientation Level Oriented X4   Memory Within functional limits   Following Commands Follows multistep commands with increased time or repetition   Activity Tolerance   Activity Tolerance Patient tolerated treatment well   Assessment   Treatment Assessment OT tx sessions focused on bed mobility, transfers, standing balance, ADL skills, and overall activity tolerance/endurance  Refer above for details on pt performance  Pt would benefit from continued skilled OT services in order to achieve highest functional abilities  Prognosis Good   Problem List Decreased strength;Decreased range of motion;Decreased endurance; Impaired balance;Decreased mobility; Decreased safety awareness;Decreased skin integrity;Orthopedic restrictions;Pain   Barriers to Discharge Inaccessible home environment;Decreased caregiver support   Plan   Treatment/Interventions ADL retraining;Functional transfer training; Endurance training;Patient/family training;Equipment eval/education; Bed mobility; Compensatory technique education;OT Progress Progressing toward goals   Recommendation   OT Discharge Recommendation Other (Comment)  (Pending pt progress  )   OT Therapy Minutes   OT Time In 0630   OT Time Out 0800   OT Total Time (minutes) 90   OT Mode of treatment - Individual (minutes) 90   OT Mode of treatment - Concurrent (minutes) 0   OT Mode of treatment - Group (minutes) 0   OT Mode of treatment - Co-treat (minutes) 0   OT Mode of Teatment - Total time(minutes) 90 minutes   Therapy Time missed   Time missed?  No   Rebekah Cardoza, [address]

## 2019-07-27 NOTE — PROGRESS NOTES
Internal Medicine Progress Note  Patient: Radha Bob  Age/sex: 80 y o  female  Medical Record #: [id number]      ASSESSMENT/PLAN: (Interval History)  Radha Bob is seen and examined and management for following issues:    Right hip fx; s/p TFN 7/23/19:  WBAT; watch incision     ABLA:  Hgb 7 5 yesterday but pt did not want transfusion  Repeat hemoglobin today 8 5  No indication for transfusion      Chronic diastolic CHF with LVEF 37%, mild MR/moderate AS and AR/moderate TR:  she will need to go back to see Dr Melania Hyde since he has been following her for this and can then refer to Cardiology if he and the patient wish  She is to remain on her home dose of Lasix 80mg daily = she feels edema is baseline and that she never has no edema     HTN:  at home, she had been on Verapamil but changed in April to Toprol 50mg daily 2/2 insurance would not pay for the Verapamil; however, since she has a lot of Verpamil left, she has been using them up and then is going to switch back to the Toprol  Will keep Verapamil 240mg qd for now     Left 3rd toe wound and right great toe subungual hematoma:  had been seen by Podiatry; they did remove an ingrown toenail left 3rd toe  They feel that she is likely to lose the nail on the right great toe  Continue local care      Subjective/ HPI:  Patients overnight issues or events were reviewed with nursing or staff during rounds or morning huddle session  New or overnight issues  ABLA: Await Hgb today and plan to transfuse  HTN:  Stable on current tx  Chronic diastolic CHF: stable  Offers no complaints except feels "fuzzy headed" and tired      ROS:     GI: denies abdominal pain, change bowel habits or reflux symptoms  Neuro: Denies any headache, new vision changes, new neuropathies,new weaknesses   Respiratory: No Cough, SOB, denies wheeze  Cardiovascular: No CP, palpitations , denies perception of rapid heartbeat  : denies any new urinary burning or frequency    Review of Scheduled Meds:    Current Facility-Administered Medications:  acetaminophen 975 mg Oral Q8H [address] Balwinder Moran MD   bacitracin 1 small application Topical Daily JUAN Marie   docusate sodium 100 mg Oral BID Balwinder Moran MD   enoxaparin 40 mg Subcutaneous Daily Balwinder Moran MD   furosemide 80 mg Oral Daily Balwinder Moran MD   pantoprazole 40 mg Oral Daily Before Breakfast Balwinder Moran MD   polyethylene glycol 17 g Oral Daily PRN Balwinder Moran MD   traMADol 25 mg Oral Q4H PRN Balwinder Moran MD   traMADol 50 mg Oral Q4H PRN Balwinder Moran MD   verapamil 240 mg Oral Daily Demetrio Alarcon MD       Labs:     Results from last 7 days   Lab Units 07/27/19  0922 07/26/19  0640   WBC Thousand/uL 8 71 7 30   HEMOGLOBIN g/dL 8 5* 7 5*   HEMATOCRIT % 27 6* 24 0*   PLATELETS Thousands/uL 229 164     Results from last 7 days   Lab Units 07/26/19  0640 07/25/19  0623   SODIUM mmol/L 143 142   POTASSIUM mmol/L 3 8 3 7   CHLORIDE mmol/L 108 106   CO2 mmol/L 33* 32   BUN mg/dL 19 21   CREATININE mg/dL 0 65 0 84   CALCIUM mg/dL 8 4 8 2*         Results from last 7 days   Lab Units 07/22/19  1721   INR  1 02              Imaging:     No orders to display       *Labs reviewed  *Radiology studies reviewed  *Medications reviewed and reconciled as needed  *Please refer to order section for additional ordered labs studies  *Case discussed with primary attending during morning huddle case rounds    Physical Examination:  Vitals:   Vitals:    07/26/19 2052 07/27/19 0600 07/27/19 0626 07/27/19 0902   BP: 130/59  149/66 (!) 114/48   BP Location: Left arm  Left arm    Pulse: 80  66    Resp: 16  20    Temp: 98 9 °F (37 2 °C)  97 8 °F (36 6 °C)    TempSrc: Oral  Oral    SpO2: 100%  98%    Weight:  50 6 kg (111 lb 9 6 oz)     Height:           General Appearance: NAD, conversive  Eyes: No icterus; conjunctiva normal, PERRLA  HENT: oropharynx clear; mucous membranes moist  Neck: trachea midline, range of motion full  Lungs: CTA, normal respiratory effort, no retractions, expiratory effort normal  CV: regular rate, no rubs/murmurs/gallops  ABD: soft: NT/ND; +BS  EXT: mild-mod edema of lower legs as always edema  Skin: normal turgor, normal texture, no rashes  Psych: affect normal, no overt anxiety/depression   Neuro: AAOx3            Total time spent: At least 40 minutes, with more than 50% spent counseling/coordinating care  Counseling includes discussion with patient re: progress  and discussion with patient of his/her current medical state/information  Coordination of patient's care was performed in conjunction with primary service  Time invested included review of patient's labs, vitals, and management of their comorbidities with continued monitoring  In addition, this patient was discussed with medical team including physician and advanced extenders  The care of the patient was extensively discussed and appropriate treatment plan was formulated unique for this patient  ** Please Note: Dragon 360 Dictation voice to text software may have been used in the creation of this document   **

## 2019-07-27 NOTE — PLAN OF CARE
Problem: Prexisting or High Potential for Compromised Skin Integrity  Goal: Skin integrity is maintained or improved  Description  INTERVENTIONS:  - Identify patients at risk for skin breakdown  - Assess and monitor skin integrity  - Assess and monitor nutrition and hydration status  - Monitor labs (i e  albumin)  - Assess for incontinence   - Turn and reposition patient  - Assist with mobility/ambulation  - Relieve pressure over bony prominences  - Avoid friction and shearing  - Provide appropriate hygiene as needed including keeping skin clean and dry  - Evaluate need for skin moisturizer/barrier cream  - Collaborate with interdisciplinary team (i e  Nutrition, Rehabilitation, etc )   - Patient/family teaching  Outcome: Progressing     Problem: Potential for Falls  Goal: Patient will remain free of falls  Description  INTERVENTIONS:  - Assess patient frequently for physical needs  -  Identify cognitive and physical deficits and behaviors that affect risk of falls    -  [place] fall precautions as indicated by assessment   - Educate patient/family on patient safety including physical limitations  - Instruct patient to call for assistance with activity based on assessment  - Modify environment to reduce risk of injury  - Consider OT/PT consult to assist with strengthening/mobility  Outcome: Progressing     Problem: PAIN - ADULT  Goal: Verbalizes/displays adequate comfort level or baseline comfort level  Description  Interventions:  - Encourage patient to monitor pain and request assistance  - Assess pain using appropriate pain scale  - Administer analgesics based on type and severity of pain and evaluate response  - Implement non-pharmacological measures as appropriate and evaluate response  - Consider cultural and social influences on pain and pain management  - Notify physician/advanced practitioner if interventions unsuccessful or patient reports new pain  Outcome: Progressing     Problem: INFECTION - ADULT  Goal: Absence or prevention of progression during hospitalization  Description  INTERVENTIONS:  - Assess and monitor for signs and symptoms of infection  - Monitor lab/diagnostic results  - Monitor all insertion sites, i e  indwelling lines, tubes, and drains  - Monitor endotracheal (as able) and nasal secretions for changes in amount and color  - [place] appropriate cooling/warming therapies per order  - Administer medications as ordered  - Instruct and encourage patient and family to use good hand hygiene technique  - Identify and instruct in appropriate isolation precautions for identified infection/condition  Outcome: Progressing  Goal: Absence of fever/infection during neutropenic period  Description  INTERVENTIONS:  - Monitor WBC  - Implement neutropenic guidelines  Outcome: Progressing     Problem: SAFETY ADULT  Goal: Maintain or return to baseline ADL function  Description  INTERVENTIONS:  -  Assess patient's ability to carry out ADLs; assess patient's baseline for ADL function and identify physical deficits which impact ability to perform ADLs (bathing, care of mouth/teeth, toileting, grooming, dressing, etc )  - Assess/evaluate cause of self-care deficits   - Assess range of motion  - Assess patient's mobility; develop plan if impaired  - Assess patient's need for assistive devices and provide as appropriate  - Encourage maximum independence but intervene and supervise when necessary  ¯ Involve family in performance of ADLs  ¯ Assess for home care needs following discharge   ¯ Request OT consult to assist with ADL evaluation and planning for discharge  ¯ Provide patient education as appropriate  Outcome: Progressing  Goal: Maintain or return mobility status to optimal level  Description  INTERVENTIONS:  - Assess patient's baseline mobility status (ambulation, transfers, stairs, etc )    - Identify cognitive and physical deficits and behaviors that affect mobility  - Identify mobility aids required to assist with transfers and/or ambulation (gait belt, sit-to-stand, lift, walker, cane, etc )  - [place] fall precautions as indicated by assessment  - Record patient progress and toleration of activity level on Mobility SBAR; progress patient to next Phase/Stage  - Instruct patient to call for assistance with activity based on assessment  - Request Rehabilitation consult to assist with strengthening/weightbearing, etc   Outcome: Progressing     Problem: DISCHARGE PLANNING  Goal: Discharge to home or other facility with appropriate resources  Description  INTERVENTIONS:  - Identify barriers to discharge w/patient and caregiver  - Arrange for needed discharge resources and transportation as appropriate  - Identify discharge learning needs (meds, wound care, etc )  - Arrange for interpretive services to assist at discharge as needed  - Refer to Case Management Department for coordinating discharge planning if the patient needs post-hospital services based on physician/advanced practitioner order or complex needs related to functional status, cognitive ability, or social support system  Outcome: Progressing     Problem: Knowledge Deficit  Goal: Patient/family/caregiver demonstrates understanding of disease process, treatment plan, medications, and discharge instructions  Description  Complete learning assessment and assess knowledge base    Interventions:  - Provide teaching at level of understanding  - Provide teaching via preferred learning methods  Outcome: Progressing

## 2019-07-27 NOTE — PROGRESS NOTES
07/27/19 1000   Pain Assessment   Pain Assessment 0-10   Pain Score 6   Pain Type Acute pain;Surgical pain   Pain Location Hip;Leg   Pain Orientation Right   Hospital Pain Intervention(s) Repositioned; Ambulation/increased activity   Response to Interventions Unchanged at end of session    Restrictions/Precautions   Precautions Fall Risk;Contact/isolation;Pain   Weight Bearing Restrictions Yes   RLE Weight Bearing Per Order WBAT   Cognition   Arousal/Participation Alert; Cooperative   Subjective   Subjective Pt with c/o of pain see above  No other c/o at this time  Agreeable to session  QI: Sit to Stand   Assistance Needed Adaptive equipment;Physical assistance;Verbal cues   Assistance Provided by [place] 50%-74%   Comment RW, cues for proper hand placement  Boost to stand    Sit to Stand CARE Score 2   QI: Chair/Bed-to-Chair Transfer   Assistance Needed Adaptive equipment;Physical assistance   Assistance Provided by [place] 25%-49%   Comment SPT with RW  Cues to reach backf or chair when sitting    Chair/Bed-to-Chair Transfer CARE Score 3   Transfer Bed/Chair/Wheelchair   Limitations Noted In Balance;Pain Management;LE Strength; Sequencing   Adaptive Equipment Roller Walker   Stand Pivot Minimal Assist   Sit to Stand Moderate Assist   Stand to Sit Minimal Assist   Bed, Chair, Wheelchair Transfer (FIM) 2 - [place] needs to lift or boost to rise AND assist to sit   QI: Walk 10 Feet   Assistance Needed Adaptive equipment;Physical assistance   Assistance Provided by [place] Total assistance   Comment Jesus with CFA for safety    Walk 10 Feet CARE Score 1   Ambulation   Does the patient walk? 2  Yes   Primary Discharge Mode of Locomotion Walk   Walk Assist Level Minimum Assist;Chair Follow   Gait Pattern Antalgic; Inconsistant Felipa;Decreased foot clearance; Step to;Decreased R stance; Improper weight shift   Assist Device Trina Patino Walked (feet) 35 ft   Limitations Noted In Balance; Endurance; Heel Strike; Sequencing;Speed;Strength;Swing   Findings Pt ambulated 35ft before becoming dizzy and needing to stop activity  Min A with RW and CFA    Wheelchair mobility   QI: Does the patient use a wheelchair? 0  No   QI: Toilet Transfer   Assistance Needed Physical assistance; Adaptive equipment   Assistance Provided by [place] 50%-74%   Comment ModA boost from [facility] cues to reach for arm rests  Jesus SPT reclienr to [facility] commode  Toilet Transfer CARE Score 2   Toilet Transfer   Surface Assessed Bedside Commode   Limitations Noted In Balance; Endurance; Sequencing;UE Strength;LE Strength   Adaptive Equipment   (BSC armrests and RW )   Findings ModA boost from [facility] cues to reach for arm rests  Jesus SPT reclienr to [facility] commode  Toilet Transfer (FIM) 2 - [place] needs to lift or boost to rise AND assist to sit   Therapeutic Interventions   Strengthening Seated TE due to sx of dizziness: BLE 1x12 LAQ, Marching, Hip ADD/ABD  Flexibility Seated BLE hs/gastroc stretch x4 min    Other Wt shifting in // bars to encourage WB through RLE, SLS in // bars with BUE support to help unload all wt on to RLE  Repeated STS and SPT to green chair in gym   Assessment   Treatment Assessment Pt Participated in skilled PT session with focus  on funcitonal transfer, inc strength, inc act tolerance and mobility  Pt cont to be limited by pain t/o session which inc with activity  Pt required max A to boost for STS due to inc pain  Pt ambulated 35 feet limited by becomming dizzy and light headed feeling "foggy" Pt BP on automatic cuff 186/74, checked manually for accuracy reading 162/66  Pt BP elevated and nursing notified  Pt related feeling of dizziness and foggy to pain meds  Sx resided upon a few mins seated  Finished session with seated TE to avoid reporducing sxs  Pt cont to benefit from skiled PT to inc strength, balance, mobility and safety with transfer to maximize function and independence as pt cares for  at home  Family/Caregiver Present no    Barriers to Discharge Inaccessible home environment;Decreased caregiver support   PT Barriers   Physical Impairment Decreased range of motion;Decreased strength;Decreased endurance; Impaired balance;Decreased mobility; Decreased coordination;Orthopedic restrictions;Pain   Functional Limitation Car transfers;Standing;Stair negotiation;Transfers; Walking   Plan   Treatment/Interventions Functional transfer training;LE strengthening/ROM; Elevations; Therapeutic exercise; Endurance training;Patient/family training;Equipment eval/education; Bed mobility;Gait training   Progress Progressing toward goals   Recommendation   Recommendation Other (Comment)  (TBD )   Equipment Recommended Walker   PT Therapy Minutes   PT Time In 1000   PT Time Out 1130   PT Total Time (minutes) 90   PT Mode of treatment - Individual (minutes) 90   PT Mode of treatment - Concurrent (minutes) 0   PT Mode of treatment - Group (minutes) 0   PT Mode of treatment - Co-treat (minutes) 0   PT Mode of Teatment - Total time(minutes) 90 minutes   Therapy Time missed   Time missed?  No

## 2019-07-28 PROCEDURE — 99232 SBSQ HOSP IP/OBS MODERATE 35: CPT | Performed by: PHYSICAL MEDICINE & REHABILITATION

## 2019-07-28 PROCEDURE — 97530 THERAPEUTIC ACTIVITIES: CPT

## 2019-07-28 PROCEDURE — 97116 GAIT TRAINING THERAPY: CPT

## 2019-07-28 PROCEDURE — 97110 THERAPEUTIC EXERCISES: CPT

## 2019-07-28 PROCEDURE — 97530 THERAPEUTIC ACTIVITIES: CPT | Performed by: STUDENT IN AN ORGANIZED HEALTH CARE EDUCATION/TRAINING PROGRAM

## 2019-07-28 PROCEDURE — 97535 SELF CARE MNGMENT TRAINING: CPT | Performed by: STUDENT IN AN ORGANIZED HEALTH CARE EDUCATION/TRAINING PROGRAM

## 2019-07-28 RX ADMIN — VERAPAMIL HYDROCHLORIDE 240 MG: 240 TABLET, FILM COATED, EXTENDED RELEASE ORAL at 08:51

## 2019-07-28 RX ADMIN — BACITRACIN ZINC 1 SMALL APPLICATION: 500 OINTMENT TOPICAL at 08:55

## 2019-07-28 RX ADMIN — PANTOPRAZOLE SODIUM 40 MG: 40 TABLET, DELAYED RELEASE ORAL at 05:58

## 2019-07-28 RX ADMIN — ACETAMINOPHEN 975 MG: 325 TABLET ORAL at 13:52

## 2019-07-28 RX ADMIN — ACETAMINOPHEN 975 MG: 325 TABLET ORAL at 05:58

## 2019-07-28 RX ADMIN — FUROSEMIDE 80 MG: 80 TABLET ORAL at 08:55

## 2019-07-28 RX ADMIN — ENOXAPARIN SODIUM 40 MG: 40 INJECTION SUBCUTANEOUS at 22:22

## 2019-07-28 RX ADMIN — TRAMADOL HYDROCHLORIDE 50 MG: 50 TABLET, FILM COATED ORAL at 07:23

## 2019-07-28 RX ADMIN — TRAMADOL HYDROCHLORIDE 50 MG: 50 TABLET, FILM COATED ORAL at 00:36

## 2019-07-28 RX ADMIN — ACETAMINOPHEN 975 MG: 325 TABLET ORAL at 22:21

## 2019-07-28 NOTE — PROGRESS NOTES
PM&R Progress Note:    Subjective: Patient seen face to face briefly  No acute issues  Has pain in right leg with therapies, feeling sore  Feels a bit better sitting and with ICE application  Review of Systems: Pertinent positives in subjective, all other ROS reviewed are negative  Functional update: Min A - Mod A for upper/lower body dressing  Transfers vary from CGA - Mod A     Objective:   /52 (BP Location: Right arm)   Pulse 75   Temp 98 7 °F (37 1 °C) (Oral)   Resp 17   Ht 5' (1 524 m)   Wt 51 3 kg (113 lb)   SpO2 98%   BMI 22 07 kg/m²   Focussed PE:   Gen: NAD  CV: RRR  Lungs: CTAB  Ext: R 2+ edema present, incisions covered  Motor: R DF 4+/5    Lab Results   Component Value Date    WBC 8 71 07/27/2019    HGB 8 5 (L) 07/27/2019    HCT 27 6 (L) 07/27/2019    MCV 92 07/27/2019     07/27/2019         Assessment/Plan: Jeannette Pereira is a 80 y o  female with Right femur fracture post ORIF     -Continue current rehabilitation plan of care to maximize function     -Continue current medical plan of care  Discussed with internal medicine consultants          Roxanne Mary MD  PM&R Attending

## 2019-07-28 NOTE — PROGRESS NOTES
07/28/19 1100   Pain Assessment   Pain Assessment 0-10   Pain Score 3   Pain Type Acute pain;Surgical pain   Pain Location Hip;Leg   Pain Orientation Right   Hospital Pain Intervention(s) Ambulation/increased activity;Repositioned   Restrictions/Precautions   Precautions Fall Risk;Contact/isolation;Pain   Weight Bearing Restrictions Yes   RLE Weight Bearing Per Order WBAT   Cognition   Overall Cognitive Status WFL   Arousal/Participation Alert; Cooperative   Subjective   Subjective Pt presents in w/c sleeping  Agreeable to session   QI: Sit to Stand   Assistance Needed Physical assistance; Adaptive equipment   Assistance Provided by [place] 25%-49%   Comment Jesus with RW    Sit to Stand CARE Score 3   QI: Chair/Bed-to-Chair Transfer   Assistance Needed Physical assistance; Adaptive equipment   Assistance Provided by [place] Less than 25%   Comment Jesus with RW    Chair/Bed-to-Chair Transfer CARE Score 3   Transfer Bed/Chair/Wheelchair   Limitations Noted In Balance; Endurance;Pain Management; Sequencing;LE Strength   Adaptive Equipment Roller Walker   Stand Pivot Minimal Assist;Contact Guard   Sit to Stand Minimal Assist   Stand to [facility], Chair, Wheelchair Transfer (FIM) 3 - [place] needs to lift, boost or assist to stand OR sit   QI: Walk 10 Feet   Assistance Needed Adaptive equipment; Incidental touching   Assistance Provided by [place] No physical assistance   Comment CGA with RW,    Walk 10 Feet CARE Score 4   QI: Walk 50 Feet with Two Turns   Assistance Needed Adaptive equipment; Incidental touching   Assistance Provided by [place] No physical assistance   Comment CGA with RW    Walk 50 Feet with Two Turns CARE Score 4   Ambulation   Does the patient walk? 2  Yes   Primary Discharge Mode of Locomotion Walk   Walk Assist Level Contact Guard   Gait Pattern Antalgic; Inconsistant Felipa; Slow Felipa;Decreased foot clearance;Decreased R stance; Step to; Improper weight shift   Assist Device Trina Mtz Distance Walked (feet) 60 ft  (x2 )   Limitations Noted In Balance; Endurance; Heel Strike;Speed;Strength;Swing   Findings Pt ambulated form PT gym back to room with inc time, one standing rest break around 60 ft  Pt with dec knee flexion and ext with cues to inc heel strike  SLow cadience with dec R stance time due to pain  No c/o dizziness this session with ambulation  Walking (FIM) 2 - Patient ambulates between 50 - 149 feet regardless of assist/device/set up   Wheelchair mobility   QI: Does the patient use a wheelchair? 0  No   Therapeutic Interventions   Strengthening Repeated STS and SPT to green chair in PT gym  Flexibility seated BLE h/s, gastroc and quad stretching x6min    Other Inc time for ambulation back to room from PT gym    Assessment   Treatment Assessment Pt participated in skilled PT with focus on inc Strength, functional mobility, ROM and ambulation  Pt presents "foggy" feeling in w/c which she attributed to pain meds  BP sitting manually 140/62 RUE  Pt with TEDs this session for edema controll  Pt inc time for ambulation backt to room at end of session  Inc distance with ambulation but cont o be limited by pain and stiffness  Gets relief with stretching  Pt cont to benefit from skilled PT to inc strength, balance, ROM and functional mobility as pt cares for  at home  Family/Caregiver Present no    Barriers to Discharge Inaccessible home environment;Decreased caregiver support   PT Barriers   Physical Impairment Decreased strength;Decreased range of motion;Decreased endurance; Impaired balance;Decreased mobility;Orthopedic restrictions;Pain   Functional Limitation Car transfers;Stair negotiation;Standing;Transfers; Walking   Plan   Treatment/Interventions LE strengthening/ROM; Functional transfer training;Elevations; Therapeutic exercise; Endurance training;Patient/family training;Equipment eval/education; Bed mobility;Gait training   Progress Progressing toward goals   Recommendation Recommendation Other (Comment)  (TBD )   Equipment Recommended Walker   PT Therapy Minutes   PT Time In 1100   PT Time Out 1140   PT Total Time (minutes) 40   PT Mode of treatment - Individual (minutes) 40   PT Mode of treatment - Concurrent (minutes) 0   PT Mode of treatment - Group (minutes) 0   PT Mode of treatment - Co-treat (minutes) 0   PT Mode of Teatment - Total time(minutes) 40 minutes   Therapy Time missed   Time missed?  No

## 2019-07-28 NOTE — PLAN OF CARE
Problem: Prexisting or High Potential for Compromised Skin Integrity  Goal: Skin integrity is maintained or improved  Description  INTERVENTIONS:  - Identify patients at risk for skin breakdown  - Assess and monitor skin integrity  - Assess and monitor nutrition and hydration status  - Monitor labs (i e  albumin)  - Assess for incontinence   - Turn and reposition patient  - Assist with mobility/ambulation  - Relieve pressure over bony prominences  - Avoid friction and shearing  - Provide appropriate hygiene as needed including keeping skin clean and dry  - Evaluate need for skin moisturizer/barrier cream  - Collaborate with interdisciplinary team (i e  Nutrition, Rehabilitation, etc )   - Patient/family teaching  Outcome: Progressing     Problem: Potential for Falls  Goal: Patient will remain free of falls  Description  INTERVENTIONS:  - Assess patient frequently for physical needs  -  Identify cognitive and physical deficits and behaviors that affect risk of falls    -  [place] fall precautions as indicated by assessment   - Educate patient/family on patient safety including physical limitations  - Instruct patient to call for assistance with activity based on assessment  - Modify environment to reduce risk of injury  - Consider OT/PT consult to assist with strengthening/mobility  Outcome: Progressing     Problem: PAIN - ADULT  Goal: Verbalizes/displays adequate comfort level or baseline comfort level  Description  Interventions:  - Encourage patient to monitor pain and request assistance  - Assess pain using appropriate pain scale  - Administer analgesics based on type and severity of pain and evaluate response  - Implement non-pharmacological measures as appropriate and evaluate response  - Consider cultural and social influences on pain and pain management  - Notify physician/advanced practitioner if interventions unsuccessful or patient reports new pain  Outcome: Progressing     Problem: INFECTION - ADULT  Goal: Absence or prevention of progression during hospitalization  Description  INTERVENTIONS:  - Assess and monitor for signs and symptoms of infection  - Monitor lab/diagnostic results  - Monitor all insertion sites, i e  indwelling lines, tubes, and drains  - Monitor endotracheal (as able) and nasal secretions for changes in amount and color  - [place] appropriate cooling/warming therapies per order  - Administer medications as ordered  - Instruct and encourage patient and family to use good hand hygiene technique  - Identify and instruct in appropriate isolation precautions for identified infection/condition  Outcome: Progressing  Goal: Absence of fever/infection during neutropenic period  Description  INTERVENTIONS:  - Monitor WBC  - Implement neutropenic guidelines  Outcome: Progressing     Problem: SAFETY ADULT  Goal: Maintain or return to baseline ADL function  Description  INTERVENTIONS:  -  Assess patient's ability to carry out ADLs; assess patient's baseline for ADL function and identify physical deficits which impact ability to perform ADLs (bathing, care of mouth/teeth, toileting, grooming, dressing, etc )  - Assess/evaluate cause of self-care deficits   - Assess range of motion  - Assess patient's mobility; develop plan if impaired  - Assess patient's need for assistive devices and provide as appropriate  - Encourage maximum independence but intervene and supervise when necessary  ¯ Involve family in performance of ADLs  ¯ Assess for home care needs following discharge   ¯ Request OT consult to assist with ADL evaluation and planning for discharge  ¯ Provide patient education as appropriate  Outcome: Progressing  Goal: Maintain or return mobility status to optimal level  Description  INTERVENTIONS:  - Assess patient's baseline mobility status (ambulation, transfers, stairs, etc )    - Identify cognitive and physical deficits and behaviors that affect mobility  - Identify mobility aids required to assist with transfers and/or ambulation (gait belt, sit-to-stand, lift, walker, cane, etc )  - [place] fall precautions as indicated by assessment  - Record patient progress and toleration of activity level on Mobility SBAR; progress patient to next Phase/Stage  - Instruct patient to call for assistance with activity based on assessment  - Request Rehabilitation consult to assist with strengthening/weightbearing, etc   Outcome: Progressing     Problem: DISCHARGE PLANNING  Goal: Discharge to home or other facility with appropriate resources  Description  INTERVENTIONS:  - Identify barriers to discharge w/patient and caregiver  - Arrange for needed discharge resources and transportation as appropriate  - Identify discharge learning needs (meds, wound care, etc )  - Arrange for interpretive services to assist at discharge as needed  - Refer to Case Management Department for coordinating discharge planning if the patient needs post-hospital services based on physician/advanced practitioner order or complex needs related to functional status, cognitive ability, or social support system  Outcome: Progressing     Problem: Knowledge Deficit  Goal: Patient/family/caregiver demonstrates understanding of disease process, treatment plan, medications, and discharge instructions  Description  Complete learning assessment and assess knowledge base    Interventions:  - Provide teaching at level of understanding  - Provide teaching via preferred learning methods  Outcome: Progressing

## 2019-07-28 NOTE — PROGRESS NOTES
07/28/19 0700   Pain Assessment   Pain Assessment 0-10   Pain Score 7   Restrictions/Precautions   Precautions Fall Risk;Contact/isolation;Pain   Weight Bearing Restrictions Yes   RLE Weight Bearing Per Order WBAT   QI: Oral Hygiene   Assistance Needed Supervision   Assistance Provided by [place] No physical assistance   Comment wash face and brush hair sitting at sinki   Oral Hygiene CARE Score 4   Grooming   Able To Initiate Tasks; Wash/Dry Face;Comb/Brush Hair   Grooming (FIM) 5 - Patient requires supervision/monitoring   QI: Shower/Bathe Self   Assistance Needed Physical assistance   Assistance Provided by [place] Less than 25%   Comment SB   Shower/Bathe Self CARE Score 3   Bathing   Assessed Bath Style Sponge Bath   Anticipated D/C Bath Style Sponge Bath   Able to [address] No   Able to [employer] Temperature Yes   Able to Wash/Rinse/Dry (body part) Left Arm;Right Arm;L Upper Leg;R Upper Leg;Chest;Abdomen;Perineal Area; Buttocks   Limitations Noted in Balance; Endurance;ROM   Positioning Seated;Standing   Bathing (FIM) 4 - Patient completes 8/10 or 9/10 parts   QI: Upper Body Dressing   Assistance Needed Set-up / [address] [facility] Provided by [place] No physical assistance   Comment bra and dress   Upper Body Dressing CARE Score 5   QI: Lower Body Dressing   Assistance Needed Physical assistance   Assistance Provided by [place] Less than 25%   Comment don/doff socks and underwear   Lower Body Dressing CARE Score 3   Dressing/Undressing Clothing   Remove UB Clothes Other  (gown)   Remove LB Clothes Undergarment   Don UB Clothes Bra; Other  (dress)   Elton Lam LB Clothes Undergarment;Socks   Limitations Noted In Balance; Endurance; Safety;ROM;Strength   Positioning Supported Sit;Standing   UB Dressing (FIM) 5 - Patient requires supervision/monitoring   LB Dressing (FIM) 4 - Patient completes 75% of all tasks   QI: Lying to Sitting on Side of Bed   Assistance Needed Physical assistance   Assistance Provided by [place] Less than 25%   Comment HOB slightly elevated   Lying to Sitting on Side of Bed CARE Score 3   QI: Sit to Stand   Assistance Needed Physical assistance   Assistance Provided by [place] 25%-49%   Sit to Stand CARE Score 3   Bed Mobility   Supine to Sit 4  Minimal assistance   Additional items Assist x 1;HOB elevated;Verbal cues;LE management   QI: Chair/Bed-to-Chair Transfer   Assistance Needed Physical assistance   Assistance Provided by [place] 25%-49%   Chair/Bed-to-Chair Transfer CARE Score 3   Transfer Bed/Chair/Wheelchair   Limitations Noted In Balance; Endurance;UE Strength;LE Strength   Adaptive Equipment Roller Walker   Stand Pivot Contact Guard   Sit to Stand Moderate Assist   Stand to Sit Minimal   Supine to Sit Minimal   Bed, Chair, Wheelchair Transfer (FIM) 3 - [place] needs to lift, boost or assist to stand OR sit   QI: [address] Needed Incidental touching   Assistance Provided by [place] No physical assistance   Comment BM and bladder; pt able to manage toilet hygiene   Toileting Hygiene CARE Score 4   [facility] to [address] down yes, up yes  Able to Manage Clothing Closures No   Manage Hygiene Bowel;Bladder   Limitations Noted In Balance; Safety   Toileting (FIM) 3 - Patient completes  50-74% of all tasks   QI: Toilet Transfer   Assistance Needed Physical assistance   Assistance Provided by [place] 25%-49%   Toilet Transfer CARE Score 3   Toilet Transfer   Surface Assessed Standard Commode   Transfer Technique Standard   Limitations Noted In Balance; Endurance;LE Strength   Adaptive Equipment Grab Bar   Toilet Transfer (FIM) 3 - [place] needs to lift, boost or assist to stand OR sit   Functional Standing Tolerance   Time 10 min   Activity static standing at table   Cognition   Overall Cognitive Status [facility]   Arousal/Participation Alert   Attention Within functional limits   Orientation Level Oriented X4   Memory Within functional limits   Following Commands Follows one step commands with increased time or repetition   Activity Tolerance   Activity Tolerance Patient tolerated treatment well   Assessment   Treatment Assessment Pt participates in OT session with focus on bathing, dressing, grooming, toileting, transfers, activity tolerance, and UE strengthening to increase I for d/c  Pt min A supine to sit EOB with HOB slightly elevated  Pt requires A for RLE 2* pain and decreased ROM  Pt mod Ax1 sit to stand for SPT from EOB to w/c along with HHA  Pt mod A toileting for bladder and BM and pt able to manage toilet hygiene with steadying during stance  Pt setup grooming to wash face and brush hair at the sink  Pt min A bathing with SB with A needed for both feet 2* decreased ROM  Pt setup UB dressing to don dress and bra  Pt min A LB dressing to don/doff socks and underwear and requires A to don sock on RLE  Pt engaged in static standing activity with pegboard activity for 10 min duration at the table  Pt engaged in UE strengthening with 3# dowel 3x10 for bicep curls, chest presses, and rows  Pt also used 2# dumbell for RUE 3x10 and not weight for LUE 2* rotator cuff injury  Pt will continue to benefit from activity tolerance, adls, and transfers  Prognosis Good   Problem List Decreased strength;Decreased range of motion;Decreased endurance; Impaired balance;Decreased mobility;Pain   Plan   Treatment/Interventions ADL retraining;Functional transfer training;LE strengthening/ROM; Therapeutic exercise; Endurance training   Progress Progressing toward goals   OT Therapy Minutes   OT Time In 0700   OT Time Out 0830   OT Total Time (minutes) 90   OT Mode of treatment - Individual (minutes) 90   OT Mode of treatment - Concurrent (minutes) 0   OT Mode of treatment - Group (minutes) 0   OT Mode of treatment - Co-treat (minutes) 0   OT Mode of Teatment - Total time(minutes) 90 minutes   Therapy Time missed   Time missed?  No

## 2019-07-28 NOTE — PROGRESS NOTES
Internal Medicine Progress Note  Patient: Susan Zelaya  Age/sex: 80 y o  female  Medical Record #: [id number]      ASSESSMENT/PLAN: (Interval History)  Susan Zelaya is seen and examined and management for following issues:    Right hip fx; s/p TFN 7/23/19:  WBAT; watch incision     ABLA: Hgb 8 5  No indication for transfusion      Chronic diastolic CHF with LVEF 11%, mild MR/moderate AS and AR/moderate TR:  she will need to go back to see Dr Fabio Prado since he has been following her for this and can then refer to Cardiology if he and the patient wish  She is to remain on her home dose of Lasix 80mg daily = she feels edema is baseline      HTN:  at home, she had been on Verapamil but changed in April to Toprol 50mg daily 2/2 insurance would not pay for the Verapamil; however, since she has a lot of Verpamil left, she has been using them up and then is going to switch back to the Toprol  Will keep Verapamil 240mg qd for now     Left 3rd toe wound and right great toe subungual hematoma:  had been seen by Podiatry; they did remove an ingrown toenail left 3rd toe  They feel that she is likely to lose the nail on the right great toe  Continue local care      Subjective/ HPI:  Patients overnight issues or events were reviewed with nursing or staff during rounds or morning huddle session  New or overnight issues  ABLA: Hgb 8 5  Repeat Monday  HTN:  Stable on current tx  Chronic diastolic CHF: stable  Offers no complaints except feels "fuzzy headed" and tired       ROS:     GI: denies abdominal pain, change bowel habits or reflux symptoms  Neuro: Denies any headache, new vision changes, new neuropathies,new weaknesses   Respiratory: No Cough, SOB, denies wheeze  Cardiovascular: No CP, palpitations , denies perception of rapid heartbeat  : denies any new urinary burning or frequency    Review of Scheduled Meds:    Current Facility-Administered Medications:  acetaminophen 975 mg Oral Q8H Esthela Ace MD bacitracin 1 small application Topical Daily JUAN Marie   docusate sodium 100 mg Oral BID Balwinder Moran MD   enoxaparin 40 mg Subcutaneous Daily Balwinder Moran MD   furosemide 80 mg Oral Daily Balwinder Moran MD   pantoprazole 40 mg Oral Daily Before Breakfast Balwinder Moran MD   polyethylene glycol 17 g Oral Daily PRN Balwinder Moran MD   traMADol 25 mg Oral Q4H PRN Balwinder Moran MD   traMADol 50 mg Oral Q4H PRN Balwinder Moran MD   verapamil 240 mg Oral Daily Lina Prince MD       Labs:     Results from last 7 days   Lab Units 07/27/19  0922 07/26/19  0640   WBC Thousand/uL 8 71 7 30   HEMOGLOBIN g/dL 8 5* 7 5*   HEMATOCRIT % 27 6* 24 0*   PLATELETS Thousands/uL 229 164     Results from last 7 days   Lab Units 07/26/19  0640 07/25/19  0623   SODIUM mmol/L 143 142   POTASSIUM mmol/L 3 8 3 7   CHLORIDE mmol/L 108 106   CO2 mmol/L 33* 32   BUN mg/dL 19 21   CREATININE mg/dL 0 65 0 84   CALCIUM mg/dL 8 4 8 2*         Results from last 7 days   Lab Units 07/22/19  1721   INR  1 02              Imaging:     No orders to display       *Labs reviewed  *Radiology studies reviewed  *Medications reviewed and reconciled as needed  *Please refer to order section for additional ordered labs studies  *Case discussed with primary attending during morning huddle case rounds    Physical Examination:  Vitals:   Vitals:    07/28/19 0558 07/28/19 0559 07/28/19 0600 07/28/19 0850   BP:  116/56  114/56   BP Location:  Left arm  Left arm   Pulse:  85  68   Resp:  18     Temp:  98 3 °F (36 8 °C)     TempSrc:  Oral     SpO2: 96%      Weight:   51 3 kg (113 lb)    Height:           General Appearance: NAD, conversive  Eyes: No icterus; conjunctiva normal, PERRLA  HENT: oropharynx clear; mucous membranes moist  Neck: trachea midline, range of motion full     Lungs: CTA, normal respiratory effort, no retractions, expiratory effort normal  CV: regular rate, no rubs/murmurs/gallops  ABD: soft: NT/ND; +BS  EXT: mild-mod edema of lower legs as always edema  Skin: normal turgor, normal texture, no rashes  Psych: affect normal, no overt anxiety/depression   Neuro: AAOx3            Total time spent: At least 40 minutes, with more than 50% spent counseling/coordinating care  Counseling includes discussion with patient re: progress  and discussion with patient of his/her current medical state/information  Coordination of patient's care was performed in conjunction with primary service  Time invested included review of patient's labs, vitals, and management of their comorbidities with continued monitoring  In addition, this patient was discussed with medical team including physician and advanced extenders  The care of the patient was extensively discussed and appropriate treatment plan was formulated unique for this patient  ** Please Note: Dragon 360 Dictation voice to text software may have been used in the creation of this document   **

## 2019-07-29 LAB
ANION GAP SERPL CALCULATED.3IONS-SCNC: 3 MMOL/L (ref 4–13)
BASOPHILS # BLD AUTO: 0.05 THOUSANDS/ΜL (ref 0–0.1)
BASOPHILS NFR BLD AUTO: 1 % (ref 0–1)
BUN SERPL-MCNC: 23 MG/DL (ref 5–25)
CALCIUM SERPL-MCNC: 9 MG/DL (ref 8.3–10.1)
CHLORIDE SERPL-SCNC: 102 MMOL/L (ref 100–108)
CO2 SERPL-SCNC: 35 MMOL/L (ref 21–32)
CREAT SERPL-MCNC: 0.72 MG/DL (ref 0.6–1.3)
EOSINOPHIL # BLD AUTO: 0.31 THOUSAND/ΜL (ref 0–0.61)
EOSINOPHIL NFR BLD AUTO: 3 % (ref 0–6)
ERYTHROCYTE [DISTWIDTH] IN BLOOD BY AUTOMATED COUNT: 13.6 % (ref 11.6–15.1)
GFR SERPL CREATININE-BSD FRML MDRD: 77 ML/MIN/1.73SQ M
GLUCOSE SERPL-MCNC: 99 MG/DL (ref 65–140)
HCT VFR BLD AUTO: 24.8 % (ref 34.8–46.1)
HGB BLD-MCNC: 7.7 G/DL (ref 11.5–15.4)
IMM GRANULOCYTES # BLD AUTO: 0.13 THOUSAND/UL (ref 0–0.2)
IMM GRANULOCYTES NFR BLD AUTO: 1 % (ref 0–2)
LYMPHOCYTES # BLD AUTO: 1.44 THOUSANDS/ΜL (ref 0.6–4.47)
LYMPHOCYTES NFR BLD AUTO: 16 % (ref 14–44)
MCH RBC QN AUTO: 28.6 PG (ref 26.8–34.3)
MCHC RBC AUTO-ENTMCNC: 31 G/DL (ref 31.4–37.4)
MCV RBC AUTO: 92 FL (ref 82–98)
MONOCYTES # BLD AUTO: 0.96 THOUSAND/ΜL (ref 0.17–1.22)
MONOCYTES NFR BLD AUTO: 10 % (ref 4–12)
NEUTROPHILS # BLD AUTO: 6.42 THOUSANDS/ΜL (ref 1.85–7.62)
NEUTS SEG NFR BLD AUTO: 69 % (ref 43–75)
NRBC BLD AUTO-RTO: 0 /100 WBCS
PLATELET # BLD AUTO: 246 THOUSANDS/UL (ref 149–390)
PMV BLD AUTO: 11.1 FL (ref 8.9–12.7)
POTASSIUM SERPL-SCNC: 3.9 MMOL/L (ref 3.5–5.3)
RBC # BLD AUTO: 2.69 MILLION/UL (ref 3.81–5.12)
SODIUM SERPL-SCNC: 140 MMOL/L (ref 136–145)
WBC # BLD AUTO: 9.31 THOUSAND/UL (ref 4.31–10.16)

## 2019-07-29 PROCEDURE — 97116 GAIT TRAINING THERAPY: CPT

## 2019-07-29 PROCEDURE — 97530 THERAPEUTIC ACTIVITIES: CPT

## 2019-07-29 PROCEDURE — 97110 THERAPEUTIC EXERCISES: CPT

## 2019-07-29 PROCEDURE — 97535 SELF CARE MNGMENT TRAINING: CPT

## 2019-07-29 PROCEDURE — 99232 SBSQ HOSP IP/OBS MODERATE 35: CPT | Performed by: PHYSICAL MEDICINE & REHABILITATION

## 2019-07-29 PROCEDURE — 85025 COMPLETE CBC W/AUTO DIFF WBC: CPT | Performed by: NURSE PRACTITIONER

## 2019-07-29 PROCEDURE — 80048 BASIC METABOLIC PNL TOTAL CA: CPT | Performed by: NURSE PRACTITIONER

## 2019-07-29 RX ADMIN — POLYETHYLENE GLYCOL 3350 17 G: 17 POWDER, FOR SOLUTION ORAL at 14:14

## 2019-07-29 RX ADMIN — TRAMADOL HYDROCHLORIDE 50 MG: 50 TABLET, FILM COATED ORAL at 14:09

## 2019-07-29 RX ADMIN — BACITRACIN ZINC 1 SMALL APPLICATION: 500 OINTMENT TOPICAL at 08:54

## 2019-07-29 RX ADMIN — FUROSEMIDE 80 MG: 80 TABLET ORAL at 08:52

## 2019-07-29 RX ADMIN — ACETAMINOPHEN 975 MG: 325 TABLET ORAL at 14:01

## 2019-07-29 RX ADMIN — ACETAMINOPHEN 975 MG: 325 TABLET ORAL at 22:19

## 2019-07-29 RX ADMIN — DOCUSATE SODIUM 100 MG: 100 CAPSULE, LIQUID FILLED ORAL at 08:52

## 2019-07-29 RX ADMIN — TRAMADOL HYDROCHLORIDE 50 MG: 50 TABLET, FILM COATED ORAL at 00:24

## 2019-07-29 RX ADMIN — ENOXAPARIN SODIUM 40 MG: 40 INJECTION SUBCUTANEOUS at 22:19

## 2019-07-29 RX ADMIN — VERAPAMIL HYDROCHLORIDE 240 MG: 240 TABLET, FILM COATED, EXTENDED RELEASE ORAL at 08:52

## 2019-07-29 RX ADMIN — ACETAMINOPHEN 975 MG: 325 TABLET ORAL at 05:58

## 2019-07-29 RX ADMIN — TRAMADOL HYDROCHLORIDE 50 MG: 50 TABLET, FILM COATED ORAL at 08:58

## 2019-07-29 RX ADMIN — PANTOPRAZOLE SODIUM 40 MG: 40 TABLET, DELAYED RELEASE ORAL at 05:58

## 2019-07-29 NOTE — PROGRESS NOTES
07/29/19 0830   Pain Assessment   Pain Assessment 0-10   Pain Score 7   Pain Type Acute pain;Surgical pain   Pain Location Coccyx;Hip;Leg   Pain Orientation Right   Restrictions/Precautions   Precautions Fall Risk;Pain;Contact/isolation   Weight Bearing Restrictions Yes   RLE Weight Bearing Per Order WBAT   Subjective   Subjective pt reports feeling okay and ready for PT session c/o pain as above    QI: Sit to Lying   Assistance Needed Physical assistance   Assistance Provided by [place] 50%-74%   Sit to Lying CARE Score 2   QI: Lying to Sitting on Side of Bed   Assistance Needed Physical assistance   Assistance Provided by [place] 25%-49%   Lying to Sitting on Side of Bed CARE Score 3   QI: Sit to Stand   Assistance Needed Physical assistance   Assistance Provided by [place] 25%-49%   Sit to Stand CARE Score 3   QI: Chair/Bed-to-Chair Transfer   Assistance Needed Physical assistance   Assistance Provided by [place] Less than 25%   Chair/Bed-to-Chair Transfer CARE Score 3   Transfer Bed/Chair/Wheelchair   Limitations Noted In Balance; Endurance;Pain Management; Sequencing;LE Strength   Adaptive Equipment Roller Walker   Stand Pivot Minimal Assist;Contact Guard   Sit to Stand Minimal Assist;Contact Guard   Stand to [facility]   Supine to Sit Moderate Assist   Sit to Supine Moderate Assist   Findings RLE needs Assist/ supine <> sit    Bed, Chair, Wheelchair Transfer (FIM) 3 - Patient completes 50 - 74% of all tasks   QI: Walk 10 Feet   Assistance Needed Adaptive equipment; Incidental touching   Walk 10 Feet CARE Score 4   QI: Walk 50 Feet with Two Turns   Assistance Needed Incidental touching; Adaptive equipment   Walk 50 Feet with Two Turns CARE Score 4   QI: Walk 150 Feet   Reason if not Attempted Safety concerns   Walk 150 Feet CARE Score 88   Ambulation   Does the patient walk? 2  Yes   Primary Discharge Mode of Locomotion Walk   Walk Assist Level Contact Guard   Gait Pattern Antalgic; Inconsistant Felipa; Slow Felipa;Narrow EVELYN; Forward Flexion; Improper weight shift   Assist Device Roller Justa Patino Walked (feet) 100 ft   Limitations Noted In Balance; Endurance;Strength;Sensation   Findings pt walking 100 feet today inc form over the weekend ,50 feet x2 with RW short distance   Walking (FIM) 2 - Patient ambulates between 50 - 149 feet regardless of assist/device/set up   Therapeutic Interventions   Strengthening supine SAQ x15 QS x15 heelslides with towel and slide board x10 , hip add/abd x10 needs Assist/ gluts x15    Balance seated and standing balance w/o RW    Other walking BWD and side to side    Assessment      pt states had a great session and no c/o                              at end of skilled PT    Treatment Assessment pt perform skilled PT inc functional mobility , strengthening , conditioning , inc gait distance , pt RLE swelling noted but ave TEDS on  Pt also perform STS and SPT to RW vc's for hand placement , pt also progressing monitoring pt RLE swelling , pt will cont to need skilled PT to meet goals    Barriers to Discharge Inaccessible home environment;Decreased caregiver support   Plan   Treatment/Interventions Functional transfer training;LE strengthening/ROM; Therapeutic exercise; Endurance training;Cognitive reorientation;Equipment eval/education; Bed mobility;Gait training   Progress Progressing toward goals   PT Therapy Minutes   PT Time In 0830   PT Time Out 1000   PT Total Time (minutes) 90   PT Mode of treatment - Individual (minutes) 90   PT Mode of treatment - Concurrent (minutes) 0   PT Mode of treatment - Group (minutes) 0   PT Mode of treatment - Co-treat (minutes) 0   PT Mode of Teatment - Total time(minutes) 90 minutes   Therapy Time missed   Time missed?  No

## 2019-07-29 NOTE — PROGRESS NOTES
Physical Medicine and Rehabilitation Progress Note  Umu Loza 80 y o  female MRN: [id number]  Unit/Bed#: [facility] 364-91 Encounter: [id number]    HPI: Umu Loza is a 80 y o  female who presented to the [facility] after fall at home  Found to have right hip fracture  Now s/p IM nailing of right femur on 7/23/19  Patient WBAT  Postoperatively noted to have ABLA, mild leukocytosis  Also required podiatry consult for left 3rd toe and left large toe wound  Patient was evaluated by therapy services, found to be below functional baseline  She was accepted to the [facility] on 7/25/19  Chief Complaint: f/u fracture and f/u ambulatory dysfunction    Interval: No acute events overnight  Denies any CP or SOB  reports pain is getting more tolerable  ROS: A 10 point ROS was performed; negative except as noted above  Assessment/Plan:    * Closed right hip fracture, initial encounter [facility])  Assessment & Plan  · Acute comprehensive interdisciplinary inpatient rehabilitation including PT, OT, RN, CM, SW, dietary, psychology, etc   · WBAT    Anemia  Assessment & Plan  Results from last 7 days   Lab Units 07/29/19  0504 07/27/19  0922 07/26/19  0640   HEMOGLOBIN g/dL 7 7* 8 5* 7 5*     · Likely post-operative  · Monitor CBC intermittently  · Transfuse for Hgb <7   Did not require transfusion this weekend as H/h has improved     Chronic diastolic congestive heart failure (HCC)  Assessment & Plan  Wt Readings from Last 3 Encounters:   07/29/19 51 8 kg (114 lb 3 2 oz)   07/25/19 50 3 kg (110 lb 14 3 oz)   04/02/19 51 3 kg (113 lb)     · Daily weights  · Lasix daily    Essential hypertension  Assessment & Plan  Temp:  [98 3 °F (36 8 °C)-98 8 °F (37 1 °C)] 98 3 °F (36 8 °C)  HR:  [66-75] 66  Resp:  [17-19] 19  BP: (104-139)/(52-61) 137/61    · Verapamil    # Skin  · Encourage regular turning as patient at risk for skin breakdown  · Staff to continue patient education on Q2h turning  · Rehabilitation team to perform skin checks regularly     # Bowel  · Patient reports no constipation  · To ensure regular BMs, bowel regimen consisting of:  colace , dulcolax suppository  and miralax     # Bladder  · Patient voiding spontaneously    # Pain  · Continue tylenol, for max of 3gm daily  · Continue tramadol    # Rehab Psych   · There are no psychological or psychiatric problems identified    # Other  - Diet/Nutrition:        Diet Orders   (From admission, onward)             Start     Ordered    07/25/19 1647  Diet Cardiovascular; Sodium 2 GM  Diet effective now     Question Answer Comment   Diet Type Cardiovascular    Cardiac Sodium 2 GM    RD to adjust diet per protocol?  Yes        07/25/19 1647 07/25/19 1647  Room Service  Once     Question:  Type of Service  Answer:  Room Service - Appropriate with Assistance    07/25/19 1647              - DVT prophy: Sequential compression device (Venodyne)  and Enoxaparin (Lovenox)  - GI ppx: Pantaprazole  - Nausea: None  - Supplements: None  - Sleep: None    Disposition: TBD    CODE: Level 1: Full Code Scheduled Meds:    Current Facility-Administered Medications:  acetaminophen 975 mg Oral Q8H [address] Balwinder Moran MD   bacitracin 1 small application Topical Daily JUAN Marie   docusate sodium 100 mg Oral BID Balwinder Moran MD   enoxaparin 40 mg Subcutaneous Daily Balwinder Moran MD   furosemide 80 mg Oral Daily Balwinder Moran MD   pantoprazole 40 mg Oral Daily Before Breakfast Balwinder Moran MD   polyethylene glycol 17 g Oral Daily PRN Balwinder Moran MD   traMADol 25 mg Oral Q4H PRN Balwinder Moran MD   traMADol 50 mg Oral Q4H PRN Balwinder Moran MD   verapamil 240 mg Oral Daily Balwinder Moran MD        Objective:    Functional Update:  Mobility: '  Transfers: mod assist  ADLs: min lowers, supervision uppers    Allergies per EMR    Physical Exam:  Temp:  [98 3 °F (36 8 °C)-98 8 °F (37 1 °C)] 98 3 °F (36 8 °C)  HR:  [66-67] 66  Resp: [18-19] 19  BP: (120-139)/(56-61) 137/61  SpO2:  [97 %-98 %] 97 %    General: alert, no apparent distress, cooperative and comfortable  HEENT:  Head: Normocephalic, no lesions, without obvious abnormality  LUNGS:  no abnormal respiratory pattern, no retractions noted, non-labored breathing   ABDOMEN:  soft, non-tender  Bowel sounds normal  No masses, no organomegaly  EXTREMITIES:  edema 1+ bilateral LEs  NEURO:   mental status, speech normal, alert and oriented x3  PSYCH:  Alert and oriented, appropriate affect  Physical examination is otherwise unchanged from previous encounter, except as noted above  Diagnostic Studies: Reviewed, no new imaging  No orders to display     Laboratory: Reviewed  Results from last 7 days   Lab Units 07/29/19  0504 07/27/19  0922 07/26/19  0640   HEMOGLOBIN g/dL 7 7* 8 5* 7 5*   HEMATOCRIT % 24 8* 27 6* 24 0*   WBC Thousand/uL 9 31 8 71 7 30     Results from last 7 days   Lab Units 07/29/19  0504 07/26/19  0640 07/25/19  0623  07/22/19  1721   BUN mg/dL 23 19 21   < > 30*   SODIUM mmol/L 140 143 142   < > 142   POTASSIUM mmol/L 3 9 3 8 3 7   < > 3 5   CHLORIDE mmol/L 102 108 106   < > 102   CREATININE mg/dL 0 72 0 65 0 84   < > 1 06   AST U/L  --   --   --   --  28   ALT U/L  --   --   --   --  20    < > = values in this interval not displayed       Results from last 7 days   Lab Units 07/22/19  1721   PROTIME seconds 10 7   INR  1 02        Patient Active Problem List   Diagnosis    Cataract    Essential hypertension    Aortic stenosis, moderate    Atrial dilatation, bilateral    Mild tricuspid insufficiency    Non-rheumatic mitral regurgitation    Chronic diastolic congestive heart failure (HCC)    Localized edema    Leg foot wound- 3rd digit    Other fatigue    Arthritis    Closed right hip fracture, initial encounter ([address] )    Intertrochanteric fracture of right femur ([address] )    Mitral valve stenosis, mild    Anemia       ** Please Note: Fluency Direct voice to text software may have been used in the creation of this document   **

## 2019-07-29 NOTE — PLAN OF CARE
Problem: Prexisting or High Potential for Compromised Skin Integrity  Goal: Skin integrity is maintained or improved  Description  INTERVENTIONS:  - Identify patients at risk for skin breakdown  - Assess and monitor skin integrity  - Assess and monitor nutrition and hydration status  - Monitor labs (i e  albumin)  - Assess for incontinence   - Turn and reposition patient  - Assist with mobility/ambulation  - Relieve pressure over bony prominences  - Avoid friction and shearing  - Provide appropriate hygiene as needed including keeping skin clean and dry  - Evaluate need for skin moisturizer/barrier cream  - Collaborate with interdisciplinary team (i e  Nutrition, Rehabilitation, etc )   - Patient/family teaching  Outcome: Progressing     Problem: Potential for Falls  Goal: Patient will remain free of falls  Description  INTERVENTIONS:  - Assess patient frequently for physical needs  -  Identify cognitive and physical deficits and behaviors that affect risk of falls    -  [place] fall precautions as indicated by assessment   - Educate patient/family on patient safety including physical limitations  - Instruct patient to call for assistance with activity based on assessment  - Modify environment to reduce risk of injury  - Consider OT/PT consult to assist with strengthening/mobility  Outcome: Progressing     Problem: PAIN - ADULT  Goal: Verbalizes/displays adequate comfort level or baseline comfort level  Description  Interventions:  - Encourage patient to monitor pain and request assistance  - Assess pain using appropriate pain scale  - Administer analgesics based on type and severity of pain and evaluate response  - Implement non-pharmacological measures as appropriate and evaluate response  - Consider cultural and social influences on pain and pain management  - Notify physician/advanced practitioner if interventions unsuccessful or patient reports new pain  Outcome: Progressing     Problem: INFECTION - ADULT  Goal: Absence or prevention of progression during hospitalization  Description  INTERVENTIONS:  - Assess and monitor for signs and symptoms of infection  - Monitor lab/diagnostic results  - Monitor all insertion sites, i e  indwelling lines, tubes, and drains  - Monitor endotracheal (as able) and nasal secretions for changes in amount and color  - [place] appropriate cooling/warming therapies per order  - Administer medications as ordered  - Instruct and encourage patient and family to use good hand hygiene technique  - Identify and instruct in appropriate isolation precautions for identified infection/condition  Outcome: Progressing  Goal: Absence of fever/infection during neutropenic period  Description  INTERVENTIONS:  - Monitor WBC  - Implement neutropenic guidelines  Outcome: Progressing     Problem: SAFETY ADULT  Goal: Maintain or return to baseline ADL function  Description  INTERVENTIONS:  -  Assess patient's ability to carry out ADLs; assess patient's baseline for ADL function and identify physical deficits which impact ability to perform ADLs (bathing, care of mouth/teeth, toileting, grooming, dressing, etc )  - Assess/evaluate cause of self-care deficits   - Assess range of motion  - Assess patient's mobility; develop plan if impaired  - Assess patient's need for assistive devices and provide as appropriate  - Encourage maximum independence but intervene and supervise when necessary  ¯ Involve family in performance of ADLs  ¯ Assess for home care needs following discharge   ¯ Request OT consult to assist with ADL evaluation and planning for discharge  ¯ Provide patient education as appropriate  Outcome: Progressing  Goal: Maintain or return mobility status to optimal level  Description  INTERVENTIONS:  - Assess patient's baseline mobility status (ambulation, transfers, stairs, etc )    - Identify cognitive and physical deficits and behaviors that affect mobility  - Identify mobility aids required to assist with transfers and/or ambulation (gait belt, sit-to-stand, lift, walker, cane, etc )  - [place] fall precautions as indicated by assessment  - Record patient progress and toleration of activity level on Mobility SBAR; progress patient to next Phase/Stage  - Instruct patient to call for assistance with activity based on assessment  - Request Rehabilitation consult to assist with strengthening/weightbearing, etc   Outcome: Progressing     Problem: DISCHARGE PLANNING  Goal: Discharge to home or other facility with appropriate resources  Description  INTERVENTIONS:  - Identify barriers to discharge w/patient and caregiver  - Arrange for needed discharge resources and transportation as appropriate  - Identify discharge learning needs (meds, wound care, etc )  - Arrange for interpretive services to assist at discharge as needed  - Refer to Case Management Department for coordinating discharge planning if the patient needs post-hospital services based on physician/advanced practitioner order or complex needs related to functional status, cognitive ability, or social support system  Outcome: Progressing     Problem: Knowledge Deficit  Goal: Patient/family/caregiver demonstrates understanding of disease process, treatment plan, medications, and discharge instructions  Description  Complete learning assessment and assess knowledge base    Interventions:  - Provide teaching at level of understanding  - Provide teaching via preferred learning methods  Outcome: Progressing

## 2019-07-29 NOTE — PROGRESS NOTES
07/29/19 1300   Pain Assessment   Pain Assessment 0-10   Pain Score 9   Pain Type Acute pain   Pain Location Hip;Leg;Shoulder   Pain Orientation Left;Right   Hospital Pain Intervention(s) Repositioned;Cold applied; Other (Comment); Environmental changes; Emotional support  (nurse notified)   Restrictions/Precautions   Precautions Fall Risk;Pain;Contact/isolation   Weight Bearing Restrictions Yes   RLE Weight Bearing Per Order WBAT   QI: Putting On/Taking Off Footwear   Assistance Needed Supervision; Adaptive equipment   Assistance Provided by Ama No physical assistance   Comment Pt continues to be limited with LB dressing 2* to increased pain in R hip  Continues to benefit from [place] training  After demonstration pt is able to complete at an overall CS level with increased time  Putting On/Taking Off Footwear CARE Score 4   QI: Sit to Stand   Assistance Needed Physical assistance   Assistance Provided by Ama 50%-74%   Comment 2* to increased pain pt does require up to modA to boost up from surfaces  Sit to Stand CARE Score 2   QI: Chair/Bed-to-Chair Transfer   Assistance Needed Physical assistance   Assistance Provided by Ama 50%-74%   Chair/Bed-to-Chair Transfer CARE Score 2   Transfer Bed/Chair/Wheelchair   Limitations Noted In Balance; Endurance;Pain Management;LE Strength   Adaptive Equipment Roller Walker   Findings Pt limited by pain and requires up to modA for initial sit to stands  Bed, Chair, Wheelchair Transfer (FIM) 3 - Ama needs to lift, boost or assist to stand OR sit   Light Housekeeping   Light Housekeeping Level Walker   Light Housekeeping Level of Assistance Contact guard   Light Housekeeping Pt completes laundry tasks while in stance at tabletop with occasional UE support as needed to maintain balance  Pt overall tolerates task well with increased time  Demos unequal weight shift while in stance as she treats RLE as TTWB this session   Educated pt that she can attempt putting more weight through as she tolerates but continues to be limited due to pain  Pt tolerates standing for ~5 minutes at a CGA level once in stance  Exercise Tools   Other Exercise Tool 1 Gentle UB strengthening completed with use of 2# dowel bar moving through bicep curls and chest press completing 3 x 10 each to increase UB strength, reduce UE stiffness, and endurance to increase independence with ADL tasks  Pt reports no increase in pain post exercises  Cognition   Overall Cognitive Status WFL   Arousal/Participation Alert; Cooperative   Attention Within functional limits   Orientation Level Oriented X4   Memory Within functional limits   Following Commands Follows one step commands with increased time or repetition   Activity Tolerance   Activity Tolerance Patient tolerated treatment well   Medical Staff Made Aware Nurse, Vivek Mahoney, present to administer medications  Assessment   Treatment Assessment Pt participated in skilled OT services with focus on LB dressing, functional transfers, standing tolerance, and homemaking tasks  Pt continues to be limited by pain in R knee and hip and pain in L shoulder  Pt motivated to participate in session as tolerated  Pt provided with education on pacing and energy conservation techniques  Pt also educated on pain and on strategies to identify s/s to alleviate severe pain  Time spent on repostioning pt in recliner chair with legs elevated, IP issued to R hip and L shoulder and education on repositioning frequently to avoid discomfort  Pt primary caregiver for disabled  and was functioning at an independent level without an AD PTA  Pt will continue to benefit from skilled OT services with focus on standing tolerance/balance, endurance, LB management, I/ADL retraining  Prognosis Good   Problem List Decreased strength;Decreased range of motion;Decreased endurance; Impaired balance;Decreased mobility;Pain   Plan   Treatment/Interventions ADL retraining;Functional transfer training; Therapeutic exercise; Endurance training;Cognitive reorientation;Patient/family training;Equipment eval/education; Bed mobility; Compensatory technique education   Progress Progressing toward goals   Recommendation   OT Discharge Recommendation   (pending progress)   OT Therapy Minutes   OT Time In 1300   OT Time Out 1430   OT Total Time (minutes) 90   OT Mode of treatment - Individual (minutes) 90   OT Mode of treatment - Concurrent (minutes) 0   OT Mode of treatment - Group (minutes) 0   OT Mode of treatment - Co-treat (minutes) 0   OT Mode of Teatment - Total time(minutes) 90 minutes   Therapy Time missed   Time missed?  No

## 2019-07-29 NOTE — PROGRESS NOTES
Internal Medicine Progress Note  Patient: Jeannette Pereira  Age/sex: 80 y o  female  Medical Record #: [id number]      ASSESSMENT/PLAN: (Interval History)  Jeannette Pereira is seen and examined and management for following issues:    Right hip fx; s/p TFN 7/23/19:  WBAT; watch incision     ABLA: Hgb 7 7  Did not want to be transfused last week when he hemoglobin      Chronic diastolic CHF with LVEF 75%, mild MR/moderate AS and AR/moderate TR:  she will need to go back to see Dr Zulay Fisher since he has been following her for this and can then refer to Cardiology if he and the patient wish  She is to remain on her home dose of Lasix 80mg daily = she feels edema is baseline      HTN:  at home, she had been on Verapamil but changed in April to Toprol 50mg daily 2/2 insurance would not pay for the Verapamil; however, since she has a lot of Verpamil left, she has been using them up and then is going to switch back to the Toprol  Will keep Verapamil 240mg qd for now     Left 3rd toe wound and right great toe subungual hematoma:  had been seen by Podiatry; they did remove an ingrown toenail left 3rd toe  They feel that she is likely to lose the nail on the right great toe  Continue local care      Subjective/ HPI:  Patients overnight issues or events were reviewed with nursing or staff during rounds or morning huddle session  New or overnight issues  ABLA: Hgb 7 7  Repeat AM 7/31/19  HTN:  Stable on current tx  Chronic diastolic CHF: stable  Offers no complaints       ROS:     GI: denies abdominal pain, change bowel habits or reflux symptoms  Neuro: Denies any headache, new vision changes, new neuropathies,new weaknesses   Respiratory: No Cough, SOB, denies wheeze  Cardiovascular: No CP, palpitations , denies perception of rapid heartbeat  : denies any new urinary burning or frequency    Review of Scheduled Meds:    Current Facility-Administered Medications:  acetaminophen 975 mg Oral Q8H Alka Vallejo MD bacitracin 1 small application Topical Daily JUAN Marie   docusate sodium 100 mg Oral BID Balwinder Moran MD   enoxaparin 40 mg Subcutaneous Daily Balwinder Moran MD   furosemide 80 mg Oral Daily Balwinder Moran MD   pantoprazole 40 mg Oral Daily Before Breakfast Balwinder Moran MD   polyethylene glycol 17 g Oral Daily PRN Balwinder Moran MD   traMADol 25 mg Oral Q4H PRN Balwinder Moran MD   traMADol 50 mg Oral Q4H PRN Balwinder Moran MD   verapamil 240 mg Oral Daily Devonte Brennan MD       Labs:     Results from last 7 days   Lab Units 07/29/19  0504 07/27/19  0922   WBC Thousand/uL 9 31 8 71   HEMOGLOBIN g/dL 7 7* 8 5*   HEMATOCRIT % 24 8* 27 6*   PLATELETS Thousands/uL 246 229     Results from last 7 days   Lab Units 07/29/19  0504 07/26/19  0640   SODIUM mmol/L 140 143   POTASSIUM mmol/L 3 9 3 8   CHLORIDE mmol/L 102 108   CO2 mmol/L 35* 33*   BUN mg/dL 23 19   CREATININE mg/dL 0 72 0 65   CALCIUM mg/dL 9 0 8 4         Results from last 7 days   Lab Units 07/22/19  1721   INR  1 02              Imaging:     No orders to display       *Labs reviewed  *Radiology studies reviewed  *Medications reviewed and reconciled as needed  *Please refer to order section for additional ordered labs studies  *Case discussed with primary attending during morning huddle case rounds    Physical Examination:  Vitals:   Vitals:    07/28/19 2040 07/29/19 0500 07/29/19 0600 07/29/19 0852   BP: 139/60 120/56  137/61   BP Location: Right arm Right arm     Pulse: 67 66     Resp: 18 19     Temp: 98 8 °F (37 1 °C) 98 3 °F (36 8 °C)     TempSrc: Oral Oral     SpO2: 98% 97%     Weight:   51 8 kg (114 lb 3 2 oz)    Height:           General Appearance: NAD, conversive  Eyes: No icterus; conjunctiva normal, PERRLA  HENT: oropharynx clear; mucous membranes moist  Neck: trachea midline, range of motion full     Lungs: CTA, normal respiratory effort, no retractions, expiratory effort normal  CV: regular rate, no rubs/murmurs/gallops  ABD: soft: NT/ND; +BS  EXT: mod edema of lower legs as always R>L  Skin: normal turgor, normal texture, no rashes  Psych: affect normal, no overt anxiety/depression   Neuro: AAOx3            Total time spent: At least 40 minutes, with more than 50% spent counseling/coordinating care  Counseling includes discussion with patient re: progress  and discussion with patient of his/her current medical state/information  Coordination of patient's care was performed in conjunction with primary service  Time invested included review of patient's labs, vitals, and management of their comorbidities with continued monitoring  In addition, this patient was discussed with medical team including physician and advanced extenders  The care of the patient was extensively discussed and appropriate treatment plan was formulated unique for this patient  ** Please Note: Dragon 360 Dictation voice to text software may have been used in the creation of this document   **

## 2019-07-30 PROCEDURE — 97116 GAIT TRAINING THERAPY: CPT

## 2019-07-30 PROCEDURE — 97530 THERAPEUTIC ACTIVITIES: CPT

## 2019-07-30 PROCEDURE — 97110 THERAPEUTIC EXERCISES: CPT

## 2019-07-30 PROCEDURE — 97535 SELF CARE MNGMENT TRAINING: CPT

## 2019-07-30 PROCEDURE — 99223 1ST HOSP IP/OBS HIGH 75: CPT | Performed by: PHYSICAL MEDICINE & REHABILITATION

## 2019-07-30 RX ADMIN — DOCUSATE SODIUM 100 MG: 100 CAPSULE, LIQUID FILLED ORAL at 09:51

## 2019-07-30 RX ADMIN — ACETAMINOPHEN 975 MG: 325 TABLET ORAL at 22:04

## 2019-07-30 RX ADMIN — VERAPAMIL HYDROCHLORIDE 240 MG: 240 TABLET, FILM COATED, EXTENDED RELEASE ORAL at 09:53

## 2019-07-30 RX ADMIN — TRAMADOL HYDROCHLORIDE 50 MG: 50 TABLET, FILM COATED ORAL at 00:52

## 2019-07-30 RX ADMIN — ACETAMINOPHEN 975 MG: 325 TABLET ORAL at 14:35

## 2019-07-30 RX ADMIN — ACETAMINOPHEN 975 MG: 325 TABLET ORAL at 05:39

## 2019-07-30 RX ADMIN — TRAMADOL HYDROCHLORIDE 50 MG: 50 TABLET, FILM COATED ORAL at 09:51

## 2019-07-30 RX ADMIN — ENOXAPARIN SODIUM 40 MG: 40 INJECTION SUBCUTANEOUS at 22:04

## 2019-07-30 RX ADMIN — BACITRACIN ZINC 1 SMALL APPLICATION: 500 OINTMENT TOPICAL at 09:51

## 2019-07-30 RX ADMIN — PANTOPRAZOLE SODIUM 40 MG: 40 TABLET, DELAYED RELEASE ORAL at 05:39

## 2019-07-30 RX ADMIN — FUROSEMIDE 80 MG: 80 TABLET ORAL at 09:51

## 2019-07-30 NOTE — PROGRESS NOTES
Physical Medicine and Rehabilitation Progress Note  Joe Cloud 80 y o  female MRN: [id number]  Unit/Bed#: [facility] 622-52 Encounter: [id number]    HPI: Joe Cloud is a 80 y o  female who presented to the [facility] [address] after fall at home  Found to have right hip fracture  Now s/p IM nailing of right femur on 7/23/19  Patient WBAT  Postoperatively noted to have ABLA, mild leukocytosis  Also required podiatry consult for left 3rd toe and left large toe wound  Patient was evaluated by therapy services, found to be below functional baseline  She was accepted to the [facility] on 7/25/19  Chief Complaint: f/u fracture and f/u ambulatory dysfunction    Interval: No acute events overnight  Denies any CP or SOB  Slept well overnight  ROS: A 10 point ROS was performed; negative except as noted above  Assessment/Plan:    * Closed right hip fracture, initial encounter [facility])  Assessment & Plan  · Acute comprehensive interdisciplinary inpatient rehabilitation including PT, OT, RN, CM, SW, dietary, psychology, etc   · WBAT    Anemia  Assessment & Plan  Results from last 7 days   Lab Units 07/29/19  0504 07/27/19  0922 07/26/19  0640   HEMOGLOBIN g/dL 7 7* 8 5* 7 5*     · Likely post-operative  · Monitor CBC intermittently  · Transfuse for Hgb <7   Did not require transfusion this weekend as H/h has improved     Chronic diastolic congestive heart failure (HCC)  Assessment & Plan  Wt Readings from Last 3 Encounters:   07/30/19 55 2 kg (121 lb 11 1 oz)   07/25/19 50 3 kg (110 lb 14 3 oz)   04/02/19 51 3 kg (113 lb)     · Regular weight checks  · Lasix daily    Essential hypertension  Assessment & Plan  Temp:  [97 8 °F (36 6 °C)-98 2 °F (36 8 °C)] 97 8 °F (36 6 °C)  HR:  [69-73] 73  Resp:  [18-20] 20  BP: (120-142)/(50-68) 136/68    · Verapamil    # Skin  · Encourage regular turning as patient at risk for skin breakdown  · Staff to continue patient education on Q2h turning  · Rehabilitation team to perform skin checks regularly     # Bowel  · Patient reports no constipation  · To ensure regular BMs, bowel regimen consisting of:  colace , dulcolax suppository  and miralax     # Bladder  · Patient voiding spontaneously    # Pain  · Continue tylenol, for max of 3gm daily  · Continue tramadol    # Rehab Psych   · There are no psychological or psychiatric problems identified    # Other  - Diet/Nutrition:        Diet Orders   (From admission, onward)             Start     Ordered    07/25/19 1647  Diet Cardiovascular; Sodium 2 GM  Diet effective now     Question Answer Comment   Diet Type Cardiovascular    Cardiac Sodium 2 GM    RD to adjust diet per protocol?  Yes        07/25/19 1647 07/25/19 1647  Room Service  Once     Question:  Type of Service  Answer:  Room Service - Appropriate with Assistance    07/25/19 1647              - DVT prophy: Sequential compression device (Venodyne)  and Enoxaparin (Lovenox)  - GI ppx: Pantaprazole  - Nausea: None  - Supplements: None  - Sleep: None    Disposition: TBD    CODE: Level 1: Full Code Scheduled Meds:    Current Facility-Administered Medications:  acetaminophen 975 mg Oral Q8H [facility] Balwinder Moran MD   bacitracin 1 small application Topical Daily JUAN Marie   docusate sodium 100 mg Oral BID Balwinder Moran MD   enoxaparin 40 mg Subcutaneous Daily Balwinder Moran MD   furosemide 80 mg Oral Daily Balwinder Moran MD   pantoprazole 40 mg Oral Daily Before Breakfast Balwinder Moran MD   polyethylene glycol 17 g Oral Daily PRN Balwinder Moran MD   traMADol 25 mg Oral Q4H PRN Balwinder Moran MD   traMADol 50 mg Oral Q4H PRN Balwinder Moran MD   verapamil 240 mg Oral Daily Balwinder Moran MD        Objective:    Functional Update:  Mobility: '  Transfers: mod assist  ADLs: min lowers, supervision uppers    Allergies per EMR    Physical Exam:  Temp:  [97 8 °F (36 6 °C)-98 2 °F (36 8 °C)] 97 8 °F (36 6 °C)  HR:  [69-73] 73  Resp:  [18-20] 20  BP: (120-142)/(50-68) 136/68  SpO2:  [95 %-97 %] 95 %    General: alert, no apparent distress, cooperative and comfortable  HEENT:  Head: Normocephalic, no lesions, without obvious abnormality  LUNGS:  no abnormal respiratory pattern, no retractions noted, non-labored breathing   ABDOMEN:  soft, non-tender  Bowel sounds normal  No masses, no organomegaly  EXTREMITIES:  edema 1+ bilateral LEs  NEURO:   mental status, speech normal, alert and oriented x3  PSYCH:  Alert and oriented, appropriate affect  Physical examination is otherwise unchanged from previous encounter, except as noted above  Diagnostic Studies: Reviewed, no new imaging  No orders to display     Laboratory: Reviewed   Results from last 7 days   Lab Units 07/29/19  0504 07/27/19  0922 07/26/19  0640   HEMOGLOBIN g/dL 7 7* 8 5* 7 5*   HEMATOCRIT % 24 8* 27 6* 24 0*   WBC Thousand/uL 9 31 8 71 7 30     Results from last 7 days   Lab Units 07/29/19  0504 07/26/19  0640 07/25/19  0623   BUN mg/dL 23 19 21   SODIUM mmol/L 140 143 142   POTASSIUM mmol/L 3 9 3 8 3 7   CHLORIDE mmol/L 102 108 106   CREATININE mg/dL 0 72 0 65 0 84            Patient Active Problem List   Diagnosis    Cataract    Essential hypertension    Aortic stenosis, moderate    Atrial dilatation, bilateral    Mild tricuspid insufficiency    Non-rheumatic mitral regurgitation    Chronic diastolic congestive heart failure (HCC)    Localized edema    Leg foot wound- 3rd digit    Other fatigue    Arthritis    Closed right hip fracture, initial encounter ([facility] )    Intertrochanteric fracture of right femur (HCC)    Mitral valve stenosis, mild    Anemia       ** Please Note: Fluency Direct voice to text software may have been used in the creation of this document   **

## 2019-07-30 NOTE — PCC PHYSICAL THERAPY
8/6/2019Rebeca Givens has made good progress since admission to [facility]  Pt cont to be limited by pain in RLE  Pt functioning CS level for functional transfers, Jesus for bed mobility for RLE, and CS for toileting  Pt limitations include dec activity tolerance, pain, RLE weakness and dec ROM  Pt barriers to d/c include: dec caregiver support and inaccessible home environment  Stairs are currently biggest barrier as pt plans to sleep on second floor bedroom  Has FF 12 stairs to second floor and full bath  OT initiated discussion with pt about transitioning to first floor set up  Pt  lives on first floor which pt cares for  Discussed possibility of sleeping on couch on first floor or having son move bed downstairs  Pt has access to half bathroom on first floor since stairs are biggest barrier currently  One of pt's barriers is lack of caregiver availability, as she was the primary caregiver for her , with A from home health aides  Pt currently needs physical A for transfers, and needs BP monitored due to complaints of feeling "woozy"  Pt will cont to benefit from skilled PT to make further progress in overall balance, safety and (I) with functional transfers  Currently anticipating need for her to have help upon d/c, however will cont to assess as she makes progress

## 2019-07-30 NOTE — PCC OCCUPATIONAL THERAPY
Pt making good progress towards goals for ADL routine  Overall pt completing ADLS and functional transfers at supervision/min assist level using RW  Based on pt's current progress, anticipate pt will require assistance to don/doff TEDS, assistance for household and community IADLS, assistance for providing care to her , who is physically disabled  Additionally, anticipating 1st floor setup, where pt will require bed on 1st floor  OT recommending D/C home later in week with home OT services, family support, and increased assistance for IADLS and care for her  at home  CM has contacted pt's son regarding having increased assistance at home  OT to follow up with pt's son after team regarding scheduling family training for this weekend

## 2019-07-30 NOTE — PROGRESS NOTES
Internal Medicine Progress Note  Patient: Jessa Marie  Age/sex: 80 y o  female  Medical Record #: [id number]      ASSESSMENT/PLAN: (Interval History)  Jessa Marie is seen and examined and management for following issues:    Right hip fx; s/p TFN 7/23/19:  WBAT; watch incision     ABLA: Hgb 7 7  Did not want to be transfused last week when he hemoglobin      Chronic diastolic CHF with LVEF 79%, mild MR/moderate AS and AR/moderate TR:  she will need to go back to see Dr Regan Oconnor since he has been following her for this and can then refer to Cardiology if he and the patient wish  She is to remain on her home dose of Lasix 80mg daily = she feels edema is baseline      HTN:  at home, she had been on Verapamil but changed in April to Toprol 50mg daily 2/2 insurance would not pay for the Verapamil; however, since she has a lot of Verpamil left, she has been using them up and then is going to switch back to the Toprol  Will keep Verapamil 240mg qd for now     Left 3rd toe wound and right great toe subungual hematoma:  had been seen by Podiatry; they did remove an ingrown toenail left 3rd toe  They feel that she is likely to lose the nail on the right great toe  Continue local care      Subjective/ HPI:  Patients overnight issues or events were reviewed with nursing or staff during rounds or morning huddle session  New or overnight issues  ABLA: Hgb 7 7  Repeat AM 7/31/19  HTN:  Stable on current tx  Chronic diastolic CHF: stable  Offers no complaints       ROS:     GI: denies abdominal pain, change bowel habits or reflux symptoms  Neuro: Denies any headache, new vision changes, new neuropathies,new weaknesses   Respiratory: No Cough, SOB, denies wheeze  Cardiovascular: No CP, palpitations , denies perception of rapid heartbeat  : denies any new urinary burning or frequency    Review of Scheduled Meds:    Current Facility-Administered Medications:  acetaminophen 975 mg Oral Q8H Martha Mcmanus MD bacitracin 1 small application Topical Daily JUAN Marei   docusate sodium 100 mg Oral BID Balwinder Moran MD   enoxaparin 40 mg Subcutaneous Daily Balwinder Moran MD   furosemide 80 mg Oral Daily Balwinder Moran MD   pantoprazole 40 mg Oral Daily Before Breakfast Balwinder Moran MD   polyethylene glycol 17 g Oral Daily PRN Balwinder Moran MD   traMADol 25 mg Oral Q4H PRN Balwinder Moran MD   traMADol 50 mg Oral Q4H PRN Balwinder Moran MD   verapamil 240 mg Oral Daily Susi Valentin MD       Labs:     Results from last 7 days   Lab Units 07/29/19  0504 07/27/19  0922   WBC Thousand/uL 9 31 8 71   HEMOGLOBIN g/dL 7 7* 8 5*   HEMATOCRIT % 24 8* 27 6*   PLATELETS Thousands/uL 246 229     Results from last 7 days   Lab Units 07/29/19  0504 07/26/19  0640   SODIUM mmol/L 140 143   POTASSIUM mmol/L 3 9 3 8   CHLORIDE mmol/L 102 108   CO2 mmol/L 35* 33*   BUN mg/dL 23 19   CREATININE mg/dL 0 72 0 65   CALCIUM mg/dL 9 0 8 4                      Imaging:     No orders to display       *Labs reviewed  *Radiology studies reviewed  *Medications reviewed and reconciled as needed  *Please refer to order section for additional ordered labs studies  *Case discussed with primary attending during morning huddle case rounds    Physical Examination:  Vitals:   Vitals:    07/29/19 1403 07/29/19 2136 07/30/19 0616 07/30/19 0953   BP: 140/64 120/50 142/62 136/68   BP Location: Left arm Left arm     Pulse: 73 69 73    Resp: 18 18 20    Temp: 98 2 °F (36 8 °C) 98 °F (36 7 °C) 97 8 °F (36 6 °C)    TempSrc: Oral Oral Oral    SpO2: 97% 95% 95%    Weight:   55 5 kg (122 lb 5 7 oz)    Height:           General Appearance: NAD, conversive  Eyes: No icterus; conjunctiva normal, PERRLA  HENT: oropharynx clear; mucous membranes moist  Neck: trachea midline, range of motion full     Lungs: CTA, normal respiratory effort, no retractions, expiratory effort normal  CV: regular rate, no rubs/murmurs/gallops  ABD: soft: NT/ND; +BS  EXT: mod edema of lower legs as always R>L  Skin: normal turgor, normal texture, no rashes  Psych: affect normal, no overt anxiety/depression   Neuro: AAOx3            Total time spent: At least 40 minutes, with more than 50% spent counseling/coordinating care  Counseling includes discussion with patient re: progress  and discussion with patient of his/her current medical state/information  Coordination of patient's care was performed in conjunction with primary service  Time invested included review of patient's labs, vitals, and management of their comorbidities with continued monitoring  In addition, this patient was discussed with medical team including physician and advanced extenders  The care of the patient was extensively discussed and appropriate treatment plan was formulated unique for this patient  ** Please Note: Dragon 360 Dictation voice to text software may have been used in the creation of this document   **

## 2019-07-30 NOTE — PLAN OF CARE
Problem: Prexisting or High Potential for Compromised Skin Integrity  Goal: Skin integrity is maintained or improved  Description  INTERVENTIONS:  - Identify patients at risk for skin breakdown  - Assess and monitor skin integrity  - Assess and monitor nutrition and hydration status  - Monitor labs (i e  albumin)  - Assess for incontinence   - Turn and reposition patient  - Assist with mobility/ambulation  - Relieve pressure over bony prominences  - Avoid friction and shearing  - Provide appropriate hygiene as needed including keeping skin clean and dry  - Evaluate need for skin moisturizer/barrier cream  - Collaborate with interdisciplinary team (i e  Nutrition, Rehabilitation, etc )   - Patient/family teaching  Outcome: Progressing     Problem: Potential for Falls  Goal: Patient will remain free of falls  Description  INTERVENTIONS:  - Assess patient frequently for physical needs  -  Identify cognitive and physical deficits and behaviors that affect risk of falls    -  [place] fall precautions as indicated by assessment   - Educate patient/family on patient safety including physical limitations  - Instruct patient to call for assistance with activity based on assessment  - Modify environment to reduce risk of injury  - Consider OT/PT consult to assist with strengthening/mobility  Outcome: Progressing     Problem: PAIN - ADULT  Goal: Verbalizes/displays adequate comfort level or baseline comfort level  Description  Interventions:  - Encourage patient to monitor pain and request assistance  - Assess pain using appropriate pain scale  - Administer analgesics based on type and severity of pain and evaluate response  - Implement non-pharmacological measures as appropriate and evaluate response  - Consider cultural and social influences on pain and pain management  - Notify physician/advanced practitioner if interventions unsuccessful or patient reports new pain  Outcome: Progressing     Problem: INFECTION - ADULT  Goal: Absence or prevention of progression during hospitalization  Description  INTERVENTIONS:  - Assess and monitor for signs and symptoms of infection  - Monitor lab/diagnostic results  - Monitor all insertion sites, i e  indwelling lines, tubes, and drains  - Monitor endotracheal (as able) and nasal secretions for changes in amount and color  - Alpha appropriate cooling/warming therapies per order  - Administer medications as ordered  - Instruct and encourage patient and family to use good hand hygiene technique  - Identify and instruct in appropriate isolation precautions for identified infection/condition  Outcome: Progressing  Goal: Absence of fever/infection during neutropenic period  Description  INTERVENTIONS:  - Monitor WBC  - Implement neutropenic guidelines  Outcome: Progressing     Problem: SAFETY ADULT  Goal: Maintain or return to baseline ADL function  Description  INTERVENTIONS:  -  Assess patient's ability to carry out ADLs; assess patient's baseline for ADL function and identify physical deficits which impact ability to perform ADLs (bathing, care of mouth/teeth, toileting, grooming, dressing, etc )  - Assess/evaluate cause of self-care deficits   - Assess range of motion  - Assess patient's mobility; develop plan if impaired  - Assess patient's need for assistive devices and provide as appropriate  - Encourage maximum independence but intervene and supervise when necessary  ¯ Involve family in performance of ADLs  ¯ Assess for home care needs following discharge   ¯ Request OT consult to assist with ADL evaluation and planning for discharge  ¯ Provide patient education as appropriate  Outcome: Progressing  Goal: Maintain or return mobility status to optimal level  Description  INTERVENTIONS:  - Assess patient's baseline mobility status (ambulation, transfers, stairs, etc )    - Identify cognitive and physical deficits and behaviors that affect mobility  - Identify mobility aids required to assist with transfers and/or ambulation (gait belt, sit-to-stand, lift, walker, cane, etc )  - [place] fall precautions as indicated by assessment  - Record patient progress and toleration of activity level on Mobility SBAR; progress patient to next Phase/Stage  - Instruct patient to call for assistance with activity based on assessment  - Request Rehabilitation consult to assist with strengthening/weightbearing, etc   Outcome: Progressing     Problem: DISCHARGE PLANNING  Goal: Discharge to home or other facility with appropriate resources  Description  INTERVENTIONS:  - Identify barriers to discharge w/patient and caregiver  - Arrange for needed discharge resources and transportation as appropriate  - Identify discharge learning needs (meds, wound care, etc )  - Arrange for interpretive services to assist at discharge as needed  - Refer to Case Management Department for coordinating discharge planning if the patient needs post-hospital services based on physician/advanced practitioner order or complex needs related to functional status, cognitive ability, or social support system  Outcome: Progressing     Problem: Knowledge Deficit  Goal: Patient/family/caregiver demonstrates understanding of disease process, treatment plan, medications, and discharge instructions  Description  Complete learning assessment and assess knowledge base    Interventions:  - Provide teaching at level of understanding  - Provide teaching via preferred learning methods  Outcome: Progressing

## 2019-07-30 NOTE — PCC CARE MANAGEMENT
Pt is making good progress an is scheduled to return home later this week with family and contd hhc services  Son to be in prior to dc for training and is aware of plan to date  Following to assist w/dc planning needs

## 2019-07-30 NOTE — PROGRESS NOTES
07/30/19 0700   Pain Assessment   Pain Assessment 0-10   Pain Score 7   Pain Type Surgical pain   Pain Location Hip;Leg   Pain Orientation Right   Effect of Pain on Daily Activities pt's pain inc with activity   Hospital Pain Intervention(s) Repositioned; Rest   Response to Interventions pt tolerated tx   Restrictions/Precautions   Precautions Fall Risk;Pain;Contact/isolation   Weight Bearing Restrictions Yes   RLE Weight Bearing Per Order WBAT   QI: Eating   Assistance Needed Set-up / clean-up   Eating CARE Score 5   Eating Assessment   Eating (FIM) 5 - Patient needs help to open contianers or set up tray   QI: Oral Hygiene   Assistance Needed Supervision   Oral Hygiene CARE Score 4   Grooming   Able To Initiate Tasks; Acquire Items;Comb/Brush Hair;Wash/Dry Face;Brush/Clean Teeth;Wash/Dry Hands   Limitation Noted In Safety;Strength   Findings Pt completed grooming tasks standing with RW and UE support on sink and UE release with S   Grooming (FIM) 5 - Patient requires supervision/monitoring   QI: Shower/Bathe Self   Assistance Needed Supervision   Shower/Bathe Self CARE Score 4   Bathing   Assessed Bath Style Sponge Bath   Anticipated D/C Bath Style Sponge Bath;Tub   Able to Gather/Transport No   Able to [employer] Temperature Yes   Able to Wash/Rinse/Dry (body part) Left Arm;Right Arm;L Upper Leg;R Upper Leg;L Lower Leg/Foot;R Lower Leg/Foot;Chest;Abdomen;Perineal Area; Buttocks   Limitations Noted in Balance; Endurance;ROM;Safety;Strength;Timeliness   Positioning Seated;Standing   Adaptive Equipment Longhand Reacher   Findings  Pt completed sponge bath standing at sink and seated in w/c to reach down and bath b/l lower legs/feet  pt reports difficulty reaching to wash toes due to dec ROM  Pt edu on use of reacher and wash cloth to completed task and inc thoroughness of bathing feet/toes  Pt reports difficulty utilizing reacher due to dec grasp in b/l hands    Pt would benefit from continue practice with reacher or LHS  Bathing (FIM) 5 - Patient requires supervision/monitoring but completes 10/10 parts   QI: Upper Body Dressing   Assistance Needed Supervision   Upper Body Dressing CARE Score 4   QI: Lower Body Dressing   Assistance Needed Incidental touching   Lower Body Dressing CARE Score 4   QI: Putting On/Taking Off Footwear   Assistance Needed Supervision; Adaptive equipment   Putting On/Taking Off Footwear CARE Score 4   Dressing/Undressing Clothing   Remove UB Clothes Pullover Shirt   Remove LB Clothes Undergarment;Socks   Don UB Clothes Pullover Shirt;Bra   Don LB Clothes Shorts; Undergarment;Socks;TEDs   Limitations Noted In Balance; Endurance; Safety;Strength;ROM; Timeliness   Adaptive Equipment Reacher;Sock Aide   Positioning Sit Edge Of Bed;Standing   Findings Pt completed UB dressing seated with S   Pt completed LB dressing seated at w/c and standing with RW with min boost to stand  Pt was edu to don clothing through R leg first   Pt was able to reach down and thread pants and underpants over b/l LEs and pull up over hips  Pt able to doff socks seated and pt edu on use of sock aid to don socks  Pt req A to don b/l TEDs  UB Dressing (FIM) 5 - Patient requires supervision/monitoring   LB Dressing (FIM) 4 - Patient requires steadying assist or light touching   QI: Lying to Sitting on Side of Bed   Assistance Needed Supervision   Lying to Sitting on Side of Bed CARE Score 4   QI: Sit to Stand   Assistance Needed Incidental touching;Physical assistance   Assistance Provided by [place] Less than 25%   Sit to Stand CARE Score 3   QI: Chair/Bed-to-Chair Transfer   Assistance Needed Incidental touching   Chair/Bed-to-Chair Transfer CARE Score 4   Transfer Bed/Chair/Wheelchair   Limitations Noted In Balance;Confidence; Endurance;Pain Management;UE Strength;LE Strength   Adaptive Equipment Roller Walker   Findings Pt able to complete supine to EOB with S and UE support on bed with HOB slightly elevated  Initially Pt req minimal boost with sit to stand transfers and throughout session only req CGA to stand  Bed, Chair, Wheelchair Transfer (FIM) 3 - [place] needs to lift, boost or assist to stand OR sit   QI: [address] Needed Incidental touching   [address] Score 4   [facility] [address] down yes, up yes  Manage Hygiene Bladder   Limitations Noted In Balance; Safety;ROM;UE Strength;LE Strength   Findings Pt completed toileting on commode and able to manage bladder hygiene standing with RW at [facility] level  Toileting (FIM) 4 - Patient requires steadying assist or light touching   QI: Toilet Transfer   Assistance Needed Incidental touching   Toilet Transfer CARE Score 4   Toilet Transfer   Surface Assessed Standard Commode   Transfer Technique Standard   Limitations Noted In Balance;Confidence; Endurance;ROM;Safety;UE Strength;LE Strength   Findings Pt completed toilet transfers to commode with use of RW at [facility] level  Toilet Transfer (FIM) 4 - Patient requires steadying assist or light touching   Activity Tolerance   Activity Tolerance Patient tolerated treatment well   Assessment   Treatment Assessment Pt engaged in skilled OT tx session focusing on ADL fxn, fxnl transfers, Ju Pressman training, and standing balance/endurance to inc indep with ADL/IADLs  Pt demo improvement with sit <>stand transfers throughout session but is still limited by pain in R LE   OT recommending that pt continue working on ADL/IADL fxn, fxnl transfers, Ju Pressman training, standing balance/endurance, UE strength/endurance, pain management to inc participation and indep with ADL/IADL  Prognosis Good   Problem List Decreased strength;Decreased range of motion;Decreased endurance;Decreased mobility; Decreased safety awareness;Pain   Plan   Treatment/Interventions ADL retraining;Functional transfer training; Therapeutic exercise; Endurance training;Patient/family training;Equipment eval/education; Compensatory technique education;Spoke to nursing   Progress Progressing toward goals   OT Therapy Minutes   OT Time In 0700   OT Time Out 0830   OT Total Time (minutes) 90   OT Mode of treatment - Individual (minutes) 90   OT Mode of treatment - Concurrent (minutes) 0   OT Mode of treatment - Group (minutes) 0   OT Mode of treatment - Co-treat (minutes) 0   OT Mode of Teatment - Total time(minutes) 90 minutes   Therapy Time missed   Time missed?  No

## 2019-07-30 NOTE — PROGRESS NOTES
07/30/19 1000   Pain Assessment   Pain Assessment 0-10   Pain Score 8   Pain Type Other (Comment)  (tightness in thigh)   Pain Location Hip   Pain Orientation Right   Pain Frequency Intermittent   Pain Onset Ongoing   Hospital Pain Intervention(s) Cold applied   Restrictions/Precautions   Precautions Fall Risk;Pain   RLE Weight Bearing Per Order WBAT   Cognition   Overall Cognitive Status WFL   Arousal/Participation Alert; Cooperative   Attention Within functional limits   Following Commands Follows one step commands with increased time or repetition   QI: Sit to Stand   Assistance Needed Supervision;Verbal cues; Incidental touching   Assistance Provided by [place]   (pt appeared a little unsteady and extra time with rise )   Sit to Stand CARE Score 4   QI: Chair/Bed-to-Chair Transfer   Assistance Needed Verbal cues; Incidental touching;Supervision; Adaptive equipment   Assistance Provided by [place] No physical assistance   Chair/Bed-to-Chair Transfer CARE Score 4   Transfer Bed/Chair/Wheelchair   Stand Pivot Contact Guard   Sit to Stand Contact Guard   Stand to Sit Contact Guard   Findings Pt needs extra time for sit-stand due to dec strength and pain, she did not need lift assist, CG was precautionary    QI: Car Transfer   Reason if not Attempted Safety concerns   Car Transfer CARE Score 88   QI: Walk 10 Feet   Assistance Needed Verbal cues; Incidental touching; Adaptive equipment   Assistance Provided by [place] No physical assistance   Walk 10 Feet CARE Score 4   QI: Walk 50 Feet with Two Turns   Assistance Needed Adaptive equipment;Supervision;Verbal cues   Assistance Provided by [place] No physical assistance   Walk 50 Feet with Two Turns CARE Score 4   QI: Walk 150 Feet   Reason if not Attempted Safety concerns   Walk 150 Feet CARE Score 88   QI: Walking 10 Feet on Uneven Surfaces   Reason if not Attempted Safety concerns   Walking 10 Feet on Uneven Surfaces CARE Score 88   Ambulation   Primary Discharge Mode of Locomotion Walk   Walk Assist Level Contact Guard;Close Supervision   Gait Pattern Decreased foot clearance; Slow Felipa; Inconsistant Felipa;Decreased R stance   Assist Device Roller Justa Patino Walked (feet) 75 ft  (75' and 50')   Limitations Noted In Balance;Device Management; Heel Strike; Endurance; Safety;Speed;Strength   Findings Pt starts off antalgic but inc speed and uses more step through once she gets going,  needed VCs to not step up into walker too much, she was stepping up by front bar,  at times pt appeared fairly steady and cautious so provided CS but CG with turns and backing up to chair,  vcs to incorporate slight heel strike but she does not have shoes that fit due to edema   Walking (FIM) 2 - Patient ambulates between 50 - 149 feet regardless of assist/device/set up   QI: Wheel 50 Feet with Two Turns   Reason if not Attempted Activity not applicable   Wheel 50 Feet with Two Turns CARE Score 9   QI: Wheel 150 Feet   Reason if not Attempted Activity not applicable   Wheel 842 Feet CARE Score 9   Wheelchair mobility   QI: Does the patient use a wheelchair? 0   No   QI: 4 Steps   Reason if not Attempted Safety concerns   4 Steps CARE Score 88   QI: 12 Steps   Reason if not Attempted Safety concerns   12 Steps CARE Score 88   Stairs   Type Stairs   # of Steps 3   Assist Devices Bilateral Rail   Findings Used 4''  steps, ed pt in sequencing to protect RLE and prevent pain,  used retro descent due to first time and pt still has some poor stance on RLE    Stairs (FIM) 1 - Patient goes up and down less than 4 stairs regardless of assist/device/set up   Therapeutic Interventions   Strengthening Seated heelslides for quad stretch due to pt reporting feeling lots of tightness in thigh, performed 8 with 10sec hold,  LAQ with DF arom 5x3, hip abd arom to target 5x3,  using RW and pre steps, pt performed 5 step up/down retro on 4'' box   Assessment   Treatment Assessment Pt states she feels foggy, BP was 132/52, no complaints t/o rest of the session  Pt is progressing with steadiness during amb and transfers,  Majority of session was close S and times of CG  She had difficulty rising from [facility] due to no cushion, so provided her with brown air cushion which eased sit-stand  Pain fluctuates with activities, during seated program provided CP on right thigh/hip for 15', she stated dec her pain slightly  This session trialed pt on 1 step using RW and then trialed 3 steps on  steps  Ed pt on sequencing and reasoning   Family/Caregiver Present no    Problem List Decreased strength;Decreased range of motion;Decreased endurance; Impaired balance;Decreased mobility;Pain   Barriers to Discharge Inaccessible home environment;Decreased caregiver support   PT Barriers   Physical Impairment Decreased strength;Decreased range of motion;Decreased endurance; Impaired balance;Decreased mobility;Pain   Functional Limitation Car transfers; Ramp negotiation;Stair negotiation;Standing;Transfers; Walking   Plan   Treatment/Interventions Functional transfer training;LE strengthening/ROM; Elevations; Therapeutic exercise; Endurance training;Bed mobility;Gait training   Progress Progressing toward goals   Recommendation   Equipment Recommended Walker   PT Therapy Minutes   PT Time In 1000   PT Time Out 1100   PT Total Time (minutes) 60   PT Mode of treatment - Individual (minutes) 60   PT Mode of treatment - Concurrent (minutes) 0   PT Mode of treatment - Group (minutes) 0   PT Mode of treatment - Co-treat (minutes) 0   PT Mode of Teatment - Total time(minutes) 60 minutes   Therapy Time missed   Time missed?  No

## 2019-07-31 PROBLEM — R13.10 DIFFICULTY SWALLOWING: Status: ACTIVE | Noted: 2019-07-31

## 2019-07-31 LAB
BASOPHILS # BLD AUTO: 0.07 THOUSANDS/ΜL (ref 0–0.1)
BASOPHILS NFR BLD AUTO: 1 % (ref 0–1)
EOSINOPHIL # BLD AUTO: 0.37 THOUSAND/ΜL (ref 0–0.61)
EOSINOPHIL NFR BLD AUTO: 5 % (ref 0–6)
ERYTHROCYTE [DISTWIDTH] IN BLOOD BY AUTOMATED COUNT: 14.2 % (ref 11.6–15.1)
HCT VFR BLD AUTO: 26.8 % (ref 34.8–46.1)
HGB BLD-MCNC: 8.4 G/DL (ref 11.5–15.4)
IMM GRANULOCYTES # BLD AUTO: 0.14 THOUSAND/UL (ref 0–0.2)
IMM GRANULOCYTES NFR BLD AUTO: 2 % (ref 0–2)
LYMPHOCYTES # BLD AUTO: 1.66 THOUSANDS/ΜL (ref 0.6–4.47)
LYMPHOCYTES NFR BLD AUTO: 24 % (ref 14–44)
MCH RBC QN AUTO: 29.1 PG (ref 26.8–34.3)
MCHC RBC AUTO-ENTMCNC: 31.3 G/DL (ref 31.4–37.4)
MCV RBC AUTO: 93 FL (ref 82–98)
MONOCYTES # BLD AUTO: 0.84 THOUSAND/ΜL (ref 0.17–1.22)
MONOCYTES NFR BLD AUTO: 12 % (ref 4–12)
NEUTROPHILS # BLD AUTO: 3.74 THOUSANDS/ΜL (ref 1.85–7.62)
NEUTS SEG NFR BLD AUTO: 56 % (ref 43–75)
NRBC BLD AUTO-RTO: 0 /100 WBCS
PLATELET # BLD AUTO: 283 THOUSANDS/UL (ref 149–390)
PMV BLD AUTO: 10.2 FL (ref 8.9–12.7)
RBC # BLD AUTO: 2.89 MILLION/UL (ref 3.81–5.12)
WBC # BLD AUTO: 6.82 THOUSAND/UL (ref 4.31–10.16)

## 2019-07-31 PROCEDURE — 97110 THERAPEUTIC EXERCISES: CPT

## 2019-07-31 PROCEDURE — 97530 THERAPEUTIC ACTIVITIES: CPT

## 2019-07-31 PROCEDURE — 99232 SBSQ HOSP IP/OBS MODERATE 35: CPT | Performed by: PHYSICAL MEDICINE & REHABILITATION

## 2019-07-31 PROCEDURE — 85025 COMPLETE CBC W/AUTO DIFF WBC: CPT | Performed by: NURSE PRACTITIONER

## 2019-07-31 PROCEDURE — 97535 SELF CARE MNGMENT TRAINING: CPT

## 2019-07-31 RX ORDER — LANOLIN ALCOHOL/MO/W.PET/CERES
3 CREAM (GRAM) TOPICAL
Status: DISCONTINUED | OUTPATIENT
Start: 2019-07-31 | End: 2019-08-11 | Stop reason: HOSPADM

## 2019-07-31 RX ADMIN — ENOXAPARIN SODIUM 40 MG: 40 INJECTION SUBCUTANEOUS at 22:31

## 2019-07-31 RX ADMIN — PANTOPRAZOLE SODIUM 40 MG: 40 TABLET, DELAYED RELEASE ORAL at 05:03

## 2019-07-31 RX ADMIN — BACITRACIN ZINC 1 SMALL APPLICATION: 500 OINTMENT TOPICAL at 08:22

## 2019-07-31 RX ADMIN — ACETAMINOPHEN 975 MG: 325 TABLET ORAL at 22:32

## 2019-07-31 RX ADMIN — DOCUSATE SODIUM 100 MG: 100 CAPSULE, LIQUID FILLED ORAL at 08:21

## 2019-07-31 RX ADMIN — FUROSEMIDE 80 MG: 80 TABLET ORAL at 08:21

## 2019-07-31 RX ADMIN — TRAMADOL HYDROCHLORIDE 50 MG: 50 TABLET, FILM COATED ORAL at 08:22

## 2019-07-31 RX ADMIN — ACETAMINOPHEN 975 MG: 325 TABLET ORAL at 05:03

## 2019-07-31 RX ADMIN — TRAMADOL HYDROCHLORIDE 50 MG: 50 TABLET, FILM COATED ORAL at 00:22

## 2019-07-31 RX ADMIN — VERAPAMIL HYDROCHLORIDE 240 MG: 240 TABLET, FILM COATED, EXTENDED RELEASE ORAL at 08:21

## 2019-07-31 RX ADMIN — ACETAMINOPHEN 975 MG: 325 TABLET ORAL at 14:06

## 2019-07-31 NOTE — PROGRESS NOTES
Physical Medicine and Rehabilitation Progress Note  Susan Zelaya 80 y o  female MRN: [id number]  Unit/Bed#: [facility] 449-17 Encounter: [id number]    HPI: Susan Zelaya is a 80 y o  female who presented to the [facility] [address] after fall at home  Found to have right hip fracture  Now s/p IM nailing of right femur on 7/23/19  Patient WBAT  Postoperatively noted to have ABLA, mild leukocytosis  Also required podiatry consult for left 3rd toe and left large toe wound  Patient was evaluated by therapy services, found to be below functional baseline  She was accepted to the [facility] on 7/25/19  Chief Complaint: f/u fracture and f/u ambulatory dysfunction    Interval: No acute events overnight  Denies any CP or SOB  Slept well overnight  Had some difficulty swallowing pulled pork yesterday, but reports it is improved today  We discussed following up with GI, as patient may benefit from further evaluation as outpatient; she also reports having had a colonoscopy at [facility] but cannot recall timing (per chart review, was performed on March 2013)    ROS: A 10 point ROS was performed; negative except as noted above  Assessment/Plan:    * Closed right hip fracture, initial encounter [facility])  Assessment & Plan  · Acute comprehensive interdisciplinary inpatient rehabilitation including PT, OT, RN, CM, SW, dietary, psychology, etc   · WBAT    Difficulty swallowing  Assessment & Plan  · Worse with dry foods, encouraged patient to take smaller bites  · If persists or worsens, would benefit from outpatient f/u with GI  · Of note, patient last had colonoscopy on 03/04/13, with following findings:   · Sigmoid diverticula  · Internal and external hemorrhoids  · Redundant colon with acute angulations  · Patient is to have repeat colonoscopy in 2023      Anemia  Assessment & Plan  Results from last 7 days   Lab Units 07/31/19  0526 07/29/19  0504 07/27/19  0922   HEMOGLOBIN g/dL 8 4* 7 7* 8 5*     · Likely post-operative  · Monitor CBC intermittently  · Transfuse for Hgb <7  Did not require transfusion this weekend as H/h has improved     Chronic diastolic congestive heart failure (HCC)  Assessment & Plan  Wt Readings from Last 3 Encounters:   07/31/19 55 5 kg (122 lb 5 7 oz)   07/25/19 50 3 kg (110 lb 14 3 oz)   04/02/19 51 3 kg (113 lb)     · Regular weight checks  · Lasix daily    Essential hypertension  Assessment & Plan  Temp:  [97 8 °F (36 6 °C)-98 3 °F (36 8 °C)] 98 2 °F (36 8 °C)  HR:  [64-76] 64  Resp:  [16-20] 20  BP: (114-155)/(63-70) 140/63    · Verapamil    # Skin  · Encourage regular turning as patient at risk for skin breakdown  · Staff to continue patient education on Q2h turning  · Rehabilitation team to perform skin checks regularly     # Bowel  · Patient reports no constipation  · To ensure regular BMs, bowel regimen consisting of:  colace , dulcolax suppository  and miralax     # Bladder  · Patient voiding spontaneously    # Pain  · Continue tylenol, for max of 3gm daily  · Continue tramadol    # Rehab Psych   · There are no psychological or psychiatric problems identified    # Other  - Diet/Nutrition:        Diet Orders   (From admission, onward)             Start     Ordered    07/25/19 1647  Diet Cardiovascular; Sodium 2 GM  Diet effective now     Question Answer Comment   Diet Type Cardiovascular    Cardiac Sodium 2 GM    RD to adjust diet per protocol?  Yes        07/25/19 1647 07/25/19 1647  Room Service  Once     Question:  Type of Service  Answer:  Room Service - Appropriate with Assistance    07/25/19 1647              - DVT prophy: Sequential compression device (Venodyne)  and Enoxaparin (Lovenox)  - GI ppx: Pantaprazole  - Nausea: None  - Supplements: None  - Sleep: None    Disposition: TBD    CODE: Level 1: Full Code Scheduled Meds:    Current Facility-Administered Medications:  acetaminophen 975 mg Oral Q8H [address] Balwinder Moran MD   bacitracin 1 small application Topical Daily JUAN Ca   docusate sodium 100 mg Oral BID Balwinder Moran MD   enoxaparin 40 mg Subcutaneous Daily Balwinder Moran MD   furosemide 80 mg Oral Daily Balwinder Moran MD   pantoprazole 40 mg Oral Daily Before Breakfast Raina Allen MD   polyethylene glycol 17 g Oral Daily PRN Balwinder Moran MD   traMADol 25 mg Oral Q4H PRN Balwinder Moran MD   traMADol 50 mg Oral Q4H PRN Balwinder Moran MD   verapamil 240 mg Oral Daily Balwinder Moran MD        Objective:    Functional Update:  Physical Therapy Occupational Therapy   Weight Bearing Status: Weight Bearing as Tolerated  Transfers: Minimal Assistance  Bed Mobility: Moderate Assistance  Amulation Distance (ft): 100 feet  Ambulation: Minimal Assistance  Assistive Device for Ambulation: Roller Walker  Discharge Recommendations: Skilled Nursing Facility   Eating: Supervision  Grooming: Supervision  Bathing: Supervision  Bathing: Supervision  Upper Body Dressing: Supervision  Lower Body Dressing: Minimal Assistance  Toileting: Minimal Assistance  Toilet Transfer: Minimal Assistance  Cognition: Within Defined Limits  Orientation: Person, Place, Situation, Time       Allergies per EMR    Physical Exam:  Temp:  [97 8 °F (36 6 °C)-98 3 °F (36 8 °C)] 98 2 °F (36 8 °C)  HR:  [64-76] 64  Resp:  [16-20] 20  BP: (114-155)/(63-70) 140/63  SpO2:  [94 %-96 %] 95 %    General: alert, no apparent distress, cooperative and comfortable  HEENT:  Head: Normocephalic, no lesions, without obvious abnormality  LUNGS:  no abnormal respiratory pattern, no retractions noted, non-labored breathing   ABDOMEN:  soft, non-tender  Bowel sounds normal  No masses, no organomegaly  EXTREMITIES:  edema 1+ bilateral LEs  NEURO:   mental status, speech normal, alert and oriented x3  PSYCH:  Alert and oriented, appropriate affect  Physical examination is otherwise unchanged from previous encounter, except as noted above      Diagnostic Studies: Reviewed, no new imaging  No orders to display     Laboratory: Reviewed   Results from last 7 days   Lab Units 07/31/19  0526 07/29/19  0504 07/27/19  0922   HEMOGLOBIN g/dL 8 4* 7 7* 8 5*   HEMATOCRIT % 26 8* 24 8* 27 6*   WBC Thousand/uL 6 82 9 31 8 71     Results from last 7 days   Lab Units 07/29/19  0504 07/26/19  0640 07/25/19  0623   BUN mg/dL 23 19 21   SODIUM mmol/L 140 143 142   POTASSIUM mmol/L 3 9 3 8 3 7   CHLORIDE mmol/L 102 108 106   CREATININE mg/dL 0 72 0 65 0 84            Patient Active Problem List   Diagnosis    Cataract    Essential hypertension    Aortic stenosis, moderate    Atrial dilatation, bilateral    Mild tricuspid insufficiency    Non-rheumatic mitral regurgitation    Chronic diastolic congestive heart failure (HCC)    Localized edema    Leg foot wound- 3rd digit    Other fatigue    Arthritis    Closed right hip fracture, initial encounter ([facility] [address] )    Intertrochanteric fracture of right femur (HCC)    Mitral valve stenosis, mild    Anemia    Difficulty swallowing       ** Please Note: Fluency Direct voice to text software may have been used in the creation of this document  **    Total time spent: At least 35 minutes, with more than 50% spent counseling/coordinating care  Counseling includes discussion with patient re: progress in therapies, functional issues observed by therapy staff, and discussion with patient his/her current medical state/wellbeing  Coordination of patient's care was performed in conjunction with Internal Medicine service to monitor patient's labs, vitals, and management of their comorbidities  In addition, this patient was discussed by the interdisciplinary team in weekly case conference today  The care of the patient was extensively discussed with all care providers and an appropriate rehabilitation plan was formulated unique for this patient  Barriers were identified preventing progression of therapy and appropriate interventions were discussed with each discipline   Please see the team note for input from all disciplines regarding barriers, intervention, and discharge planning

## 2019-07-31 NOTE — PROGRESS NOTES
07/31/19 1230   Pain Assessment   Pain Assessment 0-10   Pain Score 4   Pain Type Surgical pain   Pain Location Hip   Pain Orientation Right   Hospital Pain Intervention(s) Cold applied;Repositioned; Emotional support; Environmental changes   Restrictions/Precautions   Precautions Fall Risk;Contact/isolation;Pain;Supervision on toilet/commode   Weight Bearing Restrictions Yes   RLE Weight Bearing Per Order WBAT   QI: Lower Body Dressing   Assistance Needed Physical assistance   Assistance Provided by [place] Less than 25%   Comment Pt successfully doffs pants and dons LLE into pants, requires Daysi to thread RLE into pant leg as she initially threads it into wrong pant leg  Encouraged pt to have her son bring in her own belongings from home to practice with actual "slacks" she will be wearing as pt has been wearing gowns  Lower Body Dressing CARE Score 3   QI: Putting On/Taking Off Footwear   Assistance Needed Physical assistance   Assistance Provided by [place] 50%-74%   Comment Pt demos ability to complete doffing/donning shoes with increased time  Requires A for tying R lace and for donning b/l TEDS which she reports wearing at baseline  Putting On/Taking Off Footwear CARE Score 2   Dressing/Undressing Clothing   Findings Refer above for details  LB Dressing (FIM) 3 - Patient completes  50-74% of all tasks   QI: Sit to [address] Provided by [place] No physical assistance   Sit to Stand CARE Score 4   QI: Chair/Bed-to-Chair Transfer   Assistance Needed Incidental touching   Assistance Provided by [place] No physical assistance   Chair/Bed-to-Chair Transfer CARE Score 4   Transfer Bed/Chair/Wheelchair   Limitations Noted In Balance; Endurance;Problem Solving;UE Strength;LE Strength   Adaptive Equipment Roller Walker   Bed, Chair, Wheelchair Transfer (FIM) 4 - Patient requires steadying assist or light touching   Exercise Tools   Other Exercise Tool 1 B/l debbie sherman with use of 4# weighted b/l debbie moving through all planes 3 x 10 each to increase UB strength, ROM, and endurance for increased independence with functional transfers and ADL tasks  Cognition   Overall Cognitive Status WFL   Arousal/Participation Cooperative   Attention Within functional limits   Orientation Level Oriented X4   Memory Within functional limits   Following Commands Follows multistep commands with increased time or repetition   Activity Tolerance   Activity Tolerance Patient tolerated treatment well   Assessment   Treatment Assessment Pt participated in skilled OT services with focus on LB dressing, functional mobility/transfers, endurance and strengthening  Extensive conversation had with pt in regard to d/c plan as she reports being primary caregiver to her  at home  She reports that typically a HHA comes three times a week for 1 5 hours each day for him  She states that they will complete his ADLs and toilet him and then get him in to his wheel chair for the day  She states he typically will just sit in the wheelchair throughout the day until her son comes in the evening to put him back in bed  Does not need to get OOB or wheelchair for toileting as he has a haskins catheter chronically  Pt states that on the alternative 4 days a week that HHAs do not come, she has "2 girls" that alternate on the other days to assist  Pt to discuss with her son and neighbor in regards to finalizing details for covering gaps as she states she will not be able to manage his sit to stand lift  She does say "worse comes to worse he would just stay in bed because I certainly can't do it right now " Pt is responsible for all other I/ADL tasks at baseline including cooking and cleaning  States she could get "some help" but overall would have a goal of being able to complete those tasks   Pt will continue to benefit from skilled OT services with focus on IADL tasks, functional mobility, strengthening and endurance  Prognosis Good   Problem List Decreased strength;Decreased range of motion;Decreased mobility; Impaired balance;Decreased endurance;Pain;Orthopedic restrictions   Plan   Treatment/Interventions ADL retraining;Functional transfer training; Therapeutic exercise; Endurance training;Cognitive reorientation;Patient/family training;Equipment eval/education; Bed mobility; Compensatory technique education   Progress Progressing toward goals   Recommendation   OT Discharge Recommendation Home OT   OT Therapy Minutes   OT Time In 1230   OT Time Out 1400   OT Total Time (minutes) 90   OT Mode of treatment - Individual (minutes) 90   OT Mode of treatment - Concurrent (minutes) 0   OT Mode of treatment - Group (minutes) 0   OT Mode of treatment - Co-treat (minutes) 0   OT Mode of Teatment - Total time(minutes) 90 minutes   Therapy Time missed   Time missed?  No

## 2019-07-31 NOTE — PROGRESS NOTES
Internal Medicine Progress Note  Patient: Lara Marquez  Age/sex: 80 y o  female  Medical Record #: [id number]      ASSESSMENT/PLAN: (Interval History)  Lara Marquez is seen and examined and management for following issues:    Right hip fx; s/p TFN 7/23/19:  WBAT; watch incision     ABLA: Hgb improving  Did not want to be transfused last week when her hemoglobin was below 8      Chronic diastolic CHF with LVEF 83%, mild MR/moderate AS and AR/moderate TR:  she will need to go back to see Dr Nahun Woody since he has been following her for this and can then refer to Cardiology if he and the patient wish  She is to remain on her home dose of Lasix 80mg daily = she feels edema is baseline      HTN:  at home, she had been on Verapamil but changed in April to Toprol 50mg daily 2/2 insurance would not pay for the Verapamil; however, since she has a lot of Verpamil left, she has been using them up and then is going to switch back to the Toprol  Will keep Verapamil 240mg qd for now     Left 3rd toe wound and right great toe subungual hematoma:  had been seen by Podiatry; they did remove an ingrown toenail left 3rd toe  They feel that she is likely to lose the nail on the right great toe  Continue local care      Subjective/ HPI:  Patients overnight issues or events were reviewed with nursing or staff during rounds or morning huddle session  New or overnight issues  ABLA: Hgb 8 4  HTN:  Stable on current tx  Chronic diastolic CHF: stable  Offers no complaints       ROS:     GI: denies abdominal pain, change bowel habits or reflux symptoms  Neuro: Denies any headache, new vision changes, new neuropathies,new weaknesses   Respiratory: No Cough, SOB, denies wheeze  Cardiovascular: No CP, palpitations , denies perception of rapid heartbeat  : denies any new urinary burning or frequency    Review of Scheduled Meds:    Current Facility-Administered Medications:  acetaminophen 975 mg Oral Q8H Cholo Zimmerman MD bacitracin 1 small application Topical Daily JUAN Marie   docusate sodium 100 mg Oral BID Balwinder Moran MD   enoxaparin 40 mg Subcutaneous Daily Balwinder Moran MD   furosemide 80 mg Oral Daily Balwinder Moran MD   pantoprazole 40 mg Oral Daily Before Breakfast Balwinder Moran MD   polyethylene glycol 17 g Oral Daily PRN Balwinder Moran MD   traMADol 25 mg Oral Q4H PRN Balwinder Moran MD   traMADol 50 mg Oral Q4H PRN Balwinder Moran MD   verapamil 240 mg Oral Daily Eloy Henriquez MD       Labs:     Results from last 7 days   Lab Units 07/31/19  0526 07/29/19  0504   WBC Thousand/uL 6 82 9 31   HEMOGLOBIN g/dL 8 4* 7 7*   HEMATOCRIT % 26 8* 24 8*   PLATELETS Thousands/uL 283 246     Results from last 7 days   Lab Units 07/29/19  0504 07/26/19  0640   SODIUM mmol/L 140 143   POTASSIUM mmol/L 3 9 3 8   CHLORIDE mmol/L 102 108   CO2 mmol/L 35* 33*   BUN mg/dL 23 19   CREATININE mg/dL 0 72 0 65   CALCIUM mg/dL 9 0 8 4                      Imaging:     No orders to display       *Labs reviewed  *Radiology studies reviewed  *Medications reviewed and reconciled as needed  *Please refer to order section for additional ordered labs studies  *Case discussed with primary attending during morning huddle case rounds    Physical Examination:  Vitals:   Vitals:    07/30/19 2008 07/31/19 0536 07/31/19 0601 07/31/19 0821   BP: 155/70 140/63  140/63   BP Location: Left arm      Pulse: 76 64     Resp: 16 20     Temp: 98 3 °F (36 8 °C) 98 2 °F (36 8 °C)     TempSrc: Oral Oral     SpO2: 94% 95%     Weight:  55 5 kg (122 lb 5 7 oz) 55 5 kg (122 lb 5 7 oz)    Height:           General Appearance: NAD, conversive  Eyes: No icterus; conjunctiva normal, PERRLA  HENT: oropharynx clear; mucous membranes moist  Neck: trachea midline, range of motion full     Lungs: CTA, normal respiratory effort, no retractions, expiratory effort normal  CV: regular rate, no rubs/murmurs/gallops  ABD: soft: NT/ND; +BS  EXT: mod edema of lower legs as always R>L  Skin: normal turgor, normal texture, no rashes  Psych: affect normal, no overt anxiety/depression   Neuro: AAOx3            Total time spent: At least 40 minutes, with more than 50% spent counseling/coordinating care  Counseling includes discussion with patient re: progress  and discussion with patient of his/her current medical state/information  Coordination of patient's care was performed in conjunction with primary service  Time invested included review of patient's labs, vitals, and management of their comorbidities with continued monitoring  In addition, this patient was discussed with medical team including physician and advanced extenders  The care of the patient was extensively discussed and appropriate treatment plan was formulated unique for this patient  ** Please Note: Dragon 360 Dictation voice to text software may have been used in the creation of this document   **

## 2019-07-31 NOTE — ASSESSMENT & PLAN NOTE
· Improved  · Worse with dry foods, encouraged patient to take smaller bites  · If persists or worsens, would benefit from outpatient f/u with GI  · Of note, patient last had colonoscopy on 03/04/13, with following findings:   · Sigmoid diverticula  · Internal and external hemorrhoids  · Redundant colon with acute angulations  · Patient is to have repeat colonoscopy in 2023

## 2019-07-31 NOTE — PROGRESS NOTES
07/31/19 1100   Pain Assessment   Pain Score 7   Pain Type Surgical pain;Acute pain   Pain Location Hip   Pain Orientation Right   Hospital Pain Intervention(s) Cold applied;Elevated; Rest   Restrictions/Precautions   Precautions Fall Risk;Contact/isolation;Pain;Supervision on toilet/commode   RLE Weight Bearing Per Order WBAT   Cognition   Arousal/Participation Cooperative   Attention Within functional limits   Following Commands Follows multistep commands with increased time or repetition   Subjective   Subjective Pt  reported that she wants to go to the bathroom first prior to session    QI: Sit to Lying   Assistance Needed Physical assistance   Assistance Provided by [place] 25%-49%   Comment for prakash GUZMAN using leg     Sit to Lying CARE Score 3   QI: Lying to Sitting on Side of Bed   Assistance Needed Supervision   Lying to Sitting on Side of Bed CARE Score 4   QI: Sit to Stand   Assistance Needed Supervision   Sit to Stand CARE Score 4   QI: Chair/Bed-to-Chair Transfer   Assistance Needed Incidental touching   Chair/Bed-to-Chair Transfer CARE Score 4   Transfer Bed/Chair/Wheelchair   Limitations Noted In Endurance;Pain Management   Adaptive Equipment Roller Walker   Stand Pivot Contact Guard   Sit to Stand Supervision   Stand to Sit Supervision   Supine to Sit Supervision   Sit to Supine Minimal Assist   Bed, Chair, Wheelchair Transfer (FIM) 4 - [place] lifts one extremity during transfer   QI: Walk 10 Feet   Assistance Needed Supervision; Incidental touching   Walk 10 Feet CARE Score 4   QI: Walk 50 Feet with Two Turns   Assistance Needed Supervision; Incidental touching   Walk 50 Feet with Two Turns CARE Score 4   QI: Walk 150 Feet   Assistance Needed Supervision; Incidental touching   Walk 150 Feet CARE Score 4   Ambulation   Does the patient walk? 2  Yes   Primary Discharge Mode of Locomotion Walk   Walk Assist Level Contact Guard;Close Supervision   Gait Pattern Inconsistant Felipa; Slow Felipa; Forward Flexion;Decreased R stance; Improper weight shift   Assist Device Roller Justa Patino Walked (feet) 150 ft   Limitations Noted In Balance; Endurance   Walking (FIM) 4 - Patient requires steadying assist or light touching AND distance 150 feet or more, no rest   Wheelchair mobility   QI: Does the patient use a wheelchair? 0  No   QI: Toilet Transfer   Assistance Needed Supervision   Comment CS   Toilet Transfer CARE Score 4   Toilet Transfer   Surface Assessed Raised Toilet   Toilet Transfer (FIM) 5 - Patient requires supervision/monitoring   Assessment   Treatment Assessment Pt  engaged in 30 mins PT session and participated in toielt transfers, inc activity tolerance in walking and bed mobility  Pt  CS/ CGA with functional transfers but cont to need min A with bed mobility due to R LE pain and swelling  trailed using leg  this session  Pt  able to lift R leg over group home towards the bed but needed extra assist to put it completely in the bed  Pt  did not utilize bedrail with bed mobility  Pt  assisted at recliner at end of session with CP applied due to inc pain on R hip  Also given a towel roll to put in bet knees to prevent excessive IR  No other complaints reported  Cont to work on improving bed mobility with or without leg  and inc overall independence and activity tolerance in functional mobility  Problem List Decreased strength;Decreased range of motion;Decreased endurance; Impaired balance;Pain   Barriers to Discharge Inaccessible home environment;Decreased caregiver support   PT Barriers   Physical Impairment Decreased strength;Decreased range of motion;Decreased endurance; Impaired balance;Pain   Functional Limitation Car transfers;Stair negotiation;Standing;Transfers; Walking   Plan   Treatment/Interventions Functional transfer training;LE strengthening/ROM; Elevations; Therapeutic exercise; Endurance training;Patient/family training;Equipment eval/education; Bed mobility;Gait training Recommendation   Recommendation Home PT;Outpatient PT; Home with family support   Equipment Recommended Walker   PT Therapy Minutes   PT Time In 1100   PT Time Out 1130   PT Total Time (minutes) 30   PT Mode of treatment - Individual (minutes) 30   PT Mode of treatment - Concurrent (minutes) 0   PT Mode of treatment - Group (minutes) 0   PT Mode of treatment - Co-treat (minutes) 0   PT Mode of Teatment - Total time(minutes) 30 minutes   Therapy Time missed   Time missed?  No

## 2019-07-31 NOTE — PLAN OF CARE
Problem: Prexisting or High Potential for Compromised Skin Integrity  Goal: Skin integrity is maintained or improved  Description  INTERVENTIONS:  - Identify patients at risk for skin breakdown  - Assess and monitor skin integrity  - Assess and monitor nutrition and hydration status  - Monitor labs (i e  albumin)  - Assess for incontinence   - Turn and reposition patient  - Assist with mobility/ambulation  - Relieve pressure over bony prominences  - Avoid friction and shearing  - Provide appropriate hygiene as needed including keeping skin clean and dry  - Evaluate need for skin moisturizer/barrier cream  - Collaborate with interdisciplinary team (i e  Nutrition, Rehabilitation, etc )   - Patient/family teaching  Outcome: Progressing     Problem: Potential for Falls  Goal: Patient will remain free of falls  Description  INTERVENTIONS:  - Assess patient frequently for physical needs  -  Identify cognitive and physical deficits and behaviors that affect risk of falls    -  [place] fall precautions as indicated by assessment   - Educate patient/family on patient safety including physical limitations  - Instruct patient to call for assistance with activity based on assessment  - Modify environment to reduce risk of injury  - Consider OT/PT consult to assist with strengthening/mobility  Outcome: Progressing     Problem: PAIN - ADULT  Goal: Verbalizes/displays adequate comfort level or baseline comfort level  Description  Interventions:  - Encourage patient to monitor pain and request assistance  - Assess pain using appropriate pain scale  - Administer analgesics based on type and severity of pain and evaluate response  - Implement non-pharmacological measures as appropriate and evaluate response  - Consider cultural and social influences on pain and pain management  - Notify physician/advanced practitioner if interventions unsuccessful or patient reports new pain  Outcome: Progressing     Problem: INFECTION - ADULT  Goal: Absence or prevention of progression during hospitalization  Description  INTERVENTIONS:  - Assess and monitor for signs and symptoms of infection  - Monitor lab/diagnostic results  - Monitor all insertion sites, i e  indwelling lines, tubes, and drains  - Monitor endotracheal (as able) and nasal secretions for changes in amount and color  - [place] appropriate cooling/warming therapies per order  - Administer medications as ordered  - Instruct and encourage patient and family to use good hand hygiene technique  - Identify and instruct in appropriate isolation precautions for identified infection/condition  Outcome: Progressing  Goal: Absence of fever/infection during neutropenic period  Description  INTERVENTIONS:  - Monitor WBC  - Implement neutropenic guidelines  Outcome: Progressing     Problem: SAFETY ADULT  Goal: Maintain or return to baseline ADL function  Description  INTERVENTIONS:  -  Assess patient's ability to carry out ADLs; assess patient's baseline for ADL function and identify physical deficits which impact ability to perform ADLs (bathing, care of mouth/teeth, toileting, grooming, dressing, etc )  - Assess/evaluate cause of self-care deficits   - Assess range of motion  - Assess patient's mobility; develop plan if impaired  - Assess patient's need for assistive devices and provide as appropriate  - Encourage maximum independence but intervene and supervise when necessary  ¯ Involve family in performance of ADLs  ¯ Assess for home care needs following discharge   ¯ Request OT consult to assist with ADL evaluation and planning for discharge  ¯ Provide patient education as appropriate  Outcome: Progressing  Goal: Maintain or return mobility status to optimal level  Description  INTERVENTIONS:  - Assess patient's baseline mobility status (ambulation, transfers, stairs, etc )    - Identify cognitive and physical deficits and behaviors that affect mobility  - Identify mobility aids required to assist with transfers and/or ambulation (gait belt, sit-to-stand, lift, walker, cane, etc )  - [place] fall precautions as indicated by assessment  - Record patient progress and toleration of activity level on Mobility SBAR; progress patient to next Phase/Stage  - Instruct patient to call for assistance with activity based on assessment  - Request Rehabilitation consult to assist with strengthening/weightbearing, etc   Outcome: Progressing     Problem: DISCHARGE PLANNING  Goal: Discharge to home or other facility with appropriate resources  Description  INTERVENTIONS:  - Identify barriers to discharge w/patient and caregiver  - Arrange for needed discharge resources and transportation as appropriate  - Identify discharge learning needs (meds, wound care, etc )  - Arrange for interpretive services to assist at discharge as needed  - Refer to Case Management Department for coordinating discharge planning if the patient needs post-hospital services based on physician/advanced practitioner order or complex needs related to functional status, cognitive ability, or social support system  Outcome: Progressing     Problem: Knowledge Deficit  Goal: Patient/family/caregiver demonstrates understanding of disease process, treatment plan, medications, and discharge instructions  Description  Complete learning assessment and assess knowledge base    Interventions:  - Provide teaching at level of understanding  - Provide teaching via preferred learning methods  Outcome: Progressing

## 2019-07-31 NOTE — TEAM CONFERENCE
Acute RehabilitationTeam Conference Note  Date: 7/31/2019   Time: 10:43 AM       Patient Name:  Radha Bob       Medical Record Number: [id number]   YOB: 1935  Sex: Female          Room/Bed:  [facility]8/[facility] 968-01  Payor Info:  Payor: MEDICARE / Plan: MEDICARE A AND B / Product Type: Medicare A & B Fee for Service /      Admitting Diagnosis: Closed right hip fracture ([facility] 75 ) [S72 001A]   Admit Date/Time:  7/25/2019  4:32 PM  Admission Comments: No comment available     Primary Diagnosis:  Closed right hip fracture, initial encounter ([place] [address] )  Principal Problem: Closed right hip fracture, initial encounter [facility])    Patient Active Problem List    Diagnosis Date Noted    Anemia 07/26/2019    Closed right hip fracture, initial encounter ([place] [address] ) 07/22/2019    Intertrochanteric fracture of right femur ([place] [address] ) 07/22/2019    Mitral valve stenosis, mild     Arthritis 10/19/2018    Chronic diastolic congestive heart failure ([place] [address] ) 05/10/2018    Localized edema 05/10/2018    Leg foot wound- 3rd digit 05/10/2018    Other fatigue 05/10/2018    Cataract 05/13/2016    Aortic stenosis, moderate 05/11/2016    Atrial dilatation, bilateral 05/11/2016    Mild tricuspid insufficiency 05/11/2016    Non-rheumatic mitral regurgitation 05/11/2016    Essential hypertension 08/22/2013       Physical Therapy:    Weight Bearing Status: Weight Bearing as Tolerated  Transfers: Minimal Assistance  Bed Mobility: Moderate Assistance  Amulation Distance (ft): 100 feet  Ambulation: Minimal Assistance  Assistive Device for Ambulation: Roller Walker  Discharge Recommendations: Prem Lux    One of pt's barriers is lack of caregiver availability, as she was the primary caregiver for her , with A from home health aides  Pt currently needs physical A for transfers, and needs BP monitored due to complaints of feeling "woozy"   Pt will cont to benefit from skilled PT to make further progress in overall balance, safety and (I) with functional transfers  Currently anticipating need for her to have help upon d/c, however will cont to assess as she makes progress  Occupational Therapy:  Eating: Supervision  Grooming: Supervision  Bathing: Supervision  Bathing: Supervision  Upper Body Dressing: Supervision  Lower Body Dressing: Minimal Assistance  Toileting: Minimal Assistance  Toilet Transfer: Minimal Assistance  Cognition: Within Defined Limits  Orientation: Person, Place, Situation, Time  Discharge Recommendations: Other       Pt continues to present with impairments in activity tolerance, endurance, standing balance/tolerance, sitting balance/tolerance and UE strength  Additional functional barriers include orthopedic restricitions , decreased caregiver support, risk for falls and home environment  Pt demonstrates improvements in sit <> stand transfers, however at time requires mod assist  Pt was assisting in physical care for her  at home PTA  Pt will continue to benefit from skilled OT services to address above mentioned barriers and maximize functional independence in baseline areas of occupation  OT D/C recommendation is for reteam    Speech Therapy:           No notes on file    Nursing Notes:  Appetite: Good  Diet Type: Cardiac                      Diet Patient/Family Education Complete: Yes    Type of Wound (LDA):  Wound                    Type of Wound Patient/Family Education: Yes  Bladder: 5 - Supervision     Bladder Patient/Family Education: Yes  Bowel: 5 - Supervision     Bowel Patient/Family Education: Yes  Pain Location: Hip, Leg  Pain Orientation: Right  Pain Score: 4                       Hospital Pain Intervention(s): Medication (See MAR), Repositioned  Pain Patient/Family Education: Yes  Medication Management/Safety  Injectable: Lovenox(unsure if pt will de discharged on Lovenox)  Safe Administration: Yes(hosptial setting)  Medication Patient/Family Education Complete: No    History of chronic diastolic heart failure, HTN, and anxiety  On 7/23 pt was admitted with a right hip fracture after falling in the shower   She underwent a R IMN (2 incisions with stapes)  Pt positive for MRSA in the nares  Pt has a L 3rd toe wound and a R great toe hematoma  Pt is on Lasix for CHF and will follow up with Dr Vickie Jonas and has baseline edema  Pt is on Verapamil for HTN and is stable  Hemoglobin value is 7 1 and has not required any transfusions  Pain controled with Tylenol and PRN Tramadol  Pt is continent of both bowl and bladder  Pt require supervision with toileting  We will monitor labs and values, including hemoglobin and BP  We will educate on repositioning and off loading in order to prevent skin breakdown and preform routine skin checks  We will monitor incision site for healing and signs of infection  Monitor I+O's for signs and symptoms of CHF  We will monitor pt for adequate pain control  We will encourage independence with ADLs  We will increase safety awareness with transfers and keep patient free from falls  Case Management:     Discharge Planning  Living Arrangements: Spouse/significant other  Support Systems: Spouse/significant other, Children  Assistance Needed: none  Type of Current Residence: Private residence  [address]: No  Pt is participating well with therapy and is expected to return home  Pt has been educated on potential recommendations for contd therapy services  Following to assist w/dc planning needs  Is the patient actively participating in therapies? yes  List any modifications to the treatment plan:     Barriers Interventions   stairs Therapy stair training   Caregiver support Attain mod I goals   Right leg pain Therapy exericses, medication education             Is the patient making expected progress toward goals?  yes  List any update or changes to goals:     Medical Goals: Patient will be medically stable for discharge to leaset restrictive envrionment upon completion of rehab program and Patient will be able to manage medical conditions and comorbid conditions with medications and follow up upon completion of rehab program    Weekly Team Goals:   Rehab Team Goals  ADL Team Goal: Patient will be independent with ADLs with least restrictive device upon completion of rehab program  Bowel/Bladder Team Goal: Patient will be independent with bladder/bowel management with least restrictive device upon completion of rehab program  Transfer Team Goal: Patient will be independent with transfers with least restrictive device upon completion of rehab program  Locomotion Team Goal: Patient will be independent with locomotion with least restrictive device upon completion of rehab program  Cognitive Team Goal: Patient will be independent for basic and complex tasks upon completion of rehab program    Discussion: pt presents with the above barriers and is making progress  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 hiring privately to care for him during her stay  Stairs are pt's biggest barrier but she is functioning min to mod a with adls and transfers  Goals are for mod I on dc but team is concerned about her physical ability to help her spouse  Anticipated Discharge Date:  reteam [facility] Team Members Present: The following team members are supervising care for this patient and were present during this Weekly Team Conference      Physician: Dr Kami Rdz MD  : Clotilde Griffin MSW  Registered Nurse: Andrew Henning RN  Physical Therapist: Lulú Hamilton DPT  Occupational Therapist: Anisha Das MS, OTR/L, CBIS  Speech Therapist:   Other: Jairo Huggins, RN, BSN  [address] [place] [address] and Hospice

## 2019-07-31 NOTE — SOCIAL WORK
Met with Pt to review team meeting  Pt is in agreement with a reteam, as she does not feel she is in the best shape to return home  Pt shared that her  receives 1 5hrs M/W/F from [facility], and then she pays privately for two different women to be there on Tue/Th, and then Sat/Sun  CM inquired if she could have the two women there more often, when she is ready for d/c  Pt states that the weekend gal works FT M-F, and the other who is there during the week, also cares for her elderly mother  CM recommended that Pt find out if they have any colleagues, who could be available when Pt d/c  Pt stated she didn't believe so, but would look into it  CM offered that Pts son might be able to get FMLA, to assist for a time  CM confirmed that Pt would like [facility] for herself at d/c

## 2019-07-31 NOTE — PROGRESS NOTES
Occupational Therapy Treatment Note        07/31/19 0935   Pain Assessment   Pain Assessment 0-10   Pain Score 8   Pain Type Surgical pain;Acute pain   Pain Location Hip   Pain Orientation Right   Hospital Pain Intervention(s) Ambulation/increased activity; Distraction   Response to Interventions unchanged   Restrictions/Precautions   Precautions Contact/isolation; Fall Risk;Supervision on toilet/commode;Pain   Weight Bearing Restrictions Yes   RLE Weight Bearing Per Order WBAT   Lifestyle   Autonomy "I would prefer to hire someone not from an agency"   Grooming   Able To Initiate Tasks; Wash/Dry Hands   Limitation Noted In Safety;Strength   Findings Pt completed hand washing at sink with RW with supervision for balance  Grooming (FIM) 5 - Patient requires supervision/monitoring   QI: Sit to Stand   Assistance Needed Incidental touching   Assistance Provided by [place] No physical assistance   Comment Pt completed with RW  Boosting with UE from armrest   Sit to Stand CARE Score 4   QI: Chair/Bed-to-Chair Transfer   Assistance Needed Incidental touching   Assistance Provided by [place] No physical assistance   Comment RW   Chair/Bed-to-Chair Transfer CARE Score 4   Transfer Bed/Chair/Wheelchair   Limitations Noted In Balance; Endurance;Pain Management;LE Strength;UE Strength   Adaptive Equipment Roller Walker   Sit to Stand Minimal   Stand to Sit Minimal   Findings Pt completes functional mobility to and from bathroom with [place] using RW   Bed, Chair, Wheelchair Transfer (FIM) 4 - Patient requires steadying assist or light touching   QI: Toilet Transfer   Assistance Needed Incidental touching   Assistance Provided by [place] No physical assistance   Comment Pt completed toilet transfer with RW with CGA for decreased balance and activity support  Pt reports not having a commode at home  OT recommending commode over toilet at home upon discharge      Toilet Transfer CARE Score 4   Toilet Transfer   Surface Assessed Standard Commode   Transfer Technique Standard   Limitations Noted In Balance; Endurance; Safety;UE Strength;LE Strength   Toilet Transfer (FIM) 4 - Patient requires steadying assist or light touching   Cognition   Overall Cognitive Status WFL   Arousal/Participation Alert; Responsive; Cooperative   Attention Within functional limits   Orientation Level Oriented to person;Oriented to place;Oriented to situation   Memory Within functional limits   Following Commands Follows all commands and directions without difficulty   Assessment   Treatment Assessment Pt engaged in skilled OT tx session focusing on ADLs, functional mobility, functional transfers, and d/c planning  Pt uses UE to push through bars and walker for support  OT educated Pt on benefits of commode for over toilet  Pt reports pain but did not limit her in session  OT discussed with Pt about discharge planning regarding home help  Pt reports that she takes care of  full time  Pt must be at Mod I level upon d/c therefore OT recommends hiring more help to assist with  and household IADLs  Pt states that she does not want to hire help through an agency due to financial reasons  OTR relayed this to [facility] Boby and Samira Acosta who will follow up with Pt on resources for increased assistance at home  Continue OT POC to focus on ADLs, functional mobility, functional transfers, UE strength, household IADLs  Prognosis Good   Problem List Decreased strength;Decreased range of motion;Decreased mobility; Impaired balance;Decreased endurance;Pain;Orthopedic restrictions   Barriers to Discharge Decreased caregiver support; Inaccessible home environment   Plan   Treatment/Interventions ADL retraining;Functional transfer training; Therapeutic exercise; Endurance training;Patient/family training;Equipment eval/education; Compensatory technique education   Progress Progressing toward goals   Recommendation   OT Discharge Recommendation Home OT   Equipment Recommended Bedside commode   OT Equipment ordered   (OT to order)   OT Therapy Minutes   OT Time In 0935   OT Time Out 1005   OT Total Time (minutes) 30   OT Mode of treatment - Individual (minutes) 30   OT Mode of treatment - Concurrent (minutes) 0   OT Mode of treatment - Group (minutes) 0   OT Mode of treatment - Co-treat (minutes) 0   OT Mode of Teatment - Total time(minutes) 30 minutes   Therapy Time missed   Time missed?  No

## 2019-07-31 NOTE — PROGRESS NOTES
07/31/19 0830   Pain Assessment   Pain Score 4   Pain Type Surgical pain   Pain Location Hip;Leg   Pain Orientation Right   Pain Frequency Intermittent   Pain Onset Ongoing   Hospital Pain Intervention(s) Repositioned; Ambulation/increased activity   Response to Interventions 4/10   Restrictions/Precautions   Precautions Contact/isolation;Supervision on toilet/commode; Fall Risk;Pain   RLE Weight Bearing Per Order WBAT   Cognition   Arousal/Participation Cooperative   Subjective   Subjective pt reported that she didn't sleep well but is ready for PT    QI: Sit to Stand   Assistance Needed Supervision; Incidental touching; Adaptive equipment   Sit to Stand CARE Score 4   QI: Chair/Bed-to-Chair Transfer   Assistance Needed Physical assistance; Adaptive equipment   Assistance Provided by [place] Less than 25%   Chair/Bed-to-Chair Transfer CARE Score 3   Transfer Bed/Chair/Wheelchair   Limitations Noted In Balance; Endurance;UE Strength;LE Strength   Adaptive Equipment Roller Walker   Stand Pivot Minimal Assist;Contact Guard   Sit to [facility]   Stand to [facility] [employer], Chair, Wheelchair Transfer (FIM) 4 - [place] lifts one extremity during transfer   QI: Walk 10 Feet   Assistance Needed Incidental touching;Supervision; Adaptive equipment   Walk 10 Feet CARE Score 4   QI: Walk 50 Feet with Two Turns   Assistance Needed Incidental touching;Supervision; Adaptive equipment   Walk 50 Feet with Two Turns CARE Score 4   QI: Walk 150 Feet   Assistance Needed Incidental touching;Supervision; Adaptive equipment   Walk 150 Feet CARE Score 4   Ambulation   Does the patient walk? 2  Yes   Primary Discharge Mode of Locomotion Walk   Walk Assist Level Close Supervision;Contact Guard   Gait Pattern Inconsistant Felipa; Slow Felipa; Forward Flexion; Step to; Step through; Improper weight shift;Decreased R stance   Assist Device Trina Patino Walked (feet) 150 ft  (x2)   Limitations Noted In Balance; Endurance; Heel Strike;Speed;Strength;Swing   Walking (FIM) 4 - Patient requires steadying assist or light touching AND distance 150 feet or more, no rest   QI: 1 Step (Curb)   Assistance Needed Physical assistance; Adaptive equipment   Assistance Provided by [place] 25%-49%   1 Step (Curb) CARE Score 3   QI: 4 Steps   Assistance Needed Physical assistance; Adaptive equipment   Assistance Provided by [place] 25%-49%   4 Steps CARE Score 3   Stairs   Type Stairs   # of Steps 4  (2)   Weight Bearing Precautions Fall Risk   Assist Devices Bilateral Rail;Single Rail   Findings started with 4 steps,  up forward and down backwards at [address] with BUE on rails, nonreciprocal pattern  After sitting rest break, practiced both arms on L rail, up LLE first and down backwards RLE first, Min-ModA for support, pt had difficult time holding onto rail due to the construction of  steps HR  Stairs (FIM) 1 - Patient goes up and down less than 4 stairs regardless of assist/device/set up   Therapeutic Interventions   Other with RLE support, AROM-AAROM for ankle pumps, LAQ, long sitting hip IR/ER, seated hip abd/add, through small range due to pain  Assessment   Treatment Assessment Pt is making progress with transfers and ambulation distance, however cont to have dec gait speed and needs to use BUE support on RW due to dec strength/WBing on RLE  Pt was able to practice 6"  steps today, however due to deficits on RLE relies on BUE support on handrails  Pt will use ramp to enter, however needs to perform FF to access shower and bedroom at home; FF is a barrier to home at this time as she only has one rail on L side  Pt will benefit from skilled PT to further progress overall strength, balance and activity tolerance to progress to [place] for household distance walking and transfers and S for FF of stairs   Talked with pt today about working with our team and her son/family to arrange for more help at home for her  and for her  Plan to re-team and use home health/PT/OT upon dc  Barriers to Discharge Inaccessible home environment;Decreased caregiver support   PT Barriers   Physical Impairment Decreased strength;Decreased range of motion;Decreased endurance; Impaired balance;Decreased mobility;Pain   Functional Limitation Car transfers;Stair negotiation;Standing;Transfers; Walking   Plan   Treatment/Interventions Functional transfer training;LE strengthening/ROM; Elevations; Therapeutic exercise; Endurance training;Patient/family training;Equipment eval/education; Bed mobility;Gait training   Progress Progressing toward goals   Recommendation   Recommendation Home PT; Home with family support   Equipment Recommended Walker   PT Therapy Minutes   PT Time In 0830   PT Time Out 0930   PT Total Time (minutes) 60   PT Mode of treatment - Individual (minutes) 60   PT Mode of treatment - Concurrent (minutes) 0   PT Mode of treatment - Group (minutes) 0   PT Mode of treatment - Co-treat (minutes) 0   PT Mode of Teatment - Total time(minutes) 60 minutes   Therapy Time missed   Time missed?  No

## 2019-07-31 NOTE — P[facility] NURSING
History of chronic diastolic heart failure, HTN, and anxiety  On 7/23 pt was admitted with a right hip fracture after falling in the shower   She underwent a R IMN (2 incisions with stapes)  Pt positive for MRSA in the nares  Pt has a L 3rd toe wound and a R great toe hematoma  Pt is on Lasix for CHF and will follow up with Dr Jayleen Younger and has baseline edema  Pt is on Verapamil for HTN and is stable  Hemoglobin value is 7 1 and has not required any transfusions  Pain controled with Tylenol and PRN Tramadol  Pt is continent of both bowl and bladder  Pt require supervision with toileting  We will monitor labs and values, including hemoglobin and BP  We will educate on repositioning and off loading in order to prevent skin breakdown and preform routine skin checks  We will monitor incision site for healing and signs of infection  Monitor I+O's for signs and symptoms of CHF  We will monitor pt for adequate pain control  We will encourage independence with ADLs  We will increase safety awareness with transfers and keep patient free from falls

## 2019-08-01 PROBLEM — G89.29 CHRONIC PAIN OF RIGHT KNEE: Status: ACTIVE | Noted: 2019-08-01

## 2019-08-01 PROBLEM — M25.561 CHRONIC PAIN OF RIGHT KNEE: Status: ACTIVE | Noted: 2019-08-01

## 2019-08-01 PROCEDURE — 97110 THERAPEUTIC EXERCISES: CPT

## 2019-08-01 PROCEDURE — 97116 GAIT TRAINING THERAPY: CPT

## 2019-08-01 PROCEDURE — 97535 SELF CARE MNGMENT TRAINING: CPT

## 2019-08-01 PROCEDURE — 97530 THERAPEUTIC ACTIVITIES: CPT

## 2019-08-01 PROCEDURE — 99232 SBSQ HOSP IP/OBS MODERATE 35: CPT | Performed by: PHYSICAL MEDICINE & REHABILITATION

## 2019-08-01 RX ORDER — LIDOCAINE 50 MG/G
1 PATCH TOPICAL DAILY
Status: DISCONTINUED | OUTPATIENT
Start: 2019-08-01 | End: 2019-08-02

## 2019-08-01 RX ADMIN — ACETAMINOPHEN 975 MG: 325 TABLET ORAL at 14:50

## 2019-08-01 RX ADMIN — TRAMADOL HYDROCHLORIDE 25 MG: 50 TABLET, FILM COATED ORAL at 02:57

## 2019-08-01 RX ADMIN — MELATONIN 3 MG: 3 TAB ORAL at 01:09

## 2019-08-01 RX ADMIN — ENOXAPARIN SODIUM 40 MG: 40 INJECTION SUBCUTANEOUS at 22:03

## 2019-08-01 RX ADMIN — LIDOCAINE 1 PATCH: 50 PATCH CUTANEOUS at 18:15

## 2019-08-01 RX ADMIN — BACITRACIN ZINC 1 SMALL APPLICATION: 500 OINTMENT TOPICAL at 08:45

## 2019-08-01 RX ADMIN — ACETAMINOPHEN 975 MG: 325 TABLET ORAL at 22:03

## 2019-08-01 RX ADMIN — FUROSEMIDE 80 MG: 80 TABLET ORAL at 08:31

## 2019-08-01 RX ADMIN — TRAMADOL HYDROCHLORIDE 50 MG: 50 TABLET, FILM COATED ORAL at 08:31

## 2019-08-01 RX ADMIN — ACETAMINOPHEN 975 MG: 325 TABLET ORAL at 05:29

## 2019-08-01 RX ADMIN — VERAPAMIL HYDROCHLORIDE 240 MG: 240 TABLET, FILM COATED, EXTENDED RELEASE ORAL at 08:32

## 2019-08-01 RX ADMIN — TRAMADOL HYDROCHLORIDE 50 MG: 50 TABLET, FILM COATED ORAL at 14:52

## 2019-08-01 RX ADMIN — PANTOPRAZOLE SODIUM 40 MG: 40 TABLET, DELAYED RELEASE ORAL at 05:29

## 2019-08-01 NOTE — PROGRESS NOTES
08/01/19 1000   Pain Assessment   Pain Assessment 0-10   Pain Score 7   Pain Type Surgical pain   Pain Location Hip   Hospital Pain Intervention(s) Repositioned; Ambulation/increased activity   Restrictions/Precautions   Precautions Fall Risk;Supervision on toilet/commode;Contact/isolation   RLE Weight Bearing Per Order WBAT   QI: Shower/Bathe Self   Assistance Needed Physical assistance   Assistance Provided by [place] Less than 25%   Shower/Bathe Self CARE Score 3   Bathing   Assessed Bath Style Tub   Anticipated D/C Bath Style Tub   Able to Gather/Transport No   Able to Wash/Rinse/Dry (body part) Left Arm;Right Arm;L Upper Leg;R Upper Leg;Chest;Abdomen;Perineal Area; Buttocks   Limitations Noted in Balance; Endurance;Strength;ROM   Positioning Seated;Standing   Adaptive Equipment Tub Bench; Shower Bars;Hand Held Shower   Bathing (FIM) 4 - Patient completes 8/10 or 9/10 parts   Tub/Shower Transfer   Limitations Noted In Balance;LE Strength;ROM   Adaptive Equipment Transfer Bench;Grab Bars   Assessed Tub/shower combo   Tub Transfer (FIM) 4 - [place] lifts one extremity during transfer   QI: Upper Body [address] [facility] Provided by [place] No physical assistance   Comment Bra and pull over gown   Upper Body Dressing CARE Score 4   QI: Lower Body [address] [facility] Provided by [place] No physical assistance   Comment With increased time and VC's for problem solving warranted pt was able to complete at overall a CS level  Lower Body Dressing CARE Score 4   QI: Putting On/Taking Off Footwear   Assistance Needed Physical assistance   Assistance Provided by [place] 50%-74%   Comment Pt demos ability to complete doffing/donning shoes with increased time  Requires A for tying R lace and for donning b/l TEDS which she reports wearing at baseline      Putting On/Taking Off Footwear CARE Score 2   Dressing/Undressing Clothing   Remove UB Clothes   (pullover gown)   Remove LB Clothes Undergarment;Socks; Shoes   Don UB Clothes   ([address] [facility] [address] [facility])   Don LB Clothes Undergarment;Socks; Shoes   Limitations Noted In Balance; Endurance;Problem Solving; Safety;Strength;Timeliness;ROM   Positioning Supported Sit;Standing   UB Dressing (FIM) 5 - Patient requires supervision/monitoring   LB Dressing (FIM) 3 - Patient completes  50-74% of all tasks   QI: Sit to [address] Provided by [place] No physical assistance   Sit to Stand CARE Score 4   QI: Chair/Bed-to-Chair Transfer   Assistance Needed Incidental touching   Assistance Provided by [place] No physical assistance   Chair/Bed-to-Chair Transfer CARE Score 4   Transfer Bed/Chair/Wheelchair   Limitations Noted In Balance; Endurance;LE Strength;Pain Management   Adaptive Equipment Roller Walker   Bed, Chair, Wheelchair Transfer (FIM) 4 - Patient requires steadying assist or light touching   QI: [address] Needed Incidental touching   Assistance Provided by [place] No physical assistance   Toileting Hygiene CARE Score 4   Toileting   Toileting (FIM) 4 - Patient requires steadying assist or light touching   QI: Toilet Transfer   Assistance Needed Incidental touching   Assistance Provided by [place] No physical assistance   Toilet Transfer CARE Score 4   Toilet Transfer   Surface Assessed Standard Commode   Toilet Transfer (FIM) 4 - Patient requires steadying assist or light touching   Cognition   Overall Cognitive Status WFL   Arousal/Participation Cooperative   Attention Within functional limits   Orientation Level Oriented X4   Memory Within functional limits   Following Commands Follows multistep commands with increased time or repetition   Activity Tolerance   Activity Tolerance Patient tolerated treatment well   Assessment   Treatment Assessment Pt participated in skilled OT services with focus on ADL retraining and functional transfers   Pt continues to be limited by pain, decreased activity tolerance, decreased RLE ROM, and self limiting behavior  Pt reports significant c/o pain up to 8/10 pain with activity, however, physical performance is not indicative of levels of pain she reports  Pt overall functions at a CS/CGA level depending on levels of pain  Pt overall continues to require up to [address] for LB management as she requires significant A for donning TEDs which she reports wearing at baseline and also requires A for tying RLE shoe lace 2* to fatigue/pain  Pt very particular and requires extensive time for task completion  Pt requires encouragement to engage in session but does respond to encouragement  Pt will continue to benefit from skilled OT services with focus on above mentioned deficits to achieve [place] goals  Followed up with pt in regards to d/c plan for caregivers in place for , she reports, her neighbor will be unreliable as "his own things came up " Informing CM to follow up on specific needs as it is not anticipated that this pt will be able to provide level of care to her  that she had been PTA  Prognosis Good   Problem List Decreased strength;Decreased range of motion;Decreased mobility; Impaired balance;Decreased endurance;Pain;Orthopedic restrictions   Plan   Treatment/Interventions ADL retraining;Functional transfer training; Therapeutic exercise; Endurance training;Cognitive reorientation;Patient/family training;Equipment eval/education; Bed mobility; Compensatory technique education   Progress Progressing toward goals   Recommendation   OT Discharge Recommendation Home OT   OT Therapy Minutes   OT Time In 1000   OT Time Out 1130   OT Total Time (minutes) 90   OT Mode of treatment - Individual (minutes) 90   OT Mode of treatment - Concurrent (minutes) 0   OT Mode of treatment - Group (minutes) 0   OT Mode of treatment - Co-treat (minutes) 0   OT Mode of Teatment - Total time(minutes) 90 minutes   Therapy Time missed   Time missed?  No

## 2019-08-01 NOTE — PROGRESS NOTES
08/01/19 0846   Pain Assessment   Pain Assessment 0-10   Pain Score 7   Pain Type Surgical pain   Pain Location Foot;Leg;Hip   Pain Orientation Left   Restrictions/Precautions   Precautions Fall Risk;Supervision on toilet/commode;Contact/isolation   RLE Weight Bearing Per Order WBAT   Subjective   Subjective pt reports feeling okay and ready for skilled PT , pt c/o leg pain as above with nsging and OT aware   QI: Sit to Lying   Assistance Needed Physical assistance   Assistance Provided by [place] 25%-49%   Comment assist with RLE    Sit to Lying CARE Score 3   QI: Lying to Sitting on Side of Bed   Assistance Needed Supervision;Verbal cues   Comment extra time   Lying to Sitting on Side of Bed CARE Score 4   QI: Sit to Stand   Assistance Needed Supervision   Sit to Stand CARE Score 4   QI: Chair/Bed-to-Chair Transfer   Assistance Needed Incidental touching   Chair/Bed-to-Chair Transfer CARE Score 4   Transfer Bed/Chair/Wheelchair   Limitations Noted In Endurance;Pain Management   Adaptive Equipment Roller Walker   Stand Pivot Contact Guard   Sit to Stand Supervision   Stand to Sit Supervision   Supine to Sit Supervision   Sit to Supine Minimal Assist   Findings Pt needs extra time to perform sit to lying activities    Bed, Chair, Wheelchair Transfer (FIM) 4 - Patient requires steadying assist or light touching   QI: Walk 10 Feet   Assistance Needed Supervision   Walk 10 Feet CARE Score 4   QI: Walk 50 Feet with Two Turns   Assistance Needed Supervision   Walk 50 Feet with Two Turns CARE Score 4   QI: Walk 150 Feet   Assistance Needed Supervision   Walk 150 Feet CARE Score 4   Ambulation   Does the patient walk? 2  Yes   Primary Discharge Mode of Locomotion Walk   Walk Assist Level Contact Guard;Close Supervision   Gait Pattern Inconsistant Felipa;Decreased foot clearance; Forward Flexion; Improper weight shift   Assist Device Trina Patino Walked (feet) 150 ft   Limitations Noted In Balance; Endurance Walking (FIM) 4 - Patient requires steadying assist or light touching AND distance 150 feet or more, no rest   QI: 4 Steps   Assistance Needed Physical assistance   Assistance Provided by [place] 25%-49%   4 Steps CARE Score 3   Stairs   Type Stairs   # of Steps 6   Weight Bearing Precautions Fall Risk   Assist Devices Bilateral Rail;Single Rail   Stairs (FIM) 2 - Patient goes up and down 4 - 11 stairs regardless of assist/device/setup   Therapeutic Interventions   Strengthening STS/SPT with RW LAQ x15 SAQ X10 AP X10 gluts x10 wc push ups x10    Balance standing balance with out AD   Assessment   Treatment Assessment pt perform skilled PT focus inc functional activities, mobility , LE strengthening , balance activities with AD , pt perform supine thex ex's and bed mobility with overall functional activities  Pt needs encouragement for inc activities , possible behavioral activities pt c/o about RLE having pain at times, needs to inc walking activities and inc ROM to meet goals  Pt will cont to need skilled PT to meet D/C goals    Problem List Decreased strength;Decreased endurance;Decreased mobility; Impaired balance;Decreased range of motion;Orthopedic restrictions;Pain   Plan   Treatment/Interventions Functional transfer training;Patient/family training;Gait training;Bed mobility; Endurance training;LE strengthening/ROM; Elevations; Therapeutic exercise   Progress Progressing toward goals   PT Therapy Minutes   PT Time In 0846   PT Time Out 0946   PT Total Time (minutes) 60   PT Mode of treatment - Individual (minutes) 60   PT Mode of treatment - Concurrent (minutes) 0   PT Mode of treatment - Group (minutes) 0   PT Mode of treatment - Co-treat (minutes) 0   PT Mode of Teatment - Total time(minutes) 60 minutes   Therapy Time missed   Time missed?  No

## 2019-08-01 NOTE — ASSESSMENT & PLAN NOTE
· Continue lidoderm patch overnight  · S/p IASI and series of euflexxa injections by orthopedic surgery; patient reports minimal benefit     · F/u with PM&R as outpatient for conservative management

## 2019-08-01 NOTE — PROGRESS NOTES
08/01/19 1430   Pain Assessment   Pain Assessment 0-10   Pain Score 8   Pain Type Surgical pain   Pain Location Hip   Pain Orientation Left   Restrictions/Precautions   Precautions Fall Risk;Supervision on toilet/commode;Contact/isolation   RLE Weight Bearing Per Order WBAT   Subjective   Subjective pt agreeabel to perform PT    QI: Sit to Stand   Assistance Needed Supervision   Sit to Stand CARE Score 4   QI: Chair/Bed-to-Chair Transfer   Assistance Needed Supervision   Chair/Bed-to-Chair Transfer CARE Score 4   Transfer Bed/Chair/Wheelchair   Limitations Noted In Endurance;Balance;Pain Management;LE Strength   Adaptive Equipment Walker   Stand Pivot Contact Guard   Sit to Stand Supervision   Stand to Sit Supervision   Supine to Sit Supervision   Sit to Supine Minimal Assist   Bed, Chair, Wheelchair Transfer (FIM) 4 - Patient requires steadying assist or light touching   QI: Walk 50 Feet with Two Turns   Assistance Needed Supervision   Walk 50 Feet with Two Turns CARE Score 4   Ambulation   Does the patient walk? 2  Yes   Primary Discharge Mode of Locomotion Walk   Walk Assist Level Close Supervision   Gait Pattern Decreased foot clearance; Inconsistant Felipa   Assist Device Roller Justa Patino Walked (feet) 50 ft   Limitations Noted In Balance; Endurance   Findings short distanec in pt room to inc home distance activities    Walking (FIM) 2 - Patient ambulates between 50 - 149 feet regardless of assist/device/set up   Therapeutic Interventions   Strengthening LAQ x15 gluts X10    Balance standing balance in bathroom , short distance    pt also standing balance awareness in her room , walking short distance with RW side side stepping and walking BWD with RW  no LOB ar this time    Assessment   Treatment Assessment pt perform and engaged in skilled PT inc functional activitiesd in her room to inc short walking distnace for safety , walking to her bathroom back to recliner and perform bed mobility and transfers   Pt will cont to need skilled PT toward goals    Barriers to Discharge Inaccessible home environment;Decreased caregiver support   Plan   Treatment/Interventions Functional transfer training;LE strengthening/ROM; Therapeutic exercise; Endurance training;Gait training;Bed mobility; Patient/family training   Progress Progressing toward goals   PT Therapy Minutes   PT Time In 1430   PT Time Out 1500   PT Total Time (minutes) 30   PT Mode of treatment - Individual (minutes) 30   PT Mode of treatment - Concurrent (minutes) 0   PT Mode of treatment - Group (minutes) 0   PT Mode of treatment - Co-treat (minutes) 0   PT Mode of Teatment - Total time(minutes) 30 minutes   Therapy Time missed   Time missed?  No

## 2019-08-01 NOTE — PROGRESS NOTES
Internal Medicine Progress Note  Patient: Johanna Graham  Age/sex: 80 y o  female  Medical Record #: [id number]      ASSESSMENT/PLAN: (Interval History)  Johanna Graham is seen and examined and management for following issues:    Right hip fx; s/p TFN 7/23/19:  WBAT; watch incision     ABLA: Hgb improving  Did not want to be transfused last week when her hemoglobin was below 8      Chronic diastolic CHF with LVEF 30%, mild MR/moderate AS and AR/moderate TR:  she will need to go back to see Dr Holly Kern since he has been following her for this and can then refer to Cardiology if he and the patient wish  She is to remain on her home dose of Lasix 80mg daily = she feels edema is baseline      HTN:  at home, she had been on Verapamil but changed in April to Toprol 50mg daily 2/2 insurance would not pay for the Verapamil; however, since she has a lot of Verpamil left, she has been using them up and then is going to switch back to the Toprol  Will keep Verapamil 240mg qd for now     Left 3rd toe wound and right great toe subungual hematoma:  had been seen by Podiatry; they did remove an ingrown toenail left 3rd toe  They feel that she is likely to lose the nail on the right great toe  Continue local care      Subjective/ HPI:  Patients overnight issues or events were reviewed with nursing or staff during rounds or morning huddle session  New or overnight issues  ABLA: Hgb 8 4  HTN:  Stable on Verapamil  Chronic diastolic CHF: stable  Offers no complaints       ROS:     GI: denies abdominal pain, change bowel habits or reflux symptoms  Neuro: Denies any headache, new vision changes, new neuropathies,new weaknesses   Respiratory: No Cough, SOB, denies wheeze  Cardiovascular: No CP, palpitations , denies perception of rapid heartbeat  : denies any new urinary burning or frequency    Review of Scheduled Meds:    Current Facility-Administered Medications:  acetaminophen 975 mg Oral Q8H Evelyn Massey MD bacitracin 1 small application Topical Daily JUAN Marie   docusate sodium 100 mg Oral BID Balwinder Moran MD   enoxaparin 40 mg Subcutaneous Daily Balwinder Moran MD   furosemide 80 mg Oral Daily Balwinder Moran MD   lidocaine 1 patch Topical Daily Balwinder Moran MD   melatonin 3 mg Oral HS PRN Connor Chance DO   pantoprazole 40 mg Oral Daily Before Breakfast Balwinder Moran MD   polyethylene glycol 17 g Oral Daily PRN Balwinder Moran MD   traMADol 25 mg Oral Q4H PRN Balwinder Moran MD   traMADol 50 mg Oral Q4H PRN Balwinder Moran MD   verapamil 240 mg Oral Daily Geri Kussmaul, MD       Labs:     Results from last 7 days   Lab Units 07/31/19  0526 07/29/19  0504   WBC Thousand/uL 6 82 9 31   HEMOGLOBIN g/dL 8 4* 7 7*   HEMATOCRIT % 26 8* 24 8*   PLATELETS Thousands/uL 283 246     Results from last 7 days   Lab Units 07/29/19  0504 07/26/19  0640   SODIUM mmol/L 140 143   POTASSIUM mmol/L 3 9 3 8   CHLORIDE mmol/L 102 108   CO2 mmol/L 35* 33*   BUN mg/dL 23 19   CREATININE mg/dL 0 72 0 65   CALCIUM mg/dL 9 0 8 4                      Imaging:     No orders to display       *Labs reviewed  *Radiology studies reviewed  *Medications reviewed and reconciled as needed  *Please refer to order section for additional ordered labs studies  *Case discussed with primary attending during morning huddle case rounds    Physical Examination:  Vitals:   Vitals:    07/31/19 2041 08/01/19 0529 08/01/19 0641 08/01/19 0831   BP: 166/74 147/66  142/67   BP Location: Left arm      Pulse: 74 69     Resp: 18 20     Temp: 98 6 °F (37 °C) 98 3 °F (36 8 °C)     TempSrc: Oral Oral     SpO2: 99% 96%     Weight:   55 kg (121 lb 4 1 oz)    Height:           General Appearance: NAD, conversive  Eyes: No icterus; conjunctiva normal, PERRLA  HENT: oropharynx clear; mucous membranes moist  Neck: trachea midline, range of motion full     Lungs: CTA, normal respiratory effort, no retractions, expiratory effort normal  CV: regular rate, no rubs/murmurs/gallops  ABD: soft: NT/ND; +BS  EXT: mild edema of LLE and moderate edema of RLE; right leg incisions are w/o erythema/drainage  Skin: normal turgor, normal texture, no rashes  Psych: affect normal, no overt anxiety/depression   Neuro: AAOx3            Total time spent: At least 40 minutes, with more than 50% spent counseling/coordinating care  Counseling includes discussion with patient re: progress  and discussion with patient of his/her current medical state/information  Coordination of patient's care was performed in conjunction with primary service  Time invested included review of patient's labs, vitals, and management of their comorbidities with continued monitoring  In addition, this patient was discussed with medical team including physician and advanced extenders  The care of the patient was extensively discussed and appropriate treatment plan was formulated unique for this patient  ** Please Note: Dragon 360 Dictation voice to text software may have been used in the creation of this document   **

## 2019-08-01 NOTE — PROGRESS NOTES
Physical Medicine and Rehabilitation Progress Note  Jacki Del Cid 80 y o  female MRN: [id number]  Unit/Bed#: [facility] 515-98 Encounter: [id number]    HPI: Jacki Del Cid is a 80 y o  female who presented to the [facility] [address] after fall at home  Found to have right hip fracture  Now s/p IM nailing of right femur on 7/23/19  Patient WBAT  Postoperatively noted to have ABLA, mild leukocytosis  Also required podiatry consult for left 3rd toe and left large toe wound  Patient was evaluated by therapy services, found to be below functional baseline  She was accepted to the [facility] on 7/25/19  Chief Complaint: f/u fracture and f/u ambulatory dysfunction    Interval: No acute events overnight  Denies any CP or SOB  reports having increase in right knee pain overnight  She states this is chronic, but acutely worse due to increase in mobility  She states she is seeing othopedic specialist in [place], and had 1 IASI as well as a series of 3 shots (patient cannot recall)  We discussed starting Lidoderm patch to right knee overnight, to which she is agreeable  ROS: A 10 point ROS was performed; negative except as noted above  Assessment/Plan:    * Closed right hip fracture, initial encounter ([facility] [address] )  Assessment & Plan  · Acute comprehensive interdisciplinary inpatient rehabilitation including PT, OT, RN, CM, SW, dietary, psychology, etc   · WBAT    Chronic pain of right knee  Assessment & Plan  · Worse overnight due to increased mobility  · Start lidoderm patch overnight  · S/p IASI and series of euflexxa injections by orthopedic surgery; patient reports minimal benefit     · F/u with PM&R as outpatient for conservative management     Difficulty swallowing  Assessment & Plan  · Worse with dry foods, encouraged patient to take smaller bites  · If persists or worsens, would benefit from outpatient f/u with GI  · Of note, patient last had colonoscopy on 03/04/13, with following findings:   · Sigmoid diverticula  · Internal and external hemorrhoids  · Redundant colon with acute angulations  · Patient is to have repeat colonoscopy in 2023  Anemia  Assessment & Plan  Results from last 7 days   Lab Units 07/31/19  0526 07/29/19  0504 07/27/19  0922   HEMOGLOBIN g/dL 8 4* 7 7* 8 5*     · Likely post-operative  · Monitor CBC intermittently  · Transfuse for Hgb <7  Did not require transfusion this weekend as H/h has improved     Chronic diastolic congestive heart failure (HCC)  Assessment & Plan  Wt Readings from Last 3 Encounters:   07/31/19 55 5 kg (122 lb 5 7 oz)   07/25/19 50 3 kg (110 lb 14 3 oz)   04/02/19 51 3 kg (113 lb)     · Regular weight checks  · Lasix daily    Essential hypertension  Assessment & Plan  Temp:  [97 8 °F (36 6 °C)-98 3 °F (36 8 °C)] 98 2 °F (36 8 °C)  HR:  [64-76] 64  Resp:  [16-20] 20  BP: (114-155)/(63-70) 140/63    · Verapamil    # Skin  · Encourage regular turning as patient at risk for skin breakdown  · Staff to continue patient education on Q2h turning  · Rehabilitation team to perform skin checks regularly     # Bowel  · Patient reports no constipation  · To ensure regular BMs, bowel regimen consisting of:  colace , dulcolax suppository  and miralax     # Bladder  · Patient voiding spontaneously    # Pain  · Continue tylenol, for max of 3gm daily  · Continue tramadol    # Rehab Psych   · There are no psychological or psychiatric problems identified    # Other  - Diet/Nutrition:        Diet Orders   (From admission, onward)             Start     Ordered    07/25/19 1647  Diet Cardiovascular; Sodium 2 GM  Diet effective now     Question Answer Comment   Diet Type Cardiovascular    Cardiac Sodium 2 GM    RD to adjust diet per protocol?  Yes        07/25/19 1647    07/25/19 1647  Room Service  Once     Question:  Type of Service  Answer:  Room Service - Appropriate with Assistance    07/25/19 1647              - DVT prophy: Sequential compression device (Venodyne)  and Enoxaparin (Lovenox)  - GI ppx: Pantaprazole  - Nausea: None  - Supplements: None  - Sleep: None    Disposition: TBD    CODE: Level 1: Full Code Scheduled Meds:    Current Facility-Administered Medications:  acetaminophen 975 mg Oral Q8H [address] Balwinder Moran MD   bacitracin 1 small application Topical Daily JUAN Marie   docusate sodium 100 mg Oral BID Balwinder Moran MD   enoxaparin 40 mg Subcutaneous Daily Balwinder Moran MD   furosemide 80 mg Oral Daily Balwinder Moran MD   lidocaine 1 patch Topical Daily Balwinder Moran MD   melatonin 3 mg Oral HS PRN Berrytimo Celaya, DO   pantoprazole 40 mg Oral Daily Before Breakfast Balwinder Moran MD   polyethylene glycol 17 g Oral Daily PRN Balwinder Moran MD   traMADol 25 mg Oral Q4H PRN Balwinder Moran MD   traMADol 50 mg Oral Q4H PRN Balwinder Moran MD   verapamil 240 mg Oral Daily Balwinder Moran MD        Objective:    Functional Update:  Physical Therapy Occupational Therapy   Weight Bearing Status: Weight Bearing as Tolerated  Transfers: Minimal Assistance  Bed Mobility: Moderate Assistance  Amulation Distance (ft): 100 feet  Ambulation: Minimal Assistance  Assistive Device for Ambulation: Roller Walker  Discharge Recommendations: Skilled Nursing Facility   Eating: Supervision  Grooming: Supervision  Bathing: Supervision  Bathing: Supervision  Upper Body Dressing: Supervision  Lower Body Dressing: Minimal Assistance  Toileting: Minimal Assistance  Toilet Transfer: Minimal Assistance  Cognition: Within Defined Limits  Orientation: Person, Place, Situation, Time       Allergies per EMR    Physical Exam:  Temp:  [98 3 °F (36 8 °C)-98 6 °F (37 °C)] 98 3 °F (36 8 °C)  HR:  [62-74] 69  Resp:  [18-20] 20  BP: (142-166)/(66-74) 142/67  SpO2:  [96 %-100 %] 96 %    General: alert, no apparent distress, cooperative and comfortable  HEENT:  Head: Normocephalic, no lesions, without obvious abnormality    LUNGS:  no abnormal respiratory pattern, no retractions noted, non-labored breathing   ABDOMEN:  soft, non-tender  Bowel sounds normal  No masses, no organomegaly  EXTREMITIES:  edema increased to RLE  NEURO:   mental status, speech normal, alert and oriented x3  PSYCH:  Alert and oriented, appropriate affect  Right knee examination: right limb is grossly edematous as compared to left  Patient reports a generalized pain to the entire right knee, no specific area of pain  Otherwise is non-TTP to medial or lateral joint lines  Physical examination is otherwise unchanged from previous encounter, except as noted above  Diagnostic Studies: Reviewed, no new imaging  No orders to display     Laboratory: Reviewed   Results from last 7 days   Lab Units 07/31/19  0526 07/29/19  0504 07/27/19  0922   HEMOGLOBIN g/dL 8 4* 7 7* 8 5*   HEMATOCRIT % 26 8* 24 8* 27 6*   WBC Thousand/uL 6 82 9 31 8 71     Results from last 7 days   Lab Units 07/29/19  0504 07/26/19  0640   BUN mg/dL 23 19   SODIUM mmol/L 140 143   POTASSIUM mmol/L 3 9 3 8   CHLORIDE mmol/L 102 108   CREATININE mg/dL 0 72 0 65            Patient Active Problem List   Diagnosis    Cataract    Essential hypertension    Aortic stenosis, moderate    Atrial dilatation, bilateral    Mild tricuspid insufficiency    Non-rheumatic mitral regurgitation    Chronic diastolic congestive heart failure (HCC)    Localized edema    Leg foot wound- 3rd digit    Other fatigue    Arthritis    Closed right hip fracture, initial encounter ([facility] [address] )    Intertrochanteric fracture of right femur (HCC)    Mitral valve stenosis, mild    Anemia    Difficulty swallowing    Chronic pain of right knee       ** Please Note: Fluency Direct voice to text software may have been used in the creation of this document   **

## 2019-08-02 LAB
ANION GAP SERPL CALCULATED.3IONS-SCNC: 2 MMOL/L (ref 4–13)
BUN SERPL-MCNC: 23 MG/DL (ref 5–25)
CALCIUM SERPL-MCNC: 8.8 MG/DL (ref 8.3–10.1)
CHLORIDE SERPL-SCNC: 101 MMOL/L (ref 100–108)
CO2 SERPL-SCNC: 34 MMOL/L (ref 21–32)
CREAT SERPL-MCNC: 0.68 MG/DL (ref 0.6–1.3)
GFR SERPL CREATININE-BSD FRML MDRD: 81 ML/MIN/1.73SQ M
GLUCOSE SERPL-MCNC: 96 MG/DL (ref 65–140)
POTASSIUM SERPL-SCNC: 3.3 MMOL/L (ref 3.5–5.3)
SODIUM SERPL-SCNC: 137 MMOL/L (ref 136–145)

## 2019-08-02 PROCEDURE — 97116 GAIT TRAINING THERAPY: CPT

## 2019-08-02 PROCEDURE — 97530 THERAPEUTIC ACTIVITIES: CPT

## 2019-08-02 PROCEDURE — 97110 THERAPEUTIC EXERCISES: CPT

## 2019-08-02 PROCEDURE — 99232 SBSQ HOSP IP/OBS MODERATE 35: CPT | Performed by: PHYSICAL MEDICINE & REHABILITATION

## 2019-08-02 PROCEDURE — 80048 BASIC METABOLIC PNL TOTAL CA: CPT | Performed by: NURSE PRACTITIONER

## 2019-08-02 RX ORDER — MUSCLE RUB CREAM 100; 150 MG/G; MG/G
CREAM TOPICAL 4 TIMES DAILY PRN
Status: DISCONTINUED | OUTPATIENT
Start: 2019-08-02 | End: 2019-08-11 | Stop reason: HOSPADM

## 2019-08-02 RX ORDER — POTASSIUM CHLORIDE 20 MEQ/1
20 TABLET, EXTENDED RELEASE ORAL DAILY
Status: DISCONTINUED | OUTPATIENT
Start: 2019-08-03 | End: 2019-08-11 | Stop reason: HOSPADM

## 2019-08-02 RX ORDER — POTASSIUM CHLORIDE 20 MEQ/1
40 TABLET, EXTENDED RELEASE ORAL ONCE
Status: COMPLETED | OUTPATIENT
Start: 2019-08-02 | End: 2019-08-02

## 2019-08-02 RX ORDER — LIDOCAINE 50 MG/G
2 PATCH TOPICAL DAILY
Status: DISCONTINUED | OUTPATIENT
Start: 2019-08-02 | End: 2019-08-11 | Stop reason: HOSPADM

## 2019-08-02 RX ADMIN — ACETAMINOPHEN 975 MG: 325 TABLET ORAL at 22:05

## 2019-08-02 RX ADMIN — MELATONIN 3 MG: 3 TAB ORAL at 01:02

## 2019-08-02 RX ADMIN — ENOXAPARIN SODIUM 40 MG: 40 INJECTION SUBCUTANEOUS at 22:04

## 2019-08-02 RX ADMIN — DOCUSATE SODIUM 100 MG: 100 CAPSULE, LIQUID FILLED ORAL at 08:09

## 2019-08-02 RX ADMIN — TRAMADOL HYDROCHLORIDE 50 MG: 50 TABLET, FILM COATED ORAL at 15:31

## 2019-08-02 RX ADMIN — POTASSIUM CHLORIDE 40 MEQ: 1500 TABLET, EXTENDED RELEASE ORAL at 13:38

## 2019-08-02 RX ADMIN — LIDOCAINE 2 PATCH: 50 PATCH CUTANEOUS at 20:24

## 2019-08-02 RX ADMIN — ACETAMINOPHEN 975 MG: 325 TABLET ORAL at 13:38

## 2019-08-02 RX ADMIN — ACETAMINOPHEN 975 MG: 325 TABLET ORAL at 05:24

## 2019-08-02 RX ADMIN — PANTOPRAZOLE SODIUM 40 MG: 40 TABLET, DELAYED RELEASE ORAL at 05:25

## 2019-08-02 RX ADMIN — BACITRACIN ZINC 1 SMALL APPLICATION: 500 OINTMENT TOPICAL at 08:09

## 2019-08-02 RX ADMIN — FUROSEMIDE 80 MG: 80 TABLET ORAL at 08:09

## 2019-08-02 RX ADMIN — TRAMADOL HYDROCHLORIDE 50 MG: 50 TABLET, FILM COATED ORAL at 10:57

## 2019-08-02 RX ADMIN — VERAPAMIL HYDROCHLORIDE 240 MG: 240 TABLET, FILM COATED, EXTENDED RELEASE ORAL at 08:10

## 2019-08-02 NOTE — PROGRESS NOTES
08/02/19 0830   Pain Assessment   Pain Assessment 0-10   Pain Score 8   Restrictions/Precautions   Precautions Fall Risk;Contact/isolation;Pain   RLE Weight Bearing Per Order WBAT   QI: Sit to Stand   Assistance Needed Supervision   Assistance Provided by Charlotte No physical assistance   Sit to Stand CARE Score 4   QI: Chair/Bed-to-Chair Transfer   Assistance Needed Supervision   Assistance Provided by Charlotte No physical assistance   Chair/Bed-to-Chair Transfer CARE Score 4   Transfer Bed/Chair/Wheelchair   Limitations Noted In Balance; Endurance;LE Strength;Pain Management   Adaptive Equipment Roller [employer], Chair, Wheelchair Transfer (FIM) 5 - Patient requires supervision/monitoring   Kitchen Mobility   Kitchen-Mobility Level Walker   Kitchen Activity Retrieve items;Transport items   Kitchen Mobility Comments Pt engaged in basic item retrieval and transport tasks from RW level  Pt educated on various different methods of transporting items including sliding method, folding walker tray, vs RW bag  Pt reports preference for folding RW tray  Pt completes coffee making activity from RW level, retrieving cup from [facility] cabinet and transports on counter top via sliding method  Fills coffee pot and retrieves all items from fridge with G postioning and safety with RW  Pt then transports further on tabletop via folding walker tray  Pt states she will have her son move all heavier items to counter height and will utilize reacher to retrieve lighter weight objects when out of reach  Pt overall CGA with task  Exercise Tools   Other Exercise Tool 1 Pt with LUE trap tightness  Engaged in cervical stretching including lateral neck flexion and scapular depression/retraction and gentle towel glides moving through all planes with 3 second hold at end range to reduce UE stiffness and reduce trap tightness  Pt reports effectiveness post exercises      Cognition   Overall Cognitive Status [facility]   Arousal/Participation Cooperative Attention Within functional limits   Orientation Level Oriented X4   Memory Within functional limits   Following Commands Follows multistep commands with increased time or repetition   Activity Tolerance   Activity Tolerance Patient tolerated treatment well   Assessment   Treatment Assessment Pt participated in skilled OT services with focus on functional transfers, kitchen mobility, and UB stretching  Pt continues to be limited by pain and mild self limiting behaviors at times  Pt requires encouragement to fully engage in session  Pt is receptive to recommendations  Continue to discuss level of care that pt will have in place for her  at time of d/c  Pt reports her son is discussing that  Pt overall functioning at a CS/Humberto level  Pt will continue to benefit from skilled OT services with focus on functional transfers, higher level I/ADL tasks, homemaking tasks, and standing tolerance, endurance  Prognosis Good   Problem List Decreased strength;Decreased range of motion;Decreased mobility; Impaired balance;Decreased endurance;Pain;Orthopedic restrictions   Plan   Treatment/Interventions ADL retraining;Functional transfer training; Therapeutic exercise; Endurance training;Cognitive reorientation;Patient/family training;Equipment eval/education; Bed mobility; Compensatory technique education   Progress Progressing toward goals   Recommendation   OT Discharge Recommendation Home OT   OT Therapy Minutes   OT Time In 0830   OT Time Out 1000   OT Total Time (minutes) 90   OT Mode of treatment - Individual (minutes) 90   OT Mode of treatment - Concurrent (minutes) 0   OT Mode of treatment - Group (minutes) 0   OT Mode of treatment - Co-treat (minutes) 0   OT Mode of Teatment - Total time(minutes) 90 minutes   Therapy Time missed   Time missed?  No

## 2019-08-02 NOTE — PROGRESS NOTES
08/02/19 1430   Pain Assessment   Pain Score 8   Pain Location Hip   Pain Orientation Right   Hospital Pain Intervention(s) Cold applied;Repositioned   Restrictions/Precautions   Precautions Fall Risk;Pain   RLE Weight Bearing Per Order WBAT   Cognition   Arousal/Participation Alert; Cooperative   Attention Within functional limits   Memory Within functional limits   Following Commands Follows multistep commands with increased time or repetition   Subjective   Subjective Pt  reported to that her pain is the same and her legs were propped on recliner since lunch time  pt  educated on putting legs down every hour to loosen up legs and prevent stiffness  QI: Sit to Stand   Assistance Needed Supervision   Sit to Stand CARE Score 4   QI: Chair/Bed-to-Chair Transfer   Assistance Needed Supervision   Chair/Bed-to-Chair Transfer CARE Score 4   Transfer Bed/Chair/Wheelchair   Limitations Noted In Pain Management   Adaptive Equipment Roller Walker   Stand Pivot Supervision   Sit to Stand Supervision   Stand to Sit Supervision   Findings CS   Bed, Chair, Wheelchair Transfer (FIM) 5 - Patient requires supervision/monitoring   QI: Walk 10 Feet   Assistance Needed Supervision   Walk 10 Feet CARE Score 4   QI: Walk 50 Feet with Two Turns   Assistance Needed Supervision   Walk 50 Feet with Two Turns CARE Score 4   QI: Walk 150 Feet   Assistance Needed Supervision   Walk 150 Feet CARE Score 4   QI: Walking 10 Feet on Uneven Surfaces   Reason if not Attempted Safety concerns   Walking 10 Feet on Uneven Surfaces CARE Score 88   Ambulation   Does the patient walk? 2  Yes   Primary Discharge Mode of Locomotion Walk   Walk Assist Level Close Supervision;Supervision   Gait Pattern Inconsistant Felipa;Decreased R stance; Improper weight shift   Assist Device Trina Patino Walked (feet) 150 ft  (X 2 )   Limitations Noted In Endurance   Walking (FIM) 5 - Patient requires supervision/monitoring AND distance 150 feet or more, no rest   Wheelchair mobility   QI: Does the patient use a wheelchair? 0  No   QI: Toilet Transfer   Assistance Needed Supervision   Toilet Transfer CARE Score 4   Toilet Transfer   Surface Assessed Standard Toilet   Findings pt  able to perform toileting on her own    Toilet Transfer (FIM) 5 - Patient requires supervision/monitoring   Therapeutic Interventions   Flexibility R seated heelslide with 10 sec hold at end range for gentle stretching of quads, AAROM LAQ and hip flex    Assessment   Treatment Assessment Pt  cont to have severe pain but still able to tolerate above activities  Pt  noted to have dec R knee and hip ROM due to pain, weakness and swelling needing assist to complete range  Pt  still CS with transfers and gait amidst pain using RW  Pt  educated on HEP she can while sitting in recliner and to put LE down if she had been keeping B LE propped up on recliner too long  Pt  able to demonstrate understanding  Pt  will benefit from continued PT and cont to work on [employer] / strength/ bed mobility, staurs management and inc overall activity tolerance    Problem List Decreased strength;Decreased range of motion;Decreased endurance; Impaired balance;Decreased mobility;Pain   Barriers to Discharge Inaccessible home environment;Decreased caregiver support   PT Barriers   Physical Impairment Decreased strength;Decreased range of motion;Decreased endurance; Impaired balance;Decreased mobility;Pain   Functional Limitation Stair negotiation;Standing;Transfers; Walking   Plan   Treatment/Interventions Functional transfer training;LE strengthening/ROM; Elevations; Therapeutic exercise; Endurance training;Patient/family training;Equipment eval/education; Bed mobility;Gait training   Recommendation   Recommendation Home PT;Outpatient PT; Home with family support   Equipment Recommended Walker   PT Therapy Minutes   PT Time In 1430   PT Time Out 1515   PT Total Time (minutes) 45   PT Mode of treatment - Individual (minutes) 45   PT Mode of treatment - Concurrent (minutes) 0   PT Mode of treatment - Group (minutes) 0   PT Mode of treatment - Co-treat (minutes) 0   PT Mode of Teatment - Total time(minutes) 45 minutes   Therapy Time missed   Time missed?  No

## 2019-08-02 NOTE — PROGRESS NOTES
Internal Medicine Progress Note  Patient: Juan C Caal  Age/sex: 80 y o  female  Medical Record #: [id number]      ASSESSMENT/PLAN: (Interval History)  Juan C Caal is seen and examined and management for following issues:    Right hip fx; s/p TFN 7/23/19:  WBAT; watch incision     ABLA: Hgb improving  Did not want to be transfused last week when her hemoglobin was below 8      Chronic diastolic CHF with LVEF 74%, mild MR/moderate AS and AR/moderate TR:  she will need to go back to see Dr Radha Bashir since he has been following her for this and can then refer to Cardiology if he and the patient wish  She is to remain on her home dose of Lasix 80mg daily = she feels edema is baseline      HTN:  at home, she had been on Verapamil but changed in April to Toprol 50mg daily 2/2 insurance would not pay for the Verapamil; however, since she has a lot of Verpamil left, she has been using them up and then is going to switch back to the Toprol  Will keep Verapamil 240mg qd for now     Left 3rd toe wound and right great toe subungual hematoma:  had been seen by Podiatry; they did remove an ingrown toenail left 3rd toe  They feel that she is likely to lose the nail on the right great toe  Continue local care    Hypokalemia:  Will give Kdur 40 meq x 1 then start 20 meq qd tomorrow      Subjective/ HPI:  Patients overnight issues or events were reviewed with nursing or staff during rounds or morning huddle session  New or overnight issues  ABLA: Hgb 8 4  HTN:  Stable on Verapamil  Chronic diastolic CHF: stable  Offers no complaints       ROS:     GI: denies abdominal pain, change bowel habits or reflux symptoms  Neuro: Denies any headache, new vision changes, new neuropathies,new weaknesses   Respiratory: No Cough, SOB, denies wheeze  Cardiovascular: No CP, palpitations , denies perception of rapid heartbeat  : denies any new urinary burning or frequency    Review of Scheduled Meds:    Current Facility-Administered Medications:  acetaminophen 975 mg Oral Q8H [facility] Balwinder Moran MD   bacitracin 1 small application Topical Daily JUAN Marie   docusate sodium 100 mg Oral BID Balwinder Moran MD   enoxaparin 40 mg Subcutaneous Daily Balwinder Moran MD   furosemide 80 mg Oral Daily Balwinder Moran MD   lidocaine 2 patch Topical Daily Balwinder Moran MD   melatonin 3 mg Oral HS PRN Miri Villa DO   menthol-methyl salicylate  Apply externally 4x Daily PRN Elias Hammans, MD   pantoprazole 40 mg Oral Daily Before Breakfast Balwinder Moran MD   polyethylene glycol 17 g Oral Daily PRN Balwinder Moran MD   traMADol 25 mg Oral Q4H PRN Balwinder Moran MD   traMADol 50 mg Oral Q4H PRN Balwinder Moran MD   verapamil 240 mg Oral Daily Elias Hammans, MD       Labs:     Results from last 7 days   Lab Units 07/31/19  0526 07/29/19  0504   WBC Thousand/uL 6 82 9 31   HEMOGLOBIN g/dL 8 4* 7 7*   HEMATOCRIT % 26 8* 24 8*   PLATELETS Thousands/uL 283 246     Results from last 7 days   Lab Units 08/02/19  0612 07/29/19  0504   SODIUM mmol/L 137 140   POTASSIUM mmol/L 3 3* 3 9   CHLORIDE mmol/L 101 102   CO2 mmol/L 34* 35*   BUN mg/dL 23 23   CREATININE mg/dL 0 68 0 72   CALCIUM mg/dL 8 8 9 0                      Imaging:     No orders to display       *Labs reviewed  *Radiology studies reviewed  *Medications reviewed and reconciled as needed  *Please refer to order section for additional ordered labs studies  *Case discussed with primary attending during morning huddle case rounds    Physical Examination:  Vitals:   Vitals:    08/01/19 1315 08/01/19 2030 08/02/19 0606 08/02/19 0810   BP: 119/58 141/64 146/65 135/62   BP Location: Left arm Left arm Left arm    Pulse: 66 71 64    Resp: 18 18 18    Temp: 98 1 °F (36 7 °C) 98 6 °F (37 °C) 98 3 °F (36 8 °C)    TempSrc: Oral Oral Oral    SpO2: 98% 97% 98%    Weight:       Height:           General Appearance: NAD, conversive  Eyes: No icterus; conjunctiva normal, MARIN HURTADOT: oropharynx clear; mucous membranes moist  Neck: trachea midline, range of motion full  Lungs: CTA, normal respiratory effort, no retractions, expiratory effort normal  CV: regular rate, no rubs/murmurs/gallops  ABD: soft: NT/ND; +BS  EXT: mild edema of LLE and moderate edema of RLE; right leg incisions are w/o erythema/drainage  Skin: normal turgor, normal texture, no rashes  Psych: affect normal, no overt anxiety/depression   Neuro: AAOx3            Total time spent: At least 40 minutes, with more than 50% spent counseling/coordinating care  Counseling includes discussion with patient re: progress  and discussion with patient of his/her current medical state/information  Coordination of patient's care was performed in conjunction with primary service  Time invested included review of patient's labs, vitals, and management of their comorbidities with continued monitoring  In addition, this patient was discussed with medical team including physician and advanced extenders  The care of the patient was extensively discussed and appropriate treatment plan was formulated unique for this patient  ** Please Note: Dragon 360 Dictation voice to text software may have been used in the creation of this document   **

## 2019-08-02 NOTE — PROGRESS NOTES
08/02/19 1040   Pain Assessment   Pain Score 8   Pain Type Surgical pain   Pain Location Hip   Pain Orientation Right   Hospital Pain Intervention(s) Cold applied  (had Tramadol during session)   Restrictions/Precautions   Precautions Fall Risk;Contact/isolation;Pain   RLE Weight Bearing Per Order WBAT   Cognition   Arousal/Participation Alert; Cooperative   Attention Within functional limits   Memory Within functional limits   Following Commands Follows multistep commands with increased time or repetition   Subjective   Subjective pt  reported that she is more having pain today   MAy had been from getting in and out of tub yesterday but is agreeable to participate in therapy    QI: Sit to [address] [facility] Physical assistance   Assistance Provided by [place] 25%-49%   Comment using leg  needing min A but S on 2nd trial    Sit to Lying CARE Score 3   QI: Lying to Sitting on Side of Bed   Assistance Needed Supervision;Verbal cues   Lying to Sitting on Side of Bed CARE Score 4   QI: Sit to [facility]   Sit to Stand CARE Score 4   QI: Chair/Bed-to-Chair Transfer   Assistance Needed Supervision   Chair/Bed-to-Chair Transfer CARE Score 4   Transfer Bed/Chair/Wheelchair   Limitations Noted In Pain Management   [address] to [address] [facility] to Sit Supervision   Supine to Sit Supervision   Sit to Supine Minimal Assist   Bed, Chair, Wheelchair Transfer (FIM) 4 - [place] lifts one extremity during transfer   QI: [address] [facility] 10 Feet CARE Score 4   QI: Walk 50 Feet with Two [address] [facility] [address] 50 Feet with Two Turns CARE Score 4   QI: Walk 150 Feet   Assistance Needed Supervision   Walk 150 Feet CARE Score 4   QI: Walking 10 Feet on Uneven Surfaces   Reason if not Attempted Safety concerns   Walking 10 Feet on Uneven Surfaces CARE Score 88   Ambulation   Does the patient walk? 2  Yes   Primary Discharge Mode of Locomotion Walk   Walk Assist Level Close Supervision;Supervision   Gait Pattern Antalgic; Inconsistant Felipa; Slow Felipa;Decreased R stance; Improper weight shift   Assist Device Melvaer Justa Patino Walked (feet) 150 ft   Limitations Noted In Endurance   Walking (FIM) 5 - Patient requires supervision/monitoring AND distance 150 feet or more, no rest   Wheelchair mobility   QI: Does the patient use a wheelchair? 0  No   QI: 4 Steps   Assistance Needed Verbal cues; Incidental touching   4 Steps CARE Score 4   QI: 12 Steps   Comment limited by pain    Reason if not Attempted Safety concerns   12 Steps CARE Score 88   Stairs   Type Stairs   # of Steps 6   Weight Bearing Precautions RLE;WBAT;Fall Risk   Assist Devices Bilateral Rail   Stairs (FIM) 2 - Patient goes up and down 4 - 11 stairs regardless of assist/device/setup   Therapeutic Interventions   Strengthening quads sets, GS, ice while in mat   Flexibility B hamstring and gastroc stretching    Assessment   Treatment Assessment Tx cont to focus on increasing activity tolerance through functional mobility, improving flexibility and strength and bed mobility  Pt  CS/ S level in transfers and ambulation but cont to need min A with bed mobility  Practiced bed mobility in the mat this session using leg  with pt  initially needing min A but was eventually able to perform task with S and verbal cueing  Pt  did it better with R shoe off and with pillow case placed under R thigh to allow easier slide and movement of R LE  Pt  in severe pain today but was still able to tolerate above activities  Cont with POC as tolerated    Problem List Decreased strength;Decreased range of motion;Decreased endurance; Impaired balance;Decreased mobility;Pain   Barriers to Discharge Inaccessible home environment;Decreased caregiver support   PT Barriers   Physical Impairment Decreased strength;Decreased range of motion;Decreased endurance; Impaired balance;Decreased mobility;Pain   Functional Limitation Stair negotiation;Standing;Transfers; Walking   Plan   Treatment/Interventions Functional transfer training;LE strengthening/ROM; Elevations; Therapeutic exercise; Endurance training;Patient/family training;Equipment eval/education; Bed mobility;Gait training   Recommendation   Recommendation Home PT;Outpatient PT; Home with family support   Equipment Recommended Walker   PT Therapy Minutes   PT Time In 1040   PT Time Out 1140   PT Total Time (minutes) 60   PT Mode of treatment - Individual (minutes) 60   PT Mode of treatment - Concurrent (minutes) 0   PT Mode of treatment - Group (minutes) 0   PT Mode of treatment - Co-treat (minutes) 0   PT Mode of Teatment - Total time(minutes) 60 minutes   Therapy Time missed   Time missed?  No

## 2019-08-02 NOTE — PLAN OF CARE
Problem: Prexisting or High Potential for Compromised Skin Integrity  Goal: Skin integrity is maintained or improved  Description  INTERVENTIONS:  - Identify patients at risk for skin breakdown  - Assess and monitor skin integrity  - Assess and monitor nutrition and hydration status  - Monitor labs (i e  albumin)  - Assess for incontinence   - Turn and reposition patient  - Assist with mobility/ambulation  - Relieve pressure over bony prominences  - Avoid friction and shearing  - Provide appropriate hygiene as needed including keeping skin clean and dry  - Evaluate need for skin moisturizer/barrier cream  - Collaborate with interdisciplinary team (i e  Nutrition, Rehabilitation, etc )   - Patient/family teaching  Outcome: Progressing     Problem: Potential for Falls  Goal: Patient will remain free of falls  Description  INTERVENTIONS:  - Assess patient frequently for physical needs  -  Identify cognitive and physical deficits and behaviors that affect risk of falls    -  [place] fall precautions as indicated by assessment   - Educate patient/family on patient safety including physical limitations  - Instruct patient to call for assistance with activity based on assessment  - Modify environment to reduce risk of injury  - Consider OT/PT consult to assist with strengthening/mobility  Outcome: Progressing     Problem: PAIN - ADULT  Goal: Verbalizes/displays adequate comfort level or baseline comfort level  Description  Interventions:  - Encourage patient to monitor pain and request assistance  - Assess pain using appropriate pain scale  - Administer analgesics based on type and severity of pain and evaluate response  - Implement non-pharmacological measures as appropriate and evaluate response  - Consider cultural and social influences on pain and pain management  - Notify physician/advanced practitioner if interventions unsuccessful or patient reports new pain  Outcome: Progressing     Problem: INFECTION - ADULT  Goal: Absence or prevention of progression during hospitalization  Description  INTERVENTIONS:  - Assess and monitor for signs and symptoms of infection  - Monitor lab/diagnostic results  - Monitor all insertion sites, i e  indwelling lines, tubes, and drains  - Monitor endotracheal (as able) and nasal secretions for changes in amount and color  - [place] appropriate cooling/warming therapies per order  - Administer medications as ordered  - Instruct and encourage patient and family to use good hand hygiene technique  - Identify and instruct in appropriate isolation precautions for identified infection/condition  Outcome: Progressing  Goal: Absence of fever/infection during neutropenic period  Description  INTERVENTIONS:  - Monitor WBC  - Implement neutropenic guidelines  Outcome: Progressing     Problem: SAFETY ADULT  Goal: Maintain or return to baseline ADL function  Description  INTERVENTIONS:  -  Assess patient's ability to carry out ADLs; assess patient's baseline for ADL function and identify physical deficits which impact ability to perform ADLs (bathing, care of mouth/teeth, toileting, grooming, dressing, etc )  - Assess/evaluate cause of self-care deficits   - Assess range of motion  - Assess patient's mobility; develop plan if impaired  - Assess patient's need for assistive devices and provide as appropriate  - Encourage maximum independence but intervene and supervise when necessary  ¯ Involve family in performance of ADLs  ¯ Assess for home care needs following discharge   ¯ Request OT consult to assist with ADL evaluation and planning for discharge  ¯ Provide patient education as appropriate  Outcome: Progressing  Goal: Maintain or return mobility status to optimal level  Description  INTERVENTIONS:  - Assess patient's baseline mobility status (ambulation, transfers, stairs, etc )    - Identify cognitive and physical deficits and behaviors that affect mobility  - Identify mobility aids required to assist with transfers and/or ambulation (gait belt, sit-to-stand, lift, walker, cane, etc )  - [place] fall precautions as indicated by assessment  - Record patient progress and toleration of activity level on Mobility SBAR; progress patient to next Phase/Stage  - Instruct patient to call for assistance with activity based on assessment  - Request Rehabilitation consult to assist with strengthening/weightbearing, etc   Outcome: Progressing     Problem: DISCHARGE PLANNING  Goal: Discharge to home or other facility with appropriate resources  Description  INTERVENTIONS:  - Identify barriers to discharge w/patient and caregiver  - Arrange for needed discharge resources and transportation as appropriate  - Identify discharge learning needs (meds, wound care, etc )  - Arrange for interpretive services to assist at discharge as needed  - Refer to Case Management Department for coordinating discharge planning if the patient needs post-hospital services based on physician/advanced practitioner order or complex needs related to functional status, cognitive ability, or social support system  Outcome: Progressing     Problem: Knowledge Deficit  Goal: Patient/family/caregiver demonstrates understanding of disease process, treatment plan, medications, and discharge instructions  Description  Complete learning assessment and assess knowledge base    Interventions:  - Provide teaching at level of understanding  - Provide teaching via preferred learning methods  Outcome: Progressing

## 2019-08-02 NOTE — PROGRESS NOTES
Pt alert and able to make needs known  OOB in recliner chair with call bell and bedside table within reach  Voices no c/o's pain or discomfort at this time  Incisions remain GOYO, no drainage noted  Will continue to monitor

## 2019-08-02 NOTE — PROGRESS NOTES
Physical Medicine and Rehabilitation Progress Note  Jeannette Pereira 80 y o  female MRN: [id number]  Unit/Bed#: [facility] 988-04 Encounter: [id number]    HPI: Jeannette Pereira is a 80 y o  female who presented to the [facility] after fall at home  Found to have right hip fracture  Now s/p IM nailing of right femur on 7/23/19  Patient WBAT  Postoperatively noted to have ABLA, mild leukocytosis  Also required podiatry consult for left 3rd toe and left large toe wound  Patient was evaluated by therapy services, found to be below functional baseline  She was accepted to the [facility] on 7/25/19  Chief Complaint: f/u fracture and f/u ambulatory dysfunction    Interval: No acute events overnight  Denies any CP or SOB  Slept well overnight  reports knee pain improved with Lidoderm patch, would like for it to be continued  ROS: A 10 point ROS was performed; negative except as noted above  Assessment/Plan:    * [facility]  Assessment & Plan  · Acute comprehensive interdisciplinary inpatient rehabilitation including PT, OT, RN, CM, SW, dietary, psychology, etc   · WBAT    Chronic pain of right knee  Assessment & Plan  · Worse overnight due to increased mobility  · Start lidoderm patch overnight  · S/p IASI and series of euflexxa injections by orthopedic surgery; patient reports minimal benefit  · F/u with PM&R as outpatient for conservative management     Difficulty swallowing  Assessment & Plan  · Worse with dry foods, encouraged patient to take smaller bites  · If persists or worsens, would benefit from outpatient f/u with GI  · Of note, patient last had colonoscopy on 03/04/13, with following findings:   · Sigmoid diverticula  · Internal and external hemorrhoids  · Redundant colon with acute angulations  · Patient is to have repeat colonoscopy in 2023      Anemia  Assessment & Plan  Results from last 7 days   Lab Units 07/31/19  0526 07/29/19  9949 07/27/19  0922   HEMOGLOBIN g/dL 8 4* 7 7* 8 5*     · Likely post-operative  · Monitor CBC intermittently  · Transfuse for Hgb <7  Did not require transfusion this weekend as H/h has improved     Chronic diastolic congestive heart failure (HCC)  Assessment & Plan  Wt Readings from Last 3 Encounters:   08/01/19 55 kg (121 lb 4 1 oz)   07/25/19 50 3 kg (110 lb 14 3 oz)   04/02/19 51 3 kg (113 lb)     · Regular weight checks  · Lasix daily    Essential hypertension  Assessment & Plan  Temp:  [98 1 °F (36 7 °C)-98 6 °F (37 °C)] 98 3 °F (36 8 °C)  HR:  [64-71] 64  Resp:  [18] 18  BP: (119-146)/(58-65) 135/62    · Verapamil    # Skin  · Encourage regular turning as patient at risk for skin breakdown  · Staff to continue patient education on Q2h turning  · Rehabilitation team to perform skin checks regularly     # Bowel  · Patient reports no constipation  · To ensure regular BMs, bowel regimen consisting of:  colace , dulcolax suppository  and miralax     # Bladder  · Patient voiding spontaneously    # Pain  · Continue tylenol, for max of 3gm daily  · Continue tramadol  · Continue lidoderm patch(es)  · analgesic balm PRN    # Rehab Psych   · There are no psychological or psychiatric problems identified    # Other  - Diet/Nutrition:        Diet Orders   (From admission, onward)             Start     Ordered    07/25/19 1647  Diet Cardiovascular; Sodium 2 GM  Diet effective now     Question Answer Comment   Diet Type Cardiovascular    Cardiac Sodium 2 GM    RD to adjust diet per protocol?  Yes        07/25/19 1647    07/25/19 1647  Room Service  Once     Question:  Type of Service  Answer:  Room Service - Appropriate with Assistance    07/25/19 1647              - DVT prophy: Sequential compression device (Venodyne)  and Enoxaparin (Lovenox)  - GI ppx: Pantaprazole  - Nausea: None  - Supplements: None  - Sleep: None    Disposition: TBD    CODE: Level 1: Full Code Scheduled Meds:    Current Facility-Administered Medications:  acetaminophen 975 mg Oral Q8H [facility] term Balwinder Moran MD   bacitracin 1 small application Topical Daily JUAN Marie   docusate sodium 100 mg Oral BID Balwinder Moran MD   enoxaparin 40 mg Subcutaneous Daily Balwinder Moran MD   furosemide 80 mg Oral Daily Balwinder Moran MD   lidocaine 2 patch Topical Daily Balwinder Moran MD   melatonin 3 mg Oral HS PRN Jaleel Rudd DO   menthol-methyl salicylate  Apply externally 4x Daily PRN Byron Bates MD   pantoprazole 40 mg Oral Daily Before Breakfast Byron Bates MD   polyethylene glycol 17 g Oral Daily PRN Balwinder Moran MD   traMADol 25 mg Oral Q4H PRN Balwinder Moran MD   traMADol 50 mg Oral Q4H PRN Balwinder Moran MD   verapamil 240 mg Oral Daily Balwinder Moran MD        Objective:    Functional Update:  Physical Therapy Occupational Therapy   Weight Bearing Status: Weight Bearing as Tolerated  Transfers: Minimal Assistance  Bed Mobility: Moderate Assistance  Amulation Distance (ft): 100 feet  Ambulation: Minimal Assistance  Assistive Device for Ambulation: Roller Walker  Discharge Recommendations: Skilled Nursing Facility   Eating: Supervision  Grooming: Supervision  Bathing: Supervision  Bathing: Supervision  Upper Body Dressing: Supervision  Lower Body Dressing: Minimal Assistance  Toileting: Minimal Assistance  Toilet Transfer: Minimal Assistance  Cognition: Within Defined Limits  Orientation: Person, Place, Situation, Time       Allergies per EMR    Physical Exam:  Temp:  [98 1 °F (36 7 °C)-98 6 °F (37 °C)] 98 3 °F (36 8 °C)  HR:  [64-71] 64  Resp:  [18] 18  BP: (119-146)/(58-65) 135/62  SpO2:  [97 %-98 %] 98 %    General: alert, no apparent distress, cooperative and comfortable  HEENT:  Head: Normocephalic, no lesions, without obvious abnormality  LUNGS:  no abnormal respiratory pattern, no retractions noted, non-labored breathing   ABDOMEN:  soft, non-tender   Bowel sounds normal  No masses, no organomegaly  EXTREMITIES:  edema increased to RLE  NEURO:   mental status, speech normal, alert and oriented x3  PSYCH:  Alert and oriented, appropriate affect  Physical examination is otherwise unchanged from previous encounter, except as noted above  Diagnostic Studies: Reviewed, no new imaging  No orders to display     Laboratory: Reviewed    Results from last 7 days   Lab Units 07/31/19  0526 07/29/19  0504 07/27/19  0922   HEMOGLOBIN g/dL 8 4* 7 7* 8 5*   HEMATOCRIT % 26 8* 24 8* 27 6*   WBC Thousand/uL 6 82 9 31 8 71     Results from last 7 days   Lab Units 08/02/19  0612 07/29/19  0504   BUN mg/dL 23 23   SODIUM mmol/L 137 140   POTASSIUM mmol/L 3 3* 3 9   CHLORIDE mmol/L 101 102   CREATININE mg/dL 0 68 0 72            Patient Active Problem List   Diagnosis    Cataract    Essential hypertension    Aortic stenosis, moderate    Atrial dilatation, bilateral    Mild tricuspid insufficiency    Non-rheumatic mitral regurgitation    Chronic diastolic congestive heart failure (HCC)    Localized edema    Leg foot wound- 3rd digit    Other fatigue    Arthritis    [facility]    Intertrochanteric fracture of right femur ([facility]    Mitral valve stenosis, mild    Anemia    Difficulty swallowing    Chronic pain of right knee       ** Please Note: Fluency Direct voice to text software may have been used in the creation of this document   **

## 2019-08-03 LAB
BASOPHILS # BLD AUTO: 0.08 THOUSANDS/ΜL (ref 0–0.1)
BASOPHILS NFR BLD AUTO: 1 % (ref 0–1)
EOSINOPHIL # BLD AUTO: 0.22 THOUSAND/ΜL (ref 0–0.61)
EOSINOPHIL NFR BLD AUTO: 3 % (ref 0–6)
ERYTHROCYTE [DISTWIDTH] IN BLOOD BY AUTOMATED COUNT: 15.4 % (ref 11.6–15.1)
HCT VFR BLD AUTO: 27.4 % (ref 34.8–46.1)
HGB BLD-MCNC: 8.3 G/DL (ref 11.5–15.4)
IMM GRANULOCYTES # BLD AUTO: 0.1 THOUSAND/UL (ref 0–0.2)
IMM GRANULOCYTES NFR BLD AUTO: 2 % (ref 0–2)
LYMPHOCYTES # BLD AUTO: 1.44 THOUSANDS/ΜL (ref 0.6–4.47)
LYMPHOCYTES NFR BLD AUTO: 22 % (ref 14–44)
MCH RBC QN AUTO: 28.4 PG (ref 26.8–34.3)
MCHC RBC AUTO-ENTMCNC: 30.3 G/DL (ref 31.4–37.4)
MCV RBC AUTO: 94 FL (ref 82–98)
MONOCYTES # BLD AUTO: 0.74 THOUSAND/ΜL (ref 0.17–1.22)
MONOCYTES NFR BLD AUTO: 11 % (ref 4–12)
NEUTROPHILS # BLD AUTO: 3.98 THOUSANDS/ΜL (ref 1.85–7.62)
NEUTS SEG NFR BLD AUTO: 61 % (ref 43–75)
NRBC BLD AUTO-RTO: 0 /100 WBCS
PLATELET # BLD AUTO: 288 THOUSANDS/UL (ref 149–390)
PMV BLD AUTO: 10 FL (ref 8.9–12.7)
RBC # BLD AUTO: 2.92 MILLION/UL (ref 3.81–5.12)
WBC # BLD AUTO: 6.56 THOUSAND/UL (ref 4.31–10.16)

## 2019-08-03 PROCEDURE — 97535 SELF CARE MNGMENT TRAINING: CPT

## 2019-08-03 PROCEDURE — 85025 COMPLETE CBC W/AUTO DIFF WBC: CPT | Performed by: NURSE PRACTITIONER

## 2019-08-03 PROCEDURE — 97110 THERAPEUTIC EXERCISES: CPT

## 2019-08-03 PROCEDURE — 97530 THERAPEUTIC ACTIVITIES: CPT

## 2019-08-03 RX ADMIN — LIDOCAINE 2 PATCH: 50 PATCH CUTANEOUS at 18:37

## 2019-08-03 RX ADMIN — DOCUSATE SODIUM 100 MG: 100 CAPSULE, LIQUID FILLED ORAL at 09:32

## 2019-08-03 RX ADMIN — VERAPAMIL HYDROCHLORIDE 240 MG: 240 TABLET, FILM COATED, EXTENDED RELEASE ORAL at 09:33

## 2019-08-03 RX ADMIN — FUROSEMIDE 80 MG: 80 TABLET ORAL at 09:31

## 2019-08-03 RX ADMIN — ACETAMINOPHEN 975 MG: 325 TABLET ORAL at 22:12

## 2019-08-03 RX ADMIN — ACETAMINOPHEN 975 MG: 325 TABLET ORAL at 06:04

## 2019-08-03 RX ADMIN — ACETAMINOPHEN 975 MG: 325 TABLET ORAL at 14:37

## 2019-08-03 RX ADMIN — POTASSIUM CHLORIDE 20 MEQ: 1500 TABLET, EXTENDED RELEASE ORAL at 09:30

## 2019-08-03 RX ADMIN — TRAMADOL HYDROCHLORIDE 50 MG: 50 TABLET, FILM COATED ORAL at 07:17

## 2019-08-03 RX ADMIN — BACITRACIN ZINC 1 SMALL APPLICATION: 500 OINTMENT TOPICAL at 09:31

## 2019-08-03 RX ADMIN — ENOXAPARIN SODIUM 40 MG: 40 INJECTION SUBCUTANEOUS at 22:12

## 2019-08-03 RX ADMIN — PANTOPRAZOLE SODIUM 40 MG: 40 TABLET, DELAYED RELEASE ORAL at 06:05

## 2019-08-03 RX ADMIN — TRAMADOL HYDROCHLORIDE 50 MG: 50 TABLET, FILM COATED ORAL at 23:08

## 2019-08-03 NOTE — PLAN OF CARE
Problem: Prexisting or High Potential for Compromised Skin Integrity  Goal: Skin integrity is maintained or improved  Description  INTERVENTIONS:  - Identify patients at risk for skin breakdown  - Assess and monitor skin integrity  - Assess and monitor nutrition and hydration status  - Monitor labs (i e  albumin)  - Assess for incontinence   - Turn and reposition patient  - Assist with mobility/ambulation  - Relieve pressure over bony prominences  - Avoid friction and shearing  - Provide appropriate hygiene as needed including keeping skin clean and dry  - Evaluate need for skin moisturizer/barrier cream  - Collaborate with interdisciplinary team (i e  Nutrition, Rehabilitation, etc )   - Patient/family teaching  Outcome: Progressing     Problem: Potential for Falls  Goal: Patient will remain free of falls  Description  INTERVENTIONS:  - Assess patient frequently for physical needs  -  Identify cognitive and physical deficits and behaviors that affect risk of falls    -  [place] fall precautions as indicated by assessment   - Educate patient/family on patient safety including physical limitations  - Instruct patient to call for assistance with activity based on assessment  - Modify environment to reduce risk of injury  - Consider OT/PT consult to assist with strengthening/mobility  Outcome: Progressing     Problem: PAIN - ADULT  Goal: Verbalizes/displays adequate comfort level or baseline comfort level  Description  Interventions:  - Encourage patient to monitor pain and request assistance  - Assess pain using appropriate pain scale  - Administer analgesics based on type and severity of pain and evaluate response  - Implement non-pharmacological measures as appropriate and evaluate response  - Consider cultural and social influences on pain and pain management  - Notify physician/advanced practitioner if interventions unsuccessful or patient reports new pain  Outcome: Progressing     Problem: INFECTION - ADULT  Goal: Absence or prevention of progression during hospitalization  Description  INTERVENTIONS:  - Assess and monitor for signs and symptoms of infection  - Monitor lab/diagnostic results  - Monitor all insertion sites, i e  indwelling lines, tubes, and drains  - Monitor endotracheal (as able) and nasal secretions for changes in amount and color  - [place] appropriate cooling/warming therapies per order  - Administer medications as ordered  - Instruct and encourage patient and family to use good hand hygiene technique  - Identify and instruct in appropriate isolation precautions for identified infection/condition  Outcome: Progressing  Goal: Absence of fever/infection during neutropenic period  Description  INTERVENTIONS:  - Monitor WBC  - Implement neutropenic guidelines  Outcome: Progressing     Problem: SAFETY ADULT  Goal: Maintain or return to baseline ADL function  Description  INTERVENTIONS:  -  Assess patient's ability to carry out ADLs; assess patient's baseline for ADL function and identify physical deficits which impact ability to perform ADLs (bathing, care of mouth/teeth, toileting, grooming, dressing, etc )  - Assess/evaluate cause of self-care deficits   - Assess range of motion  - Assess patient's mobility; develop plan if impaired  - Assess patient's need for assistive devices and provide as appropriate  - Encourage maximum independence but intervene and supervise when necessary  ¯ Involve family in performance of ADLs  ¯ Assess for home care needs following discharge   ¯ Request OT consult to assist with ADL evaluation and planning for discharge  ¯ Provide patient education as appropriate  Outcome: Progressing  Goal: Maintain or return mobility status to optimal level  Description  INTERVENTIONS:  - Assess patient's baseline mobility status (ambulation, transfers, stairs, etc )    - Identify cognitive and physical deficits and behaviors that affect mobility  - Identify mobility aids required to assist with transfers and/or ambulation (gait belt, sit-to-stand, lift, walker, cane, etc )  - [place] fall precautions as indicated by assessment  - Record patient progress and toleration of activity level on Mobility SBAR; progress patient to next Phase/Stage  - Instruct patient to call for assistance with activity based on assessment  - Request Rehabilitation consult to assist with strengthening/weightbearing, etc   Outcome: Progressing     Problem: DISCHARGE PLANNING  Goal: Discharge to home or other facility with appropriate resources  Description  INTERVENTIONS:  - Identify barriers to discharge w/patient and caregiver  - Arrange for needed discharge resources and transportation as appropriate  - Identify discharge learning needs (meds, wound care, etc )  - Arrange for interpretive services to assist at discharge as needed  - Refer to Case Management Department for coordinating discharge planning if the patient needs post-hospital services based on physician/advanced practitioner order or complex needs related to functional status, cognitive ability, or social support system  Outcome: Progressing     Problem: Knowledge Deficit  Goal: Patient/family/caregiver demonstrates understanding of disease process, treatment plan, medications, and discharge instructions  Description  Complete learning assessment and assess knowledge base    Interventions:  - Provide teaching at level of understanding  - Provide teaching via preferred learning methods  Outcome: Progressing

## 2019-08-03 NOTE — PROGRESS NOTES
Internal Medicine Progress Note  Patient: Alicia Delatorre  Age/sex: 80 y o  female  Medical Record #: [id number]      ASSESSMENT/PLAN: (Interval History)  Alicia Delatorre is seen and examined and management for following issues:    Right hip fx; s/p TFN 7/23/19:  WBAT; watch incision     ABLA: Hgb improving  Did not want to be transfused last week when her hemoglobin was below 8      Chronic diastolic CHF with LVEF 30%, mild MR/moderate AS and AR/moderate TR:  she will need to go back to see Dr Marca Bosworth since he has been following her for this and can then refer to Cardiology if he and the patient wish  She is to remain on her home dose of Lasix 80mg daily = she feels edema is baseline      HTN:  at home, she had been on Verapamil but changed in April to Toprol 50mg daily 2/2 insurance would not pay for the Verapamil; however, since she has a lot of Verpamil left, she has been using them up and then is going to switch back to the Toprol  Will keep Verapamil 240mg qd for now     Left 3rd toe wound and right great toe subungual hematoma:  had been seen by Podiatry; they did remove an ingrown toenail left 3rd toe  They feel that she is likely to lose the nail on the right great toe  Continue local care    Hypokalemia:  Continue KCl 20 mEq daily  BMP Monday  Subjective/ HPI:  Patients overnight issues or events were reviewed with nursing or staff during rounds or morning huddle session  New or overnight issues  ABLA: Hgb 8 4  HTN:  Stable on Verapamil  Chronic diastolic CHF: stable  Offers no complaints       ROS:     GI: denies abdominal pain, change bowel habits or reflux symptoms  Neuro: Denies any headache, new vision changes, new neuropathies,new weaknesses   Respiratory: No Cough, SOB, denies wheeze  Cardiovascular: No CP, palpitations , denies perception of rapid heartbeat  : denies any new urinary burning or frequency    Review of Scheduled Meds:    Current Facility-Administered Medications:  acetaminophen 975 mg Oral Q8H [address] Balwinder Moran MD   bacitracin 1 small application Topical Daily JUAN Marie   docusate sodium 100 mg Oral BID Balwinder Moran MD   enoxaparin 40 mg Subcutaneous Daily Balwinder Moran MD   furosemide 80 mg Oral Daily Balwinder Moran MD   lidocaine 2 patch Topical Daily Balwinder Moran MD   melatonin 3 mg Oral HS PRN Dontaejohny Chao DO   menthol-methyl salicylate  Apply externally 4x Daily PRN Juwan Andrade MD   pantoprazole 40 mg Oral Daily Before Breakfast Balwinder Moran MD   polyethylene glycol 17 g Oral Daily PRN Balwinder Moran MD   potassium chloride 20 mEq Oral Daily JUAN Ward   traMADol 25 mg Oral Q4H PRN Juwan Andrade MD   traMADol 50 mg Oral Q4H PRN Balwinder Moran MD   verapamil 240 mg Oral Daily Juwan Andrade MD       Labs:     Results from last 7 days   Lab Units 08/03/19  0623 07/31/19  0526   WBC Thousand/uL 6 56 6 82   HEMOGLOBIN g/dL 8 3* 8 4*   HEMATOCRIT % 27 4* 26 8*   PLATELETS Thousands/uL 288 283     Results from last 7 days   Lab Units 08/02/19  0612 07/29/19  0504   SODIUM mmol/L 137 140   POTASSIUM mmol/L 3 3* 3 9   CHLORIDE mmol/L 101 102   CO2 mmol/L 34* 35*   BUN mg/dL 23 23   CREATININE mg/dL 0 68 0 72   CALCIUM mg/dL 8 8 9 0                      Imaging:     No orders to display       *Labs reviewed  *Radiology studies reviewed  *Medications reviewed and reconciled as needed  *Please refer to order section for additional ordered labs studies  *Case discussed with primary attending during morning huddle case rounds    Physical Examination:  Vitals:   Vitals:    08/02/19 0810 08/02/19 1329 08/02/19 2057 08/03/19 0500   BP: 135/62 126/57 146/60 153/67   BP Location:  Left arm Left arm Left arm   Pulse:  60 69 70   Resp:  18 18 18   Temp:  97 7 °F (36 5 °C) 97 8 °F (36 6 °C) 98 5 °F (36 9 °C)   TempSrc:  Oral Oral Oral   SpO2:  100% 100% 97%   Weight:       Height:           General Appearance: NAD, conversive  Eyes: No icterus; conjunctiva normal, PERRLA  HENT: oropharynx clear; mucous membranes moist  Neck: trachea midline, range of motion full  Lungs: CTA, normal respiratory effort, no retractions, expiratory effort normal  CV: regular rate, no rubs/murmurs/gallops  ABD: soft: NT/ND; +BS  EXT: mild edema of LLE and moderate edema of RLE; right leg incisions are w/o erythema/drainage  Skin: normal turgor, normal texture, no rashes  Psych: affect normal, no overt anxiety/depression   Neuro: AAOx3            Total time spent: At least 40 minutes, with more than 50% spent counseling/coordinating care  Counseling includes discussion with patient re: progress  and discussion with patient of his/her current medical state/information  Coordination of patient's care was performed in conjunction with primary service  Time invested included review of patient's labs, vitals, and management of their comorbidities with continued monitoring  In addition, this patient was discussed with medical team including physician and advanced extenders  The care of the patient was extensively discussed and appropriate treatment plan was formulated unique for this patient  ** Please Note: Dragon 360 Dictation voice to text software may have been used in the creation of this document   **

## 2019-08-03 NOTE — PROGRESS NOTES
08/03/19 1000   Pain Assessment   Pain Assessment 0-10   Pain Score 8   Pain Location Hip   Pain Orientation Right   Hospital Pain Intervention(s) Rest;Repositioned   Response to Interventions dec tolerance for WB   QI: Eating   Assistance Needed Set-up / clean-up   Assistance Provided by [place] No physical assistance   Eating CARE Score 5   Eating Assessment   Eating (FIM) 5 - Patient needs help to open contianers or set up tray   Grooming   Findings CS in stnace at sink   Grooming (FIM) 5 - Patient requires supervision/monitoring   QI: Upper Body Dressing   Assistance Needed Supervision   Upper Body Dressing CARE Score 4   Dressing/Undressing Clothing   UB Dressing (FIM) 5 - Patient requires supervision/monitoring   QI: Sit to Stand   Assistance Needed Supervision   Comment increased time   Sit to Stand CARE Score 4   QI: Chair/Bed-to-Chair Transfer   Assistance Needed Supervision   Chair/Bed-to-Chair Transfer CARE Score 4   Transfer Bed/Chair/Wheelchair   Findings CS with use of RW   Bed, Chair, Wheelchair Transfer (FIM) 5 - Patient requires supervision/monitoring   QI: [place] Score 4   Toileting   Able to [address] down yes, up yes  Able to Manage Clothing Closures Yes   Toileting (FIM) 5 - Patient requires supervision/monitoring   Assessment   Treatment Assessment Pt assisted OOB this am with request to utilize bathroom and begin washing at sink  Pt req A to manage LE OOB to sit EOB in preparation for sit>stand transfer  Pt reports she just recieved pain medication  Pt at this time req CS for xfers with RW with inc time 2* pain  Pt able to manage hygiene following toileting  Pt positioned at sink and PCA Hope notified of pt's positioning      OT Therapy Minutes   OT Time In 1000   OT Time Out 1025   OT Total Time (minutes) 25   OT Mode of treatment - Individual (minutes) 0   OT Mode of treatment - Concurrent (minutes) 25   OT Mode of treatment - Group (minutes) 0   OT Mode of treatment - Co-treat (minutes) 0   OT Mode of Teatment - Total time(minutes) 25 minutes   Therapy Time missed   Time missed?  No

## 2019-08-03 NOTE — PROGRESS NOTES
08/03/19 1400   Pain Assessment   Pain Assessment 0-10   Pain Score 7   Pain Type Surgical pain   Pain Location Hip   Pain Orientation Right   Pain Radiating Towards down leg towards knee   Pain Descriptors Cramping;Nagging; Sore   Pain Frequency Constant/continuous   Pain Onset Progressive   Clinical Progression Not changed   Effect of Pain on Daily Activities inc with functional mobility and transfers    Hospital Pain Intervention(s) Repositioned; Rest   Response to Interventions requested to complete UE therex during session instead of tasks in stance 2* pain   Restrictions/Precautions   Precautions Fall Risk;Pain   RLE Weight Bearing Per Order WBAT   QI: Sit to Stand   Assistance Needed Supervision   Assistance Provided by [place] No physical assistance   Comment ext time to position RLE    Sit to Stand CARE Score 4   QI: Chair/Bed-to-Chair Transfer   Assistance Needed Incidental touching   Assistance Provided by [place] No physical assistance   Chair/Bed-to-Chair Transfer CARE Score 4   Transfer Bed/Chair/Wheelchair   Positioning Concerns   (pain)   Limitations Noted In Endurance;Pain Management;LE Strength   Adaptive Equipment Roller [employer], Chair, Wheelchair Transfer (FIM) 4 - Patient requires steadying assist or light touching   Exercise Tools   Theraband Pt completed UE therex with yellow theraband to promtoe UE strengthening for functional transfers and mobility  Pt reports h/o rotator cuff injury on L shoulder and therefore did not complete exercises with shoulders flex/ABD passed 90*  Pt tolerated bicep curls, tricep ext, and hor ABD/ADD with minimal discomfort noted in LUE and no discomfort in RUE  Short rest break warranted between sets for 3x10 to manage fatigue  Cognition   Overall Cognitive Status WFL   Arousal/Participation Alert; Cooperative   Attention Within functional limits   Orientation Level Oriented X4   Memory Within functional limits   Following Commands Follows multistep commands with increased time or repetition   Additional Activities   Additional Activities Comments Pt completed functional mobility in pt room, down hammer, and in OT gym with focus on RW management, obstacle negotiation, and tolerance for inc indep with functional ADL and IADL completion  Pt cont to report pain in RLE limting tolerance but motivated to continue work to [employer]  Completed at [facility] level overall  Activity Tolerance   Activity Tolerance Patient limited by pain   Assessment   Treatment Assessment Pt participated in skilled OT session with focus on functional mobility and UE therex  Refer above for details  Engaged in discussion with pt regarding [employer] as pt continues to report concern about inability to call for help with fall  Pt however concerned about pricing for product  Discussed "[employer]" daily service where company calls 1-2 times daily to check in and if pt does not response, company alerts response team  Pt again concerned about pricing but receptive to receiving information although educated that she must keep her cordless phone with her at all times to ensure she can respond in time  Pt would benefit from cont therapy with focus on dynamic balance, standing tolerance, IADL management, and pt safety upon dc as pt must be mod(I) for dc home as she is husbands caregiver  Cont with POC  Prognosis Good   Problem List Decreased strength;Decreased range of motion;Decreased endurance; Impaired balance;Decreased mobility;Pain   Barriers to Discharge Inaccessible home environment;Decreased caregiver support   Plan   Treatment/Interventions ADL retraining;Functional transfer training; Therapeutic exercise; Endurance training;Bed mobility; Equipment eval/education   Progress Progressing toward goals   Recommendation   OT Discharge Recommendation Home OT   OT Therapy Minutes   OT Time In 1400   OT Time Out 1430   OT Total Time (minutes) 30   OT Mode of treatment - Individual (minutes) 30   OT Mode of treatment - Concurrent (minutes) 0   OT Mode of treatment - Group (minutes) 0   OT Mode of treatment - Co-treat (minutes) 0   OT Mode of Teatment - Total time(minutes) 30 minutes   Therapy Time missed   Time missed?  No

## 2019-08-04 PROCEDURE — 97530 THERAPEUTIC ACTIVITIES: CPT

## 2019-08-04 PROCEDURE — 97110 THERAPEUTIC EXERCISES: CPT

## 2019-08-04 PROCEDURE — 97116 GAIT TRAINING THERAPY: CPT

## 2019-08-04 RX ADMIN — VERAPAMIL HYDROCHLORIDE 240 MG: 240 TABLET, FILM COATED, EXTENDED RELEASE ORAL at 09:14

## 2019-08-04 RX ADMIN — ACETAMINOPHEN 975 MG: 325 TABLET ORAL at 21:48

## 2019-08-04 RX ADMIN — ACETAMINOPHEN 975 MG: 325 TABLET ORAL at 15:14

## 2019-08-04 RX ADMIN — ACETAMINOPHEN 975 MG: 325 TABLET ORAL at 06:11

## 2019-08-04 RX ADMIN — MELATONIN 3 MG: 3 TAB ORAL at 23:19

## 2019-08-04 RX ADMIN — PANTOPRAZOLE SODIUM 40 MG: 40 TABLET, DELAYED RELEASE ORAL at 06:11

## 2019-08-04 RX ADMIN — ENOXAPARIN SODIUM 40 MG: 40 INJECTION SUBCUTANEOUS at 21:49

## 2019-08-04 RX ADMIN — DOCUSATE SODIUM 100 MG: 100 CAPSULE, LIQUID FILLED ORAL at 09:13

## 2019-08-04 RX ADMIN — BACITRACIN ZINC 1 SMALL APPLICATION: 500 OINTMENT TOPICAL at 09:13

## 2019-08-04 RX ADMIN — POTASSIUM CHLORIDE 20 MEQ: 1500 TABLET, EXTENDED RELEASE ORAL at 09:13

## 2019-08-04 RX ADMIN — TRAMADOL HYDROCHLORIDE 50 MG: 50 TABLET, FILM COATED ORAL at 23:19

## 2019-08-04 RX ADMIN — FUROSEMIDE 80 MG: 80 TABLET ORAL at 09:13

## 2019-08-04 RX ADMIN — LIDOCAINE 2 PATCH: 50 PATCH CUTANEOUS at 18:03

## 2019-08-04 RX ADMIN — TRAMADOL HYDROCHLORIDE 50 MG: 50 TABLET, FILM COATED ORAL at 09:30

## 2019-08-04 RX ADMIN — MELATONIN 3 MG: 3 TAB ORAL at 00:51

## 2019-08-04 NOTE — PROGRESS NOTES
08/04/19 0931   Pain Assessment   Pain Assessment 0-10   Pain Score 8   Pain Location Hip   Pain Orientation Right   Hospital Pain Intervention(s) Medication (See MAR); Cold applied; Ambulation/increased activity; Distraction;Repositioned   Restrictions/Precautions   Precautions Contact/isolation; Fall Risk;Pain   Weight Bearing Restrictions Yes   RLE Weight Bearing Per Order WBAT   ROM Restrictions No   Cognition   Overall Cognitive Status WFL   Arousal/Participation Alert; Cooperative   Attention Within functional limits   Orientation Level Oriented X4   Memory Within functional limits   Following Commands Follows multistep commands with increased time or repetition   Subjective   Subjective Pt remarks on poor sleep  Concerned over ability to return to caring for her disabled  on return home  Pain management remains her greatest complaint  QI: Sit to Stand   Assistance Needed Supervision   Sit to Stand CARE Score 4   Transfer Bed/Chair/Wheelchair   Limitations Noted In Balance;Pain Management;LE Strength   Adaptive Equipment Roller [employer] to Stand Supervision   Stand to W  R  [place], Chair, Wheelchair Transfer (FIM) 5 - Patient requires supervision/monitoring   QI: Walk 10 Feet   Assistance Needed Supervision; Adaptive equipment   Walk 10 Feet CARE Score 4   QI: Walk 50 Feet with Two Turns   Assistance Needed Adaptive equipment;Supervision   Walk 50 Feet with Two Turns CARE Score 4   QI: Walk 150 Feet   Assistance Needed Supervision; Adaptive equipment   Walk 150 Feet CARE Score 4   Ambulation   Does the patient walk? 2  Yes   Primary Discharge Mode of Locomotion Walk   Walk Assist Level Supervision   Gait Pattern Antalgic;Decreased R stance;Narrow EVELYN; Decreased foot clearance; Inconsistant Felipa  (right foot IR)   Assist Device Roller Walker   Distance Walked (feet) 150 ft  (x2, 70 x1)   Limitations Noted In Balance; Endurance;Speed;Strength;Swing;Heel Strike  (Pain mgmt)   Walking (FIM) 5 - Patient requires supervision/monitoring AND distance 150 feet or more, no rest   QI: Wheel 50 Feet with Two Turns   Reason if not Attempted Activity not applicable   Wheel 50 Feet with Two Turns CARE Score 9   QI: Wheel 150 Feet   Reason if not Attempted Activity not applicable   Wheel 822 Feet CARE Score 9   Wheelchair mobility   QI: Does the patient use a wheelchair? 0   No   Wheelchair (FIM) 0 - Activity does not occur   QI: 1 Step (Curb)   Reason if not Attempted Safety concerns   1 Step (Curb) CARE Score 88   QI: 4 Steps   Assistance Needed Physical assistance   Assistance Provided by [place] 50%-74%   Comment Supervision for ascent with left sided HR and non-reciprocal pattern, pt independent in pattern recall, modAx1 for descent secondary to pain and poor eccentric control, significant left knee crepitus with controlled knee flexion   4 Steps CARE Score 2   QI: 12 Steps   Reason if not Attempted Safety concerns   12 Steps CARE Score 88   Stairs   Type Stairs   # of Steps 6  (3x2)   Weight Bearing Precautions RLE;WBAT   Assist Devices Single Rail   Stairs (FIM) 2 - Patient completes 25 - 49% of all tasks AND goes up and down full flight (12- 14 stairs)   Therapeutic Interventions   Strengthening Seated LAQ 2 x10, marching 2 x10 performed in alternating fashion, hip ADD ball squeeze 3" x 20, standing TE partial squat 2 x10 to comfort, standing alternating march x 10, HR/TR x 20, sidestep ABD/ADD x 5 lengths in //   Flexibility Bilateral manual stretch hs and gs 3 x30", IR/ER of lower extremity in long sit position 5" x 20 ea   Balance Standing weight shift without UE assist x 20, simulated bed mobility assistance to her  without UE assist rolling large bolster forwards and backwards on hi-low and perform large rolling ball arcs on hi-low x 20   Assessment   Treatment Assessment Pt participated in skilled PT session emphasizing activity tolerance, functional strengthening, ambulatory tolerance and safety, and simulating house care and caregiver requirements to return to her disabled   Pt managed throughout session with application of topical ice  Pain most notable with transitions and initiating gait training  Pt with significant crepitus in both knees during any degree of closed chain knee flexion however only remarks of pain in right lower extremity  Excellent safety awareness appreciated throughout transfers and gait training functioning at a close supervision level  Stairs remain most challenging secondary to single rail in home and pain provoked during descent  Standing tolerance is good with pt maintaining unsupported standing x 7 minutes while rolling large bolster forwards and backwards and making large ball arcs on mat to begin simulating assisting her  with bed mobility and skin checks  Pt resting comfortably with all needs in reach at end of session, encouraged to move legs while seated throughout day  She will benefit from continued skilled PT intervention to maximize safety, activity tolerance, and particularly negotiation of stairs while progressing towards dc  Problem List Decreased strength;Decreased range of motion;Decreased endurance; Impaired balance;Decreased mobility;Pain   Barriers to Discharge Inaccessible home environment;Decreased caregiver support   PT Barriers   Physical Impairment Decreased strength;Decreased range of motion;Decreased endurance; Impaired balance;Decreased mobility;Pain   Functional Limitation Stair negotiation;Standing;Transfers; Walking   Plan   Treatment/Interventions Functional transfer training;LE strengthening/ROM; Elevations; Therapeutic exercise; Endurance training;Patient/family training;Bed mobility;Gait training   Progress Progressing toward goals   PT Therapy Minutes   PT Time In 0930   PT Time Out 1100   PT Total Time (minutes) 90   PT Mode of treatment - Individual (minutes) 90   PT Mode of treatment - Concurrent (minutes) 0   PT Mode of treatment - Group (minutes) 0   PT Mode of treatment - Co-treat (minutes) 0   PT Mode of Teatment - Total time(minutes) 90 minutes   Therapy Time missed   Time missed?  No

## 2019-08-04 NOTE — PROGRESS NOTES
Internal Medicine Progress Note  Patient: Jeannette Pereira  Age/sex: 80 y o  female  Medical Record #: [id number]      ASSESSMENT/PLAN: (Interval History)  Jeannette Pereira is seen and examined and management for following issues:    Right hip fx; s/p TFN 7/23/19:  WBAT; watch incision     ABLA: Hgb improving  Did not want to be transfused last week when her hemoglobin was below 8      Chronic diastolic CHF with LVEF 47%, mild MR/moderate AS and AR/moderate TR:  she will need to go back to see Dr Zulay Fisher since he has been following her for this and can then refer to Cardiology if he and the patient wish  She is to remain on her home dose of Lasix 80mg daily = she feels edema is baseline      HTN:  at home, she had been on Verapamil but changed in April to Toprol 50mg daily 2/2 insurance would not pay for the Verapamil; however, since she has a lot of Verpamil left, she has been using them up and then is going to switch back to the Toprol  Will keep Verapamil 240mg qd for now     Left 3rd toe wound and right great toe subungual hematoma:  had been seen by Podiatry; they did remove an ingrown toenail left 3rd toe  They feel that she is likely to lose the nail on the right great toe  Continue local care    Hypokalemia:  Continue KCl 20 mEq daily  BMP Monday  Subjective/ HPI:  Patients overnight issues or events were reviewed with nursing or staff during rounds or morning huddle session  New or overnight issues  ABLA: Hgb 8 4  HTN:  Stable on Verapamil  Chronic diastolic CHF: stable  Offers no complaints       ROS:     GI: denies abdominal pain, change bowel habits or reflux symptoms  Neuro: Denies any headache, new vision changes, new neuropathies,new weaknesses   Respiratory: No Cough, SOB, denies wheeze  Cardiovascular: No CP, palpitations , denies perception of rapid heartbeat  : denies any new urinary burning or frequency    Review of Scheduled Meds:    Current Facility-Administered Medications:  acetaminophen 975 mg Oral Q8H [address] Balwinder Moran MD   bacitracin 1 small application Topical Daily JUAN Marie   docusate sodium 100 mg Oral BID Balwinder Moran MD   enoxaparin 40 mg Subcutaneous Daily Balwinder Moran MD   furosemide 80 mg Oral Daily Balwinder Moran MD   lidocaine 2 patch Topical Daily Balwinder Moran MD   melatonin 3 mg Oral HS PRN Bella Reynolds DO   menthol-methyl salicylate  Apply externally 4x Daily PRN Saeid Francicso MD   pantoprazole 40 mg Oral Daily Before Breakfast Balwinder Moran MD   polyethylene glycol 17 g Oral Daily PRN Balwinder Moran MD   potassium chloride 20 mEq Oral Daily JUAN Talbot   traMADol 25 mg Oral Q4H PRN Saeid Francisco MD   traMADol 50 mg Oral Q4H PRN Saeid Francisco MD   verapamil 240 mg Oral Daily Saeid Francisco MD       Labs:     Results from last 7 days   Lab Units 08/03/19  0623 07/31/19  0526   WBC Thousand/uL 6 56 6 82   HEMOGLOBIN g/dL 8 3* 8 4*   HEMATOCRIT % 27 4* 26 8*   PLATELETS Thousands/uL 288 283     Results from last 7 days   Lab Units 08/02/19  0612 07/29/19  0504   SODIUM mmol/L 137 140   POTASSIUM mmol/L 3 3* 3 9   CHLORIDE mmol/L 101 102   CO2 mmol/L 34* 35*   BUN mg/dL 23 23   CREATININE mg/dL 0 68 0 72   CALCIUM mg/dL 8 8 9 0                      Imaging:     No orders to display       *Labs reviewed  *Radiology studies reviewed  *Medications reviewed and reconciled as needed  *Please refer to order section for additional ordered labs studies  *Case discussed with primary attending during morning huddle case rounds    Physical Examination:  Vitals:   Vitals:    08/03/19 0500 08/03/19 1326 08/03/19 2218 08/04/19 0633   BP: 153/67 108/54 129/55 151/67   BP Location: Left arm Left arm Left arm Left arm   Pulse: 70 63 68 68   Resp: 18 18 18 20   Temp: 98 5 °F (36 9 °C) 98 8 °F (37 1 °C) 98 1 °F (36 7 °C) 98 °F (36 7 °C)   TempSrc: Oral Oral Oral Oral   SpO2: 97% 98% 97% 96%   Weight: Height:           General Appearance: NAD, conversive  Eyes: No icterus; conjunctiva normal, PERRLA  HENT: oropharynx clear; mucous membranes moist  Neck: trachea midline, range of motion full  Lungs: CTA, normal respiratory effort, no retractions, expiratory effort normal  CV: regular rate, no rubs/murmurs/gallops  ABD: soft: NT/ND; +BS  EXT: mild edema of LLE and moderate edema of RLE; right leg incisions are w/o erythema/drainage  Skin: normal turgor, normal texture, no rashes  Psych: affect normal, no overt anxiety/depression   Neuro: AAOx3            Total time spent: At least 40 minutes, with more than 50% spent counseling/coordinating care  Counseling includes discussion with patient re: progress  and discussion with patient of his/her current medical state/information  Coordination of patient's care was performed in conjunction with primary service  Time invested included review of patient's labs, vitals, and management of their comorbidities with continued monitoring  In addition, this patient was discussed with medical team including physician and advanced extenders  The care of the patient was extensively discussed and appropriate treatment plan was formulated unique for this patient  ** Please Note: Dragon 360 Dictation voice to text software may have been used in the creation of this document   **

## 2019-08-04 NOTE — PLAN OF CARE
Problem: Prexisting or High Potential for Compromised Skin Integrity  Goal: Skin integrity is maintained or improved  Description  INTERVENTIONS:  - Identify patients at risk for skin breakdown  - Assess and monitor skin integrity  - Assess and monitor nutrition and hydration status  - Monitor labs (i e  albumin)  - Assess for incontinence   - Turn and reposition patient  - Assist with mobility/ambulation  - Relieve pressure over bony prominences  - Avoid friction and shearing  - Provide appropriate hygiene as needed including keeping skin clean and dry  - Evaluate need for skin moisturizer/barrier cream  - Collaborate with interdisciplinary team (i e  Nutrition, Rehabilitation, etc )   - Patient/family teaching  Outcome: Progressing     Problem: Potential for Falls  Goal: Patient will remain free of falls  Description  INTERVENTIONS:  - Assess patient frequently for physical needs  -  Identify cognitive and physical deficits and behaviors that affect risk of falls    -  [place] fall precautions as indicated by assessment   - Educate patient/family on patient safety including physical limitations  - Instruct patient to call for assistance with activity based on assessment  - Modify environment to reduce risk of injury  - Consider OT/PT consult to assist with strengthening/mobility  Outcome: Progressing     Problem: PAIN - ADULT  Goal: Verbalizes/displays adequate comfort level or baseline comfort level  Description  Interventions:  - Encourage patient to monitor pain and request assistance  - Assess pain using appropriate pain scale  - Administer analgesics based on type and severity of pain and evaluate response  - Implement non-pharmacological measures as appropriate and evaluate response  - Consider cultural and social influences on pain and pain management  - Notify physician/advanced practitioner if interventions unsuccessful or patient reports new pain  Outcome: Progressing     Problem: INFECTION - ADULT  Goal: Absence or prevention of progression during hospitalization  Description  INTERVENTIONS:  - Assess and monitor for signs and symptoms of infection  - Monitor lab/diagnostic results  - Monitor all insertion sites, i e  indwelling lines, tubes, and drains  - Monitor endotracheal (as able) and nasal secretions for changes in amount and color  - Check appropriate cooling/warming therapies per order  - Administer medications as ordered  - Instruct and encourage patient and family to use good hand hygiene technique  - Identify and instruct in appropriate isolation precautions for identified infection/condition  Outcome: Progressing  Goal: Absence of fever/infection during neutropenic period  Description  INTERVENTIONS:  - Monitor WBC  - Implement neutropenic guidelines  Outcome: Progressing     Problem: SAFETY ADULT  Goal: Maintain or return to baseline ADL function  Description  INTERVENTIONS:  -  Assess patient's ability to carry out ADLs; assess patient's baseline for ADL function and identify physical deficits which impact ability to perform ADLs (bathing, care of mouth/teeth, toileting, grooming, dressing, etc )  - Assess/evaluate cause of self-care deficits   - Assess range of motion  - Assess patient's mobility; develop plan if impaired  - Assess patient's need for assistive devices and provide as appropriate  - Encourage maximum independence but intervene and supervise when necessary  ¯ Involve family in performance of ADLs  ¯ Assess for home care needs following discharge   ¯ Request OT consult to assist with ADL evaluation and planning for discharge  ¯ Provide patient education as appropriate  Outcome: Progressing  Goal: Maintain or return mobility status to optimal level  Description  INTERVENTIONS:  - Assess patient's baseline mobility status (ambulation, transfers, stairs, etc )    - Identify cognitive and physical deficits and behaviors that affect mobility  - Identify mobility aids required to assist with transfers and/or ambulation (gait belt, sit-to-stand, lift, walker, cane, etc )  - [place] fall precautions as indicated by assessment  - Record patient progress and toleration of activity level on Mobility SBAR; progress patient to next Phase/Stage  - Instruct patient to call for assistance with activity based on assessment  - Request Rehabilitation consult to assist with strengthening/weightbearing, etc   Outcome: Progressing     Problem: DISCHARGE PLANNING  Goal: Discharge to home or other facility with appropriate resources  Description  INTERVENTIONS:  - Identify barriers to discharge w/patient and caregiver  - Arrange for needed discharge resources and transportation as appropriate  - Identify discharge learning needs (meds, wound care, etc )  - Arrange for interpretive services to assist at discharge as needed  - Refer to Case Management Department for coordinating discharge planning if the patient needs post-hospital services based on physician/advanced practitioner order or complex needs related to functional status, cognitive ability, or social support system  Outcome: Progressing     Problem: Knowledge Deficit  Goal: Patient/family/caregiver demonstrates understanding of disease process, treatment plan, medications, and discharge instructions  Description  Complete learning assessment and assess knowledge base    Interventions:  - Provide teaching at level of understanding  - Provide teaching via preferred learning methods  Outcome: Progressing

## 2019-08-05 ENCOUNTER — APPOINTMENT (INPATIENT)
Dept: NON INVASIVE DIAGNOSTICS | Facility: HOSPITAL | Age: 84
DRG: 560 | End: 2019-08-05
Payer: MEDICARE

## 2019-08-05 PROBLEM — M79.89 RIGHT LEG SWELLING: Status: ACTIVE | Noted: 2019-08-05

## 2019-08-05 LAB
ANION GAP SERPL CALCULATED.3IONS-SCNC: 3 MMOL/L (ref 4–13)
BUN SERPL-MCNC: 27 MG/DL (ref 5–25)
CALCIUM SERPL-MCNC: 8.8 MG/DL (ref 8.3–10.1)
CHLORIDE SERPL-SCNC: 104 MMOL/L (ref 100–108)
CO2 SERPL-SCNC: 32 MMOL/L (ref 21–32)
CREAT SERPL-MCNC: 0.66 MG/DL (ref 0.6–1.3)
GFR SERPL CREATININE-BSD FRML MDRD: 81 ML/MIN/1.73SQ M
GLUCOSE SERPL-MCNC: 98 MG/DL (ref 65–140)
POTASSIUM SERPL-SCNC: 3.7 MMOL/L (ref 3.5–5.3)
SODIUM SERPL-SCNC: 139 MMOL/L (ref 136–145)

## 2019-08-05 PROCEDURE — 97530 THERAPEUTIC ACTIVITIES: CPT

## 2019-08-05 PROCEDURE — 99232 SBSQ HOSP IP/OBS MODERATE 35: CPT | Performed by: PHYSICAL MEDICINE & REHABILITATION

## 2019-08-05 PROCEDURE — 93971 EXTREMITY STUDY: CPT

## 2019-08-05 PROCEDURE — 97535 SELF CARE MNGMENT TRAINING: CPT

## 2019-08-05 PROCEDURE — 97110 THERAPEUTIC EXERCISES: CPT

## 2019-08-05 PROCEDURE — 80048 BASIC METABOLIC PNL TOTAL CA: CPT | Performed by: NURSE PRACTITIONER

## 2019-08-05 PROCEDURE — 93971 EXTREMITY STUDY: CPT | Performed by: SURGERY

## 2019-08-05 RX ADMIN — DOCUSATE SODIUM 100 MG: 100 CAPSULE, LIQUID FILLED ORAL at 08:34

## 2019-08-05 RX ADMIN — LIDOCAINE 2 PATCH: 50 PATCH CUTANEOUS at 20:19

## 2019-08-05 RX ADMIN — VERAPAMIL HYDROCHLORIDE 240 MG: 240 TABLET, FILM COATED, EXTENDED RELEASE ORAL at 08:35

## 2019-08-05 RX ADMIN — FUROSEMIDE 80 MG: 80 TABLET ORAL at 08:34

## 2019-08-05 RX ADMIN — PANTOPRAZOLE SODIUM 40 MG: 40 TABLET, DELAYED RELEASE ORAL at 05:45

## 2019-08-05 RX ADMIN — BACITRACIN ZINC 1 SMALL APPLICATION: 500 OINTMENT TOPICAL at 08:34

## 2019-08-05 RX ADMIN — TRAMADOL HYDROCHLORIDE 50 MG: 50 TABLET, FILM COATED ORAL at 07:41

## 2019-08-05 RX ADMIN — DOCUSATE SODIUM 100 MG: 100 CAPSULE, LIQUID FILLED ORAL at 17:32

## 2019-08-05 RX ADMIN — ACETAMINOPHEN 975 MG: 325 TABLET ORAL at 13:41

## 2019-08-05 RX ADMIN — TRAMADOL HYDROCHLORIDE 50 MG: 50 TABLET, FILM COATED ORAL at 13:41

## 2019-08-05 RX ADMIN — POTASSIUM CHLORIDE 20 MEQ: 1500 TABLET, EXTENDED RELEASE ORAL at 08:34

## 2019-08-05 RX ADMIN — ENOXAPARIN SODIUM 40 MG: 40 INJECTION SUBCUTANEOUS at 22:00

## 2019-08-05 RX ADMIN — ACETAMINOPHEN 975 MG: 325 TABLET ORAL at 22:00

## 2019-08-05 RX ADMIN — ACETAMINOPHEN 975 MG: 325 TABLET ORAL at 05:45

## 2019-08-05 NOTE — ASSESSMENT & PLAN NOTE
· secondary to surgery, immobility  · Venous dopplers negative for DVT, but there does appear to be a popliteal fossa cyst, likely a Baker's cyst    · Improving     · Continue ACE wrapping, patient to resume PETER hose upon discharge (she has stockings at home)

## 2019-08-05 NOTE — PROGRESS NOTES
08/05/19 0830   Pain Assessment   Pain Assessment No/denies pain   Pain Score No Pain   Restrictions/Precautions   Precautions Contact/isolation; Fall Risk;Pain   Weight Bearing Restrictions Yes   RLE Weight Bearing Per Order WBAT   ROM Restrictions No   QI: Oral Hygiene   Assistance Needed Supervision   Assistance Provided by [place] No physical assistance   Comment Supervision while in stance at sink  Oral Hygiene CARE Score 4   Grooming   Able To Initiate Tasks; Acquire Items; Wash/Dry Hands;Brush/Clean Teeth;Wash/Dry Face;Comb/Brush Hair   Limitation Noted In Strength   Findings CS while in stance  Grooming (FIM) 5 - Patient requires supervision/monitoring   QI: Shower/Bathe Self   Assistance Needed Incidental touching   Assistance Provided by [place] No physical assistance   Shower/Bathe Self CARE Score 4   Bathing   Assessed Bath Style Tub   Anticipated D/C Bath Style Tub   Able to Gather/Transport No   Able to Adjust Water Temperature Yes   Able to Wash/Rinse/Dry (body part) Left Arm;Right Arm;L Upper Leg;R Upper Leg;Chest;Abdomen;R Lower Leg/Foot;L Lower Leg/Foot;Perineal Area; Buttocks   Limitations Noted in Balance; Endurance;Strength   Positioning Seated;Standing   Adaptive Equipment Hand Held Shower; Shower Bars   Bathing (FIM) 4 - Patient requires steadying assist or light touching   Tub/Shower Transfer   Limitations Noted In Balance;LE Strength;ROM   Adaptive Equipment Transfer Bench;Grab Bars   Assessed Tub/shower combo   Tub Transfer (FIM) 4 - [place] lifts one extremity during transfer   QI: Upper Body [address] / [place] Provided by [place] No physical assistance   Comment Bra and pullover shirt      Upper Body Dressing CARE Score 5   QI: Lower Body Dressing   Assistance Needed Set-up / clean-up   Assistance Provided by [place] No physical assistance   Lower Body Dressing CARE Score 5   QI: Putting On/Taking Off Footwear   Assistance Needed Physical assistance Assistance Provided by [place] 50%-74%   Comment Pt continues to require A for donning TEDS which she will be wearing upon d/c  Pt demos improvement with doffing standard socks and donning shoes  Putting On/Taking Off Footwear CARE Score 2   Dressing/Undressing Clothing   Remove UB Clothes   (pullover gown)   Remove LB Clothes Undergarment;Socks   Don UB Clothes Pullover Shirt;Bra   Don LB Clothes Undergarment; Shorts;TEDs; Shoes   Limitations Noted In Balance; Endurance;Problem Solving; Safety;Strength;Timeliness   Positioning Supported Sit;Standing   UB Dressing (FIM) 5 - [place] sets up supplies or applies device   LB Dressing (FIM) 5 - [place] sets up supplies or applies device   QI: Sit to [address] Provided by [place] No physical assistance   Sit to Stand CARE Score 4   QI: Chair/Bed-to-Chair Transfer   Assistance Needed Supervision   Assistance Provided by [place] No physical assistance   Chair/Bed-to-Chair Transfer CARE Score 4   Transfer Bed/Chair/Wheelchair   Limitations Noted In Balance; Endurance;LE Strength;UE Strength   Adaptive Equipment Roller Walker   Findings CS with RW     Bed, Chair, Wheelchair Transfer (FIM) 5 - Patient requires supervision/monitoring   Cognition   Overall Cognitive Status WFL   Arousal/Participation Alert; Cooperative   Attention Within functional limits   Orientation Level Oriented X4   Memory Within functional limits   Following Commands Follows multistep commands with increased time or repetition   Activity Tolerance   Activity Tolerance Patient limited by pain   Assessment   Treatment Assessment Pt participated in skilled OT services with focus on ADL retraining, functional mobility, and transfers  Pt overall tolerates session well  Continues to report pain in R knee, Dr Franny Stewart present to discuss and ordered doppler to r/o DVTs  Pt has made G progress with functional transfers and overall requires CS   Pt continues to require [place] for tub transfer 2* to requiring A with RLE in/out of tub 2* to RLE weakness and pain  Pt demos increased ability to complete all LB dressing, requires A for TEDS only  Pt will continue to benefit from skilled OT services following POC with focus on higher level I/ADL tasks, standing balance, endurance, and strengthening  Prognosis Good   Problem List Decreased strength;Decreased range of motion;Decreased endurance; Impaired balance;Decreased mobility;Pain   Plan   Treatment/Interventions ADL retraining;Functional transfer training; Therapeutic exercise; Endurance training;Cognitive reorientation;Patient/family training;Equipment eval/education; Compensatory technique education   Progress Progressing toward goals   Recommendation   OT Discharge Recommendation Home OT   OT Therapy Minutes   OT Time In 0830   OT Time Out 1000   OT Total Time (minutes) 90   OT Mode of treatment - Individual (minutes) 90   OT Mode of treatment - Concurrent (minutes) 0   OT Mode of treatment - Group (minutes) 0   OT Mode of treatment - Co-treat (minutes) 0   OT Mode of Teatment - Total time(minutes) 90 minutes   Therapy Time missed   Time missed?  No

## 2019-08-05 NOTE — PROGRESS NOTES
08/05/19 1230   Pain Assessment   Pain Assessment 0-10   Pain Score 8   Pain Type Acute pain;Surgical pain   Pain Location Hip;Leg   Pain Orientation Right; Anterior; Upper; Outer   Pain Descriptors Shooting; Sore   Pain Frequency Constant/continuous   Hospital Pain Intervention(s) Elevated;Cold applied; Rest   Restrictions/Precautions   Precautions Fall Risk;Contact/isolation;Pain   Cognition   Overall Cognitive Status WFL   Subjective   Subjective reporting increased pain today   QI: Sit to Lying   Assistance Needed Physical assistance   Assistance Provided by [place] Less than 25%   Comment A R LE   Sit to Lying CARE Score 3   QI: Lying to Sitting on Side of Bed   Assistance Needed Physical assistance   Assistance Provided by [place] Less than 25%   Comment A R LE   Lying to Sitting on Side of Bed CARE Score 3   QI: Sit to Stand   Assistance Needed Incidental touching   Assistance Provided by [place] Less than 25%   Comment reports being "very stiff after lying down for ex's"   Sit to Stand CARE Score 3   QI: Chair/Bed-to-Chair Transfer   Assistance Needed Supervision; Adaptive equipment   Assistance Provided by [place] No physical assistance   Comment using RW   Chair/Bed-to-Chair Transfer CARE Score 4   Transfer Bed/Chair/Wheelchair   Limitations Noted In LE Strength;Pain Management; Endurance   Adaptive Equipment Roller Walker   Bed, Chair, Wheelchair Transfer (FIM) 4 - [place] lifts one extremity during transfer   Therapeutic Interventions   Strengthening AA LAQ, SAQ R LE, active quad setys, ankle pumps with leg elevated on pillow/wedge while cold pack on for 25 min  SLR, Hip Flex/ext L LE 3 sets to fatigue   Modalities Cold pack R knee x 25 min, R hip x 20 min   Other Comments   Comments Incresaed time required with all transfers due to pt c/o pain   Assessment   Treatment Assessment Pt with order by MD this Am for dopller to r/o DVT R LE after pt presenting with increased pain and edema   Doppler performed at 1130am, no results at time of PT sesion, therefore more conservative approach taken awaiting results  Pt given pian meds by RN at end of session, discussion had regarding pain management and timing with therapy  Pt experiencing pain Rhip flexor with activity, gentle STM provided along with ice with relief reported dropping pain form 8/10 to 4-5/10  Deferred walking activities this session awaiting test results  Pt remained up in chair at bedside to finish eating her lunch  PT Therapy Minutes   PT Time In 1230   PT Time Out 1400   PT Total Time (minutes) 90   PT Mode of treatment - Individual (minutes) 90   PT Mode of treatment - Concurrent (minutes) 0   PT Mode of treatment - Group (minutes) 0   PT Mode of treatment - Co-treat (minutes) 0   PT Mode of Teatment - Total time(minutes) 90 minutes   Therapy Time missed   Time missed? No        08/05/19 1230   Pain Assessment   Pain Assessment 0-10   Pain Score 8   Pain Type Acute pain;Surgical pain   Pain Location Hip;Leg   Pain Orientation Right; Anterior; Upper; Outer   Pain Descriptors Shooting; Sore   Pain Frequency Constant/continuous   Hospital Pain Intervention(s) Elevated;Cold applied; Rest   Restrictions/Precautions   Precautions Fall Risk;Contact/isolation;Pain   Cognition   Overall Cognitive Status WFL   Subjective   Subjective reporting increased pain today   QI: Sit to Lying   Assistance Needed Physical assistance   Assistance Provided by [place] Less than 25%   Comment A R LE   Sit to Lying CARE Score 3   QI: Lying to Sitting on Side of Bed   Assistance Needed Physical assistance   Assistance Provided by [place] Less than 25%   Comment A R LE   Lying to Sitting on Side of Bed CARE Score 3   QI: Sit to Stand   Assistance Needed Incidental touching   Assistance Provided by [place] Less than 25%   Comment reports being "very stiff after lying down for ex's"   Sit to Stand CARE Score 3   QI: Chair/Bed-to-Chair Transfer   Assistance Needed Supervision; Adaptive equipment Assistance Provided by [place] No physical assistance   Comment using RW   Chair/Bed-to-Chair Transfer CARE Score 4   Transfer Bed/Chair/Wheelchair   Limitations Noted In LE Strength;Pain Management; Endurance   Adaptive Equipment Roller Walker   Bed, Chair, Wheelchair Transfer (FIM) 4 - [place] lifts one extremity during transfer   Therapeutic Interventions   Strengthening AA LAQ, SAQ R LE, active quad setys, ankle pumps with leg elevated on pillow/wedge while cold pack on for 25 min  SLR, Hip Flex/ext L LE 3 sets to fatigue   Modalities Cold pack R knee x 25 min, R hip x 20 min   Other Comments   Comments Incresaed time required with all transfers due to pt c/o pain   Assessment   Treatment Assessment Pt with order by MD this Am for dopller to r/o DVT R LE after pt presenting with increased pain and edema  Doppler performed at 1130am, no results at time of PT sesion, therefore more conservative approach taken awaiting results  Pt given pian meds by RN at end of session, discussion had regarding pain management and timing with therapy  Pt experiencing pain Rhip flexor with activity, gentle STM provided along with ice with relief reported dropping pain form 8/10 to 4-5/10  Deferred walking activities this session awaiting test results  Pt remained up in chair at bedside to finish eating her lunch  PT Therapy Minutes   PT Time In 1230   PT Time Out 1400   PT Total Time (minutes) 90   PT Mode of treatment - Individual (minutes) 90   PT Mode of treatment - Concurrent (minutes) 0   PT Mode of treatment - Group (minutes) 0   PT Mode of treatment - Co-treat (minutes) 0   PT Mode of Teatment - Total time(minutes) 90 minutes   Therapy Time missed   Time missed?  No

## 2019-08-05 NOTE — PROGRESS NOTES
Physical Medicine and Rehabilitation Progress Note  Dania Maki 80 y o  female MRN: [id number]  Unit/Bed#: [facility] 506-63 Encounter: [id number]    HPI: Dania Maki is a 80 y o  female who presented to the [facility] [address] after fall at home  Found to have right hip fracture  Now s/p IM nailing of right femur on 7/23/19  Patient WBAT  Postoperatively noted to have ABLA, mild leukocytosis  Also required podiatry consult for left 3rd toe and left large toe wound  Patient was evaluated by therapy services, found to be below functional baseline  She was accepted to the [facility] on 7/25/19  Chief Complaint: f/u fracture and f/u ambulatory dysfunction    Interval: No acute events overnight  Denies any CP or SOB  Slept well overnight  Having increased RLE lower limb pain, swelling  Discussed doppler to r/o DVT  ROS: A 10 point ROS was performed; negative except as noted above  Assessment/Plan:    * Closed right hip fracture, initial encounter ([facility] )  Assessment & Plan  · Acute comprehensive interdisciplinary inpatient rehabilitation including PT, OT, RN, CM, SW, dietary, psychology, etc   · WBAT    Right leg swelling  Assessment & Plan  · Likely secondary to surgery  · Order venous doppler to r/o dvt    Chronic pain of right knee  Assessment & Plan  · Worse overnight due to increased mobility  · Start lidoderm patch overnight  · S/p IASI and series of euflexxa injections by orthopedic surgery; patient reports minimal benefit     · F/u with PM&R as outpatient for conservative management     Difficulty swallowing  Assessment & Plan  · Worse with dry foods, encouraged patient to take smaller bites  · If persists or worsens, would benefit from outpatient f/u with GI  · Of note, patient last had colonoscopy on 03/04/13, with following findings:   · Sigmoid diverticula  · Internal and external hemorrhoids  · Redundant colon with acute angulations  · Patient is to have repeat colonoscopy in 2023     Anemia  Assessment & Plan  Results from last 7 days   Lab Units 08/03/19  0623 07/31/19  0526   HEMOGLOBIN g/dL 8 3* 8 4*     · Likely post-operative  · Monitor CBC intermittently  · Transfuse for Hgb <7  Did not require transfusion this weekend as H/h has improved     Chronic diastolic congestive heart failure (HCC)  Assessment & Plan  Wt Readings from Last 3 Encounters:   08/01/19 55 kg (121 lb 4 1 oz)   07/25/19 50 3 kg (110 lb 14 3 oz)   04/02/19 51 3 kg (113 lb)     · Regular weight checks  · Lasix daily    Essential hypertension  Assessment & Plan  Temp:  [98 3 °F (36 8 °C)-98 6 °F (37 °C)] 98 3 °F (36 8 °C)  HR:  [63-80] 80  Resp:  [17-18] 17  BP: (137-147)/(61-67) 141/64    · Verapamil    # Skin  · Encourage regular turning as patient at risk for skin breakdown  · Staff to continue patient education on Q2h turning  · Rehabilitation team to perform skin checks regularly     # Bowel  · Patient reports no constipation  · To ensure regular BMs, bowel regimen consisting of:  colace , dulcolax suppository  and miralax     # Bladder  · Patient voiding spontaneously    # Pain  · Continue tylenol, for max of 3gm daily  · Continue tramadol  · Continue lidoderm patch(es)  · analgesic balm PRN    # Rehab Psych   · There are no psychological or psychiatric problems identified    # Other  - Diet/Nutrition:        Diet Orders   (From admission, onward)             Start     Ordered    08/02/19 1728  Dietary nutrition supplements  Once     Question Answer Comment   Select Supplement: Ensure Compact-Chocolate    Frequency Breakfast, Dinner        08/02/19 1727    07/25/19 1647  Diet Cardiovascular; Sodium 2 GM  Diet effective now     Question Answer Comment   Diet Type Cardiovascular    Cardiac Sodium 2 GM    RD to adjust diet per protocol?  Yes        07/25/19 1647 07/25/19 1647  Room Service  Once     Question:  Type of Service  Answer:  Room Service - Appropriate with Assistance    07/25/19 1647 - DVT prophy: Sequential compression device (Venodyne)  and Enoxaparin (Lovenox)  - GI ppx: Pantaprazole  - Nausea: None  - Supplements: None  - Sleep: None    Disposition: TBD    CODE: Level 1: Full Code Scheduled Meds:    Current Facility-Administered Medications:  acetaminophen 975 mg Oral Q8H [address] Balwinder Moran MD   bacitracin 1 small application Topical Daily JUAN Marie   docusate sodium 100 mg Oral BID Balwinder Moran MD   enoxaparin 40 mg Subcutaneous Daily Balwinder Moran MD   furosemide 80 mg Oral Daily Balwinder Moran MD   lidocaine 2 patch Topical Daily Balwinder Moran MD   melatonin 3 mg Oral HS PRN Harlo Mcardle, DO   menthol-methyl salicylate  Apply externally 4x Daily PRN Coby Bashir MD   pantoprazole 40 mg Oral Daily Before Breakfast Balwinder Moran MD   polyethylene glycol 17 g Oral Daily PRN Balwinder Moran MD   potassium chloride 20 mEq Oral Daily Sally FlirJUAN curiel   traMADol 25 mg Oral Q4H PRN Balwinder Moran MD   traMADol 50 mg Oral Q4H PRN Balwinder Moran MD   verapamil 240 mg Oral Daily Balwinder Moran MD        Objective:    Functional Update:  Physical Therapy Occupational Therapy   Weight Bearing Status: Weight Bearing as Tolerated  Transfers: Minimal Assistance  Bed Mobility: Moderate Assistance  Amulation Distance (ft): 100 feet  Ambulation: Minimal Assistance  Assistive Device for Ambulation: Roller Walker  Discharge Recommendations: Skilled Nursing Facility   Eating: Supervision  Grooming: Supervision  Bathing: Supervision  Bathing: Supervision  Upper Body Dressing: Supervision  Lower Body Dressing: Minimal Assistance  Toileting: Minimal Assistance  Toilet Transfer: Minimal Assistance  Cognition: Within Defined Limits  Orientation: Person, Place, Situation, Time       Allergies per EMR    Physical Exam:  Temp:  [98 3 °F (36 8 °C)-98 6 °F (37 °C)] 98 3 °F (36 8 °C)  HR:  [63-80] 80  Resp:  [17-18] 17  BP: (137-147)/(61-67) 141/64  SpO2:  [97 %-99 %] 97 %    General: alert, no apparent distress, cooperative and comfortable  HEENT:  Head: Normocephalic, no lesions, without obvious abnormality  LUNGS:  no abnormal respiratory pattern, no retractions noted, non-labored breathing   ABDOMEN:  soft, non-tender  Bowel sounds normal  No masses, no organomegaly  EXTREMITIES:  edema increased to RLE  NEURO:   mental status, speech normal, alert and oriented x3  PSYCH:  Alert and oriented, appropriate affect  Physical examination is otherwise unchanged from previous encounter, except as noted above  Diagnostic Studies: Reviewed, no new imaging  VAS lower limb venous duplex study, unilateral/limited    (Results Pending)     Laboratory: Reviewed    Results from last 7 days   Lab Units 08/03/19  0623 07/31/19  0526   HEMOGLOBIN g/dL 8 3* 8 4*   HEMATOCRIT % 27 4* 26 8*   WBC Thousand/uL 6 56 6 82     Results from last 7 days   Lab Units 08/05/19  0509 08/02/19  0612   BUN mg/dL 27* 23   SODIUM mmol/L 139 137   POTASSIUM mmol/L 3 7 3 3*   CHLORIDE mmol/L 104 101   CREATININE mg/dL 0 66 0 68            Patient Active Problem List   Diagnosis    Cataract    Essential hypertension    Aortic stenosis, moderate    Atrial dilatation, bilateral    Mild tricuspid insufficiency    Non-rheumatic mitral regurgitation    Chronic diastolic congestive heart failure (HCC)    Localized edema    Leg foot wound- 3rd digit    Other fatigue    Arthritis    Closed right hip fracture, initial encounter ([facility] )    Intertrochanteric fracture of right femur (HCC)    Mitral valve stenosis, mild    Anemia    Difficulty swallowing    Chronic pain of right knee    Right leg swelling       ** Please Note: Fluency Direct voice to text software may have been used in the creation of this document   **

## 2019-08-05 NOTE — PROGRESS NOTES
Internal Medicine Progress Note  Patient: Elvin Perla  Age/sex: 80 y o  female  Medical Record #: [id number]      ASSESSMENT/PLAN: (Interval History)  Elvin Perla is seen and examined and management for following issues:    Right hip fx; s/p TFN 7/23/19:  WBAT; watch incision     ABLA: Hgb improving  Did not want to be transfused last week when her hemoglobin was below 8      Chronic diastolic CHF with LVEF 64%, mild MR/moderate AS and AR/moderate TR:  she will need to go back to see Dr Mike Hicks since he has been following her for this and can then refer to Cardiology if he and the patient wish  She is to remain on her home dose of Lasix 80mg daily     HTN:  at home, she had been on Verapamil but changed in April to Toprol 50mg daily 2/2 insurance would not pay for the Verapamil; however, since she has a lot of Verpamil left, she has been using them up and then is going to switch back to the Toprol  Will keep Verapamil 240mg qd for now     Left 3rd toe wound and right great toe subungual hematoma:  had been seen by Podiatry; they did remove an ingrown toenail left 3rd toe  They feel that she is likely to lose the nail on the right great toe  Continue local care    Hypokalemia:  Continue KCl 20 mEq daily  RLE edema: to have Venous doppler today      Subjective/ HPI:  Patients overnight issues or events were reviewed with nursing or staff during rounds or morning huddle session  New or overnight issues  ABLA: Hgb 8 4  HTN:  Stable on Verapamil  Chronic diastolic CHF: stable  Offers no complaints except increased RLE edema       ROS:     GI: denies abdominal pain, change bowel habits or reflux symptoms  Neuro: Denies any headache, new vision changes, new neuropathies,new weaknesses   Respiratory: No Cough, SOB, denies wheeze  Cardiovascular: No CP, palpitations , denies perception of rapid heartbeat  : denies any new urinary burning or frequency    Review of Scheduled Meds:    Current Facility-Administered Medications:  acetaminophen 975 mg Oral Q8H [address] Balwinder Moran MD   bacitracin 1 small application Topical Daily JUAN Marie   docusate sodium 100 mg Oral BID Balwinder Moran MD   enoxaparin 40 mg Subcutaneous Daily Balwinder Moran MD   furosemide 80 mg Oral Daily Balwinder Moran MD   lidocaine 2 patch Topical Daily Balwinder Moran MD   melatonin 3 mg Oral HS PRN Harlo Mcardle, DO   menthol-methyl salicylate  Apply externally 4x Daily PRN Coby Bashir MD   pantoprazole 40 mg Oral Daily Before Breakfast Balwinder Moran MD   polyethylene glycol 17 g Oral Daily PRN Balwinder Moran MD   potassium chloride 20 mEq Oral Daily Sally FlirtSAIGENP   traMADol 25 mg Oral Q4H PRN Coby Bashir MD   traMADol 50 mg Oral Q4H PRN Balwinder Moran MD   verapamil 240 mg Oral Daily Coby Bashir MD       Labs:     Results from last 7 days   Lab Units 08/03/19  0623 07/31/19  0526   WBC Thousand/uL 6 56 6 82   HEMOGLOBIN g/dL 8 3* 8 4*   HEMATOCRIT % 27 4* 26 8*   PLATELETS Thousands/uL 288 283     Results from last 7 days   Lab Units 08/05/19  0509 08/02/19  0612   SODIUM mmol/L 139 137   POTASSIUM mmol/L 3 7 3 3*   CHLORIDE mmol/L 104 101   CO2 mmol/L 32 34*   BUN mg/dL 27* 23   CREATININE mg/dL 0 66 0 68   CALCIUM mg/dL 8 8 8 8                      Imaging:     VAS lower limb venous duplex study, unilateral/limited    (Results Pending)       *Labs reviewed  *Radiology studies reviewed  *Medications reviewed and reconciled as needed  *Please refer to order section for additional ordered labs studies  *Case discussed with primary attending during morning huddle case rounds    Physical Examination:  Vitals:   Vitals:    08/04/19 1310 08/04/19 1944 08/05/19 0547 08/05/19 0835   BP: 137/61 147/67 139/61 141/64   BP Location:  Left arm Left arm    Pulse: 63 77 80    Resp: 18 18 17    Temp: 98 5 °F (36 9 °C) 98 6 °F (37 °C) 98 3 °F (36 8 °C)    TempSrc: Oral Oral Oral    SpO2: 99% 97%    Weight:       Height:           General Appearance: NAD, conversive  Eyes: No icterus; conjunctiva normal, PERRLA  HENT: oropharynx clear; mucous membranes moist  Neck: trachea midline, range of motion full  Lungs: CTA, normal respiratory effort, no retractions, expiratory effort normal  CV: regular rate, no rubs/murmurs/gallops  ABD: soft: NT/ND; +BS  EXT: mild edema of LLE and moderate edema of RLE and more so posterior knee area (has moderate ecchymosis there as well); right leg incisions are w/o erythema/drainage  Skin: normal turgor, normal texture, no rashes  Psych: affect normal, no overt anxiety/depression   Neuro: AAOx3            Total time spent: At least 40 minutes, with more than 50% spent counseling/coordinating care  Counseling includes discussion with patient re: progress  and discussion with patient of his/her current medical state/information  Coordination of patient's care was performed in conjunction with primary service  Time invested included review of patient's labs, vitals, and management of their comorbidities with continued monitoring  In addition, this patient was discussed with medical team including physician and advanced extenders  The care of the patient was extensively discussed and appropriate treatment plan was formulated unique for this patient  ** Please Note: Dragon 360 Dictation voice to text software may have been used in the creation of this document   **

## 2019-08-06 ENCOUNTER — APPOINTMENT (INPATIENT)
Dept: RADIOLOGY | Facility: HOSPITAL | Age: 84
DRG: 560 | End: 2019-08-06
Attending: PHYSICAL MEDICINE & REHABILITATION
Payer: MEDICARE

## 2019-08-06 PROBLEM — R13.10 DIFFICULTY SWALLOWING: Status: RESOLVED | Noted: 2019-07-31 | Resolved: 2019-08-06

## 2019-08-06 PROCEDURE — 97530 THERAPEUTIC ACTIVITIES: CPT

## 2019-08-06 PROCEDURE — 99232 SBSQ HOSP IP/OBS MODERATE 35: CPT | Performed by: PHYSICAL MEDICINE & REHABILITATION

## 2019-08-06 PROCEDURE — 73502 X-RAY EXAM HIP UNI 2-3 VIEWS: CPT

## 2019-08-06 PROCEDURE — 97110 THERAPEUTIC EXERCISES: CPT

## 2019-08-06 PROCEDURE — 97535 SELF CARE MNGMENT TRAINING: CPT

## 2019-08-06 RX ADMIN — TRAMADOL HYDROCHLORIDE 50 MG: 50 TABLET, FILM COATED ORAL at 08:02

## 2019-08-06 RX ADMIN — MELATONIN 3 MG: 3 TAB ORAL at 22:16

## 2019-08-06 RX ADMIN — DOCUSATE SODIUM 100 MG: 100 CAPSULE, LIQUID FILLED ORAL at 17:10

## 2019-08-06 RX ADMIN — DOCUSATE SODIUM 100 MG: 100 CAPSULE, LIQUID FILLED ORAL at 08:01

## 2019-08-06 RX ADMIN — ENOXAPARIN SODIUM 40 MG: 40 INJECTION SUBCUTANEOUS at 22:17

## 2019-08-06 RX ADMIN — TRAMADOL HYDROCHLORIDE 50 MG: 50 TABLET, FILM COATED ORAL at 13:56

## 2019-08-06 RX ADMIN — POTASSIUM CHLORIDE 20 MEQ: 1500 TABLET, EXTENDED RELEASE ORAL at 08:01

## 2019-08-06 RX ADMIN — LIDOCAINE 2 PATCH: 50 PATCH CUTANEOUS at 18:34

## 2019-08-06 RX ADMIN — FUROSEMIDE 80 MG: 80 TABLET ORAL at 08:05

## 2019-08-06 RX ADMIN — TRAMADOL HYDROCHLORIDE 50 MG: 50 TABLET, FILM COATED ORAL at 01:02

## 2019-08-06 RX ADMIN — ACETAMINOPHEN 975 MG: 325 TABLET ORAL at 05:48

## 2019-08-06 RX ADMIN — VERAPAMIL HYDROCHLORIDE 240 MG: 240 TABLET, FILM COATED, EXTENDED RELEASE ORAL at 08:06

## 2019-08-06 RX ADMIN — ACETAMINOPHEN 975 MG: 325 TABLET ORAL at 13:55

## 2019-08-06 RX ADMIN — ACETAMINOPHEN 975 MG: 325 TABLET ORAL at 22:16

## 2019-08-06 RX ADMIN — PANTOPRAZOLE SODIUM 40 MG: 40 TABLET, DELAYED RELEASE ORAL at 05:48

## 2019-08-06 RX ADMIN — BACITRACIN ZINC 1 SMALL APPLICATION: 500 OINTMENT TOPICAL at 08:01

## 2019-08-06 NOTE — PROGRESS NOTES
Physical Medicine and Rehabilitation Progress Note  Johanna Graham 80 y o  female MRN: [id number]  Unit/Bed#: [facility] 640-71 Encounter: [id number]    HPI: Johanna Graham is a 80 y o  female who presented to the [facility] [address] after fall at home  Found to have right hip fracture  Now s/p IM nailing of right femur on 7/23/19  Patient WBAT  Postoperatively noted to have ABLA, mild leukocytosis  Also required podiatry consult for left 3rd toe and left large toe wound  Patient was evaluated by therapy services, found to be below functional baseline  She was accepted to the [facility] on 7/25/19  Chief Complaint: f/u fracture and f/u ambulatory dysfunction    Interval: No acute events overnight  Denies any CP or SOB  Did not sleep as well last night as she did not receive melatonin  Reviewed results of doppler last night, no DVTs  Will have 2 week f/u today by orthopedics  ROS: A 10 point ROS was performed; negative except as noted above  Assessment/Plan:    * Closed right hip fracture, initial encounter ([facility] [address] )  Assessment & Plan  · Acute comprehensive interdisciplinary inpatient rehabilitation including PT, OT, RN, CM, SW, dietary, psychology, etc   · WBAT    Right leg swelling  Assessment & Plan  · Likely secondary to surgery  · Venous dopplers negative for DVT, but there does appear to be a popliteal fossa cyst, likely a Baker's cyst  Will ask ortho to f/u during their 2 week f/u today for possible drainage as outpatient    Chronic pain of right knee  Assessment & Plan  · Continue lidoderm patch overnight  · S/p IASI and series of euflexxa injections by orthopedic surgery; patient reports minimal benefit  · F/u with PM&R as outpatient for conservative management     Anemia  Assessment & Plan  Results from last 7 days   Lab Units 08/03/19  0623 07/31/19  0526   HEMOGLOBIN g/dL 8 3* 8 4*     · Likely post-operative  · Monitor CBC intermittently  · Transfuse for Hgb <7   Did not require transfusion as H/h has improved     Chronic diastolic congestive heart failure (HCC)  Assessment & Plan  Wt Readings from Last 3 Encounters:   08/01/19 55 kg (121 lb 4 1 oz)   07/25/19 50 3 kg (110 lb 14 3 oz)   04/02/19 51 3 kg (113 lb)     · Regular weight checks  · Lasix daily    Essential hypertension  Assessment & Plan  Temp:  [97 8 °F (36 6 °C)-98 3 °F (36 8 °C)] 97 8 °F (36 6 °C)  HR:  [70-81] 81  Resp:  [18-19] 18  BP: (132-148)/(58-70) 132/58    · Verapamil    Difficulty swallowingresolved as of 8/6/2019  Assessment & Plan  · Improved  · Worse with dry foods, encouraged patient to take smaller bites  · If persists or worsens, would benefit from outpatient f/u with GI  · Of note, patient last had colonoscopy on 03/04/13, with following findings:   · Sigmoid diverticula  · Internal and external hemorrhoids  · Redundant colon with acute angulations  · Patient is to have repeat colonoscopy in 2023  # Skin  · Encourage regular turning as patient at risk for skin breakdown  · Staff to continue patient education on Q2h turning  · Rehabilitation team to perform skin checks regularly     # Bowel  · Patient reports no constipation  · To ensure regular BMs, bowel regimen consisting of:  colace , dulcolax suppository  and miralax     # Bladder  · Patient voiding spontaneously    # Pain  · Continue tylenol, for max of 3gm daily  · Continue tramadol  · Continue lidoderm patch(es)  · analgesic balm PRN    # Rehab Psych   · There are no psychological or psychiatric problems identified    # Other  - Diet/Nutrition:        Diet Orders   (From admission, onward)             Start     Ordered    08/02/19 1728  Dietary nutrition supplements  Once     Question Answer Comment   Select Supplement: Ensure Compact-Chocolate    Frequency Breakfast, Dinner        08/02/19 1727    07/25/19 1647  Diet Cardiovascular;  Sodium 2 GM  Diet effective now     Question Answer Comment   Diet Type Cardiovascular    Cardiac Sodium 2 GM RD to adjust diet per protocol?  Yes        07/25/19 1647    07/25/19 1647  Room Service  Once     Question:  Type of Service  Answer:  Room Service - Appropriate with Assistance    07/25/19 1647              - DVT prophy: Sequential compression device (Venodyne)  and Enoxaparin (Lovenox)  - GI ppx: Pantaprazole  - Nausea: None  - Supplements: None  - Sleep: None    Disposition: TBD    CODE: Level 1: Full Code Scheduled Meds:    Current Facility-Administered Medications:  acetaminophen 975 mg Oral Q8H [facility] Balwinder Moran MD   bacitracin 1 small application Topical Daily JUAN Marie   docusate sodium 100 mg Oral BID Balwinder Moran MD   enoxaparin 40 mg Subcutaneous Daily Balwinder Moran MD   furosemide 80 mg Oral Daily Balwinder Moran MD   lidocaine 2 patch Topical Daily Balwinder Moran MD   melatonin 3 mg Oral HS PRN Jan Rather, DO   menthol-methyl salicylate  Apply externally 4x Daily PRN Lina Prince MD   pantoprazole 40 mg Oral Daily Before Breakfast Balwinder Moran MD   polyethylene glycol 17 g Oral Daily PRN Balwinder Moran MD   potassium chloride 20 mEq Oral Daily JUAN Bolaños   traMADol 25 mg Oral Q4H PRN Balwinder Moran MD   traMADol 50 mg Oral Q4H PRN Balwinder Moran MD   verapamil 240 mg Oral Daily Balwinder Moran MD        Objective:    Functional Update:  Physical Therapy Occupational Therapy   Weight Bearing Status: Weight Bearing as Tolerated  Transfers: Minimal Assistance  Bed Mobility: Moderate Assistance  Amulation Distance (ft): 100 feet  Ambulation: Minimal Assistance  Assistive Device for Ambulation: Roller Walker  Discharge Recommendations: Skilled Nursing Facility   Eating: Supervision  Grooming: Supervision  Bathing: Supervision  Bathing: Supervision  Upper Body Dressing: Supervision  Lower Body Dressing: Minimal Assistance  Toileting: Minimal Assistance  Toilet Transfer: Minimal Assistance  Cognition: Within Defined Limits  Orientation: Person, Place, Situation, Time       Allergies per EMR    Physical Exam:  Temp:  [97 8 °F (36 6 °C)-98 3 °F (36 8 °C)] 97 8 °F (36 6 °C)  HR:  [70-81] 81  Resp:  [18-19] 18  BP: (132-148)/(58-70) 132/58  SpO2:  [94 %-98 %] 97 %    General: alert, no apparent distress, cooperative and comfortable  HEENT:  Head: Normocephalic, no lesions, without obvious abnormality  LUNGS:  no abnormal respiratory pattern, no retractions noted, non-labored breathing   ABDOMEN:  soft, non-tender  Bowel sounds normal  No masses, no organomegaly  EXTREMITIES:  edema RLE, as compared to left  NEURO:   mental status, speech normal, alert and oriented x3  PSYCH:  Alert and oriented, appropriate affect  Physical examination is otherwise unchanged from previous encounter, except as noted above      Diagnostic Studies: Reviewed  VAS lower limb venous duplex study, unilateral/limited   Final Result by Lino Miller MD (08/05 1514)      XR hip/pelv 2-3 vws right if performed    (Results Pending)     Laboratory: Reviewed    Results from last 7 days   Lab Units 08/03/19  0623 07/31/19  0526   HEMOGLOBIN g/dL 8 3* 8 4*   HEMATOCRIT % 27 4* 26 8*   WBC Thousand/uL 6 56 6 82     Results from last 7 days   Lab Units 08/05/19  0509 08/02/19  0612   BUN mg/dL 27* 23   SODIUM mmol/L 139 137   POTASSIUM mmol/L 3 7 3 3*   CHLORIDE mmol/L 104 101   CREATININE mg/dL 0 66 0 68            Patient Active Problem List   Diagnosis    Cataract    Essential hypertension    Aortic stenosis, moderate    Atrial dilatation, bilateral    Mild tricuspid insufficiency    Non-rheumatic mitral regurgitation    Chronic diastolic congestive heart failure (HCC)    Localized edema    Leg foot wound- 3rd digit    Other fatigue    Arthritis    Closed right [facility] fracture, initial encounter ([facility] [address] )    Intertrochanteric fracture of right femur ([facility] [address] )    Mitral valve stenosis, mild    Anemia    Chronic pain of right knee    Right leg swelling       ** Please Note: Fluency Direct voice to text software may have been used in the creation of this document   **

## 2019-08-06 NOTE — PROGRESS NOTES
Internal Medicine Progress Note  Patient: Camila Rebolledo  Age/sex: 80 y o  female  Medical Record #: [id number]      ASSESSMENT/PLAN: (Interval History)  Camila Rebolledo is seen and examined and management for following issues:    Right hip fx; s/p TFN 7/23/19:  WBAT; watch incision     ABLA: Hgb improving/stable w/o having to receive transfusion      Chronic diastolic CHF/LVEF 20%, mild MR/moderate AS, AR/moderate TR:  she will need to go back to see Dr Yovani Crouch since he has been following her for this and can then refer to Cardiology if he and the patient wish  She is to remain on her home dose of Lasix 80mg daily     HTN:  at home, she had been on Verapamil but changed in April to Toprol 50mg daily 2/2 insurance would not pay for the Verapamil; however, since she has a lot of Verpamil left, she has been using them up and then is going to switch back to the Toprol  Will keep Verapamil 240mg qd for now     Left 3rd toe wound and right great toe subungual hematoma:  had been seen by Podiatry; they did remove an ingrown toenail left 3rd toe  They feel that she is likely to lose the nail on the right great toe  Continue local care    Hypokalemia:  Continue KCl 20 mEq daily  RLE edema:  Venous doppler yesterday negative DVT, +Bakers cyst      Subjective/ HPI:  Patients overnight issues or events were reviewed with nursing or staff during rounds or morning huddle session  New or overnight issues  ABLA: Hgb 8 4  HTN:  Stable on Verapamil  Chronic diastolic CHF: stable  Offers no complaints except increased RLE edema       ROS:     GI: denies abdominal pain, change bowel habits or reflux symptoms  Neuro: Denies any headache, new vision changes, new neuropathies,new weaknesses   Respiratory: No Cough, SOB, denies wheeze  Cardiovascular: No CP, palpitations , denies perception of rapid heartbeat  : denies any new urinary burning or frequency    Review of Scheduled Meds:    Current Facility-Administered Medications:  acetaminophen 975 mg Oral Q8H [address] Balwinder Moran MD   bacitracin 1 small application Topical Daily JUAN Marie   docusate sodium 100 mg Oral BID Balwinder Moran MD   enoxaparin 40 mg Subcutaneous Daily Balwinder Moran MD   furosemide 80 mg Oral Daily Balwinder Moran MD   lidocaine 2 patch Topical Daily Balwinder Moran MD   melatonin 3 mg Oral HS PRN Sariah Cordero, DO   menthol-methyl salicylate  Apply externally 4x Daily PRN Barron Hardwick MD   pantoprazole 40 mg Oral Daily Before Breakfast Balwinder Moran MD   polyethylene glycol 17 g Oral Daily PRN Balwinder Moran MD   potassium chloride 20 mEq Oral Daily JUAN Nolasco   traMADol 25 mg Oral Q4H PRN Barron Hardwick MD   traMADol 50 mg Oral Q4H PRN Barron Hardwick MD   verapamil 240 mg Oral Daily Barron Hardwick MD       Labs:     Results from last 7 days   Lab Units 08/03/19  0623 07/31/19  0526   WBC Thousand/uL 6 56 6 82   HEMOGLOBIN g/dL 8 3* 8 4*   HEMATOCRIT % 27 4* 26 8*   PLATELETS Thousands/uL 288 283     Results from last 7 days   Lab Units 08/05/19  0509 08/02/19  0612   SODIUM mmol/L 139 137   POTASSIUM mmol/L 3 7 3 3*   CHLORIDE mmol/L 104 101   CO2 mmol/L 32 34*   BUN mg/dL 27* 23   CREATININE mg/dL 0 66 0 68   CALCIUM mg/dL 8 8 8 8                      Imaging:     VAS lower limb venous duplex study, unilateral/limited   Final Result by Michael Ocampo MD (08/05 2914)      XR hip/pelv 2-3 vws right if performed    (Results Pending)       *Labs reviewed  *Radiology studies reviewed  *Medications reviewed and reconciled as needed  *Please refer to order section for additional ordered labs studies  *Case discussed with primary attending during morning huddle case rounds    Physical Examination:  Vitals:   Vitals:    08/05/19 1341 08/05/19 2049 08/06/19 0550 08/06/19 0806   BP: 148/62 144/64 140/70 132/58   BP Location: Left arm Left arm Left arm    Pulse: 70 75 81    Resp: 19 18 18    Temp: 98 °F (36 7 °C) 98 3 °F (36 8 °C) 97 8 °F (36 6 °C)    TempSrc: Oral Oral Oral    SpO2: 94% 98% 97%    Weight:       Height:           General Appearance: NAD, conversive  Eyes: No icterus; conjunctiva normal, PERRLA  HENT: oropharynx clear; mucous membranes moist  Neck: trachea midline, range of motion full  Lungs: CTA, normal respiratory effort, no retractions, expiratory effort normal  CV: regular rate, no rubs/murmurs/gallops  ABD: soft: NT/ND; +BS  EXT: mild edema of LLE and moderate edema of RLE posterior knee area (has moderate ecchymosis there as well); right leg incisions are w/o erythema/drainage  Skin: normal turgor, normal texture, no rashes  Psych: affect normal, no overt anxiety/depression   Neuro: AAOx3            Total time spent: At least 40 minutes, with more than 50% spent counseling/coordinating care  Counseling includes discussion with patient re: progress  and discussion with patient of his/her current medical state/information  Coordination of patient's care was performed in conjunction with primary service  Time invested included review of patient's labs, vitals, and management of their comorbidities with continued monitoring  In addition, this patient was discussed with medical team including physician and advanced extenders  The care of the patient was extensively discussed and appropriate treatment plan was formulated unique for this patient  ** Please Note: Dragon 360 Dictation voice to text software may have been used in the creation of this document   **

## 2019-08-06 NOTE — SOCIAL WORK
VM left for Pts son, Diamante Mendoza , requesting a return call to discuss Pts d/c plan, as well as scheduling family training, and what the family will do if Pt cannot care for her  as she was previously  CM offered that she if she wasn't available, her colleague may be

## 2019-08-06 NOTE — PROGRESS NOTES
08/06/19 0830   Pain Assessment   Pain Assessment No/denies pain   Pain Score No Pain   Restrictions/Precautions   Precautions Fall Risk;Contact/isolation;Pain   Weight Bearing Restrictions Yes   RLE Weight Bearing Per Order WBAT   ROM Restrictions No   QI: Oral Hygiene   Assistance Needed Supervision   Assistance Provided by [place] No physical assistance   Oral Hygiene CARE Score 4   QI: Putting On/Taking Off Footwear   Assistance Needed Physical assistance   Assistance Provided by [place] 50%-74%   Comment Pt continues to require A for donning TEDS which she will be wearing upon d/c  Pt demos improvement with doffing standard socks and donning shoes  Putting On/Taking Off Footwear CARE Score 2   QI: Sit to Stand   Assistance Needed Supervision   Assistance Provided by [place] No physical assistance   Sit to Stand CARE Score 4   QI: Chair/Bed-to-Chair Transfer   Assistance Needed Supervision   Assistance Provided by [place] No physical assistance   Chair/Bed-to-Chair Transfer CARE Score 4   Transfer Bed/Chair/Wheelchair   Limitations Noted In Endurance;LE Strength;Pain Management   Adaptive Equipment Roller Walker   Findings CS with RW   Bed, Chair, Wheelchair Transfer (FIM) 5 - Patient requires supervision/monitoring   Exercise Tools   Other Exercise Tool 1 Pt completes UB strengthening with use of 3# dowel bar for bicep/tricep, then 2# dowel for chest press and rows  Pt completes 3 x 10 each to increase strength and endurance for increased independence with ADL tasks  Tolerates well with rest breaks and no c/o pain  Cognition   Overall Cognitive Status WFL   Arousal/Participation Alert; Cooperative   Attention Within functional limits   Orientation Level Oriented X4   Memory Within functional limits   Following Commands Follows multistep commands with increased time or repetition   Activity Tolerance   Activity Tolerance Patient limited by pain   Assessment   Treatment Assessment Pt participated in skilled OT services with focus on oral hygiene, LB dressing, functional mobility, and strengthening  Dr Meliton Bradley briefly present at start of session to discuss finding of doppler yesterday which was negative for DVT  Reports bakers cyst behind R knee  Recommended pt follow up with ortho for management  Pt engaged in therapy and completes all functional mobility with RW at a Supervision level  Pt requires rest breaks to manage fatigue  Pt continues to be limited by pain  Will continue to benefit from skilled OT services following POC in order to achieve Susana goals as pt was primary caregiver for disabled   To follow up with pt's son in regards to plan for d/c as it is not anticipated that pt will be able to provided level of care she previously provided to her   Prognosis Good   Problem List Decreased strength;Decreased range of motion;Decreased endurance; Impaired balance;Decreased mobility;Pain   Plan   Treatment/Interventions ADL retraining;Functional transfer training; Therapeutic exercise; Endurance training;Cognitive reorientation;Patient/family training;Equipment eval/education; Bed mobility; Compensatory technique education   Progress Progressing toward goals   OT Therapy Minutes   OT Time In 0830   OT Time Out 0930   OT Total Time (minutes) 60   OT Mode of treatment - Individual (minutes) 60   OT Mode of treatment - Concurrent (minutes) 0   OT Mode of treatment - Group (minutes) 0   OT Mode of treatment - Co-treat (minutes) 0   OT Mode of Teatment - Total time(minutes) 60 minutes   Therapy Time missed   Time missed?  No

## 2019-08-06 NOTE — PROGRESS NOTES
08/06/19 1500   Pain Assessment   Pain Assessment 0-10   Pain Score 8   Pain Type Acute pain;Surgical pain   Pain Location Hip;Leg   Pain Orientation Right   Hospital Pain Intervention(s) Ambulation/increased activity;Repositioned   Response to Interventions Pain cont to inc 10/10 with activity  dec 5/10 with rest at end of sessions eated in reclienr  Restrictions/Precautions   Precautions Fall Risk;Contact/isolation;Supervision on toilet/commode;Pain   Weight Bearing Restrictions Yes   RLE Weight Bearing Per Order WBAT   Cognition   Overall Cognitive Status WFL   Arousal/Participation Alert; Cooperative   Subjective   Subjective Pt reports being tired and sore from getting "josseled around" while having x-ray completed  Pt agreeable to session  QI: Sit to Stand   Assistance Needed Supervision   Assistance Provided by [place] No physical assistance   Sit to Stand CARE Score 4   QI: Chair/Bed-to-Chair Transfer   Assistance Needed Adaptive equipment;Supervision   Assistance Provided by [place] No physical assistance   Comment CS with RW    Chair/Bed-to-Chair Transfer CARE Score 4   Transfer Bed/Chair/Wheelchair   Limitations Noted In Pain Management;LE Strength   Adaptive Equipment Roller Walker   Stand Pivot Supervision   Sit to Stand Supervision   Stand to Sit Supervision   Bed, Chair, Wheelchair Transfer (FIM) 5 - Patient requires supervision/monitoring   QI: Walk 10 Feet   Assistance Needed Supervision; Adaptive equipment   Assistance Provided by [place] No physical assistance   Comment CS with RW    Walk 10 Feet CARE Score 4   QI: Walk 50 Feet with Two Turns   Assistance Needed Supervision; Adaptive equipment   Assistance Provided by [place] No physical assistance   Comment CS with RW    Walk 50 Feet with Two Turns CARE Score 4   QI: Walk 150 Feet   Assistance Needed Adaptive equipment;Supervision   Assistance Provided by [place] No physical assistance   Comment CS with RW    Walk 150 Feet CARE Score 4 Ambulation   Does the patient walk? 2  Yes   Primary Discharge Mode of Locomotion Walk   Walk Assist Level Close Supervision   Gait Pattern Antalgic; Slow Felipa;Decreased foot clearance;Decreased R stance; Improper weight shift; Step through   Assist Device Trina Patino Walked (feet) 150 ft  (x2, short distances in room )   Limitations Noted In Endurance; Heel Strike;Speed;Strength;Swing   Findings Pt ambualted 150x2 with CS level and RW  Pt practiced RW management in room with confined spaces  Walking (FIM) 5 - Patient requires supervision/monitoring AND distance 150 feet or more, no rest   Wheelchair mobility   QI: Does the patient use a wheelchair? 0  No   Therapeutic Interventions   Other Ambulation in tight spaces around furniture in room and in and out of bathroom  Assessment   Treatment Assessment Pt participated in skiled PT session with focus on in fucntional mobility and transfer  Pt with inc pain this session which she attributed to transfers during x-ray downstairs  Pt cont to be limited by pain with greatest inc of pain coming during STS transfers and lessening slightly with walking  Pt cont to benefit from skilled PT to inc activity tolerance, functional mobility to maximize independence as pt cares for  at home  Steps reamin biggest barrier to d/c, starting to initate thoughts about single floor set up at home  Family/Caregiver Present no    Barriers to Discharge Inaccessible home environment;Decreased caregiver support   PT Barriers   Physical Impairment Decreased strength;Decreased range of motion;Decreased endurance;Decreased mobility; Impaired balance;Decreased coordination;Orthopedic restrictions;Pain   Functional Limitation Car transfers;Stair negotiation;Standing;Transfers; Walking   Plan   Treatment/Interventions Functional transfer training;LE strengthening/ROM; Therapeutic exercise;Elevations; Endurance training;Patient/family training;Equipment eval/education; Bed mobility;Gait training   Progress Progressing toward goals   Recommendation   Recommendation Home PT;Outpatient PT; Home with family support   Equipment Recommended Walker   PT Therapy Minutes   PT Time In 1500   PT Time Out 1530   PT Total Time (minutes) 30   PT Mode of treatment - Individual (minutes) 30   PT Mode of treatment - Concurrent (minutes) 0   PT Mode of treatment - Group (minutes) 0   PT Mode of treatment - Co-treat (minutes) 0   PT Mode of Teatment - Total time(minutes) 30 minutes   Therapy Time missed   Time missed?  No

## 2019-08-06 NOTE — PROGRESS NOTES
Occupational Therapy Treatment Note        08/06/19 1000   Pain Assessment   Pain Assessment No/denies pain   Restrictions/Precautions   Precautions Fall Risk;Supervision on toilet/commode   RLE Weight Bearing Per Order WBAT   Lifestyle   Autonomy "I don't think I can go up the stairs"   Grooming   Able To Wash/Dry Hands   Limitation Noted In Strength;Timeliness   Findings In stance at sink   Grooming (FIM) 5 - Patient requires supervision/monitoring   QI: Sit to [address] Provided by [place] No physical assistance   Sit to Stand CARE Score 4   QI: Chair/Bed-to-Chair Transfer   Assistance Needed Supervision; Adaptive equipment   Assistance Provided by [place] No physical assistance   Comment Pt completed functional mobility in room and bathroom using RW with supervision for safety  Chair/Bed-to-Chair Transfer CARE Score 4   Transfer Bed/Chair/Wheelchair   Limitations Noted In Balance; Endurance;LE Strength;UE Strength   Adaptive Equipment Roller Walker   Sit to TXU Asa   Stand to W  R  [place], Chair, Wheelchair Transfer (FIM) 5 - Patient requires supervision/monitoring   QI: [address] Provided by [place] No physical assistance   Comment Pt able to complete toileting routine with supervision for safety and with increased time  Toileting Hygiene CARE Score 4   Toileting   Able to [address] down yes, up yes  Manage Hygiene Bladder   Limitations Noted In Balance; Safety;UE Strength;LE Strength   Findings Pt manages dress with supervision during toileting routine   Toileting (FIM) 5 - Patient requires supervision/monitoring   QI: Toilet Transfer   Assistance Needed Supervision   Assistance Provided by [place] No physical assistance   Comment Pt completed transfer with RW onto standard commode above toilet with supervision for safety     Toilet Transfer CARE Score 4   Toilet Transfer   Surface Assessed Standard Commode   Transfer Technique Standard   Limitations Noted In Balance; Endurance; Safety;UE Strength;LE Strength   Toilet Transfer (FIM) 5 - Patient requires supervision/monitoring   Cognition   Overall Cognitive Status WFL   Arousal/Participation Alert; Responsive; Cooperative   Attention Within functional limits   Orientation Level Oriented X4   Memory Within functional limits   Following Commands Follows all commands and directions without difficulty   Activity Tolerance   Activity Tolerance Patient tolerated treatment well   Assessment   Treatment Assessment Pt engaged in skilled OT tx session focusing on discharge planning and ADLs  OT spoke with Pt regarding first floor set up at home w/ options for placing bed on first floor and sponge bathing in first floor bathroom until home OT is available to take Pt upstairs to shower  Pt stated that she will not be able to take care of her  like she did prior to admission and thinks she will hire help for herself and her   Pt states that she will hire additional help for her  to relieve her son of care duty at night time  OT talked to [address] [place] [address] about contacting Pts son to discuss discharge planning and family training  OT provided CM with sons cell phone number, which Pt had provided  OT to contact son to set up family training this week  OT reviewed with Pt that she will require assistance to don/doff TEDs and for household IADLs including care of   Based on Pts current inability to navigate upstairs independently  OT anticipating sponge bathing upon d/c  Continue OT POC focusing on sponge bathing, don/doffing TEDs, kitchen mobility, meal preparation, and finalizing DME/adaptive equipment recommendations, and discharge planning  Prognosis Good   Problem List Decreased strength;Decreased endurance; Impaired balance;Decreased mobility;Pain;Orthopedic restrictions   Barriers to Discharge Inaccessible home environment;Decreased caregiver support   Plan   Treatment/Interventions ADL retraining;Functional transfer training; Therapeutic exercise; Endurance training;Patient/family training;Bed mobility; Equipment eval/education; Compensatory technique education   Progress Progressing toward goals   Recommendation   OT Discharge Recommendation Home OT   Equipment Recommended Other (comment)  (OT to submit)   OT Therapy Minutes   OT Time In 1000   OT Time Out 1030   OT Total Time (minutes) 30   OT Mode of treatment - Individual (minutes) 30   OT Mode of treatment - Concurrent (minutes) 0   OT Mode of treatment - Group (minutes) 0   OT Mode of treatment - Co-treat (minutes) 0   OT Mode of Teatment - Total time(minutes) 30 minutes   Therapy Time missed   Time missed?  No

## 2019-08-06 NOTE — PROGRESS NOTES
08/06/19 1030   Pain Assessment   Pain Assessment 0-10   Pain Score 5   Pain Type Acute pain;Surgical pain   Pain Location Hip;Leg   Pain Orientation Right   Pain Descriptors Aching   Pain Frequency Constant/continuous   Hospital Pain Intervention(s) Cold applied;Repositioned; Ambulation/increased activity   Response to Interventions Pain inc 8/10 with activity, dec 2/10 at end of session with pt sitting at rest in recliner  Restrictions/Precautions   Precautions Fall Risk;Contact/isolation;Pain   Weight Bearing Restrictions Yes   RLE Weight Bearing Per Order WBAT   Cognition   Overall Cognitive Status WFL   Arousal/Participation Alert; Cooperative   Subjective   Subjective Pt c/o of pain in RLE see above, no other c/o at this time  Pt agreeable to session requesting ice pack stating helped reduce pain yesterday  QI: Sit to Stand   Assistance Needed Supervision; Adaptive equipment   Assistance Provided by [place] No physical assistance   Comment CS with STS to RW    Sit to Stand CARE Score 4   QI: Chair/Bed-to-Chair Transfer   Assistance Needed Supervision; Adaptive equipment   Assistance Provided by [place] No physical assistance   Comment CS with RW   Chair/Bed-to-Chair Transfer CARE Score 4   Transfer Bed/Chair/Wheelchair   Limitations Noted In Pain Management;LE Strength   Adaptive Equipment Roller Walker   Stand Pivot Supervision   Sit to Stand Supervision   Stand to Sit Supervision   Bed, Chair, Wheelchair Transfer (FIM) 5 - Patient requires supervision/monitoring   QI: Walk 10 Feet   Assistance Needed Adaptive equipment;Supervision   Assistance Provided by [place] No physical assistance   Comment CS with RW    Walk 10 Feet CARE Score 4   QI: Walk 50 Feet with Two Turns   Assistance Needed Adaptive equipment;Supervision   Assistance Provided by [place] No physical assistance   Comment CS with RW    Walk 50 Feet with Two Turns CARE Score 4   QI: Walk 150 Feet   Assistance Needed Adaptive equipment;Supervision Assistance Provided by [place] No physical assistance   Comment CS with RW    Walk 150 Feet CARE Score 4   Ambulation   Does the patient walk? 2  Yes   Primary Discharge Mode of Locomotion Walk   Walk Assist Level Close Supervision   Gait Pattern Antalgic; Inconsistant Felipa;Decreased foot clearance; Step through; Decreased R stance; Improper weight shift   Assist Device Melvaer Justa Patino Walked (feet) 150 ft  (x2 )   Limitations Noted In Balance; Endurance; Heel Strike;Speed;Strength;Swing   Findings Pt with inc ambulation distance this session  Able to ambulate CS level with RW  Dec knee flexion noted on RLE along with dec heel strike and stance time  Cues to bend leg when walking and encourage heel strike  Walking (FIM) 5 - Patient requires supervision/monitoring AND distance 150 feet or more, no rest   Wheelchair mobility   QI: Does the patient use a wheelchair? 0  No   QI: 1 Step (Curb)   Assistance Needed Adaptive equipment; Incidental touching   Assistance Provided by [place] Less than 25%   Comment CGA with LHR to mimic home set up  Step to pattern    1 Step (Curb) CARE Score 3   Therapeutic Interventions   Strengthening Seated TE while receiving cold [id number] manually resisted ABD/ADD, AAROM on RLE marching, LAQ with overpressure into knee flexion with 10 second hold  Flexibility Seated quad stretch w/c rolled fwd x3 min, Seated BLE hs and gs stretch x4 min  Assessment   Treatment Assessment Pt participated in skilled PT session with focus on inc functional mobility, transfer and LE strengthening/ROM  DVT imaging conducted yesterday, negative for DVT  Imaging showed cyst behind knee  Pt going for x-ray today  Pt cont to be limited by inc pain with activity and mobility stating pain inc 8/10 with movement  Stairs cont to be biggest barrier pt has FF in home to bedroom  OT spoke with pt regarding possibility of first floor set up if able to fit a bed in space downstairs   Pt unsure if she can sleep on couch or funitrure stating that it is very low to the ground  Cont to practice stairs with use of single HR on L ascending  Pt cont to benefit from skilled PT to inc strength, dec pain, and maximize function to allow pt to help care for  and dec caregiver burden  Barriers to Discharge Inaccessible home environment;Decreased caregiver support   PT Barriers   Physical Impairment Decreased strength;Decreased range of motion;Decreased endurance; Impaired balance;Decreased mobility;Orthopedic restrictions;Pain   Functional Limitation Car transfers;Stair negotiation;Standing;Transfers; Walking   Plan   Treatment/Interventions Functional transfer training;LE strengthening/ROM; Elevations; Therapeutic exercise; Endurance training;Patient/family training;Equipment eval/education;Gait training;Bed mobility   Progress Progressing toward goals   Recommendation   Equipment Recommended Walker   PT Therapy Minutes   PT Time In 1030   PT Time Out 1130   PT Total Time (minutes) 60   PT Mode of treatment - Individual (minutes) 60   PT Mode of treatment - Concurrent (minutes) 0   PT Mode of treatment - Group (minutes) 0   PT Mode of treatment - Co-treat (minutes) 0   PT Mode of Teatment - Total time(minutes) 60 minutes   Therapy Time missed   Time missed?  No

## 2019-08-07 PROCEDURE — 99024 POSTOP FOLLOW-UP VISIT: CPT | Performed by: ORTHOPAEDIC SURGERY

## 2019-08-07 PROCEDURE — 97110 THERAPEUTIC EXERCISES: CPT

## 2019-08-07 PROCEDURE — 97535 SELF CARE MNGMENT TRAINING: CPT

## 2019-08-07 PROCEDURE — 97530 THERAPEUTIC ACTIVITIES: CPT

## 2019-08-07 PROCEDURE — 99233 SBSQ HOSP IP/OBS HIGH 50: CPT | Performed by: PHYSICAL MEDICINE & REHABILITATION

## 2019-08-07 RX ORDER — TRAMADOL HYDROCHLORIDE 50 MG/1
25 TABLET ORAL 2 TIMES DAILY PRN
Qty: 7 TABLET | Refills: 0 | Status: SHIPPED | OUTPATIENT
Start: 2019-08-07 | End: 2019-08-14

## 2019-08-07 RX ADMIN — POTASSIUM CHLORIDE 20 MEQ: 1500 TABLET, EXTENDED RELEASE ORAL at 10:00

## 2019-08-07 RX ADMIN — LIDOCAINE 2 PATCH: 50 PATCH CUTANEOUS at 19:52

## 2019-08-07 RX ADMIN — TRAMADOL HYDROCHLORIDE 50 MG: 50 TABLET, FILM COATED ORAL at 07:26

## 2019-08-07 RX ADMIN — ACETAMINOPHEN 975 MG: 325 TABLET ORAL at 22:24

## 2019-08-07 RX ADMIN — ACETAMINOPHEN 975 MG: 325 TABLET ORAL at 13:31

## 2019-08-07 RX ADMIN — PANTOPRAZOLE SODIUM 40 MG: 40 TABLET, DELAYED RELEASE ORAL at 05:44

## 2019-08-07 RX ADMIN — DOCUSATE SODIUM 100 MG: 100 CAPSULE, LIQUID FILLED ORAL at 10:00

## 2019-08-07 RX ADMIN — BACITRACIN ZINC 1 SMALL APPLICATION: 500 OINTMENT TOPICAL at 10:01

## 2019-08-07 RX ADMIN — VERAPAMIL HYDROCHLORIDE 240 MG: 240 TABLET, FILM COATED, EXTENDED RELEASE ORAL at 10:00

## 2019-08-07 RX ADMIN — ENOXAPARIN SODIUM 40 MG: 40 INJECTION SUBCUTANEOUS at 22:24

## 2019-08-07 RX ADMIN — FUROSEMIDE 80 MG: 80 TABLET ORAL at 10:01

## 2019-08-07 RX ADMIN — ACETAMINOPHEN 975 MG: 325 TABLET ORAL at 05:44

## 2019-08-07 RX ADMIN — MELATONIN 3 MG: 3 TAB ORAL at 22:54

## 2019-08-07 RX ADMIN — TRAMADOL HYDROCHLORIDE 50 MG: 50 TABLET, FILM COATED ORAL at 13:30

## 2019-08-07 RX ADMIN — TRAMADOL HYDROCHLORIDE 50 MG: 50 TABLET, FILM COATED ORAL at 22:56

## 2019-08-07 RX ADMIN — POLYETHYLENE GLYCOL 3350 17 G: 17 POWDER, FOR SOLUTION ORAL at 14:01

## 2019-08-07 NOTE — PROGRESS NOTES
Occupational Therapy Treatment Note       08/07/19 0700   Pain Assessment   Pain Assessment 0-10   Pain Score 7   Pain Type Chronic pain;Acute pain   Pain Location Hip;Knee   Pain Orientation Right   Hospital Pain Intervention(s) Repositioned; Ambulation/increased activity   Response to Interventions pain unchanged    Restrictions/Precautions   Precautions Fall Risk;Pain;Supervision on toilet/commode   RLE Weight Bearing Per Order WBAT   Lifestyle   Autonomy "maybe I could have help for me at home"    QI: [address] Provided by [place] No physical assistance   Eating CARE Score 6   Eating Assessment   Positioning Upright   Meal Assessed Breakfast   Findings increased time 2* B/L hand arthritis    Eating (FIM) 6 - Patient requires increased time or safety concern   QI: Oral Hygiene   Assistance Needed Supervision   Assistance Provided by [place] No physical assistance   Comment in stance at sink    Oral Hygiene CARE Score 4   Grooming   Able To Brush/Clean Teeth;Wash/Dry Hands   Findings in stance at sink with B/L UE release    Grooming (FIM) 5 - Patient requires supervision/monitoring   QI: Shower/Bathe Self   Assistance Needed Supervision; Adaptive equipment   Assistance Provided by [place] No physical assistance   Shower/Bathe Self CARE Score 4   Bathing   Assessed Bath Style Sponge Bath   Anticipated D/C Bath Style Sponge Bath   Able to Gather/Transport Yes   Able to Adjust Water Temperature Yes   Able to Wash/Rinse/Dry (body part) Left Arm;Right Arm;L Upper Leg;R Upper Leg;L Lower Leg/Foot;R Lower Leg/Foot;Chest;Abdomen;Perineal Area; Buttocks   Limitations Noted in Balance; Endurance; Safety;Strength;Timeliness   Positioning Seated;Standing   Findings  Plan for pt to sponge bath 2* requiring 1st floor setup at home with only 1/2 bathroom  OT simulated this during ADL routine   Pt completed UB and upper leg bathing in stance at sink with B/L UE release, and unilateral UE support on sink as needed, with supervision  OT educated pt to then sit on [facility] to bathe lower legs/feet  Pt reports she believes she will be able to reach for vanity on sink where washcloths can be placed  OT educated pt on placing down towel to absorb water, but drying floor and removing towel while seated using reacher, prior to standing  Pt able to bend forward while seated on BSC to complete LE bathing at supervision level  While pt can shower prior to D/C on Sunday, this will be plan for pt's D/C ADL routine at home  Bathing (FIM) 5 - Patient requires supervision/monitoring but completes 10/10 parts   Tub/Shower Transfer   Not Assessed Sponge Bath   Findings Plan for pt to sponge bath 2* requiring 1st floor setup at home with only 1/2 bathroom  OT simulated this during ADL routine  Pt completed UB and upper leg bathing in stance at sink with B/L UE release, and unilateral UE support on sink as needed, with supervision  OT educated pt to then sit on [facility] to bathe lower legs/feet  Pt reports she believes she will be able to reach for vanity on sink where washcloths can be placed  OT educated pt on placing down towel to absorb water, but drying floor and removing towel while seated using reacher, prior to standing  Pt able to bend forward while seated on BSC to complete LE bathing at supervision level  While pt can shower prior to D/C on Sunday, this will be plan for pt's D/C ADL routine at home  QI: Upper Body Dressing   Assistance Needed Supervision   Assistance Provided by [place] No physical assistance   Comment seated  supervision to retrieve clothing    Upper Body Dressing CARE Score 4   QI: [address] [place] Provided by [place] No physical assistance   Comment Pt able to thread LEs into shorts and stand to pull over hips at supervision level  Pt requires verbal cue to not stand to doff underwear in unilateral stance  Pt plans to dress/undress while seated on BSC   OT educated pt on recommendation to sit to unthread/thread LEs from LB clothing  Lower Body Dressing CARE Score 4   QI: Putting On/Taking Off Footwear   Assistance Needed Physical assistance   Assistance Provided by [place] 50%-74%   Comment pt requires assistance to don TEDS  Pt will be unable to complete this task at home  Per Dr Lilia Rdz, pt to be wearing teds at home  OT discussed with pt having home health aides (who are present in the AM for her ) assist with TEDS (if they are able to) or having her son assist in donning them in the afternoon and doffing them at night  Pt able to bend forward while seated on BSC to don and tie sneakers with setup assist     Putting On/Taking Off Footwear CARE Score 2   QI: Picking Up Object   Assistance Needed Supervision; Adaptive equipment   Comment Pt stands to retrieve item from floor with min assist using no reacher  OT educated pt on recommendation on to use reacher to dry floor after bathing and remove towel to decrease fall risk  Pt then able to stand with supervision using reacher to retrieve towel from floor after bathing  Picking Up Object CARE Score 4   Dressing/Undressing Clothing   Remove LB Clothes Undergarment;Socks   Don UB Clothes Pullover Shirt;Bra   Don LB Clothes Shorts;TEDs; Shoes   Limitations Noted In Balance; Endurance; Safety;Strength;Timeliness   Positioning Supported Sit;Standing   Findings see above    UB Dressing (FIM) 5 - Patient requires supervision/monitoring   LB Dressing (FIM) 5 - Patient requires supervision/monitoring   QI: Sit to Stand   Assistance Needed Supervision; Adaptive equipment   Assistance Provided by [place] No physical assistance   Sit to Stand CARE Score 4   QI: Chair/Bed-to-Chair Transfer   Assistance Needed Supervision; Adaptive equipment   Assistance Provided by [place] No physical assistance   Chair/Bed-to-Chair Transfer CARE Score 4   Transfer Bed/Chair/Wheelchair   Limitations Noted In Balance; Endurance;UE Strength;LE Strength   Adaptive Equipment Roller Walker   Sit to Stand Supervision   Stand to Sit Supervision   Findings supervision within room/bathroom using RW    Bed, Chair, Wheelchair Transfer (FIM) 5 - Patient requires supervision/monitoring   QI: [address] Provided by [place] No physical assistance   Itz Chen 83 Score 4   Toileting   Able to [address] down yes, up yes  Toileting (FIM) 5 - Patient requires supervision/monitoring   QI: Toilet Transfer   Assistance Needed Supervision; Adaptive equipment   Assistance Provided by [place] No physical assistance   Toilet Transfer CARE Score 4   Toilet Transfer   Surface Assessed Standard Commode   Transfer Technique Standard   Limitations Noted In Balance; Endurance;LE Strength  (pain )   Findings BSC over toilet    Toilet Transfer (FIM) 5 - Patient requires supervision/monitoring   Cognition   Overall Cognitive Status WFL   Activity Tolerance   Activity Tolerance Patient tolerated treatment well   Medical Staff Made Aware per MACKENZIE Browning, she spoke with pt's son yesterday and son is available saturday morning for family training  Leann Hagan will call son today to schedule family training for this Saturday August 10 from  with OT and  with PT  Assessment   Treatment Assessment Pt participated in skilled OT tx session focused on ADL routine  See above for further details on functional performance  Plan for pt to D/C Sunday with 1st floor setup, therefore will be completing sponge bathing at home  Per MACKENZIE Browning, she spoke with pt's son who reports he will be taking medical leave from work upon pt D/C home and will be available in the afternoon to assist with pt's   Pt reports son can assist with household/community IADLS  PT Mindi Olvera to call pt's son today to solidify family training time for Saturday from 10-11 with OT and  with PT   Based on pt's current progress, pt demonstrated ability to achieve mod I for ADL routine and transfers, except for asif FISCHER  Pt will require assistance for household IADL tasks, and care-giving/physically assisting her   Pt will continue to benefit from skilled OT intervention to address kitchen mobility with demonstration of walker tray, standing balance, household IADL tasks, in order to maximize functional independence in ADLS, functional mobility/transfers and IADLS, while decreasing burden of care  Home OT recommended upon D/C to increase pt's independence in household IADLs  Prognosis Good   Problem List Decreased strength;Decreased endurance; Impaired balance;Decreased mobility;Orthopedic restrictions;Pain   Plan   Treatment/Interventions ADL retraining;Functional transfer training; Therapeutic exercise; Endurance training;Patient/family training;Equipment eval/education; Bed mobility; Compensatory technique education   Progress Progressing toward goals   Recommendation   OT Discharge Recommendation Home OT   OT Therapy Minutes   OT Time In 0700   OT Time Out 0850   OT Total Time (minutes) 110   OT Mode of treatment - Individual (minutes) 110   OT Mode of treatment - Concurrent (minutes) 0   OT Mode of treatment - Group (minutes) 0   OT Mode of treatment - Co-treat (minutes) 0   OT Mode of Teatment - Total time(minutes) 110 minutes   Therapy Time missed   Time missed?  No       Rita Anderson MS, OTR/L , CBIS

## 2019-08-07 NOTE — PROGRESS NOTES
08/07/19 1500   Pain Assessment   Pain Score 6   Pain Type Acute pain;Chronic pain   Pain Location Hip;Knee   Pain Orientation Right   Hospital Pain Intervention(s) Repositioned   Response to Interventions 6/10 with movement, 4/10 at rest    Restrictions/Precautions   Precautions Fall Risk;Contact/isolation   RLE Weight Bearing Per Order WBAT   Cognition   Arousal/Participation Cooperative   Subjective   Subjective pt reported feeling tired but ready for therapy    QI: Sit to Stand   Assistance Needed Supervision; Adaptive equipment   Sit to Stand CARE Score 4   QI: Chair/Bed-to-Chair Transfer   Assistance Needed Supervision; Adaptive equipment   Chair/Bed-to-Chair Transfer CARE Score 4   Transfer Bed/Chair/Wheelchair   Limitations Noted In Balance; Endurance;LE Strength;UE Strength   Adaptive Equipment Roller Walker   Stand Pivot Supervision   Sit to Stand Supervision   Stand to W  R  [place], Chair, Wheelchair Transfer (FIM) 5 - Patient requires supervision/monitoring   QI: Walk 10 Feet   Assistance Needed Supervision; Adaptive equipment   Walk 10 Feet CARE Score 4   QI: Walk 50 Feet with Two Turns   Assistance Needed Supervision; Adaptive equipment   Walk 50 Feet with Two Turns CARE Score 4   QI: Walk 150 Feet   Assistance Needed Supervision; Adaptive equipment   Walk 150 Feet CARE Score 4   Ambulation   Primary Discharge Mode of Locomotion Walk   Walk Assist Level Supervision   Gait Pattern Inconsistant Felipa; Slow Felipa; Forward Flexion;Decreased R stance;Decreased L stance; Improper weight shift; Step through   Assist Device Trina Justa Sukumar Walked (feet) 150 ft  (x2)   Limitations Noted In Balance; Endurance; Heel Strike;Speed;Strength;Swing   Walking (FIM) 5 - Patient requires supervision/monitoring AND distance 150 feet or more, no rest   QI: 1 Step (Curb)   Assistance Needed Physical assistance; Adaptive equipment   Assistance Provided by [place] Less than 25%   1 Step (Curb) CARE Score 3   QI: 4 Steps   Assistance Needed Physical assistance; Adaptive equipment   Assistance Provided by [place] Less than 25%   4 Steps CARE Score 3   Stairs   Type Stairs   # of Steps 4  (x2)   Weight Bearing Precautions Fall Risk   Assist Devices Single Rail   Findings up with LLE first down backwards with RLE first, in stairwell, both hands on L rail, sitting rest break between each 4   Stairs (FIM) 2 - Patient goes up and down 4 - 11 stairs regardless of assist/device/setup   Therapeutic Interventions   Other fixed TEDs stockings so they weren't pinching at the top; talked with Dr Lilia Rdz about ace wrapping above the knee  Assessment   Treatment Assessment Pt cont to have increased swelling on RLE; talked with Dr Lilia Rdz and he will put in an order for ace wraps to wrap above her knee; sneakers most likely won't fit, however can use slipper socks during therapy  Pt's son to come in this Saturday from  am for FT with OT/PT  To cont to practice stairs in stairwell to get a single HR instead of the HR set up  To also progress to Adrienne in her room with RW prior to dc home and review HEP with her  Barriers to Discharge Decreased caregiver support   PT Barriers   Physical Impairment Decreased range of motion;Decreased endurance;Decreased strength; Impaired balance;Decreased mobility;Pain;Orthopedic restrictions   Functional Limitation Car transfers;Stair negotiation;Standing;Transfers; Walking   Plan   Treatment/Interventions Functional transfer training;LE strengthening/ROM; Elevations; Therapeutic exercise; Endurance training;Bed mobility; Equipment eval/education;Patient/family training;Gait training   Progress Progressing toward goals   Recommendation   Recommendation Home PT; Home with family support   Equipment Recommended Walker   PT Therapy Minutes   PT Time In 1500   PT Time Out 1545   PT Total Time (minutes) 45   PT Mode of treatment - Individual (minutes) 45   PT Mode of treatment - Concurrent (minutes) 0   PT Mode of treatment - Group (minutes) 0   PT Mode of treatment - Co-treat (minutes) 0   PT Mode of Teatment - Total time(minutes) 45 minutes   Therapy Time missed   Time missed?  No

## 2019-08-07 NOTE — PROGRESS NOTES
08/07/19 0930   Pain Assessment   Pain Score 6   Pain Type Chronic pain;Surgical pain   Pain Location Leg;Hip   Pain Orientation Right   Pain Frequency Intermittent   Pain Onset Ongoing   Hospital Pain Intervention(s) Repositioned; Ambulation/increased activity;Cold applied  (ice applied end of session to R knee 20 ')   Response to Interventions 6/10   Restrictions/Precautions   Precautions Fall Risk;Pain   RLE Weight Bearing Per Order WBAT   Cognition   Arousal/Participation Cooperative   Subjective   Subjective pt rpeorted having pain RLE but wanting to do PT    QI: Sit to Lying   Assistance Needed Physical assistance   Assistance Provided by [place] Less than 25%   Sit to Lying CARE Score 3   QI: Lying to Sitting on Side of Bed   Assistance Needed Supervision   Lying to Sitting on Side of Bed CARE Score 4   QI: Sit to Stand   Assistance Needed Supervision; Adaptive equipment   Sit to Stand CARE Score 4   QI: Chair/Bed-to-Chair Transfer   Assistance Needed Adaptive equipment;Supervision   Chair/Bed-to-Chair Transfer CARE Score 4   Transfer Bed/Chair/Wheelchair   Limitations Noted In Balance; Endurance;UE Strength;LE Strength   Adaptive Equipment Roller Walker   Stand Pivot Supervision   Sit to Stand Supervision   Stand to Sit Supervision   Supine to Sit Supervision   Sit to Supine Supervision;Minimal Assist   Findings leg  for bed mobility    Bed, Chair, Wheelchair Transfer (FIM) 4 - [place] lifts one extremity during transfer   QI: Walk 10 Feet   Assistance Needed Supervision; Adaptive equipment   Walk 10 Feet CARE Score 4   QI: Walk 50 Feet with Two Turns   Assistance Needed Supervision; Adaptive equipment   Walk 50 Feet with Two Turns CARE Score 4   QI: Walk 150 Feet   Assistance Needed Supervision; Adaptive equipment   Walk 150 Feet CARE Score 4   Ambulation   Does the patient walk? 2  Yes   Primary Discharge Mode of Locomotion Walk   Walk Assist Level Supervision   Gait Pattern Inconsistant Felipa; Slow Felipa; Forward Flexion; Improper weight shift;Decreased R stance;Decreased L stance; Step through; Step to   Assist Device Melvaer Justa Patino Walked (feet) 150 ft  (x2)   Limitations Noted In Balance; Endurance; Heel Strike;Speed;Strength;Swing;Posture   Findings used taller RW today to help her improve posture, she still takes rest breaks to stand up staright due to using BUE support on RW to offload RLE    Walking (FIM) 5 - Patient requires supervision/monitoring AND distance 150 feet or more, no rest   Therapeutic Interventions   Strengthening AROM RLE for SAQ, LAQ, hip abd/add, hip IR/ER, hip ABD/ADD  STM to R hip flexor, pt rpeorted it feels much better than it did a couple days ago    Assessment   Treatment Assessment Pt's RLE pain is the biggest barrier at this time to progressing functional mobility and progression of stairs  Pt rpeorted that she can stay on the first floor if needed and will sponge bathe as shes not sure she feels comfortable doing FF of stairs to 2nd floor; discussed will cont to practice this and recommend she is not home alone and has S when doing the stairs  Pt's son may be taking FMLA to help pt and her  more often at [facility]  Pt is progressing  with bed mobility, walking and stairs and will cont to benefit from skilled PT to further progress overall functional mobility in prep for dc home Sunday  Barriers to Discharge Inaccessible home environment;Decreased caregiver support  (to practice walking up/down ramp )   PT Barriers   Physical Impairment Decreased strength;Decreased range of motion;Decreased endurance;Decreased mobility; Impaired balance;Pain;Orthopedic restrictions   Functional Limitation Car transfers;Stair negotiation;Standing;Transfers; Walking   Plan   Treatment/Interventions Functional transfer training;LE strengthening/ROM; Elevations; Therapeutic exercise; Endurance training;Patient/family training;Equipment eval/education; Bed mobility;Gait training Progress Progressing toward goals   Recommendation   Recommendation Home PT; Home with family support   Equipment Recommended Walker   PT Therapy Minutes   PT Time In 0930   PT Time Out 1030   PT Total Time (minutes) 60   PT Mode of treatment - Individual (minutes) 60   PT Mode of treatment - Concurrent (minutes) 0   PT Mode of treatment - Group (minutes) 0   PT Mode of treatment - Co-treat (minutes) 0   PT Mode of Teatment - Total time(minutes) 60 minutes   Therapy Time missed   Time missed?  No

## 2019-08-07 NOTE — PROGRESS NOTES
Physical Medicine and Rehabilitation Progress Note  Blake Wilson 80 y o  female MRN: [id number]  Unit/Bed#: [facility] 500-69 Encounter: [id number]    HPI: Blake Wilson is a 80 y o  female who presented to the [facility] [address] after fall at home  Found to have right hip fracture  Now s/p IM nailing of right femur on 7/23/19  Patient WBAT  Postoperatively noted to have ABLA, mild leukocytosis  Also required podiatry consult for left 3rd toe and left large toe wound  Patient was evaluated by therapy services, found to be below functional baseline  She was accepted to the [facility] on 7/25/19  Chief Complaint: f/u fracture and f/u ambulatory dysfunction    Interval: No acute events overnight  Denies any CP or SOB  Slept well overnight  Pain is controlled  ROS: A 10 point ROS was performed; negative except as noted above  Assessment/Plan:    * Closed right [facility] ([facility] )  Assessment & Plan  · Acute comprehensive interdisciplinary inpatient rehabilitation including PT, OT, RN, CM, SW, dietary, psychology, etc   · WBAT    Right leg swelling  Assessment & Plan  · Likely secondary to surgery  · Venous dopplers negative for DVT, but there does appear to be a popliteal fossa cyst, likely a Baker's cyst  Will ask ortho to f/u during their 2 week f/u today for possible drainage as outpatient    Chronic pain of right knee  Assessment & Plan  · Continue lidoderm patch overnight  · S/p IASI and series of euflexxa injections by orthopedic surgery; patient reports minimal benefit  · F/u with PM&R as outpatient for conservative management     Anemia  Assessment & Plan  Results from last 7 days   Lab Units 08/03/19  0623 07/31/19  0526   HEMOGLOBIN g/dL 8 3* 8 4*     · Likely post-operative  · Monitor CBC intermittently  · Transfuse for Hgb <7   Did not require transfusion as H/h has improved     Chronic diastolic congestive heart failure ([facility] )  Assessment & Plan  Wt Readings from Last 3 Encounters:   08/07/19 49 5 kg (109 lb 2 oz)   07/25/19 50 3 kg (110 lb 14 3 oz)   04/02/19 51 3 kg (113 lb)     · Regular weight checks  · Lasix daily    Essential hypertension  Assessment & Plan  Temp:  [97 8 °F (36 6 °C)-98 2 °F (36 8 °C)] 98 °F (36 7 °C)  HR:  [70-75] 75  Resp:  [18] 18  BP: (143-169)/(61-73) 165/73    · Verapamil    Difficulty swallowingresolved as of 8/6/2019  Assessment & Plan  · Improved  · Worse with dry foods, encouraged patient to take smaller bites  · If persists or worsens, would benefit from outpatient f/u with GI  · Of note, patient last had colonoscopy on 03/04/13, with following findings:   · Sigmoid diverticula  · Internal and external hemorrhoids  · Redundant colon with acute angulations  · Patient is to have repeat colonoscopy in 2023  # Skin  · Encourage regular turning as patient at risk for skin breakdown  · Staff to continue patient education on Q2h turning  · Rehabilitation team to perform skin checks regularly     # Bowel  · Patient reports no constipation  · To ensure regular BMs, bowel regimen consisting of:  colace , dulcolax suppository  and miralax     # Bladder  · Patient voiding spontaneously    # Pain  · Continue tylenol, for max of 3gm daily  · Continue tramadol  · Continue lidoderm patch(es)  · analgesic balm PRN    # Rehab Psych   · There are no psychological or psychiatric problems identified    # Other  - Diet/Nutrition:        Diet Orders   (From admission, onward)             Start     Ordered    08/02/19 1728  Dietary nutrition supplements  Once     Question Answer Comment   Select Supplement: Ensure Compact-Chocolate    Frequency Breakfast, Dinner        08/02/19 1727    07/25/19 1647  Diet Cardiovascular; Sodium 2 GM  Diet effective now     Question Answer Comment   Diet Type Cardiovascular    Cardiac Sodium 2 GM    RD to adjust diet per protocol?  Yes        07/25/19 1647    07/25/19 1647  Room Service  Once     Question:  Type of Service  Answer: Room Service - Appropriate with Assistance    07/25/19 1417              - DVT prophy: Sequential compression device (Venodyne)  and Enoxaparin (Lovenox)  - GI ppx: Pantaprazole  - Nausea: None  - Supplements: None  - Sleep: None    Disposition: TBD    CODE: Level 1: Full Code Scheduled Meds:    Current Facility-Administered Medications:  acetaminophen 975 mg Oral Q8H [address] Balwinder Moran MD   bacitracin 1 small application Topical Daily JUAN Marie   docusate sodium 100 mg Oral BID Balwinder Moran MD   enoxaparin 40 mg Subcutaneous Daily Balwinder Moran MD   furosemide 80 mg Oral Daily Balwinder Moran MD   lidocaine 2 patch Topical Daily Balwinder Moran MD   melatonin 3 mg Oral HS PRN Sariah Null, DO   menthol-methyl salicylate  Apply externally 4x Daily PRN Barron Hardwick MD   pantoprazole 40 mg Oral Daily Before Breakfast Balwinder Moran MD   polyethylene glycol 17 g Oral Daily PRN Balwinder Moran MD   potassium chloride 20 mEq Oral Daily JUAN Nolasco   traMADol 25 mg Oral Q4H PRN Balwinder Moran MD   traMADol 50 mg Oral Q4H PRN Balwinder Moran MD   verapamil 240 mg Oral Daily Balwinder Moran MD        Objective:    Functional Update:  Physical Therapy Occupational Therapy   Weight Bearing Status: Weight Bearing as Tolerated  Transfers: Supervision  Bed Mobility: Minimal Assistance  Amulation Distance (ft): 150 feet  Ambulation: Supervision, Contact Guard  Assistive Device for Ambulation: Roller Walker  Number of Stairs: 4  Assistive Device for Stairs: Lehft Hand Rail  Stair Assistance: Minimal Assistance  Ramp: Contact Guard  Assistive Device for Ramp: Roller Walker  Discharge Recommendations: Home with:  [address] [facility] [address] with[de-identified] Family Support, Home Physical Therapy, Outpatient Physical Therapy   Eating: Supervision  Grooming: Supervision  Bathing: Minimal Assistance  Bathing: Minimal Assistance  Upper Body Dressing: Supervision  Lower Body Dressing: Supervision  Toileting: Supervision  Tub/Shower Transfer: Minimal Assistance  Toilet Transfer: Supervision  Cognition: Within Defined Limits  Orientation: Person, Place, Time, Situation       Allergies per EMR    Physical Exam:  Temp:  [97 8 °F (36 6 °C)-98 2 °F (36 8 °C)] 98 °F (36 7 °C)  HR:  [70-75] 75  Resp:  [18] 18  BP: (143-169)/(61-73) 165/73  SpO2:  [97 %-98 %] 97 %    General: alert, no apparent distress, cooperative and comfortable  HEENT:  Head: Normocephalic, no lesions, without obvious abnormality  LUNGS:  no abnormal respiratory pattern, no retractions noted, non-labored breathing   ABDOMEN:  soft, non-tender  Bowel sounds normal  No masses, no organomegaly  EXTREMITIES:  edema RLE, as compared to left  NEURO:   mental status, speech normal, alert and oriented x3  PSYCH:  Alert and oriented, appropriate affect  Physical examination is otherwise unchanged from previous encounter, except as noted above  Diagnostic Studies: Reviewed  XR hip/pelv 2-3 vws right if performed   Final Result by Lee Guaman MD (08/07 0974)      Status post ORIF of the right hip for an intertrochanteric fracture  Satisfactory alignment              Workstation performed: [id number]         VAS lower limb venous duplex study, unilateral/limited   Final Result by Jerel Gold MD (08/05 7415)        Laboratory: Reviewed    Results from last 7 days   Lab Units 08/03/19  0623   HEMOGLOBIN g/dL 8 3*   HEMATOCRIT % 27 4*   WBC Thousand/uL 6 56     Results from last 7 days   Lab Units 08/05/19  0509 08/02/19  0612   BUN mg/dL 27* 23   SODIUM mmol/L 139 137   POTASSIUM mmol/L 3 7 3 3*   CHLORIDE mmol/L 104 101   CREATININE mg/dL 0 66 0 68            Patient Active Problem List   Diagnosis    Cataract    Essential hypertension    Aortic stenosis, moderate    Atrial dilatation, bilateral    Mild tricuspid insufficiency    Non-rheumatic mitral regurgitation    Chronic diastolic congestive heart failure (HCC)    Localized edema    Leg foot wound- 3rd digit    Other fatigue    Arthritis    Closed right hip fracture, [facility] encounter ([facility] )    Intertrochanteric fracture of right femur (HCC)    Mitral valve stenosis, mild    Anemia    Chronic pain of right knee    Right leg swelling       ** Please Note: Fluency Direct voice to text software may have been used in the creation of this document  **    Total time spent: At least 35 minutes, with more than 50% spent counseling/coordinating care  Counseling includes discussion with patient re: progress in therapies, functional issues observed by therapy staff, and discussion with patient his/her current medical state/wellbeing  Coordination of patient's care was performed in conjunction with Internal Medicine service to monitor patient's labs, vitals, and management of their comorbidities  In addition, this patient was discussed by the interdisciplinary team in weekly case conference today  The care of the patient was extensively discussed with all care providers and an appropriate rehabilitation plan was formulated unique for this patient  Barriers were identified preventing progression of therapy and appropriate interventions were discussed with each discipline  Please see the team note for input from all disciplines regarding barriers, intervention, and discharge planning

## 2019-08-07 NOTE — TEAM CONFERENCE
Acute RehabilitationTeam Conference Note  Date: 8/7/2019   Time: 10:39 AM       Patient Name:  Evette Arrington       Medical Record Number: [id number]   YOB: 1935  Sex: Female          Room/Bed:  [facility]8/[facility] 968-01  Payor Info:  Payor: MEDICARE / Plan: MEDICARE A AND B / Product Type: Medicare A & B Fee for Service /      Admitting Diagnosis: Closed right hip fracture ([facility] ) [S72 001A]   Admit Date/Time:  7/25/2019  4:32 PM  Admission Comments: No comment available     Primary Diagnosis:  Closed right hip fracture, initial encounter ([facility] )  Principal Problem: Closed right hip fracture, initial encounter [facility])    Patient Active Problem List    Diagnosis Date Noted    Right leg swelling 08/05/2019    Chronic pain of right knee 08/01/2019    Anemia 07/26/2019    Closed right hip fracture, initial encounter ([facility] ) 07/22/2019    Intertrochanteric fracture of right femur ([facility] ) 07/22/2019    Mitral valve stenosis, mild     Arthritis 10/19/2018    [facility] 05/10/2018    Localized edema 05/10/2018    Leg foot wound- 3rd digit 05/10/2018    Other fatigue 05/10/2018    Cataract 05/13/2016    Aortic stenosis, moderate 05/11/2016    Atrial dilatation, bilateral 05/11/2016    Mild tricuspid insufficiency 05/11/2016    Non-rheumatic mitral regurgitation 05/11/2016    Essential hypertension 08/22/2013       Physical Therapy:    Weight Bearing Status: Weight Bearing as Tolerated  Transfers: Supervision  Bed Mobility: Minimal Assistance  Amulation Distance (ft): 150 feet  Ambulation: Supervision, Contact Guard  Assistive Device for Ambulation: Roller Walker  Number of Stairs: 4  Assistive Device for Stairs: [address]: Minimal Assistance  Ramp: Contact Guard  Assistive Device for Ramp: Roller Walker  Discharge Recommendations: Home with:  [address] with[de-identified] Family Support, Home Physical Therapy, Outpatient Physical Therapy    8/6/2019: Masood Simpson has made good progress since admission to [facility]  Pt cont to be limited by pain in RLE  Pt functioning CS level for functional transfers, Jesus for bed mobility for RLE, and CS for toileting  Pt limitations include dec activity tolerance, pain, RLE weakness and dec ROM  Pt barriers to d/c include: dec caregiver support and inaccessible home environment  Stairs are currently biggest barrier as pt plans to sleep on second floor bedroom  Has FF 12 stairs to second floor and full bath  OT initiated discussion with pt about transitioning to first floor set up  Pt  lives on first floor which pt cares for  Discussed possibility of sleeping on couch on first floor or having son move bed downstairs  Pt has access to half bathroom on first floor since stairs are biggest barrier currently  One of pt's barriers is lack of caregiver availability, as she was the primary caregiver for her , with A from home health aides  Pt currently needs physical A for transfers, and needs BP monitored due to complaints of feeling "woozy"  Pt will cont to benefit from skilled PT to make further progress in overall balance, safety and (I) with functional transfers  Currently anticipating need for her to have help upon d/c, however will cont to assess as she makes progress  Occupational Therapy:  Eating: Supervision  Grooming: Supervision  Bathing: Minimal Assistance  Bathing: Minimal Assistance  Upper Body Dressing: Supervision  Lower Body Dressing: Supervision  Toileting: Supervision  Tub/Shower Transfer: Minimal Assistance  Toilet Transfer: Supervision  Cognition: Within Defined Limits  Orientation: Person, Place, Time, Situation  Discharge Recommendations: Home with:  [address] with[de-identified] Home Occupational Therapy       Pt making good progress towards goals for ADL routine  Overall pt completing ADLS and functional transfers at supervision/min assist level using RW   Based on pt's current progress, anticipate pt will require assistance to don/doff TEDS, assistance for household and community IADLS, assistance for providing care to her , who is physically disabled  Additionally, anticipating 1st floor setup, where pt will require bed on 1st floor  OT recommending D/C home later in week with home OT services, family support, and increased assistance for IADLS and care for her  at home  CM has contacted pt's son regarding having increased assistance at home  OT to follow up with pt's son after team regarding scheduling family training for this weekend  Speech Therapy:           No notes on file    Nursing Notes:  Appetite: Fair  Diet Type: Cardiac, 2 gram sodium diet                      Diet Patient/Family Education Complete: Yes    Type of Wound (LDA): Wound                    Type of Wound Patient/Family Education: Yes  Bladder: 5 - Supervision     Bladder Patient/Family Education: Yes  Bowel: 5 - Supervision     Bowel Patient/Family Education: Yes  Pain Location: Hip, Knee  Pain Orientation: Right  Pain Score: 7                       Hospital Pain Intervention(s): Medication (See MAR), Repositioned, Ambulation/increased activity  Pain Patient/Family Education: Yes  Medication Management/Safety  Injectable: Lovenox(unsure if pt will de discharged on Lovenox)  Safe Administration: Yes(hosptial setting)  Medication Patient/Family Education Complete: No    History of chronic diastolic heart failure, HTN, and anxiety  On 7/23 pt was admitted with a right hip fracture after falling in the shower   She underwent a R IMN (2 incisions with stapes)  Pt positive for MRSA in the nares  Pt has a L 3rd toe wound and a R great toe hematoma  Pt is on Lasix for CHF and will follow up with Dr Tiburcio Aguilar and has baseline edema  Pt is on Verapamil for HTN and is stable  Hemoglobin value is 7 1 and has not required any transfusions  Pain controled with Tylenol and PRN Tramadol  Pt is continent of both bowl and bladder   Pt require supervision with toileting  We will monitor labs and values, including hemoglobin and BP  We will educate on repositioning and off loading in order to prevent skin breakdown and preform routine skin checks  We will monitor incision site for healing and signs of infection  Monitor I+O's for signs and symptoms of CHF  We will monitor pt for adequate pain control  We will encourage independence with ADLs  We will increase safety awareness with transfers and keep patient free from falls  Case Management:     Discharge Planning  Living Arrangements: Spouse/significant other  Support Systems: Spouse/significant other, Children  Assistance Needed: none  Type of Current Residence: Private residence  [address]: No  Pt is making good progress an is scheduled to return home later this week with family and contd [facility] services  Son to be in prior to dc for training and is aware of plan to date  Following to assist w/dc planning needs  Is the patient actively participating in therapies? yes  List any modifications to the treatment plan:     Barriers Interventions   Pain right lower ext Pain meds, repositioning   stairs Therapy stair training, first flr set up   Caregiver support Family education, son taking fmla in the afternoons             Is the patient making expected progress toward goals?  yes  List any update or changes to goals:     Medical Goals: Patient will be medically stable for discharge to [facility] upon completion of rehab program and Patient will be able to manage medical conditions and comorbid conditions with medications and follow up upon completion of rehab program    Weekly Team Goals:   Rehab Team Goals  ADL Team Goal: Patient will be independent with ADLs with least restrictive device upon completion of rehab program  Bowel/Bladder Team Goal: Patient will be independent with bladder/bowel management with least restrictive device upon completion of rehab program  Transfer Team Goal: Patient will be independent with transfers with least restrictive device upon completion of rehab program  Locomotion Team Goal: Patient will be independent with locomotion with least restrictive device upon completion of rehab program  Cognitive Team Goal: Patient will be independent for basic and complex tasks upon completion of rehab program    Discussion: pt is making progress but conitnues with the above barriers and is functioning at supervision for all mobility  Pt continues with deficits on stairs and states she can have a first flr set up  Goals are for mod I on dc  Recommendations are for contd [facility] for rn pt and ot  Anticipated Discharge Date:  8/11/19  [facility] Team Members Present: The following team members are supervising care for this patient and were present during this Weekly Team Conference      Physician: Dr Rex Meléndez MD  : DEVAN Sesay  Registered Nurse: Disha De Leon RN  Physical Therapist: Eliz Cummings DPT  Occupational Therapist: Angeline Garcia MS, OTR/L, CBIS  Speech Therapist:   Other: Samuel Hills RN, BSN  [address] [place] [address] and Hospice

## 2019-08-07 NOTE — PROGRESS NOTES
Internal Medicine Progress Note  Patient: Dania Maki  Age/sex: 80 y o  female  Medical Record #: [id number]      ASSESSMENT/PLAN: (Interval History)  Dania Maki is seen and examined and management for following issues:    Right hip fx; s/p TFN 7/23/19:  WBAT; watch incision     ABLA: Hgb improving/stable w/o having to receive transfusion      Chronic diastolic CHF/LVEF 76%, mild MR/moderate AS, AR/moderate TR:  she will need to go back to see Dr Kvng Ashton since he has been following her for this and can then refer to Cardiology if he and the patient wish  She remains on her home dose of Lasix 80mg daily     HTN:  at home, she had been on Verapamil but changed in April to Toprol 50mg daily 2/2 insurance would not pay for the Verapamil; however, since she has a lot of Verpamil left, she has been using them up and then is going to switch back to the Toprol  Will keep Verapamil 240mg qd for now     Left 3rd toe wound and right great toe subungual hematoma:  had been seen by Podiatry; they did remove an ingrown toenail left 3rd toe  They feel that she is likely to lose the nail on the right great toe  Continue local care    Hypokalemia:  Continue KCl 20 mEq daily  RLE edema:  Venous doppler yesterday negative DVT, +Bakers cyst      Subjective/ HPI:  Patients overnight issues or events were reviewed with nursing or staff during rounds or morning huddle session  No new or overnight issues  ABLA: Hgb 8 3  HTN:  Stable on Verapamil  Chronic diastolic CHF: stable  Offers no complaints        ROS:     GI: denies abdominal pain, change bowel habits or reflux symptoms  Neuro: Denies any headache, new vision changes, new neuropathies,new weaknesses   Respiratory: No Cough, SOB, denies wheeze  Cardiovascular: No CP, palpitations , denies perception of rapid heartbeat  : denies any new urinary burning or frequency    Review of Scheduled Meds:    Current Facility-Administered Medications:  acetaminophen 975 mg Oral Q8H [address] Balwinder Moran MD   bacitracin 1 small application Topical Daily JUAN Marie   docusate sodium 100 mg Oral BID Balwinder Moran MD   enoxaparin 40 mg Subcutaneous Daily Balwinder Moran MD   furosemide 80 mg Oral Daily Balwinder Moran MD   lidocaine 2 patch Topical Daily Balwinder Moran MD   melatonin 3 mg Oral HS PRN Sariah Null, DO   menthol-methyl salicylate  Apply externally 4x Daily PRN Barron Hardwick MD   pantoprazole 40 mg Oral Daily Before Breakfast Balwinder Moran MD   polyethylene glycol 17 g Oral Daily PRN Balwinder Moran MD   potassium chloride 20 mEq Oral Daily JUAN Nolasco   traMADol 25 mg Oral Q4H PRN Balwinder Moran MD   traMADol 50 mg Oral Q4H PRN Balwinder Moran MD   verapamil 240 mg Oral Daily Balwinder Moran MD       Labs:     Results from last 7 days   Lab Units 08/03/19  0623   WBC Thousand/uL 6 56   HEMOGLOBIN g/dL 8 3*   HEMATOCRIT % 27 4*   PLATELETS Thousands/uL 288     Results from last 7 days   Lab Units 08/05/19  0509 08/02/19  0612   SODIUM mmol/L 139 137   POTASSIUM mmol/L 3 7 3 3*   CHLORIDE mmol/L 104 101   CO2 mmol/L 32 34*   BUN mg/dL 27* 23   CREATININE mg/dL 0 66 0 68   CALCIUM mg/dL 8 8 8 8                      Imaging:     XR hip/pelv 2-3 vws right if performed   Final Result by Chantal Hernandez MD (08/07 6753)      Status post ORIF of the right hip for an intertrochanteric fracture  Satisfactory alignment              Workstation performed: [id number]         VAS lower limb venous duplex study, unilateral/limited   Final Result by Michael Ocampo MD (08/05 5574)          *Labs reviewed  *Radiology studies reviewed  *Medications reviewed and reconciled as needed  *Please refer to order section for additional ordered labs studies  *Case discussed with primary attending during morning huddle case rounds    Physical Examination:  Vitals:   Vitals:    08/06/19 1355 08/06/19 2022 08/07/19 0529 08/07/19 1000   BP: 169/69 143/61 165/73 146/62   BP Location: Left arm Left arm Left arm    Pulse: 72 70 75    Resp: 18 18 18    Temp: 98 2 °F (36 8 °C) 97 8 °F (36 6 °C) 98 °F (36 7 °C)    TempSrc: Oral Oral Oral    SpO2: 98% 98% 97%    Weight:   49 5 kg (109 lb 2 oz)    Height:           General Appearance: NAD, conversive  Eyes: No icterus; conjunctiva normal, PERRLA  HENT: oropharynx clear; mucous membranes moist  Neck: trachea midline, range of motion full  Lungs: CTA, normal respiratory effort, no retractions, expiratory effort normal  CV: regular rate, no rubs/murmurs/gallops  ABD: soft: NT/ND; +BS  EXT: mild edema of LLE and moderate edema of RLE posterior knee area (has moderate ecchymosis there as well); right leg incisions are w/o erythema/drainage  Skin: normal turgor, normal texture, no rashes  Psych: affect normal, no overt anxiety/depression   Neuro: AAOx3            Total time spent: At least 40 minutes, with more than 50% spent counseling/coordinating care  Counseling includes discussion with patient re: progress  and discussion with patient of his/her current medical state/information  Coordination of patient's care was performed in conjunction with primary service  Time invested included review of patient's labs, vitals, and management of their comorbidities with continued monitoring  In addition, this patient was discussed with medical team including physician and advanced extenders  The care of the patient was extensively discussed and appropriate treatment plan was formulated unique for this patient  ** Please Note: Dragon 360 Dictation voice to text software may have been used in the creation of this document   **

## 2019-08-08 LAB
ANION GAP SERPL CALCULATED.3IONS-SCNC: 8 MMOL/L (ref 4–13)
BASOPHILS # BLD AUTO: 0.05 THOUSANDS/ΜL (ref 0–0.1)
BASOPHILS NFR BLD AUTO: 1 % (ref 0–1)
BUN SERPL-MCNC: 32 MG/DL (ref 5–25)
CALCIUM SERPL-MCNC: 9 MG/DL (ref 8.3–10.1)
CHLORIDE SERPL-SCNC: 104 MMOL/L (ref 100–108)
CO2 SERPL-SCNC: 30 MMOL/L (ref 21–32)
CREAT SERPL-MCNC: 0.77 MG/DL (ref 0.6–1.3)
EOSINOPHIL # BLD AUTO: 0.07 THOUSAND/ΜL (ref 0–0.61)
EOSINOPHIL NFR BLD AUTO: 1 % (ref 0–6)
ERYTHROCYTE [DISTWIDTH] IN BLOOD BY AUTOMATED COUNT: 15.3 % (ref 11.6–15.1)
GFR SERPL CREATININE-BSD FRML MDRD: 71 ML/MIN/1.73SQ M
GLUCOSE SERPL-MCNC: 158 MG/DL (ref 65–140)
HCT VFR BLD AUTO: 34 % (ref 34.8–46.1)
HGB BLD-MCNC: 10.5 G/DL (ref 11.5–15.4)
IMM GRANULOCYTES # BLD AUTO: 0.02 THOUSAND/UL (ref 0–0.2)
IMM GRANULOCYTES NFR BLD AUTO: 0 % (ref 0–2)
LYMPHOCYTES # BLD AUTO: 1.06 THOUSANDS/ΜL (ref 0.6–4.47)
LYMPHOCYTES NFR BLD AUTO: 18 % (ref 14–44)
MCH RBC QN AUTO: 29.1 PG (ref 26.8–34.3)
MCHC RBC AUTO-ENTMCNC: 30.9 G/DL (ref 31.4–37.4)
MCV RBC AUTO: 94 FL (ref 82–98)
MONOCYTES # BLD AUTO: 0.51 THOUSAND/ΜL (ref 0.17–1.22)
MONOCYTES NFR BLD AUTO: 9 % (ref 4–12)
NEUTROPHILS # BLD AUTO: 4.07 THOUSANDS/ΜL (ref 1.85–7.62)
NEUTS SEG NFR BLD AUTO: 71 % (ref 43–75)
NRBC BLD AUTO-RTO: 0 /100 WBCS
PLATELET # BLD AUTO: 317 THOUSANDS/UL (ref 149–390)
PMV BLD AUTO: 10.3 FL (ref 8.9–12.7)
POTASSIUM SERPL-SCNC: 3.6 MMOL/L (ref 3.5–5.3)
RBC # BLD AUTO: 3.61 MILLION/UL (ref 3.81–5.12)
SODIUM SERPL-SCNC: 142 MMOL/L (ref 136–145)
WBC # BLD AUTO: 5.78 THOUSAND/UL (ref 4.31–10.16)

## 2019-08-08 PROCEDURE — 99232 SBSQ HOSP IP/OBS MODERATE 35: CPT | Performed by: PHYSICAL MEDICINE & REHABILITATION

## 2019-08-08 PROCEDURE — 97116 GAIT TRAINING THERAPY: CPT

## 2019-08-08 PROCEDURE — 97110 THERAPEUTIC EXERCISES: CPT

## 2019-08-08 PROCEDURE — 97530 THERAPEUTIC ACTIVITIES: CPT

## 2019-08-08 PROCEDURE — 97530 THERAPEUTIC ACTIVITIES: CPT | Performed by: STUDENT IN AN ORGANIZED HEALTH CARE EDUCATION/TRAINING PROGRAM

## 2019-08-08 PROCEDURE — 80048 BASIC METABOLIC PNL TOTAL CA: CPT | Performed by: NURSE PRACTITIONER

## 2019-08-08 PROCEDURE — 85025 COMPLETE CBC W/AUTO DIFF WBC: CPT | Performed by: NURSE PRACTITIONER

## 2019-08-08 RX ADMIN — BACITRACIN ZINC 1 SMALL APPLICATION: 500 OINTMENT TOPICAL at 08:16

## 2019-08-08 RX ADMIN — ACETAMINOPHEN 975 MG: 325 TABLET ORAL at 21:55

## 2019-08-08 RX ADMIN — FUROSEMIDE 80 MG: 80 TABLET ORAL at 08:16

## 2019-08-08 RX ADMIN — TRAMADOL HYDROCHLORIDE 50 MG: 50 TABLET, FILM COATED ORAL at 23:01

## 2019-08-08 RX ADMIN — MELATONIN 3 MG: 3 TAB ORAL at 23:00

## 2019-08-08 RX ADMIN — TRAMADOL HYDROCHLORIDE 50 MG: 50 TABLET, FILM COATED ORAL at 13:10

## 2019-08-08 RX ADMIN — TRAMADOL HYDROCHLORIDE 50 MG: 50 TABLET, FILM COATED ORAL at 08:25

## 2019-08-08 RX ADMIN — ACETAMINOPHEN 975 MG: 325 TABLET ORAL at 05:03

## 2019-08-08 RX ADMIN — DOCUSATE SODIUM 100 MG: 100 CAPSULE, LIQUID FILLED ORAL at 08:16

## 2019-08-08 RX ADMIN — VERAPAMIL HYDROCHLORIDE 240 MG: 240 TABLET, FILM COATED, EXTENDED RELEASE ORAL at 08:16

## 2019-08-08 RX ADMIN — ENOXAPARIN SODIUM 40 MG: 40 INJECTION SUBCUTANEOUS at 19:40

## 2019-08-08 RX ADMIN — POTASSIUM CHLORIDE 20 MEQ: 1500 TABLET, EXTENDED RELEASE ORAL at 08:16

## 2019-08-08 RX ADMIN — DOCUSATE SODIUM 100 MG: 100 CAPSULE, LIQUID FILLED ORAL at 18:48

## 2019-08-08 RX ADMIN — PANTOPRAZOLE SODIUM 40 MG: 40 TABLET, DELAYED RELEASE ORAL at 05:02

## 2019-08-08 RX ADMIN — LIDOCAINE 2 PATCH: 50 PATCH CUTANEOUS at 18:48

## 2019-08-08 NOTE — PLAN OF CARE
Problem: Prexisting or High Potential for Compromised Skin Integrity  Goal: Skin integrity is maintained or improved  Description  INTERVENTIONS:  - Identify patients at risk for skin breakdown  - Assess and monitor skin integrity  - Assess and monitor nutrition and hydration status  - Monitor labs (i e  albumin)  - Assess for incontinence   - Turn and reposition patient  - Assist with mobility/ambulation  - Relieve pressure over bony prominences  - Avoid friction and shearing  - Provide appropriate hygiene as needed including keeping skin clean and dry  - Evaluate need for skin moisturizer/barrier cream  - Collaborate with interdisciplinary team (i e  Nutrition, Rehabilitation, etc )   - Patient/family teaching  Outcome: Progressing     Problem: Potential for Falls  Goal: Patient will remain free of falls  Description  INTERVENTIONS:  - Assess patient frequently for physical needs  -  Identify cognitive and physical deficits and behaviors that affect risk of falls    -  [place] fall precautions as indicated by assessment   - Educate patient/family on patient safety including physical limitations  - Instruct patient to call for assistance with activity based on assessment  - Modify environment to reduce risk of injury  - Consider OT/PT consult to assist with strengthening/mobility  Outcome: Progressing     Problem: PAIN - ADULT  Goal: Verbalizes/displays adequate comfort level or baseline comfort level  Description  Interventions:  - Encourage patient to monitor pain and request assistance  - Assess pain using appropriate pain scale  - Administer analgesics based on type and severity of pain and evaluate response  - Implement non-pharmacological measures as appropriate and evaluate response  - Consider cultural and social influences on pain and pain management  - Notify physician/advanced practitioner if interventions unsuccessful or patient reports new pain  Outcome: Progressing     Problem: INFECTION - ADULT  Goal: Absence or prevention of progression during hospitalization  Description  INTERVENTIONS:  - Assess and monitor for signs and symptoms of infection  - Monitor lab/diagnostic results  - Monitor all insertion sites, i e  indwelling lines, tubes, and drains  - Monitor endotracheal (as able) and nasal secretions for changes in amount and color  - [place] appropriate cooling/warming therapies per order  - Administer medications as ordered  - Instruct and encourage patient and family to use good hand hygiene technique  - Identify and instruct in appropriate isolation precautions for identified infection/condition  Outcome: Progressing  Goal: Absence of fever/infection during neutropenic period  Description  INTERVENTIONS:  - Monitor WBC  - Implement neutropenic guidelines  Outcome: Progressing     Problem: SAFETY ADULT  Goal: Maintain or return to baseline ADL function  Description  INTERVENTIONS:  -  Assess patient's ability to carry out ADLs; assess patient's baseline for ADL function and identify physical deficits which impact ability to perform ADLs (bathing, care of mouth/teeth, toileting, grooming, dressing, etc )  - Assess/evaluate cause of self-care deficits   - Assess range of motion  - Assess patient's mobility; develop plan if impaired  - Assess patient's need for assistive devices and provide as appropriate  - Encourage maximum independence but intervene and supervise when necessary  ¯ Involve family in performance of ADLs  ¯ Assess for home care needs following discharge   ¯ Request OT consult to assist with ADL evaluation and planning for discharge  ¯ Provide patient education as appropriate  Outcome: Progressing  Goal: Maintain or return mobility status to optimal level  Description  INTERVENTIONS:  - Assess patient's baseline mobility status (ambulation, transfers, stairs, etc )    - Identify cognitive and physical deficits and behaviors that affect mobility  - Identify mobility aids required to assist with transfers and/or ambulation (gait belt, sit-to-stand, lift, walker, cane, etc )  - [place] fall precautions as indicated by assessment  - Record patient progress and toleration of activity level on Mobility SBAR; progress patient to next Phase/Stage  - Instruct patient to call for assistance with activity based on assessment  - Request Rehabilitation consult to assist with strengthening/weightbearing, etc   Outcome: Progressing     Problem: DISCHARGE PLANNING  Goal: Discharge to home or other facility with appropriate resources  Description  INTERVENTIONS:  - Identify barriers to discharge w/patient and caregiver  - Arrange for needed discharge resources and transportation as appropriate  - Identify discharge learning needs (meds, wound care, etc )  - Arrange for interpretive services to assist at discharge as needed  - Refer to Case Management Department for coordinating discharge planning if the patient needs post-hospital services based on physician/advanced practitioner order or complex needs related to functional status, cognitive ability, or social support system  Outcome: Progressing     Problem: Knowledge Deficit  Goal: Patient/family/caregiver demonstrates understanding of disease process, treatment plan, medications, and discharge instructions  Description  Complete learning assessment and assess knowledge base    Interventions:  - Provide teaching at level of understanding  - Provide teaching via preferred learning methods  Outcome: Progressing

## 2019-08-08 NOTE — PROGRESS NOTES
08/08/19 0830   Pain Assessment   Pain Assessment 0-10   Pain Score 6   Restrictions/Precautions   Precautions Fall Risk;Contact/isolation   Weight Bearing Restrictions Yes   RLE Weight Bearing Per Order WBAT   QI: Oral Hygiene   Assistance Needed Set-up / [address] [place] Provided by [place] No physical assistance   Oral Hygiene CARE Score 5   Grooming   Able To Brush/Clean Teeth; Initiate Tasks   Grooming (FIM) 5 - [place] sets up supplies or applies device   QI: Sit to [address] Provided by [place] No physical assistance   Sit to Stand CARE Score 4   QI: Chair/Bed-to-Chair Transfer   Assistance Needed Supervision   Assistance Provided by [place] No physical assistance   Chair/Bed-to-Chair Transfer CARE Score 4   Transfer Bed/Chair/Wheelchair   Limitations Noted In Balance;LE Strength   Adaptive Equipment Roller Walker   Stand Pivot Supervision   Sit to Stand Supervision   Stand to Sit Supervision   Bed, Chair, Wheelchair Transfer (FIM) 5 - Patient requires supervision/monitoring   QI: [address] Provided by [place] No physical assistance   [address] Score 4   Toileting   Able to [address] down yes, up yes     Able to [address] Yes   Manage Hygiene Bladder   Toileting (FIM) 5 - Patient requires supervision/monitoring   QI: Toilet Transfer   Assistance Needed Supervision   Assistance Provided by [place] No physical assistance   Toilet Transfer CARE Score 4   Toilet Transfer   Surface Assessed Standard Commode   Transfer Technique Standard   Limitations Noted In [address] [facility] [address]   Toilet Transfer (FIM) 5 - Patient requires supervision/monitoring   Cognition   Overall Cognitive Status WFL   Arousal/Participation Cooperative   Attention Within functional limits   Orientation Level Oriented X4   Memory Within functional limits   Following Commands Follows all commands and directions without difficulty   Activity Tolerance   Activity Tolerance Patient tolerated treatment well   Assessment   Treatment Assessment Pt participates in OT session with focus on toileting, grooming, activity tolerance, standing tolerance, transfers, and UE strengthening to increase I for d/c  Pt supervision grooming to brush teeth while seated in w/c  Pt supervision toileting for bladder management and able to manage toilet hygiene and clothing management  Pt supervision functional mobility with RW from room to therapy gym  Pt engaged in static standing at the table for parquetry block activity and stood for 10 min duration  Pt engaged in UE strengthening with 2# dumbell and 2# dowel for various arm exercises  Pt engaged in  strengthening and finger strengthening activity with grippers  Pt will continue to benefit from activity tolerance, adls, and transfers  Prognosis Good   Problem List Decreased strength;Decreased range of motion;Decreased endurance; Impaired balance;Decreased mobility;Pain   Plan   Treatment/Interventions ADL retraining;Functional transfer training;LE strengthening/ROM; Therapeutic exercise; Endurance training   Progress Progressing toward goals   OT Therapy Minutes   OT Time In 0830   OT Time Out 1012   OT Total Time (minutes) 102   OT Mode of treatment - Individual (minutes) 102   OT Mode of treatment - Concurrent (minutes) 0   OT Mode of treatment - Group (minutes) 0   OT Mode of treatment - Co-treat (minutes) 0   OT Mode of Teatment - Total time(minutes) 102 minutes   Therapy Time missed   Time missed?  No

## 2019-08-08 NOTE — PROGRESS NOTES
Physical Medicine and Rehabilitation Progress Note  Susan Zelaya 80 y o  female MRN: [id number]  Unit/Bed#: [facility] 803-74 Encounter: [id number]    HPI: Susan Zelaya is a 80 y o  female who presented to the [facility] after fall at home  Found to have right hip fracture  Now s/p IM nailing of right femur on 7/23/19  Patient WBAT  Postoperatively noted to have ABLA, mild leukocytosis  Also required podiatry consult for left 3rd toe and left large toe wound  Patient was evaluated by therapy services, found to be below functional baseline  She was accepted to the [facility] on 7/25/19  Chief Complaint: f/u fracture and f/u ambulatory dysfunction    Interval: No acute events overnight  Denies any CP or SOB  Slept well overnight  ROS: A 10 point ROS was performed; negative except as noted above  Assessment/Plan:    * Closed right hip fracture, initial encounter [facility])  Assessment & Plan  · Acute comprehensive interdisciplinary inpatient rehabilitation including PT, OT, RN, CM, SW, dietary, psychology, etc   · WBAT  · 2 week f/u performed by orthopedics, staples removed    Right leg swelling  Assessment & Plan  · secondary to surgery, immobility  · Venous dopplers negative for DVT, but there does appear to be a popliteal fossa cyst, likely a Baker's cyst    · Continue PETRE hose, ACE wrapping as appropriate    Chronic pain of right knee  Assessment & Plan  · Continue lidoderm patch overnight  · S/p IASI and series of euflexxa injections by orthopedic surgery; patient reports minimal benefit  · F/u with PM&R as outpatient for conservative management     Anemia  Assessment & Plan  Results from last 7 days   Lab Units 08/03/19  0623   HEMOGLOBIN g/dL 8 3*     · Likely post-operative  · Monitor CBC intermittently  · Transfuse for Hgb <7   Did not require transfusion as H/h has improved     Chronic diastolic congestive heart failure (HCC)  Assessment & Plan  Wt Readings from Last 3 Encounters: 08/07/19 49 5 kg (109 lb 2 oz)   07/25/19 50 3 kg (110 lb 14 3 oz)   04/02/19 51 3 kg (113 lb)     · Regular weight checks  · Lasix daily    Essential hypertension  Assessment & Plan  Temp:  [98 5 °F (36 9 °C)-98 7 °F (37 1 °C)] 98 5 °F (36 9 °C)  HR:  [67-68] 67  Resp:  [18] 18  BP: (122-153)/(58-67) 140/58    · Verapamil    Difficulty swallowingresolved as of 8/6/2019  Assessment & Plan  · Improved  · Worse with dry foods, encouraged patient to take smaller bites  · If persists or worsens, would benefit from outpatient f/u with GI  · Of note, patient last had colonoscopy on 03/04/13, with following findings:   · Sigmoid diverticula  · Internal and external hemorrhoids  · Redundant colon with acute angulations  · Patient is to have repeat colonoscopy in 2023  # Skin  · Encourage regular turning as patient at risk for skin breakdown  · Staff to continue patient education on Q2h turning  · Rehabilitation team to perform skin checks regularly     # Bowel  · Patient reports no constipation  · To ensure regular BMs, bowel regimen consisting of:  colace , dulcolax suppository  and miralax     # Bladder  · Patient voiding spontaneously    # Pain  · Continue tylenol, for max of 3gm daily  · Continue tramadol  · Continue lidoderm patch(es)  · analgesic balm PRN    # Rehab Psych   · There are no psychological or psychiatric problems identified    # Other  - Diet/Nutrition:        Diet Orders   (From admission, onward)             Start     Ordered    08/02/19 1728  Dietary nutrition supplements  Once     Question Answer Comment   Select Supplement: Ensure Compact-Chocolate    Frequency Breakfast, Dinner        08/02/19 1727    07/25/19 1647  Diet Cardiovascular; Sodium 2 GM  Diet effective now     Question Answer Comment   Diet Type Cardiovascular    Cardiac Sodium 2 GM    RD to adjust diet per protocol?  Yes        07/25/19 1647    07/25/19 1647  Room Service  Once     Question:  Type of Service  Answer:  Room Service - Appropriate with Assistance    07/25/19 8627              - DVT prophy: Sequential compression device (Venodyne)  and Enoxaparin (Lovenox)  - GI ppx: Pantaprazole  - Nausea: None  - Supplements: None  - Sleep: None    Disposition: TBD    CODE: Level 1: Full Code Scheduled Meds:    Current Facility-Administered Medications:  acetaminophen 975 mg Oral Q8H [facility] Balwinder Moran MD   bacitracin 1 small application Topical Daily JUAN Marie   docusate sodium 100 mg Oral BID Balwinder Moran MD   enoxaparin 40 mg Subcutaneous Daily Balwinder Moran MD   furosemide 80 mg Oral Daily Balwinder Moran MD   lidocaine 2 patch Topical Daily Balwinder Moran MD   melatonin 3 mg Oral HS PRN Francis Bertrand DO   menthol-methyl salicylate  Apply externally 4x Daily PRN Radha Christensen MD   pantoprazole 40 mg Oral Daily Before Breakfast Balwinder Moran MD   polyethylene glycol 17 g Oral Daily PRN Balwinder Moran MD   potassium chloride 20 mEq Oral Daily JUAN Alcantar   traMADol 25 mg Oral Q4H PRN Balwinder Moran MD   traMADol 50 mg Oral Q4H PRN Balwinder Moran MD   verapamil 240 mg Oral Daily Balwinder Moran MD        Objective:    Functional Update:  Physical Therapy Occupational Therapy   Weight Bearing Status: Weight Bearing as Tolerated  Transfers: Supervision  Bed Mobility: Minimal Assistance  Amulation Distance (ft): 150 feet  Ambulation: Supervision, Contact Guard  Assistive Device for Ambulation: Roller Walker  Number of Stairs: 4  Assistive Device for Stairs: Lehft Hand Rail  Stair Assistance: Minimal Assistance  Ramp: Contact Guard  Assistive Device for Ramp: Roller Walker  Discharge Recommendations: Home with:  [address] [facility] [place] with[de-identified] Family Support, Home Physical Therapy, Outpatient Physical Therapy   Eating: Supervision  Grooming: Supervision  Bathing: Minimal Assistance  Bathing: Minimal Assistance  Upper Body Dressing: Supervision  Lower Body Dressing: Supervision  Toileting: Supervision  Tub/Shower Transfer: Minimal Assistance  Toilet Transfer: Supervision  Cognition: Within Defined Limits  Orientation: Person, Place, Time, Situation       Allergies per EMR    Physical Exam:  Temp:  [98 5 °F (36 9 °C)-98 7 °F (37 1 °C)] 98 5 °F (36 9 °C)  HR:  [67-68] 67  Resp:  [18] 18  BP: (122-153)/(58-67) 140/58  SpO2:  [97 %-100 %] 97 %    General: alert, no apparent distress, cooperative and comfortable  HEENT:  Head: Normocephalic, no lesions, without obvious abnormality  LUNGS:  no abnormal respiratory pattern, no retractions noted, non-labored breathing   ABDOMEN:  soft, non-tender  Bowel sounds normal  No masses, no organomegaly  EXTREMITIES:  edema RLE, as compared to left  NEURO:   mental status, speech normal, alert and oriented x3  PSYCH:  Alert and oriented, appropriate affect  Physical examination is otherwise unchanged from previous encounter, except as noted above  Diagnostic Studies: Reviewed  XR hip/pelv 2-3 vws right if performed   Final Result by Pat Maradiaga MD (08/07 6601)      Status post ORIF of the right hip for an intertrochanteric fracture  Satisfactory alignment              Workstation performed: [id number]         VAS lower limb venous duplex study, unilateral/limited   Final Result by Queta Srivastava MD (08/05 4834)        Laboratory: Reviewed    Results from last 7 days   Lab Units 08/03/19  0623   HEMOGLOBIN g/dL 8 3*   HEMATOCRIT % 27 4*   WBC Thousand/uL 6 56     Results from last 7 days   Lab Units 08/05/19  0509 08/02/19  0612   BUN mg/dL 27* 23   SODIUM mmol/L 139 137   POTASSIUM mmol/L 3 7 3 3*   CHLORIDE mmol/L 104 101   CREATININE mg/dL 0 66 0 68            Patient Active Problem List   Diagnosis    Cataract    Essential hypertension    Aortic stenosis, moderate    Atrial dilatation, bilateral    Mild tricuspid insufficiency    Non-rheumatic mitral regurgitation    Chronic diastolic congestive heart failure (HCC)    Localized edema    Leg foot wound- 3rd digit    Other fatigue    Arthritis    Closed right hip fracture, initial encounter ([facility] [address] )    Intertrochanteric fracture of right femur (HCC)    Mitral valve stenosis, mild    Anemia    Chronic pain of right knee    Right leg swelling       ** Please Note: Fluency Direct voice to text software may have been used in the creation of this document   **

## 2019-08-08 NOTE — PROGRESS NOTES
Internal Medicine Progress Note  Patient: Johanna Graham  Age/sex: 80 y o  female  Medical Record #: [id number]      ASSESSMENT/PLAN: (Interval History)  Johanna Graham is seen and examined and management for following issues:    Right hip fx; s/p TFN 7/23/19:  WBAT; watch incision     ABLA: Hgb improving/stable w/o having to receive transfusion      Chronic diastolic CHF/LVEF 05%, mild MR/moderate AS, AR/moderate TR:  she will need to go back to see Dr Holly Kern since he has been following her for this and can then refer to Cardiology if he and the patient wish  She remains on her home dose of Lasix 80mg daily     HTN:  at home, she had been on Verapamil but changed in April to Toprol 50mg daily 2/2 insurance would not pay for the Verapamil; however, since she has a lot of Verpamil left, she has been using them up and then is going to switch back to the Toprol  Will keep Verapamil 240mg qd for now     Left 3rd toe wound and right great toe subungual hematoma:  had been seen by Podiatry; they did remove an ingrown toenail left 3rd toe  They feel that she is likely to lose the nail on the right great toe  Continue local care    Hypokalemia:  Continue KCl 20 mEq daily = K+ is 3 6  RLE edema:  Venous doppler = negative DVT, +Bakers cyst      Subjective/ HPI:  Patients overnight issues or events were reviewed with nursing or staff during rounds or morning huddle session  No new or overnight issues  ABLA: Hgb 8 3  HTN:  Stable on Verapamil  Chronic diastolic CHF: stable  Offers no complaints       ROS:     GI: denies abdominal pain, change bowel habits or reflux symptoms  Neuro: Denies any headache, new vision changes, new neuropathies,new weaknesses   Respiratory: No Cough, SOB, denies wheeze  Cardiovascular: No CP, palpitations , denies perception of rapid heartbeat  : denies any new urinary burning or frequency    Review of Scheduled Meds:    Current Facility-Administered Medications:  acetaminophen 975 mg Oral Q8H [facility] Balwinder Moran MD   bacitracin 1 small application Topical Daily JUAN Marie   docusate sodium 100 mg Oral BID Balwinder Moran MD   enoxaparin 40 mg Subcutaneous Daily Balwinder Moran MD   furosemide 80 mg Oral Daily Balwinder Moran MD   lidocaine 2 patch Topical Daily Balwinder Moran MD   melatonin 3 mg Oral HS PRN Elba Portillo DO   menthol-methyl salicylate  Apply externally 4x Daily PRN Susi Valentin MD   pantoprazole 40 mg Oral Daily Before Breakfast Balwinder Moran MD   polyethylene glycol 17 g Oral Daily PRN Balwinder Moran MD   potassium chloride 20 mEq Oral Daily JUAN Matson   traMADol 25 mg Oral Q4H PRN Balwinder Moran MD   traMADol 50 mg Oral Q4H PRN Balwinder Moran MD   verapamil 240 mg Oral Daily Balwinder Moran MD       Labs:     Results from last 7 days   Lab Units 08/08/19  1124 08/03/19  0623   WBC Thousand/uL 5 78 6 56   HEMOGLOBIN g/dL 10 5* 8 3*   HEMATOCRIT % 34 0* 27 4*   PLATELETS Thousands/uL 317 288     Results from last 7 days   Lab Units 08/08/19  1124 08/05/19  0509   SODIUM mmol/L 142 139   POTASSIUM mmol/L 3 6 3 7   CHLORIDE mmol/L 104 104   CO2 mmol/L 30 32   BUN mg/dL 32* 27*   CREATININE mg/dL 0 77 0 66   CALCIUM mg/dL 9 0 8 8                      Imaging:     XR hip/pelv 2-3 vws right if performed   Final Result by Segundo Frias MD (08/07 4618)      Status post ORIF of the right hip for an intertrochanteric fracture  Satisfactory alignment              Workstation performed: [id number]         VAS lower limb venous duplex study, unilateral/limited   Final Result by Teresa Kay MD (08/05 4854)          *Labs reviewed  *Radiology studies reviewed  *Medications reviewed and reconciled as needed  *Please refer to order section for additional ordered labs studies  *Case discussed with primary attending during morning huddle case rounds    Physical Examination:  Vitals:   Vitals:    08/07/19 1315 08/07/19 2023 08/08/19 0548 08/08/19 0816   BP: 122/58 153/67 136/62 140/58   BP Location: Left arm Left arm Left arm    Pulse: 68 67 67    Resp: 18 18 18    Temp: 98 7 °F (37 1 °C) 98 7 °F (37 1 °C) 98 5 °F (36 9 °C)    TempSrc: Oral Oral Oral    SpO2: 100% 99% 97%    Weight:       Height:           General Appearance: NAD, conversive  Eyes: No icterus; conjunctiva normal, PERRLA  HENT: oropharynx clear; mucous membranes moist  Neck: trachea midline, range of motion full  Lungs: CTA, normal respiratory effort, no retractions, expiratory effort normal  CV: regular rate, no rubs/murmurs/gallops  ABD: soft: NT/ND; +BS  EXT: mild edema of LLE and moderate edema of RLE posterior knee area (has moderate ecchymosis there as well); right leg incisions are w/o erythema/drainage  Skin: normal turgor, normal texture, no rashes  Psych: affect normal, no overt anxiety/depression   Neuro: AAOx3            Total time spent: At least 40 minutes, with more than 50% spent counseling/coordinating care  Counseling includes discussion with patient re: progress  and discussion with patient of his/her current medical state/information  Coordination of patient's care was performed in conjunction with primary service  Time invested included review of patient's labs, vitals, and management of their comorbidities with continued monitoring  In addition, this patient was discussed with medical team including physician and advanced extenders  The care of the patient was extensively discussed and appropriate treatment plan was formulated unique for this patient  ** Please Note: Dragon 360 Dictation voice to text software may have been used in the creation of this document   **

## 2019-08-08 NOTE — PLAN OF CARE
Problem: Prexisting or High Potential for Compromised Skin Integrity  Goal: Skin integrity is maintained or improved  Description  INTERVENTIONS:  - Identify patients at risk for skin breakdown  - Assess and monitor skin integrity  - Assess and monitor nutrition and hydration status  - Monitor labs (i e  albumin)  - Assess for incontinence   - Turn and reposition patient  - Assist with mobility/ambulation  - Relieve pressure over bony prominences  - Avoid friction and shearing  - Provide appropriate hygiene as needed including keeping skin clean and dry  - Evaluate need for skin moisturizer/barrier cream  - Collaborate with interdisciplinary team (i e  Nutrition, Rehabilitation, etc )   - Patient/family teaching  Outcome: Progressing     Problem: Potential for Falls  Goal: Patient will remain free of falls  Description  INTERVENTIONS:  - Assess patient frequently for physical needs  -  Identify cognitive and physical deficits and behaviors that affect risk of falls    -  [place] fall precautions as indicated by assessment   - Educate patient/family on patient safety including physical limitations  - Instruct patient to call for assistance with activity based on assessment  - Modify environment to reduce risk of injury  - Consider OT/PT consult to assist with strengthening/mobility  Outcome: Progressing     Problem: PAIN - ADULT  Goal: Verbalizes/displays adequate comfort level or baseline comfort level  Description  Interventions:  - Encourage patient to monitor pain and request assistance  - Assess pain using appropriate pain scale  - Administer analgesics based on type and severity of pain and evaluate response  - Implement non-pharmacological measures as appropriate and evaluate response  - Consider cultural and social influences on pain and pain management  - Notify physician/advanced practitioner if interventions unsuccessful or patient reports new pain  Outcome: Progressing     Problem: SAFETY ADULT  Goal: Maintain or return to baseline ADL function  Description  INTERVENTIONS:  -  Assess patient's ability to carry out ADLs; assess patient's baseline for ADL function and identify physical deficits which impact ability to perform ADLs (bathing, care of mouth/teeth, toileting, grooming, dressing, etc )  - Assess/evaluate cause of self-care deficits   - Assess range of motion  - Assess patient's mobility; develop plan if impaired  - Assess patient's need for assistive devices and provide as appropriate  - Encourage maximum independence but intervene and supervise when necessary  ¯ Involve family in performance of ADLs  ¯ Assess for home care needs following discharge   ¯ Request OT consult to assist with ADL evaluation and planning for discharge  ¯ Provide patient education as appropriate  Outcome: Progressing

## 2019-08-08 NOTE — PROGRESS NOTES
08/08/19 1230   Pain Assessment   Pain Assessment 0-10   Pain Score 8   Pain Type Chronic pain;Surgical pain   Pain Location Hip;Knee   Pain Orientation Right   Pain Descriptors Aching   Pain Frequency Constant/continuous   Effect of Pain on [employer] with functional transfer and mobility    Hospital Pain Intervention(s) Cold applied;Repositioned; Ambulation/increased activity; Medication (See MAR)  (Nursing admin medication for pain during session )   Response to Interventions Inc pain with ambulation and STS transfer  Cold pack during supine TE helped  Pain unchanged at end of session  Restrictions/Precautions   Precautions Fall Risk;Contact/isolation   Weight Bearing Restrictions Yes   RLE Weight Bearing Per Order WBAT   ROM Restrictions No   Cognition   Overall Cognitive Status WFL   Arousal/Participation Cooperative; Alert   Subjective   Subjective Pt presents sleeping in recliner  Pt c/o of pain at rest see above  Agreeable to session    QI: Sit to Lying   Assistance Needed Physical assistance; Adaptive equipment   Assistance Provided by [employer] Less than 25%   Comment Jesus RLE despite use of leg   Sit to Lying CARE Score 3   QI: Lying to Sitting on Side of Bed   Assistance Needed Physical assistance; Adaptive equipment   Assistance Provided by [employer] Less than 25%   Comment Jesus RLE despite use of leg   Lying to Sitting on Side of Bed CARE Score 3   QI: Sit to Stand   Assistance Needed Supervision; Adaptive equipment   Assistance Provided by Industry No physical assistance   Comment CS with RW    Sit to Stand CARE Score 4   QI: Chair/Bed-to-Chair Transfer   Assistance Needed Supervision   Assistance Provided by Industry No physical assistance   Comment CS with RW    Chair/Bed-to-Chair Transfer CARE Score 4   Transfer Bed/Chair/Wheelchair   Limitations Noted In Balance;Pain Management;LE Strength   Adaptive Equipment Roller Walker   Stand Pivot Supervision   Sit to Stand Supervision Stand to Sit Supervision   Supine to Sit Minimal Assist   Sit to Supine Minimal Assist   Car Transfer Minimal Assist   Findings Leg  for bed mobility and Jesus for RLE despite use of leg   Bed, Chair, Wheelchair Transfer (FIM) 4 - Patient requires steadying assist or light touching   QI: Car Transfer   Assistance Needed Adaptive equipment;Physical assistance   Assistance Provided by [place] Less than 25%   Comment Jesus RLE into/out of car    Car Transfer CARE Score 3   QI: Walk 10 Feet   Assistance Needed Adaptive equipment;Supervision   Assistance Provided by [place] No physical assistance   Comment CS with RW    Walk 10 Feet CARE Score 4   QI: Walk 50 Feet with Two [address]; Adaptive equipment   Assistance Provided by [place] No physical assistance   Comment CS w RW    Walk 50 Feet with Two Turns CARE Score 4   QI: Walk 150 Feet   Assistance Needed Supervision; Adaptive equipment   Assistance Provided by [place] No physical assistance   Comment CS w RW    Walk 150 Feet CARE Score 4   Ambulation   Does the patient walk? 2  Yes   Primary Discharge Mode of Locomotion Walk   Walk Assist Level Close Supervision   Gait Pattern Antalgic; Inconsistant Felipa; Slow Felipa; Forward Flexion; Step through; Decreased R stance;Decreased L stance; Improper weight shift   Assist Device Trina Patino Walked (feet) 150 ft  (x2 )   Limitations Noted In Balance; Endurance; Heel Strike;Speed;Strength;Swing   Findings Pt cont to ambulate with dec heel strike due to inc pain during swing phase on RLE  Dec R stande time  Occ rest break to stand up and stretch back into ext  Walking (FIM) 6 - Patient requires assistive device/extra time/safety concerns but completes independently AND distance 150 feet or more, no rest   Wheelchair mobility   QI: Does the patient use a wheelchair? 0  No   QI: 1 Step (Curb)   Assistance Needed Physical assistance; Adaptive equipment   Assistance Provided by [place] Less than 25%   Comment CGA/Jesus with LHR ascending  Non-reciprocal pattern  completed on 6"  stairs  Ascended FWD, descended BWD     1 Step (Curb) CARE Score 3   QI: 4 Steps   Assistance Needed Physical assistance   Assistance Provided by [place] Less than 25%   Comment CGA/Jesus with LHR ascending  Non-reciprocal pattern  completed on 6"  stairs  Ascended FWD, descended BWD     4 Steps CARE Score 3   Stairs   Type Stairs   # of Steps 4   Weight Bearing Precautions Fall Risk   Assist Devices Single Rail   Findings CGA for ascending with Jesus for descending BWD  Pt with inc pain today limited stairs to 4 and only able to complete once  Non-reciprocal pattern LHR ascending FWD, descending BWD  Stairs (FIM) 2 - Patient goes up and down 4 - 11 stairs regardless of assist/device/setup   QI: Picking Up Object   Reason if not Attempted Safety concerns   Picking Up Object CARE Score 88   Toilet Transfer   Toilet Transfer (FIM) 0 - Activity does not occur   Therapeutic Interventions   Strengthening RLE AROM: 2x10 SAQ, AAROM RLE 2x10: SLR, Heel slides  Flexibility Seated BLE h/s and gastroc x4 min  Seated quad stretch RLE x2min  Modalities CP to R hip during supine TE x20 min  Other STM to R hip flexor x5 min  Ace wrapped RLE per Dr Niru Potts order  Removed PETER from LLE  Assessment   Treatment Assessment Pt particiapted in skilled PT with focus on inc functional transfer, mobility, LE strengthening, Ace wrap RLE and inc activity tolerance  Pt with inc pain this session  Nursing admin medication during session, pt ed on taking pain meds prior to therapy if able  Stairs cont to be biggest barrier at this time  Activity and mobility limited by pain which is worse when perfroming STS transfer or bed mobility  Pt cont to benefit from skilled PT to inc activity tolerance and maximize function  Pt set to d/c home Sunday 8/11      Family/Caregiver Present no    Barriers to Discharge Decreased caregiver support   PT Barriers   Physical Impairment Decreased range of motion;Decreased endurance;Decreased strength; Impaired balance;Decreased mobility;Orthopedic restrictions;Pain   Functional Limitation Car transfers;Stair negotiation;Standing;Transfers; Walking   Plan   Treatment/Interventions Functional transfer training;LE strengthening/ROM; Elevations; Therapeutic exercise; Endurance training;Patient/family training;Bed mobility; Equipment eval/education;Gait training   Progress Progressing toward goals   Recommendation   Recommendation Home PT; Home with family support   Equipment Recommended Walker   PT Therapy Minutes   PT Time In 1230   PT Time Out 1400   PT Total Time (minutes) 90   PT Mode of treatment - Individual (minutes) 90   PT Mode of treatment - Concurrent (minutes) 0   PT Mode of treatment - Group (minutes) 0   PT Mode of treatment - Co-treat (minutes) 0   PT Mode of Teatment - Total time(minutes) 90 minutes   Therapy Time missed   Time missed?  No

## 2019-08-08 NOTE — SOCIAL WORK
In preparation for Pt d/c, a DME order for a walker and a commode sent via [facility] to [facility]       Referral sent to [facility] through [facility] requesting continued RN/PT/OT

## 2019-08-08 NOTE — PROGRESS NOTES
Progress Note - Orthopedics   Félix Mcdaniels 80 y o  female MRN: [id number]  Unit/Bed#: -01      Subjective:    80 y  o female who had a closed displaced intertrochanteric fracture of the right hip s/p R femur IMN 2 weeks ago  Pt evaluated today afternoon at [facility] and was found doing well with PT and rehab  No pain complains       Labs:  0   Lab Value Date/Time    HCT 27 4 (L) 08/03/2019 0623    HCT 26 8 (L) 07/31/2019 0526    HCT 24 8 (L) 07/29/2019 0504    HCT 40 3 10/13/2015 1320    HGB 8 3 (L) 08/03/2019 0623    HGB 8 4 (L) 07/31/2019 0526    HGB 7 7 (L) 07/29/2019 0504    HGB 13 0 10/13/2015 1320    INR 1 02 07/22/2019 1721    WBC 6 56 08/03/2019 0623    WBC 6 82 07/31/2019 0526    WBC 9 31 07/29/2019 0504    WBC 6 0 10/13/2015 1320       Meds:    Current Facility-Administered Medications:     acetaminophen (TYLENOL) tablet 975 mg, 975 mg, Oral, Q8H [address], Balwinder Moran MD, 975 mg at 08/07/19 1331    bacitracin topical ointment 1 small application, 1 small application, Topical, Daily, JUAN Marie, 1 small application at 38/02/04 1001    docusate sodium (COLACE) capsule 100 mg, 100 mg, Oral, BID, Balwinder Moran MD, 100 mg at 08/07/19 1000    enoxaparin (LOVENOX) subcutaneous injection 40 mg, 40 mg, Subcutaneous, Daily, Balwinder Moran MD, 40 mg at 08/06/19 2217    furosemide (LASIX) tablet 80 mg, 80 mg, Oral, Daily, Balwinder Moran MD, 80 mg at 08/07/19 1001    lidocaine (LIDODERM) 5 % patch 2 patch, 2 patch, Topical, Daily, Balwinder Moran MD, 2 patch at 08/07/19 1952    melatonin tablet 3 mg, 3 mg, Oral, HS PRN, Olive Jm, DO, 3 mg at 08/06/19 2216    menthol-methyl salicylate (BENGAY) 38-06 % cream, , Apply externally, 4x Daily PRN, Balwinder Moran MD    pantoprazole (PROTONIX) EC tablet 40 mg, 40 mg, Oral, Daily Before Breakfast, Balwinder Moran MD, 40 mg at 08/07/19 0585    polyethylene glycol (MIRALAX) packet 17 g, 17 g, Oral, Daily PRN, Balwinder Moran MD, 17 g at 08/07/19 1401    potassium chloride (K-DUR,KLOR-CON) CR tablet 20 mEq, 20 mEq, Oral, Daily, JUAN Bolaños, 20 mEq at 08/07/19 1000    traMADol (ULTRAM) tablet 25 mg, 25 mg, Oral, Q4H PRN, Balwinder Moran MD, 25 mg at 08/01/19 0257    traMADol (ULTRAM) tablet 50 mg, 50 mg, Oral, Q4H PRN, Balwinder Moran MD, 50 mg at 08/07/19 1330    verapamil (CALAN-SR) CR tablet 240 mg, 240 mg, Oral, Daily, Balwinder Moran MD, 240 mg at 08/07/19 1000    Blood Culture:   No results found for: BLOODCX    Wound Culture:   No results found for: WOUNDCULT    Ins and Outs:  I/O last 24 hours: In: 120 [P O :120]  Out: 3050 [Urine:3050]          Physical:  Vitals:    08/07/19 2023   BP: 153/67   Pulse: 67   Resp: 18   Temp: 98 7 °F (37 1 °C)   SpO2: 99%     Musculoskeletal: right Lower Extremity  · Skin : staples were in place in the 3 incisions  No drainage noted, incisions were dry and clean  · TTP mildly near the surgical area in the right hip  · SILT s/s/sp/dp/t  +fhl/ehl, +ankle dorsi/plantar flexion  2+ DP pulse    Assessment:    80 y  o female s/p R IMN from 2 weeks ago  Doing well with rehab, she is weight bearing as tolerated, primarily using a walker to ambulate  Surgical incisions dry and clean  Xrays evaluated and they showed hardware in place  Sutures were removed today without complications  Plan:  · WBAT   · Keep incisions dry and clean  · Continue inpatient rehab as scheduled  · Cont Pain control  · Continue DVT ppx  · F/u in 4 weeks w new xrays    Ophelia Rubio MD

## 2019-08-09 PROBLEM — E87.6 HYPOKALEMIA: Status: ACTIVE | Noted: 2019-08-09

## 2019-08-09 PROCEDURE — 97530 THERAPEUTIC ACTIVITIES: CPT

## 2019-08-09 PROCEDURE — 97535 SELF CARE MNGMENT TRAINING: CPT

## 2019-08-09 PROCEDURE — 97110 THERAPEUTIC EXERCISES: CPT

## 2019-08-09 PROCEDURE — 99233 SBSQ HOSP IP/OBS HIGH 50: CPT | Performed by: PHYSICAL MEDICINE & REHABILITATION

## 2019-08-09 PROCEDURE — 97116 GAIT TRAINING THERAPY: CPT

## 2019-08-09 RX ORDER — POTASSIUM CHLORIDE 20 MEQ/1
20 TABLET, EXTENDED RELEASE ORAL DAILY
Qty: 30 TABLET | Refills: 0 | Status: SHIPPED | OUTPATIENT
Start: 2019-08-10 | End: 2019-09-13 | Stop reason: SDUPTHER

## 2019-08-09 RX ADMIN — ACETAMINOPHEN 975 MG: 325 TABLET ORAL at 21:50

## 2019-08-09 RX ADMIN — PANTOPRAZOLE SODIUM 40 MG: 40 TABLET, DELAYED RELEASE ORAL at 05:34

## 2019-08-09 RX ADMIN — POTASSIUM CHLORIDE 20 MEQ: 1500 TABLET, EXTENDED RELEASE ORAL at 08:37

## 2019-08-09 RX ADMIN — LIDOCAINE 2 PATCH: 50 PATCH CUTANEOUS at 18:18

## 2019-08-09 RX ADMIN — ACETAMINOPHEN 975 MG: 325 TABLET ORAL at 14:01

## 2019-08-09 RX ADMIN — TRAMADOL HYDROCHLORIDE 50 MG: 50 TABLET, FILM COATED ORAL at 11:34

## 2019-08-09 RX ADMIN — DOCUSATE SODIUM 100 MG: 100 CAPSULE, LIQUID FILLED ORAL at 18:18

## 2019-08-09 RX ADMIN — BACITRACIN ZINC 1 SMALL APPLICATION: 500 OINTMENT TOPICAL at 08:37

## 2019-08-09 RX ADMIN — DOCUSATE SODIUM 100 MG: 100 CAPSULE, LIQUID FILLED ORAL at 08:37

## 2019-08-09 RX ADMIN — ACETAMINOPHEN 975 MG: 325 TABLET ORAL at 05:34

## 2019-08-09 RX ADMIN — VERAPAMIL HYDROCHLORIDE 240 MG: 240 TABLET, FILM COATED, EXTENDED RELEASE ORAL at 08:37

## 2019-08-09 RX ADMIN — FUROSEMIDE 80 MG: 80 TABLET ORAL at 08:37

## 2019-08-09 RX ADMIN — ENOXAPARIN SODIUM 40 MG: 40 INJECTION SUBCUTANEOUS at 22:00

## 2019-08-09 NOTE — ASSESSMENT & PLAN NOTE
Results from last 7 days   Lab Units 08/08/19  1124 08/05/19  0509   POTASSIUM mmol/L 3 6 3 7       · Continue potassium supplement  · PCP to recheck BMP as outpatient, adjust potassium supplement as appropriate

## 2019-08-09 NOTE — SOCIAL WORK
[facility], to obtain Pts PCP info (Dr Page [facility], [phone])  CM will fax the d/c summary to their office on Monday, after Pt d/c Sunday  Fax# [phone] Ryland Gil, ATTN: Vick Sullivan  In preparation of Pt d/c, medications sent to [facility]  It will be determined whether or not the meds will be delivered and locked away until Pt d/c, or if Pt will p/u after d/c Sunday, based on any charges/costs  CM learned that the meds would cost over $64   Info shared with Pt, and she will have her son p/u at the pharmacy tomorrow

## 2019-08-09 NOTE — DISCHARGE SUMMARY
NON-BILLABLE NOTE  Discharge Summary - Sonia Urbina 80 y o  female MRN: [id number]  Unit/Bed#: [facility] 450-22 Encounter: [id number]    Admission Date: 7/25/2019     Discharge Date: 8/11/19    Etiologic/Rehabilitation Diagnosis: Impairment of mobility, safety and Activities of Daily Living (ADLs) due to Orthopedic Disorders:  08 11  Unilateral Hip Fracture    HPI: Susan Zelaya is a 80 y o  female who presented to the [address] [place] [address] Road after fall at home  Found to have right hip fracture  Now s/p IM nailing of right femur on 7/23/19  Patient WBAT  Postoperatively noted to have ABLA, mild leukocytosis  Also required podiatry consult for left 3rd toe and left large toe wound  Patient was evaluated by therapy services, found to be below functional baseline  She was accepted to the [facility] on 7/25/19      Procedures Performed During [facility] Admission: none    Acute Rehabilitation Center Course: Patient participated in a comprehensive interdisciplinary inpatient rehabilitation program which included involvment of MD, therapies (PT, OT, and/or SLP), RN, CM, SW, dietary, and psychology services  She was able to be advanced to an overall supervision level of assist and considered safe for discharge home with family  Please see below for patient's day to day management of medical needs        * Closed right hip fracture, initial encounter [facility])  Assessment & Plan  · Acute comprehensive interdisciplinary inpatient rehabilitation including PT, OT, RN, CM, SW, dietary, psychology, etc   · WBAT  · 2 week f/u performed by orthopedics, staples removed    Hypokalemia  Assessment & Plan  Results from last 7 days   Lab Units 08/08/19  1124 08/05/19  0509   POTASSIUM mmol/L 3 6 3 7       · Continue potassium supplement  · PCP to recheck BMP as outpatient, adjust potassium supplement as appropriate    Right leg swelling  Assessment & Plan  · secondary to surgery, immobility  · Venous dopplers negative for DVT, but there does appear to be a popliteal fossa cyst, likely a Baker's cyst    · Improving  · Continue ACE wrapping, patient to resume PETRE hose upon discharge (she has stockings at home)    Chronic pain of right knee  Assessment & Plan  · Continue lidoderm patch overnight  · S/p IASI and series of euflexxa injections by orthopedic surgery; patient reports minimal benefit  · F/u with PM&R as outpatient for conservative management     Anemia  Assessment & Plan  Results from last 7 days   Lab Units 08/08/19  1124 08/03/19  0623   HEMOGLOBIN g/dL 10 5* 8 3*     · Likely post-operative  · Monitor CBC intermittently  · Transfuse for Hgb <7  Did not require transfusion as H/h has improved     Chronic diastolic congestive heart failure (HCC)  Assessment & Plan  Wt Readings from Last 3 Encounters:   08/07/19 49 5 kg (109 lb 2 oz)   07/25/19 50 3 kg (110 lb 14 3 oz)   04/02/19 51 3 kg (113 lb)     · Regular weight checks  · Lasix daily    Essential hypertension  Assessment & Plan  Temp:  [98 °F (36 7 °C)-98 6 °F (37 °C)] 98 °F (36 7 °C)  HR:  [68-70] 69  Resp:  [18-19] 19  BP: (142-159)/(56-70) 142/56    · Verapamil    Difficulty swallowingresolved as of 8/6/2019  Assessment & Plan  · Improved  · Worse with dry foods, encouraged patient to take smaller bites  · If persists or worsens, would benefit from outpatient f/u with GI  · Of note, patient last had colonoscopy on 03/04/13, with following findings:   · Sigmoid diverticula  · Internal and external hemorrhoids  · Redundant colon with acute angulations  · Patient is to have repeat colonoscopy in 2023  Discharge Physical Examination:  General: alert, no apparent distress, cooperative and comfortable  HEENT:  Head: Normocephalic, no lesions, without obvious abnormality  LUNGS:  no abnormal respiratory pattern, no retractions noted, non-labored breathing   ABDOMEN:  soft, non-tender   Bowel sounds normal  No masses, no organomegaly  EXTREMITIES:  edema RLE, as compared to left  NEURO:   mental status, speech normal, alert and oriented x3  PSYCH:  Alert and oriented, appropriate affect  Significant Findings, Care, Treatment and Services Provided: Acute comprehensive interdisciplinary inpatient rehabilitation including PT, OT, RN, CM, SW, dietary, psychology, etc     Complications: none    Functional Status Upon Admission to [facility]:  Mobility:mod assist  Transfers: mod assist  ADLs: max assist lowers    Functional Status Upon Discharge from [facility]:   Physical Therapy Occupational Therapy   Weight Bearing Status: Weight Bearing as Tolerated  Transfers: Supervision  Bed Mobility: Minimal Assistance  Amulation Distance (ft): 150 feet  Ambulation: Supervision, Contact Guard  Assistive Device for Ambulation: Roller Walker  Number of Stairs: 4  Assistive Device for Stairs: Lehft Hand Rail  Stair Assistance: Minimal Assistance  Ramp: Contact Guard  Assistive Device for Ramp: Roller Walker  Discharge Recommendations: Home with:  [address] with[de-identified] Family Support, Home Physical Therapy, Outpatient Physical Therapy   Eating: Supervision  Grooming: Supervision  Bathing: Minimal Assistance  Bathing: Minimal Assistance  Upper Body Dressing: Supervision  Lower Body Dressing: Supervision  Toileting: Supervision  Tub/Shower Transfer: Minimal Assistance  Toilet Transfer: Supervision  Cognition: Within Defined Limits  Orientation: Person, Place, Time, Situation       Discharge Diagnosis: Impairment of mobility, safety and Activities of Daily Living (ADLs) due to Orthopedic Disorders:  08 11  Unilateral Hip Fracture    Discharge Medications:   See after visit summary for reconciled discharge medications provided to patient and family  Condition at Discharge: stable     Discharge instructions/Information to patient and family:   See after visit summary for information provided to patient and family        Provisions for Follow-Up Care:  See after visit summary for information related to follow-up care and any pertinent home health orders  Future Appointments   Date Time Provider Macey Saba   8/22/2019  3:15 PM Yamila Butler MD [facility]   9/5/2019  2:30 PM DO [facility]       Disposition: Home    Planned Readmission: No    Discharge Medications:  See after visit summary for reconciled discharge medications provided to patient and family

## 2019-08-09 NOTE — PLAN OF CARE
Problem: Prexisting or High Potential for Compromised Skin Integrity  Goal: Skin integrity is maintained or improved  Description  INTERVENTIONS:  - Identify patients at risk for skin breakdown  - Assess and monitor skin integrity  - Assess and monitor nutrition and hydration status  - Monitor labs (i e  albumin)  - Assess for incontinence   - Turn and reposition patient  - Assist with mobility/ambulation  - Relieve pressure over bony prominences  - Avoid friction and shearing  - Provide appropriate hygiene as needed including keeping skin clean and dry  - Evaluate need for skin moisturizer/barrier cream  - Collaborate with interdisciplinary team (i e  Nutrition, Rehabilitation, etc )   - Patient/family teaching  Outcome: Progressing     Problem: Potential for Falls  Goal: Patient will remain free of falls  Description  INTERVENTIONS:  - Assess patient frequently for physical needs  -  Identify cognitive and physical deficits and behaviors that affect risk of falls    -  [place] fall precautions as indicated by assessment   - Educate patient/family on patient safety including physical limitations  - Instruct patient to call for assistance with activity based on assessment  - Modify environment to reduce risk of injury  - Consider OT/PT consult to assist with strengthening/mobility  Outcome: Progressing     Problem: PAIN - ADULT  Goal: Verbalizes/displays adequate comfort level or baseline comfort level  Description  Interventions:  - Encourage patient to monitor pain and request assistance  - Assess pain using appropriate pain scale  - Administer analgesics based on type and severity of pain and evaluate response  - Implement non-pharmacological measures as appropriate and evaluate response  - Consider cultural and social influences on pain and pain management  - Notify physician/advanced practitioner if interventions unsuccessful or patient reports new pain  Outcome: Progressing     Problem: SAFETY ADULT  Goal: Maintain or return to baseline ADL function  Description  INTERVENTIONS:  -  Assess patient's ability to carry out ADLs; assess patient's baseline for ADL function and identify physical deficits which impact ability to perform ADLs (bathing, care of mouth/teeth, toileting, grooming, dressing, etc )  - Assess/evaluate cause of self-care deficits   - Assess range of motion  - Assess patient's mobility; develop plan if impaired  - Assess patient's need for assistive devices and provide as appropriate  - Encourage maximum independence but intervene and supervise when necessary  ¯ Involve family in performance of ADLs  ¯ Assess for home care needs following discharge   ¯ Request OT consult to assist with ADL evaluation and planning for discharge  ¯ Provide patient education as appropriate  Outcome: Progressing

## 2019-08-09 NOTE — PROGRESS NOTES
Occupational Therapy Treatment Note       08/09/19 0845   Pain Assessment   Pain Assessment 0-10   Pain Score 6   Pain Type Acute pain   Pain Location Hip   Pain Orientation Right   Hospital Pain Intervention(s) Ambulation/increased activity; Distraction   Response to Interventions tolerates session    Restrictions/Precautions   Precautions Contact/isolation   RLE Weight Bearing Per Order WBAT   Lifestyle   Autonomy "I am currently sitting on a stool at the kitchen table, we had to raise the table so my 's wheelchair can fit under it"    QI: [address] Provided by [place] No physical assistance   Comment in stance at sink    Oral Hygiene CARE Score 6   Grooming   Able To Wash/Dry Face;Wash/Dry Hands;Brush/Clean Teeth;Comb/Brush Hair   Findings in stance at sink with increased time and UE support as needed on sink, although pt is able to maintain B/L UE release with no LOB to complete grooming tasks    Grooming (FIM) 6 - Patient requires assistive device/extra time/safety concerns but completes independently   QI: Shower/Bathe [address]; Adaptive equipment   Assistance Provided by [place] No physical assistance   Shower/Bathe Self CARE Score 6   Bathing   Assessed Bath Style Sponge Bath   Anticipated D/C Bath Style Sponge Bath   Able to Gather/Transport Yes   Able to Adjust Water Temperature Yes   Able to Wash/Rinse/Dry (body part) Left Arm;Right Arm;L Upper Leg;R Upper Leg;Chest;L Lower Leg/Foot;R Lower Leg/Foot; Abdomen;Perineal Area; Buttocks   Limitations Noted in Endurance   Positioning Seated;Standing   Findings  Pt completes sponge bathing in bathroom to simulate home environment  Chair placed next to pt to simulate sink countertop at home being at pt's reach while seated on BSC  Pt completed UB bathing and LB bathing to knee level in stance at sink at mod I level   Pt then sits on BSC and bathes lower legs/feet by bending forward at mod I level for increased time  Bathing (FIM) 6 - Patient requires assistive device/extra time/safety concerns but completes independently   Tub/Shower Transfer   Not Assessed Sponge Bath   QI: Upper Body [address] Provided by [place] No physical assistance   Comment seated and in stance    Upper Body Dressing CARE Score 6   QI: [address]; Adaptive equipment   Assistance Provided by [place] No physical assistance   Lower Body Dressing CARE Score 6   QI: Putting On/Taking Off Footwear   Assistance Needed Physical assistance   Assistance Provided by [place] 25%-49%   Comment Pt able to doff and don socks while seated on BSC bending forward and don shoes while bending forward  Pt requires assist to ace wrap R LE  Per Dr Shane Copa may be left off L LE at this time but can be donned is swelling occurs  Taiwo Figueroa reports pt can wear teds on L LE  During this session, pt only required assistance to ace wrap R LE and therefore only required 25% of work during don/doffing of entire footwear  Putting On/Taking Off Footwear CARE Score 3   QI: Picking Up Object   Assistance Needed Independent; Adaptive equipment   Assistance Provided by [place] No physical assistance   Comment use of reacher in stance    Picking Up Object CARE Score 6   Dressing/Undressing Clothing   Remove UB Clothes   (gown )   Remove LB Clothes Undergarment;Socks   [address] LB Clothes Shorts; Undergarment;Socks; Shoes   Positioning Supported Sit;Standing   Findings increased time for LB dressing 2* requiring rest breaks between bending forward    UB Dressing (FIM) 7 - No helper, safely, timely and completes all tasks independently   LB Dressing (FIM) 6 - Patient requires assistive device/extra time/safety concerns but completes independently   QI: Sit to [address]; Adaptive equipment   Assistance Provided by [place] No physical assistance   Sit to Stand CARE Score 6   QI: Chair/Bed-to-Chair Transfer   Assistance Needed Independent; Adaptive equipment   Assistance Provided by [place] No physical assistance   Chair/Bed-to-Chair Transfer CARE Score 6   Transfer Bed/Chair/Wheelchair   Adaptive Equipment Roller Walker   Sit to Stand Other  (mod I)   Stand to Sit Other  (mod I)   Findings mod I for functional mobility within room/bathroom using RW    Bed, Chair, Wheelchair Transfer (FIM) 6 - Patient requires assistive device/extra time/safety concerns but completes independently   QI: [id number]; Adaptive equipment   Assistance Provided by [place] No physical assistance   [address] Score 6   Toileting   Able to [address] down yes, up yes  Manage Hygiene Bladder   Toileting (FIM) 6 - Patient requires assistive device/extra time/safety concerns but completes independently   QI: Toilet Transfer   Assistance Needed Independent; Adaptive equipment   Assistance Provided by [place] No physical assistance   Toilet Transfer CARE Score 6   Toilet Transfer   Surface Assessed Standard Commode   Transfer Technique Standard   Findings mod I increased time 2* pain    Toilet Transfer (FIM) 6 - Patient requires assistive device/extra time/safety concerns but completes independently   Kitchen Mobility   Kitchen Mobility Comments Spoke with pt regarding walker tray  Pt currently not interested in purchasing, reports she has a small kitchen and [address] in the middle of kitchen, states she will be able to transport food from countertop to [address] then [address] to kitchen table  OT educated pt on if needed to carry further distances utilize closeable containers in walker bag   Of note, at end of session pt reports she has been eating on elevated stool 2* height of kitchen table, she will think if she has any arm chairs she could use in the house and OT recommending she ask her son if he has arm chairs as well, to increase pt's independence and safety  Cognition   Overall Cognitive Status WFL   Attention Within functional limits   Memory Within functional limits   Following Commands Follows all commands and directions without difficulty   Activity Tolerance   Activity Tolerance Patient tolerated treatment well   Medical Staff Made Aware OT will speak to PT regarding progressing pt to have bathroom privileges while seated in recliner chair  Pt will require setup assist for bathing still 2* environment  Will follow up with PT if pt requires leg  for bed mobility at home  Pt reports she plans on sleeping on 1st floor couch  Assessment   Treatment Assessment Pt participated in skilled OT tx session focused on ADL routine  See above for further details on functional performance  Pt demonstrates progress in overall ADL routine, able to complete functional mobility/transfers and sponge bathing at mod I level, simulating how pt will be washing/dressing on 1st floor powder room at home  Pt reports her  has a reacher she can use at home and currently does not require purchasing of other adaptive equipment for LB dressing  Pt declines walker tray at this time  OT is recommending pt utilize walker bag for item transportation at home  Plan for family training tomorrow with son from 7-65  OT to review setup recommendations for sponge bathing, don/doffing ACE wrap/teds, pt will require assist for IADLS  Pt will continue to benefit from skilled OT intervention to address kitchen mobility/simple meal preparation, tub transfer, in order to maximize functional independence in ADLS, functional mobility/transfers and IADLS, while decreasing burden of care  Prognosis Good   Problem List Decreased endurance;Orthopedic restrictions;Pain   Plan   Treatment/Interventions ADL retraining;Functional transfer training; Therapeutic exercise; Endurance training;Patient/family training;Equipment eval/education; Bed mobility; Compensatory technique education   Progress Progressing toward goals   Recommendation   OT Discharge Recommendation Home OT   Equipment Recommended Bedside commode  (8/8/19)   OT Therapy Minutes   OT Time In 0845   OT Time Out 1040   OT Total Time (minutes) 115   OT Mode of treatment - Individual (minutes) 115   OT Mode of treatment - Concurrent (minutes) 0   OT Mode of treatment - Group (minutes) 0   OT Mode of treatment - Co-treat (minutes) 0   OT Mode of Teatment - Total time(minutes) 115 minutes   Therapy Time missed   Time missed?  No       Rita Anderson MS, OTR/L , CBIS

## 2019-08-09 NOTE — DISCHARGE INSTRUCTIONS
[address] Discharge Instructions    Should you develop fevers, chills, sweats, rigors, or any drainage from your surgical site please contact your family doctor or surgeon immediately or go to the ER immediately as these are indicators of possible infection     Please note you are restricted from driving/operating a motorized vehicle/operating heavy machinery/etc until you are cleared by your doctors or through a formal driving evaluation  This service is offered through [employer] driving evaluation program on [address] however this evaluation can be done at a site of your choosing  Please contact your family doctor for a referral when your family doctor clears you to perform this evaluation once your neurologic/physical deficits have stabilized  Please see your doctors listed in the follow up providers section of your discharge paperwork, and take the discharge paperwork with you to your appointments    Please note changes may have been made to your medications please refer to your discharge paperwork for your current medications and take this list with you to all your doctors appointments for your doctors to review    Please do not resume a home medication unless the medication reconciliation sheet indicates to do so, please do not assume that a medication that you were given a prescription for is the same as a medication you have at home based on both medications having the same name as dosages and frequency may have changed  Prior to your discharge from the hospital, your nurse has reviewed: your medications, when to take them, how to take them, what they are for, how much to take, and when your next dose is due; please follow these instructions as directed       Unless listed to do so on your medication reconciliation in your discharge paperwork please do not combine muscle relaxants (including but not limited to zanaflex, flexaril, soma, robaxin, etc), pain medications (including but not limited to tramadol, vicodin, norco, percocet, oxycodone, oxycontin, morphine, hydropmorphone, oxymorphone, fentanyl, hydrocodone, etc)  or sedatives/sleep aids/anti-anxiety medications (including but not limited to [place], trazodone, valium, xanax, klonipin, clonazepam, ativan, lorazepam, restoril, temazepam etc) that you may have been prescribed prior to admission with the pain medication you are being prescribed upon discharge from the rehab unit  Please do not drive or operate heavy machinery while using these medications  Do not drink alcohol while using these medications  Unless listed to do so on your medication reconciliation in your discharge paperwork please do not combine pain medications (including but not limited to tramadol, vicodin, norco, percocet, oxycodone, oxycontin, morphine, hydropmorphone, oxymorphone, fentanyl, hydrocodone, etc) or muscle relaxants (including but not limited to zanaflex, flexaril, soma, robaxin, etc) or sedatives/sleep aids/anti-anxiety medications (including but not limited to [place], trazodone, valium, xanax, klonipin, clonazepam, ativan, lorazepam, restoril, temazepam etc) that you may have been prescribed prior to admission with each other  Please do not drive or operate heavy machinery while using these medications  Do not drink alcohol while using these medications      Unless listed to do so on your medication reconciliation in your discharge paperwork in general it is advisable to limit/avoid the use of pain medications (including but not limited to tramadol, vicodin, norco, percocet, oxycodone, oxycontin, morphine, hydropmorphone, oxymorphone, fentanyl, hydrocodone, etc), sedatives/sleep aids/anti-anxiety medications (including but not limited to [place], trazodone, valium, xanax, klonipin, clonazepam, ativan, lorazepam, restoril, temazepam etc), and muscle relaxants (including but not limited to zanaflex, flexaril, soma, robaxin, etc) as they may become habit forming  Please do not drive or operate heavy machinery while using these medications  Do not drink alcohol while using these medications    You will be given a limited supply of pain medications on discharge; should you require additional refills/medications, your PCP and/or surgeon may prescribe at his/her discretion  Please check your blood pressure prior to taking your blood pressure medications and 1 hour after, please contact your family doctor or cardiologist immediately for a blood pressure below 100/60 and do not take your blood pressure medications until speaking with them, please contact your family doctor or cardiologist immediately for blood pressure greater than 160/100    Please note the following incidental findings were found during your recent hospitalization please discuss them with your doctors so that they may arrange any tests/referrals as they deem necessary:   Popliteal cyst - this was found incidentally during an ultrasound of your right leg  Please follow-up with the orthopedic surgeon and family physician upon discharge for further workup as needed  Hypokalemia - This is the term used to indicate low potassium levels  This improved throughout your rehabilitation stay  Please follow-up with your family physician  Please avoid NSAID (including but not limited to advil, aleve, motrin, naproxen, ibuprofen, mobic, meloxicam, diclofenac etc) medications as NSAID medications may delay bone healing    Please avoid NSAID (including but not limited to advil, aleve, motrin, naproxen, ibuprofen, mobic, meloxicam diclofenac etc) medications, anti platelet medications (including but not limited to plavix, aspirin and aspirin containing products), and any prescription blood thinners due to your already being on Lovenox for a total of a 28 day course (you have been provided with a prescription for the remaining days on your 28 day course)   You may be cleared by your doctors to use these medications after you have completed your 28 day course however please do not use them unless your are advised to do so by your doctors as the use of these medications in combination with Lovenox can increase bleeding risk       Unless specifically noted in your medication list provided to you in your discharge paper work, please discuss with your family doctor prior to resuming any vitamins/minerals/supplements you may have been taking prior to your hospitalization     Please note a summary of your hospital stay with relevant information for your doctors has been sent to them, please confirm with your doctors at your follow up visits that they have received this summary and have them contact [address] [place] [address] if they have not received them along with any other medical records they may require

## 2019-08-09 NOTE — PROGRESS NOTES
Internal Medicine Progress Note  Patient: Luisa Kilpatrick  Age/sex: 80 y o  female  Medical Record #: [id number]      ASSESSMENT/PLAN: (Interval History)  Luisa Kilpatrick is seen and examined and management for following issues:    Right hip fx; s/p TFN 7/23/19:  WBAT; watch incision     ABLA: Hgb improving/stable w/o having to receive transfusion      Chronic diastolic CHF/LVEF 08%, mild MR/moderate AS, AR/moderate TR:  she will need to go back to see Dr Catalina Licona since he has been following her for this and can then refer to Cardiology if he and the patient wish  She remains on her home dose of Lasix 80mg daily     HTN:  at home, she had been on Verapamil but changed in April to Toprol 50mg daily 2/2 insurance would not pay for the Verapamil; however, since she has a lot of Verpamil left, she has been using them up and then is going to switch back to the Toprol  Will keep Verapamil 240mg qd for now     Left 3rd toe wound and right great toe subungual hematoma:  had been seen by Podiatry; they did remove an ingrown toenail left 3rd toe  They feel that she is likely to lose the nail on the right great toe  Continue local care    Hypokalemia:  Continue KCl 20 mEq daily = K+ is 3 6   K supplement will be new for home at discharge    RLE edema:  Venous doppler = negative DVT, +Bakers cyst; has ACE wrap on      Subjective/ HPI:  Patients overnight issues or events were reviewed with nursing or staff during rounds or morning huddle session  No new or overnight issues  ABLA: Hgb 8 3  HTN:  Stable on Verapamil  Chronic diastolic CHF: stable  Offers no complaints       ROS:     GI: denies abdominal pain, change bowel habits or reflux symptoms  Neuro: Denies any headache, new vision changes, new neuropathies,new weaknesses   Respiratory: No Cough, SOB, denies wheeze  Cardiovascular: No CP, palpitations , denies perception of rapid heartbeat  : denies any new urinary burning or frequency    Review of Scheduled Meds:    Current Facility-Administered Medications:  acetaminophen 975 mg Oral Q8H [address] Balwinder Moran MD   bacitracin 1 small application Topical Daily JUAN Marie   docusate sodium 100 mg Oral BID Balwinder Moran MD   enoxaparin 40 mg Subcutaneous Daily Balwinder Moran MD   furosemide 80 mg Oral Daily Balwinder Moran MD   lidocaine 2 patch Topical Daily Balwinder Moran MD   melatonin 3 mg Oral HS PRN Dolores Rodríguez DO   menthol-methyl salicylate  Apply externally 4x Daily PRN Marco Cortez MD   pantoprazole 40 mg Oral Daily Before Breakfast Balwinder Moran MD   polyethylene glycol 17 g Oral Daily PRN Balwinder Moran MD   potassium chloride 20 mEq Oral Daily Rian ColaceJUAN   traMADol 25 mg Oral Q4H PRN Balwinder Moran MD   traMADol 50 mg Oral Q4H PRN Balwinder Moran MD   verapamil 240 mg Oral Daily Balwinder Moran MD       Labs:     Results from last 7 days   Lab Units 08/08/19  1124 08/03/19  0623   WBC Thousand/uL 5 78 6 56   HEMOGLOBIN g/dL 10 5* 8 3*   HEMATOCRIT % 34 0* 27 4*   PLATELETS Thousands/uL 317 288     Results from last 7 days   Lab Units 08/08/19  1124 08/05/19  0509   SODIUM mmol/L 142 139   POTASSIUM mmol/L 3 6 3 7   CHLORIDE mmol/L 104 104   CO2 mmol/L 30 32   BUN mg/dL 32* 27*   CREATININE mg/dL 0 77 0 66   CALCIUM mg/dL 9 0 8 8                      Imaging:     XR hip/pelv 2-3 vws right if performed   Final Result by Ayah Rico MD (08/07 0579)      Status post ORIF of the right hip for an intertrochanteric fracture  Satisfactory alignment              Workstation performed: [id number]         VAS lower limb venous duplex study, unilateral/limited   Final Result by Hugo Guerrero MD (08/05 1514)          *Labs reviewed  *Radiology studies reviewed  *Medications reviewed and reconciled as needed  *Please refer to order section for additional ordered labs studies  *Case discussed with primary attending during morning huddle case rounds    Physical Examination:  Vitals:   Vitals:    08/08/19 1345 08/08/19 2100 08/09/19 0547 08/09/19 0837   BP: 156/70 159/67 152/67 142/56   BP Location: Right arm Left arm Left arm    Pulse: 68 70 69    Resp: 18 18 19    Temp: 98 6 °F (37 °C)  98 °F (36 7 °C)    TempSrc: Oral  Oral    SpO2: 98% 100% 97%    Weight:       Height:           General Appearance: NAD, conversive  Eyes: No icterus; conjunctiva normal, PERRLA  HENT: oropharynx clear; mucous membranes moist  Neck: trachea midline, range of motion full  Lungs: CTA, normal respiratory effort, no retractions, expiratory effort normal  CV: regular rate, no rubs/murmurs/gallops  ABD: soft: NT/ND; +BS  EXT: tr-mild edema of LLE and mild-moderate edema of RLE posterior knee area (has moderate ecchymosis there as well); right leg incisions are reportedly w/o erythema/drainage  Skin: normal turgor, normal texture, no rashes  Psych: affect normal, no overt anxiety/depression   Neuro: AAOx3            Total time spent: At least 40 minutes, with more than 50% spent counseling/coordinating care  Counseling includes discussion with patient re: progress  and discussion with patient of his/her current medical state/information  Coordination of patient's care was performed in conjunction with primary service  Time invested included review of patient's labs, vitals, and management of their comorbidities with continued monitoring  In addition, this patient was discussed with medical team including physician and advanced extenders  The care of the patient was extensively discussed and appropriate treatment plan was formulated unique for this patient  ** Please Note: Dragon 360 Dictation voice to text software may have been used in the creation of this document   **

## 2019-08-09 NOTE — PROGRESS NOTES
Physical Medicine and Rehabilitation Progress Note  Luisa Kilpatrick 80 y o  female MRN: [id number]  Unit/Bed#: [facility] 858-55 Encounter: [id number]    HPI: Luisa Kilpatrick is a 80 y o  female who presented to the [facility] [address] after fall at home  Found to have right hip fracture  Now s/p IM nailing of right femur on 7/23/19  Patient WBAT  Postoperatively noted to have ABLA, mild leukocytosis  Also required podiatry consult for left 3rd toe and left large toe wound  Patient was evaluated by therapy services, found to be below functional baseline  She was accepted to the [facility] on 7/25/19  Chief Complaint: f/u fracture and f/u ambulatory dysfunction    Interval: No acute events overnight  Denies any CP or SOB  Slept well overnight  having sensitivity to bilateral ankles; ROM is intact  Patient reports this is chronic, related to her edema  RLE edema improved with ACE wrapping  Looking forward to d/c on Sunday; We discussed her discharge, including prescriptions and follow-up appointments  Epic messages sent to PM&R and Orthopedics informing them of patient's discharge, and to call her to schedule follow-up appointment  ROS: A 10 point ROS was performed; negative except as noted above  Assessment/Plan:    * Closed right hip fracture, initial encounter [facility])  Assessment & Plan  · Acute comprehensive interdisciplinary inpatient rehabilitation including PT, OT, RN, CM, SW, dietary, psychology, etc   · WBAT  · 2 week f/u performed by orthopedics, staples removed    Right leg swelling  Assessment & Plan  · secondary to surgery, immobility  · Venous dopplers negative for DVT, but there does appear to be a popliteal fossa cyst, likely a Baker's cyst    · Improving     · Continue ACE wrapping, patient to resume PETER hose upon discharge (she has stockings at home)    Chronic pain of right knee  Assessment & Plan  · Continue lidoderm patch overnight  · S/p IASI and series of euflexxa injections by orthopedic surgery; patient reports minimal benefit  · F/u with PM&R as outpatient for conservative management     Anemia  Assessment & Plan  Results from last 7 days   Lab Units 08/08/19  1124 08/03/19  0623   HEMOGLOBIN g/dL 10 5* 8 3*     · Likely post-operative  · Monitor CBC intermittently  · Transfuse for Hgb <7  Did not require transfusion as H/h has improved     Chronic diastolic congestive heart failure (HCC)  Assessment & Plan  Wt Readings from Last 3 Encounters:   08/07/19 49 5 kg (109 lb 2 oz)   07/25/19 50 3 kg (110 lb 14 3 oz)   04/02/19 51 3 kg (113 lb)     · Regular weight checks  · Lasix daily    Essential hypertension  Assessment & Plan  Temp:  [98 °F (36 7 °C)-98 6 °F (37 °C)] 98 °F (36 7 °C)  HR:  [68-70] 69  Resp:  [18-19] 19  BP: (142-159)/(56-70) 142/56    · Verapamil    Difficulty swallowingresolved as of 8/6/2019  Assessment & Plan  · Improved  · Worse with dry foods, encouraged patient to take smaller bites  · If persists or worsens, would benefit from outpatient f/u with GI  · Of note, patient last had colonoscopy on 03/04/13, with following findings:   · Sigmoid diverticula  · Internal and external hemorrhoids  · Redundant colon with acute angulations  · Patient is to have repeat colonoscopy in 2023  # Skin  · Encourage regular turning as patient at risk for skin breakdown  · Staff to continue patient education on Q2h turning  · Rehabilitation team to perform skin checks regularly     # Bowel  · Patient reports no constipation  · To ensure regular BMs, bowel regimen consisting of:  colace , dulcolax suppository  and miralax     # Bladder  · Patient voiding spontaneously    # Pain  · Continue tylenol, for max of 3gm daily      · Continue tramadol  · Continue lidoderm patch(es)  · analgesic balm PRN    # Rehab Psych   · There are no psychological or psychiatric problems identified    # Other  - Diet/Nutrition:        Diet Orders   (From admission, onward)             Start     Ordered 08/02/19 1728  Dietary nutrition supplements  Once     Question Answer Comment   Select Supplement: Ensure Compact-Chocolate    Frequency Breakfast, Dinner        08/02/19 1727    07/25/19 1647  Diet Cardiovascular; Sodium 2 GM  Diet effective now     Question Answer Comment   Diet Type Cardiovascular    Cardiac Sodium 2 GM    RD to adjust diet per protocol?  Yes        07/25/19 1647    07/25/19 1647  Room Service  Once     Question:  Type of Service  Answer:  Room Service - Appropriate with Assistance    07/25/19 1647              - DVT prophy: Sequential compression device (Venodyne)  and Enoxaparin (Lovenox)  - GI ppx: Pantaprazole  - Nausea: None  - Supplements: None  - Sleep: None    Disposition: TBD    CODE: Level 1: Full Code Scheduled Meds:    Current Facility-Administered Medications:  acetaminophen 975 mg Oral Q8H [address] Balwinder Moran MD   bacitracin 1 small application Topical Daily JUAN Marie   docusate sodium 100 mg Oral BID Balwinder Moran MD   enoxaparin 40 mg Subcutaneous Daily Balwinder Moran MD   furosemide 80 mg Oral Daily Balwinder Moran MD   lidocaine 2 patch Topical Daily Balwinder Moran MD   melatonin 3 mg Oral HS PRN [facility],    menthol-methyl salicylate  Apply externally 4x Daily PRN Balwinder Moran MD   pantoprazole 40 mg Oral Daily Before Breakfast Balwinder Moran MD   polyethylene glycol 17 g Oral Daily PRN Balwinder Moran MD   potassium chloride 20 mEq Oral Daily JUAN Griggs   traMADol 25 mg Oral Q4H PRN Balwinder Moran MD   traMADol 50 mg Oral Q4H PRN Balwinder Moran MD   verapamil 240 mg Oral Daily Balwinder Moran MD        Objective:    Functional Update:  Physical Therapy Occupational Therapy   Weight Bearing Status: Weight Bearing as Tolerated  Transfers: Supervision  Bed Mobility: Minimal Assistance  Amulation Distance (ft): 150 feet  Ambulation: Supervision, Contact Guard  Assistive Device for Ambulation: Roller Walker  Number of Stairs: 4  Assistive Device for Stairs: Lehft Hand Rail  Stair Assistance: Minimal Assistance  Ramp: Contact Guard  Assistive Device for Ramp: Roller Walker  Discharge Recommendations: Home with:  [address] with[de-identified] Family Support, Home Physical Therapy, Outpatient Physical Therapy   Eating: Supervision  Grooming: Supervision  Bathing: Minimal Assistance  Bathing: Minimal Assistance  Upper Body Dressing: Supervision  Lower Body Dressing: Supervision  Toileting: Supervision  Tub/Shower Transfer: Minimal Assistance  Toilet Transfer: Supervision  Cognition: Within Defined Limits  Orientation: Person, Place, Time, Situation       Allergies per EMR    Physical Exam:  Temp:  [98 °F (36 7 °C)-98 6 °F (37 °C)] 98 °F (36 7 °C)  HR:  [68-70] 69  Resp:  [18-19] 19  BP: (142-159)/(56-70) 142/56  SpO2:  [97 %-100 %] 97 %    General: alert, no apparent distress, cooperative and comfortable  HEENT:  Head: Normocephalic, no lesions, without obvious abnormality  LUNGS:  no abnormal respiratory pattern, no retractions noted, non-labored breathing   ABDOMEN:  soft, non-tender  Bowel sounds normal  No masses, no organomegaly  EXTREMITIES:  edema RLE, as compared to left  NEURO:   mental status, speech normal, alert and oriented x3  PSYCH:  Alert and oriented, appropriate affect  Physical examination is otherwise unchanged from previous encounter, except as noted above  Diagnostic Studies: Reviewed  XR hip/pelv 2-3 vws right if performed   Final Result by Pat Maradiaga MD (08/07 0055)      Status post ORIF of the right hip for an intertrochanteric fracture  Satisfactory alignment              Workstation performed: [id number]         VAS lower limb venous duplex study, unilateral/limited   Final Result by Queta Srivastava MD (08/05 2944)        Laboratory: Reviewed    Results from last 7 days   Lab Units 08/08/19  1124 08/03/19  0623   HEMOGLOBIN g/dL 10 5* 8 3*   HEMATOCRIT % 34 0* 27 4*   WBC Thousand/uL 5 78 6 56     Results from last 7 days   Lab Units 08/08/19  1124 08/05/19  0509   BUN mg/dL 32* 27*   SODIUM mmol/L 142 139   POTASSIUM mmol/L 3 6 3 7   CHLORIDE mmol/L 104 104   CREATININE mg/dL 0 77 0 66            Patient Active Problem List   Diagnosis    Cataract    Essential hypertension    Aortic stenosis, moderate    Atrial dilatation, bilateral    Mild tricuspid insufficiency    Non-rheumatic mitral regurgitation    Chronic diastolic congestive heart failure (HCC)    Localized edema    Leg foot wound- 3rd digit    Other fatigue    Arthritis    Closed right hip fracture, initial encounter ([facility] )    Intertrochanteric fracture of right femur ([facility] )    Mitral valve stenosis, mild    Anemia    Chronic pain of right knee    Right leg swelling       ** Please Note: Fluency Direct voice to text software may have been used in the creation of this document  **    Total time spent: At least 65 minutes  Counseling includes discussion with patient re: progress in therapies, functional issues observed by therapy staff, and discussion with patient regarding their current medical state and wellbeing  Coordination of patient's care was performed in conjunction with Internal Medicine service to monitor patient's vitals, labs, and management of their comorbidities  Additional time was spent preparing patient for discharge on 8/11/19  Greater than 50% of the total time was spent examining patient, answering all patient questions, arranging and discussing plan of care with patient as well as directly providing post-discharge instructions  Additional time then spent on discharge activities

## 2019-08-09 NOTE — PROGRESS NOTES
08/09/19 1230   Pain Assessment   Pain Assessment 0-10   Pain Score 7   Pain Type Acute pain   Pain Location Hip;Knee   Pain Orientation Right   Hospital Pain Intervention(s) Cold applied;Repositioned; Ambulation/increased activity   Response to Interventions Pt cont with inc pain during functional transfer and mobility  CP behind R knee during supine TE  Restrictions/Precautions   Precautions Contact/isolation   Weight Bearing Restrictions Yes   RLE Weight Bearing Per Order WBAT   Cognition   Overall Cognitive Status WFL   Arousal/Participation Cooperative; Alert   Subjective   Subjective Pt presents in recliner chair  C/o pain as above  Agreeable to session  QI: Sit to Lying   Assistance Needed Physical assistance; Adaptive equipment   Assistance Provided by [place] Less than 25%   Comment Daysi RLE  Trailed bed mobility with sheet and leg   Pt states leg  works best  Still need Daysi for boost due to inc pain behind knee this session  Sit to Lying CARE Score 3   QI: Lying to Sitting on Side of Bed   Assistance Needed Adaptive equipment;Physical assistance   Assistance Provided by [place] Less than 25%   Comment Daysi slight boost to boost RLE despite use of leg   Pt with inc ease of transfer this session but cont to have limitation due to inc pain behind R knee this session  Lying to Sitting on Side of Bed CARE Score 3   QI: Sit to Stand   Assistance Needed Independent; Adaptive equipment   Assistance Provided by [place] No physical assistance   Sit to Stand CARE Score 6   QI: Chair/Bed-to-Chair Transfer   Assistance Needed Incidental touching; Adaptive equipment   Assistance Provided by [place] No physical assistance   Comment Adrienne with RW    Chair/Bed-to-Chair Transfer CARE Score 4   Transfer Bed/Chair/Wheelchair   Limitations Noted In Balance;Pain Management;LE Strength   Adaptive Equipment Roller Walker   Stand Pivot Modified Independent   Sit to Stand Modified Independent   Stand to Sit Modified Independent   Supine to Sit Minimal Assist   Sit to Supine Minimal Assist   Findings Leg  for bed mobility  Bed, Chair, Wheelchair Transfer (FIM) 6 - Patient requires assistive device/extra time/safety concerns but completes independently   QI: [address]; Adaptive equipment   Assistance Provided by [place] No physical assistance   Comment Adrienne with RW  Walk 10 Feet CARE Score 6   QI: Walk 50 Feet with Two [address]; Adaptive equipment   Assistance Provided by [place] No physical assistance   Comment Adrienne with RW    Walk 50 Feet with Two Turns CARE Score 6   QI: Walk 150 Feet   Assistance Needed Supervision; Adaptive equipment   Assistance Provided by [place] No physical assistance   Comment CS for longer distances with RW    Walk 150 Feet CARE Score 4   QI: Walking 10 Feet on Uneven Surfaces   Assistance Needed Supervision; Adaptive equipment   Assistance Provided by [place] No physical assistance   Comment CS with RW for outside ambulation    Walking 10 Feet on Uneven Surfaces CARE Score 4   Ambulation   Does the patient walk? 2  Yes   Primary Discharge Mode of Locomotion Walk   Walk Assist Level Modified Independent;Close Supervision   Gait Pattern Antalgic; Inconsistant Felipa;Decreased foot clearance; Step through; Decreased R stance; Improper weight shift   Assist Device Melvaer Justa Patino Walked (feet) 150 ft  (x2, 100x2 outside B entrance, HH distance in gym )   Limitations Noted In Heel Strike;Balance;Speed;Strength;Swing;Posture   Findings Pt ambulated HH distances in gym and room  Progressed to IRP with RW  Nursing and board updated  Pt CS level for longer distance walking and ambulation outside B entrance  Cont with antalgic gait and dec heel strike on RLE      Walking (FIM) 5 - HOUSEHOLD EXCEPTION: Ambulates 50 feet or more, with or w/o a device, but completely independent   Wheelchair mobility   QI: Does the patient use a wheelchair? 0  No   QI: 1 Step (Curb)   Assistance Needed Adaptive equipment; Incidental touching;Verbal cues   Assistance Provided by [place] Less than 25%   Comment VCs sequencing with RW curb step outside B entrance    1 Step (Curb) CARE Score 3   QI: 4 Steps   Reason if not Attempted Safety concerns   4 Steps CARE Score 88   QI: 12 Steps   Reason if not Attempted Safety concerns   12 Steps CARE Score 88   Stairs   Type Curb;Ramp   # of Steps 1   Weight Bearing Precautions Fall Risk   Assist Devices Roller Walker   Findings CGA with RW curb step outside B entrance  Stairs (FIM) 4 - Patient requires steadying assist or light touching AND patient goes up and down full flight (12- 14 stairs)   Toilet Transfer   Toilet Transfer (FIM) 0 - Activity does not occur   Therapeutic Interventions   Strengthening Supine ther ex: AAROM RLE 1x15 SKC, SLR,  2x10 SAQ  Flexibility Seated BLE hs and gastroc stretch x4 min  Seated quad stretch x3 min RLE  Modalities CP behind R knee x15 mins during supine TE  Other Ambulation in pts room to pracitce avoiding obstacles and managing tray table to make for safe ambulation in room as pt progressed to IRP  Ambulation outside B entrance [id number]  STM to R hip flexor x5 min  Assessment   Treatment Assessment Pt participated in skilled PT to inc functional mobility, transfer, LE strengthening and bed mobility  Pt repeated trials of bed mobility on hi/lo mat trialing bed sheet, towel and leg  to left RLE into bed  Leg  worked best  Pt purchased form consignment [employer]  Pt progressed to Adrienne and IRP this session  Cont to be  limited by pain which was inc behind R knee this session  Redness ntoed behind right Knee and CRNP SKYLA and DR Moran noified  Plan for FT with son tomorrow  Cont to be S for bathing and ADL in room as per OT  Pt cont o benefit from skile PT to maximize function and overall safety      Family/Caregiver Present no    Barriers to Discharge Inaccessible home environment;Decreased caregiver support   PT Barriers   Physical Impairment Decreased strength;Decreased range of motion;Decreased endurance;Decreased mobility;Orthopedic restrictions;Pain   Functional Limitation Car transfers;Stair negotiation;Standing;Walking;Transfers   Plan   Treatment/Interventions Functional transfer training;LE strengthening/ROM; Therapeutic exercise; Endurance training;Patient/family training;Equipment eval/education; Bed mobility;Gait training   Progress Progressing toward goals   Recommendation   Recommendation Home PT; Home with family support   Equipment Recommended Walker   PT Therapy Minutes   PT Time In 1230   PT Time Out 1400   PT Total Time (minutes) 90   PT Mode of treatment - Individual (minutes) 90   PT Mode of treatment - Concurrent (minutes) 0   PT Mode of treatment - Group (minutes) 0   PT Mode of treatment - Co-treat (minutes) 0   PT Mode of Teatment - Total time(minutes) 90 minutes   Therapy Time missed   Time missed?  No

## 2019-08-10 PROCEDURE — 97535 SELF CARE MNGMENT TRAINING: CPT

## 2019-08-10 PROCEDURE — 97530 THERAPEUTIC ACTIVITIES: CPT

## 2019-08-10 RX ADMIN — MELATONIN 3 MG: 3 TAB ORAL at 01:03

## 2019-08-10 RX ADMIN — ACETAMINOPHEN 975 MG: 325 TABLET ORAL at 22:08

## 2019-08-10 RX ADMIN — LIDOCAINE 2 PATCH: 50 PATCH CUTANEOUS at 22:06

## 2019-08-10 RX ADMIN — ACETAMINOPHEN 975 MG: 325 TABLET ORAL at 14:49

## 2019-08-10 RX ADMIN — DOCUSATE SODIUM 100 MG: 100 CAPSULE, LIQUID FILLED ORAL at 08:17

## 2019-08-10 RX ADMIN — VERAPAMIL HYDROCHLORIDE 240 MG: 240 TABLET, FILM COATED, EXTENDED RELEASE ORAL at 08:18

## 2019-08-10 RX ADMIN — FUROSEMIDE 80 MG: 80 TABLET ORAL at 08:17

## 2019-08-10 RX ADMIN — MELATONIN 3 MG: 3 TAB ORAL at 22:10

## 2019-08-10 RX ADMIN — ENOXAPARIN SODIUM 40 MG: 40 INJECTION SUBCUTANEOUS at 22:08

## 2019-08-10 RX ADMIN — ACETAMINOPHEN 975 MG: 325 TABLET ORAL at 05:37

## 2019-08-10 RX ADMIN — TRAMADOL HYDROCHLORIDE 50 MG: 50 TABLET, FILM COATED ORAL at 14:52

## 2019-08-10 RX ADMIN — TRAMADOL HYDROCHLORIDE 50 MG: 50 TABLET, FILM COATED ORAL at 01:03

## 2019-08-10 RX ADMIN — POTASSIUM CHLORIDE 20 MEQ: 1500 TABLET, EXTENDED RELEASE ORAL at 08:17

## 2019-08-10 RX ADMIN — PANTOPRAZOLE SODIUM 40 MG: 40 TABLET, DELAYED RELEASE ORAL at 05:38

## 2019-08-10 RX ADMIN — DOCUSATE SODIUM 100 MG: 100 CAPSULE, LIQUID FILLED ORAL at 17:15

## 2019-08-10 NOTE — PROGRESS NOTES
08/10/19 1030   Pain Assessment   Pain Assessment 0-10   Pain Score 5   Pain Type Acute pain;Surgical pain   Pain Location Hip;Knee   Pain Orientation Right   Pain Descriptors [facility] Pain Intervention(s) Repositioned; Ambulation/increased activity   Response to Interventions Pt pain cont to inc w functional mobility but tolerance much improved this session  Restrictions/Precautions   Precautions Fall Risk;Pain   Weight Bearing Restrictions Yes   RLE Weight Bearing Per Order WBAT   Cognition   Overall Cognitive Status WFL   Arousal/Participation Alert; Cooperative   Subjective   Subjective Pt presents with son and DIL present for family training  Pt c/o pain but agreeable to session  QI: Roll Left and Right   Assistance Needed Supervision   Assistance Provided by [place] Less than 25%   Comment S roll to L side  Refused to roll on right due to hip pain on affected side  Roll Left and Right CARE Score 3   QI: Sit to Lying   Assistance Needed Supervision; Adaptive equipment   Assistance Provided by [place] Less than 25%   Comment CGA/CS with use of leg   Inc time but better tolerance this session  Sit to Lying CARE Score 3   QI: Lying to Sitting on Side of Bed   Assistance Needed Supervision; Incidental touching; Adaptive equipment   Assistance Provided by [place] Less than 25%   Comment CGA with use of leg     Lying to Sitting on Side of Bed CARE Score 3   QI: Sit to [address]; Adaptive equipment   Assistance Provided by [place] No physical assistance   Comment RW    Sit to Stand CARE Score 6   Bed Mobility   Able to Roll Right to Left   QI: Chair/Bed-to-Chair Transfer   Assistance Needed Independent; Adaptive equipment   Assistance Provided by [place] No physical assistance   Comment RW    Chair/Bed-to-Chair Transfer CARE Score 6   Transfer Bed/Chair/Wheelchair   Limitations Noted In Pain Management   Adaptive Equipment Roller Walker   Stand Pivot Modified Independent   Sit to Stand Modified Independent   Stand to Sit Modified Independent   Supine to Sit Contact Guard   Sit to Supine Contact Guard   Car Transfer Supervision;Contact Guard   Findings Leg  for bed mobility    Bed, Chair, Wheelchair Transfer (FIM) 6 - Patient requires assistive device/extra time/safety concerns but completes independently   QI: Car Transfer   Assistance Needed Adaptive equipment;Supervision; Incidental touching   Assistance Provided by [place] No physical assistance   Comment with Leg   Trialed on son's car able to complete with inc time  Car Transfer CARE Score 4   QI: Walk [address]; Adaptive equipment   Assistance Provided by [place] No physical assistance   Walk 10 Feet CARE Score 6   QI: Walk 50 Feet with Two [address]; Adaptive equipment   Assistance Provided by [place] No physical assistance   Walk 50 Feet with Two Turns CARE Score 6   QI: Walk 150 Feet   Assistance Needed Independent; Adaptive equipment   Walk 150 Feet CARE Score 6   QI: Walking 10 Feet on Uneven Surfaces   Assistance Needed Supervision; Adaptive equipment   Assistance Provided by [place] No physical assistance   Comment CS on uneven surface outside B entrance    Walking 10 Feet on Uneven Surfaces CARE Score 4   Ambulation   Does the patient walk? 2  Yes   Primary Discharge Mode of Locomotion Walk   Walk Assist Level Modified Independent;Close Supervision   Gait Pattern Antalgic; Inconsistant Felipa;Decreased foot clearance; Step to;Decreased R stance; Improper weight shift   Assist Device Roller Justa Patino Walked (feet) 150 ft  (x3, 50 outside B entrance )   Limitations Noted In Heel Strike;Posture;Speed;Swing;Strength   Findings Pt ambualted 150ft Adrienne  Son and DIL present to educate on guarding pt if pain inc or fatigued  Good understanding and able to demonstrate  Pt functionally Adrienne level for d/c for house hold distances   Cont to be CS outside ambulation on unven surfaces  Walking (FIM) 6 - Patient requires assistive device/extra time/safety concerns but completes independently AND distance 150 feet or more, no rest   Wheelchair mobility   QI: Does the patient use a wheelchair? 0  No   QI: 1 Step (Curb)   Assistance Needed Adaptive equipment;Physical assistance   Assistance Provided by [place] 25%-49%   Comment Jesus with SHR on L ascending- non-reciprocal pattern  Instructed son on proper guarding technique  1 Step (Curb) CARE Score 3   QI: 4 Steps   Assistance Needed Physical assistance; Adaptive equipment   Assistance Provided by [place] 25%-49%   Comment Jesus with SHR on L ascending- non-reciprocal pattern  Instructed son on proper guarding technique  4 Steps CARE Score 3   QI: 12 Steps   Reason if not Attempted Safety concerns   12 Steps CARE Score 88   Stairs   Type Curb;Stairs; Ramp   # of Steps 4   Weight Bearing Precautions RLE;WBAT;Fall Risk   Assist Devices Single Rail   Findings Instructed son and DIL on proper guarding technique for stair negotiation  Able to demonstrate soundly  Discussed recommendation is first floor set up until pt becomes stronger and can progress to stairs at home  Pt and son in agreement and arrangements made for single floor set up upon d/c  Stairs (FIM) 2 - Patient goes up and down 4 - 11 stairs regardless of assist/device/setup   Toilet Transfer   Toilet Transfer (FIM) 0 - Activity does not occur   Therapeutic Interventions   Other Perfomed car txfr, ambulation, stair negotiation and repeated transfer with son and DIL present to educate on guarding pt if needed  Assessment   Treatment Assessment Pt participated in skilled PT session with focus on functional transfer, stair negotiation and bed mobility family training with son and DIL  Pt functioning [place]ana level for transfer and mobility  CS for car transfer and bedmobility with occ A for RLE despite use of leg    Pt able to complete bed mobility today but functional level fluctuates with pain level  Pt son has 12 weeks approved FMLA to help upon d/c along with aide who comes daily to assist with   Son made arrangements for first floor set up  Pt can enter house via w/c ramp in back as steps are barrier at this time  Pt able to complete car transfer CS level with use of leg  and inc time  Pt ghada tobin DIL sound understanding and demonstration of proper guarding technique for transfer and stair negotiation  Pt to d/c home tomorrow 8/11 with HHPT  Family/Caregiver Present yes  (Kaykay Tim Ova Pop )   PT Family training done with: Kaykay Tim Ova Pop    PT Barriers   Physical Impairment Decreased strength;Decreased range of motion;Decreased endurance;Orthopedic restrictions;Pain;Decreased mobility   Plan   Treatment/Interventions Functional transfer training;LE strengthening/ROM; Therapeutic exercise; Endurance training;Patient/family training;Bed mobility;Gait training;Equipment eval/education   Progress Progressing toward goals   Recommendation   Recommendation Home PT; Home with family support   Equipment Recommended Walker   PT Therapy Minutes   PT Time In 1030   PT Time Out 1130   PT Total Time (minutes) 60   PT Mode of treatment - Individual (minutes) 60   PT Mode of treatment - Concurrent (minutes) 0   PT Mode of treatment - Group (minutes) 0   PT Mode of treatment - Co-treat (minutes) 0   PT Mode of Teatment - Total time(minutes) 60 minutes   Therapy Time missed   Time missed?  No

## 2019-08-10 NOTE — PROGRESS NOTES
Occupational Therapy Treatment Note       08/10/19 0900   Pain Assessment   Pain Assessment 0-10   Pain Score 5   Pain Type Acute pain;Surgical pain   Pain Location Hip;Knee  (Right)   Hospital Pain Intervention(s) Ambulation/increased activity; Distraction;Relaxation technique   Response to Interventions unchanged   Restrictions/Precautions   Precautions Fall Risk;Pain   RLE Weight Bearing Per Order WBAT   Lifestyle   Autonomy "we will see how things go at home"   QI: [address] Provided by [place] No physical assistance   Eating CARE Score 6   Eating Assessment   Eating (FIM) 6 - Patient requires increased time or safety concern   QI: Oral Hygiene   Assistance Needed Independent   Assistance Provided by [place] No physical assistance   Oral Hygiene CARE Score 6   Grooming   Able To Apply Make-up;Comb/Brush Hair;Brush/Clean Teeth;Wash/Dry Hands; Initiate Tasks; Acquire Items   Limitation Noted In Strength;Timeliness   Findings Pt able to initiate and complete grooming tasks in stance at sink independently  Grooming (FIM) 7 - No helper, safely, timely and completes all tasks independently   QI: Shower/Bathe [address] Provided by [place] No physical assistance   Comment Pt completed bathing in shower seated/standing with grab bars and tub bench with supervision for standing balance on wet surface  OT recommends sponge bathing in bathroom in stance at sink/seated on commode for d/c 2* inability to walk up stairs independently to tub on 2nd floor  Pt is able to  Complete sponge bathing in stance and while seated on BSC at Mod I level      Shower/Bathe Self CARE Score 6   Bathing   Assessed Bath Style Shower   Anticipated D/C Bath Style Sponge Bath   Able to [address] No   Able to [employer] Temperature Yes   Able to Wash/Rinse/Dry (body part) Left Arm;Right Arm;L Upper Leg;R Upper Leg;L Lower Leg/Foot;R Lower Leg/Foot;Chest;Perineal Area;Buttocks; Abdomen   Limitations Noted in Balance;Strength;Timeliness; Endurance   Positioning Seated;Standing   Adaptive Equipment Tub Bench; Shower [employer]; Shower Seat;Hand [employer] occurs 2* Pt being unable to shower at home  OT recommendation for sponge bathing on first floor  Bathing (FIM) 6 - Patient requires assistive device/extra time/safety concerns but completes independently   Tub/Shower Transfer   Limitations Noted In Balance; Endurance;LE Strength   Adaptive Equipment Grab Bars;Transfer Bench   Assessed Shower   Findings Pt transferred into shower with RW to tub bench with supervision  OF note when simulating tub pt requires assist to lift R LE over tub  Plan for sponge bathing upon d/c  OT educated Pt and son that when Pt is able to access 2nd floor anticipate Pt will required tub transfer bench  Plan for home therapy services to assess DME needs based on Pts progress at home   Shower Transfer (FIM) 5 - Patient requires supervision/monitoring   QI: Upper Body [address] Provided by [place] No physical assistance   Upper Body Dressing CARE Score 6   QI: Lower Body [address] Provided by [place] No physical assistance   Comment seated to thread LE and in stance to pull clothing over hips  Pt unthreaded underwear in stance with no LOB  OT educated Pt to sit to unthread LE from underwear to decrease fall risk  Lower Body Dressing CARE Score 6   QI: Putting On/Taking Off Footwear   Assistance Needed Physical assistance   Assistance Provided by [place] 50%-74%   Comment Pt 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 and requires assistance  Son/dtr in law present for family training and verbalized that they have donned TEDS for pts  previously  Ot educated son on ace wrapping R LE, per Dr Lilia Rdz verbally 8/9/19  Pt son demo ace wrap of R LE  Pt able to don/doff shoeswhile seated independently  Putting On/Taking Off Footwear CARE Score 2   QI: [address] [facility] Provided by [place] No physical assistance   Comment Pt used reacher to  object in stance with RW independently  OT educated Pt to get close to object to decrease fall risk  Picking Up Object CARE Score 6   Dressing/Undressing Clothing   Able to  [address] Undergarment;Socks; Shoes   Don UB Clothes Other;Bra  (Dress)   Don LB Clothes Socks; Shoes;TEDs;Undergarment   Limitations Noted In Endurance;Strength;Timeliness   Positioning Standing;Supported Sit   UB Dressing (FIM) 6 - Patient requires assistive device/extra time/safety concerns but completes independently   LB Dressing (FIM) 5 - Patient requires supervision/monitoring   QI: Sit to [address]; Adaptive equipment   Assistance Provided by [place] No physical assistance   Sit to Stand CARE Score 6   QI: Chair/Bed-to-Chair Transfer   Assistance Needed Independent; Adaptive equipment   Assistance Provided by [place] No physical assistance   Chair/Bed-to-Chair Transfer CARE Score 6   Transfer Bed/Chair/Wheelchair   Limitations Noted In Endurance;LE Strength;Balance   Adaptive Equipment Roller Walker   Findings Pt completes functional mobility in room and bathroom using RW at Mod I level   Bed, Chair, Wheelchair Transfer (FIM) 6 - Patient requires assistive device/extra time/safety concerns but completes independently   QI: [address] Provided by [place] No physical assistance   [address] Score 6   Toileting   Able to [address] down yes, up yes  Manage Hygiene Bladder; Bowel   Limitations Noted In Balance;LE Strength   Adaptive Equipment Grab Bar   Toileting (FIM) 6 - Patient requires assistive device/extra time/safety concerns but completes independently   QI: Toilet Transfer   Assistance Needed Independent; Adaptive equipment Assistance Provided by [place] No physical assistance   Toilet Transfer CARE Score 6   Toilet Transfer   Surface Assessed Standard Commode   Transfer Technique Standard   Limitations Noted In Balance;LE Strength; Endurance   Toilet Transfer (FIM) 6 - Patient requires assistive device/extra time/safety concerns but completes independently   Kitchen Mobility   Kitchen-Mobility Level Walker   Kitchen Activity Retrieve items;Transport items   Kitchen Mobility Comments OT educated Pt on parking walker to side of kitchen counter and using B/L UE support on counter to slide items  Pt able to demo carry over of sliding plate  Plan for Pt to transport items from counter to Rafa then Rafa to kitchen table  Pt does not wish to purchase walker tray at this time  Pt able to complete at Mod I level  Cognition   Overall Cognitive Status WFL   Arousal/Participation Alert; Responsive; Cooperative   Attention Within functional limits   Orientation Level Oriented X4   Memory Within functional limits   Following Commands Follows multistep commands with increased time or repetition   Activity Tolerance   Activity Tolerance Patient tolerated treatment well;Patient limited by pain   Assessment   Treatment Assessment Pt engaged in skilled OT session focusing on ADLs and family training  Pt currently at Mod I level for ADLs, functional mobility, and functional transfers  Son and dtr in law present for family training  OT educated family on recommendations for first floor sponge bathing, assistance required for TEDS and ace wraps and Pts overall progress  Family was receptive to training and understood d/c plan  Standard commode and RW were in room and son will take commode home today before d/c tomorrow  Family and Pt with no further questions, from OT standpoint, at end of session   Home OT recommended   Prognosis Good   Recommendation   OT Discharge Recommendation Home OT   OT Therapy Minutes   OT Time In 0900   OT Time Out 1030   OT Total Time (minutes) 90   OT Mode of treatment - Individual (minutes) 90   OT Mode of treatment - Concurrent (minutes) 0   OT Mode of treatment - Group (minutes) 0   OT Mode of treatment - Co-treat (minutes) 0   OT Mode of Teatment - Total time(minutes) 90 minutes   Therapy Time missed   Time missed?  No

## 2019-08-10 NOTE — PROGRESS NOTES
Internal Medicine Progress Note  Patient: Alicia Delatorre  Age/sex: 80 y o  female  Medical Record #: [id number]      ASSESSMENT/PLAN: (Interval History)  Alicia Delatorre is seen and examined and management for following issues:    Right hip fx; s/p TFN 7/23/19:  WBAT; watch incision     ABLA: Hgb improving/stable w/o having to receive transfusion      Chronic diastolic CHF/LVEF 40%, mild MR/moderate AS, AR/moderate TR:  she will need to go back to see Dr Marca Bosworth since he has been following her for this and can then refer to Cardiology if he and the patient wish  She remains on her home dose of Lasix 80mg daily     HTN:  at home, she had been on Verapamil but changed in April to Toprol 50mg daily 2/2 insurance would not pay for the Verapamil; however, since she has a lot of Verpamil left, she has been using them up and then is going to switch back to the Toprol  Will keep Verapamil 240mg qd for now     Left 3rd toe wound and right great toe subungual hematoma:  had been seen by Podiatry; they did remove an ingrown toenail left 3rd toe  They feel that she is likely to lose the nail on the right great toe  Continue local care    Hypokalemia:  Continue KCl 20 mEq daily = K+ is 3 6   K supplement will be new for home at discharge    RLE edema:  Venous doppler = negative DVT, +Bakers cyst; has ACE wrap on      Subjective/ HPI:  Patients overnight issues or events were reviewed with nursing or staff during rounds or morning huddle session  No new or overnight issues  ABLA: Hgb 8 3  HTN:  Stable on Verapamil  Chronic diastolic CHF: stable  Offers no complaints       ROS:     GI: denies abdominal pain, change bowel habits or reflux symptoms  Neuro: Denies any headache, new vision changes, new neuropathies,new weaknesses   Respiratory: No Cough, SOB, denies wheeze  Cardiovascular: No CP, palpitations , denies perception of rapid heartbeat  : denies any new urinary burning or frequency    Review of Scheduled Meds:    Current Facility-Administered Medications:  acetaminophen 975 mg Oral Q8H [address] Balwinder Moran MD   bacitracin 1 small application Topical Daily JUAN Marie   docusate sodium 100 mg Oral BID Balwinder Moran MD   enoxaparin 40 mg Subcutaneous Daily Balwinder Moran MD   furosemide 80 mg Oral Daily Balwinder Moran MD   lidocaine 2 patch Topical Daily Balwinder Moran MD   melatonin 3 mg Oral HS PRN Ya Bettencourt DO   menthol-methyl salicylate  Apply externally 4x Daily PRN Eloy Henriquez MD   pantoprazole 40 mg Oral Daily Before Breakfast Balwinder Moran MD   polyethylene glycol 17 g Oral Daily PRN Balwinder Moran MD   potassium chloride 20 mEq Oral Daily Honorio JUAN Collazo   traMADol 25 mg Oral Q4H PRN Balwinder Moran MD   traMADol 50 mg Oral Q4H PRN Balwinder Moran MD   verapamil 240 mg Oral Daily Balwinder Moran MD       Labs:     Results from last 7 days   Lab Units 08/08/19  1124   WBC Thousand/uL 5 78   HEMOGLOBIN g/dL 10 5*   HEMATOCRIT % 34 0*   PLATELETS Thousands/uL 317     Results from last 7 days   Lab Units 08/08/19  1124 08/05/19  0509   SODIUM mmol/L 142 139   POTASSIUM mmol/L 3 6 3 7   CHLORIDE mmol/L 104 104   CO2 mmol/L 30 32   BUN mg/dL 32* 27*   CREATININE mg/dL 0 77 0 66   CALCIUM mg/dL 9 0 8 8                      Imaging:     XR hip/pelv 2-3 vws right if performed   Final Result by Kami Hernandes MD (08/07 7599)      Status post ORIF of the right hip for an intertrochanteric fracture  Satisfactory alignment              Workstation performed: [id number]         VAS lower limb venous duplex study, unilateral/limited   Final Result by Lino Miller MD (08/05 2457)          *Labs reviewed  *Radiology studies reviewed  *Medications reviewed and reconciled as needed  *Please refer to order section for additional ordered labs studies  *Case discussed with primary attending during morning huddle case rounds    Physical Examination:  Vitals:   Vitals: 08/09/19 0547 08/09/19 0837 08/09/19 2041 08/10/19 0530   BP: 152/67 142/56 129/62 153/70   BP Location: Left arm  Right arm Right arm   Pulse: 69  67 81   Resp: 19 18 17   Temp: 98 °F (36 7 °C)  99 1 °F (37 3 °C) 98 4 °F (36 9 °C)   TempSrc: Oral  Oral Oral   SpO2: 97%  99% 98%   Weight:       Height:           General Appearance: NAD, conversive  Eyes: No icterus; conjunctiva normal, PERRLA  HENT: oropharynx clear; mucous membranes moist  Neck: trachea midline, range of motion full  Lungs: CTA, normal respiratory effort, no retractions, expiratory effort normal  CV: regular rate, no rubs/murmurs/gallops  ABD: soft: NT/ND; +BS  EXT: tr-mild edema of LLE and mild-moderate edema of RLE posterior knee area (has moderate ecchymosis there as well); right leg incisions are reportedly w/o erythema/drainage  Skin: normal turgor, normal texture, no rashes  Psych: affect normal, no overt anxiety/depression   Neuro: AAOx3            Total time spent: At least 40 minutes, with more than 50% spent counseling/coordinating care  Counseling includes discussion with patient re: progress  and discussion with patient of his/her current medical state/information  Coordination of patient's care was performed in conjunction with primary service  Time invested included review of patient's labs, vitals, and management of their comorbidities with continued monitoring  In addition, this patient was discussed with medical team including physician and advanced extenders  The care of the patient was extensively discussed and appropriate treatment plan was formulated unique for this patient  ** Please Note: Dragon 360 Dictation voice to text software may have been used in the creation of this document   **

## 2019-08-11 VITALS
HEIGHT: 60 IN | WEIGHT: 109.13 LBS | DIASTOLIC BLOOD PRESSURE: 68 MMHG | TEMPERATURE: 98 F | RESPIRATION RATE: 18 BRPM | SYSTOLIC BLOOD PRESSURE: 144 MMHG | BODY MASS INDEX: 21.42 KG/M2 | OXYGEN SATURATION: 96 % | HEART RATE: 76 BPM

## 2019-08-11 PROCEDURE — 99238 HOSP IP/OBS DSCHRG MGMT 30/<: CPT | Performed by: PHYSICAL MEDICINE & REHABILITATION

## 2019-08-11 RX ADMIN — VERAPAMIL HYDROCHLORIDE 240 MG: 240 TABLET, FILM COATED, EXTENDED RELEASE ORAL at 08:06

## 2019-08-11 RX ADMIN — ACETAMINOPHEN 975 MG: 325 TABLET ORAL at 15:11

## 2019-08-11 RX ADMIN — PANTOPRAZOLE SODIUM 40 MG: 40 TABLET, DELAYED RELEASE ORAL at 05:40

## 2019-08-11 RX ADMIN — FUROSEMIDE 80 MG: 80 TABLET ORAL at 08:06

## 2019-08-11 RX ADMIN — DOCUSATE SODIUM 100 MG: 100 CAPSULE, LIQUID FILLED ORAL at 08:06

## 2019-08-11 RX ADMIN — ACETAMINOPHEN 975 MG: 325 TABLET ORAL at 05:39

## 2019-08-11 RX ADMIN — POTASSIUM CHLORIDE 20 MEQ: 1500 TABLET, EXTENDED RELEASE ORAL at 08:06

## 2019-08-11 NOTE — NURSING NOTE
Pt d/c home with written and verbal instructions  Scripts given to son day prior to d/c  No c/o SOB, pain or discomfort at this time

## 2019-08-11 NOTE — OCCUPATIONAL THERAPY NOTE
Occupational Therapy Discharge Summary:     Pt made good progress throughout rehab stay, from OT standpoint  Pt was D/C home with continued home OT services  At time of D/C pt was completing ADLS at mod I level, plan for 1st floor sponge bathing at home only and functional transfers at mod I level using RW  Pt does require assistance for LE ace wrapping/don/doffing TEDS - family training was completed with pt's son prior to D/C and this was reviewed  Pt recommended for the following DME: BSC, RW  OT also recommending pt use walker bag at home  Pt was having assistance in caring from her  by her son and hired help

## 2019-08-12 ENCOUNTER — TELEPHONE (OUTPATIENT)
Dept: INTERNAL MEDICINE CLINIC | Facility: CLINIC | Age: 84
End: 2019-08-12

## 2019-08-12 NOTE — TELEPHONE ENCOUNTER
Call returned to the caregiver indicating that the swelling is not surprising in view of her right femur fracture as well as her history of congestive heart failure recommend continued use of furosemide 80 mg daily and elevating legs when possible  She can discontinue her Protonix medication she did not need that long-term    Will see her in the office for follow-up visit as soon as she can make it in for visit

## 2019-08-12 NOTE — CASE MANAGEMENT
Team Discharge Summary  Pt made good progress, and returned home with family support and cont'd service  Pt will received RN/PT/OT through [facility]  Pts son will provide additional supervision, and assistance with Pts spouse  Pt acquired a commode and a walker from [facility] prior to d/c, and received her medications from [address] [place] [address]  Pts son was present for family training, as well as the d/c instructions, and aware of Pts functional ability

## 2019-08-13 ENCOUNTER — TELEPHONE (OUTPATIENT)
Dept: OBGYN CLINIC | Facility: CLINIC | Age: 84
End: 2019-08-13

## 2019-08-13 NOTE — TELEPHONE ENCOUNTER
Spoke to [place] at the time of this note  She will be faxing us an initial plan of care to initiate home health services for Aki Zelaya since she was discharged from acute rehab  We will be happy to sign the orders for orthopedics and she will contact the patient's primary care, Dr Michaela Mendenhall, for management of the medical issues

## 2019-08-13 NOTE — PHYSICAL THERAPY NOTE
PT D/C SUMMARY    Pt progressed well to Adrienne with RW for functional mobility and was able to dc home with cont home PT services to make additional gains in strength, balance and overall activity tolerance  Pt's son participated in FT to review safety on the stairs and her overall functional mobility  DME order was placed for RW prior to dc home  Pt demonstrated understanding of how to perform therex as part of HEP as well  Pt used a leg  to help her get into and out of bed, and was able to perform bed mobility at Adrienne level with use of leg  to perform these tasks at home as well  Pt able to dc home at this time with family support and cont skilled PT services

## 2019-08-13 NOTE — TELEPHONE ENCOUNTER
Magali with [facility] would like Dr Katie Reinoso to call her with regard to home care for this patient    [phone]

## 2019-08-14 ENCOUNTER — TELEPHONE (OUTPATIENT)
Dept: INTERNAL MEDICINE CLINIC | Facility: CLINIC | Age: 84
End: 2019-08-14

## 2019-08-15 ENCOUNTER — TELEPHONE (OUTPATIENT)
Dept: OBGYN CLINIC | Facility: HOSPITAL | Age: 84
End: 2019-08-15

## 2019-08-15 ENCOUNTER — TRANSITIONAL CARE MANAGEMENT (OUTPATIENT)
Dept: INTERNAL MEDICINE CLINIC | Facility: CLINIC | Age: 84
End: 2019-08-15

## 2019-08-15 NOTE — TELEPHONE ENCOUNTER
Pt contacted Call Center requested refill of their medication  Medication Name: Tramadol      Dosage of Med: 50 mg      Frequency of Med:  2 times daily PRN for severe pain      Remaining Medication:  3 pills      Pharmacy and Location:   [address] [place] [address] #[id number], [address] Preferred Callback Phone Number:  [phone]    Thank you

## 2019-08-16 ENCOUNTER — OFFICE VISIT (OUTPATIENT)
Dept: OBGYN CLINIC | Facility: CLINIC | Age: 84
End: 2019-08-16
Payer: MEDICARE

## 2019-08-16 VITALS
BODY MASS INDEX: 21.09 KG/M2 | HEART RATE: 70 BPM | DIASTOLIC BLOOD PRESSURE: 62 MMHG | SYSTOLIC BLOOD PRESSURE: 172 MMHG | WEIGHT: 108 LBS

## 2019-08-16 DIAGNOSIS — M17.11 PRIMARY OSTEOARTHRITIS OF RIGHT KNEE: Primary | ICD-10-CM

## 2019-08-16 DIAGNOSIS — M25.561 CHRONIC PAIN OF RIGHT KNEE: ICD-10-CM

## 2019-08-16 DIAGNOSIS — G89.29 CHRONIC PAIN OF RIGHT KNEE: ICD-10-CM

## 2019-08-16 DIAGNOSIS — M25.461 EFFUSION OF RIGHT KNEE: ICD-10-CM

## 2019-08-16 PROCEDURE — 1124F ACP DISCUSS-NO DSCNMKR DOCD: CPT | Performed by: PHYSICIAN ASSISTANT

## 2019-08-16 PROCEDURE — 99213 OFFICE O/P EST LOW 20 MIN: CPT | Performed by: PHYSICIAN ASSISTANT

## 2019-08-16 NOTE — PROGRESS NOTES
Assessment/Plan:  1  Primary osteoarthritis of right knee     2  Effusion of right knee     3  Chronic pain of right knee       Felipa Kearns is a very pleasant 42-year-old female presenting today for evaluation of right knee pain  After reviewing her images, history, and physical exam, I believe that her pain is multifactorial   She does have some right lower extremity edema which is most likely residual from her recent long cephalomedullary nail and use of Lovenox  She also has severe underlying right knee osteoarthritis, which is likely causing her effusion, limiting motion, and responsible for pain with ambulation  I discussed with her and her son that she would be eligible for an aspiration and cortisone injection of the knee, however she would be at an elevated risk of heme arthrosis due to her use of Lovenox over the past 3 and half weeks  Because of this increased risk, she would like to wait and try conservative treatment  She can continue with ice, Ace wrap, tramadol, and Tylenol  Once she has completed Lovenox, she can also try Aleve, as this is been efficacious for her in the past   I do not have any concern for septic arthritis, heme arthrosis, or anything related to her recent right hip surgery  She has a follow-up scheduled in 3 weeks for postop evaluation of her right femur fracture  If her right knee is still painful at this visit, we can consider performing the aspiration and cortisone injection then  If she cannot wait that long, she can call and come in sooner provided she is completed with her Lovenox  She expressed understanding and all of her questions were addressed today  Subjective:  Right knee pain    Patient ID: Alicia Delatorre is a 80 y o  female  Felipa Kearns is a pleasant 42-year-old female well known to our practice after undergoing a long cephalomedullary nail for a right intertrochanteric hip fracture approximately 3 and half weeks ago    She is presenting today for evaluation of right knee pain and swelling  She was sent for an ultrasound which ruled out a DVT but did show a Baker cyst  She reports that her knee has been causing her difficulty in physical therapy and with her recovery  She feels that it has been swollen  She has a long history of right knee pain due to significant underlying osteoarthritis  She previously underwent a cortisone injection and most recently a series of Euflexxa which completed in January of this year  She denies any recent falls or new injuries to the right knee  Overall, her hip is feeling better  She is still using Lovenox for DVT prophylaxis for the next week or so  Review of Systems   Constitutional: Negative  HENT: Negative  Eyes: Negative  Respiratory: Negative  Cardiovascular: Positive for leg swelling  Gastrointestinal: Negative  Endocrine: Positive for polydipsia  Genitourinary: Negative  Musculoskeletal: Positive for arthralgias, joint swelling and myalgias  Skin: Negative  Allergic/Immunologic: Negative  Neurological: Negative  Hematological: Negative  Psychiatric/Behavioral: Negative  Past Medical History:   Diagnosis Date    Anesthesia complication       Yrs ago had "anxiety" reaction not sure while sedated, pt states sensitive to anesthesia effects    Anxiety     stress at home    Arthritis     Cataract, right     Last Assessed:5/11/16    Eye hemorrhage     left eye currently 5/12/16    History of shingles 05/2015    Hypertension     Mitral valve stenosis, mild        Past Surgical History:   Procedure Laterality Date    CARPAL TUNNEL RELEASE Left     CATARACT EXTRACTION W/ INTRAOCULAR LENS IMPLANT Left 10/11/2016    Procedure: EXTRACTION EXTRACAPSULAR CATARACT PHACO INTRAOCULAR LENS (IOL);   Surgeon: Jazmin Pike MD;  Location: [facility] OR;  Service:    [facility] January CERVICAL CONE BIOPSY      COLONOSCOPY      EYE SURGERY Left     muscle repair    IN OPEN RX FEMUR FX+INTRAMED NINI Right 2019    Procedure: INSERTION NAIL IM FEMUR ANTEGRADE (TROCHANTERIC); Surgeon: Meagan Hernández DO;  Location: [facility];  Service: Orthopedics    HI Ryan Dejesus 25 EXTRACAP,INSERT LENS Right 2016    Procedure: EXTRACTION EXTRACAPSULAR CATARACT PHACO INTRAOCULAR LENS (IOL);   Surgeon: Cheyanne Trammell MD;  Location: [facility] MAIN OR;  Service: Ophthalmology    TONSILLECTOMY         Family History   Problem Relation Age of Onset    Heart disease Mother         MI  at age 71   eRbeca Duffy Arthritis Mother     GI problems Father         bleeding ulcer    Arthritis Sister     Sleep apnea Sister     Irritable bowel syndrome Sister     Cancer Sister         skin cancer    Heart disease Brother         CABG (5)    Cancer Brother         skin cancer    Heart disease Maternal Aunt        Social History     Occupational History    Not on file   Tobacco Use    Smoking status: Never Smoker    Smokeless tobacco: Never Used   Substance and Sexual Activity    Alcohol use: Yes     Comment: rarely    Drug use: No    Sexual activity: Not on file         Current Outpatient Medications:     acetaminophen (TYLENOL) 325 mg tablet, Take 3 tablets (975 mg total) by mouth every 8 (eight) hours, Disp: 30 tablet, Rfl: 0    Calcium Carb-Cholecalciferol (CALCIUM 600 + D) 600-200 MG-UNIT TABS, Take 1 tablet by mouth daily, Disp: , Rfl:     enoxaparin (LOVENOX) 40 mg/0 4 mL, Inject 0 4 mL (40 mg total) under the skin daily for 7 days, Disp: 7 Syringe, Rfl: 0    furosemide (LASIX) 40 mg tablet, TAKE 2 TABLETS BY MOUTH EVERY DAY, Disp: 180 tablet, Rfl: 3    Multiple Vitamins-Minerals (PRESERVISION AREDS PO), Take by mouth, Disp: , Rfl:     potassium chloride (K-DUR,KLOR-CON) 20 mEq tablet, Take 1 tablet (20 mEq total) by mouth daily, Disp: 30 tablet, Rfl: 0    VERAPAMIL HCL PO, Take by mouth, Disp: , Rfl:     Allergies   Allergen Reactions    Indocin [Indomethacin]      hypertension       Objective:  Vitals: 08/16/19 1537   BP: (!) 172/62   Pulse: 70       Body mass index is 21 09 kg/m²  Right Knee Exam     Tenderness   The patient is experiencing tenderness in the patella, patellar tendon, medial joint line, medial retinaculum, lateral retinaculum and lateral joint line  Range of Motion   Extension:  5 abnormal   Flexion:  120 abnormal     Tests   Varus: negative Valgus: negative  Drawer:  Anterior - negative    Posterior - negative  Patellar apprehension: negative    Other   Erythema: present  Scars: absent  Sensation: normal  Pulse: present  Swelling: moderate  Effusion: effusion (1+) present    Comments:  Mild ecchymosis anterior knee  Mild erythema with no associated warmth or breakage in the skin and popliteal fossa  1+ edema and significant medial-sided swelling above compression stocking  Global tenderness to palpation  Ambulates slowly with an antalgic gait  No erythema or increased warmth of the knee          Observations     Right Knee   Positive for effusion (1+)  Physical Exam   Constitutional: She is oriented to person, place, and time  She appears well-developed and well-nourished  Body mass index is 21 09 kg/m²  HENT:   Head: Normocephalic and atraumatic  Eyes: EOM are normal    Neck: Normal range of motion  Cardiovascular: Intact distal pulses  Pulmonary/Chest: Effort normal    Musculoskeletal:        Right knee: She exhibits effusion (1+)  See ortho exam   Neurological: She is alert and oriented to person, place, and time  Skin: Skin is warm and dry  Psychiatric: She has a normal mood and affect  Her behavior is normal  Judgment and thought content normal    Nursing note and vitals reviewed  I have personally reviewed pertinent films in PACS of the x-rays taken previously of her right knee which show end-stage degenerative changes with joint space obliteration, osteophytosis, and sclerosis    Is also visualize her right knee joint from the operative films and postoperative x-rays of her right femur which do not show any acute processes

## 2019-08-19 ENCOUNTER — TELEPHONE (OUTPATIENT)
Dept: INTERNAL MEDICINE CLINIC | Facility: CLINIC | Age: 84
End: 2019-08-19

## 2019-08-19 ENCOUNTER — TELEPHONE (OUTPATIENT)
Dept: OBGYN CLINIC | Facility: CLINIC | Age: 84
End: 2019-08-19

## 2019-08-19 DIAGNOSIS — S72.141A CLOSED DISPLACED INTERTROCHANTERIC FRACTURE OF RIGHT FEMUR, INITIAL ENCOUNTER (HCC): Primary | ICD-10-CM

## 2019-08-19 RX ORDER — TRAMADOL HYDROCHLORIDE 50 MG/1
TABLET ORAL
Qty: 30 TABLET | Refills: 0 | Status: SHIPPED | OUTPATIENT
Start: 2019-08-19 | End: 2019-09-17 | Stop reason: ALTCHOICE

## 2019-08-19 NOTE — TELEPHONE ENCOUNTER
[place] is calling back about this patient  She said someone pleased a call out to her    Please reach back out to her thank you,     CB: [phone]

## 2019-08-19 NOTE — TELEPHONE ENCOUNTER
currently getting tramadol from the ortho dr Corrie Brooks are telling home care that she needs to get tramadol from you  They will no longer fill it     [facility] send electronically if poss

## 2019-08-19 NOTE — TELEPHONE ENCOUNTER
Dr Radha Cardoso is not prescribing this medication to the patient a message was left for Thais Matias with office call back number

## 2019-08-22 ENCOUNTER — OFFICE VISIT (OUTPATIENT)
Dept: INTERNAL MEDICINE CLINIC | Facility: CLINIC | Age: 84
End: 2019-08-22
Payer: MEDICARE

## 2019-08-22 VITALS
TEMPERATURE: 98.3 F | OXYGEN SATURATION: 98 % | BODY MASS INDEX: 20.97 KG/M2 | HEART RATE: 62 BPM | HEIGHT: 60 IN | DIASTOLIC BLOOD PRESSURE: 62 MMHG | SYSTOLIC BLOOD PRESSURE: 128 MMHG | WEIGHT: 106.8 LBS

## 2019-08-22 DIAGNOSIS — I35.0 AORTIC STENOSIS, MODERATE: ICD-10-CM

## 2019-08-22 DIAGNOSIS — D64.9 ANEMIA, UNSPECIFIED TYPE: Primary | ICD-10-CM

## 2019-08-22 DIAGNOSIS — S72.141A CLOSED DISPLACED INTERTROCHANTERIC FRACTURE OF RIGHT FEMUR, INITIAL ENCOUNTER (HCC): ICD-10-CM

## 2019-08-22 DIAGNOSIS — M79.89 RIGHT LEG SWELLING: ICD-10-CM

## 2019-08-22 DIAGNOSIS — I10 ESSENTIAL HYPERTENSION: ICD-10-CM

## 2019-08-22 DIAGNOSIS — I50.32 CHRONIC DIASTOLIC CONGESTIVE HEART FAILURE (HCC): ICD-10-CM

## 2019-08-22 PROCEDURE — 99495 TRANSJ CARE MGMT MOD F2F 14D: CPT | Performed by: INTERNAL MEDICINE

## 2019-08-22 NOTE — ASSESSMENT & PLAN NOTE
Postoperative anemia tract through the patient's hospitalization and stay at rehab  She still has a slight decrease in her red blood cells on her last CBC of asked her to have 1 more follow-up in approximately 2 weeks to re-evaluate her blood count

## 2019-08-22 NOTE — ASSESSMENT & PLAN NOTE
Right leg swelling secondary to repair of right inter trochanteric fracture of the femur as well as advanced arthritis of the right knee  No indication of deep vein thrombosis on examination

## 2019-08-22 NOTE — PROGRESS NOTES
Assessment/Plan:    Essential hypertension  Evaluation of this patient's hypertension reveals a blood pressure 128/62 after sitting for several minutes  This is adequate control for her blood pressure I have recommended that she continue on her verapamil medication for blood pressure control  Chronic diastolic congestive heart failure (HCC)  Wt Readings from Last 3 Encounters:   08/22/19 48 4 kg (106 lb 12 8 oz)   08/16/19 49 kg (108 lb)   08/07/19 49 5 kg (109 lb 2 oz)       Chronic diastolic congestive heart failure appears to be adequately controlled  Recommend that she continue on her 80 mg of furosemide daily  She also should continue on her potassium chloride supplementation  She is cautioned about eating high salt foods  Follow-up assessment is requested in 1 month  Aortic stenosis, moderate  History of moderate aortic stenosis  Her systolic murmur has not changed in intensity over her hospitalization  She does not have any shortness of breath her chest pain or palpitations at this time  Recommend continued monitoring  Intertrochanteric fracture of right femur ([address] )  Inter trochanteric fracture of the right femur appears to be healing nicely  Wounds from her surgical repair are all healing nicely with no indication of any infection process  She walks presently with a walker her ambulation is impaired by her advanced arthritis of the right knee  Follow-up with Orthopedics is recommended  Right leg swelling  Right leg swelling secondary to repair of right inter trochanteric fracture of the femur as well as advanced arthritis of the right knee  No indication of deep vein thrombosis on examination  Anemia  Postoperative anemia tract through the patient's hospitalization and stay at rehab  She still has a slight decrease in her red blood cells on her last CBC of asked her to have 1 more follow-up in approximately 2 weeks to re-evaluate her blood count         Diagnoses and all orders for this visit:    Anemia, unspecified type  -     CBC and differential; Future        Subjective:      Patient ID: Umu Loza is a 80 y o  female  This is a transition of care visit for this 60-year-old female patient  She was recently hospitalized at [facility] for a fractured right fibula  She underwent pinning internally of this fracture followed by recovery at a skilled nursing facility she has recently returned home  Her recovery has been compromised somewhat by her advanced arthritis of the right knee  She has had difficulty ambulating because of the arthritis in her right knee  We reviewed during today's visit the hospitalization and recovery at skilled nursing facility  We also reviewed with the patient today the x-rays of her right knee as well as the fractured right femur  She is using tramadol half a tablet p r n  for relief of her pain  There is no indication of abuse or dependence on this medication  She was recently he has presented with a refill prescription  She indicates that she has been sleeping well but occasionally wakes up because of pain when she rolls around in bed  She has had no constipation symptoms from her tramadol pain medication  Her appetite remains well she continues with physical therapy  She has had no shortness of breath chest pain or unexpected calf pain  The following portions of the patient's history were reviewed and updated as appropriate:   She  has a past medical history of Anesthesia complication, Anxiety, Arthritis, Cataract, right, Eye hemorrhage, History of shingles (05/2015), Hypertension, and Mitral valve stenosis, mild    She   Patient Active Problem List    Diagnosis Date Noted    Hypokalemia 08/09/2019    Right leg swelling 08/05/2019    Chronic pain of right knee 08/01/2019    Anemia 07/26/2019    Closed right hip fracture, initial encounter ([facility] ) 07/22/2019    Intertrochanteric fracture of right femur ([facility] ) 07/22/2019    Mitral valve stenosis, mild     Arthritis 10/19/2018    Chronic diastolic congestive heart failure ([address] ) 05/10/2018    Localized edema 05/10/2018    Leg foot wound- 3rd digit 05/10/2018    Other fatigue 05/10/2018    Cataract 05/13/2016    Aortic stenosis, moderate 05/11/2016    Atrial dilatation, bilateral 05/11/2016    Mild tricuspid insufficiency 05/11/2016    Non-rheumatic mitral regurgitation 05/11/2016    Essential hypertension 08/22/2013     She  has a past surgical history that includes Tonsillectomy; Eye surgery (Left); Carpal tunnel release (Left); Cervical cone biopsy; Colonoscopy; Cataract extraction w/ intraocular lens implant (Left, 10/11/2016); pr remv cataract extracap,insert lens (Right, 5/17/2016); and pr open rx femur fx+intramed natalie (Right, 7/23/2019)  Her family history includes Arthritis in her mother and sister; Cancer in her brother and sister; GI problems in her father; Heart disease in her brother, maternal aunt, and mother; Irritable bowel syndrome in her sister; Sleep apnea in her sister  She  reports that she has never smoked  She has never used smokeless tobacco  She reports that she drinks alcohol  She reports that she does not use drugs    Current Outpatient Medications   Medication Sig Dispense Refill    acetaminophen (TYLENOL) 325 mg tablet Take 3 tablets (975 mg total) by mouth every 8 (eight) hours 30 tablet 0    Calcium Carb-Cholecalciferol (CALCIUM 600 + D) 600-200 MG-UNIT TABS Take 1 tablet by mouth daily      furosemide (LASIX) 40 mg tablet TAKE 2 TABLETS BY MOUTH EVERY  tablet 3    Multiple Vitamins-Minerals (PRESERVISION AREDS PO) Take by mouth      potassium chloride (K-DUR,KLOR-CON) 20 mEq tablet Take 1 tablet (20 mEq total) by mouth daily 30 tablet 0    traMADol (ULTRAM) 50 mg tablet Take 1/2 tablet twice a day as needed for moderate to severe pain 30 tablet 0    VERAPAMIL HCL PO Take by mouth       No current facility-administered medications for this visit       Review of Systems   Constitutional: Positive for fatigue  Cardiovascular: Positive for leg swelling  Musculoskeletal: Positive for arthralgias, gait problem, joint swelling and myalgias  All other systems reviewed and are negative  Objective:      /62   Pulse 62   Temp 98 3 °F (36 8 °C)   Ht 5' (1 524 m)   Wt 48 4 kg (106 lb 12 8 oz)   SpO2 98%   BMI 20 86 kg/m²          Physical Exam   Constitutional: She is oriented to person, place, and time  Vital signs are normal  She appears well-developed and well-nourished  She is cooperative  HENT:   Right Ear: Hearing, tympanic membrane, external ear and ear canal normal    Left Ear: Hearing, tympanic membrane, external ear and ear canal normal    Nose: Nose normal  No mucosal edema  Mouth/Throat: Uvula is midline, oropharynx is clear and moist and mucous membranes are normal    Eyes: Pupils are equal, round, and reactive to light  Conjunctivae and lids are normal    Neck: No JVD present  Carotid bruit is not present  No thyromegaly present  Cardiovascular: Normal rate, regular rhythm and intact distal pulses  Murmur heard  Pulmonary/Chest: Effort normal and breath sounds normal  No respiratory distress  Abdominal: Soft  Normal appearance and bowel sounds are normal    Musculoskeletal: Normal range of motion  She exhibits tenderness and deformity  She exhibits no edema  This patient has some mild peripheral edema which is expected in the right lower extremity  She has tenderness over the sites of surgery but they are well healed with no indication of any infection process  She has significant swelling and a Baker cyst in her right knee  She has tenderness increased warmth in the right knee as well  There is no evidence of deep vein thrombosis on examination of her calfs  Lymphadenopathy:     She has no cervical adenopathy  Neurological: She is alert and oriented to person, place, and time   She has normal reflexes  She displays normal reflexes  Skin: Skin is warm, dry and intact  Psychiatric: She has a normal mood and affect  Her speech is normal and behavior is normal  Judgment and thought content normal  Cognition and memory are normal    Vitals reviewed

## 2019-08-22 NOTE — ASSESSMENT & PLAN NOTE
Evaluation of this patient's hypertension reveals a blood pressure 128/62 after sitting for several minutes  This is adequate control for her blood pressure I have recommended that she continue on her verapamil medication for blood pressure control

## 2019-08-22 NOTE — ASSESSMENT & PLAN NOTE
Inter trochanteric fracture of the right femur appears to be healing nicely  Wounds from her surgical repair are all healing nicely with no indication of any infection process  She walks presently with a walker her ambulation is impaired by her advanced arthritis of the right knee  Follow-up with Orthopedics is recommended

## 2019-08-22 NOTE — ASSESSMENT & PLAN NOTE
History of moderate aortic stenosis  Her systolic murmur has not changed in intensity over her hospitalization  She does not have any shortness of breath her chest pain or palpitations at this time  Recommend continued monitoring

## 2019-08-22 NOTE — ASSESSMENT & PLAN NOTE
Wt Readings from Last 3 Encounters:   08/22/19 48 4 kg (106 lb 12 8 oz)   08/16/19 49 kg (108 lb)   08/07/19 49 5 kg (109 lb 2 oz)       Chronic diastolic congestive heart failure appears to be adequately controlled  Recommend that she continue on her 80 mg of furosemide daily  She also should continue on her potassium chloride supplementation  She is cautioned about eating high salt foods  Follow-up assessment is requested in 1 month

## 2019-08-28 ENCOUNTER — TELEPHONE (OUTPATIENT)
Dept: INTERNAL MEDICINE CLINIC | Facility: CLINIC | Age: 84
End: 2019-08-28

## 2019-09-05 ENCOUNTER — TELEPHONE (OUTPATIENT)
Dept: INTERNAL MEDICINE CLINIC | Facility: CLINIC | Age: 84
End: 2019-09-05

## 2019-09-05 ENCOUNTER — APPOINTMENT (OUTPATIENT)
Dept: RADIOLOGY | Facility: CLINIC | Age: 84
End: 2019-09-05
Payer: MEDICARE

## 2019-09-05 ENCOUNTER — OFFICE VISIT (OUTPATIENT)
Dept: OBGYN CLINIC | Facility: CLINIC | Age: 84
End: 2019-09-05
Payer: MEDICARE

## 2019-09-05 VITALS
DIASTOLIC BLOOD PRESSURE: 57 MMHG | WEIGHT: 106.6 LBS | SYSTOLIC BLOOD PRESSURE: 171 MMHG | BODY MASS INDEX: 20.82 KG/M2 | HEART RATE: 65 BPM

## 2019-09-05 DIAGNOSIS — M25.551 RIGHT HIP PAIN: ICD-10-CM

## 2019-09-05 DIAGNOSIS — M17.11 PRIMARY OSTEOARTHRITIS OF RIGHT KNEE: ICD-10-CM

## 2019-09-05 DIAGNOSIS — S72.144D CLOSED NONDISPLACED INTERTROCHANTERIC FRACTURE OF RIGHT FEMUR WITH ROUTINE HEALING, SUBSEQUENT ENCOUNTER: Primary | ICD-10-CM

## 2019-09-05 DIAGNOSIS — G89.29 CHRONIC PAIN OF RIGHT KNEE: ICD-10-CM

## 2019-09-05 DIAGNOSIS — M25.561 CHRONIC PAIN OF RIGHT KNEE: ICD-10-CM

## 2019-09-05 PROCEDURE — 99024 POSTOP FOLLOW-UP VISIT: CPT | Performed by: ORTHOPAEDIC SURGERY

## 2019-09-05 PROCEDURE — 73502 X-RAY EXAM HIP UNI 2-3 VIEWS: CPT

## 2019-09-05 PROCEDURE — 20610 DRAIN/INJ JOINT/BURSA W/O US: CPT | Performed by: ORTHOPAEDIC SURGERY

## 2019-09-05 RX ORDER — BUPIVACAINE HYDROCHLORIDE 5 MG/ML
6 INJECTION, SOLUTION EPIDURAL; INTRACAUDAL
Status: COMPLETED | OUTPATIENT
Start: 2019-09-05 | End: 2019-09-05

## 2019-09-05 RX ORDER — TRIAMCINOLONE ACETONIDE 40 MG/ML
80 INJECTION, SUSPENSION INTRA-ARTICULAR; INTRAMUSCULAR
Status: COMPLETED | OUTPATIENT
Start: 2019-09-05 | End: 2019-09-05

## 2019-09-05 RX ADMIN — BUPIVACAINE HYDROCHLORIDE 6 ML: 5 INJECTION, SOLUTION EPIDURAL; INTRACAUDAL at 15:27

## 2019-09-05 RX ADMIN — TRIAMCINOLONE ACETONIDE 80 MG: 40 INJECTION, SUSPENSION INTRA-ARTICULAR; INTRAMUSCULAR at 15:27

## 2019-09-05 NOTE — PROGRESS NOTES
Assessment/Plan:  1  Closed nondisplaced intertrochanteric fracture of right femur with routine healing, subsequent encounter     2  Right hip pain  XR hip/pelv 2-3 vws right if performed   3  Primary osteoarthritis of right knee  Large joint arthrocentesis   4  Chronic pain of right knee  Large joint arthrocentesis     Naomi Cano is a pleasant 51-year-old female presenting today 6 weeks after undergoing a long cephalomedullary nail of her right hip for an intertrochanteric fracture with subtrochanteric extension  The has maintained adequate alignment and change from previous films  However, there has not been significant callus development at the fracture site, which is still visible as she had significant bone loss  She has been doing well with physical therapy in terms of regaining strength and function of the right hip  However, she has been hindered by her ongoing activity related right knee pain due to her severe underlying osteoarthritis  She has completed Lovenox for DVT prophylaxis and there is no concern for infection or DVT at this time  After discussing risks and benefits, she consented to and underwent an aspiration and cortisone injection for the right knee today, which she tolerated well without complication  Post injection instructions were provided  We will plan to see her back in 6 weeks for re-evaluation with an x-ray on arrival of her right hip and femur  She should continue with physical therapy  All of her questions were addressed today          Large joint arthrocentesis: R knee  Date/Time: 9/5/2019 3:27 PM  Consent given by: patient  Site marked: site marked  Timeout: Immediately prior to procedure a time out was called to verify the correct patient, procedure, equipment, support staff and site/side marked as required   Supporting Documentation  Indications: pain   Procedure Details  Location: knee - R knee  Preparation: Patient was prepped and draped in the usual sterile fashion  Needle size: 18 G  Ultrasound guidance: no  Approach: lateral  Medications administered: 6 mL bupivacaine (PF) 0 5 %; 80 mg triamcinolone acetonide 40 mg/mL    Aspirate amount: 0 mL    Patient tolerance: patient tolerated the procedure well with no immediate complications  Dressing:  Sterile dressing applied            Subjective: 6 weeks s/p long cephalomedullary nail right hip    Patient ID: Elvin Perla is a 80 y o  female  Fouzia Bahena is a very pleasant 80-year-old female presenting today for follow-up after undergoing a long cephalomedullary nail for a right hip intratrochanteric fracture with subtrochanteric extension 6 weeks ago  She is now home and has physical therapy coming to the house  She has completed her course of Lovenox for DVT prophylaxis  She has been ambulatory, and only complains of mild pain in the lateral and posterior hip at times  Her chief complaint is of ongoing right knee pain and swelling with activity  She feels that this has been limiting her ability to participate in physical therapy and home exercise program   She denies any new symptoms  Now that she has completed her Lovenox, she would like to consider an aspiration and cortisone injection for her right knee osteoarthritis  Review of Systems   Constitutional: Negative  HENT: Negative  Eyes: Negative  Respiratory: Negative  Cardiovascular: Positive for leg swelling  Gastrointestinal: Negative  Endocrine: Positive for polydipsia  Genitourinary: Negative  Musculoskeletal: Positive for arthralgias, joint swelling and myalgias  Skin: Negative  Allergic/Immunologic: Negative  Neurological: Positive for numbness  Hematological: Negative  Psychiatric/Behavioral: Negative  Past Medical History:   Diagnosis Date    Anesthesia complication       Yrs ago had "anxiety" reaction not sure while sedated, pt states sensitive to anesthesia effects    Anxiety     stress at home    Arthritis     Cataract, right     Last Assessed:16    Eye hemorrhage     left eye currently 16    History of shingles 2015    Hypertension     Mitral valve stenosis, mild        Past Surgical History:   Procedure Laterality Date    CARPAL TUNNEL RELEASE Left     CATARACT EXTRACTION W/ INTRAOCULAR LENS IMPLANT Left 10/11/2016    Procedure: EXTRACTION EXTRACAPSULAR CATARACT PHACO INTRAOCULAR LENS (IOL); Surgeon: Matt Torres MD;  Location: [facility] MAIN OR;  Service:     CERVICAL CONE BIOPSY      COLONOSCOPY      EYE SURGERY Left     muscle repair    VT OPEN RX FEMUR FX+INTRAMED NINI Right 2019    Procedure: INSERTION NAIL IM FEMUR ANTEGRADE (TROCHANTERIC); Surgeon: Srinivasa Amado DO;  Location: [address] [place] [address];  Service: Orthopedics    VT [address] EXTRACAP,INSERT LENS Right 2016    Procedure: EXTRACTION EXTRACAPSULAR CATARACT PHACO INTRAOCULAR LENS (IOL);   Surgeon: Matt Torres MD;  Location: [facility] MAIN OR;  Service: Ophthalmology    TONSILLECTOMY         Family History   Problem Relation Age of Onset    Heart disease Mother         MI  at age 71   Tsai Arthritis Mother     GI problems Father         bleeding ulcer    Arthritis Sister     Sleep apnea Sister     Irritable bowel syndrome Sister     Cancer Sister         skin cancer    Heart disease Brother         CABG (5)    Cancer Brother         skin cancer    Heart disease Maternal Aunt        Social History     Occupational History    Not on file   Tobacco Use    Smoking status: Never Smoker    Smokeless tobacco: Never Used   Substance and Sexual Activity    Alcohol use: Yes     Comment: rarely    Drug use: No    Sexual activity: Not on file         Current Outpatient Medications:     acetaminophen (TYLENOL) 325 mg tablet, Take 3 tablets (975 mg total) by mouth every 8 (eight) hours, Disp: 30 tablet, Rfl: 0    Calcium Carb-Cholecalciferol (CALCIUM 600 + D) 600-200 MG-UNIT TABS, Take 1 tablet by mouth daily, Disp: , Rfl:     furosemide (LASIX) 40 mg tablet, TAKE 2 TABLETS BY MOUTH EVERY DAY, Disp: 180 tablet, Rfl: 3    Multiple Vitamins-Minerals (PRESERVISION AREDS PO), Take by mouth, Disp: , Rfl:     potassium chloride (K-DUR,KLOR-CON) 20 mEq tablet, Take 1 tablet (20 mEq total) by mouth daily, Disp: 30 tablet, Rfl: 0    traMADol (ULTRAM) 50 mg tablet, Take 1/2 tablet twice a day as needed for moderate to severe pain, Disp: 30 tablet, Rfl: 0    VERAPAMIL HCL PO, Take by mouth, Disp: , Rfl:     Allergies   Allergen Reactions    Indocin [Indomethacin]      hypertension       Objective:  Vitals:    09/05/19 1428   BP: (!) 171/57   Pulse: 65       Body mass index is 20 82 kg/m²  Right Knee Exam     Tenderness   The patient is experiencing tenderness in the patella, patellar tendon, medial joint line, medial retinaculum, lateral retinaculum and lateral joint line  Range of Motion   Extension:  5 abnormal   Flexion:  120 abnormal     Tests   Varus: negative Valgus: negative  Drawer:  Anterior - negative    Posterior - negative  Patellar apprehension: negative    Other   Erythema: absent  Scars: absent  Sensation: normal  Pulse: present  Swelling: mild  Effusion: effusion (Trace) present    Comments:  Previous ecchymosis and erythema has resolved  Bilateral Lower extremity edema has mostly resolved  Ambulates slowly with an antalgic gait  No erythema or increased warmth of the knee      Right Hip Exam     Tenderness   The patient is experiencing tenderness in the anterior and lateral     Range of Motion   Abduction: normal   Adduction: normal   Extension: normal   Flexion: normal   External rotation: normal   Internal rotation: normal     Muscle Strength   Abduction: 5/5   Adduction: 5/5   Flexion: 5/5     Tests   NAOMI: negative  Benoit: negative    Other   Erythema: absent  Scars: present    Comments: Three lateral incisions well healed without erythema or warmth    Able to tolerate active and passive range of motion without significant pain  Only mild discomfort in the groin          Observations     Right Knee   Positive for effusion (Trace)  Physical Exam   Constitutional: She is oriented to person, place, and time  She appears well-developed and well-nourished  Body mass index is 20 82 kg/m²  HENT:   Head: Normocephalic and atraumatic  Eyes: EOM are normal    Neck: Normal range of motion  Cardiovascular: Intact distal pulses  Pulmonary/Chest: Effort normal    Musculoskeletal:        Right knee: She exhibits effusion (Trace)  See ortho exam   Neurological: She is alert and oriented to person, place, and time  Skin: Skin is warm and dry  Psychiatric: She has a normal mood and affect  Her behavior is normal  Judgment and thought content normal    Nursing note and vitals reviewed  I have personally reviewed pertinent films in PACS of the x-rays taken today of her right hip and femur which demonstrate no change in alignment of the long cephalomedullary nail and interlocking screw distally  The intertrochanteric fracture is still visible, and significant bone loss  There is early callus development appreciated  No other interval changes are appreciated

## 2019-09-12 ENCOUNTER — APPOINTMENT (OUTPATIENT)
Dept: LAB | Facility: HOSPITAL | Age: 84
End: 2019-09-12
Payer: MEDICARE

## 2019-09-12 ENCOUNTER — TRANSCRIBE ORDERS (OUTPATIENT)
Dept: ADMINISTRATIVE | Facility: HOSPITAL | Age: 84
End: 2019-09-12

## 2019-09-12 DIAGNOSIS — D64.9 ANEMIA, UNSPECIFIED TYPE: ICD-10-CM

## 2019-09-12 LAB
BASOPHILS # BLD AUTO: 0.02 THOUSANDS/ΜL (ref 0–0.1)
BASOPHILS NFR BLD AUTO: 0 % (ref 0–1)
EOSINOPHIL # BLD AUTO: 0.12 THOUSAND/ΜL (ref 0–0.61)
EOSINOPHIL NFR BLD AUTO: 1 % (ref 0–6)
ERYTHROCYTE [DISTWIDTH] IN BLOOD BY AUTOMATED COUNT: 14 % (ref 11.6–15.1)
HCT VFR BLD AUTO: 43 % (ref 34.8–46.1)
HGB BLD-MCNC: 13 G/DL (ref 11.5–15.4)
IMM GRANULOCYTES # BLD AUTO: 0.08 THOUSAND/UL (ref 0–0.2)
IMM GRANULOCYTES NFR BLD AUTO: 1 % (ref 0–2)
LYMPHOCYTES # BLD AUTO: 2 THOUSANDS/ΜL (ref 0.6–4.47)
LYMPHOCYTES NFR BLD AUTO: 18 % (ref 14–44)
MCH RBC QN AUTO: 27.7 PG (ref 26.8–34.3)
MCHC RBC AUTO-ENTMCNC: 30.2 G/DL (ref 31.4–37.4)
MCV RBC AUTO: 92 FL (ref 82–98)
MONOCYTES # BLD AUTO: 1.01 THOUSAND/ΜL (ref 0.17–1.22)
MONOCYTES NFR BLD AUTO: 9 % (ref 4–12)
NEUTROPHILS # BLD AUTO: 7.93 THOUSANDS/ΜL (ref 1.85–7.62)
NEUTS SEG NFR BLD AUTO: 71 % (ref 43–75)
NRBC BLD AUTO-RTO: 0 /100 WBCS
PLATELET # BLD AUTO: 275 THOUSANDS/UL (ref 149–390)
PMV BLD AUTO: 10.8 FL (ref 8.9–12.7)
RBC # BLD AUTO: 4.7 MILLION/UL (ref 3.81–5.12)
WBC # BLD AUTO: 11.16 THOUSAND/UL (ref 4.31–10.16)

## 2019-09-12 PROCEDURE — 36415 COLL VENOUS BLD VENIPUNCTURE: CPT

## 2019-09-12 PROCEDURE — 85025 COMPLETE CBC W/AUTO DIFF WBC: CPT

## 2019-09-13 DIAGNOSIS — E87.6 HYPOKALEMIA: ICD-10-CM

## 2019-09-13 RX ORDER — POTASSIUM CHLORIDE 20 MEQ/1
20 TABLET, EXTENDED RELEASE ORAL DAILY
Qty: 30 TABLET | Refills: 0 | Status: SHIPPED | OUTPATIENT
Start: 2019-09-13 | End: 2019-09-18 | Stop reason: SDUPTHER

## 2019-09-17 ENCOUNTER — OFFICE VISIT (OUTPATIENT)
Dept: INTERNAL MEDICINE CLINIC | Facility: CLINIC | Age: 84
End: 2019-09-17
Payer: MEDICARE

## 2019-09-17 VITALS
BODY MASS INDEX: 20.22 KG/M2 | HEART RATE: 68 BPM | WEIGHT: 103 LBS | TEMPERATURE: 99.4 F | RESPIRATION RATE: 14 BRPM | SYSTOLIC BLOOD PRESSURE: 146 MMHG | DIASTOLIC BLOOD PRESSURE: 60 MMHG | HEIGHT: 60 IN | OXYGEN SATURATION: 99 %

## 2019-09-17 DIAGNOSIS — S72.141A CLOSED DISPLACED INTERTROCHANTERIC FRACTURE OF RIGHT FEMUR, INITIAL ENCOUNTER (HCC): ICD-10-CM

## 2019-09-17 DIAGNOSIS — F32.1 CURRENT MODERATE EPISODE OF MAJOR DEPRESSIVE DISORDER WITHOUT PRIOR EPISODE (HCC): Primary | ICD-10-CM

## 2019-09-17 DIAGNOSIS — E87.6 HYPOKALEMIA: ICD-10-CM

## 2019-09-17 DIAGNOSIS — I10 ESSENTIAL HYPERTENSION: ICD-10-CM

## 2019-09-17 DIAGNOSIS — I35.0 AORTIC STENOSIS, MODERATE: ICD-10-CM

## 2019-09-17 DIAGNOSIS — D64.9 ANEMIA, UNSPECIFIED TYPE: ICD-10-CM

## 2019-09-17 DIAGNOSIS — I50.32 CHRONIC DIASTOLIC CONGESTIVE HEART FAILURE (HCC): ICD-10-CM

## 2019-09-17 PROBLEM — S72.001A CLOSED RIGHT HIP FRACTURE, INITIAL ENCOUNTER (HCC): Status: RESOLVED | Noted: 2019-07-22 | Resolved: 2019-09-17

## 2019-09-17 PROCEDURE — 99215 OFFICE O/P EST HI 40 MIN: CPT | Performed by: INTERNAL MEDICINE

## 2019-09-17 RX ORDER — MIRTAZAPINE 7.5 MG/1
7.5 TABLET, FILM COATED ORAL
Qty: 30 TABLET | Refills: 3 | Status: SHIPPED | OUTPATIENT
Start: 2019-09-17 | End: 2019-10-21 | Stop reason: ALTCHOICE

## 2019-09-17 RX ORDER — FUROSEMIDE 40 MG/1
40 TABLET ORAL DAILY
COMMUNITY
End: 2020-06-22

## 2019-09-17 NOTE — ASSESSMENT & PLAN NOTE
Recommend continuation of potassium supplementation at this point she is on 20 mEq daily her last potassium level was 3 point 6    I have provided her with a request for follow-up basic metabolic profile prior to his next visit in 1 month

## 2019-09-17 NOTE — ASSESSMENT & PLAN NOTE
Postoperative hemoglobin was down to 7 7 g  Her follow-up CBC performed for this visit indicates resolution of her anemia    She now has a hemoglobin of 13 0 g

## 2019-09-17 NOTE — ASSESSMENT & PLAN NOTE
Intratrochanteric fracture of the right femur fixed with surgical intervention  The patient's recovery continues to proceed nicely  She has home physical therapy at this time but will be transitioning I am sure in the near future to outpatient physical therapy  Her recovery is slightly impaired by her chronic right knee arthritis  I have asked her to discontinue her tramadol as a believe it may be contributing to some of her depression symptoms  In its place she can use Tylenol 1000 mg every 8 hours and Aleve 2 tablets twice a day to be taken with food

## 2019-09-17 NOTE — ASSESSMENT & PLAN NOTE
Symptoms consistent with a depression condition no evidence of anxiety at this time  She expresses difficulty getting started in the morning with motivation at a very low level  Her appetite is decreased and she is worried about how she will take care of her  when he returns home  We discussed the symptoms being consistent with depression and I have started her on 7 and 0 5 mg of Remeron at bedtime  Will see her in a month to follow-up on her depression symptoms

## 2019-09-17 NOTE — ASSESSMENT & PLAN NOTE
Blood pressure is mildly elevated today with a reading 146/60  I have recommended a follow-up observation in 1 month in the meantime recommend continuation of her for O semi it and verapamil medications

## 2019-09-17 NOTE — ASSESSMENT & PLAN NOTE
Patient with a history of aortic stenosis no change in her systolic murmur  She denies any lightheadedness or presyncopal episodes  Will continue to monitor

## 2019-09-17 NOTE — ASSESSMENT & PLAN NOTE
Wt Readings from Last 3 Encounters:   09/17/19 46 7 kg (103 lb)   09/05/19 48 4 kg (106 lb 9 6 oz)   08/22/19 48 4 kg (106 lb 12 8 oz)         Persistent the right-sided congestive heart failure with bilateral lower leg extremity  Recommend continued use of of furosemide at 80 mg daily and potassium chloride 20 mEq daily  Patient cautioned about eating excessively salty foods  I have also asked her to elevate her feet whenever possible during the day in a recliner  She has also request for a metabolic profile prior to her follow-up visit with us in 4 weeks

## 2019-09-17 NOTE — PROGRESS NOTES
Assessment/Plan:    Essential hypertension  Blood pressure is mildly elevated today with a reading 146/60  I have recommended a follow-up observation in 1 month in the meantime recommend continuation of her for O semi it and verapamil medications  Chronic diastolic congestive heart failure (HCC)  Wt Readings from Last 3 Encounters:   09/17/19 46 7 kg (103 lb)   09/05/19 48 4 kg (106 lb 9 6 oz)   08/22/19 48 4 kg (106 lb 12 8 oz)         Persistent the right-sided congestive heart failure with bilateral lower leg extremity  Recommend continued use of of furosemide at 80 mg daily and potassium chloride 20 mEq daily  Patient cautioned about eating excessively salty foods  I have also asked her to elevate her feet whenever possible during the day in a recliner  She has also request for a metabolic profile prior to her follow-up visit with us in 4 weeks  Aortic stenosis, moderate  Patient with a history of aortic stenosis no change in her systolic murmur  She denies any lightheadedness or presyncopal episodes  Will continue to monitor  Intertrochanteric fracture of right femur ([address] )  Intratrochanteric fracture of the right femur fixed with surgical intervention  The patient's recovery continues to proceed nicely  She has home physical therapy at this time but will be transitioning I am sure in the near future to outpatient physical therapy  Her recovery is slightly impaired by her chronic right knee arthritis  I have asked her to discontinue her tramadol as a believe it may be contributing to some of her depression symptoms  In its place she can use Tylenol 1000 mg every 8 hours and Aleve 2 tablets twice a day to be taken with food  Hypokalemia  Recommend continuation of potassium supplementation at this point she is on 20 mEq daily her last potassium level was 3 point 6    I have provided her with a request for follow-up basic metabolic profile prior to his next visit in 1 month    Current moderate episode of major depressive disorder without prior episode ([facility] [address] )  Symptoms consistent with a depression condition no evidence of anxiety at this time  She expresses difficulty getting started in the morning with motivation at a very low level  Her appetite is decreased and she is worried about how she will take care of her  when he returns home  We discussed the symptoms being consistent with depression and I have started her on 7 and 0 5 mg of Remeron at bedtime  Will see her in a month to follow-up on her depression symptoms  Anemia  Postoperative hemoglobin was down to 7 7 g  Her follow-up CBC performed for this visit indicates resolution of her anemia  She now has a hemoglobin of 13 0 g        Diagnoses and all orders for this visit:    Current moderate episode of major depressive disorder without prior episode (HCC)  -     mirtazapine (REMERON) 7 5 MG tablet; Take 1 tablet (7 5 mg total) by mouth daily at bedtime    Essential hypertension  -     Comprehensive metabolic panel; Future    Chronic diastolic congestive heart failure (HCC)    Aortic stenosis, moderate    Closed displaced intertrochanteric fracture of right femur, initial encounter ([facility])    Hypokalemia    Anemia, unspecified type    Other orders  -     furosemide (LASIX) 40 mg tablet; Take 40 mg by mouth 2 (two) times a day        Subjective:      Patient ID: Ruben Mortensen is a 80 y o  female  This 19-year-old patient returns today in the company of her son  She continues to make good progress in recovery from a fractured right femur  She continues with home physical therapy twice a week  She continues to ambulate with a walker  She is now negotiating stairs at home carefully  She reports no falls  Follow-up since her surgery indicates that her CBC has returned to normal and her postoperative anemia has resolved  The patient's congestive heart failure continues under treatment with diuretic therapy    She does have bilateral lower extremity edema today but does denies any chest pain palpitations or shortness of breath  She inquired whether not she needs to continue on her potassium supplement indicated that since she is taking diuretic she certainly should stay on the potassium supplement  Most recent value for potassium was 3 6 with the supplement  The patient admits to feeling somewhat down and mildly depressed since returning home from her surgery and recovery recuperation  She has a difficult time in the morning getting started her appetite is less than normal and today her affect seems to be somewhat negative  Her son is to with the is with her today confirms that he believes that she does have a mild depression  The following portions of the patient's history were reviewed and updated as appropriate:   She  has a past medical history of Anesthesia complication, Anxiety, Arthritis, Cataract, right, Eye hemorrhage, History of shingles (05/2015), Hypertension, and Mitral valve stenosis, mild  She   Patient Active Problem List    Diagnosis Date Noted    [facility] of [facility] 09/17/2019    Hypokalemia 08/09/2019    Right leg swelling 08/05/2019    Chronic pain of right knee 08/01/2019    Anemia 07/26/2019    Intertrochanteric fracture of right femur ([facility] [address] ) 07/22/2019    Mitral valve stenosis, mild     Arthritis 10/19/2018    Chronic diastolic congestive heart failure ([facility] [address] ) 05/10/2018    Localized edema 05/10/2018    Leg foot wound- 3rd digit 05/10/2018    Other fatigue 05/10/2018    Cataract 05/13/2016    Aortic stenosis, moderate 05/11/2016    Atrial dilatation, bilateral 05/11/2016    Mild tricuspid insufficiency 05/11/2016    Non-rheumatic mitral regurgitation 05/11/2016    Essential hypertension 08/22/2013     She  has a past surgical history that includes Tonsillectomy; Eye surgery (Left); Carpal tunnel release (Left);  Cervical cone biopsy; Colonoscopy; Cataract extraction w/ intraocular lens implant (Left, 10/11/2016); pr remv cataract extracap,insert lens (Right, 5/17/2016); and pr open rx femur fx+intramed natalie (Right, 7/23/2019)  Her family history includes Arthritis in her mother and sister; Cancer in her brother and sister; GI problems in her father; Heart disease in her brother, maternal aunt, and mother; Irritable bowel syndrome in her sister; Sleep apnea in her sister  She  reports that she has never smoked  She has never used smokeless tobacco  She reports that she drinks alcohol  She reports that she does not use drugs  Current Outpatient Medications   Medication Sig Dispense Refill    acetaminophen (TYLENOL) 325 mg tablet Take 3 tablets (975 mg total) by mouth every 8 (eight) hours 30 tablet 0    Calcium Carb-Cholecalciferol (CALCIUM 600 + D) 600-200 MG-UNIT TABS Take 1 tablet by mouth daily      furosemide (LASIX) 40 mg tablet Take 40 mg by mouth 2 (two) times a day      Multiple Vitamins-Minerals (PRESERVISION AREDS PO) Take by mouth      potassium chloride (K-DUR,KLOR-CON) 20 mEq tablet Take 1 tablet (20 mEq total) by mouth daily 30 tablet 0    VERAPAMIL HCL PO Take by mouth      mirtazapine (REMERON) 7 5 MG tablet Take 1 tablet (7 5 mg total) by mouth daily at bedtime 30 tablet 3     No current facility-administered medications for this visit       Review of Systems   Cardiovascular: Positive for leg swelling  Musculoskeletal: Positive for arthralgias, gait problem and myalgias  Psychiatric/Behavioral: Positive for decreased concentration and dysphoric mood  All other systems reviewed and are negative  Objective:      /60   Pulse 68   Temp 99 4 °F (37 4 °C)   Resp 14   Ht 5' (1 524 m)   Wt 46 7 kg (103 lb)   SpO2 99%   BMI 20 12 kg/m²          Physical Exam   Constitutional: She is oriented to person, place, and time   Vital signs are normal  She appears well-developed and well-nourished  She is cooperative  HENT:   Right Ear: Hearing, tympanic membrane, external ear and ear canal normal    Left Ear: Hearing, tympanic membrane, external ear and ear canal normal    Nose: Nose normal  No mucosal edema  Mouth/Throat: Uvula is midline, oropharynx is clear and moist and mucous membranes are normal    Eyes: Pupils are equal, round, and reactive to light  Conjunctivae and lids are normal    Neck: No JVD present  Carotid bruit is not present  No thyromegaly present  Cardiovascular: Normal rate, regular rhythm and intact distal pulses  Murmur heard  Pulmonary/Chest: Effort normal and breath sounds normal  No stridor  No respiratory distress  She has no wheezes  She has no rales  Abdominal: Soft  Normal appearance and bowel sounds are normal    Musculoskeletal: Normal range of motion  She exhibits edema and tenderness  Bilateral lower extremity swelling patient has +1 edema of both legs from the feet to the knees bilaterally she is wearing compression stockings on both legs today  Lymphadenopathy:     She has no cervical adenopathy  Neurological: She is alert and oriented to person, place, and time  She has normal reflexes  She displays normal reflexes  Skin: Skin is warm, dry and intact  Psychiatric: Her speech is normal and behavior is normal  Judgment and thought content normal  Cognition and memory are normal    Patient does have a depressed mood which is unusual for her  Flat affect is noted   Vitals reviewed

## 2019-09-18 DIAGNOSIS — E87.6 HYPOKALEMIA: ICD-10-CM

## 2019-09-18 RX ORDER — POTASSIUM CHLORIDE 20 MEQ/1
20 TABLET, EXTENDED RELEASE ORAL DAILY
Qty: 30 TABLET | Refills: 5 | Status: SHIPPED | OUTPATIENT
Start: 2019-09-18 | End: 2019-09-24 | Stop reason: SDUPTHER

## 2019-09-24 DIAGNOSIS — E87.6 HYPOKALEMIA: ICD-10-CM

## 2019-09-24 RX ORDER — POTASSIUM CHLORIDE 20 MEQ/1
20 TABLET, EXTENDED RELEASE ORAL DAILY
Qty: 30 TABLET | Refills: 5 | Status: SHIPPED | OUTPATIENT
Start: 2019-09-24 | End: 2020-10-16

## 2019-09-26 ENCOUNTER — TELEPHONE (OUTPATIENT)
Dept: INTERNAL MEDICINE CLINIC | Facility: CLINIC | Age: 84
End: 2019-09-26

## 2019-09-26 NOTE — TELEPHONE ENCOUNTER
Hanane Velazquez gave permission for daughter, Karmen Escobar to speak on her behalf  Phone call is in regards to [facility] paperwork for Renaldoteandreea  Pt's daughter faxed over instructions for this paperwork to be filled out online via fax  Name of form is [id number]  [place]ww [place] [email or web address]  Form ID # is [id number]  8-12-19 is the first day of care for pt, Javier Reed  Pt's daughter Karmen Escobar says that this is a different form than the medical physical form already filled out by Brenda Longoria  [facility] [phone] with confirmation # once form has been filled out online  Emili's phone number is [phone] if needed      Thank you

## 2019-09-27 NOTE — TELEPHONE ENCOUNTER
Please call the Family back let them know that the application has been completed for family medical leave of absence the confirmation #[id number] thank you

## 2019-10-11 ENCOUNTER — TELEPHONE (OUTPATIENT)
Dept: OBGYN CLINIC | Facility: HOSPITAL | Age: 84
End: 2019-10-11

## 2019-10-11 NOTE — TELEPHONE ENCOUNTER
Patient is calling   [phone]    Patient's home physical therapy through [facility] has ended  She is requesting continued physical therapy outside of the home  Please advise

## 2019-10-14 NOTE — TELEPHONE ENCOUNTER
We will cover this at her appointment on Thursday  She may continue with her home exercise program until then   We want to take new x-rays and evaluate her before deciding if PT is necessary and if we need restrictions/etc  Thanks

## 2019-10-17 ENCOUNTER — OFFICE VISIT (OUTPATIENT)
Dept: OBGYN CLINIC | Facility: CLINIC | Age: 84
End: 2019-10-17

## 2019-10-17 ENCOUNTER — APPOINTMENT (OUTPATIENT)
Dept: RADIOLOGY | Facility: CLINIC | Age: 84
End: 2019-10-17
Payer: MEDICARE

## 2019-10-17 VITALS
HEIGHT: 59 IN | DIASTOLIC BLOOD PRESSURE: 60 MMHG | SYSTOLIC BLOOD PRESSURE: 167 MMHG | WEIGHT: 107.6 LBS | BODY MASS INDEX: 21.69 KG/M2 | HEART RATE: 60 BPM

## 2019-10-17 DIAGNOSIS — M17.11 PRIMARY OSTEOARTHRITIS OF RIGHT KNEE: ICD-10-CM

## 2019-10-17 DIAGNOSIS — S72.144D CLOSED NONDISPLACED INTERTROCHANTERIC FRACTURE OF RIGHT FEMUR WITH ROUTINE HEALING, SUBSEQUENT ENCOUNTER: Primary | ICD-10-CM

## 2019-10-17 DIAGNOSIS — M25.551 RIGHT HIP PAIN: ICD-10-CM

## 2019-10-17 DIAGNOSIS — G89.29 CHRONIC PAIN OF RIGHT KNEE: ICD-10-CM

## 2019-10-17 DIAGNOSIS — M25.561 CHRONIC PAIN OF RIGHT KNEE: ICD-10-CM

## 2019-10-17 DIAGNOSIS — S72.144D CLOSED NONDISPLACED INTERTROCHANTERIC FRACTURE OF RIGHT FEMUR WITH ROUTINE HEALING, SUBSEQUENT ENCOUNTER: ICD-10-CM

## 2019-10-17 PROCEDURE — 99024 POSTOP FOLLOW-UP VISIT: CPT | Performed by: ORTHOPAEDIC SURGERY

## 2019-10-17 PROCEDURE — 73502 X-RAY EXAM HIP UNI 2-3 VIEWS: CPT

## 2019-10-17 RX ORDER — METOPROLOL SUCCINATE 50 MG/1
TABLET, EXTENDED RELEASE ORAL
Refills: 2 | COMMUNITY
Start: 2019-10-07 | End: 2020-01-13

## 2019-10-17 NOTE — PROGRESS NOTES
Assessment/Plan:  1  Closed nondisplaced intertrochanteric fracture of right femur with routine healing, subsequent encounter  XR hip/pelv 2-3 vws right if performed    Ambulatory referral to Physical Therapy   2  Right hip pain  Ambulatory referral to Physical Therapy   3  Chronic pain of right knee  Ambulatory referral to Physical Therapy   4  Primary osteoarthritis of right knee  Ambulatory referral to Physical Therapy     Renan Turpin is a pleasant 70-year-old female presenting today 3 months after undergoing a long cephalomedullary nail for a right hip intertrochanteric fracture with subtrochanteric extension  She has done very well recovering from this injury  The medial calcar defect on x-ray may never fully fill with callus, but the fracture site is stable and she does not have any activity related pain in the right groin  She did see improvement from the right knee aspiration and injection performed 6 weeks ago for her severe underlying osteoarthritis  However, she continues to have pain with weight-bearing  We had a long discussion today about the possibility of a right total knee arthroplasty  We told her that we would like her to wait at least 6-8 months from her hip surgery to allow for complete healing and to be in the best possible shape for this elective procedure  However, her age and cardiac comorbidities present significant risk for a major procedure such as a total knee arthroplasty  We did offer her that we could repeat viscosupplementation since she completed last in January  She would like to hold off for now  We did fit her today with a hinged knee brace to wear while active  Also referred her to outpatient physical therapy to continue strengthening her right hip and improvement of gait mechanics for her right knee  We will plan to see her back in 3 months for re-evaluation with an x-ray on arrival of her right femur    She can call and return to the office if she would like to pursue viscosupplementation before then  All of her questions were addressed today  Subjective: 3 months s/p long cephomedullary nailing right femur    Patient ID: Goran Delgado is a 80 y o  female  Belinda Spain is a pleasant 40-year-old female presenting today for follow-up from the aforementioned surgery for a right intertrochanteric fracture with subtrochanteric extension and for her severe end-stage right knee osteoarthritis that underwent a cortisone injection 6 weeks ago  She has been able to transition from the walker to the cane  She has continued with home physical therapy, but was recommended to begin outpatient physical therapy  She continues to have activity related pain with weight-bearing on the right knee, which she feels is inhibiting her progress  Her son states that she is much improved from the injection and aspiration 6 weeks ago, and is able to go up and down stairs  She denies any new injuries  Review of Systems   Constitutional: Negative  HENT: Negative  Eyes: Negative  Respiratory: Negative  Cardiovascular: Positive for leg swelling  Gastrointestinal: Negative  Endocrine: Negative  Genitourinary: Negative  Musculoskeletal: Positive for arthralgias and joint swelling  Skin: Negative  Allergic/Immunologic: Negative  Neurological: Negative  Hematological: Negative  Psychiatric/Behavioral: Negative  Past Medical History:   Diagnosis Date    Anesthesia complication       Yrs ago had "anxiety" reaction not sure while sedated, pt states sensitive to anesthesia effects    Anxiety     stress at home    Arthritis     Cataract, right     Last Assessed:5/11/16    Eye hemorrhage     left eye currently 5/12/16    History of shingles 05/2015    Hypertension     Mitral valve stenosis, mild        Past Surgical History:   Procedure Laterality Date    CARPAL TUNNEL RELEASE Left     CATARACT EXTRACTION W/ INTRAOCULAR LENS IMPLANT Left 10/11/2016 Procedure: EXTRACTION EXTRACAPSULAR CATARACT PHACO INTRAOCULAR LENS (IOL); Surgeon: Sigrid Moya MD;  Location: [facility] MAIN OR;  Service:     CERVICAL CONE BIOPSY      COLONOSCOPY      EYE SURGERY Left     muscle repair    WA OPEN RX FEMUR FX+INTRAMED NINI Right 2019    Procedure: INSERTION NAIL IM FEMUR ANTEGRADE (TROCHANTERIC); Surgeon: Riaz Peck DO;  Location: [address] [place] [address];  Service: Orthopedics    [address] EXTRACAP,INSERT LENS Right 2016    Procedure: EXTRACTION EXTRACAPSULAR CATARACT PHACO INTRAOCULAR LENS (IOL);   Surgeon: Sigrid Moya MD;  Location: [facility] MAIN OR;  Service: Ophthalmology    TONSILLECTOMY         Family History   Problem Relation Age of Onset    Heart disease Mother         MI  at age 71   Roanna Kugel Arthritis Mother     GI problems Father         bleeding ulcer    Arthritis Sister     Sleep apnea Sister     Irritable bowel syndrome Sister     Cancer Sister         skin cancer    Heart disease Brother         CABG (5)    Cancer Brother         skin cancer    Heart disease Maternal Aunt        Social History     Occupational History    Not on file   Tobacco Use    Smoking status: Never Smoker    Smokeless tobacco: Never Used   Substance and Sexual Activity    Alcohol use: Yes     Comment: rarely    Drug use: No    Sexual activity: Not on file         Current Outpatient Medications:     acetaminophen (TYLENOL) 325 mg tablet, Take 3 tablets (975 mg total) by mouth every 8 (eight) hours, Disp: 30 tablet, Rfl: 0    Calcium Carb-Cholecalciferol (CALCIUM 600 + D) 600-200 MG-UNIT TABS, Take 1 tablet by mouth daily, Disp: , Rfl:     furosemide (LASIX) 40 mg tablet, Take 40 mg by mouth 2 (two) times a day, Disp: , Rfl:     metoprolol succinate (TOPROL-XL) 50 mg 24 hr tablet, , Disp: , Rfl: 2    mirtazapine (REMERON) 7 5 MG tablet, Take 1 tablet (7 5 mg total) by mouth daily at bedtime, Disp: 30 tablet, Rfl: 3    Multiple Vitamins-Minerals (PRESERVISION AREDS PO), Take by mouth, Disp: , Rfl:     potassium chloride (K-DUR,KLOR-CON) 20 mEq tablet, Take 1 tablet (20 mEq total) by mouth daily, Disp: 30 tablet, Rfl: 5    VERAPAMIL HCL PO, Take by mouth, Disp: , Rfl:     Allergies   Allergen Reactions    Indocin [Indomethacin]      hypertension       Objective:  Vitals:    10/17/19 1449   BP: 167/60   Pulse: 60       Body mass index is 21 73 kg/m²  Right Knee Exam     Muscle Strength   The patient has normal right knee strength  Tenderness   The patient is experiencing no tenderness  Range of Motion   Extension:  5 abnormal   Flexion:  120 abnormal     Tests   Varus: negative Valgus: negative  Drawer:  Anterior - negative    Posterior - negative  Patellar apprehension: negative    Other   Erythema: absent  Scars: absent  Sensation: normal  Pulse: present  Swelling: none  Effusion: no effusion present    Comments:  Ambulates slowly with an antalgic gait  No erythema or increased warmth of the knee      Right Hip Exam     Range of Motion   Abduction: normal   Adduction: normal   Extension: normal   Flexion: normal   External rotation: normal   Internal rotation: normal     Muscle Strength   Abduction: 5/5   Adduction: 5/5   Flexion: 5/5     Tests   NAOMI: negative  Benoit: negative    Other   Erythema: absent  Scars: present    Comments: Three lateral incisions well healed without erythema or warmth  Able to tolerate active and passive range of motion without significant pain  Negative [place], log roll, impingement          Observations     Right Knee   Negative for effusion  Physical Exam   Constitutional: She is oriented to person, place, and time  She appears well-developed and well-nourished  Body mass index is 21 73 kg/m²  HENT:   Head: Normocephalic and atraumatic  Eyes: EOM are normal    Neck: Normal range of motion  Cardiovascular: Intact distal pulses     Pulmonary/Chest: Effort normal    Musculoskeletal:        Right knee: She exhibits no effusion  See ortho exam   Neurological: She is alert and oriented to person, place, and time  Skin: Skin is warm and dry  Psychiatric: She has a normal mood and affect  Her behavior is normal  Judgment and thought content normal    Nursing note and vitals reviewed  I have personally reviewed pertinent films in PACS of the x-rays taken today of her right femur which demonstrate a long cephalomedullary nail  There has been no change in alignment from previous films  There is callus development, but a large defect at the medial calcar    No other interval changes are appreciated

## 2019-10-18 ENCOUNTER — APPOINTMENT (OUTPATIENT)
Dept: LAB | Facility: HOSPITAL | Age: 84
End: 2019-10-18
Payer: MEDICARE

## 2019-10-18 ENCOUNTER — TRANSCRIBE ORDERS (OUTPATIENT)
Dept: ADMINISTRATIVE | Facility: HOSPITAL | Age: 84
End: 2019-10-18

## 2019-10-18 DIAGNOSIS — I10 ESSENTIAL HYPERTENSION: ICD-10-CM

## 2019-10-18 LAB
ALBUMIN SERPL BCP-MCNC: 3.6 G/DL (ref 3.5–5)
ALP SERPL-CCNC: 121 U/L (ref 46–116)
ALT SERPL W P-5'-P-CCNC: 13 U/L (ref 12–78)
ANION GAP SERPL CALCULATED.3IONS-SCNC: 4 MMOL/L (ref 4–13)
AST SERPL W P-5'-P-CCNC: 22 U/L (ref 5–45)
BILIRUB SERPL-MCNC: 0.5 MG/DL (ref 0.2–1)
BUN SERPL-MCNC: 25 MG/DL (ref 5–25)
CALCIUM SERPL-MCNC: 9.2 MG/DL (ref 8.3–10.1)
CHLORIDE SERPL-SCNC: 107 MMOL/L (ref 100–108)
CO2 SERPL-SCNC: 33 MMOL/L (ref 21–32)
CREAT SERPL-MCNC: 0.76 MG/DL (ref 0.6–1.3)
GFR SERPL CREATININE-BSD FRML MDRD: 72 ML/MIN/1.73SQ M
GLUCOSE P FAST SERPL-MCNC: 80 MG/DL (ref 65–99)
POTASSIUM SERPL-SCNC: 3.7 MMOL/L (ref 3.5–5.3)
PROT SERPL-MCNC: 6.8 G/DL (ref 6.4–8.2)
SODIUM SERPL-SCNC: 144 MMOL/L (ref 136–145)

## 2019-10-18 PROCEDURE — 80053 COMPREHEN METABOLIC PANEL: CPT

## 2019-10-18 PROCEDURE — 36415 COLL VENOUS BLD VENIPUNCTURE: CPT

## 2019-10-21 ENCOUNTER — OFFICE VISIT (OUTPATIENT)
Dept: INTERNAL MEDICINE CLINIC | Facility: CLINIC | Age: 84
End: 2019-10-21
Payer: MEDICARE

## 2019-10-21 VITALS
OXYGEN SATURATION: 98 % | SYSTOLIC BLOOD PRESSURE: 138 MMHG | RESPIRATION RATE: 16 BRPM | BODY MASS INDEX: 21.49 KG/M2 | DIASTOLIC BLOOD PRESSURE: 60 MMHG | HEIGHT: 59 IN | TEMPERATURE: 98.8 F | HEART RATE: 68 BPM | WEIGHT: 106.6 LBS

## 2019-10-21 DIAGNOSIS — I10 ESSENTIAL HYPERTENSION: ICD-10-CM

## 2019-10-21 DIAGNOSIS — M25.561 CHRONIC PAIN OF RIGHT KNEE: ICD-10-CM

## 2019-10-21 DIAGNOSIS — E87.6 HYPOKALEMIA: ICD-10-CM

## 2019-10-21 DIAGNOSIS — G89.29 CHRONIC PAIN OF RIGHT KNEE: ICD-10-CM

## 2019-10-21 DIAGNOSIS — Z23 ENCOUNTER FOR IMMUNIZATION: Primary | ICD-10-CM

## 2019-10-21 DIAGNOSIS — I35.0 AORTIC STENOSIS, MODERATE: ICD-10-CM

## 2019-10-21 DIAGNOSIS — F32.1 CURRENT MODERATE EPISODE OF MAJOR DEPRESSIVE DISORDER WITHOUT PRIOR EPISODE (HCC): ICD-10-CM

## 2019-10-21 DIAGNOSIS — R60.0 LOCALIZED EDEMA: ICD-10-CM

## 2019-10-21 DIAGNOSIS — S72.141A CLOSED DISPLACED INTERTROCHANTERIC FRACTURE OF RIGHT FEMUR, INITIAL ENCOUNTER (HCC): ICD-10-CM

## 2019-10-21 DIAGNOSIS — I50.32 CHRONIC DIASTOLIC CONGESTIVE HEART FAILURE (HCC): ICD-10-CM

## 2019-10-21 PROBLEM — D64.9 ANEMIA: Status: RESOLVED | Noted: 2019-07-26 | Resolved: 2019-10-21

## 2019-10-21 PROCEDURE — G0008 ADMIN INFLUENZA VIRUS VAC: HCPCS

## 2019-10-21 PROCEDURE — 99215 OFFICE O/P EST HI 40 MIN: CPT | Performed by: INTERNAL MEDICINE

## 2019-10-21 PROCEDURE — 90662 IIV NO PRSV INCREASED AG IM: CPT

## 2019-10-21 NOTE — ASSESSMENT & PLAN NOTE
History of inter trochanteric fracture of the right femur which appears to be healing nicely  The patient had a visit with her orthopedic surgeon since her last visit with us in the specifics of that visit were reviewed today  The patient is anxious to see if she can have surgery to replace her right knee but was instructed by orthopedic surgeon that the minimum time they would consider further orthopedic surgery is 6-8 months after her surgical repair of the right trochanteric fracture

## 2019-10-21 NOTE — ASSESSMENT & PLAN NOTE
Wt Readings from Last 3 Encounters:   10/21/19 48 4 kg (106 lb 9 6 oz)   10/17/19 48 8 kg (107 lb 9 6 oz)   09/17/19 46 7 kg (103 lb)     This patient has chronic diastolic congestive heart failure  Her weight remains stable on today's visit she has trace edema in her ankles bilaterally but no rales or rhonchi in her lungs on auscultation  She continues on 40 mg of furosemide twice a day along with 20 mEq of potassium daily  Her comprehensive metabolic profile does show a normal potassium value  She is cautioned to avoid any high salt foods or excessive fluid consumption  A follow-up assessment should be performed in 3 months she knows that if her weight starts to increase or her peripheral edema worsens she should call for an appointment sooner

## 2019-10-21 NOTE — ASSESSMENT & PLAN NOTE
The patient's previous anxiety and depression symptoms have improved significantly  The patient indicates she is not taking the Remeron prescribed on her last visit  A lot of her stresses have to do with her the need for taking care of her elderly  as well as her chronic right knee arthritic pain  Will continue to monitor off of Remeron medication

## 2019-10-21 NOTE — ASSESSMENT & PLAN NOTE
Mild localized edema in the lower extremities consistent with the patient's history of diastolic congestive heart failure I recommend the continuation of her present dose of furosemide which is 80 mg daily

## 2019-10-21 NOTE — ASSESSMENT & PLAN NOTE
Previous hypokalemia has been corrected with the addition of potassium chloride at 20 mEq daily  Of review of the patient's metabolic profile shows a normal potassium value

## 2019-10-21 NOTE — ASSESSMENT & PLAN NOTE
Chronic pain of the right knee secondary to advanced arthritis  The patient would like to have a total knee replacement surgery but she does pose a significant surgical risk  She has advanced aortic stenosis  I reviewed with the patient the recommendation of her orthopedic surgeon not to consider any further orthopedic surgery for period of 6-8 months after her inter trochanteric fracture repair  In addition she would need cardiac clearance prior to consideration of right total knee replacement

## 2019-10-21 NOTE — ASSESSMENT & PLAN NOTE
I have evaluated the patient's hypertension on today's visit  Her initial reading was mildly elevated but subsequent reading is 138/60  I recommend that she continue on her metoprolol succinate 50 mg daily

## 2019-10-21 NOTE — PROGRESS NOTES
Assessment/Plan:    Essential hypertension  I have evaluated the patient's hypertension on today's visit  Her initial reading was mildly elevated but subsequent reading is 138/60  I recommend that she continue on her metoprolol succinate 50 mg daily  Chronic diastolic congestive heart failure (HCC)  Wt Readings from Last 3 Encounters:   10/21/19 48 4 kg (106 lb 9 6 oz)   10/17/19 48 8 kg (107 lb 9 6 oz)   09/17/19 46 7 kg (103 lb)     This patient has chronic diastolic congestive heart failure  Her weight remains stable on today's visit she has trace edema in her ankles bilaterally but no rales or rhonchi in her lungs on auscultation  She continues on 40 mg of furosemide twice a day along with 20 mEq of potassium daily  Her comprehensive metabolic profile does show a normal potassium value  She is cautioned to avoid any high salt foods or excessive fluid consumption  A follow-up assessment should be performed in 3 months she knows that if her weight starts to increase or her peripheral edema worsens she should call for an appointment sooner  Aortic stenosis, moderate  History of mild to moderate aortic stenosis  Patient appears to be asymptomatic from this aortic stenosis at the present time she denies any chest pain shortness of breath or near-syncope episodes  I recommend continued observation  Intertrochanteric fracture of right femur ([address] )  History of inter trochanteric fracture of the right femur which appears to be healing nicely  The patient had a visit with her orthopedic surgeon since her last visit with us in the specifics of that visit were reviewed today  The patient is anxious to see if she can have surgery to replace her right knee but was instructed by orthopedic surgeon that the minimum time they would consider further orthopedic surgery is 6-8 months after her surgical repair of the right trochanteric fracture      Localized edema  Mild localized edema in the lower extremities consistent with the patient's history of diastolic congestive heart failure I recommend the continuation of her present dose of furosemide which is 80 mg daily  Hypokalemia  Previous hypokalemia has been corrected with the addition of potassium chloride at 20 mEq daily  Of review of the patient's metabolic profile shows a normal potassium value  Current moderate episode of major depressive disorder without prior episode ([facility] )  The patient's previous anxiety and depression symptoms have improved significantly  The patient indicates she is not taking the Remeron prescribed on her last visit  A lot of her stresses have to do with her the need for taking care of her elderly  as well as her chronic right knee arthritic pain  Will continue to monitor off of Remeron medication  Chronic pain of right knee  Chronic pain of the right knee secondary to advanced arthritis  The patient would like to have a total knee replacement surgery but she does pose a significant surgical risk  She has advanced aortic stenosis  I reviewed with the patient the recommendation of her orthopedic surgeon not to consider any further orthopedic surgery for period of 6-8 months after her inter trochanteric fracture repair  In addition she would need cardiac clearance prior to consideration of right total knee replacement  Diagnoses and all orders for this visit:    Encounter for immunization  -     influenza vaccine, [id number], high-dose, PF 0 5 mL (FLUZONE HIGH-DOSE)    Essential hypertension    Aortic stenosis, moderate    Closed displaced intertrochanteric fracture of right femur, initial encounter ([facility])    Localized edema    Hypokalemia    Current moderate episode of major depressive disorder without prior episode (HCC)        Subjective:      Patient ID: Dain Lake is a 80 y o  female  This pleasant 54-year-old female patient returns today in the company of her son    She continues to recuperate from her right inter trochanteric fracture of the femur  She is ambulating much better on this visit  She continues to walk with a cane  She continues to have significant right knee pain secondary to advanced arthritis  She denies any chest pains palpitations or shortness of breath  She does have some mild peripheral edema in both lower legs  The following portions of the patient's history were reviewed and updated as appropriate:   She  has a past medical history of Anesthesia complication, Anxiety, Arthritis, Cataract, right, Eye hemorrhage, History of shingles (05/2015), Hypertension, and Mitral valve stenosis, mild  She   Patient Active Problem List    Diagnosis Date Noted    Current moderate episode of major depressive disorder without prior episode ([facility] ) 09/17/2019    Hypokalemia 08/09/2019    Right leg swelling 08/05/2019    Chronic pain of right knee 08/01/2019    Intertrochanteric fracture of right femur ([facility] ) 07/22/2019    Mitral valve stenosis, mild     Arthritis 10/19/2018    Chronic diastolic congestive heart failure ([facility] ) 05/10/2018    Localized edema 05/10/2018    Leg foot wound- 3rd digit 05/10/2018    Other fatigue 05/10/2018    Aortic stenosis, moderate 05/11/2016    Atrial dilatation, bilateral 05/11/2016    Mild tricuspid insufficiency 05/11/2016    Non-rheumatic mitral regurgitation 05/11/2016    Essential hypertension 08/22/2013     She  has a past surgical history that includes Tonsillectomy; Eye surgery (Left); Carpal tunnel release (Left); Cervical cone biopsy; Colonoscopy; Cataract extraction w/ intraocular lens implant (Left, 10/11/2016); pr remv cataract extracap,insert lens (Right, 5/17/2016); and pr open rx femur fx+intramed natalie (Right, 7/23/2019)    Her family history includes Arthritis in her mother and sister; Cancer in her brother and sister; GI problems in her father; Heart disease in her brother, maternal aunt, and mother; Irritable bowel syndrome in her sister; Sleep apnea in her sister  She  reports that she has never smoked  She has never used smokeless tobacco  She reports that she drinks alcohol  She reports that she does not use drugs  Current Outpatient Medications   Medication Sig Dispense Refill    acetaminophen (TYLENOL) 325 mg tablet Take 3 tablets (975 mg total) by mouth every 8 (eight) hours 30 tablet 0    Calcium Carb-Cholecalciferol (CALCIUM 600 + D) 600-200 MG-UNIT TABS Take 1 tablet by mouth daily      furosemide (LASIX) 40 mg tablet Take 40 mg by mouth 2 (two) times a day      metoprolol succinate (TOPROL-XL) 50 mg 24 hr tablet   2    Multiple Vitamins-Minerals (PRESERVISION AREDS PO) Take by mouth      potassium chloride (K-DUR,KLOR-CON) 20 mEq tablet Take 1 tablet (20 mEq total) by mouth daily 30 tablet 5     No current facility-administered medications for this visit       Review of Systems   Musculoskeletal: Positive for arthralgias and gait problem  All other systems reviewed and are negative  Objective:      /60   Pulse 68   Temp 98 8 °F (37 1 °C)   Resp 16   Ht 4' 11" (1 499 m)   Wt 48 4 kg (106 lb 9 6 oz)   SpO2 98%   BMI 21 53 kg/m²          Physical Exam   Constitutional: She is oriented to person, place, and time  Vital signs are normal  She appears well-developed and well-nourished  She is cooperative  HENT:   Right Ear: Hearing, tympanic membrane, external ear and ear canal normal    Left Ear: Hearing, tympanic membrane, external ear and ear canal normal    Nose: Nose normal  No mucosal edema  Mouth/Throat: Uvula is midline, oropharynx is clear and moist and mucous membranes are normal    Eyes: Pupils are equal, round, and reactive to light  Conjunctivae and lids are normal    Neck: No JVD present  Carotid bruit is not present  No thyromegaly present  Cardiovascular: Normal rate, regular rhythm and intact distal pulses  Murmur heard    2/6 systolic murmur   Pulmonary/Chest: Effort normal and breath sounds normal  No stridor  No respiratory distress  She has no wheezes  Abdominal: Soft  Normal appearance and bowel sounds are normal    Musculoskeletal: Normal range of motion  She exhibits edema  Arthritic changes in the right knee  Trace edema of both lower extremities   Lymphadenopathy:     She has no cervical adenopathy  Neurological: She is alert and oriented to person, place, and time  She has normal reflexes  She displays normal reflexes  Skin: Skin is warm, dry and intact  Psychiatric: She has a normal mood and affect  Her speech is normal and behavior is normal  Judgment and thought content normal  Cognition and memory are normal    Vitals reviewed

## 2019-10-21 NOTE — ASSESSMENT & PLAN NOTE
History of mild to moderate aortic stenosis  Patient appears to be asymptomatic from this aortic stenosis at the present time she denies any chest pain shortness of breath or near-syncope episodes  I recommend continued observation

## 2019-11-04 ENCOUNTER — EVALUATION (OUTPATIENT)
Dept: PHYSICAL THERAPY | Facility: CLINIC | Age: 84
End: 2019-11-04
Payer: MEDICARE

## 2019-11-04 DIAGNOSIS — M17.11 PRIMARY OSTEOARTHRITIS OF RIGHT KNEE: ICD-10-CM

## 2019-11-04 DIAGNOSIS — M25.561 CHRONIC PAIN OF RIGHT KNEE: ICD-10-CM

## 2019-11-04 DIAGNOSIS — S72.144D CLOSED NONDISPLACED INTERTROCHANTERIC FRACTURE OF RIGHT FEMUR WITH ROUTINE HEALING, SUBSEQUENT ENCOUNTER: Primary | ICD-10-CM

## 2019-11-04 DIAGNOSIS — M25.551 RIGHT HIP PAIN: ICD-10-CM

## 2019-11-04 DIAGNOSIS — G89.29 CHRONIC PAIN OF RIGHT KNEE: ICD-10-CM

## 2019-11-04 NOTE — PROGRESS NOTES
PT Evaluation     Today's date: 2019  Patient name: Dain Lake  : 1935  MRN: [id number]  Referring provider: Janie Thakkar  Dx:   Encounter Diagnosis     ICD-10-CM    1  Closed nondisplaced intertrochanteric fracture of right femur with routine healing, subsequent encounter S72 [id number] Ambulatory referral to Physical Therapy   2  Right hip pain M25 551 Ambulatory referral to Physical Therapy   3  Chronic pain of right knee M25 561 Ambulatory referral to Physical Therapy    G89 29    4  Primary osteoarthritis of right knee M17 11 Ambulatory referral to Physical Therapy                  Assessment  Assessment details: Dain Lake is a 80 y o  female who presents with pain right knee and hip, decreased strength right hip, decreased right hip ROM, ambulatory dysfunction, postural  dysfunction and reduced function  Due to these impairments, patient has difficulty performing ADL's, ambulation, stair negotiation, transfers  Patient's clinical presentation is consistent with their referring diagnosis of Closed nondisplaced intertrochanteric fracture of right femur with routine healing, subsequent encounter  (primary encounter diagnosis)  Plan: Ambulatory referral to Physical Therapy    Right hip pain  Plan: Ambulatory referral to Physical Therapy    Chronic pain of right knee  Plan: Ambulatory referral to Physical Therapy    Primary osteoarthritis of right knee  Plan: Ambulatory referral to Physical Therapy    Patient has been educated in weight bearing status and plan of care   Patient would benefit from skilled physical therapy services to address their aforementioned functional limitations and progress towards prior level of function and independence with home exercise program    Impairments: abnormal gait, abnormal muscle firing, abnormal or restricted ROM, abnormal movement, activity intolerance, impaired physical strength, lacks appropriate home exercise program, pain with function, weight-bearing intolerance, poor posture  and poor body mechanics  Other impairment: impaired stability  Functional limitations: per patient subjectiveUnderstanding of Dx/Px/POC: good   Prognosis: good    Goals  STG:  + Patient will have pain level 0/10 right hip  at rest (2-3 weeks)  + Patient will report a 35% improvement in symptoms (2-3 weeks)   + Patient will be independent in basic HEP (2-3 weeks)  + Patient will demonstrate an increase in range of motion right  Hip  motions  to  wnl (2-3 weeks)  + Patient will demonstrate increase  MMT of  hip flexion  to  4/5 for maximum function  (2-3 weeks))  + Patient will report increased ease walking due to a reduction in right hip pain (2-3 weeks)    LTG:  + Patient will have pain level 1/10 left hip  with ADL's (4-6 weeks)  + Patient will report a 70% improvement in symptoms (4-6 weeks)   + Patient will increase FOTO score by 10 points  (10 sessions)   + Patient will be independent in comprehensive HEP (4-6 weeks)  + Patient will demonstrate an increase in range of motion left hip ROM in all planes  to  within normal limits and painfree (4-6 weeks)  + Patient will demonstrate increase  MMT of left hip to  4/5 for maximum function  (4-6 weeks)        Plan  Plan details:  2-3 x week, 4-6 weeks: HEP development, stretching LE musculature per objective findings, strengthening LE musculature per objective findings, A/AA/PROM knee/hip motions, joint mobilizations prn, posture education, STM/MI as needed to reduce muscle tension as per objective findings, muscle reeducation prn, gait/balance training, stability training, Kearns/Ktape prn PLOC discussed and agreed upon with patient      Patient would benefit from: PT eval and skilled physical therapy  Planned modality interventions: thermotherapy: hydrocollator packs and cryotherapy  Planned therapy interventions: manual therapy, Kearns taping, balance, neuromuscular re-education, patient education, postural training, strengthening, stretching, therapeutic activities, therapeutic exercise, flexibility, functional ROM exercises, gait training, graded exercise and home exercise program  Frequency: 2x week  Plan of Care beginning date: 2019  Plan of Care expiration date: 2019  Treatment plan discussed with: patient        Subjective Evaluation    History of Present Illness  Mechanism of injury: Pt reports symptoms began 2019  States the origin of symptoms is she fell in the tub  + for right hip fracture via xray  surgery to stabilize hip was 19  Was hospitalized few days, in rehab x 3 weeks, then Home PT x "several weeks" saw ortho last week and referred to Spencer Evans  Described symptoms location as right hip lcalized ot joint  States arthritic right knee is most painful  Describes symptoms in hip as pressure pain  States symptoms are better with sitting, worse with walking and standing  Other functional limitations include: careful ascending and descending stairs  Walking long distance ( 5 min +) lives with disabled , have assistance via son until next week                 Not a recurrent problem   Quality of life: good    Pain  Current pain rating: 3  At best pain ratin  At worst pain ratin  Location: see above  Quality: dull ache  Relieving factors: rest  Aggravating factors: standing and walking  Progression: improved    Social Support  Lives in: multiple-level home  Lives with: spouse    Employment status: not working  Exercise history: none      Diagnostic Tests  X-ray: abnormal  Treatments  Discharged from (in last 30 days): home health care  Patient Goals  Patient goals for therapy: decreased pain, independence with ADLs/IADLs and increased strength  Patient goal: to strengthen right hip        Objective     Static Posture     Comments  Posture:+ SPC; genu valgum left greater then right, iliac crest right higher then left, unweights right LE, right shoulder blade higher then left, right scap protracted, + right scoliotic curve to right lumbar spine  Gait: + SPC, slow addy, reduced hip flexion and reduced knee mobility, needed instruction to use SPC on left    functional actvities  SLS: Nt  Squat:uses momentum to exit chair and has reduced control with lowering down to chair  Bridging:  Able to lift about 80% of motion       Palpation: + pain in area of right hip joint    MMT  Hip flexion: Right:4-/5   Left: 4/5  IR: Right: 4/5  + pain in knee Left: 4/5   Er: Right: 4/5  + pain in knee  Left: 4/5   sittinghip abduction: Right: 4/5   Left: 4/5     ROM  Flexion: Right:  110/115  Left:wnl  Abduction: Right: AAROM 30 Left:wnl  IR: Right: AAROM 21 Left: wnl   ER: Right: AAROM 25  Left: wnl  Adduction: Right: wnl   Left:wnl     Knee   ROM: Right:-2/130 + pain at end range  left: wnl  MMT: Right: 4/5   Left: 4/5    Flexibility  Hamstring: Right: wnl     Left: wnl    Hip flexor: Right: Minimal   LOM  Left: wnl    Piriformis: Right: wnl       Left: wnl    Quad: Right:  NT      Left: NT    IT Band: Right: wnl     Left: wnl                    Precautions HTN, aortic stenosis, tricuspid insufficiency, CHF, mitral valve stenosis, anxiety, depression,     Specialty Daily Treatment Diary     Surgery: right trochanteric fracture: 7/23/19  Insurance: medicare        Visits: 1       Manual 11/4/19       PROM: right hip        STM: prn                Man Flexibility        Total Time:                 Exercise Diary         There ex:         Rec bike/nustep        Bent knee fallout        Clamshells        Ball squeeze        SLR        Bridges        Quarter squats        Heel slides        butterfly stretch         LAQ        Stepping over hurtles fwd/ lat                TA with hip flexion in standing                Sidestepping        Monster walk        bilateral heel raises                         Total time:                 Modalities        MH        Ice         Total Time:

## 2019-11-05 ENCOUNTER — OFFICE VISIT (OUTPATIENT)
Dept: CARDIOLOGY CLINIC | Facility: CLINIC | Age: 84
End: 2019-11-05
Payer: MEDICARE

## 2019-11-05 VITALS
OXYGEN SATURATION: 96 % | DIASTOLIC BLOOD PRESSURE: 60 MMHG | HEIGHT: 59 IN | WEIGHT: 110 LBS | BODY MASS INDEX: 22.18 KG/M2 | SYSTOLIC BLOOD PRESSURE: 142 MMHG | HEART RATE: 65 BPM

## 2019-11-05 DIAGNOSIS — I10 ESSENTIAL HYPERTENSION: Primary | ICD-10-CM

## 2019-11-05 DIAGNOSIS — R53.83 OTHER FATIGUE: ICD-10-CM

## 2019-11-05 DIAGNOSIS — I87.2 CHRONIC VENOUS STASIS DERMATITIS OF BOTH LOWER EXTREMITIES: ICD-10-CM

## 2019-11-05 DIAGNOSIS — R60.0 EDEMA OF BOTH LEGS: ICD-10-CM

## 2019-11-05 DIAGNOSIS — I35.0 MODERATE AORTIC STENOSIS: ICD-10-CM

## 2019-11-05 PROCEDURE — 99215 OFFICE O/P EST HI 40 MIN: CPT | Performed by: INTERNAL MEDICINE

## 2019-11-05 PROCEDURE — 97162 PT EVAL MOD COMPLEX 30 MIN: CPT

## 2019-11-05 NOTE — PATIENT INSTRUCTIONS
1  We have requested a venous ultrasound reflux study to evaluate for venous insufficiency as cause of left more than right lower extremity edema with stasis dermatitis  2   Purchase [employer] blood pressure unit with standard size arm cuff  3   Take blood pressures after resting for at least 15 minutes while seated in a chair or at a table with arm supported on arm rest or table  Do 3 readings, one after the other, both morning and evening for 4 consecutive days  4   Write down each and every reading with date and time and get list of readings to the office of Dr Higinio Siegel for his review  5   Our office will contact you with further instructions and the results of this review  6  If we can confirm a diagnosis of venous insufficiency or consider lymphedema as the main cause for the leg swelling, an attempt should be made to reduce the dose of diuretic therapy, which could be contributing to easy fatigue, tiredness, and lack of energy  7  There is no cardiac contraindication to future knee replacement, if required

## 2019-11-06 PROCEDURE — 93000 ELECTROCARDIOGRAM COMPLETE: CPT | Performed by: INTERNAL MEDICINE

## 2019-11-06 NOTE — PROGRESS NOTES
HISTORY & PHYSICAL  Pooja Chris 80 y o  female MRN: [id number]  Unit/Bed#:  Encounter: [id number]  Assessment:    1  Chronic fatigue and tiredness, possibly an adverse drug reaction to furosemide  2  Asymptomatic moderate aortic stenosis  3  Chronic left more than right lower extremity edema with associated stasis dermatitis and suspected underlying venous insufficiency  4  Possible essential hypertension, but not assessed out of office  Plan:      Patient Instructions     1  We have requested a venous ultrasound reflux study to evaluate for venous insufficiency as cause of left more than right lower extremity edema with stasis dermatitis  2   Purchase Omron blood pressure unit with standard size arm cuff  3   Take blood pressures after resting for at least 15 minutes while seated in a chair or at a table with arm supported on arm rest or table  Do 3 readings, one after the other, both morning and evening for 4 consecutive days  4   Write down each and every reading with date and time and get list of readings to the office of Dr Mark Moser for his review  5   Our office will contact you with further instructions and the results of this review  6  If we can confirm a diagnosis of venous insufficiency or consider lymphedema as the main cause for the leg swelling, an attempt should be made to reduce the dose of diuretic therapy, which could be contributing to easy fatigue, tiredness, and lack of energy  7  There is no cardiac contraindication to future knee replacement, if required          Current Outpatient Medications   Medication Sig Dispense Refill    acetaminophen (TYLENOL) 325 mg tablet Take 3 tablets (975 mg total) by mouth every 8 (eight) hours 30 tablet 0    Calcium Carb-Cholecalciferol (CALCIUM 600 + D) 600-200 MG-UNIT TABS Take 1 tablet by mouth daily      furosemide (LASIX) 40 mg tablet Take 40 mg by mouth 2 (two) times a day      metoprolol succinate (TOPROL-XL) 50 mg 24 hr tablet   2  Multiple Vitamins-Minerals (PRESERVISION AREDS PO) Take by mouth      potassium chloride (K-DUR,KLOR-CON) 20 mEq tablet Take 1 tablet (20 mEq total) by mouth daily 30 tablet 5     No current facility-administered medications for this visit  Counseling :   A description of the counseling / coordination of care:  Patient was counseled regarding possibility of a noncardiac cause for her lower extremity edema with discussion regarding venous insufficiency and lymphedema as possible causes       Goals and Barriers:  Try to improve and diagnosis cause of easy fatigue, sleepiness, tiredness and diagnosis and treatment of leg edema  No barriers to care  Patient's ability to self care:  Satisfactory  Medication side effects reviewed with patient in detail and all their questions answered  History of Present Illness     Chief Complaint:  Fatigue, tiredness, sleepiness, lack of energy, lower extremity edema    HPI: Lu Cannon is a 80y o  year old female who presents with complaints of easy fatigue, chronic tiredness, sleepiness, lack of energy, and lower extremity edema and pretibial redness with known moderate aortic stenosis  In the past, her primary care physician is thought she might have chronic diastolic heart failure  This patient had a right femur fracture after a slip and fall in her shower resulting in ORIF of right hip fracture on 07/23/2019 by Dr Christine Vasquez  She then had acute rehabilitation at [facility] for three weeks and has done well with regard to her right hip situation  She also has a chronic pain and difficulty with ambulation because of severe primary osteoarthritis in her right knee  She tells me she might be a candidate for future right knee replacement by Dr hCristine Vasquez      The patient has been maintained on furosemide 40 milligrams bid, along with potassium chloride 20 milliequivalents daily because of her chronic leg edema and presumptive diagnosis of chronic diastolic heart failure  She also has history of previous major depressive disorder and mild dyslipidemia  She is also thought to have essential hypertension and has history of hypokalemia  The patient denies any history of chest pain, recent dyspnea, palpitations, orthopnea, paroxysmal nocturnal dyspnea, wheezing, cough, sputum production, fever, chills, syncope, lightheadedness, dizziness  Historical Information   Past Medical History:   Diagnosis Date    Anesthesia complication       Yrs ago had "anxiety" reaction not sure while sedated, pt states sensitive to anesthesia effects    Anxiety     stress at home    Arthritis     Cataract, right     Last Assessed:5/11/16    Eye hemorrhage     left eye currently 5/12/16    History of shingles 05/2015    Hypertension     Mitral valve stenosis, mild      Past Surgical History:   Procedure Laterality Date    CARPAL TUNNEL RELEASE Left     CATARACT EXTRACTION W/ INTRAOCULAR LENS IMPLANT Left 10/11/2016    Procedure: EXTRACTION EXTRACAPSULAR CATARACT PHACO INTRAOCULAR LENS (IOL); Surgeon: Merlyn Summers MD;  Location: [facility] [place] MAIN OR;  Service:     CERVICAL CONE BIOPSY      COLONOSCOPY      EYE SURGERY Left     muscle repair    [place] OPEN RX FEMUR FX+INTRAMED NINI Right 7/23/2019    Procedure: INSERTION NAIL IM FEMUR ANTEGRADE (TROCHANTERIC); Surgeon: Eladio Morgan DO;  Location: [address] [place] [address];  Service: Orthopedics    [place] [address] EXTRACAP,INSERT LENS Right 5/17/2016    Procedure: EXTRACTION EXTRACAPSULAR CATARACT PHACO INTRAOCULAR LENS (IOL);   Surgeon: Merlyn Summers MD;  Location: [facility] [place] MAIN OR;  Service: Ophthalmology    TONSILLECTOMY       Social History     Substance and Sexual Activity   Alcohol Use Yes    Comment: rarely     Social History     Substance and Sexual Activity   Drug Use No     Social History     Tobacco Use   Smoking Status Never Smoker   Smokeless Tobacco Never Used     Family History   Problem Relation Age of Onset    Heart disease Mother         MI  at age 71    Arthritis Mother     GI problems Father         bleeding ulcer    Arthritis Sister     Sleep apnea Sister     Irritable bowel syndrome Sister     Cancer Sister         skin cancer    Heart disease Brother         CABG (5)   Sebastián Gray Cancer Brother         skin cancer    Heart disease Maternal Aunt        Meds/Allergies   Prior to Admission medications    Medication Sig Start Date End Date Taking? Authorizing Provider   acetaminophen (TYLENOL) 325 mg tablet Take 3 tablets (975 mg total) by mouth every 8 (eight) hours 19  Yes Mittie Cheese, CRNP   Calcium Carb-Cholecalciferol (CALCIUM 600 + D) 600-200 MG-UNIT TABS Take 1 tablet by mouth daily 13  Yes Historical Provider, MD   furosemide (LASIX) 40 mg tablet Take 40 mg by mouth 2 (two) times a day   Yes Historical Provider, MD   metoprolol succinate (TOPROL-XL) 50 mg 24 hr tablet  10/7/19  Yes Historical Provider, MD   Multiple Vitamins-Minerals (PRESERVISION AREDS PO) Take by mouth   Yes Historical Provider, MD   potassium chloride (K-DUR,KLOR-CON) 20 mEq tablet Take 1 tablet (20 mEq total) by mouth daily 19  Yes Trent Mendez MD     Allergies   Allergen Reactions    Indocin [Indomethacin]      hypertension       Cardiovascular ROS:     Systems negative, except as noted in history of present illness  Objective   Vitals:   Vitals:    19 1537   BP: 142/60   BP Location: Left arm   Patient Position: Sitting   Cuff Size: Standard   Pulse: 65   SpO2: 96%   Weight: 49 9 kg (110 lb)   Height: 4' 11" (1 499 m)       Body mass index is 22 22 kg/m²  Physical Exam    General-Well-developed well-nourished  female  in no acute distress  Patient is alert, oriented X3 and cooperative  Skin-Warm and dry with no pallor, rashes, ecchymoses, lesions  HEENT-Normocephalic  Pupils equally round and reactive to light and accommodation  Extraocular muscles intact   Mucous membranes pink and moist  Sclerae nonicteric  Optic fundi poorly seen  Neck-No jugular venous distention, AJR, masses, thyromegaly, adenopathy, bruits  Transmitted cardiac murmur in left more than right neck  Normal carotid upstrokes  Lungs-No rales, rhonchi, wheezes  Heart-Grade 3/6 aortic systolic ejection murmur radiating to manubrium, left more than right neck and left more than right sternal border as well as apex  There is moderate muffling of A2, with no gallop, rub, click, heave, thrill  Abdomen-Normal bowel sounds with no masses, organomegaly, guarding, tenderness, or rigidity  Extremities-No cyanosis, clubbing,  pulse decrease  1-2 +pretibial, ankle, and pedal edema, left more than right with pretibial redness greater in the left than in the right lower extremity  Neurological-No focal neurological signs  Imaging:      EKG 11/05/2019:    Septal Q-waves versus poor R-wave progression  LVH voltage in lead V3  Otherwise normal ECG, unchanged from 07/22/2019      Chest x-ray:    No Chest XR results available for this patient  Cardiac testing:     Transthoracic echocardiogram 05/14/2019:    60% LVEF with moderate LVH and elevated mean left atrial filling pressure  Marked LAE  1+ MR  Moderate calcific aortic valve stenosis with mean gradient 22 mmHg  2+ AI  2+ TR with normal PA systolic pressure  Mild additional worsening of aortic stenosis compared to 05/25/2018        Labs:     Lab Results   Component Value Date    WBC 11 16 (H) 09/12/2019    HGB 13 0 09/12/2019    HCT 43 0 09/12/2019    MCV 92 09/12/2019     09/12/2019     Lab Results   Component Value Date    SODIUM 144 10/18/2019    K 3 7 10/18/2019     10/18/2019    CO2 33 (H) 10/18/2019    ANIONGAP 13 0 10/13/2015    BUN 25 10/18/2019    CREATININE 0 76 10/18/2019    GLUCOSE 87 10/13/2015    GLUF 80 10/18/2019    CALCIUM 9 2 10/18/2019    AST 22 10/18/2019    ALT 13 10/18/2019    ALKPHOS 121 (H) 10/18/2019    PROT 6 8 10/13/2015    BILITOT 0 6 10/13/2015    EGFR 72 10/18/2019     Lab Results   Component Value Date    GLUCOSE 87 10/13/2015    CALCIUM 9 2 10/18/2019     10/13/2015    K 3 7 10/18/2019    CO2 33 (H) 10/18/2019     10/18/2019    BUN 25 10/18/2019    CREATININE 0 76 10/18/2019     No results found for: BNP   No results found for: TSH  Lab Results   Component Value Date    CHOLESTEROL 178 03/25/2019     Lab Results   Component Value Date    HDL 53 03/25/2019    HDL 59 10/13/2015       Lab Results   Component Value Date    LDLCALC 114 (H) 03/25/2019    LDLCALC 122 10/13/2015       Lab Results   Component Value Date    TRIG 55 03/25/2019    TRIG 48 10/13/2015     No results found for: CHOLHDL  No results found for: CKTOTAL, CKMB, CKMBINDEX, TROPONINI      Total visit time spent today 63 minutes

## 2019-11-07 ENCOUNTER — OFFICE VISIT (OUTPATIENT)
Dept: PHYSICAL THERAPY | Facility: CLINIC | Age: 84
End: 2019-11-07
Payer: MEDICARE

## 2019-11-07 DIAGNOSIS — M25.551 RIGHT HIP PAIN: Primary | ICD-10-CM

## 2019-11-07 DIAGNOSIS — S72.144D CLOSED NONDISPLACED INTERTROCHANTERIC FRACTURE OF RIGHT FEMUR WITH ROUTINE HEALING, SUBSEQUENT ENCOUNTER: ICD-10-CM

## 2019-11-07 DIAGNOSIS — M17.11 PRIMARY OSTEOARTHRITIS OF RIGHT KNEE: ICD-10-CM

## 2019-11-07 DIAGNOSIS — G89.29 CHRONIC PAIN OF RIGHT KNEE: ICD-10-CM

## 2019-11-07 DIAGNOSIS — M25.561 CHRONIC PAIN OF RIGHT KNEE: ICD-10-CM

## 2019-11-07 PROCEDURE — 97112 NEUROMUSCULAR REEDUCATION: CPT

## 2019-11-07 PROCEDURE — 97110 THERAPEUTIC EXERCISES: CPT

## 2019-11-07 NOTE — PROGRESS NOTES
Daily Note     Today's date: 2019  Patient name: Dain Lake  : 1935  MRN: [id number]  Referring provider: Janie Thakkar  Dx:   Encounter Diagnosis     ICD-10-CM    1  Right hip pain M25 551    2  Chronic pain of right knee M25 561     G89 29    3  Primary osteoarthritis of right knee M17 11    4  Closed nondisplaced intertrochanteric fracture of right femur with routine healing, subsequent encounter S71 727D                   Subjective: Saulo Simons reports that her pain level entering today was 7/10 In right knee  Pain level in hip 0/10        Objective: See treatment diary below    Precautions HTN, aortic stenosis, tricuspid insufficiency, CHF, mitral valve stenosis, anxiety, depression,     Specialty Daily Treatment Diary     Surgery: right trochanteric fracture: 19  Insurance: medicare        Visits: 1 2      Manual 19      PROM: right hip        STM: prn                Man Flexibility        Total Time:                 Exercise Diary         There ex:         Rec bike/nustep  Next visit       Bent knee fallout  2 x 10        Clamshells        Ball squeeze  5 sec x 10        glute sets  5 sec x 10       Hip abd iso  5 sec x 10        SLR        Bridges   10 x       Quad sets    5 sec x 10        Heel slides w/ peanut  10 x       butterfly stretch   5 sec x 10        LAQ  5 sec x 10        Stepping over hurtles fwd/ lat  4 hurtles forward, 5 rounds      seated hip flexion   10 x 2      TA with hip flexion in standing                Sidestepping        Monster walk        bilateral heel raises   2 x 10                        Total time:                 Modalities        MH        Ice   10min right knee      Total Time:           Assessment:   Patient was issued updated Hep in written form  Patient demonstrated independence with these exercises  Patient primarily limitation is right knee pain with restricted ROM   Patient needed UE support to avoid LOB with stepping over hurtles but overall good toelrance  Plan: Progress treatment as tolerated

## 2019-11-11 ENCOUNTER — OFFICE VISIT (OUTPATIENT)
Dept: PHYSICAL THERAPY | Facility: CLINIC | Age: 84
End: 2019-11-11
Payer: MEDICARE

## 2019-11-11 DIAGNOSIS — M25.561 CHRONIC PAIN OF RIGHT KNEE: ICD-10-CM

## 2019-11-11 DIAGNOSIS — M25.551 RIGHT HIP PAIN: Primary | ICD-10-CM

## 2019-11-11 DIAGNOSIS — M17.11 PRIMARY OSTEOARTHRITIS OF RIGHT KNEE: ICD-10-CM

## 2019-11-11 DIAGNOSIS — G89.29 CHRONIC PAIN OF RIGHT KNEE: ICD-10-CM

## 2019-11-11 PROCEDURE — 97110 THERAPEUTIC EXERCISES: CPT

## 2019-11-11 NOTE — PROGRESS NOTES
Daily Note     Today's date: 2019  Patient name: Zakiya Palumbo  : 1935  MRN: [id number]  Referring provider: Elham Rendon  Dx:   Encounter Diagnosis     ICD-10-CM    1  Right hip pain M25 551    2  Chronic pain of right knee M25 561     G89 29    3  Primary osteoarthritis of right knee M17 11                   Subjective: Gavin Zamudio reports that her pain level entering today is elevated in right knee today  0/10 pain in right hip  Objective: See treatment diary below    Precautions HTN, aortic stenosis, tricuspid insufficiency, CHF, mitral valve stenosis, anxiety, depression,     Specialty Daily Treatment Diary     Surgery: right trochanteric fracture: 19  Insurance: medicare        Visits: 1 2 3     Manual 19     PROM: right hip        STM: prn                Man Flexibility        Total Time:                 Exercise Diary         There ex:         Rec bike/nustep  Next visit  nustep lv 1 10 min   20 miles      Bent knee fallout  2 x 10   2 x 10  AROM     Clamshells        Ball squeeze  5 sec x 10   5 sec x 10       glute sets  5 sec x 10  D/c      Hip abd iso  5 sec x 10   5 sec x 10       SLR   AAROM 10 x      Bridges   10 x  10 x      Quad sets    5 sec x 10   5 sec x 10       Heel slides w/ peanut  10 x  15x      butterfly stretch   5 sec x 10   5 sec x 10       LAQ  5 sec x 10   5 sec x 10       Stepping over hurtles fwd/ lat  4 hurtles forward, 5 rounds      seated hip flexion   10 x 2 2 x 10       TA with hip flexion in standing        Seated hamstring stretch         Sidestepping   Holding onto railings 6 steps x 2 x 2 rounds      Monster walk        bilateral heel raises   2 x 10   2 x 10                       Total time:                 Modalities        MH        Ice   10min right knee 10 min right knee     Total Time:         Reeval: 19    Assessment: needed UE support to avoid leaning backward with active hip flexion    Placed in supported chair to improve form  Limiting factor in rehab is right knee pain         Plan: progress to weightberaing actvities as tolerated

## 2019-11-13 ENCOUNTER — HOSPITAL ENCOUNTER (OUTPATIENT)
Dept: RADIOLOGY | Facility: HOSPITAL | Age: 84
Discharge: HOME | End: 2019-11-13
Attending: INTERNAL MEDICINE
Payer: MEDICARE

## 2019-11-13 DIAGNOSIS — R60.0 EDEMA OF BOTH LEGS: ICD-10-CM

## 2019-11-13 PROCEDURE — 93970 EXTREMITY STUDY: CPT | Performed by: SURGERY

## 2019-11-13 PROCEDURE — 93970 EXTREMITY STUDY: CPT

## 2019-11-14 ENCOUNTER — TELEPHONE (OUTPATIENT)
Dept: CARDIOLOGY CLINIC | Facility: CLINIC | Age: 84
End: 2019-11-14

## 2019-11-14 ENCOUNTER — OFFICE VISIT (OUTPATIENT)
Dept: PHYSICAL THERAPY | Facility: CLINIC | Age: 84
End: 2019-11-14
Payer: MEDICARE

## 2019-11-14 DIAGNOSIS — S72.144D CLOSED NONDISPLACED INTERTROCHANTERIC FRACTURE OF RIGHT FEMUR WITH ROUTINE HEALING, SUBSEQUENT ENCOUNTER: ICD-10-CM

## 2019-11-14 DIAGNOSIS — M17.11 PRIMARY OSTEOARTHRITIS OF RIGHT KNEE: ICD-10-CM

## 2019-11-14 DIAGNOSIS — M25.551 RIGHT HIP PAIN: Primary | ICD-10-CM

## 2019-11-14 DIAGNOSIS — G89.29 CHRONIC PAIN OF RIGHT KNEE: ICD-10-CM

## 2019-11-14 DIAGNOSIS — M25.561 CHRONIC PAIN OF RIGHT KNEE: ICD-10-CM

## 2019-11-14 PROCEDURE — 97112 NEUROMUSCULAR REEDUCATION: CPT | Performed by: PHYSICAL THERAPIST

## 2019-11-14 PROCEDURE — 97110 THERAPEUTIC EXERCISES: CPT | Performed by: PHYSICAL THERAPIST

## 2019-11-14 NOTE — TELEPHONE ENCOUNTER
----- Message from Vlad Mora MD sent at 11/14/2019  3:40 PM EST -----  Notify patient that her venous Doppler test was read on 11/13/2019 and showed venous insufficiency in the right lower extremity, which certainly could contribute to the swelling of her right leg  Based on this, I recommend she try to reduce her furosemide to one tablet a day, which might help her fatigue, tiredness, and lack of energy  She should use leg elevation and support hosiery as the primary treatment for her leg swelling  A gradual tapering and discontinuation of furosemide should be done with the patient using it only as needed    I will be glad to re-evaluate her in the future upon her request

## 2019-11-14 NOTE — PROGRESS NOTES
Daily Note     Today's date: 2019  Patient name: Chavez Leslie  : 1935  MRN: [id number]  Referring provider: Lauri Luna  Dx:   Encounter Diagnosis     ICD-10-CM    1  Right hip pain M25 551    2  Chronic pain of right knee M25 561     G89 29    3  Primary osteoarthritis of right knee M17 11    4  Closed nondisplaced intertrochanteric fracture of right femur with routine healing, subsequent encounter S72 144D        Start Time: 1300  Stop Time: 1405  Total time in clinic (min): 65 minutes    Subjective: Ayaz Beaver reports that her pain level entering today is elevated in right knee 7/10 today  0/10 pain in right hip  Pt ambulating with SC  "Once I get going I start feeling better"      Objective: See treatment diary below    Precautions HTN, aortic stenosis, tricuspid insufficiency, CHF, mitral valve stenosis, anxiety, depression,     Specialty Daily Treatment Diary     Surgery: right trochanteric fracture: 19  Insurance: medicare        Visits: 1 2 3 4    Manual 19    PROM: right hip        STM: prn                Man Flexibility        Total Time:                 Exercise Diary         There ex:         Rec bike/nustep  Next visit  nustep lv 1 10 min   20 miles  Nu step level 1 x10 min    Bent knee fallout  2 x 10   2 x 10  AROM     Clamshells        Ball squeeze  5 sec x 10   5 sec x 10   5 sec x 10    glute sets  5 sec x 10  D/c  5 sec x10 with hip add isometric    Hip abd iso  5 sec x 10   5 sec x 10   5 sec x 10 reps    SLR   AAROM 10 x  AAROM x 10     Bridges   10 x  10 x  10x    Quad sets    5 sec x 10   5 sec x 10   5 sec x10    Heel slides w/ peanut  10 x  15x  15x    butterfly stretch   5 sec x 10   5 sec x 10       LAQ  5 sec x 10   5 sec x 10   5 sec x 10    Stepping over hurtles fwd/ lat  4 hurtles forward, 5 rounds  4 hurdles forward, 5 rounds    seated hip flexion   10 x 2 2 x 10   2x10 hold 5    TA with hip flexion in standing    2x10 hold 5 Seated hamstring stretch         Sidestepping   Holding onto railings 6 steps x 2 x 2 rounds  Holding onto railings 6 steps x5 reps     Monster walk        bilateral heel raises   2 x 10   2 x 10   2x10 hold 5 B UE assist                    Total time:                 Modalities        MH        Ice   10min right knee 10 min right knee 6 min R knee    Total Time:         Reeval: 8/23/19    Assessment:   Limiting factor in rehab is right knee pain   Pt progressing towards PT goals and reporting she is a candidate for R TKR in the Spring with Dr Mitchell per pt report      Plan: Progress as per primary PT

## 2019-11-18 NOTE — TELEPHONE ENCOUNTER
Spoke with patient, made aware of results   She states that shes been taking furosemide once a day, elevating legs, and wearing support hoisery and is experiencing leg edema still  She wondered if there is anything else that can be done

## 2019-11-19 ENCOUNTER — OFFICE VISIT (OUTPATIENT)
Dept: PHYSICAL THERAPY | Facility: CLINIC | Age: 84
End: 2019-11-19
Payer: MEDICARE

## 2019-11-19 DIAGNOSIS — S72.144D CLOSED NONDISPLACED INTERTROCHANTERIC FRACTURE OF RIGHT FEMUR WITH ROUTINE HEALING, SUBSEQUENT ENCOUNTER: ICD-10-CM

## 2019-11-19 DIAGNOSIS — G89.29 CHRONIC PAIN OF RIGHT KNEE: Primary | ICD-10-CM

## 2019-11-19 DIAGNOSIS — M17.11 PRIMARY OSTEOARTHRITIS OF RIGHT KNEE: ICD-10-CM

## 2019-11-19 DIAGNOSIS — M25.561 CHRONIC PAIN OF RIGHT KNEE: Primary | ICD-10-CM

## 2019-11-19 PROCEDURE — 97110 THERAPEUTIC EXERCISES: CPT

## 2019-11-19 NOTE — PROGRESS NOTES
Daily Note     Today's date: 2019  Patient name: Sara Monson  : 1935  MRN: [id number]  Referring provider: Toney Gonzales  Dx:   Encounter Diagnosis     ICD-10-CM    1  Chronic pain of right knee M25 561     G89 29    2  Closed nondisplaced intertrochanteric fracture of right femur with routine healing, subsequent encounter S72 144D    3  Primary osteoarthritis of right knee M17 11                   Subjective: Collin Leventhal reports that her pain level in knee is 7/10, right hip 0/10  States she wishes she can have her knee replaced sooner  Objective: See treatment diary below  Precautions HTN, aortic stenosis, tricuspid insufficiency, CHF, mitral valve stenosis, anxiety, depression,     Specialty Daily Treatment Diary     Surgery: right trochanteric fracture: 19  Insurance: medicare        Visits: 1 2 3 4 5   Manual 19   PROM: right hip        STM: prn                Man Flexibility        Total Time:                 Exercise Diary         There ex:         Rec bike/nustep  Next visit  nustep lv 1 10 min   20 miles  Nu step level 1 x10 min Nustep: lv 1 10 min    Bent knee fallout  2 x 10   2 x 10  AROM     Clamshells        Ball squeeze  5 sec x 10   5 sec x 10   5 sec x 10    glute sets  5 sec x 10  D/c  5 sec x10 with hip add isometric    Hip abd iso  5 sec x 10   5 sec x 10   5 sec x 10 reps    SLR   AAROM 10 x  AAROM x 10  AROM: 5 x 2    Bridges   10 x  10 x  10x 10 x    Quad sets    5 sec x 10   5 sec x 10   5 sec x10 5 sec x 10     Heel slides w/ peanut  10 x  15x  15x 15 x    butterfly stretch   5 sec x 10   5 sec x 10    5 sec x 10     LAQ  5 sec x 10   5 sec x 10   5 sec x 10 5 sec x 10     Stepping over hurtles fwd/ lat  4 hurtles forward, 5 rounds  4 hurdles forward, 5 rounds 4 hurtles 3 rounds tony UE support forward and lateral   seated hip flexion   10 x 2 2 x 10   2x10 hold 5    TA with hip flexion in standing    2x10 hold 5 2 x 10 Seated hamstring stretch         Sidestepping   Holding onto railings 6 steps x 2 x 2 rounds  Holding onto railings 6 steps x5 reps  8 feet: x 2 x 2 rounds    Monster walk     --   bilateral heel raises   2 x 10   2 x 10   2x10 hold 5 B UE assist 2 x 10  No hold                   Total time:                 Modalities        MH        Ice   10min right knee 10 min right knee 6 min R knee 6 MIN RIGHT KNEE   Total Time:         Reeval: 12/4/19        Assessment: needs verbal reminders to lift knees when sidestepping to avoid dragging legs  Plan: Progress treatment as tolerated

## 2019-11-20 NOTE — TELEPHONE ENCOUNTER
After one more month of current treatment, she would be eligible under Medicare guidelines, for edema hydraulic leg pumps, which are worn like half trousers on her lower legs with excellent results  You could schedule her for an appointment in about a month or six weeks and I can explain this in more detail to her

## 2019-11-21 ENCOUNTER — OFFICE VISIT (OUTPATIENT)
Dept: PHYSICAL THERAPY | Facility: CLINIC | Age: 84
End: 2019-11-21
Payer: MEDICARE

## 2019-11-21 DIAGNOSIS — S72.144D CLOSED NONDISPLACED INTERTROCHANTERIC FRACTURE OF RIGHT FEMUR WITH ROUTINE HEALING, SUBSEQUENT ENCOUNTER: ICD-10-CM

## 2019-11-21 DIAGNOSIS — G89.29 CHRONIC PAIN OF RIGHT KNEE: Primary | ICD-10-CM

## 2019-11-21 DIAGNOSIS — M25.561 CHRONIC PAIN OF RIGHT KNEE: Primary | ICD-10-CM

## 2019-11-21 PROCEDURE — 97110 THERAPEUTIC EXERCISES: CPT

## 2019-11-21 NOTE — PROGRESS NOTES
Daily Note     Today's date: 2019  Patient name: Lu Cannon  : 1935  MRN: [id number]  Referring provider: Andrés Canales  Dx:   Encounter Diagnosis     ICD-10-CM    1  Chronic pain of right knee M25 561     G89 29    2  Closed nondisplaced intertrochanteric fracture of right femur with routine healing, subsequent encounter S72 144D                   Subjective: Danielle Malave reports that her  Right knee continues to be a limitation  States pain is 7/10, states right hip is 0/10      Objective: See treatment diary below  Precautions HTN, aortic stenosis, tricuspid insufficiency, CHF, mitral valve stenosis, anxiety, depression,     Specialty Daily Treatment Diary     Surgery: right trochanteric fracture: 19  Insurance: medicare        Visits: 2 3 4 5  6   Manual 19   PROM: right hip        STM: prn                Man Flexibility        Total Time:                 Exercise Diary         There ex:         Rec bike/nustep Next visit  nustep lv 1 10 min   20 miles  Nu step level 1 x10 min Nustep: lv 1 10 min  lv 1 10 min    Bent knee fallout 2 x 10   2 x 10  AROM      Clamshells        Ball squeeze 5 sec x 10   5 sec x 10   5 sec x 10  5 sec x 10     glute sets 5 sec x 10  D/c  5 sec x10 with hip add isometric     Hip abd iso 5 sec x 10   5 sec x 10   5 sec x 10 reps     SLR  AAROM 10 x  AAROM x 10  AROM: 5 x 2     Bridges  10 x  10 x  10x 10 x     Quad sets   5 sec x 10   5 sec x 10   5 sec x10 5 sec x 10      Heel slides w/ peanut 10 x  15x  15x 15 x     butterfly stretch  5 sec x 10   5 sec x 10    5 sec x 10      LAQ 5 sec x 10   5 sec x 10   5 sec x 10 5 sec x 10   5 sec x 10     Stepping over hurtles fwd/ lat 4 hurtles forward, 5 rounds  4 hurdles forward, 5 rounds 4 hurtles 3 rounds tony UE support forward and lateral 5 hurtles 3 rounds toyn UE support forward and lateral   seated hip flexion  10 x 2 2 x 10   2x10 hold 5  5 sec x 2 x 10   TA with hip flexion in standing   2x10 hold 5 2 x 10   5 sec x 2x 10   Seated hamstring stretch         Sidestepping  Holding onto railings 6 steps x 2 x 2 rounds  Holding onto railings 6 steps x5 reps  8 feet: x 2 x 2 rounds  Over hurtles 4 rounds x 5 hurtles    Stepping over hurtles: forward     -- 5 hurtles 4 rounds   bilateral heel raises  2 x 10   2 x 10   2x10 hold 5 B UE assist 2 x 10  No hold 2 x 10     Standing abd, ext     2 x 10             Total time:                 Modalities        MH        Ice  10min right knee 10 min right knee 6 min R knee  8 min right knee   Total Time:         Reeval: 12/4/19          Assessment: patient tolerates hip activities well with no increase in pain in hip  Patient continues to be limited by right knee pain with standing and walking actvities  Plan: reeval next 1-2 sessions  possible d/c

## 2019-11-25 ENCOUNTER — OFFICE VISIT (OUTPATIENT)
Dept: PHYSICAL THERAPY | Facility: CLINIC | Age: 84
End: 2019-11-25
Payer: MEDICARE

## 2019-11-25 DIAGNOSIS — G89.29 CHRONIC PAIN OF RIGHT KNEE: Primary | ICD-10-CM

## 2019-11-25 DIAGNOSIS — M25.561 CHRONIC PAIN OF RIGHT KNEE: Primary | ICD-10-CM

## 2019-11-25 DIAGNOSIS — S72.144D CLOSED NONDISPLACED INTERTROCHANTERIC FRACTURE OF RIGHT FEMUR WITH ROUTINE HEALING, SUBSEQUENT ENCOUNTER: ICD-10-CM

## 2019-11-25 PROCEDURE — 97110 THERAPEUTIC EXERCISES: CPT

## 2019-11-25 NOTE — PROGRESS NOTES
Daily Note     Today's date: 2019  Patient name: Diane Bruno  : 1935  MRN: [id number]  Referring provider: Alhaji Singh  Dx:   Encounter Diagnosis     ICD-10-CM    1  Chronic pain of right knee M25 561     G89 29    2  Closed nondisplaced intertrochanteric fracture of right femur with routine healing, subsequent encounter S72 189D                   Subjective: Luis Eduardo Burleson reports that her pain level entering today is 1-2/10 hip        Objective: See treatment diary below  Precautions HTN, aortic stenosis, tricuspid insufficiency, CHF, mitral valve stenosis, anxiety, depression,     Specialty Daily Treatment Diary     Surgery: right trochanteric fracture: 19  Insurance: medicare     foto performed    Visits: 3 4 5  6 7   Manual 19   PROM: right hip        STM: prn                Man Flexibility        Total Time:                 Exercise Diary         There ex:         Rec bike/nustep nustep lv 1 10 min   20 miles  Nu step level 1 x10 min Nustep: lv 1 10 min  lv 1 10 min  lv 3 8 min   25 miles   Bent knee fallout 2 x 10  AROM    5 sec x 10     Clamshells        Ball squeeze 5 sec x 10   5 sec x 10  5 sec x 10      glute sets D/c  5 sec x10 with hip add isometric   5 sec x 10  W/ hip add iso    Hip abd iso 5 sec x 10   5 sec x 10 reps      SLR AAROM 10 x  AAROM x 10  AROM: 5 x 2   5 x 2    Bridges 10 x  10x 10 x   --   Quad sets  5 sec x 10   5 sec x10 5 sec x 10    5 sec x 10     Heel slides w/ peanut 15x  15x 15 x   20 x    butterfly stretch  5 sec x 10    5 sec x 10    5 sec x 10   With gentle manual stretch   LAQ 5 sec x 10   5 sec x 10 5 sec x 10   5 sec x 10   2 lb 2 x 10     Stepping over hurtles fwd/ lat  4 hurdles forward, 5 rounds 4 hurtles 3 rounds tony UE support forward and lateral 5 hurtles 3 rounds tony UE support forward and lateral --   seated hip flexion  2 x 10   2x10 hold 5  5 sec x 2 x 10 Seated 8 x 2 2 lb    TA with hip flexion in standing  2x10 hold 5 2 x 10   5 sec x 2x 10    Seated hamstring stretch         Sidestepping Holding onto railings 6 steps x 2 x 2 rounds  Holding onto railings 6 steps x5 reps  8 feet: x 2 x 2 rounds  Over hurtles 4 rounds x 5 hurtles  --   Stepping over hurtles: forward    -- 5 hurtles 4 rounds --   bilateral heel raises  2 x 10   2x10 hold 5 B UE assist 2 x 10  No hold 2 x 10   2 x 10     Standing abd, ext    2 x 10   2 x 10  Each    Standing  knee flexion      2 x 10     Total time:                 Modalities        MH        Ice  10 min right knee 6 min R knee  8 min right knee 8 min    Total Time:         Reeval: 12/4/19        Assessment: patient remains motivated despite elevated pain level and dysfunction in right knee   No difficulty with hip actvities and is preparing to transition to d/c      Plan: reeval in 1-2 session then transtion to HEp

## 2019-11-26 ENCOUNTER — APPOINTMENT (OUTPATIENT)
Dept: PHYSICAL THERAPY | Facility: CLINIC | Age: 84
End: 2019-11-26
Payer: MEDICARE

## 2019-11-29 ENCOUNTER — EVALUATION (OUTPATIENT)
Dept: PHYSICAL THERAPY | Facility: CLINIC | Age: 84
End: 2019-11-29
Payer: MEDICARE

## 2019-11-29 DIAGNOSIS — M17.11 PRIMARY OSTEOARTHRITIS OF RIGHT KNEE: ICD-10-CM

## 2019-11-29 DIAGNOSIS — S72.144D CLOSED NONDISPLACED INTERTROCHANTERIC FRACTURE OF RIGHT FEMUR WITH ROUTINE HEALING, SUBSEQUENT ENCOUNTER: Primary | ICD-10-CM

## 2019-11-29 DIAGNOSIS — G89.29 CHRONIC PAIN OF RIGHT KNEE: ICD-10-CM

## 2019-11-29 DIAGNOSIS — M25.561 CHRONIC PAIN OF RIGHT KNEE: ICD-10-CM

## 2019-11-29 PROCEDURE — 97110 THERAPEUTIC EXERCISES: CPT

## 2019-11-29 PROCEDURE — 97112 NEUROMUSCULAR REEDUCATION: CPT

## 2019-11-29 NOTE — PROGRESS NOTES
PT Discharge SUmmary    Today's date: 2019  Patient name: Rhys Christianson  : 1935  MRN: [id number]  Referring provider: Joey Monk PA-C  Dx:   Encounter Diagnosis     ICD-10-CM    1  Closed nondisplaced intertrochanteric fracture of right femur with routine healing, subsequent encounter S72 144D    2  Chronic pain of right knee M25 561     G89 29    3  Primary osteoarthritis of right knee M17 11                   Subjective: Sharron Marcelino reports that her pain in right hip is 0/10  Describes occasional pain in right QL and pain in right knee constant with weightbearing and most limiting pain  % improvement is 80% improvement in hip  Pain level at rest right hip 0/10, right knee 0-5, with ADL's Right hip: 0/10, right knee 8/10, at worst knee 9/10, hip: 1/10  Current symptoms include: general right knee pain   Function: weightbearing activities are limited due to knee symptoms  Objective: See treatment diary below    Precautions HTN, aortic stenosis, tricuspid insufficiency, CHF, mitral valve stenosis, anxiety, depression,     Specialty Daily Treatment Diary     Surgery: right trochanteric fracture: 19  Insurance: medicare    foto performed     Visits: 4 5  6 7 8   Manual 19   PROM: right hip        STM: prn                Man Flexibility        Total Time:                 Exercise Diary      reeval performed   There ex:         Rec bike/nustep Nu step level 1 x10 min Nustep: lv 1 10 min  lv 1 10 min  lv 3 8 min   25 miles lv 3 8 min    Bent knee fallout    5 sec x 10   5 sec x 10     Clamshells        Ball squeeze 5 sec x 10  5 sec x 10         glute sets 5 sec x10 with hip add isometric   5 sec x 10  W/ hip add iso  5 sec x 10   Hip abd iso 5 sec x 10 reps       SLR AAROM x 10  AROM: 5 x 2   5 x 2     Bridges 10x 10 x   --    Quad sets  5 sec x10 5 sec x 10    5 sec x 10      Heel slides w/ peanut 15x 15 x   20 x  15 x    butterfly stretch   5 sec x 10    5 sec x 10   With gentle manual stretch    LAQ 5 sec x 10 5 sec x 10   5 sec x 10   2 lb 2 x 10      Stepping over hurtles fwd/ lat 4 hurdles forward, 5 rounds 4 hurtles 3 rounds tony UE support forward and lateral 5 hurtles 3 rounds tony UE support forward and lateral -- 5 hurtles 3 rounds tony UE support forward and lateral   seated hip flexion  2x10 hold 5  5 sec x 2 x 10 Seated 8 x 2 2 lb     TA with hip flexion in standing 2x10 hold 5 2 x 10   5 sec x 2x 10  5 sec 2 x 10     Seated hamstring stretch         bilateral heel raises  2x10 hold 5 B UE assist 2 x 10  No hold 2 x 10   2 x 10   2 x 10     Standing abd, ext   2 x 10   2 x 10  Each  2 x 10     Standing  knee flexion     2 x 10   2 x 10     Total time:                 Modalities        MH        Ice  6 min R knee  8 min right knee 8 min     Total Time:         Reeval: 12/4/19    Goals (11/29/19)  STG:  + Patient will have pain level 0/10 right hip  at rest (2-3 weeks) (met)   + Patient will report a 35% improvement in symptoms (2-3 weeks) (met)  + Patient will be independent in basic HEP (2-3 weeks) (met)   + Patient will demonstrate an increase in range of motion right  Hip  motions  to  wnl (2-3 weeks) (met)   + Patient will demonstrate increase  MMT of  hip flexion  to  4/5 for maximum function   (2-3 weeks)) (met)   + Patient will report increased ease walking due to a reduction in right hip pain (2-3 weeks) (met: not met due to right knee pain)    LTG:  + Patient will have pain level 1/10 left hip  with ADL's (4-6 weeks) (met)   + Patient will report a 70% improvement in symptoms (4-6 weeks)  (met)   + Patient will increase FOTO score by 10 points  (10 sessions) (met)   + Patient will be independent in comprehensive HEP (4-6 weeks) (met)   + Patient will demonstrate an increase in range of motion left hip ROM in all planes  to  within normal limits and painfree (4-6 weeks) (met)   + Patient will demonstrate increase  MMT of right  hip to  4+/5 for maximum function  (4-6 weeks)(not met)      Static Posture ( 11/29/19)     Comments  Posture:+ SPC; genu valgum left greater then right, iliac crest right higher then left, unweights right LE, right shoulder blade higher then left, right scap protracted, + right scoliotic curve to right lumbar spine, right knee flexed  (status quo)    Gait: + SPC, slow addy, reduced hip flexion and reduced knee mobility, needed instruction to use SPC on left ( proper sequencing with SPC, + knee flexion on right, minimal to no knee mobility)    functional actvities  SLS: Nt  Squat:uses momentum to exit chair and has reduced control with lowering down to chair (status quo)   Bridging:  Able to lift about 80% of motion  ( wnl)    Palpation: + pain in area of right hip joint ( still present: describes as mild)     MMT  Hip flexion: Right:4-/5   Left: 4/5 ( (status quo due to knee pain)  IR: Right: 4/5  + pain in knee Left: 4/5   Er: Right: 4/5  + pain in knee  Left: 4/5   sittinghip abduction: Right: 4/5   Left: 4/5     ROM  Flexion: Right:  110/115  Left:wnl ( 118)  Abduction: Right: AAROM 30 Left:wnl ( AAROM: 32)  IR: Right: AAROM 21 Left: wnl (Right: 32)  ER: Right: AAROM 25  Left: wnl ( Right: 30)  Adduction: Right: wnl   Left:wnl     Knee   ROM: Right:-2/130 + pain at end range  left: wnl ( Right: 128 + pain at end range, 0)  MMT: Right: 4/5   Left: 4/5 (no pain)     Flexibility  Hamstring: Right: wnl     Left: wnl    Hip flexor: Right: Minimal   LOM ((status quo)   Left: wnl    Piriformis: Right: wnl       Left: wnl      IT Band: Right: wnl     Left: wnl           Assessment: patient has tolerated there ex well with no increase in hp pain  Patient main limiting factor is right knee pain during weightbearing activities  Knee rom is slightly limited but weightbearing activities is most painful for right knee  Also noted is significant gait deviations due to right knee pain  She is anxiously awaiting a knee replacement  Patient was issued updated Hep in written form and appropriate band if needed  Patient demonstrated independence with these exercises  At this time PT services are being discharged due to limitations on progressing patient due to right knee pain  Patient advised to follow up with ortho as needed         Plan: d/c to HEP

## 2020-01-07 ENCOUNTER — OFFICE VISIT (OUTPATIENT)
Dept: CARDIOLOGY CLINIC | Facility: CLINIC | Age: 85
End: 2020-01-07
Payer: MEDICARE

## 2020-01-07 VITALS
BODY MASS INDEX: 23.18 KG/M2 | HEART RATE: 64 BPM | WEIGHT: 115 LBS | OXYGEN SATURATION: 98 % | SYSTOLIC BLOOD PRESSURE: 130 MMHG | HEIGHT: 59 IN | DIASTOLIC BLOOD PRESSURE: 52 MMHG

## 2020-01-07 DIAGNOSIS — I87.2 CHRONIC VENOUS STASIS DERMATITIS OF BOTH LOWER EXTREMITIES: ICD-10-CM

## 2020-01-07 DIAGNOSIS — I35.0 MODERATE AORTIC STENOSIS: Primary | ICD-10-CM

## 2020-01-07 DIAGNOSIS — R60.0 EDEMA OF BOTH LEGS: ICD-10-CM

## 2020-01-07 DIAGNOSIS — R53.83 OTHER FATIGUE: ICD-10-CM

## 2020-01-07 PROCEDURE — 99214 OFFICE O/P EST MOD 30 MIN: CPT | Performed by: INTERNAL MEDICINE

## 2020-01-07 NOTE — PATIENT INSTRUCTIONS
1  Discussed in detail the possible use of a compression pump for her right greater than left bilateral lymphedema of lower extremities, which persist despite use of diuretics, exercise, elevation, and class one compression  2  Because of her caregiver status for her  and her on schedule, the patient is somewhat skeptical about her ability to comply with twice a day application of the compression device  3  She will consider short-term additional daytime help to see if that would allow her more time to use the compression pumps during the daytime hours for several weeks, prior to using them at night only  4  She will let us know if she would like us to order the Lymphapress compression pump system  5  Recommended a trial of further tapering of the dose of furosemide from 40 milligrams daily to usage several days a week and as needed  6  Patient will give us a call and let us know if she wants us to proceed with ordering this   compression system  7  Cardiology follow-up approximately six months with EKG

## 2020-01-11 DIAGNOSIS — I10 ESSENTIAL HYPERTENSION: Primary | ICD-10-CM

## 2020-01-13 RX ORDER — METOPROLOL SUCCINATE 50 MG/1
TABLET, EXTENDED RELEASE ORAL
Qty: 90 TABLET | Refills: 2 | Status: SHIPPED | OUTPATIENT
Start: 2020-01-13 | End: 2020-10-16

## 2020-01-17 ENCOUNTER — APPOINTMENT (OUTPATIENT)
Dept: RADIOLOGY | Facility: CLINIC | Age: 85
End: 2020-01-17
Payer: MEDICARE

## 2020-01-17 ENCOUNTER — OFFICE VISIT (OUTPATIENT)
Dept: OBGYN CLINIC | Facility: CLINIC | Age: 85
End: 2020-01-17
Payer: MEDICARE

## 2020-01-17 VITALS
BODY MASS INDEX: 22.98 KG/M2 | WEIGHT: 114 LBS | HEART RATE: 67 BPM | HEIGHT: 59 IN | SYSTOLIC BLOOD PRESSURE: 143 MMHG | DIASTOLIC BLOOD PRESSURE: 81 MMHG

## 2020-01-17 DIAGNOSIS — M17.11 PRIMARY OSTEOARTHRITIS OF RIGHT KNEE: ICD-10-CM

## 2020-01-17 DIAGNOSIS — M25.561 CHRONIC PAIN OF RIGHT KNEE: ICD-10-CM

## 2020-01-17 DIAGNOSIS — S72.144D CLOSED NONDISPLACED INTERTROCHANTERIC FRACTURE OF RIGHT FEMUR WITH ROUTINE HEALING, SUBSEQUENT ENCOUNTER: ICD-10-CM

## 2020-01-17 DIAGNOSIS — S72.144D CLOSED NONDISPLACED INTERTROCHANTERIC FRACTURE OF RIGHT FEMUR WITH ROUTINE HEALING, SUBSEQUENT ENCOUNTER: Primary | ICD-10-CM

## 2020-01-17 DIAGNOSIS — G89.29 CHRONIC PAIN OF RIGHT KNEE: ICD-10-CM

## 2020-01-17 PROCEDURE — 20610 DRAIN/INJ JOINT/BURSA W/O US: CPT | Performed by: ORTHOPAEDIC SURGERY

## 2020-01-17 PROCEDURE — 99214 OFFICE O/P EST MOD 30 MIN: CPT | Performed by: ORTHOPAEDIC SURGERY

## 2020-01-17 PROCEDURE — 73552 X-RAY EXAM OF FEMUR 2/>: CPT

## 2020-01-17 RX ORDER — TRIAMCINOLONE ACETONIDE 40 MG/ML
80 INJECTION, SUSPENSION INTRA-ARTICULAR; INTRAMUSCULAR
Status: COMPLETED | OUTPATIENT
Start: 2020-01-17 | End: 2020-01-17

## 2020-01-17 RX ORDER — BUPIVACAINE HYDROCHLORIDE 5 MG/ML
6 INJECTION, SOLUTION EPIDURAL; INTRACAUDAL
Status: COMPLETED | OUTPATIENT
Start: 2020-01-17 | End: 2020-01-17

## 2020-01-17 RX ADMIN — BUPIVACAINE HYDROCHLORIDE 6 ML: 5 INJECTION, SOLUTION EPIDURAL; INTRACAUDAL at 15:32

## 2020-01-17 RX ADMIN — TRIAMCINOLONE ACETONIDE 80 MG: 40 INJECTION, SUSPENSION INTRA-ARTICULAR; INTRAMUSCULAR at 15:32

## 2020-01-17 NOTE — PROGRESS NOTES
Assessment/Plan:  1  Closed nondisplaced intertrochanteric fracture of right femur with routine healing, subsequent encounter  CANCELED: XR hip/pelv 2-3 vws right if performed   2  Primary osteoarthritis of right knee     3  Chronic pain of right knee       Scribe Attestation    I,:   Yuliana Griffith am acting as a scribe while in the presence of the attending physician :        I,:   Katharine March, DO personally performed the services described in this documentation    as scribed in my presence :          Milton Mares is a pleasant 80-year-old female who presents today for follow-up evaluation six months status post right hip long cephalomedullary nailing  I'm very pleased with her clinical presentation and imaging with respect to her hip and explained that her fracture is now fully healed  We did also discuss her right knee which does have severe end-stage osteoarthritis  We discussed a right total knee arthroplasty, however at this time that she does have obligations with respect caring for her  and does not have the social support system in place for a successful recovery from such a procedure  This may be more realistic option for in the summer when her son is available to assist with her care  In the meantime, I offered her a corticosteroid injection for pain relief today in the office  She was amenable to this and the injection was administered without issue and tolerated well by the patient  Post injection instructions were provided  We can perform repeat corticosteroid injection for her no sooner than three months if she finds lasting pain relief from today's injection  We will see her back on an as-needed basis but she does plan on moving forward with a right total knee arthroplasty most likely this summer      Large joint arthrocentesis: R knee  Date/Time: 1/17/2020 3:32 PM  Consent given by: patient  Site marked: site marked  Timeout: Immediately prior to procedure a time out was called to verify the correct patient, procedure, equipment, support staff and site/side marked as required   Supporting Documentation  Indications: pain   Procedure Details  Location: knee - R knee  Preparation: Patient was prepped and draped in the usual sterile fashion  Needle size: 20 G  Ultrasound guidance: no  Approach: anterolateral  Medications administered: 6 mL bupivacaine (PF) 0 5 %; 80 mg triamcinolone acetonide 40 mg/mL    Patient tolerance: patient tolerated the procedure well with no immediate complications  Dressing:  Sterile dressing applied         Subjective: Follow-up evaluation six months status post right hip long cephalomedullary nailing    Patient ID: Ricardo Tracy is a 80 y o  female  HPI  Yuli Grullon is a pleasant 79-year-old female who presents today for follow-up evaluation six months status post right hip long cephalomedullary nailing  She states that she has been doing well since we saw her last   She does note some intermittent groin discomfort but states that overall she has been improving  She does find that her current discomfort does resolve when she gets moving  She is more concerned today with her continued chronic right knee pain and is interested in discussing right total knee arthroplasty  She does state that she currently lives at home with her  and is his primary caregiver  She also states that her son does help care for her and her  at times but will be unavailable to do so again until this upcoming summer  She denies any new injury or trauma  She denies any distal paresthesias of the lower extremity  Review of Systems   Constitutional: Positive for activity change  Negative for chills, fever and unexpected weight change  HENT: Negative  Negative for hearing loss, nosebleeds and sore throat  Eyes: Negative  Negative for pain, redness and visual disturbance  Respiratory: Negative  Negative for cough, shortness of breath and wheezing  Cardiovascular: Positive for leg swelling  Negative for chest pain and palpitations  Gastrointestinal: Negative  Negative for abdominal pain, nausea and vomiting  Endocrine: Negative  Negative for polydipsia and polyuria  Genitourinary: Negative for dysuria and hematuria  Musculoskeletal: Positive for arthralgias, joint swelling and myalgias  See HPI   Skin: Negative  Negative for rash and wound  Neurological: Positive for numbness  Negative for dizziness and headaches  Psychiatric/Behavioral: Negative  Negative for decreased concentration and suicidal ideas  The patient is not nervous/anxious  Past Medical History:   Diagnosis Date    Anesthesia complication       Yrs ago had "anxiety" reaction not sure while sedated, pt states sensitive to anesthesia effects    Anxiety     stress at home    Arthritis     Cataract, right     Last Assessed:5/11/16    Eye hemorrhage     left eye currently 5/12/16    History of shingles 05/2015    Hypertension     Mitral valve stenosis, mild        Past Surgical History:   Procedure Laterality Date    CARPAL TUNNEL RELEASE Left     CATARACT EXTRACTION W/ INTRAOCULAR LENS IMPLANT Left 10/11/2016    Procedure: EXTRACTION EXTRACAPSULAR CATARACT PHACO INTRAOCULAR LENS (IOL); Surgeon: Sonia Quesada MD;  Location: [facility] MAIN OR;  Service:     CERVICAL CONE BIOPSY      COLONOSCOPY      EYE SURGERY Left     muscle repair    AZ OPEN RX FEMUR FX+INTRAMED NINI Right 7/23/2019    Procedure: INSERTION NAIL IM FEMUR ANTEGRADE (TROCHANTERIC); Surgeon: Tatianna Broderick DO;  Location: [address] [place] [address];  Service: Orthopedics    AZ XCAPSL CTRC RMVL INSJ IO LENS PROSTH W/O ECP Right 5/17/2016    Procedure: EXTRACTION EXTRACAPSULAR CATARACT PHACO INTRAOCULAR LENS (IOL);   Surgeon: Sonia Quesada MD;  Location: [facility] MAIN OR;  Service: Ophthalmology    TONSILLECTOMY         Family History   Problem Relation Age of Onset    Heart disease Mother         MI  at age 71    Arthritis Mother     GI problems Father         bleeding ulcer    Arthritis Sister     Sleep apnea Sister     Irritable bowel syndrome Sister     Cancer Sister         skin cancer    Heart disease Brother         CABG (5)    Cancer Brother         skin cancer    Heart disease Maternal Aunt        Social History     Occupational History    Not on file   Tobacco Use    Smoking status: Never Smoker    Smokeless tobacco: Never Used   Substance and Sexual Activity    Alcohol use: Yes     Comment: rarely    Drug use: No    Sexual activity: Not on file         Current Outpatient Medications:     Calcium Carb-Cholecalciferol (CALCIUM 600 + D) 600-200 MG-UNIT TABS, Take 1 tablet by mouth daily, Disp: , Rfl:     furosemide (LASIX) 40 mg tablet, Take 40 mg by mouth daily , Disp: , Rfl:     metoprolol succinate (TOPROL-XL) 50 mg 24 hr tablet, TAKE ONE TABLET BY MOUTH DAILY AS DIRECTED, Disp: 90 tablet, Rfl: 2    Multiple Vitamins-Minerals (PRESERVISION AREDS PO), Take by mouth, Disp: , Rfl:     potassium chloride (K-DUR,KLOR-CON) 20 mEq tablet, Take 1 tablet (20 mEq total) by mouth daily, Disp: 30 tablet, Rfl: 5    acetaminophen (TYLENOL) 325 mg tablet, Take 3 tablets (975 mg total) by mouth every 8 (eight) hours (Patient not taking: Reported on 2020), Disp: 30 tablet, Rfl: 0    Allergies   Allergen Reactions    Indocin [Indomethacin]      hypertension       Objective:  Vitals:    20 1438   BP: 143/81   Pulse: 67       Body mass index is 23 03 kg/m²  Right Knee Exam     Tenderness   The patient is experiencing tenderness in the medial joint line and patella  Range of Motion   Right knee extension: 5  Right knee flexion: 125       Tests   Varus: negative Valgus: negative  Drawer:  Anterior - negative    Posterior - negative  Patellar apprehension: negative    Other   Erythema: absent  Scars: absent  Sensation: normal  Pulse: present  Swelling: none  Effusion: no effusion present    Comments:  Stable at 0, 30 and 90°  Neurovascularly intact distally  No warmth, erythema or signs of infection        Right Hip Exam     Tenderness   The patient is experiencing no tenderness  Range of Motion   Abduction: normal   Adduction: normal   Flexion: normal   External rotation: normal   Internal rotation: normal     Muscle Strength   Abduction: 5/5   Adduction: 5/5   Flexion: 5/5     Other   Erythema: absent  Scars: present (Well-healed operative incision)  Sensation: normal  Pulse: present    Comments:  No increased pain with passive range of motion of the hip          Observations     Right Knee   Negative for effusion  Physical Exam   Constitutional: She is oriented to person, place, and time  She appears well-developed and well-nourished  HENT:   Head: Normocephalic and atraumatic  Eyes: Pupils are equal, round, and reactive to light  Conjunctivae and EOM are normal    Neck: Normal range of motion  Neck supple  Cardiovascular: Normal rate and intact distal pulses  Pulmonary/Chest: Effort normal  No respiratory distress  Musculoskeletal:        Right knee: She exhibits no effusion  As noted in HPI   Neurological: She is alert and oriented to person, place, and time  Skin: Skin is warm and dry  Psychiatric: She has a normal mood and affect  Her behavior is normal    Nursing note and vitals reviewed  X-ray of the right femur obtained on 01/17/2020 shows a healed intertrochanteric fracture with subtrochanteric extension and associated operative hardware that is well positioned and well aligned  There is no interval change in her hardware compared to prior plain films  There is evidence of severe osteoarthritis in her right knee with bone-on-bone contact  There is no acute fracture, dislocation, lytic or blastic lesion

## 2020-02-21 ENCOUNTER — APPOINTMENT (OUTPATIENT)
Dept: RADIOLOGY | Facility: CLINIC | Age: 85
End: 2020-02-21
Payer: MEDICARE

## 2020-02-21 ENCOUNTER — OFFICE VISIT (OUTPATIENT)
Dept: OBGYN CLINIC | Facility: CLINIC | Age: 85
End: 2020-02-21
Payer: MEDICARE

## 2020-02-21 VITALS
SYSTOLIC BLOOD PRESSURE: 180 MMHG | HEART RATE: 87 BPM | HEIGHT: 59 IN | WEIGHT: 104 LBS | BODY MASS INDEX: 20.96 KG/M2 | DIASTOLIC BLOOD PRESSURE: 72 MMHG

## 2020-02-21 DIAGNOSIS — S72.144D CLOSED NONDISPLACED INTERTROCHANTERIC FRACTURE OF RIGHT FEMUR WITH ROUTINE HEALING, SUBSEQUENT ENCOUNTER: ICD-10-CM

## 2020-02-21 DIAGNOSIS — M54.16 LUMBAR RADICULOPATHY: Primary | ICD-10-CM

## 2020-02-21 DIAGNOSIS — M53.3 SACROILIAC JOINT DYSFUNCTION OF RIGHT SIDE: ICD-10-CM

## 2020-02-21 PROCEDURE — 3077F SYST BP >= 140 MM HG: CPT | Performed by: ORTHOPAEDIC SURGERY

## 2020-02-21 PROCEDURE — 3078F DIAST BP <80 MM HG: CPT | Performed by: ORTHOPAEDIC SURGERY

## 2020-02-21 PROCEDURE — 1160F RVW MEDS BY RX/DR IN RCRD: CPT | Performed by: ORTHOPAEDIC SURGERY

## 2020-02-21 PROCEDURE — 4040F PNEUMOC VAC/ADMIN/RCVD: CPT | Performed by: ORTHOPAEDIC SURGERY

## 2020-02-21 PROCEDURE — 73502 X-RAY EXAM HIP UNI 2-3 VIEWS: CPT

## 2020-02-21 PROCEDURE — 99214 OFFICE O/P EST MOD 30 MIN: CPT | Performed by: ORTHOPAEDIC SURGERY

## 2020-02-21 PROCEDURE — 3008F BODY MASS INDEX DOCD: CPT | Performed by: ORTHOPAEDIC SURGERY

## 2020-02-21 PROCEDURE — 1036F TOBACCO NON-USER: CPT | Performed by: ORTHOPAEDIC SURGERY

## 2020-02-21 NOTE — PROGRESS NOTES
Assessment/Plan:  1  Lumbar radiculopathy  Ambulatory referral to Physical Therapy   2  Sacroiliac joint dysfunction of right side  Ambulatory referral to Physical Therapy    diclofenac sodium (VOLTAREN) 1 %   3  Closed nondisplaced intertrochanteric fracture of right femur with routine healing, subsequent encounter  XR hip/pelv 2-3 vws right if performed    CANCELED: XR hip/pelv 2-3 vws right if performed     Scribe Attestation    I,:   Fortino Schilling am acting as a scribe while in the presence of the attending physician :        I,:   Ritta Merlin, DO personally performed the services described in this documentation    as scribed in my presence :          Elvin Canchola is a very pleasant 41-year-old female well known to the practice who returns today for follow-up evaluation of right hip pain  After reviewing her imaging and performing a thorough history and physical exam I explained that her plain films today demonstrate no interval change in her cephalomedullary nail  I also explained that she is suffering with severe and diffuse multilevel degenerative change about her lumbar sacral spine which I believe is the generator of her pain  We discussed treatment options today and I recommended a course of formal physical therapy for her  I provided her with a prescription for this today in the office  I also provided a prescription for Voltaren gel and instructions for use  If she fails to find relief with these conservative treatment measures, we will refer her to Dr Zelda Feliciano for consideration of injection therapy  We will see her back on an as-needed basis for this issue  Subjective: Follow up evaluation for right hip pain    Patient ID: Darolyn Dancer is a 80 y o  female  HPI  Elvin Canchola is a very pleasant 41-year-old female well known to the practice who returns today for follow-up evaluation of right hip pain    She is now approximately seven months status post cephalomedullary nailing which demonstrated full healing at her last appointment  At today's visit, she complains of low back and right hip pain  She describes this pain as a severe aching pain about the anterior aspect of hip that can radiate posteriorly as well as distally down the lateral aspect of her leg  She does state that her pain improves somewhat once she has initiated movement  She denies any acute injury or trauma  She denies any distal paresthesias of the lower extremity  Review of Systems   Constitutional: Positive for activity change  Negative for chills, fever and unexpected weight change  HENT: Negative for hearing loss, nosebleeds and sore throat  Eyes: Negative for pain, redness and visual disturbance  Respiratory: Negative for cough, shortness of breath and wheezing  Cardiovascular: Positive for leg swelling  Negative for chest pain and palpitations  Gastrointestinal: Negative for abdominal pain, nausea and vomiting  Endocrine: Negative for polydipsia and polyuria  Genitourinary: Negative for dysuria and hematuria  Musculoskeletal: Positive for arthralgias and myalgias  Negative for joint swelling  See HPI   Skin: Negative for rash and wound  Neurological: Negative for dizziness, numbness and headaches  Psychiatric/Behavioral: Negative for decreased concentration and suicidal ideas  The patient is not nervous/anxious  Past Medical History:   Diagnosis Date    Anesthesia complication       Yrs ago had "anxiety" reaction not sure while sedated, pt states sensitive to anesthesia effects    Anxiety     stress at home    Arthritis     Cataract, right     Last Assessed:5/11/16    Eye hemorrhage     left eye currently 5/12/16    History of shingles 05/2015    Hypertension     Mitral valve stenosis, mild        Past Surgical History:   Procedure Laterality Date    CARPAL TUNNEL RELEASE Left     CATARACT EXTRACTION W/ INTRAOCULAR LENS IMPLANT Left 10/11/2016    Procedure: EXTRACTION EXTRACAPSULAR CATARACT PHACO INTRAOCULAR LENS (IOL); Surgeon: Ximena Frank MD;  Location: [facility] MAIN OR;  Service:     CERVICAL CONE BIOPSY      COLONOSCOPY      EYE SURGERY Left     muscle repair    NC OPEN RX FEMUR FX+INTRAMED NINI Right 2019    Procedure: INSERTION NAIL IM FEMUR ANTEGRADE (TROCHANTERIC); Surgeon: Humaira Hawthorne DO;  Location: [address] [place] [address];  Service: Orthopedics    NC XCAPSL CTRC RMVL INSJ IO LENS PROSTH W/O ECP Right 2016    Procedure: EXTRACTION EXTRACAPSULAR CATARACT PHACO INTRAOCULAR LENS (IOL);   Surgeon: Ximena Frank MD;  Location: [facility] MAIN OR;  Service: Ophthalmology    TONSILLECTOMY         Family History   Problem Relation Age of Onset    Heart disease Mother         MI  at age 71   Rangel Justice Arthritis Mother     GI problems Father         bleeding ulcer    Arthritis Sister     Sleep apnea Sister     Irritable bowel syndrome Sister     Cancer Sister         skin cancer    Heart disease Brother         CABG (5)    Cancer Brother         skin cancer    Heart disease Maternal Aunt        Social History     Occupational History    Not on file   Tobacco Use    Smoking status: Never Smoker    Smokeless tobacco: Never Used   Substance and Sexual Activity    Alcohol use: Yes     Comment: rarely    Drug use: No    Sexual activity: Not on file         Current Outpatient Medications:     acetaminophen (TYLENOL) 325 mg tablet, Take 3 tablets (975 mg total) by mouth every 8 (eight) hours (Patient not taking: Reported on 2020), Disp: 30 tablet, Rfl: 0    Calcium Carb-Cholecalciferol (CALCIUM 600 + D) 600-200 MG-UNIT TABS, Take 1 tablet by mouth daily, Disp: , Rfl:     diclofenac sodium (VOLTAREN) 1 %, Apply 2 g topically 4 (four) times a day, Disp: 1 Tube, Rfl: 1    furosemide (LASIX) 40 mg tablet, Take 40 mg by mouth daily , Disp: , Rfl:     metoprolol succinate (TOPROL-XL) 50 mg 24 hr tablet, TAKE ONE TABLET BY MOUTH DAILY AS DIRECTED, Disp: 90 tablet, Rfl: 2    Multiple Vitamins-Minerals (PRESERVISION AREDS PO), Take by mouth, Disp: , Rfl:     potassium chloride (K-DUR,KLOR-CON) 20 mEq tablet, Take 1 tablet (20 mEq total) by mouth daily, Disp: 30 tablet, Rfl: 5    Allergies   Allergen Reactions    Indocin [Indomethacin]      hypertension       Objective:  Vitals:    02/21/20 1417   BP: (!) 180/72   Pulse: 87       Body mass index is 21 01 kg/m²  Right Hip Exam     Tenderness   The patient is experiencing no tenderness  Muscle Strength   Abduction: 5/5   Adduction: 5/5   Flexion: 5/5     Tests   NAOMI: negative    Other   Erythema: absent  Scars: present (well healed operative incision)  Sensation: normal  Pulse: present    Comments:  No increased pain with IR/ER  Range of motion of all planes without pain tenderness to palpation around right SI joint and the right-sided lumbar paraspinal muscles  Negative neuro straight leg raise            Physical Exam   Constitutional: She is oriented to person, place, and time  She appears well-developed and well-nourished  HENT:   Head: Normocephalic and atraumatic  Eyes: Pupils are equal, round, and reactive to light  Conjunctivae and EOM are normal    Neck: Normal range of motion  Neck supple  Cardiovascular: Normal rate and intact distal pulses  Pulmonary/Chest: Effort normal  No respiratory distress  Musculoskeletal:   As noted in HPI   Neurological: She is alert and oriented to person, place, and time  Skin: Skin is warm and dry  Psychiatric: She has a normal mood and affect  Her behavior is normal    Nursing note and vitals reviewed  I have personally reviewed pertinent films in PACS  X-ray of the right hip and pelvis obtained on 02/21/2020 demonstrates a well-positioned well-aligned cephalomedullary nail which shows no interval change, cut out, or failure    The visualized lumbar spine demonstrates severe and diffuse multilevel degenerative change as well as curvature of the lumbar spine   There is no acute fracture, dislocation, lytic or blastic lesion

## 2020-04-22 ENCOUNTER — OFFICE VISIT (OUTPATIENT)
Dept: OBGYN CLINIC | Facility: CLINIC | Age: 85
End: 2020-04-22
Payer: MEDICARE

## 2020-04-22 VITALS
BODY MASS INDEX: 23.19 KG/M2 | SYSTOLIC BLOOD PRESSURE: 180 MMHG | DIASTOLIC BLOOD PRESSURE: 72 MMHG | WEIGHT: 114.8 LBS | HEART RATE: 69 BPM

## 2020-04-22 DIAGNOSIS — G89.29 CHRONIC PAIN OF RIGHT KNEE: ICD-10-CM

## 2020-04-22 DIAGNOSIS — M25.461 EFFUSION OF RIGHT KNEE: ICD-10-CM

## 2020-04-22 DIAGNOSIS — M25.561 CHRONIC PAIN OF RIGHT KNEE: ICD-10-CM

## 2020-04-22 DIAGNOSIS — M17.11 PRIMARY OSTEOARTHRITIS OF RIGHT KNEE: Primary | ICD-10-CM

## 2020-04-22 PROCEDURE — 3077F SYST BP >= 140 MM HG: CPT | Performed by: ORTHOPAEDIC SURGERY

## 2020-04-22 PROCEDURE — 1036F TOBACCO NON-USER: CPT | Performed by: ORTHOPAEDIC SURGERY

## 2020-04-22 PROCEDURE — 3078F DIAST BP <80 MM HG: CPT | Performed by: ORTHOPAEDIC SURGERY

## 2020-04-22 PROCEDURE — 1160F RVW MEDS BY RX/DR IN RCRD: CPT | Performed by: ORTHOPAEDIC SURGERY

## 2020-04-22 PROCEDURE — 20610 DRAIN/INJ JOINT/BURSA W/O US: CPT | Performed by: ORTHOPAEDIC SURGERY

## 2020-04-22 PROCEDURE — 4040F PNEUMOC VAC/ADMIN/RCVD: CPT | Performed by: ORTHOPAEDIC SURGERY

## 2020-04-22 PROCEDURE — 99213 OFFICE O/P EST LOW 20 MIN: CPT | Performed by: ORTHOPAEDIC SURGERY

## 2020-04-22 RX ORDER — TRIAMCINOLONE ACETONIDE 40 MG/ML
80 INJECTION, SUSPENSION INTRA-ARTICULAR; INTRAMUSCULAR
Status: COMPLETED | OUTPATIENT
Start: 2020-04-22 | End: 2020-04-22

## 2020-04-22 RX ORDER — BUPIVACAINE HYDROCHLORIDE 5 MG/ML
6 INJECTION, SOLUTION EPIDURAL; INTRACAUDAL
Status: COMPLETED | OUTPATIENT
Start: 2020-04-22 | End: 2020-04-22

## 2020-04-22 RX ADMIN — TRIAMCINOLONE ACETONIDE 80 MG: 40 INJECTION, SUSPENSION INTRA-ARTICULAR; INTRAMUSCULAR at 15:52

## 2020-04-22 RX ADMIN — BUPIVACAINE HYDROCHLORIDE 6 ML: 5 INJECTION, SOLUTION EPIDURAL; INTRACAUDAL at 15:52

## 2020-05-22 ENCOUNTER — TELEPHONE (OUTPATIENT)
Dept: INTERNAL MEDICINE CLINIC | Facility: CLINIC | Age: 85
End: 2020-05-22

## 2020-05-26 DIAGNOSIS — I10 ESSENTIAL HYPERTENSION: Primary | ICD-10-CM

## 2020-05-26 DIAGNOSIS — E78.5 HYPERLIPIDEMIA, UNSPECIFIED HYPERLIPIDEMIA TYPE: ICD-10-CM

## 2020-05-26 DIAGNOSIS — Z13.0 SCREENING FOR DEFICIENCY ANEMIA: ICD-10-CM

## 2020-06-22 DIAGNOSIS — I50.32 CHRONIC DIASTOLIC CONGESTIVE HEART FAILURE (HCC): Primary | ICD-10-CM

## 2020-06-22 RX ORDER — FUROSEMIDE 40 MG/1
TABLET ORAL
Qty: 180 TABLET | Refills: 3 | Status: SHIPPED | OUTPATIENT
Start: 2020-06-22 | End: 2020-07-07

## 2020-07-01 ENCOUNTER — APPOINTMENT (OUTPATIENT)
Dept: LAB | Facility: CLINIC | Age: 85
End: 2020-07-01
Payer: MEDICARE

## 2020-07-01 DIAGNOSIS — I10 ESSENTIAL HYPERTENSION: ICD-10-CM

## 2020-07-01 DIAGNOSIS — Z13.0 SCREENING FOR DEFICIENCY ANEMIA: ICD-10-CM

## 2020-07-01 DIAGNOSIS — E78.5 HYPERLIPIDEMIA, UNSPECIFIED HYPERLIPIDEMIA TYPE: ICD-10-CM

## 2020-07-01 LAB
ALBUMIN SERPL BCP-MCNC: 3.9 G/DL (ref 3.5–5)
ALP SERPL-CCNC: 98 U/L (ref 46–116)
ALT SERPL W P-5'-P-CCNC: 19 U/L (ref 12–78)
ANION GAP SERPL CALCULATED.3IONS-SCNC: 5 MMOL/L (ref 4–13)
AST SERPL W P-5'-P-CCNC: 20 U/L (ref 5–45)
BACTERIA UR QL AUTO: ABNORMAL /HPF
BASOPHILS # BLD AUTO: 0.1 THOUSANDS/ΜL (ref 0–0.1)
BASOPHILS NFR BLD AUTO: 1 % (ref 0–1)
BILIRUB SERPL-MCNC: 0.69 MG/DL (ref 0.2–1)
BILIRUB UR QL STRIP: NEGATIVE
BUN SERPL-MCNC: 30 MG/DL (ref 5–25)
CALCIUM SERPL-MCNC: 9.4 MG/DL (ref 8.3–10.1)
CHLORIDE SERPL-SCNC: 106 MMOL/L (ref 100–108)
CHOLEST SERPL-MCNC: 212 MG/DL (ref 50–200)
CLARITY UR: CLEAR
CO2 SERPL-SCNC: 29 MMOL/L (ref 21–32)
COLOR UR: YELLOW
CREAT SERPL-MCNC: 0.8 MG/DL (ref 0.6–1.3)
EOSINOPHIL # BLD AUTO: 0.25 THOUSAND/ΜL (ref 0–0.61)
EOSINOPHIL NFR BLD AUTO: 3 % (ref 0–6)
ERYTHROCYTE [DISTWIDTH] IN BLOOD BY AUTOMATED COUNT: 13.1 % (ref 11.6–15.1)
GFR SERPL CREATININE-BSD FRML MDRD: 67 ML/MIN/1.73SQ M
GLUCOSE P FAST SERPL-MCNC: 99 MG/DL (ref 65–99)
GLUCOSE UR STRIP-MCNC: NEGATIVE MG/DL
HCT VFR BLD AUTO: 43.4 % (ref 34.8–46.1)
HDLC SERPL-MCNC: 55 MG/DL
HGB BLD-MCNC: 13.9 G/DL (ref 11.5–15.4)
HGB UR QL STRIP.AUTO: NEGATIVE
HYALINE CASTS #/AREA URNS LPF: ABNORMAL /LPF
IMM GRANULOCYTES # BLD AUTO: 0.03 THOUSAND/UL (ref 0–0.2)
IMM GRANULOCYTES NFR BLD AUTO: 0 % (ref 0–2)
KETONES UR STRIP-MCNC: NEGATIVE MG/DL
LDLC SERPL CALC-MCNC: 138 MG/DL (ref 0–100)
LEUKOCYTE ESTERASE UR QL STRIP: ABNORMAL
LYMPHOCYTES # BLD AUTO: 1.44 THOUSANDS/ΜL (ref 0.6–4.47)
LYMPHOCYTES NFR BLD AUTO: 19 % (ref 14–44)
MCH RBC QN AUTO: 30 PG (ref 26.8–34.3)
MCHC RBC AUTO-ENTMCNC: 32 G/DL (ref 31.4–37.4)
MCV RBC AUTO: 94 FL (ref 82–98)
MONOCYTES # BLD AUTO: 0.59 THOUSAND/ΜL (ref 0.17–1.22)
MONOCYTES NFR BLD AUTO: 8 % (ref 4–12)
NEUTROPHILS # BLD AUTO: 5.04 THOUSANDS/ΜL (ref 1.85–7.62)
NEUTS SEG NFR BLD AUTO: 69 % (ref 43–75)
NITRITE UR QL STRIP: NEGATIVE
NON-SQ EPI CELLS URNS QL MICRO: ABNORMAL /HPF
NONHDLC SERPL-MCNC: 157 MG/DL
NRBC BLD AUTO-RTO: 0 /100 WBCS
PH UR STRIP.AUTO: 7 [PH]
PLATELET # BLD AUTO: 173 THOUSANDS/UL (ref 149–390)
PMV BLD AUTO: 11.1 FL (ref 8.9–12.7)
POTASSIUM SERPL-SCNC: 3.9 MMOL/L (ref 3.5–5.3)
PROT SERPL-MCNC: 6.9 G/DL (ref 6.4–8.2)
PROT UR STRIP-MCNC: NEGATIVE MG/DL
RBC # BLD AUTO: 4.64 MILLION/UL (ref 3.81–5.12)
RBC #/AREA URNS AUTO: ABNORMAL /HPF
SODIUM SERPL-SCNC: 140 MMOL/L (ref 136–145)
SP GR UR STRIP.AUTO: 1.02 (ref 1–1.03)
TRIGL SERPL-MCNC: 95 MG/DL
UROBILINOGEN UR QL STRIP.AUTO: 0.2 E.U./DL
WBC # BLD AUTO: 7.45 THOUSAND/UL (ref 4.31–10.16)
WBC #/AREA URNS AUTO: ABNORMAL /HPF

## 2020-07-01 PROCEDURE — 85025 COMPLETE CBC W/AUTO DIFF WBC: CPT

## 2020-07-01 PROCEDURE — 80053 COMPREHEN METABOLIC PANEL: CPT

## 2020-07-01 PROCEDURE — 36415 COLL VENOUS BLD VENIPUNCTURE: CPT

## 2020-07-01 PROCEDURE — 81001 URINALYSIS AUTO W/SCOPE: CPT | Performed by: INTERNAL MEDICINE

## 2020-07-01 PROCEDURE — 80061 LIPID PANEL: CPT

## 2020-07-07 ENCOUNTER — OFFICE VISIT (OUTPATIENT)
Dept: INTERNAL MEDICINE CLINIC | Facility: CLINIC | Age: 85
End: 2020-07-07
Payer: MEDICARE

## 2020-07-07 VITALS
OXYGEN SATURATION: 97 % | TEMPERATURE: 98.5 F | WEIGHT: 110.4 LBS | DIASTOLIC BLOOD PRESSURE: 72 MMHG | HEART RATE: 63 BPM | BODY MASS INDEX: 22.26 KG/M2 | SYSTOLIC BLOOD PRESSURE: 140 MMHG | HEIGHT: 59 IN

## 2020-07-07 DIAGNOSIS — M25.561 CHRONIC PAIN OF RIGHT KNEE: ICD-10-CM

## 2020-07-07 DIAGNOSIS — S81.801A MULTIPLE OPEN WOUNDS OF LOWER LEG, RIGHT, INITIAL ENCOUNTER: ICD-10-CM

## 2020-07-07 DIAGNOSIS — E78.5 HYPERLIPIDEMIA, UNSPECIFIED HYPERLIPIDEMIA TYPE: ICD-10-CM

## 2020-07-07 DIAGNOSIS — I89.0 LYMPHEDEMA: ICD-10-CM

## 2020-07-07 DIAGNOSIS — G89.29 CHRONIC PAIN OF RIGHT KNEE: ICD-10-CM

## 2020-07-07 DIAGNOSIS — I50.32 CHRONIC DIASTOLIC CONGESTIVE HEART FAILURE (HCC): ICD-10-CM

## 2020-07-07 DIAGNOSIS — I35.0 AORTIC STENOSIS, MODERATE: ICD-10-CM

## 2020-07-07 DIAGNOSIS — I10 ESSENTIAL HYPERTENSION: Primary | ICD-10-CM

## 2020-07-07 DIAGNOSIS — R53.83 OTHER FATIGUE: ICD-10-CM

## 2020-07-07 DIAGNOSIS — Z13.29 SCREENING FOR HYPOTHYROIDISM: ICD-10-CM

## 2020-07-07 PROBLEM — S72.141A INTERTROCHANTERIC FRACTURE OF RIGHT FEMUR (HCC): Status: RESOLVED | Noted: 2019-07-22 | Resolved: 2020-07-07

## 2020-07-07 PROBLEM — S81.802A LEG WOUND, LEFT: Status: RESOLVED | Noted: 2018-05-10 | Resolved: 2020-07-07

## 2020-07-07 PROBLEM — S81.809A MULTIPLE OPEN WOUNDS OF LOWER LEG: Status: ACTIVE | Noted: 2020-07-07

## 2020-07-07 PROBLEM — M19.90 ARTHRITIS: Status: RESOLVED | Noted: 2018-10-19 | Resolved: 2020-07-07

## 2020-07-07 PROBLEM — M79.89 RIGHT LEG SWELLING: Status: RESOLVED | Noted: 2019-08-05 | Resolved: 2020-07-07

## 2020-07-07 PROCEDURE — 3008F BODY MASS INDEX DOCD: CPT | Performed by: INTERNAL MEDICINE

## 2020-07-07 PROCEDURE — 99215 OFFICE O/P EST HI 40 MIN: CPT | Performed by: INTERNAL MEDICINE

## 2020-07-07 PROCEDURE — G0439 PPPS, SUBSEQ VISIT: HCPCS | Performed by: INTERNAL MEDICINE

## 2020-07-07 PROCEDURE — 3077F SYST BP >= 140 MM HG: CPT | Performed by: INTERNAL MEDICINE

## 2020-07-07 PROCEDURE — 3078F DIAST BP <80 MM HG: CPT | Performed by: INTERNAL MEDICINE

## 2020-07-07 PROCEDURE — 1123F ACP DISCUSS/DSCN MKR DOCD: CPT | Performed by: INTERNAL MEDICINE

## 2020-07-07 PROCEDURE — 1036F TOBACCO NON-USER: CPT | Performed by: INTERNAL MEDICINE

## 2020-07-07 PROCEDURE — 1170F FXNL STATUS ASSESSED: CPT | Performed by: INTERNAL MEDICINE

## 2020-07-07 PROCEDURE — 1125F AMNT PAIN NOTED PAIN PRSNT: CPT | Performed by: INTERNAL MEDICINE

## 2020-07-07 PROCEDURE — 1160F RVW MEDS BY RX/DR IN RCRD: CPT | Performed by: INTERNAL MEDICINE

## 2020-07-07 PROCEDURE — 4040F PNEUMOC VAC/ADMIN/RCVD: CPT | Performed by: INTERNAL MEDICINE

## 2020-07-07 RX ORDER — FUROSEMIDE 40 MG/1
40 TABLET ORAL DAILY
Qty: 90 TABLET | Refills: 3
Start: 2020-07-07 | End: 2020-07-14

## 2020-07-07 NOTE — ASSESSMENT & PLAN NOTE
Of 2 small 1/2 inch of open areas on the medial aspect of the right lower leg  These are not infected  I suspect they will not heal without resolution of the soft tissue swelling  She is going to talk to her cardiologist about obtaining lymphedema pumps to see if they will help  Patient is interested in right total knee replacement surgery indicated that these areas will have to be healed prior to having surgery to minimize the risk of postoperative infection

## 2020-07-07 NOTE — ASSESSMENT & PLAN NOTE
Lipid profile reviewed with the patient recommend continuation of a cholesterol restricted diet we did review with the patient today dietary adjustments to reduce her exposure to saturated fats

## 2020-07-07 NOTE — ASSESSMENT & PLAN NOTE
Patient reports significant fatigue symptoms  She falls asleep very easily during the day of her blood work does not reveal any significant imbalances to explain this fatigue  I have asked her to have a free T4 and a TSH test done to evaluate for possible thyroid deficiency

## 2020-07-07 NOTE — ASSESSMENT & PLAN NOTE
Of the patient continues to have significant pain in her right knee which causes significant ambulatory difficulties  She is ambulating with the assistance of a cane  Reviewed with the patient the x-rays of her knee which indicate advanced arthritis in the knee joint  She has a small Baker cyst along with increase in temperature of the joint  These are all findings consistent with her known to add degenerative arthritis  She would like to consider having the surgery done to replace this knee which seems to be the only practical way to approach relief of her pain in restore functionality to her life  She needs to have cardiac clearance prior to the surgery also needs to have resolution of her lymphedema along with resolution of the 2 small openings in her skin in the lower leg on the right side

## 2020-07-07 NOTE — PROGRESS NOTES
Assessment/Plan:    Essential hypertension  Assessment of the patient's blood pressure today indicates a mild elevation with a reading 140/72  She is currently on metoprolol 50 mg daily and low-dose of furosemide 40 mg daily  She has significant lymphedema of both lower extremities  She has an upcoming visit with her cardiologist will defer to their evaluation if blood pressure remains elevated consideration for additional medication for hypertensive control  Chronic diastolic congestive heart failure (HCC)  Wt Readings from Last 3 Encounters:   07/07/20 50 1 kg (110 lb 6 4 oz)   04/22/20 52 1 kg (114 lb 12 8 oz)   02/21/20 47 2 kg (104 lb)       This patient does have a history of diastolic congestive heart failure however I believe that the swelling that she has at this point in both of her lower extremities represents chronic venous stasis or lymphedema  Her cardiologist has felt in the past that this represents chronic lymphedema and has offered to obtain pumps for the patient's legs  She is now willing to start this therapy 8  I doubt that the openings on the right lower leg are infected but will most likely not heal unless the swelling in soft tissue is resolved  Aortic stenosis, moderate  Moderate aortic stenosis with a 2/6 systolic murmur noted  Of the patient seems to be asymptomatic with no lightheadedness or syncopal type episodes  She is considering of a right total knee replacement operation in the future  She will need cardiac clearance  Other fatigue  Patient reports significant fatigue symptoms  She falls asleep very easily during the day of her blood work does not reveal any significant imbalances to explain this fatigue  I have asked her to have a free T4 and a TSH test done to evaluate for possible thyroid deficiency  Multiple open wounds of lower leg  Of 2 small 1/2 inch of open areas on the medial aspect of the right lower leg  These are not infected    I suspect they will not heal without resolution of the soft tissue swelling  She is going to talk to her cardiologist about obtaining lymphedema pumps to see if they will help  Patient is interested in right total knee replacement surgery indicated that these areas will have to be healed prior to having surgery to minimize the risk of postoperative infection  Lymphedema  Significant bilateral lower extremity lymphedema with chronic venous stasis or lymphedema changes of the skin 2 small openings on the right lower extremity  Her symptoms have not improved with diuretic therapy so I doubt that they are related to her history of diastolic congestive heart failure  She will be contacting her cardiologist for follow-up and to possibly obtained lymphedema pumps  Hyperlipidemia  Lipid profile reviewed with the patient recommend continuation of a cholesterol restricted diet we did review with the patient today dietary adjustments to reduce her exposure to saturated fats  Chronic pain of right knee  Of the patient continues to have significant pain in her right knee which causes significant ambulatory difficulties  She is ambulating with the assistance of a cane  Reviewed with the patient the x-rays of her knee which indicate advanced arthritis in the knee joint  She has a small Baker cyst along with increase in temperature of the joint  These are all findings consistent with her known to add degenerative arthritis  She would like to consider having the surgery done to replace this knee which seems to be the only practical way to approach relief of her pain in restore functionality to her life  She needs to have cardiac clearance prior to the surgery also needs to have resolution of her lymphedema along with resolution of the 2 small openings in her skin in the lower leg on the right side         Diagnoses and all orders for this visit:    Essential hypertension    Hyperlipidemia, unspecified hyperlipidemia type    Chronic diastolic congestive heart failure (HCC)  -     furosemide (LASIX) 40 mg tablet; Take 1 tablet (40 mg total) by mouth daily    Other fatigue    Screening for hypothyroidism  -     T4, free; Future  -     TSH, 3rd generation; Future    Chronic pain of right knee    Aortic stenosis, moderate    Lymphedema    Multiple open wounds of lower leg, right, initial encounter        Subjective:      Patient ID: Rosa Palafox is a 80 y o  female  This is an annual complete physical examination review of blood work for this 17-year-old female patient with a history of aortic stenosis and chronic diastolic congestive heart failure  She also has a history of advanced degenerative arthritis of her right knee  She has also been struggling with persistent swelling of both lower extremities has been diagnosed with lymphedema by her cardiologist   She was reluctant to start pumping due to the fact that 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   Unfortunately her  passed away in May  She does have 2 small openings on the medial aspect of the right lower leg  These do not appear to be infected  We do seep a small amount of serous fluid at times  The patient denies any chest pain palpitations or shortness of breath  The following portions of the patient's history were reviewed and updated as appropriate:   She  has a past medical history of Anesthesia complication, Anxiety, Arthritis, Cataract, right, Eye hemorrhage, History of shingles (05/2015), Hypertension, and Mitral valve stenosis, mild    She   Patient Active Problem List    Diagnosis Date Noted    Hyperlipidemia 07/07/2020    Lymphedema 07/07/2020    Multiple open wounds of lower leg 07/07/2020    Current moderate episode of major depressive disorder without prior episode ([facility] [address] ) 09/17/2019    Hypokalemia 08/09/2019    Chronic pain of right knee 08/01/2019    Mitral valve stenosis, mild     Chronic diastolic congestive heart failure ([facility] [address] ) 05/10/2018    Localized edema 05/10/2018    Other fatigue 05/10/2018    Aortic stenosis, moderate 05/11/2016    Atrial dilatation, bilateral 05/11/2016    Mild tricuspid insufficiency 05/11/2016    Non-rheumatic mitral regurgitation 05/11/2016    Essential hypertension 08/22/2013     She  has a past surgical history that includes Tonsillectomy; Eye surgery (Left); Carpal tunnel release (Left); Cervical cone biopsy; Colonoscopy; Cataract extraction w/ intraocular lens implant (Left, 10/11/2016); pr xcapsl ctrc rmvl insj io lens prosth w/o ecp (Right, 5/17/2016); and pr open rx femur fx+intramed natalie (Right, 7/23/2019)  Her family history includes Arthritis in her mother and sister; Cancer in her brother and sister; GI problems in her father; Heart disease in her brother, maternal aunt, and mother; Irritable bowel syndrome in her sister; Sleep apnea in her sister  She  reports that she has never smoked  She has never used smokeless tobacco  She reports that she drinks alcohol  She reports that she does not use drugs  Current Outpatient Medications   Medication Sig Dispense Refill    Calcium Carb-Cholecalciferol (CALCIUM 600 + D) 600-200 MG-UNIT TABS Take 1 tablet by mouth daily      diclofenac sodium (VOLTAREN) 1 % Apply 2 g topically 4 (four) times a day 1 Tube 1    furosemide (LASIX) 40 mg tablet Take 1 tablet (40 mg total) by mouth daily 90 tablet 3    metoprolol succinate (TOPROL-XL) 50 mg 24 hr tablet TAKE ONE TABLET BY MOUTH DAILY AS DIRECTED 90 tablet 2    Multiple Vitamins-Minerals (PRESERVISION AREDS PO) Take by mouth      potassium chloride (K-DUR,KLOR-CON) 20 mEq tablet Take 1 tablet (20 mEq total) by mouth daily 30 tablet 5     No current facility-administered medications for this visit       Review of Systems   Constitutional: Positive for fatigue  Cardiovascular: Positive for leg swelling  Musculoskeletal: Positive for arthralgias, gait problem, joint swelling and myalgias  Skin: Positive for wound  All other systems reviewed and are negative  Objective:      /72   Pulse 63   Temp 98 5 °F (36 9 °C)   Ht 4' 11" (1 499 m)   Wt 50 1 kg (110 lb 6 4 oz)   SpO2 97%   BMI 22 30 kg/m²          Physical Exam   Constitutional: She is oriented to person, place, and time  Vital signs are normal  She appears well-developed and well-nourished  She is cooperative  No distress  HENT:   Right Ear: Hearing, tympanic membrane, external ear and ear canal normal    Left Ear: Hearing, tympanic membrane, external ear and ear canal normal    Nose: Nose normal  No mucosal edema  Mouth/Throat: Uvula is midline, oropharynx is clear and moist and mucous membranes are normal    Eyes: Pupils are equal, round, and reactive to light  Conjunctivae and lids are normal  Right eye exhibits no discharge  Left eye exhibits no discharge  Neck: No JVD present  Carotid bruit is not present  No thyromegaly present  Cardiovascular: Normal rate, regular rhythm and intact distal pulses  Murmur heard  Pulmonary/Chest: Effort normal and breath sounds normal  No stridor  No respiratory distress  She has no wheezes  She has no rales  Abdominal: Soft  Normal appearance and bowel sounds are normal  She exhibits no distension and no mass  There is no tenderness  There is no guarding  Musculoskeletal: Normal range of motion  She exhibits edema, tenderness and deformity  Plus two edema of the lower extremities bilaterally with inflamed skin which is consistent with chronic lymphedema  She has 2 small openings in the skin on the right lower leg on the medial aspect of the lower leg  These are not appearing to be infected at this time  They are approximately half an inch in diameter each  Patient also has significant warmth and effusion noted in the right knee with tenderness  Lymphadenopathy:     She has no cervical adenopathy     Neurological: She is alert and oriented to person, place, and time  She has normal reflexes  She displays normal reflexes  No cranial nerve deficit  She exhibits normal muscle tone  Coordination normal    Skin: Skin is warm, dry and intact  No rash noted  She is not diaphoretic  No erythema  No pallor  Psychiatric: She has a normal mood and affect  Her speech is normal and behavior is normal  Judgment and thought content normal  Cognition and memory are normal    Vitals reviewed

## 2020-07-07 NOTE — PROGRESS NOTES
Assessment and Plan:     Problem List Items Addressed This Visit     None           Preventive health issues were discussed with patient, and age appropriate screening tests were ordered as noted in patient's After Visit Summary  Personalized health advice and appropriate referrals for health education or preventive services given if needed, as noted in patient's After Visit Summary  History of Present Illness:     Patient presents for Medicare Annual Wellness visit    Patient Care Team:  Antonia Alexander MD as PCP - Christianne Sarkar MD     Problem List:     Patient Active Problem List   Diagnosis    Essential hypertension    Aortic stenosis, moderate    Atrial dilatation, bilateral    Mild tricuspid insufficiency    Non-rheumatic mitral regurgitation    Chronic diastolic congestive heart failure (HCC)    Localized edema    Leg foot wound- 3rd digit    Other fatigue    Arthritis    Intertrochanteric fracture of right femur ([address] )    Mitral valve stenosis, mild    Chronic pain of right knee    Right leg swelling    Hypokalemia    Current moderate episode of major depressive disorder without prior episode [facility])      Past Medical and Surgical History:     Past Medical History:   Diagnosis Date    Anesthesia complication       Yrs ago had "anxiety" reaction not sure while sedated, pt states sensitive to anesthesia effects    Anxiety     stress at home    Arthritis     Cataract, right     Last Assessed:5/11/16    Eye hemorrhage     left eye currently 5/12/16    History of shingles 05/2015    Hypertension     Mitral valve stenosis, mild      Past Surgical History:   Procedure Laterality Date    CARPAL TUNNEL RELEASE Left     CATARACT EXTRACTION W/ INTRAOCULAR LENS IMPLANT Left 10/11/2016    Procedure: EXTRACTION EXTRACAPSULAR CATARACT PHACO INTRAOCULAR LENS (IOL);   Surgeon: Natanael Leon MD;  [facility] MA[facility] OR;  Service:     CERVICAL CONE BIOPSY      COLONOSCOPY  EYE SURGERY Left     muscle repair    [place] OPEN RX FEMUR FX+INTRAMED NINI Right 2019    Procedure: INSERTION NAIL IM FEMUR ANTEGRADE (TROCHANTERIC); Surgeon: Maribeth Valentin DO;  Location: [address] [place] [address];  Service: Orthopedics    [place] XCAPSL CTRC RMVL INSJ IO LENS PROSTH W/O ECP Right 2016    Procedure: EXTRACTION EXTRACAPSULAR CATARACT PHACO INTRAOCULAR LENS (IOL);   Surgeon: Jacob Carlson MD;  Location: [facility] MAIN OR;  Service: Ophthalmology    TONSILLECTOMY        Family History:     Family History   Problem Relation Age of Onset    Heart disease Mother         MI  at age 71   [facility] Arthritis Mother    [facility] GI problems Father         bleeding ulcer    Arthritis Sister     Sleep apnea Sister     Irritable bowel syndrome Sister     Cancer Sister         skin cancer    Heart disease Brother         CABG (5)    Cancer Brother         skin cancer    Heart disease Maternal Aunt       Social History:        Social History     Socioeconomic History    Marital status: /Civil Union     Spouse name: Not on file    Number of children: Not on file    Years of education: Not on file    Highest education level: Not on file   Occupational History    Not on file   Social Needs    Financial resource strain: Not on file    Food insecurity:     Worry: Not on file     Inability: Not on file    Transportation needs:     Medical: Not on file     Non-medical: Not on file   Tobacco Use    Smoking status: Never Smoker    Smokeless tobacco: Never Used   Substance and Sexual Activity    Alcohol use: Yes     Comment: rarely    Drug use: No    Sexual activity: Not on file   Lifestyle    Physical activity:     Days per week: Not on file     Minutes per session: Not on file    Stress: Not on file   Relationships    Social connections:     Talks on phone: Not on file     Gets together: Not on file     Attends Jew service: Not on file     Active member of club or organization: Not on file Attends meetings of clubs or organizations: Not on file     Relationship status: Not on file    Intimate partner violence:     Fear of current or ex partner: Not on file     Emotionally abused: Not on file     Physically abused: Not on file     Forced sexual activity: Not on file   Other Topics Concern    Not on file   Social History Narrative    Not on file      Medications and Allergies:     Current Outpatient Medications   Medication Sig Dispense Refill    acetaminophen (TYLENOL) 325 mg tablet Take 3 tablets (975 mg total) by mouth every 8 (eight) hours 30 tablet 0    Calcium Carb-Cholecalciferol (CALCIUM 600 + D) 600-200 MG-UNIT TABS Take 1 tablet by mouth daily      diclofenac sodium (VOLTAREN) 1 % Apply 2 g topically 4 (four) times a day 1 Tube 1    furosemide (LASIX) 40 mg tablet TAKE 2 TABLETS BY MOUTH EVERY  tablet 3    metoprolol succinate (TOPROL-XL) 50 mg 24 hr tablet TAKE ONE TABLET BY MOUTH DAILY AS DIRECTED 90 tablet 2    Multiple Vitamins-Minerals (PRESERVISION AREDS PO) Take by mouth      potassium chloride (K-DUR,KLOR-CON) 20 mEq tablet Take 1 tablet (20 mEq total) by mouth daily 30 tablet 5     No current facility-administered medications for this visit  Allergies   Allergen Reactions    Indocin [Indomethacin]      hypertension      Immunizations:     Immunization History   Administered Date(s) Administered    Influenza Split High Dose Preservative Free IM 09/30/2016    Influenza TIV (IM) 01/05/2014    Influenza, high dose seasonal 0 5 mL 10/19/2018, 10/21/2019    Pneumococcal Conjugate 13-Valent 02/22/2019      Health Maintenance: There are no preventive care reminders to display for this patient  Topic Date Due    DTaP,Tdap,and Td Vaccines (1 - Tdap) 05/24/1946    Pneumococcal Vaccine: 65+ Years (2 of 2 - PPSV23) 02/22/2020    Influenza Vaccine  07/01/2020      Medicare Health Risk Assessment:     There were no vitals taken for this visit       Marilee webster here for her Subsequent Wellness visit  Last Medicare Wellness visit information reviewed, patient interviewed, no change since last AWV  Health Risk Assessment:   Patient rates overall health as good  Patient feels that their physical health rating is same  Hearing was rated as same  Patient feels that their emotional and mental health rating is same  Pain experienced in the last 7 days has been some  Patient's pain rating has been 3/10  Patient states that she has experienced no weight loss or gain in last 6 months  Depression Screening:   PHQ-2 Score: 0  PHQ-9 Score: 0      Fall Risk Screening: In the past year, patient has experienced: history of falling in past year    Number of falls: 1    Urinary Incontinence Screening:   Patient has leaked urine accidently in the last six months  Home Safety:  Patient does not have trouble with stairs inside or outside of their home  Patient has working smoke alarms and has no working carbon monoxide detector  Home safety hazards include: none  Nutrition:   Current diet is Regular  Medications:   Patient is currently taking over-the-counter supplements  OTC medications include: see medication list  Patient is able to manage medications  Activities of Daily Living (ADLs)/Instrumental Activities of Daily Living (IADLs):   Walk and transfer into and out of bed and chair?: Yes  Dress and groom yourself?: Yes    Bathe or shower yourself?: Yes    Feed yourself?  Yes  Do your laundry/housekeeping?: Yes  Manage your money, pay your bills and track your expenses?: Yes  Make your own meals?: Yes    Do your own shopping?: Yes    Previous Hospitalizations:   Any hospitalizations or ED visits within the last 12 months?: Yes    How many hospitalizations have you had in the last year?: 1-2    Advance Care Planning:   Living will: No    Durable POA for healthcare: No    Advanced directive: No      Cognitive Screening:   Provider or family/friend/caregiver concerned regarding cognition?: No    PREVENTIVE SCREENINGS      Cardiovascular Screening:    General: Screening Not Indicated and History Lipid Disorder      Diabetes Screening:     General: Screening Current      Colorectal Cancer Screening:     General: Screening Not Indicated      Breast Cancer Screening:     General: Risks and Benefits Discussed      Cervical Cancer Screening:    General: Screening Not Indicated      Osteoporosis Screening:    General: Risks and Benefits Discussed      Abdominal Aortic Aneurysm (AAA) Screening:        General: Screening Not Indicated      Lung Cancer Screening:     General: Screening Not Indicated      Hepatitis C Screening:    General: Screening Not Indicated      Randa Hazel MD

## 2020-07-07 NOTE — ASSESSMENT & PLAN NOTE
Wt Readings from Last 3 Encounters:   07/07/20 50 1 kg (110 lb 6 4 oz)   04/22/20 52 1 kg (114 lb 12 8 oz)   02/21/20 47 2 kg (104 lb)       This patient does have a history of diastolic congestive heart failure however I believe that the swelling that she has at this point in both of her lower extremities represents chronic venous stasis or lymphedema  Her cardiologist has felt in the past that this represents chronic lymphedema and has offered to obtain pumps for the patient's legs  She is now willing to start this therapy 8  I doubt that the openings on the right lower leg are infected but will most likely not heal unless the swelling in soft tissue is resolved

## 2020-07-07 NOTE — ASSESSMENT & PLAN NOTE
Significant bilateral lower extremity lymphedema with chronic venous stasis or lymphedema changes of the skin 2 small openings on the right lower extremity  Her symptoms have not improved with diuretic therapy so I doubt that they are related to her history of diastolic congestive heart failure  She will be contacting her cardiologist for follow-up and to possibly obtained lymphedema pumps

## 2020-07-07 NOTE — ASSESSMENT & PLAN NOTE
Moderate aortic stenosis with a 2/6 systolic murmur noted  Of the patient seems to be asymptomatic with no lightheadedness or syncopal type episodes  She is considering of a right total knee replacement operation in the future  She will need cardiac clearance

## 2020-07-07 NOTE — ASSESSMENT & PLAN NOTE
Assessment of the patient's blood pressure today indicates a mild elevation with a reading 140/72  She is currently on metoprolol 50 mg daily and low-dose of furosemide 40 mg daily  She has significant lymphedema of both lower extremities  She has an upcoming visit with her cardiologist will defer to their evaluation if blood pressure remains elevated consideration for additional medication for hypertensive control

## 2020-07-14 ENCOUNTER — OFFICE VISIT (OUTPATIENT)
Dept: CARDIOLOGY CLINIC | Facility: CLINIC | Age: 85
End: 2020-07-14
Payer: MEDICARE

## 2020-07-14 VITALS
OXYGEN SATURATION: 98 % | BODY MASS INDEX: 22.98 KG/M2 | DIASTOLIC BLOOD PRESSURE: 70 MMHG | HEIGHT: 59 IN | HEART RATE: 56 BPM | TEMPERATURE: 98.5 F | WEIGHT: 114 LBS | SYSTOLIC BLOOD PRESSURE: 138 MMHG

## 2020-07-14 DIAGNOSIS — R60.0 EDEMA OF RIGHT LOWER LEG DUE TO PERIPHERAL VENOUS INSUFFICIENCY: ICD-10-CM

## 2020-07-14 DIAGNOSIS — R60.0 EDEMA OF BOTH LEGS: ICD-10-CM

## 2020-07-14 DIAGNOSIS — I10 ESSENTIAL HYPERTENSION: ICD-10-CM

## 2020-07-14 DIAGNOSIS — I50.32 CHRONIC DIASTOLIC CONGESTIVE HEART FAILURE (HCC): ICD-10-CM

## 2020-07-14 DIAGNOSIS — I35.0 MODERATE AORTIC STENOSIS: Primary | ICD-10-CM

## 2020-07-14 DIAGNOSIS — I89.0 LYMPHEDEMA OF BOTH LOWER EXTREMITIES: ICD-10-CM

## 2020-07-14 DIAGNOSIS — E78.5 DYSLIPIDEMIA: ICD-10-CM

## 2020-07-14 DIAGNOSIS — I87.2 EDEMA OF RIGHT LOWER LEG DUE TO PERIPHERAL VENOUS INSUFFICIENCY: ICD-10-CM

## 2020-07-14 PROCEDURE — 1160F RVW MEDS BY RX/DR IN RCRD: CPT | Performed by: INTERNAL MEDICINE

## 2020-07-14 PROCEDURE — 3008F BODY MASS INDEX DOCD: CPT | Performed by: INTERNAL MEDICINE

## 2020-07-14 PROCEDURE — 99214 OFFICE O/P EST MOD 30 MIN: CPT | Performed by: INTERNAL MEDICINE

## 2020-07-14 PROCEDURE — 3075F SYST BP GE 130 - 139MM HG: CPT | Performed by: INTERNAL MEDICINE

## 2020-07-14 PROCEDURE — 1036F TOBACCO NON-USER: CPT | Performed by: INTERNAL MEDICINE

## 2020-07-14 PROCEDURE — 3078F DIAST BP <80 MM HG: CPT | Performed by: INTERNAL MEDICINE

## 2020-07-14 PROCEDURE — 4040F PNEUMOC VAC/ADMIN/RCVD: CPT | Performed by: INTERNAL MEDICINE

## 2020-07-14 PROCEDURE — 1170F FXNL STATUS ASSESSED: CPT | Performed by: INTERNAL MEDICINE

## 2020-07-14 PROCEDURE — 93000 ELECTROCARDIOGRAM COMPLETE: CPT | Performed by: INTERNAL MEDICINE

## 2020-07-14 RX ORDER — FUROSEMIDE 40 MG/1
40 TABLET ORAL DAILY
Qty: 90 TABLET | Refills: 3
Start: 2020-07-14 | End: 2020-11-02 | Stop reason: SDUPTHER

## 2020-07-14 NOTE — PROGRESS NOTES
Office Cardiology Progress Note  Mary Ellen Cueto 80 y o  female MRN: [id number]  07/14/20  2:55 PM      ASSESSMENT:       1  Chronic fatigue, sleepiness and tiredness, possibly from undiagnosed hypothyroidism  2  Asymptomatic moderate aortic stenosis  Seems to also have murmur of mitral regurgitation  Had moderate aortic stenosis and regurgitation with mean gradient of 22 mmHg as well as 1+ MR/MAC on TTE 05/14/2019  Also had moderate LVH with 60% LVEF and elevated mean left atrial filling pressure  3  Chronic left more than right lower extremity lymphedema with associated stasis dermatitis and contribution from underlying venous insufficiency of right lower extremity, confirmed on venous reflux study of 11/13/2019  Has complicating non healing superficial ulcerations of right lower medial calf  4  Possible essential hypertension, but not assessed out of office  5  Worsening dyslipidemia, with rising cholesterol and LDL  Consider undiagnosed hypothyroidism as contributing factor       Plan       Patient Instructions     1  Consider starting patient on statin to reduce progression of aortic valve stenosis if thyroid function tests are normal   Patient planning to have TSH and free T4 done tomorrow  2  Otherwise, continue current medication  3  Will order lymphedema compression pumps for refractory lower extremity lymphedema and will complete necessary paperwork for that process  Patient is agreeable to that strategy  4  Cardiology follow-up approximately six months with EKG, lipids, CMP  HPI    This 80 y o  female  denies new cardiopulmonary and medical symptoms  The patient's  Sp Koehler passed away about two months ago  The patient complains of sleeping a lot whenever she sits down, lack of ability to ambulate because of chronic right knee pain and chronic back pain    Her leg edema has not improved despite the use of elevation, attempts at exercise, class 1 compression and compression [id number]  She also has chronic superficial scabs and ulceration with some drainage over the right lower medial calf  She complains of a lack of energy  The patient is seen in follow-up of the below listed diagnoses  Encounter Diagnoses   Name Primary?  Moderate aortic stenosis Yes    Edema of both legs     Edema of right lower leg due to peripheral venous insufficiency     Essential hypertension     Chronic diastolic congestive heart failure (HCC)     Dyslipidemia     Lymphedema of both lower extremities         Review of Systems    All other systems negative, except as noted in history of present illness    Historical Information   Past Medical History:   Diagnosis Date    Anesthesia complication       Yrs ago had "anxiety" reaction not sure while sedated, pt states sensitive to anesthesia effects    Anxiety     stress at home    Arthritis     Cataract, right     Last Assessed:5/11/16    Eye hemorrhage     left eye currently 5/12/16    History of shingles 05/2015    Hypertension     Mitral valve stenosis, mild      Past Surgical History:   Procedure Laterality Date    CARPAL TUNNEL RELEASE Left     CATARACT EXTRACTION W/ INTRAOCULAR LENS IMPLANT Left 10/11/2016    Procedure: EXTRACTION EXTRACAPSULAR CATARACT PHACO INTRAOCULAR LENS (IOL); Surgeon: Ana Coffman MD;  Location: [facility] MAIN OR;  Service:     CERVICAL CONE BIOPSY      COLONOSCOPY      EYE SURGERY Left     muscle repair    [place] OPEN RX FEMUR FX+INTRAMED NINI Right 7/23/2019    Procedure: INSERTION NAIL IM FEMUR ANTEGRADE (TROCHANTERIC); Surgeon: Mellisa Franco DO;  Location: [address] [place] [address];  Service: Orthopedics    [place] XCAPSL CTRC RMVL INSJ IO LENS PROSTH W/O ECP Right 5/17/2016    Procedure: EXTRACTION EXTRACAPSULAR CATARACT PHACO INTRAOCULAR LENS (IOL);   Surgeon: Ana Coffman MD;  Location: [facility] MAIN OR;  Service: Ophthalmology    TONSILLECTOMY       Social History     Substance and Sexual Activity   Alcohol Use Yes Comment: rarely     Social History     Substance and Sexual Activity   Drug Use No     Social History     Tobacco Use   Smoking Status Never Smoker   Smokeless Tobacco Never Used       Family History:  Family History   Problem Relation Age of Onset    Heart disease Mother         MI  at age 71   [facility] Arthritis Mother     GI problems Father         bleeding ulcer    Arthritis Sister     Sleep apnea Sister     Irritable bowel syndrome Sister     Cancer Sister         skin cancer    Heart disease Brother         CABG (5)    Cancer Brother         skin cancer    Heart disease Maternal Aunt          Meds/Allergies     Prior to Admission medications    Medication Sig Start Date End Date Taking?  Authorizing Provider   Calcium Carb-Cholecalciferol (CALCIUM 600 + D) 600-200 MG-UNIT TABS Take 1 tablet by mouth daily 13  Yes Historical Provider, MD   furosemide (LASIX) 40 mg tablet Take 1 tablet (40 mg total) by mouth daily As needed for leg swelling 20  Yes Jennifer Ellis MD   metoprolol succinate (TOPROL-XL) 50 mg 24 hr tablet TAKE ONE TABLET BY MOUTH DAILY AS DIRECTED 20  Yes Alexi Baez MD   Multiple Vitamins-Minerals (PRESERVISION AREDS PO) Take by mouth   Yes Historical Provider, MD   potassium chloride (K-DUR,KLOR-CON) 20 mEq tablet Take 1 tablet (20 mEq total) by mouth daily 19  Yes Alexi Baez MD   furosemide (LASIX) 40 mg tablet Take 1 tablet (40 mg total) by mouth daily 20 Yes Alexi Baez MD   diclofenac sodium (VOLTAREN) 1 % Apply 2 g topically 4 (four) times a day  Patient not taking: Reported on 2020  Edwin Grimm,        Allergies   Allergen Reactions    Indocin [Indomethacin]      hypertension         Vitals:    20 1402   BP: 138/70   BP Location: Left arm   Patient Position: Sitting   Cuff Size: Standard   Pulse: 56   Temp: 98 5 °F (36 9 °C)   SpO2: 98%   Weight: 51 7 kg (114 lb)   Height: 4' 11" (1 499 m)       Body mass index is 23 03 kg/m²  1 pound weight loss in approximately six months    Physical Exam:    General Appearance:  Alert, cooperative, no distress, appears stated age   Head:  Normocephalic, without obvious abnormality, atraumatic   Eyes:  PERRL, conjunctiva/corneas clear, EOM's intact,   both eyes   Ears:  Normal TM's and external ear canals, both ears   Nose: Nares normal, septum midline, mucosa normal, no drainage or sinus tenderness   Throat: Lips, mucosa, and tongue normal; teeth and gums normal   Neck: Supple, symmetrical, trachea midline, no adenopathy, thyroid: not enlarged, symmetric, no tenderness/mass/nodules, no carotid bruit or JVD   Back:   Symmetric, no curvature, ROM normal, no CVA tenderness   Lungs:   Clear to auscultation bilaterally, respirations unlabored   Chest Wall:  No tenderness or deformity   Heart:  Regular rate and rhythm, S1, S2 normal, grade 3/6 medium pitched aortic systolic ejection murmur, heard best in aortic region, manubrium, with radiation to the left sternal border and additional higher pitched grade 2/6 apical systolic ejection murmur radiating to mid axillary line with slightly muffled but intact A2 and no rub or gallop   Abdomen:   Soft, non-tender, bowel sounds active all four quadrants,  no masses, no organomegaly   Extremities: Extremities normal, atraumatic, no cyanosis with bilateral left greater than right lower extremity 2+ lymphedema and two superficial scabs/ulceration over right medial calf with minimal drainage from lower of the two lesions     Pulses: 2+ and symmetric   Skin: Skin showed normal color, texture, turgor and no rashes or lesions   Lymph nodes: Cervical, supraclavicular, and axillary nodes normal   Neurologic: Normal         Cardiographics    ECG 07/14/20:    Sinus bradycardia  Poor R-wave progression  LVH voltage  Slower heart rate by 7 bpm and more leftward frontal plane QRS axis than 11/05/2019    IMAGING:    No Chest XR results available for this patient            LAB REVIEW:      Lab Results   Component Value Date    SODIUM 140 07/01/2020    K 3 9 07/01/2020     07/01/2020    CO2 29 07/01/2020    ANIONGAP 13 0 10/13/2015    BUN 30 (H) 07/01/2020    CREATININE 0 80 07/01/2020    GLUCOSE 87 10/13/2015    GLUF 99 07/01/2020    CALCIUM 9 4 07/01/2020    AST 20 07/01/2020    ALT 19 07/01/2020    ALKPHOS 98 07/01/2020    PROT 6 8 10/13/2015    BILITOT 0 6 10/13/2015    EGFR 67 07/01/2020     Lab Results   Component Value Date    CHOLESTEROL 212 (H) 07/01/2020    CHOLESTEROL 178 03/25/2019     Lab Results   Component Value Date    HDL 55 07/01/2020    HDL 53 03/25/2019    HDL 59 10/13/2015       Lab Results   Component Value Date    LDLCALC 138 (H) 07/01/2020    LDLCALC 114 (H) 03/25/2019    LDLCALC 122 10/13/2015     Lab Results   Component Value Date    TRIG 95 07/01/2020    TRIG 55 03/25/2019    TRIG 48 10/13/2015       CBC 07/01/2020:  Normal        Stacey Figueroa MD

## 2020-07-14 NOTE — PATIENT INSTRUCTIONS
1  Consider starting patient on statin to reduce progression of aortic valve stenosis if thyroid function tests are normal   Patient planning to have TSH and free T4 done tomorrow  2  Otherwise, continue current medication  3  Will order lymphedema compression pumps for refractory lower extremity lymphedema and will complete necessary paperwork for that process  4  Cardiology follow-up approximately six months with EKG, lipids, CMP

## 2020-07-15 ENCOUNTER — APPOINTMENT (OUTPATIENT)
Dept: LAB | Facility: CLINIC | Age: 85
End: 2020-07-15
Payer: MEDICARE

## 2020-07-15 DIAGNOSIS — Z13.29 SCREENING FOR HYPOTHYROIDISM: ICD-10-CM

## 2020-07-15 LAB
T4 FREE SERPL-MCNC: 1.06 NG/DL (ref 0.76–1.46)
TSH SERPL DL<=0.05 MIU/L-ACNC: 2.07 UIU/ML (ref 0.36–3.74)

## 2020-07-15 PROCEDURE — 36415 COLL VENOUS BLD VENIPUNCTURE: CPT

## 2020-07-15 PROCEDURE — 84443 ASSAY THYROID STIM HORMONE: CPT

## 2020-07-15 PROCEDURE — 84439 ASSAY OF FREE THYROXINE: CPT

## 2020-07-23 ENCOUNTER — OFFICE VISIT (OUTPATIENT)
Dept: OBGYN CLINIC | Facility: CLINIC | Age: 85
End: 2020-07-23
Payer: MEDICARE

## 2020-07-23 VITALS
SYSTOLIC BLOOD PRESSURE: 177 MMHG | HEIGHT: 59 IN | BODY MASS INDEX: 22.78 KG/M2 | DIASTOLIC BLOOD PRESSURE: 72 MMHG | HEART RATE: 57 BPM | WEIGHT: 113 LBS | TEMPERATURE: 98.4 F

## 2020-07-23 DIAGNOSIS — M25.561 CHRONIC PAIN OF RIGHT KNEE: ICD-10-CM

## 2020-07-23 DIAGNOSIS — M17.11 PRIMARY OSTEOARTHRITIS OF RIGHT KNEE: Primary | ICD-10-CM

## 2020-07-23 DIAGNOSIS — G89.29 CHRONIC PAIN OF RIGHT KNEE: ICD-10-CM

## 2020-07-23 DIAGNOSIS — I89.0 LYMPHEDEMA: ICD-10-CM

## 2020-07-23 DIAGNOSIS — S81.801A MULTIPLE OPEN WOUNDS OF LOWER LEG, RIGHT, INITIAL ENCOUNTER: ICD-10-CM

## 2020-07-23 PROCEDURE — 1170F FXNL STATUS ASSESSED: CPT | Performed by: ORTHOPAEDIC SURGERY

## 2020-07-23 PROCEDURE — 1036F TOBACCO NON-USER: CPT | Performed by: ORTHOPAEDIC SURGERY

## 2020-07-23 PROCEDURE — 3077F SYST BP >= 140 MM HG: CPT | Performed by: ORTHOPAEDIC SURGERY

## 2020-07-23 PROCEDURE — 1160F RVW MEDS BY RX/DR IN RCRD: CPT | Performed by: ORTHOPAEDIC SURGERY

## 2020-07-23 PROCEDURE — 4040F PNEUMOC VAC/ADMIN/RCVD: CPT | Performed by: ORTHOPAEDIC SURGERY

## 2020-07-23 PROCEDURE — 99213 OFFICE O/P EST LOW 20 MIN: CPT | Performed by: ORTHOPAEDIC SURGERY

## 2020-07-23 PROCEDURE — 3078F DIAST BP <80 MM HG: CPT | Performed by: ORTHOPAEDIC SURGERY

## 2020-07-23 PROCEDURE — 3008F BODY MASS INDEX DOCD: CPT | Performed by: ORTHOPAEDIC SURGERY

## 2020-07-23 NOTE — PROGRESS NOTES
Assessment/Plan:  1  Primary osteoarthritis of right knee     2  Chronic pain of right knee     3  Lymphedema  Ambulatory referral to Wound Care   4  Multiple open wounds of lower leg, right, initial encounter  Ambulatory referral to Jerry Wheat Iris is a very pleasant 80-year-old female presenting today follow-up of her activity related right knee pain due to her severe end-stage underlying osteoarthritis  Unfortunately, she has attempted and failed all forms of conservative treatment and continues to be significantly limited in her mobility and ability to perform activities of daily living  She would like to undergo a total knee arthroplasty  Unfortunately, she has lymphedema of both lower extremities and to ulcers on the right calf, so she would not be eligible for surgery at this time  She is working with her cardiologist to help resolve the lymphedema and we have provided a referral to wound care for treatment of the ulcers  We agreed to defer a cortisone injection at this time, as she would be interested in pursuing surgery as soon as possible and the injection would take roughly table for 3 months  She does understand that surgery would be higher risk for her, and we would have to use custom implants due to presence the long cephalomedullary nail placed last year for her intertrochanteric femur fracture  If wound care feels that the ulcers but not heal within the next 3 months, she can return for a cortisone injection  Otherwise, she can return in the lymphedema and ulcers have resolved for surgical planning  She will need [place] x-rays of her right knee at that appointment  She expressed understanding all of her questions were addressed today  Subjective: Right knee follow up    Patient ID: Jareth Guadalupe is a 80 y o  female      Vinwes Lewis is a pleasant 80-year-old female very well known to our practice after undergoing a long cephalomedullary nail for an intertrochanteric fracture and for treatment of her severe end-stage underlying right knee osteoarthritis  Her hip is still doing well  Her right knee continues to be a significant problem limit her ability to perform age-appropriate activities of daily living  She underwent an aspiration and cortisone injection 3 months ago which did not provide significant relief  She has previously failed viscosupplementation and all other forms of conservative treatment  She is being treated by her cardiologist for the edema both lower extremities which is lymphedema  She does have 2 ulcers on her leg as well which is being managed by the cardiologist   She did receive lymphedema boots in the mail yesterday and will begin using the today  She also reports that her  unfortunately passed away in May, but she is now focusing on her own healthcare  Review of Systems   Constitutional: Positive for activity change  HENT: Negative  Eyes: Negative  Respiratory: Negative  Cardiovascular: Positive for leg swelling  Gastrointestinal: Negative  Endocrine: Negative  Genitourinary: Negative  Musculoskeletal: Positive for arthralgias, gait problem, joint swelling and myalgias  Skin: Positive for wound  Allergic/Immunologic: Negative  Hematological: Negative  Psychiatric/Behavioral: Negative  Past Medical History:   Diagnosis Date    Anesthesia complication       Yrs ago had "anxiety" reaction not sure while sedated, pt states sensitive to anesthesia effects    Anxiety     stress at home    Arthritis     Cataract, right     Last Assessed:5/11/16    Eye hemorrhage     left eye currently 5/12/16    History of shingles 05/2015    Hypertension     Mitral valve stenosis, mild        Past Surgical History:   Procedure Laterality Date    CARPAL TUNNEL RELEASE Left     CATARACT EXTRACTION W/ INTRAOCULAR LENS IMPLANT Left 10/11/2016    Procedure: EXTRACTION EXTRACAPSULAR CATARACT PHACO INTRAOCULAR LENS (IOL);   Surgeon: Kin Preston MD;  Location: [facility] MAIN OR;  Service:    [facility] CERVICAL CONE BIOPSY      COLONOSCOPY      EYE SURGERY Left     muscle repair    NH OPEN RX FEMUR FX+INTRAMED NINI Right 2019    Procedure: INSERTION NAIL IM FEMUR ANTEGRADE (TROCHANTERIC); Surgeon: Penny Zarco DO;  Location: [address] [place] [address];  Service: Orthopedics    NH XCAPSL CTRC RMVL INSJ IO LENS PROSTH W/O ECP Right 2016    Procedure: EXTRACTION EXTRACAPSULAR CATARACT PHACO INTRAOCULAR LENS (IOL);   Surgeon: Kin Preston MD;  Location: [facility] MAIN OR;  Service: Ophthalmology    TONSILLECTOMY         Family History   Problem Relation Age of Onset    Heart disease Mother         MI  at age 71   [facility] Arthritis Mother     GI problems Father         bleeding ulcer    Arthritis Sister     Sleep apnea Sister     Irritable bowel syndrome Sister     Cancer Sister         skin cancer    Heart disease Brother         CABG (5)    Cancer Brother         skin cancer    Heart disease Maternal Aunt        Social History     Occupational History    Not on file   Tobacco Use    Smoking status: Never Smoker    Smokeless tobacco: Never Used   Substance and Sexual Activity    Alcohol use: Yes     Comment: rarely    Drug use: No    Sexual activity: Not on file         Current Outpatient Medications:     Calcium Carb-Cholecalciferol (CALCIUM 600 + D) 600-200 MG-UNIT TABS, Take 1 tablet by mouth daily, Disp: , Rfl:     furosemide (LASIX) 40 mg tablet, Take 1 tablet (40 mg total) by mouth daily As needed for leg swelling, Disp: 90 tablet, Rfl: 3    metoprolol succinate (TOPROL-XL) 50 mg 24 hr tablet, TAKE ONE TABLET BY MOUTH DAILY AS DIRECTED, Disp: 90 tablet, Rfl: 2    Multiple Vitamins-Minerals (PRESERVISION AREDS PO), Take by mouth, Disp: , Rfl:     potassium chloride (K-DUR,KLOR-CON) 20 mEq tablet, Take 1 tablet (20 mEq total) by mouth daily, Disp: 30 tablet, Rfl: 5    Allergies   Allergen Reactions    Indocin [Indomethacin] hypertension       Objective:  Vitals:    07/23/20 1355   BP: (!) 177/72   Pulse: 57   Temp: 98 4 °F (36 9 °C)       Body mass index is 22 82 kg/m²  Right Knee Exam     Muscle Strength   The patient has normal right knee strength  Tenderness   The patient is experiencing tenderness in the lateral joint line, medial joint line and patella  Range of Motion   Right knee extension: 5  Right knee flexion: 125  Tests   Deion:  Medial - negative Lateral - negative  Varus: negative Valgus: negative  Drawer:  Anterior - negative    Posterior - negative  Patellar apprehension: negative    Other   Erythema: absent  Scars: absent  Sensation: normal  Pulse: present  Swelling: moderate  Effusion: effusion (+1) present    Comments:  Knee is stable on ligamentous exam at 0, 30, and 90  Moderate bilateral lower extremity lymphedema mid calf to foot  Slight erythema in this area but no warmth  Two small medial ulcers  NVI  + crepitance on range of motion            Observations     Right Knee   Positive for effusion (+1)  Physical Exam   Constitutional: She is oriented to person, place, and time  She appears well-developed and well-nourished  Body mass index is 22 82 kg/m²  HENT:   Head: Normocephalic and atraumatic  Eyes: EOM are normal    Neck: Normal range of motion  Cardiovascular: Intact distal pulses  Pulmonary/Chest: Effort normal    Musculoskeletal:        Right knee: She exhibits effusion (+1)  See ortho exam   Neurological: She is alert and oriented to person, place, and time  Skin: Skin is warm and dry  Psychiatric: She has a normal mood and affect  Her behavior is normal  Judgment and thought content normal    Nursing note and vitals reviewed

## 2020-08-03 ENCOUNTER — OFFICE VISIT (OUTPATIENT)
Dept: WOUND CARE | Facility: HOSPITAL | Age: 85
End: 2020-08-03
Payer: MEDICARE

## 2020-08-03 PROCEDURE — 29581 APPL MULTLAYER CMPRN SYS LEG: CPT

## 2020-08-03 PROCEDURE — 99213 OFFICE O/P EST LOW 20 MIN: CPT

## 2020-08-10 ENCOUNTER — OFFICE VISIT (OUTPATIENT)
Dept: WOUND CARE | Facility: HOSPITAL | Age: 85
End: 2020-08-10
Payer: MEDICARE

## 2020-08-10 VITALS — HEART RATE: 66 BPM | DIASTOLIC BLOOD PRESSURE: 74 MMHG | SYSTOLIC BLOOD PRESSURE: 172 MMHG | TEMPERATURE: 98.1 F

## 2020-08-10 DIAGNOSIS — I89.0 LYMPHEDEMA: ICD-10-CM

## 2020-08-10 DIAGNOSIS — I87.331 IDIOPATHIC CHRONIC VENOUS HYPERTENSION OF RIGHT LOWER EXTREMITY WITH ULCER AND INFLAMMATION (HCC): Primary | ICD-10-CM

## 2020-08-10 DIAGNOSIS — L97.218 NON-PRESSURE CHRONIC ULCER OF RIGHT CALF WITH OTHER SPECIFIED SEVERITY (HCC): ICD-10-CM

## 2020-08-10 DIAGNOSIS — L97.919 IDIOPATHIC CHRONIC VENOUS HYPERTENSION OF RIGHT LOWER EXTREMITY WITH ULCER AND INFLAMMATION (HCC): Primary | ICD-10-CM

## 2020-08-10 PROCEDURE — 1036F TOBACCO NON-USER: CPT | Performed by: NURSE PRACTITIONER

## 2020-08-10 PROCEDURE — NC001 PR NO CHARGE: Performed by: NURSE PRACTITIONER

## 2020-08-10 PROCEDURE — 99213 OFFICE O/P EST LOW 20 MIN: CPT | Performed by: NURSE PRACTITIONER

## 2020-08-10 PROCEDURE — 29581 APPL MULTLAYER CMPRN SYS LEG: CPT

## 2020-08-10 PROCEDURE — 4040F PNEUMOC VAC/ADMIN/RCVD: CPT | Performed by: NURSE PRACTITIONER

## 2020-08-10 PROCEDURE — 3078F DIAST BP <80 MM HG: CPT | Performed by: NURSE PRACTITIONER

## 2020-08-10 PROCEDURE — 3077F SYST BP >= 140 MM HG: CPT | Performed by: NURSE PRACTITIONER

## 2020-08-10 PROCEDURE — 1160F RVW MEDS BY RX/DR IN RCRD: CPT | Performed by: NURSE PRACTITIONER

## 2020-08-10 NOTE — PROGRESS NOTES
Cast application    Date/Time: 8/10/2020 3:25 PM  Performed by: Magdiel Henderson LPN  Authorized by: JUAN Sotomayor     Procedure details:     Laterality:  Right    Location:  Leg    Leg:  R lower legCast type:  Multi-layer compression short leg  Post-procedure details:     Pain:  Unchanged    Patient tolerance of procedure:   Tolerated well, no immediate complications

## 2020-08-10 NOTE — PATIENT INSTRUCTIONS
Orders Placed This Encounter   Procedures    Cast application     This order was created via procedure documentation    Wound cleansing and dressings     Wash your hands with soap and water  Remove old dressing, discard into plastic bag and place in trash  Cleanse the wound with soap and water prior to applying a clean dressing  Do not use tissue or cotton balls  Do not scrub the wound  Pat dry using gauze  Shower with protection     Apply dermagran to the  wound  Cover with gauze  Secure with coflex tlc   Change dressing weekly with wrap change at wound care center  This was done today   Wound compression and edema control     Multi-layer compression wrap Instructions    Keep compression wrap/wraps clean and dry  If wraps are too tight and you experience numbness/tingling, call the wound center  If after hours, remove wraps or proceed to nearest E R  and call wound center in AM     Circaids to be ordered for right leg  Wrap will be changed 1 x weekly    Avoid prolonged standing in one place  Elevate leg(s) above the level of the heart when sitting or as much as possible

## 2020-08-10 NOTE — PROGRESS NOTES
Patient ID: Gibson Bonilla is a 80 y o  female Date of Birth 1935     Chief Complaint    Allergies  Indocin [indomethacin]    Assessment:    No problem-specific Assessment & Plan notes found for this encounter  Diagnoses and all orders for this visit:    Idiopathic chronic venous hypertension of right lower extremity with ulcer and inflammation (HCC)  -     Wound cleansing and dressings  -     Wound compression and edema control    Non-pressure chronic ulcer of right calf with other specified severity ([address] )    Lymphedema    Other orders  -     Cast application            Procedures    Plan:  1  Follow up visit  Wound not debrided today  Wound improving and measuring smaller  Will change treatment to Dermagran gauze  Continue CoFlex wrap for compression  Circaid wrap was ordered for patient today for after her wound heals  Patient will follow up in 1 week  Wound 08/10/20 Pretibial Right;Medial (Active)       Subjective:        Follow up visit venous ulcer right lower extremity  No new complaints  Tolerating CoFlex wrap  Denies any pain, fevers, or chills  The following portions of the patient's history were reviewed and updated as appropriate: allergies, current medications, past family history, past medical history, past social history, past surgical history and problem list     Review of Systems   Constitutional: Negative  HENT: Negative for ear pain and hearing loss  Eyes: Negative for pain  Respiratory: Negative for chest tightness and shortness of breath  Cardiovascular: Positive for leg swelling  Negative for chest pain and palpitations  Gastrointestinal: Negative for diarrhea, nausea and vomiting  Skin: Positive for wound  Neurological: Negative for tremors, weakness and numbness  Psychiatric/Behavioral: Negative for behavioral problems, confusion and suicidal ideas           Objective:     Wound 08/10/20 Pretibial Right;Medial (Active)   Wound Image Images linked 08/10/20 1504   Wound Description Granulation tissue 08/10/20 1500   Malathi-wound Assessment Edema; Hemosiderin staining 08/10/20 1500   Wound Length (cm) 0 4 cm 08/10/20 1500   Wound Width (cm) 0 4 cm 08/10/20 1500   Wound Depth (cm) 0 1 cm 08/10/20 1500   Wound Surface Area (cm^2) 0 16 cm^2 08/10/20 1500   Wound Volume (cm^3) 0 02 cm^3 08/10/20 1500   Calculated Wound Volume (cm^3) 0 02 cm^3 08/10/20 1500   Drainage Amount Scant 08/10/20 1500   Drainage Description Serous; Yellow 08/10/20 1500   Non-staged Wound Description Partial thickness 08/10/20 1500         BP (!) 172/74   Pulse 66   Temp 98 1 °F (36 7 °C)     Physical Exam  Constitutional:       Appearance: Normal appearance  She is normal weight  Eyes:      General:         Right eye: No discharge  Left eye: No discharge  Musculoskeletal: Normal range of motion  Right lower leg: Edema present  Left lower leg: Edema present  Skin:     General: Skin is warm and dry  Neurological:      Mental Status: She is alert and oriented to person, place, and time  Mental status is at baseline  Psychiatric:         Mood and Affect: Mood normal          Behavior: Behavior normal          Thought Content: Thought content normal          Judgment: Judgment normal            Wound Instructions:  Orders Placed This Encounter   Procedures    Cast application     This order was created via procedure documentation    Wound cleansing and dressings     Wash your hands with soap and water  Remove old dressing, discard into plastic bag and place in trash  Cleanse the wound with soap and water prior to applying a clean dressing  Do not use tissue or cotton balls  Do not scrub the wound  Pat dry using gauze  Shower with protection     Apply dermagran to the  wound  Cover with gauze  Secure with coflex tlc   Change dressing weekly with wrap change at wound care center  This was done today      Wound compression and edema control     Multi-layer compression wrap Instructions    Keep compression wrap/wraps clean and dry  If wraps are too tight and you experience numbness/tingling, call the wound center  If after hours, remove wraps or proceed to nearest E R  and call wound center in AM     Circaids to be ordered for right leg  Wrap will be changed 1 x weekly    Avoid prolonged standing in one place  Elevate leg(s) above the level of the heart when sitting or as much as possible

## 2020-08-16 ENCOUNTER — TELEPHONE (OUTPATIENT)
Dept: WOUND CARE | Facility: HOSPITAL | Age: 85
End: 2020-08-16

## 2020-08-18 ENCOUNTER — OFFICE VISIT (OUTPATIENT)
Dept: WOUND CARE | Facility: HOSPITAL | Age: 85
End: 2020-08-18
Payer: MEDICARE

## 2020-08-18 VITALS
HEART RATE: 56 BPM | SYSTOLIC BLOOD PRESSURE: 187 MMHG | TEMPERATURE: 98.3 F | DIASTOLIC BLOOD PRESSURE: 74 MMHG | RESPIRATION RATE: 20 BRPM

## 2020-08-18 DIAGNOSIS — I87.331 CHRONIC VENOUS HYPERTENSION (IDIOPATHIC) WITH ULCER AND INFLAMMATION OF RIGHT LOWER EXTREMITY (HCC): ICD-10-CM

## 2020-08-18 DIAGNOSIS — I89.0 LYMPHEDEMA: ICD-10-CM

## 2020-08-18 DIAGNOSIS — I87.322 CHRONIC VENOUS HYPERTENSION (IDIOPATHIC) WITH INFLAMMATION OF LEFT LOWER EXTREMITY: Primary | ICD-10-CM

## 2020-08-18 DIAGNOSIS — L97.218 NON-PRESSURE CHRONIC ULCER OF RIGHT CALF WITH OTHER SPECIFIED SEVERITY (HCC): ICD-10-CM

## 2020-08-18 DIAGNOSIS — L97.919 CHRONIC VENOUS HYPERTENSION (IDIOPATHIC) WITH ULCER AND INFLAMMATION OF RIGHT LOWER EXTREMITY (HCC): ICD-10-CM

## 2020-08-18 PROCEDURE — 1160F RVW MEDS BY RX/DR IN RCRD: CPT | Performed by: PREVENTIVE MEDICINE

## 2020-08-18 PROCEDURE — 3078F DIAST BP <80 MM HG: CPT | Performed by: PREVENTIVE MEDICINE

## 2020-08-18 PROCEDURE — 99213 OFFICE O/P EST LOW 20 MIN: CPT | Performed by: PREVENTIVE MEDICINE

## 2020-08-18 PROCEDURE — 1036F TOBACCO NON-USER: CPT | Performed by: PREVENTIVE MEDICINE

## 2020-08-18 PROCEDURE — 29581 APPL MULTLAYER CMPRN SYS LEG: CPT

## 2020-08-18 PROCEDURE — 3077F SYST BP >= 140 MM HG: CPT | Performed by: PREVENTIVE MEDICINE

## 2020-08-18 PROCEDURE — 4040F PNEUMOC VAC/ADMIN/RCVD: CPT | Performed by: PREVENTIVE MEDICINE

## 2020-08-18 PROCEDURE — NC001 PR NO CHARGE: Performed by: PREVENTIVE MEDICINE

## 2020-08-18 NOTE — PROGRESS NOTES
Cast Application    Date/Time: 8/18/2020 2:03 PM  Performed by: Geoff Garcia RN  Authorized by: Brenden Garay DO     Consent:     Consent obtained:  Verbal    Consent given by:  Patient    Risks discussed:  Discoloration, numbness, pain and swelling    Alternatives discussed:  No treatment  Pre-procedure details:     Sensation:  Normal  Procedure details:     Laterality:  RightCast type:  Multi-layer compression short leg (coflex tlx wrap)  Post-procedure details:     Pain:  Improved    Sensation:  Normal

## 2020-08-18 NOTE — PATIENT INSTRUCTIONS
Orders Placed This Encounter   Procedures    Wound cleansing and dressings     Right lower leg wound  Wash your hands with soap and water  Remove old dressing, discard into plastic bag and place in trash  Cleanse the wound with soap and water prior to applying a clean dressing  Do not use tissue or cotton balls  Do not scrub the wound  Pat dry using gauze  Shower with protection   Apply lotion to skin surrounding wound  Apply adaptic to the wound  Cover with coflex wrap  Change dressing with wrap change  Unna Boot/ Multi-layer compression wrap Instructions    Keep compression wrap/wraps clean and dry  If wraps are too tight and you experience numbness/tingling, call the wound center  If after hours, remove wraps or proceed to nearest E R  and call wound center in AM     Daylene Laity will be changed 1 x weekly    Avoid prolonged standing in one place  Elevate leg(s) above the level of the heart when sitting or as much as possible      this was done today     Standing Status:   Future     Standing Expiration Date:   8/18/2021

## 2020-08-24 ENCOUNTER — OFFICE VISIT (OUTPATIENT)
Dept: WOUND CARE | Facility: HOSPITAL | Age: 85
End: 2020-08-24
Payer: MEDICARE

## 2020-08-24 VITALS
RESPIRATION RATE: 20 BRPM | DIASTOLIC BLOOD PRESSURE: 69 MMHG | SYSTOLIC BLOOD PRESSURE: 183 MMHG | TEMPERATURE: 98.1 F | HEART RATE: 60 BPM

## 2020-08-24 DIAGNOSIS — L97.919 IDIOPATHIC CHRONIC VENOUS HYPERTENSION OF RIGHT LOWER EXTREMITY WITH ULCER AND INFLAMMATION (HCC): ICD-10-CM

## 2020-08-24 DIAGNOSIS — L97.919 CHRONIC VENOUS HYPERTENSION (IDIOPATHIC) WITH ULCER AND INFLAMMATION OF RIGHT LOWER EXTREMITY (HCC): Primary | ICD-10-CM

## 2020-08-24 DIAGNOSIS — L97.218 NON-PRESSURE CHRONIC ULCER OF RIGHT CALF WITH OTHER SPECIFIED SEVERITY (HCC): ICD-10-CM

## 2020-08-24 DIAGNOSIS — I87.331 IDIOPATHIC CHRONIC VENOUS HYPERTENSION OF RIGHT LOWER EXTREMITY WITH ULCER AND INFLAMMATION (HCC): ICD-10-CM

## 2020-08-24 DIAGNOSIS — I87.331 CHRONIC VENOUS HYPERTENSION (IDIOPATHIC) WITH ULCER AND INFLAMMATION OF RIGHT LOWER EXTREMITY (HCC): Primary | ICD-10-CM

## 2020-08-24 DIAGNOSIS — I89.0 LYMPHEDEMA: ICD-10-CM

## 2020-08-24 DIAGNOSIS — S81.801A MULTIPLE OPEN WOUNDS OF LOWER LEG, RIGHT, INITIAL ENCOUNTER: ICD-10-CM

## 2020-08-24 DIAGNOSIS — I87.322 CHRONIC VENOUS HYPERTENSION (IDIOPATHIC) WITH INFLAMMATION OF LEFT LOWER EXTREMITY: ICD-10-CM

## 2020-08-24 PROCEDURE — 99213 OFFICE O/P EST LOW 20 MIN: CPT | Performed by: NURSE PRACTITIONER

## 2020-08-24 NOTE — PROGRESS NOTES
Patient ID: Tari Bird is a 80 y o  female Date of Birth 1935     Chief Complaint  Chief Complaint   Patient presents with    Follow Up Wound Care Visit       Allergies  Indocin [indomethacin]    Assessment:     Diagnoses and all orders for this visit:    Chronic venous hypertension (idiopathic) with ulcer and inflammation of right lower extremity (HCC)  -     Wound cleansing and dressings; Future    Chronic venous hypertension (idiopathic) with inflammation of left lower extremity    Non-pressure chronic ulcer of right calf with other specified severity ([address] )    Lymphedema    Idiopathic chronic venous hypertension of right lower extremity with ulcer and inflammation ([address] )          Plan:  1  F/u visit  Wound is epithelialized  Counseled patient to moisturize skin and to wear her Circaid wrap daily  Patient verbalized agreement and understanding  Patient is discharged from the wound center today  Can follow up PRN  Wound 08/10/20 Pretibial Right;Medial (Active)       Subjective:     F/u venous ulcer RLE  No new complaints  Denies any pain, fevers, or chills  The following portions of the patient's history were reviewed and updated as appropriate:   She  has a past medical history of Anesthesia complication, Anxiety, Arthritis, Cataract, right, Eye hemorrhage, History of shingles (05/2015), Hypertension, and Mitral valve stenosis, mild    She   Patient Active Problem List    Diagnosis Date Noted    Hyperlipidemia 07/07/2020    Lymphedema 07/07/2020    Multiple open wounds of lower leg 07/07/2020    Current moderate episode of major depressive disorder without prior episode ([address] ) 09/17/2019    Hypokalemia 08/09/2019    Chronic pain of right knee 08/01/2019    Mitral valve stenosis, mild     Chronic diastolic congestive heart failure ([address] ) 05/10/2018    Localized edema 05/10/2018    Other fatigue 05/10/2018    Aortic stenosis, moderate 05/11/2016    Atrial dilatation, bilateral 05/11/2016  Mild tricuspid insufficiency 05/11/2016    Non-rheumatic mitral regurgitation 05/11/2016    Essential hypertension 08/22/2013     She  has a past surgical history that includes Tonsillectomy; Eye surgery (Left); Carpal tunnel release (Left); Cervical cone biopsy; Colonoscopy; Cataract extraction w/ intraocular lens implant (Left, 10/11/2016); pr xcapsl ctrc rmvl insj io lens prosth w/o ecp (Right, 5/17/2016); and pr open rx femur fx+intramed natalie (Right, 7/23/2019)  Her family history includes Arthritis in her mother and sister; Cancer in her brother and sister; GI problems in her father; Heart disease in her brother, maternal aunt, and mother; Irritable bowel syndrome in her sister; Sleep apnea in her sister  She  reports that she has never smoked  She has never used smokeless tobacco  She reports current alcohol use  She reports that she does not use drugs  Current Outpatient Medications   Medication Sig Dispense Refill    Calcium Carb-Cholecalciferol (CALCIUM 600 + D) 600-200 MG-UNIT TABS Take 1 tablet by mouth daily      furosemide (LASIX) 40 mg tablet Take 1 tablet (40 mg total) by mouth daily As needed for leg swelling 90 tablet 3    metoprolol succinate (TOPROL-XL) 50 mg 24 hr tablet TAKE ONE TABLET BY MOUTH DAILY AS DIRECTED 90 tablet 2    Multiple Vitamins-Minerals (PRESERVISION AREDS PO) Take by mouth      potassium chloride (K-DUR,KLOR-CON) 20 mEq tablet Take 1 tablet (20 mEq total) by mouth daily 30 tablet 5     No current facility-administered medications for this visit  She is allergic to indocin [indomethacin]       Review of Systems   Constitutional: Negative  HENT: Negative for ear pain and hearing loss  Eyes: Negative for pain  Respiratory: Negative for chest tightness and shortness of breath  Cardiovascular: Positive for leg swelling  Negative for chest pain and palpitations  Gastrointestinal: Negative for diarrhea, nausea and vomiting     Genitourinary: Negative for dysuria  Musculoskeletal: Negative for gait problem  Skin: Positive for wound  Neurological: Negative for tremors and weakness  Psychiatric/Behavioral: Negative for behavioral problems, confusion and suicidal ideas  Objective:       Wound 08/10/20 Pretibial Right;Medial (Active)   Wound Image Images linked 08/24/20 1356   Wound Description Clean;Dry;Edema;Fragile 08/24/20 1353   Malathi-wound Assessment Clean;Edema 08/24/20 1353   Wound Length (cm) 0 3 cm 08/24/20 1353   Wound Width (cm) 0 3 cm 08/24/20 1353   Wound Depth (cm) 0 cm 08/24/20 1353   Wound Surface Area (cm^2) 0 09 cm^2 08/24/20 1353   Wound Volume (cm^3) 0 cm^3 08/24/20 1353   Calculated Wound Volume (cm^3) 0 cm^3 08/24/20 1353   Change in Wound Size % 100 08/24/20 1353   Drainage Amount None 08/24/20 1353   Non-staged Wound Description Partial thickness 08/24/20 1353   Treatments Cleansed 08/24/20 1353   Wound packed? No 08/24/20 1353   Patient Tolerance Tolerated well 08/24/20 1353       BP (!) 183/69   Pulse 60   Temp 98 1 °F (36 7 °C)   Resp 20     Physical Exam  Vitals signs and nursing note reviewed  Constitutional:       General: She is not in acute distress  Appearance: Normal appearance  She is normal weight  HENT:      Head: Normocephalic and atraumatic  Eyes:      General:         Right eye: No discharge  Left eye: No discharge  Neck:      Musculoskeletal: Normal range of motion  Pulmonary:      Effort: Pulmonary effort is normal    Musculoskeletal: Normal range of motion  General: No swelling  Right lower leg: No edema  Left lower leg: No edema  Skin:     General: Skin is warm and dry  Neurological:      Mental Status: She is alert and oriented to person, place, and time  Mental status is at baseline  Psychiatric:         Mood and Affect: Mood normal          Behavior: Behavior normal          Thought Content:  Thought content normal          Judgment: Judgment normal  Wound Instructions:  Orders Placed This Encounter   Procedures    Wound cleansing and dressings     Right lower leg   Wash your hands with soap and water  Remove old dressing, discard into plastic bag and place in trash  Cleanse the wound with soap and water prior to applying a clean dressing  Do not use tissue or cotton balls  Do not scrub the wound  Pat dry using gauze  Shower yes   Apply lotion to skin surrounding wound    Circ-aid    Apply Circaid devices as instructed first thing qAM & remove qHS  Avoid prolonged standing in one place  Elevate leg(s) above the level of the heart when sitting or as much as possible       Standing Status:   Future     Standing Expiration Date:   8/24/2021

## 2020-08-24 NOTE — PATIENT INSTRUCTIONS
Orders Placed This Encounter   Procedures    Wound cleansing and dressings     Right lower leg   Wash your hands with soap and water  Remove old dressing, discard into plastic bag and place in trash  Cleanse the wound with soap and water prior to applying a clean dressing  Do not use tissue or cotton balls  Do not scrub the wound  Pat dry using gauze  Shower yes   Apply lotion to skin surrounding wound    Circ-aid    Apply Circaid devices as instructed first thing qAM & remove qHS  Avoid prolonged standing in one place  Elevate leg(s) above the level of the heart when sitting or as much as possible       Standing Status:   Future     Standing Expiration Date:   8/24/2021

## 2020-09-01 ENCOUNTER — TELEPHONE (OUTPATIENT)
Dept: OBGYN CLINIC | Facility: HOSPITAL | Age: 85
End: 2020-09-01

## 2020-09-01 NOTE — TELEPHONE ENCOUNTER
Patient sees Dr Christina Grimes  Patient is calling in wanting to know how far out we are scheduling surgeries, she is going to be needing to make plan to get her right knee operated on  She is asking for a call back relating this        Call back# [phone]

## 2020-09-02 NOTE — TELEPHONE ENCOUNTER
At this point we cannot discuss surgical timing due to the last office note reference in her lymphedema and ulcers  Once these ulcers are healed and her lymphedema is optimized we can consider surgical planning and optimization which could take anywhere from 3-4 weeks to optimize the patient

## 2020-09-02 NOTE — TELEPHONE ENCOUNTER
Patient states that her uclers have healed and was d/c from wound care  Her Lymphedema seems to be well controlled per patient at this time  She would like to have surgery somewhere in mid October  Per Dr Riky Haq patient will need to come in for an appointment to discuss surgical planning in further detail at least 4 wks prior to desired OP date  Will call patient and advise

## 2020-09-17 ENCOUNTER — OFFICE VISIT (OUTPATIENT)
Dept: OBGYN CLINIC | Facility: CLINIC | Age: 85
End: 2020-09-17
Payer: MEDICARE

## 2020-09-17 ENCOUNTER — APPOINTMENT (OUTPATIENT)
Dept: RADIOLOGY | Facility: CLINIC | Age: 85
End: 2020-09-17
Payer: MEDICARE

## 2020-09-17 VITALS
WEIGHT: 112 LBS | BODY MASS INDEX: 22.58 KG/M2 | HEIGHT: 59 IN | HEART RATE: 58 BPM | DIASTOLIC BLOOD PRESSURE: 65 MMHG | TEMPERATURE: 98.6 F | SYSTOLIC BLOOD PRESSURE: 172 MMHG

## 2020-09-17 DIAGNOSIS — I51.7 ATRIAL DILATATION, BILATERAL: ICD-10-CM

## 2020-09-17 DIAGNOSIS — M17.11 PRIMARY OSTEOARTHRITIS OF RIGHT KNEE: ICD-10-CM

## 2020-09-17 DIAGNOSIS — I10 ESSENTIAL HYPERTENSION: ICD-10-CM

## 2020-09-17 DIAGNOSIS — I35.0 AORTIC STENOSIS, MODERATE: ICD-10-CM

## 2020-09-17 DIAGNOSIS — I07.1 MILD TRICUSPID INSUFFICIENCY: ICD-10-CM

## 2020-09-17 DIAGNOSIS — M17.11 PRIMARY OSTEOARTHRITIS OF RIGHT KNEE: Primary | ICD-10-CM

## 2020-09-17 DIAGNOSIS — M25.561 CHRONIC PAIN OF RIGHT KNEE: ICD-10-CM

## 2020-09-17 DIAGNOSIS — G89.29 CHRONIC PAIN OF RIGHT KNEE: ICD-10-CM

## 2020-09-17 DIAGNOSIS — I89.0 LYMPHEDEMA: ICD-10-CM

## 2020-09-17 DIAGNOSIS — F32.1 CURRENT MODERATE EPISODE OF MAJOR DEPRESSIVE DISORDER WITHOUT PRIOR EPISODE (HCC): ICD-10-CM

## 2020-09-17 DIAGNOSIS — I50.32 CHRONIC DIASTOLIC CONGESTIVE HEART FAILURE (HCC): ICD-10-CM

## 2020-09-17 PROCEDURE — 73562 X-RAY EXAM OF KNEE 3: CPT

## 2020-09-17 PROCEDURE — 99213 OFFICE O/P EST LOW 20 MIN: CPT | Performed by: ORTHOPAEDIC SURGERY

## 2020-09-17 NOTE — PROGRESS NOTES
Assessment/Plan:  1  Primary osteoarthritis of right knee  XR knee 3 vw right non injury   2  Chronic pain of right knee     3  Lymphedema     4  Aortic stenosis, moderate     5  Atrial dilatation, bilateral     6  Chronic diastolic congestive heart failure ([address] )     7  Essential hypertension     8  Current moderate episode of major depressive disorder without prior episode ([facility] [address] )     9  Mild tricuspid insufficiency       Sahara Baugh is a very pleasant 80-year-old female presenting today for follow-up of her activity related right knee pain due to her severe end-stage underlying osteoarthritis  Lymphedema in her right lower extremity remains approximately the same, however the previous ulcers that she was seeing wound care for have healed well  She presented today with the intensive booking a right total knee arthroplasty  However, when we began to discuss the potential risks of surgery specific for her including wound healing problems due to vascular issues and chronic lymphedema, she became apprehensive about pursuing surgery  We also discussed that postoperative expectations would include her returning home and attending outpatient physical therapy  This concerns her because she felt as though a therapist would come to her house  Her agent cardiac comorbidities also place her at a higher risk of potential postoperative complications  We explained that although she did very well after her long cephalomedullary nailing of the right hip last year, the indications for surgery were significantly different, thus the risks associated with the surgery were significantly different as well  She has demonstrated failure of all forms of conservative treatment  In addition to her right knee complaints, she does have significant spinal pathology which may also be contributing to her symptoms    Therefore, instead of booking surgery today, we encouraged her to go speak with her son who will be her primary caretaker after any potential surgery to gauge how he would feel about her proceeding with this operation  We encouraged her to bring him to her next appointment so that he may understand the pre, adalberto, and postoperative expectations for both him and his mother  She was in agreement with this plan and will return after talking with her son  All questions addressed today    Subjective: Right knee pain    Patient ID: Bryson Iverson is a 80 y o  female  The Stacey Coughlin is a very pleasant 77-year-old female presenting today for follow-up of her activity related right knee pain due to her severe end-stage underlying osteoarthritis  She has attempted and failed all forms of conservative treatment and reports that she continues to be limited in her ability to perform activities of daily living  She has not been able to go to the store or do many chores around her house  She states that her knee pain is the main limiting factor  She has worked with wound care and the ulcers of her right lower extremity of now healed  She does wear compression boots for the lymphedema in both lower extremities  She denies new injury        Review of Systems   Constitutional: Positive for activity change  HENT: Negative  Eyes: Negative  Respiratory: Negative  Cardiovascular: Positive for leg swelling  Gastrointestinal: Negative  Endocrine: Negative  Genitourinary: Negative  Musculoskeletal: Positive for arthralgias, gait problem, joint swelling and myalgias  Skin: Positive for wound  Allergic/Immunologic: Negative  Hematological: Negative  Psychiatric/Behavioral: Negative  Past Medical History:   Diagnosis Date    Anesthesia complication       Yrs ago had "anxiety" reaction not sure while sedated, pt states sensitive to anesthesia effects    Anxiety     stress at home    Arthritis     Cataract, right     Last Assessed:5/11/16    Eye hemorrhage     left eye currently 5/12/16    History of shingles 05/2015    Hypertension     Mitral valve stenosis, mild        Past Surgical History:   Procedure Laterality Date    CARPAL TUNNEL RELEASE Left     CATARACT EXTRACTION W/ INTRAOCULAR LENS IMPLANT Left 10/11/2016    Procedure: EXTRACTION EXTRACAPSULAR CATARACT PHACO INTRAOCULAR LENS (IOL); Surgeon: Gwen Alvarez MD;  Location: [facility] MAIN OR;  Service:     CERVICAL CONE BIOPSY      COLONOSCOPY      EYE SURGERY Left     muscle repair    ID OPEN RX FEMUR FX+INTRAMED NINI Right 2019    Procedure: INSERTION NAIL IM FEMUR ANTEGRADE (TROCHANTERIC); Surgeon: Lukasz Mar DO;  Location: [address] [place] [address];  Service: Orthopedics    ID XCAPSL CTRC RMVL INSJ IO LENS PROSTH W/O ECP Right 2016    Procedure: EXTRACTION EXTRACAPSULAR CATARACT PHACO INTRAOCULAR LENS (IOL);   Surgeon: Gwen Alvarez MD;  Location: [facility] MAIN OR;  Service: Ophthalmology    TONSILLECTOMY         Family History   Problem Relation Age of Onset    Heart disease Mother         MI  at age 71   [facility] Arthritis Mother     GI problems Father         bleeding ulcer    Arthritis Sister     Sleep apnea Sister     Irritable bowel syndrome Sister     Cancer Sister         skin cancer    Heart disease Brother         CABG (5)    Cancer Brother         skin cancer    Heart disease Maternal Aunt        Social History     Occupational History    Not on file   Tobacco Use    Smoking status: Never Smoker    Smokeless tobacco: Never Used   Substance and Sexual Activity    Alcohol use: Yes     Comment: rarely    Drug use: No    Sexual activity: Not on file       Current Outpatient Medications:     Calcium Carb-Cholecalciferol (CALCIUM 600 + D) 600-200 MG-UNIT TABS, Take 1 tablet by mouth daily, Disp: , Rfl:     furosemide (LASIX) 40 mg tablet, Take 1 tablet (40 mg total) by mouth daily As needed for leg swelling, Disp: 90 tablet, Rfl: 3    metoprolol succinate (TOPROL-XL) 50 mg 24 hr tablet, TAKE ONE TABLET BY MOUTH DAILY AS DIRECTED, Disp: 90 tablet, Rfl: 2    Multiple Vitamins-Minerals (PRESERVISION AREDS PO), Take by mouth, Disp: , Rfl:     potassium chloride (K-DUR,KLOR-CON) 20 mEq tablet, Take 1 tablet (20 mEq total) by mouth daily, Disp: 30 tablet, Rfl: 5    Allergies   Allergen Reactions    Indocin [Indomethacin]      hypertension       Objective:  Vitals:    09/17/20 1351   BP: (!) 172/65   Pulse: 58   Temp: 98 6 °F (37 °C)       Body mass index is 22 62 kg/m²  Right Knee Exam     Muscle Strength   The patient has normal right knee strength  Tenderness   The patient is experiencing tenderness in the lateral joint line, medial joint line, patella and MCL  Range of Motion   Extension: abnormal Right knee extension: 5  Flexion: normal Right knee flexion: 125  Tests   Deion:  Medial - negative Lateral - negative  Varus: negative Valgus: negative  Drawer:  Anterior - negative    Posterior - negative  Patellar apprehension: negative    Other   Erythema: absent  Scars: absent  Sensation: normal  Pulse: present  Swelling: mild  Effusion: effusion (+1) present    Comments:  Knee is stable on ligamentous exam at 0, 30, and 90  Mild to Moderate bilateral lower extremity lymphedema mid calf to foot  Slight erythema in this area below mid shin but no warmth   Two small medial ulcers now well healed  NVI  + crepitance on range of motion            Observations     Right Knee   Positive for effusion (+1)  Physical Exam  Vitals signs and nursing note reviewed  Constitutional:       Appearance: She is well-developed  Comments: Body mass index is 22 62 kg/m²  HENT:      Head: Normocephalic and atraumatic  Mouth/Throat:      Mouth: Mucous membranes are moist    Eyes:      Extraocular Movements: Extraocular movements intact  Neck:      Musculoskeletal: Normal range of motion  Cardiovascular:      Rate and Rhythm: Normal rate     Pulmonary:      Effort: Pulmonary effort is normal    Abdominal:      Palpations: Abdomen is soft    Musculoskeletal:      Right knee: She exhibits effusion (+1)  Comments: See ortho exam   Skin:     General: Skin is warm and dry  Neurological:      Mental Status: She is alert and oriented to person, place, and time  Psychiatric:         Behavior: Behavior normal          Thought Content: Thought content normal          Judgment: Judgment normal          I have personally reviewed pertinent films in PACS of the updated weight-bearing Mag marker x-rays taken of her right knee which again demonstrates severe end-stage degenerative changes with complete medial joint space loss and bone-on-bone appearance  There is significant sclerosis and osteophytosis  Presence of a long cephalomedullary nail remains unchanged    No other interval changes are appreciated

## 2020-10-09 ENCOUNTER — TELEPHONE (OUTPATIENT)
Dept: OBGYN CLINIC | Facility: HOSPITAL | Age: 85
End: 2020-10-09

## 2020-10-16 DIAGNOSIS — E87.6 HYPOKALEMIA: ICD-10-CM

## 2020-10-16 DIAGNOSIS — I10 ESSENTIAL HYPERTENSION: ICD-10-CM

## 2020-10-16 RX ORDER — POTASSIUM CHLORIDE 20 MEQ/1
20 TABLET, EXTENDED RELEASE ORAL DAILY
Qty: 30 TABLET | Refills: 5 | Status: SHIPPED | OUTPATIENT
Start: 2020-10-16 | End: 2021-03-31

## 2020-10-16 RX ORDER — METOPROLOL SUCCINATE 50 MG/1
TABLET, EXTENDED RELEASE ORAL
Qty: 90 TABLET | Refills: 2 | Status: SHIPPED | OUTPATIENT
Start: 2020-10-16 | End: 2021-12-28 | Stop reason: SDUPTHER

## 2020-10-23 ENCOUNTER — TELEPHONE (OUTPATIENT)
Dept: OBGYN CLINIC | Facility: HOSPITAL | Age: 85
End: 2020-10-23

## 2020-10-23 ENCOUNTER — OFFICE VISIT (OUTPATIENT)
Dept: OBGYN CLINIC | Facility: CLINIC | Age: 85
End: 2020-10-23
Payer: MEDICARE

## 2020-10-23 VITALS
WEIGHT: 113 LBS | SYSTOLIC BLOOD PRESSURE: 198 MMHG | HEART RATE: 63 BPM | DIASTOLIC BLOOD PRESSURE: 72 MMHG | HEIGHT: 59 IN | BODY MASS INDEX: 22.78 KG/M2

## 2020-10-23 DIAGNOSIS — I89.0 LYMPHEDEMA: ICD-10-CM

## 2020-10-23 DIAGNOSIS — M17.11 PRIMARY OSTEOARTHRITIS OF RIGHT KNEE: Primary | ICD-10-CM

## 2020-10-23 DIAGNOSIS — I50.32 CHRONIC DIASTOLIC CONGESTIVE HEART FAILURE (HCC): ICD-10-CM

## 2020-10-23 DIAGNOSIS — G89.29 CHRONIC PAIN OF RIGHT KNEE: ICD-10-CM

## 2020-10-23 DIAGNOSIS — I10 ESSENTIAL HYPERTENSION: ICD-10-CM

## 2020-10-23 DIAGNOSIS — M25.561 CHRONIC PAIN OF RIGHT KNEE: ICD-10-CM

## 2020-10-23 DIAGNOSIS — I07.1 MILD TRICUSPID INSUFFICIENCY: ICD-10-CM

## 2020-10-23 DIAGNOSIS — Z01.812 PRE-OPERATIVE LABORATORY EXAMINATION: ICD-10-CM

## 2020-10-23 DIAGNOSIS — Z11.59 SCREENING FOR VIRAL DISEASE: ICD-10-CM

## 2020-10-23 DIAGNOSIS — E78.5 HYPERLIPIDEMIA, UNSPECIFIED HYPERLIPIDEMIA TYPE: ICD-10-CM

## 2020-10-23 DIAGNOSIS — I35.0 AORTIC STENOSIS, MODERATE: ICD-10-CM

## 2020-10-23 PROCEDURE — 99214 OFFICE O/P EST MOD 30 MIN: CPT | Performed by: ORTHOPAEDIC SURGERY

## 2020-10-23 RX ORDER — FERROUS SULFATE TAB EC 324 MG (65 MG FE EQUIVALENT) 324 (65 FE) MG
324 TABLET DELAYED RESPONSE ORAL
Qty: 30 TABLET | Refills: 0 | Status: SHIPPED | OUTPATIENT
Start: 2020-10-23 | End: 2020-11-17 | Stop reason: SDUPTHER

## 2020-10-23 RX ORDER — MELATONIN
1000 DAILY
Qty: 30 TABLET | Refills: 0 | Status: SHIPPED | OUTPATIENT
Start: 2020-10-23 | End: 2020-11-17 | Stop reason: SDUPTHER

## 2020-10-23 RX ORDER — FOLIC ACID 1 MG/1
1 TABLET ORAL DAILY
Qty: 30 TABLET | Refills: 0 | Status: SHIPPED | OUTPATIENT
Start: 2020-10-23 | End: 2020-11-17 | Stop reason: SDUPTHER

## 2020-10-23 RX ORDER — ZINC SULFATE 50(220)MG
220 CAPSULE ORAL DAILY
Qty: 30 CAPSULE | Refills: 0 | Status: SHIPPED | OUTPATIENT
Start: 2020-10-23 | End: 2020-11-17 | Stop reason: SDUPTHER

## 2020-10-23 RX ORDER — CHLORHEXIDINE GLUCONATE 0.12 MG/ML
15 RINSE ORAL ONCE
Status: CANCELLED | OUTPATIENT
Start: 2020-12-15 | End: 2020-10-23

## 2020-10-26 ENCOUNTER — TELEPHONE (OUTPATIENT)
Dept: OBGYN CLINIC | Facility: CLINIC | Age: 85
End: 2020-10-26

## 2020-10-27 ENCOUNTER — APPOINTMENT (OUTPATIENT)
Dept: LAB | Facility: HOSPITAL | Age: 85
End: 2020-10-27
Payer: MEDICARE

## 2020-10-27 ENCOUNTER — TRANSCRIBE ORDERS (OUTPATIENT)
Dept: ADMINISTRATIVE | Facility: HOSPITAL | Age: 85
End: 2020-10-27

## 2020-10-27 ENCOUNTER — LAB (OUTPATIENT)
Dept: LAB | Facility: HOSPITAL | Age: 85
End: 2020-10-27
Payer: MEDICARE

## 2020-10-27 ENCOUNTER — TELEPHONE (OUTPATIENT)
Dept: OBGYN CLINIC | Facility: CLINIC | Age: 85
End: 2020-10-27

## 2020-10-27 ENCOUNTER — HOSPITAL ENCOUNTER (OUTPATIENT)
Dept: RADIOLOGY | Facility: HOSPITAL | Age: 85
Discharge: HOME | End: 2020-10-27
Payer: MEDICARE

## 2020-10-27 DIAGNOSIS — M25.561 CHRONIC PAIN OF RIGHT KNEE: ICD-10-CM

## 2020-10-27 DIAGNOSIS — Z01.812 PRE-OPERATIVE LABORATORY EXAMINATION: Primary | ICD-10-CM

## 2020-10-27 DIAGNOSIS — Z01.812 PRE-OPERATIVE LABORATORY EXAMINATION: ICD-10-CM

## 2020-10-27 DIAGNOSIS — M17.11 OSTEOARTHRITIS OF RIGHT KNEE, UNSPECIFIED OSTEOARTHRITIS TYPE: Primary | ICD-10-CM

## 2020-10-27 DIAGNOSIS — M17.11 PRIMARY OSTEOARTHRITIS OF RIGHT KNEE: ICD-10-CM

## 2020-10-27 DIAGNOSIS — G89.29 CHRONIC PAIN OF RIGHT KNEE: ICD-10-CM

## 2020-10-27 DIAGNOSIS — M17.11 OSTEOARTHRITIS OF RIGHT KNEE, UNSPECIFIED OSTEOARTHRITIS TYPE: ICD-10-CM

## 2020-10-27 LAB
ABO GROUP BLD: NORMAL
ALBUMIN SERPL BCP-MCNC: 3.8 G/DL (ref 3.5–5)
ALP SERPL-CCNC: 93 U/L (ref 46–116)
ALT SERPL W P-5'-P-CCNC: 18 U/L (ref 12–78)
AMORPH PHOS CRY URNS QL MICRO: ABNORMAL /HPF
ANION GAP SERPL CALCULATED.3IONS-SCNC: 9 MMOL/L (ref 4–13)
APTT PPP: 33 SECONDS (ref 23–37)
AST SERPL W P-5'-P-CCNC: 19 U/L (ref 5–45)
BACTERIA UR QL AUTO: ABNORMAL /HPF
BASOPHILS # BLD AUTO: 0.07 THOUSANDS/ΜL (ref 0–0.1)
BASOPHILS NFR BLD AUTO: 1 % (ref 0–1)
BILIRUB SERPL-MCNC: 0.6 MG/DL (ref 0.2–1)
BILIRUB UR QL STRIP: NEGATIVE
BLD GP AB SCN SERPL QL: NEGATIVE
BUN SERPL-MCNC: 22 MG/DL (ref 5–25)
CALCIUM SERPL-MCNC: 9.4 MG/DL (ref 8.3–10.1)
CHLORIDE SERPL-SCNC: 105 MMOL/L (ref 100–108)
CLARITY UR: CLEAR
CO2 SERPL-SCNC: 28 MMOL/L (ref 21–32)
COLOR UR: ABNORMAL
CREAT SERPL-MCNC: 0.78 MG/DL (ref 0.6–1.3)
EOSINOPHIL # BLD AUTO: 0.08 THOUSAND/ΜL (ref 0–0.61)
EOSINOPHIL NFR BLD AUTO: 1 % (ref 0–6)
ERYTHROCYTE [DISTWIDTH] IN BLOOD BY AUTOMATED COUNT: 12.6 % (ref 11.6–15.1)
EST. AVERAGE GLUCOSE BLD GHB EST-MCNC: 108 MG/DL
GFR SERPL CREATININE-BSD FRML MDRD: 70 ML/MIN/1.73SQ M
GLUCOSE P FAST SERPL-MCNC: 96 MG/DL (ref 65–99)
GLUCOSE UR STRIP-MCNC: NEGATIVE MG/DL
HBA1C MFR BLD: 5.4 %
HCT VFR BLD AUTO: 40.7 % (ref 34.8–46.1)
HGB BLD-MCNC: 12.4 G/DL (ref 11.5–15.4)
HGB UR QL STRIP.AUTO: NEGATIVE
IMM GRANULOCYTES # BLD AUTO: 0.03 THOUSAND/UL (ref 0–0.2)
IMM GRANULOCYTES NFR BLD AUTO: 1 % (ref 0–2)
INR PPP: 1 (ref 0.84–1.19)
KETONES UR STRIP-MCNC: NEGATIVE MG/DL
LEUKOCYTE ESTERASE UR QL STRIP: ABNORMAL
LYMPHOCYTES # BLD AUTO: 1.26 THOUSANDS/ΜL (ref 0.6–4.47)
LYMPHOCYTES NFR BLD AUTO: 19 % (ref 14–44)
MCH RBC QN AUTO: 28.3 PG (ref 26.8–34.3)
MCHC RBC AUTO-ENTMCNC: 30.5 G/DL (ref 31.4–37.4)
MCV RBC AUTO: 93 FL (ref 82–98)
MONOCYTES # BLD AUTO: 0.44 THOUSAND/ΜL (ref 0.17–1.22)
MONOCYTES NFR BLD AUTO: 7 % (ref 4–12)
NEUTROPHILS # BLD AUTO: 4.75 THOUSANDS/ΜL (ref 1.85–7.62)
NEUTS SEG NFR BLD AUTO: 71 % (ref 43–75)
NITRITE UR QL STRIP: NEGATIVE
NON-SQ EPI CELLS URNS QL MICRO: ABNORMAL /HPF
NRBC BLD AUTO-RTO: 0 /100 WBCS
PH UR STRIP.AUTO: 7 [PH]
PLATELET # BLD AUTO: 197 THOUSANDS/UL (ref 149–390)
PMV BLD AUTO: 11.5 FL (ref 8.9–12.7)
POTASSIUM SERPL-SCNC: 3.8 MMOL/L (ref 3.5–5.3)
PROT SERPL-MCNC: 6.8 G/DL (ref 6.4–8.2)
PROT UR STRIP-MCNC: NEGATIVE MG/DL
PROTHROMBIN TIME: 13.1 SECONDS (ref 11.6–14.5)
RBC # BLD AUTO: 4.38 MILLION/UL (ref 3.81–5.12)
RBC #/AREA URNS AUTO: ABNORMAL /HPF
RH BLD: NEGATIVE
SODIUM SERPL-SCNC: 142 MMOL/L (ref 136–145)
SP GR UR STRIP.AUTO: 1.02 (ref 1–1.03)
SPECIMEN EXPIRATION DATE: NORMAL
UROBILINOGEN UR QL STRIP.AUTO: 0.2 E.U./DL
WBC # BLD AUTO: 6.63 THOUSAND/UL (ref 4.31–10.16)
WBC #/AREA URNS AUTO: ABNORMAL /HPF

## 2020-10-27 PROCEDURE — 80053 COMPREHEN METABOLIC PANEL: CPT

## 2020-10-27 PROCEDURE — 71046 X-RAY EXAM CHEST 2 VIEWS: CPT

## 2020-10-27 PROCEDURE — 87081 CULTURE SCREEN ONLY: CPT

## 2020-10-27 PROCEDURE — 86850 RBC ANTIBODY SCREEN: CPT

## 2020-10-27 PROCEDURE — 86900 BLOOD TYPING SEROLOGIC ABO: CPT

## 2020-10-27 PROCEDURE — 36415 COLL VENOUS BLD VENIPUNCTURE: CPT | Performed by: PHYSICIAN ASSISTANT

## 2020-10-27 PROCEDURE — 85025 COMPLETE CBC W/AUTO DIFF WBC: CPT

## 2020-10-27 PROCEDURE — 93005 ELECTROCARDIOGRAM TRACING: CPT

## 2020-10-27 PROCEDURE — 86901 BLOOD TYPING SEROLOGIC RH(D): CPT

## 2020-10-27 PROCEDURE — 83036 HEMOGLOBIN GLYCOSYLATED A1C: CPT | Performed by: PHYSICIAN ASSISTANT

## 2020-10-27 PROCEDURE — 85730 THROMBOPLASTIN TIME PARTIAL: CPT

## 2020-10-27 PROCEDURE — 85610 PROTHROMBIN TIME: CPT

## 2020-10-27 PROCEDURE — 81001 URINALYSIS AUTO W/SCOPE: CPT | Performed by: ORTHOPAEDIC SURGERY

## 2020-10-29 LAB
ATRIAL RATE: 63 BPM
MRSA NOSE QL CULT: NORMAL
P AXIS: 77 DEGREES
PR INTERVAL: 172 MS
QRS AXIS: 18 DEGREES
QRSD INTERVAL: 100 MS
QT INTERVAL: 408 MS
QTC INTERVAL: 417 MS
T WAVE AXIS: 67 DEGREES
VENTRICULAR RATE: 63 BPM

## 2020-10-29 PROCEDURE — 93010 ELECTROCARDIOGRAM REPORT: CPT | Performed by: INTERNAL MEDICINE

## 2020-11-02 ENCOUNTER — OFFICE VISIT (OUTPATIENT)
Dept: INTERNAL MEDICINE CLINIC | Facility: CLINIC | Age: 85
End: 2020-11-02
Payer: MEDICARE

## 2020-11-02 VITALS
HEART RATE: 72 BPM | BODY MASS INDEX: 22.5 KG/M2 | OXYGEN SATURATION: 97 % | TEMPERATURE: 98.8 F | HEIGHT: 59 IN | WEIGHT: 111.6 LBS | DIASTOLIC BLOOD PRESSURE: 90 MMHG | SYSTOLIC BLOOD PRESSURE: 174 MMHG

## 2020-11-02 DIAGNOSIS — Z23 ENCOUNTER FOR IMMUNIZATION: ICD-10-CM

## 2020-11-02 DIAGNOSIS — I35.0 AORTIC STENOSIS, MODERATE: ICD-10-CM

## 2020-11-02 DIAGNOSIS — I89.0 LYMPHEDEMA: ICD-10-CM

## 2020-11-02 DIAGNOSIS — M17.11 PRIMARY OSTEOARTHRITIS OF RIGHT KNEE: ICD-10-CM

## 2020-11-02 DIAGNOSIS — I10 ESSENTIAL HYPERTENSION: Primary | ICD-10-CM

## 2020-11-02 DIAGNOSIS — I50.32 CHRONIC DIASTOLIC CONGESTIVE HEART FAILURE (HCC): ICD-10-CM

## 2020-11-02 PROBLEM — S81.809A MULTIPLE OPEN WOUNDS OF LOWER LEG: Status: RESOLVED | Noted: 2020-07-07 | Resolved: 2020-11-02

## 2020-11-02 PROCEDURE — G0008 ADMIN INFLUENZA VIRUS VAC: HCPCS

## 2020-11-02 PROCEDURE — 90662 IIV NO PRSV INCREASED AG IM: CPT

## 2020-11-02 PROCEDURE — 99214 OFFICE O/P EST MOD 30 MIN: CPT | Performed by: INTERNAL MEDICINE

## 2020-11-02 RX ORDER — LISINOPRIL 5 MG/1
5 TABLET ORAL DAILY
Qty: 30 TABLET | Refills: 3 | Status: SHIPPED | OUTPATIENT
Start: 2020-11-02 | End: 2020-11-02 | Stop reason: ALTCHOICE

## 2020-11-02 RX ORDER — FUROSEMIDE 40 MG/1
40 TABLET ORAL DAILY
Qty: 90 TABLET | Refills: 3 | Status: SHIPPED | OUTPATIENT
Start: 2020-11-02 | End: 2022-06-21 | Stop reason: ALTCHOICE

## 2020-11-02 RX ORDER — GLUCOSAMINE/D3/BOSWELLIA SERRA 1500MG-400
TABLET ORAL
COMMUNITY
End: 2020-12-16 | Stop reason: HOSPADM

## 2020-11-03 ENCOUNTER — TELEPHONE (OUTPATIENT)
Dept: OBGYN CLINIC | Facility: HOSPITAL | Age: 85
End: 2020-11-03

## 2020-11-09 ENCOUNTER — TELEPHONE (OUTPATIENT)
Dept: CARDIOLOGY CLINIC | Facility: CLINIC | Age: 85
End: 2020-11-09

## 2020-11-10 ENCOUNTER — LAB (OUTPATIENT)
Dept: LAB | Facility: CLINIC | Age: 85
End: 2020-11-10
Payer: MEDICARE

## 2020-11-10 DIAGNOSIS — R60.0 EDEMA OF BOTH LEGS: ICD-10-CM

## 2020-11-10 DIAGNOSIS — E78.5 DYSLIPIDEMIA: ICD-10-CM

## 2020-11-10 DIAGNOSIS — I10 ESSENTIAL HYPERTENSION: ICD-10-CM

## 2020-11-10 DIAGNOSIS — I35.0 MODERATE AORTIC STENOSIS: ICD-10-CM

## 2020-11-10 LAB
ALBUMIN SERPL BCP-MCNC: 4.2 G/DL (ref 3.5–5)
ALP SERPL-CCNC: 101 U/L (ref 46–116)
ALT SERPL W P-5'-P-CCNC: 19 U/L (ref 12–78)
ANION GAP SERPL CALCULATED.3IONS-SCNC: 2 MMOL/L (ref 4–13)
AST SERPL W P-5'-P-CCNC: 19 U/L (ref 5–45)
BILIRUB SERPL-MCNC: 0.85 MG/DL (ref 0.2–1)
BUN SERPL-MCNC: 31 MG/DL (ref 5–25)
CALCIUM SERPL-MCNC: 9.7 MG/DL (ref 8.3–10.1)
CHLORIDE SERPL-SCNC: 108 MMOL/L (ref 100–108)
CHOLEST SERPL-MCNC: 186 MG/DL (ref 50–200)
CO2 SERPL-SCNC: 31 MMOL/L (ref 21–32)
CREAT SERPL-MCNC: 0.86 MG/DL (ref 0.6–1.3)
GFR SERPL CREATININE-BSD FRML MDRD: 62 ML/MIN/1.73SQ M
GLUCOSE P FAST SERPL-MCNC: 93 MG/DL (ref 65–99)
HDLC SERPL-MCNC: 57 MG/DL
LDLC SERPL CALC-MCNC: 114 MG/DL (ref 0–100)
NONHDLC SERPL-MCNC: 129 MG/DL
POTASSIUM SERPL-SCNC: 4.8 MMOL/L (ref 3.5–5.3)
PROT SERPL-MCNC: 7.3 G/DL (ref 6.4–8.2)
SODIUM SERPL-SCNC: 141 MMOL/L (ref 136–145)
TRIGL SERPL-MCNC: 74 MG/DL

## 2020-11-10 PROCEDURE — 80053 COMPREHEN METABOLIC PANEL: CPT

## 2020-11-10 PROCEDURE — 36415 COLL VENOUS BLD VENIPUNCTURE: CPT

## 2020-11-10 PROCEDURE — 80061 LIPID PANEL: CPT

## 2020-11-12 ENCOUNTER — TELEPHONE (OUTPATIENT)
Dept: CARDIOLOGY CLINIC | Facility: CLINIC | Age: 85
End: 2020-11-12

## 2020-11-12 ENCOUNTER — OFFICE VISIT (OUTPATIENT)
Dept: CARDIOLOGY CLINIC | Facility: CLINIC | Age: 85
End: 2020-11-12
Payer: MEDICARE

## 2020-11-12 VITALS
TEMPERATURE: 97.4 F | WEIGHT: 103.9 LBS | OXYGEN SATURATION: 98 % | BODY MASS INDEX: 20.95 KG/M2 | HEIGHT: 59 IN | HEART RATE: 64 BPM | SYSTOLIC BLOOD PRESSURE: 194 MMHG | DIASTOLIC BLOOD PRESSURE: 82 MMHG

## 2020-11-12 DIAGNOSIS — I35.0 AORTIC STENOSIS, MODERATE: ICD-10-CM

## 2020-11-12 DIAGNOSIS — M17.11 PRIMARY OSTEOARTHRITIS OF RIGHT KNEE: ICD-10-CM

## 2020-11-12 DIAGNOSIS — I07.1 MILD TRICUSPID INSUFFICIENCY: ICD-10-CM

## 2020-11-12 DIAGNOSIS — M25.561 CHRONIC PAIN OF RIGHT KNEE: ICD-10-CM

## 2020-11-12 DIAGNOSIS — I89.0 LYMPHEDEMA: ICD-10-CM

## 2020-11-12 DIAGNOSIS — I10 ESSENTIAL HYPERTENSION: ICD-10-CM

## 2020-11-12 DIAGNOSIS — Z11.59 SCREENING FOR VIRAL DISEASE: ICD-10-CM

## 2020-11-12 DIAGNOSIS — I50.32 CHRONIC DIASTOLIC CONGESTIVE HEART FAILURE (HCC): ICD-10-CM

## 2020-11-12 DIAGNOSIS — G89.29 CHRONIC PAIN OF RIGHT KNEE: ICD-10-CM

## 2020-11-12 DIAGNOSIS — E78.5 HYPERLIPIDEMIA, UNSPECIFIED HYPERLIPIDEMIA TYPE: ICD-10-CM

## 2020-11-12 DIAGNOSIS — Z01.812 PRE-OPERATIVE LABORATORY EXAMINATION: ICD-10-CM

## 2020-11-12 PROCEDURE — 99214 OFFICE O/P EST MOD 30 MIN: CPT | Performed by: INTERNAL MEDICINE

## 2020-11-16 ENCOUNTER — OFFICE VISIT (OUTPATIENT)
Dept: INTERNAL MEDICINE CLINIC | Facility: CLINIC | Age: 85
End: 2020-11-16
Payer: MEDICARE

## 2020-11-16 VITALS
BODY MASS INDEX: 21.97 KG/M2 | SYSTOLIC BLOOD PRESSURE: 150 MMHG | OXYGEN SATURATION: 97 % | RESPIRATION RATE: 18 BRPM | HEIGHT: 59 IN | WEIGHT: 109 LBS | DIASTOLIC BLOOD PRESSURE: 82 MMHG | TEMPERATURE: 98.4 F | HEART RATE: 72 BPM

## 2020-11-16 DIAGNOSIS — I35.0 AORTIC STENOSIS, MODERATE: Primary | ICD-10-CM

## 2020-11-16 DIAGNOSIS — I50.32 CHRONIC DIASTOLIC CONGESTIVE HEART FAILURE (HCC): ICD-10-CM

## 2020-11-16 DIAGNOSIS — I10 ESSENTIAL HYPERTENSION: ICD-10-CM

## 2020-11-16 DIAGNOSIS — R60.0 LOCALIZED EDEMA: ICD-10-CM

## 2020-11-16 PROCEDURE — 99214 OFFICE O/P EST MOD 30 MIN: CPT | Performed by: INTERNAL MEDICINE

## 2020-11-17 ENCOUNTER — TELEPHONE (OUTPATIENT)
Dept: OBGYN CLINIC | Facility: CLINIC | Age: 85
End: 2020-11-17

## 2020-11-17 DIAGNOSIS — M25.561 CHRONIC PAIN OF RIGHT KNEE: ICD-10-CM

## 2020-11-17 DIAGNOSIS — M17.11 PRIMARY OSTEOARTHRITIS OF RIGHT KNEE: ICD-10-CM

## 2020-11-17 DIAGNOSIS — G89.29 CHRONIC PAIN OF RIGHT KNEE: ICD-10-CM

## 2020-11-17 RX ORDER — FERROUS SULFATE TAB EC 324 MG (65 MG FE EQUIVALENT) 324 (65 FE) MG
324 TABLET DELAYED RESPONSE ORAL
Qty: 30 TABLET | Refills: 0 | Status: SHIPPED | OUTPATIENT
Start: 2020-11-17 | End: 2020-12-16 | Stop reason: HOSPADM

## 2020-11-17 RX ORDER — FOLIC ACID 1 MG/1
1 TABLET ORAL DAILY
Qty: 30 TABLET | Refills: 0 | Status: SHIPPED | OUTPATIENT
Start: 2020-11-17 | End: 2020-12-16 | Stop reason: HOSPADM

## 2020-11-17 RX ORDER — ZINC SULFATE 50(220)MG
220 CAPSULE ORAL DAILY
Qty: 30 CAPSULE | Refills: 0 | Status: SHIPPED | OUTPATIENT
Start: 2020-11-17 | End: 2020-12-16 | Stop reason: HOSPADM

## 2020-11-17 RX ORDER — MELATONIN
1000 DAILY
Qty: 30 TABLET | Refills: 0 | Status: SHIPPED | OUTPATIENT
Start: 2020-11-17 | End: 2020-12-16 | Stop reason: HOSPADM

## 2020-11-20 ENCOUNTER — TELEPHONE (OUTPATIENT)
Dept: OBGYN CLINIC | Facility: CLINIC | Age: 85
End: 2020-11-20

## 2020-11-24 ENCOUNTER — TELEPHONE (OUTPATIENT)
Dept: OBGYN CLINIC | Facility: HOSPITAL | Age: 85
End: 2020-11-24

## 2020-12-07 ENCOUNTER — EVALUATION (OUTPATIENT)
Dept: PHYSICAL THERAPY | Facility: CLINIC | Age: 85
End: 2020-12-07
Payer: MEDICARE

## 2020-12-07 DIAGNOSIS — G89.29 CHRONIC PAIN OF RIGHT KNEE: ICD-10-CM

## 2020-12-07 DIAGNOSIS — Z01.812 PRE-OPERATIVE LABORATORY EXAMINATION: ICD-10-CM

## 2020-12-07 DIAGNOSIS — M17.11 PRIMARY OSTEOARTHRITIS OF RIGHT KNEE: Primary | ICD-10-CM

## 2020-12-07 DIAGNOSIS — M25.561 CHRONIC PAIN OF RIGHT KNEE: ICD-10-CM

## 2020-12-07 PROCEDURE — 97161 PT EVAL LOW COMPLEX 20 MIN: CPT | Performed by: PHYSICAL THERAPIST

## 2020-12-09 DIAGNOSIS — M25.561 CHRONIC PAIN OF RIGHT KNEE: ICD-10-CM

## 2020-12-09 DIAGNOSIS — Z11.59 SCREENING FOR VIRAL DISEASE: ICD-10-CM

## 2020-12-09 DIAGNOSIS — G89.29 CHRONIC PAIN OF RIGHT KNEE: ICD-10-CM

## 2020-12-09 DIAGNOSIS — M17.11 PRIMARY OSTEOARTHRITIS OF RIGHT KNEE: ICD-10-CM

## 2020-12-09 DIAGNOSIS — Z01.812 PRE-OPERATIVE LABORATORY EXAMINATION: ICD-10-CM

## 2020-12-09 PROCEDURE — U0003 INFECTIOUS AGENT DETECTION BY NUCLEIC ACID (DNA OR RNA); SEVERE ACUTE RESPIRATORY SYNDROME CORONAVIRUS 2 (SARS-COV-2) (CORONAVIRUS DISEASE [COVID-19]), AMPLIFIED PROBE TECHNIQUE, MAKING USE OF HIGH THROUGHPUT TECHNOLOGIES AS DESCRIBED BY CMS-2020-01-R: HCPCS | Performed by: PHYSICIAN ASSISTANT

## 2020-12-11 ENCOUNTER — TELEPHONE (OUTPATIENT)
Dept: OBGYN CLINIC | Facility: CLINIC | Age: 85
End: 2020-12-11

## 2020-12-11 LAB — SARS-COV-2 RNA SPEC QL NAA+PROBE: NOT DETECTED

## 2020-12-14 ENCOUNTER — ANESTHESIA EVENT (OUTPATIENT)
Dept: PERIOP | Facility: HOSPITAL | Age: 85
End: 2020-12-14
Payer: MEDICARE

## 2020-12-15 ENCOUNTER — ANESTHESIA (OUTPATIENT)
Dept: PERIOP | Facility: HOSPITAL | Age: 85
End: 2020-12-15
Payer: MEDICARE

## 2020-12-15 ENCOUNTER — APPOINTMENT (OUTPATIENT)
Dept: RADIOLOGY | Facility: HOSPITAL | Age: 85
End: 2020-12-15
Payer: MEDICARE

## 2020-12-15 ENCOUNTER — HOSPITAL ENCOUNTER (OUTPATIENT)
Facility: HOSPITAL | Age: 85
Setting detail: OUTPATIENT SURGERY
Discharge: HOME | End: 2020-12-16
Attending: ORTHOPAEDIC SURGERY | Admitting: ORTHOPAEDIC SURGERY
Payer: MEDICARE

## 2020-12-15 VITALS — HEART RATE: 59 BPM

## 2020-12-15 DIAGNOSIS — E87.6 HYPOKALEMIA: ICD-10-CM

## 2020-12-15 DIAGNOSIS — I50.32 CHRONIC DIASTOLIC CONGESTIVE HEART FAILURE (HCC): ICD-10-CM

## 2020-12-15 DIAGNOSIS — I35.0 AORTIC STENOSIS, MODERATE: ICD-10-CM

## 2020-12-15 DIAGNOSIS — I10 ESSENTIAL HYPERTENSION: ICD-10-CM

## 2020-12-15 DIAGNOSIS — I89.0 LYMPHEDEMA: ICD-10-CM

## 2020-12-15 DIAGNOSIS — Z96.651 STATUS POST TOTAL RIGHT KNEE REPLACEMENT USING CEMENT: Primary | ICD-10-CM

## 2020-12-15 DIAGNOSIS — M17.11 PRIMARY OSTEOARTHRITIS OF RIGHT KNEE: ICD-10-CM

## 2020-12-15 DIAGNOSIS — I34.0 NON-RHEUMATIC MITRAL REGURGITATION: ICD-10-CM

## 2020-12-15 DIAGNOSIS — Z96.651 STATUS POST TOTAL KNEE REPLACEMENT USING CEMENT, RIGHT: ICD-10-CM

## 2020-12-15 LAB
ABO GROUP BLD: NORMAL
BLD GP AB SCN SERPL QL: NEGATIVE
RH BLD: NEGATIVE
SPECIMEN EXPIRATION DATE: NORMAL

## 2020-12-15 PROCEDURE — C1776 JOINT DEVICE (IMPLANTABLE): HCPCS | Performed by: ORTHOPAEDIC SURGERY

## 2020-12-15 PROCEDURE — 97530 THERAPEUTIC ACTIVITIES: CPT

## 2020-12-15 PROCEDURE — 73560 X-RAY EXAM OF KNEE 1 OR 2: CPT

## 2020-12-15 PROCEDURE — 97163 PT EVAL HIGH COMPLEX 45 MIN: CPT

## 2020-12-15 PROCEDURE — 86900 BLOOD TYPING SEROLOGIC ABO: CPT | Performed by: ORTHOPAEDIC SURGERY

## 2020-12-15 PROCEDURE — 86850 RBC ANTIBODY SCREEN: CPT | Performed by: ORTHOPAEDIC SURGERY

## 2020-12-15 PROCEDURE — 99213 OFFICE O/P EST LOW 20 MIN: CPT | Performed by: INTERNAL MEDICINE

## 2020-12-15 PROCEDURE — 27447 TOTAL KNEE ARTHROPLASTY: CPT | Performed by: ORTHOPAEDIC SURGERY

## 2020-12-15 PROCEDURE — 99024 POSTOP FOLLOW-UP VISIT: CPT | Performed by: ORTHOPAEDIC SURGERY

## 2020-12-15 PROCEDURE — C1713 ANCHOR/SCREW BN/BN,TIS/BN: HCPCS | Performed by: ORTHOPAEDIC SURGERY

## 2020-12-15 PROCEDURE — C9290 INJ, BUPIVACAINE LIPOSOME: HCPCS | Performed by: ANESTHESIOLOGY

## 2020-12-15 PROCEDURE — 86901 BLOOD TYPING SEROLOGIC RH(D): CPT | Performed by: ORTHOPAEDIC SURGERY

## 2020-12-15 PROCEDURE — 27447 TOTAL KNEE ARTHROPLASTY: CPT | Performed by: PHYSICIAN ASSISTANT

## 2020-12-15 PROCEDURE — G9197 ORDER FOR CEPH: HCPCS | Performed by: ORTHOPAEDIC SURGERY

## 2020-12-15 DEVICE — ATTUNE KNEE SYSTEM TIBIAL BASE FIXED BEARING SIZE 3 CEMENTED
Type: IMPLANTABLE DEVICE | Site: KNEE | Status: FUNCTIONAL
Brand: ATTUNE

## 2020-12-15 DEVICE — ATTUNE KNEE SYSTEM TIBIAL INSERT FIXED BEARING CRUCIATE RETAINING 4 6MM AOX
Type: IMPLANTABLE DEVICE | Site: KNEE | Status: FUNCTIONAL
Brand: ATTUNE

## 2020-12-15 DEVICE — PALACOS® R IS A FAST-CURING, RADIOPAQUE, POLY(METHYL METHACRYLATE)-BASED BONE CEMENT.PALACOS ® R CONTAINS THE X-RAY CONTRAST MEDIUM ZIRCONIUM DIOXIDE. TO IMPROVE VISIBILITY IN THE SURGICAL FIELD PALACOS ® R HAS BEEN COLOURED WITH CHLOROPHYLL (E141). THE BONE CEMENT IS PREPARED DIRECTLY BEFORE USE BY MIXING A POLYMER POWDER COMPONENT WITH A LIQUID MONOMER COMPONENT. A DUCTILE DOUGH FORMS WHICH CURES WITHIN A FEW MINUTES.
Type: IMPLANTABLE DEVICE | Site: KNEE | Status: FUNCTIONAL
Brand: PALACOS®

## 2020-12-15 DEVICE — ATTUNE PATELLA MEDIALIZED DOME 32MM CEMENTED AOX
Type: IMPLANTABLE DEVICE | Site: KNEE | Status: FUNCTIONAL
Brand: ATTUNE

## 2020-12-15 DEVICE — ATTUNE KNEE SYSTEM FEMORAL CRUCIATE RETAINING NARROW SIZE 4N RIGHT CEMENTED
Type: IMPLANTABLE DEVICE | Site: KNEE | Status: FUNCTIONAL
Brand: ATTUNE

## 2020-12-15 RX ORDER — FENTANYL CITRATE 50 UG/ML
INJECTION, SOLUTION INTRAMUSCULAR; INTRAVENOUS AS NEEDED
Status: DISCONTINUED | OUTPATIENT
Start: 2020-12-15 | End: 2020-12-15

## 2020-12-15 RX ORDER — DEXAMETHASONE SODIUM PHOSPHATE 4 MG/ML
10 INJECTION, SOLUTION INTRA-ARTICULAR; INTRALESIONAL; INTRAMUSCULAR; INTRAVENOUS; SOFT TISSUE ONCE
Status: COMPLETED | OUTPATIENT
Start: 2020-12-16 | End: 2020-12-16

## 2020-12-15 RX ORDER — SODIUM CHLORIDE 9 MG/ML
75 INJECTION, SOLUTION INTRAVENOUS CONTINUOUS
Status: DISCONTINUED | OUTPATIENT
Start: 2020-12-15 | End: 2020-12-16 | Stop reason: ALTCHOICE

## 2020-12-15 RX ORDER — OXYCODONE HYDROCHLORIDE 5 MG/1
2.5 TABLET ORAL EVERY 4 HOURS PRN
Status: DISCONTINUED | OUTPATIENT
Start: 2020-12-15 | End: 2020-12-16 | Stop reason: HOSPADM

## 2020-12-15 RX ORDER — OXYCODONE HYDROCHLORIDE 5 MG/1
5 TABLET ORAL EVERY 4 HOURS PRN
Status: DISCONTINUED | OUTPATIENT
Start: 2020-12-15 | End: 2020-12-16 | Stop reason: HOSPADM

## 2020-12-15 RX ORDER — ACETAMINOPHEN 325 MG/1
975 TABLET ORAL ONCE
Status: COMPLETED | OUTPATIENT
Start: 2020-12-15 | End: 2020-12-15

## 2020-12-15 RX ORDER — POTASSIUM CHLORIDE 20 MEQ/1
20 TABLET, EXTENDED RELEASE ORAL DAILY
Status: DISCONTINUED | OUTPATIENT
Start: 2020-12-15 | End: 2020-12-15

## 2020-12-15 RX ORDER — GABAPENTIN 300 MG/1
300 CAPSULE ORAL ONCE
Status: COMPLETED | OUTPATIENT
Start: 2020-12-15 | End: 2020-12-15

## 2020-12-15 RX ORDER — DOCUSATE SODIUM 100 MG/1
100 CAPSULE, LIQUID FILLED ORAL 2 TIMES DAILY
Status: DISCONTINUED | OUTPATIENT
Start: 2020-12-15 | End: 2020-12-16 | Stop reason: HOSPADM

## 2020-12-15 RX ORDER — FUROSEMIDE 40 MG/1
40 TABLET ORAL DAILY PRN
Status: DISCONTINUED | OUTPATIENT
Start: 2020-12-15 | End: 2020-12-16 | Stop reason: HOSPADM

## 2020-12-15 RX ORDER — SCOLOPAMINE TRANSDERMAL SYSTEM 1 MG/1
1 PATCH, EXTENDED RELEASE TRANSDERMAL ONCE
Status: DISCONTINUED | OUTPATIENT
Start: 2020-12-15 | End: 2020-12-16 | Stop reason: HOSPADM

## 2020-12-15 RX ORDER — BUPIVACAINE HYDROCHLORIDE 5 MG/ML
INJECTION, SOLUTION PERINEURAL
Status: DISCONTINUED | OUTPATIENT
Start: 2020-12-15 | End: 2020-12-15

## 2020-12-15 RX ORDER — MIDAZOLAM HYDROCHLORIDE 2 MG/2ML
INJECTION, SOLUTION INTRAMUSCULAR; INTRAVENOUS AS NEEDED
Status: DISCONTINUED | OUTPATIENT
Start: 2020-12-15 | End: 2020-12-15

## 2020-12-15 RX ORDER — MAGNESIUM HYDROXIDE/ALUMINUM HYDROXICE/SIMETHICONE 120; 1200; 1200 MG/30ML; MG/30ML; MG/30ML
30 SUSPENSION ORAL EVERY 6 HOURS PRN
Status: DISCONTINUED | OUTPATIENT
Start: 2020-12-15 | End: 2020-12-16 | Stop reason: HOSPADM

## 2020-12-15 RX ORDER — MAGNESIUM HYDROXIDE 1200 MG/15ML
LIQUID ORAL AS NEEDED
Status: DISCONTINUED | OUTPATIENT
Start: 2020-12-15 | End: 2020-12-15 | Stop reason: HOSPADM

## 2020-12-15 RX ORDER — SODIUM CHLORIDE, SODIUM LACTATE, POTASSIUM CHLORIDE, CALCIUM CHLORIDE 600; 310; 30; 20 MG/100ML; MG/100ML; MG/100ML; MG/100ML
INJECTION, SOLUTION INTRAVENOUS CONTINUOUS PRN
Status: DISCONTINUED | OUTPATIENT
Start: 2020-12-15 | End: 2020-12-15

## 2020-12-15 RX ORDER — HYDROMORPHONE HCL/PF 1 MG/ML
0.5 SYRINGE (ML) INJECTION EVERY 2 HOUR PRN
Status: DISCONTINUED | OUTPATIENT
Start: 2020-12-15 | End: 2020-12-16 | Stop reason: HOSPADM

## 2020-12-15 RX ORDER — ONDANSETRON 2 MG/ML
4 INJECTION INTRAMUSCULAR; INTRAVENOUS EVERY 6 HOURS PRN
Status: DISCONTINUED | OUTPATIENT
Start: 2020-12-15 | End: 2020-12-16 | Stop reason: HOSPADM

## 2020-12-15 RX ORDER — ACETAMINOPHEN 325 MG/1
975 TABLET ORAL EVERY 8 HOURS
Status: DISCONTINUED | OUTPATIENT
Start: 2020-12-15 | End: 2020-12-16 | Stop reason: HOSPADM

## 2020-12-15 RX ORDER — CEFAZOLIN SODIUM 2 G/50ML
2000 SOLUTION INTRAVENOUS ONCE
Status: DISCONTINUED | OUTPATIENT
Start: 2020-12-15 | End: 2020-12-15 | Stop reason: HOSPADM

## 2020-12-15 RX ORDER — CEFAZOLIN SODIUM 2 G/50ML
SOLUTION INTRAVENOUS AS NEEDED
Status: DISCONTINUED | OUTPATIENT
Start: 2020-12-15 | End: 2020-12-15

## 2020-12-15 RX ORDER — SODIUM CHLORIDE, SODIUM LACTATE, POTASSIUM CHLORIDE, CALCIUM CHLORIDE 600; 310; 30; 20 MG/100ML; MG/100ML; MG/100ML; MG/100ML
125 INJECTION, SOLUTION INTRAVENOUS CONTINUOUS
Status: DISCONTINUED | OUTPATIENT
Start: 2020-12-15 | End: 2020-12-15

## 2020-12-15 RX ORDER — CHLORHEXIDINE GLUCONATE 0.12 MG/ML
15 RINSE ORAL ONCE
Status: COMPLETED | OUTPATIENT
Start: 2020-12-15 | End: 2020-12-15

## 2020-12-15 RX ORDER — METOPROLOL SUCCINATE 50 MG/1
50 TABLET, EXTENDED RELEASE ORAL DAILY
Status: DISCONTINUED | OUTPATIENT
Start: 2020-12-16 | End: 2020-12-16 | Stop reason: HOSPADM

## 2020-12-15 RX ORDER — PANTOPRAZOLE SODIUM 40 MG/1
40 TABLET, DELAYED RELEASE ORAL DAILY
Status: DISCONTINUED | OUTPATIENT
Start: 2020-12-15 | End: 2020-12-16 | Stop reason: HOSPADM

## 2020-12-15 RX ORDER — PROPOFOL 10 MG/ML
INJECTION, EMULSION INTRAVENOUS AS NEEDED
Status: DISCONTINUED | OUTPATIENT
Start: 2020-12-15 | End: 2020-12-15

## 2020-12-15 RX ORDER — CEFAZOLIN SODIUM 1 G/50ML
1000 SOLUTION INTRAVENOUS EVERY 8 HOURS
Status: COMPLETED | OUTPATIENT
Start: 2020-12-15 | End: 2020-12-16

## 2020-12-15 RX ORDER — PROPOFOL 10 MG/ML
INJECTION, EMULSION INTRAVENOUS CONTINUOUS PRN
Status: DISCONTINUED | OUTPATIENT
Start: 2020-12-15 | End: 2020-12-15

## 2020-12-15 RX ORDER — OXYCODONE HCL 10 MG/1
10 TABLET, FILM COATED, EXTENDED RELEASE ORAL ONCE
Status: COMPLETED | OUTPATIENT
Start: 2020-12-15 | End: 2020-12-15

## 2020-12-15 RX ORDER — BUPIVACAINE HYDROCHLORIDE 2.5 MG/ML
INJECTION, SOLUTION EPIDURAL; INFILTRATION; INTRACAUDAL AS NEEDED
Status: DISCONTINUED | OUTPATIENT
Start: 2020-12-15 | End: 2020-12-15 | Stop reason: HOSPADM

## 2020-12-15 RX ORDER — GABAPENTIN 100 MG/1
200 CAPSULE ORAL 2 TIMES DAILY
Status: DISCONTINUED | OUTPATIENT
Start: 2020-12-15 | End: 2020-12-16 | Stop reason: HOSPADM

## 2020-12-15 RX ORDER — EPHEDRINE SULFATE 50 MG/ML
INJECTION INTRAVENOUS AS NEEDED
Status: DISCONTINUED | OUTPATIENT
Start: 2020-12-15 | End: 2020-12-15

## 2020-12-15 RX ADMIN — GABAPENTIN 200 MG: 100 CAPSULE ORAL at 21:01

## 2020-12-15 RX ADMIN — SODIUM CHLORIDE 75 ML/HR: 0.9 INJECTION, SOLUTION INTRAVENOUS at 15:17

## 2020-12-15 RX ADMIN — PROPOFOL 50 MCG/KG/MIN: 10 INJECTION, EMULSION INTRAVENOUS at 10:46

## 2020-12-15 RX ADMIN — TRANEXAMIC ACID 50 ML: 1 INJECTION, SOLUTION INTRAVENOUS at 11:03

## 2020-12-15 RX ADMIN — GABAPENTIN 300 MG: 300 CAPSULE ORAL at 08:23

## 2020-12-15 RX ADMIN — OXYCODONE HYDROCHLORIDE 10 MG: 10 TABLET, FILM COATED, EXTENDED RELEASE ORAL at 08:23

## 2020-12-15 RX ADMIN — PANTOPRAZOLE SODIUM 40 MG: 40 TABLET, DELAYED RELEASE ORAL at 15:18

## 2020-12-15 RX ADMIN — SODIUM CHLORIDE, SODIUM LACTATE, POTASSIUM CHLORIDE, AND CALCIUM CHLORIDE 125 ML/HR: .6; .31; .03; .02 INJECTION, SOLUTION INTRAVENOUS at 08:29

## 2020-12-15 RX ADMIN — POTASSIUM CHLORIDE 20 MEQ: 1500 TABLET, EXTENDED RELEASE ORAL at 15:18

## 2020-12-15 RX ADMIN — PROPOFOL 30 MG: 10 INJECTION, EMULSION INTRAVENOUS at 10:46

## 2020-12-15 RX ADMIN — SODIUM CHLORIDE, SODIUM LACTATE, POTASSIUM CHLORIDE, AND CALCIUM CHLORIDE: .6; .31; .03; .02 INJECTION, SOLUTION INTRAVENOUS at 10:29

## 2020-12-15 RX ADMIN — FENTANYL CITRATE 50 MCG: 50 INJECTION, SOLUTION INTRAMUSCULAR; INTRAVENOUS at 10:29

## 2020-12-15 RX ADMIN — CHLORHEXIDINE GLUCONATE 0.12% ORAL RINSE 15 ML: 1.2 LIQUID ORAL at 08:22

## 2020-12-15 RX ADMIN — ACETAMINOPHEN 975 MG: 325 TABLET, FILM COATED ORAL at 22:33

## 2020-12-15 RX ADMIN — SCOPALAMINE 1 PATCH: 1 PATCH, EXTENDED RELEASE TRANSDERMAL at 08:23

## 2020-12-15 RX ADMIN — DOCUSATE SODIUM 100 MG: 100 CAPSULE, LIQUID FILLED ORAL at 19:03

## 2020-12-15 RX ADMIN — BUPIVACAINE HYDROCHLORIDE 10 ML: 5 INJECTION, SOLUTION PERINEURAL at 12:30

## 2020-12-15 RX ADMIN — CEFAZOLIN SODIUM 1000 MG: 1 SOLUTION INTRAVENOUS at 19:03

## 2020-12-15 RX ADMIN — ACETAMINOPHEN 975 MG: 325 TABLET, FILM COATED ORAL at 08:22

## 2020-12-15 RX ADMIN — CEFAZOLIN SODIUM 2000 MG: 2 SOLUTION INTRAVENOUS at 10:30

## 2020-12-15 RX ADMIN — EPHEDRINE SULFATE 10 MG: 50 INJECTION, SOLUTION INTRAVENOUS at 11:05

## 2020-12-15 RX ADMIN — ACETAMINOPHEN 975 MG: 325 TABLET, FILM COATED ORAL at 15:18

## 2020-12-15 RX ADMIN — MIDAZOLAM 1 MG: 1 INJECTION INTRAMUSCULAR; INTRAVENOUS at 10:29

## 2020-12-15 RX ADMIN — EPHEDRINE SULFATE 10 MG: 50 INJECTION, SOLUTION INTRAVENOUS at 10:52

## 2020-12-16 VITALS
OXYGEN SATURATION: 98 % | WEIGHT: 116.25 LBS | HEART RATE: 63 BPM | DIASTOLIC BLOOD PRESSURE: 60 MMHG | HEIGHT: 59 IN | BODY MASS INDEX: 23.44 KG/M2 | TEMPERATURE: 98.3 F | RESPIRATION RATE: 16 BRPM | SYSTOLIC BLOOD PRESSURE: 157 MMHG

## 2020-12-16 LAB
ANION GAP SERPL CALCULATED.3IONS-SCNC: 5 MMOL/L (ref 4–13)
BASOPHILS # BLD AUTO: 0.03 THOUSANDS/ΜL (ref 0–0.1)
BASOPHILS NFR BLD AUTO: 0 % (ref 0–1)
BUN SERPL-MCNC: 19 MG/DL (ref 5–25)
CALCIUM SERPL-MCNC: 8.4 MG/DL (ref 8.3–10.1)
CHLORIDE SERPL-SCNC: 106 MMOL/L (ref 100–108)
CO2 SERPL-SCNC: 30 MMOL/L (ref 21–32)
CREAT SERPL-MCNC: 0.83 MG/DL (ref 0.6–1.3)
EOSINOPHIL # BLD AUTO: 0.06 THOUSAND/ΜL (ref 0–0.61)
EOSINOPHIL NFR BLD AUTO: 1 % (ref 0–6)
ERYTHROCYTE [DISTWIDTH] IN BLOOD BY AUTOMATED COUNT: 13.1 % (ref 11.6–15.1)
GFR SERPL CREATININE-BSD FRML MDRD: 65 ML/MIN/1.73SQ M
GLUCOSE P FAST SERPL-MCNC: 130 MG/DL (ref 65–99)
GLUCOSE SERPL-MCNC: 130 MG/DL (ref 65–140)
HCT VFR BLD AUTO: 37.8 % (ref 34.8–46.1)
HGB BLD-MCNC: 11.3 G/DL (ref 11.5–15.4)
IMM GRANULOCYTES # BLD AUTO: 0.03 THOUSAND/UL (ref 0–0.2)
IMM GRANULOCYTES NFR BLD AUTO: 0 % (ref 0–2)
LYMPHOCYTES # BLD AUTO: 0.93 THOUSANDS/ΜL (ref 0.6–4.47)
LYMPHOCYTES NFR BLD AUTO: 10 % (ref 14–44)
MCH RBC QN AUTO: 28.4 PG (ref 26.8–34.3)
MCHC RBC AUTO-ENTMCNC: 29.9 G/DL (ref 31.4–37.4)
MCV RBC AUTO: 95 FL (ref 82–98)
MONOCYTES # BLD AUTO: 0.85 THOUSAND/ΜL (ref 0.17–1.22)
MONOCYTES NFR BLD AUTO: 9 % (ref 4–12)
NEUTROPHILS # BLD AUTO: 7.37 THOUSANDS/ΜL (ref 1.85–7.62)
NEUTS SEG NFR BLD AUTO: 80 % (ref 43–75)
NRBC BLD AUTO-RTO: 0 /100 WBCS
PLATELET # BLD AUTO: 171 THOUSANDS/UL (ref 149–390)
PMV BLD AUTO: 11.6 FL (ref 8.9–12.7)
POTASSIUM SERPL-SCNC: 4.9 MMOL/L (ref 3.5–5.3)
RBC # BLD AUTO: 3.98 MILLION/UL (ref 3.81–5.12)
SODIUM SERPL-SCNC: 141 MMOL/L (ref 136–145)
WBC # BLD AUTO: 9.27 THOUSAND/UL (ref 4.31–10.16)

## 2020-12-16 PROCEDURE — 97167 OT EVAL HIGH COMPLEX 60 MIN: CPT

## 2020-12-16 PROCEDURE — 99225 PR SBSQ OBSERVATION CARE/DAY 25 MINUTES: CPT | Performed by: NURSE PRACTITIONER

## 2020-12-16 PROCEDURE — 85025 COMPLETE CBC W/AUTO DIFF WBC: CPT | Performed by: PHYSICIAN ASSISTANT

## 2020-12-16 PROCEDURE — 97530 THERAPEUTIC ACTIVITIES: CPT

## 2020-12-16 PROCEDURE — 99024 POSTOP FOLLOW-UP VISIT: CPT | Performed by: ORTHOPAEDIC SURGERY

## 2020-12-16 PROCEDURE — 97116 GAIT TRAINING THERAPY: CPT

## 2020-12-16 PROCEDURE — 80048 BASIC METABOLIC PNL TOTAL CA: CPT | Performed by: PHYSICIAN ASSISTANT

## 2020-12-16 PROCEDURE — 97535 SELF CARE MNGMENT TRAINING: CPT

## 2020-12-16 PROCEDURE — 97110 THERAPEUTIC EXERCISES: CPT

## 2020-12-16 RX ORDER — ACETAMINOPHEN 325 MG/1
975 TABLET ORAL EVERY 8 HOURS
Refills: 0
Start: 2020-12-16

## 2020-12-16 RX ORDER — DOCUSATE SODIUM 100 MG/1
100 CAPSULE, LIQUID FILLED ORAL 2 TIMES DAILY
Qty: 60 CAPSULE | Refills: 0 | Status: SHIPPED | OUTPATIENT
Start: 2020-12-16 | End: 2021-01-25

## 2020-12-16 RX ORDER — ASPIRIN 325 MG
325 TABLET, DELAYED RELEASE (ENTERIC COATED) ORAL 2 TIMES DAILY
Qty: 84 TABLET | Refills: 0 | Status: SHIPPED | OUTPATIENT
Start: 2020-12-16

## 2020-12-16 RX ORDER — OXYCODONE HYDROCHLORIDE 5 MG/1
5 TABLET ORAL EVERY 4 HOURS PRN
Qty: 30 TABLET | Refills: 0 | Status: SHIPPED | OUTPATIENT
Start: 2020-12-16 | End: 2020-12-26

## 2020-12-16 RX ADMIN — DOCUSATE SODIUM 100 MG: 100 CAPSULE, LIQUID FILLED ORAL at 09:07

## 2020-12-16 RX ADMIN — DEXAMETHASONE SODIUM PHOSPHATE 10 MG: 4 INJECTION, SOLUTION INTRAMUSCULAR; INTRAVENOUS at 13:10

## 2020-12-16 RX ADMIN — METOPROLOL SUCCINATE 50 MG: 50 TABLET, EXTENDED RELEASE ORAL at 09:07

## 2020-12-16 RX ADMIN — OXYCODONE HYDROCHLORIDE 5 MG: 5 TABLET ORAL at 06:15

## 2020-12-16 RX ADMIN — ACETAMINOPHEN 975 MG: 325 TABLET, FILM COATED ORAL at 09:07

## 2020-12-16 RX ADMIN — ENOXAPARIN SODIUM 40 MG: 40 INJECTION SUBCUTANEOUS at 09:07

## 2020-12-16 RX ADMIN — CEFAZOLIN SODIUM 1000 MG: 1 SOLUTION INTRAVENOUS at 02:38

## 2020-12-16 RX ADMIN — PANTOPRAZOLE SODIUM 40 MG: 40 TABLET, DELAYED RELEASE ORAL at 09:07

## 2020-12-16 RX ADMIN — OXYCODONE HYDROCHLORIDE 5 MG: 5 TABLET ORAL at 00:31

## 2020-12-16 RX ADMIN — GABAPENTIN 200 MG: 100 CAPSULE ORAL at 09:07

## 2020-12-17 ENCOUNTER — TELEPHONE (OUTPATIENT)
Dept: OBGYN CLINIC | Facility: HOSPITAL | Age: 85
End: 2020-12-17

## 2020-12-18 ENCOUNTER — OFFICE VISIT (OUTPATIENT)
Dept: PHYSICAL THERAPY | Facility: CLINIC | Age: 85
End: 2020-12-18
Payer: MEDICARE

## 2020-12-18 DIAGNOSIS — M17.11 PRIMARY OSTEOARTHRITIS OF RIGHT KNEE: Primary | ICD-10-CM

## 2020-12-18 DIAGNOSIS — M25.561 CHRONIC PAIN OF RIGHT KNEE: ICD-10-CM

## 2020-12-18 DIAGNOSIS — G89.29 CHRONIC PAIN OF RIGHT KNEE: ICD-10-CM

## 2020-12-18 DIAGNOSIS — Z01.812 PRE-OPERATIVE LABORATORY EXAMINATION: ICD-10-CM

## 2020-12-18 PROCEDURE — 97110 THERAPEUTIC EXERCISES: CPT | Performed by: PHYSICAL THERAPIST

## 2020-12-18 PROCEDURE — 97140 MANUAL THERAPY 1/> REGIONS: CPT | Performed by: PHYSICAL THERAPIST

## 2020-12-21 ENCOUNTER — OFFICE VISIT (OUTPATIENT)
Dept: PHYSICAL THERAPY | Facility: CLINIC | Age: 85
End: 2020-12-21
Payer: MEDICARE

## 2020-12-21 DIAGNOSIS — M25.561 CHRONIC PAIN OF RIGHT KNEE: ICD-10-CM

## 2020-12-21 DIAGNOSIS — G89.29 CHRONIC PAIN OF RIGHT KNEE: ICD-10-CM

## 2020-12-21 DIAGNOSIS — Z01.812 PRE-OPERATIVE LABORATORY EXAMINATION: ICD-10-CM

## 2020-12-21 DIAGNOSIS — M17.11 PRIMARY OSTEOARTHRITIS OF RIGHT KNEE: Primary | ICD-10-CM

## 2020-12-21 PROCEDURE — 97110 THERAPEUTIC EXERCISES: CPT | Performed by: PHYSICAL THERAPIST

## 2020-12-21 PROCEDURE — 97140 MANUAL THERAPY 1/> REGIONS: CPT | Performed by: PHYSICAL THERAPIST

## 2020-12-23 ENCOUNTER — OFFICE VISIT (OUTPATIENT)
Dept: PHYSICAL THERAPY | Facility: CLINIC | Age: 85
End: 2020-12-23
Payer: MEDICARE

## 2020-12-23 DIAGNOSIS — G89.29 CHRONIC PAIN OF RIGHT KNEE: ICD-10-CM

## 2020-12-23 DIAGNOSIS — M25.561 CHRONIC PAIN OF RIGHT KNEE: ICD-10-CM

## 2020-12-23 DIAGNOSIS — M17.11 PRIMARY OSTEOARTHRITIS OF RIGHT KNEE: Primary | ICD-10-CM

## 2020-12-23 DIAGNOSIS — Z01.812 PRE-OPERATIVE LABORATORY EXAMINATION: ICD-10-CM

## 2020-12-23 PROCEDURE — 97140 MANUAL THERAPY 1/> REGIONS: CPT | Performed by: PHYSICAL THERAPIST

## 2020-12-23 PROCEDURE — 97110 THERAPEUTIC EXERCISES: CPT | Performed by: PHYSICAL THERAPIST

## 2020-12-28 ENCOUNTER — APPOINTMENT (OUTPATIENT)
Dept: PHYSICAL THERAPY | Facility: CLINIC | Age: 85
End: 2020-12-28
Payer: MEDICARE

## 2020-12-29 ENCOUNTER — TELEPHONE (OUTPATIENT)
Dept: OBGYN CLINIC | Facility: HOSPITAL | Age: 85
End: 2020-12-29

## 2020-12-29 ENCOUNTER — OFFICE VISIT (OUTPATIENT)
Dept: OBGYN CLINIC | Facility: CLINIC | Age: 85
End: 2020-12-29

## 2020-12-29 ENCOUNTER — APPOINTMENT (OUTPATIENT)
Dept: RADIOLOGY | Facility: CLINIC | Age: 85
End: 2020-12-29
Payer: MEDICARE

## 2020-12-29 ENCOUNTER — APPOINTMENT (OUTPATIENT)
Dept: PHYSICAL THERAPY | Facility: CLINIC | Age: 85
End: 2020-12-29
Payer: MEDICARE

## 2020-12-29 DIAGNOSIS — Z96.651 AFTERCARE FOLLOWING RIGHT KNEE JOINT REPLACEMENT SURGERY: ICD-10-CM

## 2020-12-29 DIAGNOSIS — Z96.651 STATUS POST TOTAL RIGHT KNEE REPLACEMENT USING CEMENT: Primary | ICD-10-CM

## 2020-12-29 DIAGNOSIS — Z47.1 AFTERCARE FOLLOWING RIGHT KNEE JOINT REPLACEMENT SURGERY: ICD-10-CM

## 2020-12-29 DIAGNOSIS — Z96.651 STATUS POST TOTAL RIGHT KNEE REPLACEMENT USING CEMENT: ICD-10-CM

## 2020-12-29 PROCEDURE — 73562 X-RAY EXAM OF KNEE 3: CPT

## 2020-12-29 PROCEDURE — 99024 POSTOP FOLLOW-UP VISIT: CPT | Performed by: PHYSICIAN ASSISTANT

## 2020-12-29 RX ORDER — OXYCODONE HYDROCHLORIDE 5 MG/1
5 TABLET ORAL EVERY 4 HOURS PRN
Qty: 30 TABLET | Refills: 0 | Status: SHIPPED | OUTPATIENT
Start: 2020-12-29 | End: 2021-01-26 | Stop reason: SDUPTHER

## 2020-12-30 ENCOUNTER — OFFICE VISIT (OUTPATIENT)
Dept: PHYSICAL THERAPY | Facility: CLINIC | Age: 85
End: 2020-12-30
Payer: MEDICARE

## 2020-12-30 DIAGNOSIS — M17.11 PRIMARY OSTEOARTHRITIS OF RIGHT KNEE: Primary | ICD-10-CM

## 2020-12-30 DIAGNOSIS — G89.29 CHRONIC PAIN OF RIGHT KNEE: ICD-10-CM

## 2020-12-30 DIAGNOSIS — Z01.812 PRE-OPERATIVE LABORATORY EXAMINATION: ICD-10-CM

## 2020-12-30 DIAGNOSIS — M25.561 CHRONIC PAIN OF RIGHT KNEE: ICD-10-CM

## 2020-12-30 PROCEDURE — 97110 THERAPEUTIC EXERCISES: CPT | Performed by: PHYSICAL THERAPIST

## 2020-12-30 PROCEDURE — 97140 MANUAL THERAPY 1/> REGIONS: CPT | Performed by: PHYSICAL THERAPIST

## 2021-01-05 ENCOUNTER — OFFICE VISIT (OUTPATIENT)
Dept: PHYSICAL THERAPY | Facility: CLINIC | Age: 86
End: 2021-01-05
Payer: MEDICARE

## 2021-01-05 DIAGNOSIS — M17.11 PRIMARY OSTEOARTHRITIS OF RIGHT KNEE: Primary | ICD-10-CM

## 2021-01-05 DIAGNOSIS — G89.29 CHRONIC PAIN OF RIGHT KNEE: ICD-10-CM

## 2021-01-05 DIAGNOSIS — M25.561 CHRONIC PAIN OF RIGHT KNEE: ICD-10-CM

## 2021-01-05 PROCEDURE — 97110 THERAPEUTIC EXERCISES: CPT

## 2021-01-05 PROCEDURE — 97140 MANUAL THERAPY 1/> REGIONS: CPT

## 2021-01-05 NOTE — PROGRESS NOTES
Daily Note     Today's date: 2021  Patient name: Marilu Shultz  : 1935  MRN: [id number]  Referring provider: Barbie Silva  Dx:   Encounter Diagnosis     ICD-10-CM    1  Primary osteoarthritis of right knee  M17 11    2  Chronic pain of right knee  M25 561     G89 29                   Subjective:  She states 7-8/10 pain today right knee and distal LE      Objective: See treatment diary below      Assessment: Tolerated treatment well  Patient would benefit from continued PT  She reports that she feels a "burning" type pain going down her lateral distal LE some TTP also  Reminded her of signs and symptoms of DVT  Plan: Continue per plan of care  Progress treatment as tolerated         Precautions:  Hypertension, Lymphedema    Specialty Daily Treatment Diary     Manuals 20   Visit # 6 2 3 4 5   PF mobs 2 min 2' 2' 2' 2'   Stretch HS Quad 4 min  4' 4' 4'   Knee PROM 4 min 4' 4' 4' 4'           Warm-up        NuStep 10 min  5' 10' 10'   Neuro Re-Ed        Qset 30 30 30 30 30   Wt shift 20 20 20 20 20   Heel raises 20 20 20 20 20                   Ther Ex        Mini squat 10  10 10 10   Side step 10 ft X 4  5 x 10 ft 5 x 10 ft    Knee flex 20  20 20 20   Knee ext 20 20 30 20 20   SLR 5 AA  5x  AA 5x  AA 5x   Heel slides 30 30 30 30 30           Ther Activity                        Gait Training        W/ rw                Modalities        CP 10 min 10 min 5 min 10 min 10 min

## 2021-01-07 ENCOUNTER — OFFICE VISIT (OUTPATIENT)
Dept: PHYSICAL THERAPY | Facility: CLINIC | Age: 86
End: 2021-01-07
Payer: MEDICARE

## 2021-01-07 DIAGNOSIS — M25.561 CHRONIC PAIN OF RIGHT KNEE: ICD-10-CM

## 2021-01-07 DIAGNOSIS — G89.29 CHRONIC PAIN OF RIGHT KNEE: ICD-10-CM

## 2021-01-07 DIAGNOSIS — M17.11 PRIMARY OSTEOARTHRITIS OF RIGHT KNEE: Primary | ICD-10-CM

## 2021-01-07 PROCEDURE — 97110 THERAPEUTIC EXERCISES: CPT

## 2021-01-07 PROCEDURE — 97140 MANUAL THERAPY 1/> REGIONS: CPT

## 2021-01-12 ENCOUNTER — OFFICE VISIT (OUTPATIENT)
Dept: PHYSICAL THERAPY | Facility: CLINIC | Age: 86
End: 2021-01-12
Payer: MEDICARE

## 2021-01-12 DIAGNOSIS — M25.561 CHRONIC PAIN OF RIGHT KNEE: ICD-10-CM

## 2021-01-12 DIAGNOSIS — G89.29 CHRONIC PAIN OF RIGHT KNEE: ICD-10-CM

## 2021-01-12 DIAGNOSIS — M17.11 PRIMARY OSTEOARTHRITIS OF RIGHT KNEE: Primary | ICD-10-CM

## 2021-01-12 PROCEDURE — 97140 MANUAL THERAPY 1/> REGIONS: CPT

## 2021-01-12 PROCEDURE — 97110 THERAPEUTIC EXERCISES: CPT

## 2021-01-12 PROCEDURE — 97112 NEUROMUSCULAR REEDUCATION: CPT

## 2021-01-12 NOTE — PROGRESS NOTES
Daily Note     Today's date: 2021  Patient name: Chao Irvin  : 1935  MRN: [id number]  Referring provider: Kely Dennis  Dx:   Encounter Diagnosis     ICD-10-CM    1  Primary osteoarthritis of right knee  M17 11    2  Chronic pain of right knee  M25 561     G89 29                   Subjective: pt reports feeling "miserable" today  She has a lot of aches and feels like her whole body pain is more than usual but feels her knee is the least painful of everything  Reports she sees the surgeon tomorrow  Reports she has been getting intermittent pain around her R ankle and foot since her knee surgery         Objective: See treatment diary below      Assessment: Tolerated treatment well  Patient would benefit from continued PT knee valgus during mini squat at wall, pt cued to keep knees apart when squatting for proper form and better mechanics at the knee  Pt requires cuing to perform the correct number of repetitions  Pt fatigued during standing there ex after approx 10 min  Plan: Continue per plan of care        Precautions:  Hypertension, Lymphedema    Specialty Daily Treatment Diary     Manuals 20   Visit # 6 7 8 4 5   PF mobs 2 min  2' 2' 2'   Stretch HS Quad 4 min  4' 4' 4'   Knee PROM 4 min  4' 4' 4'           Warm-up        NuStep 10 min 10 min 10' 10' 10'   Neuro Re-Ed        Qset 30 30 30 30 30   Wt shift 20 20  20 20   Heel raises 20 20 20 20 20                   Ther Ex        Mini squat 10 12 10 10 10   Side step 10 ft X 4 10 ft x 4  5 x 10 ft    Knee flex 20 20 20 20 20   Knee ext 20 20 30 20 20   SLR 5 AA  5x  AA 5x  AA 5x   Heel slides 30 30 30 30 30   Standing hip Abd  20 20      Ther Activity                        Gait Training        W/ rw                Modalities        CP 10 min  5 min post session  10 min 10 min

## 2021-01-13 ENCOUNTER — OFFICE VISIT (OUTPATIENT)
Dept: OBGYN CLINIC | Facility: CLINIC | Age: 86
End: 2021-01-13

## 2021-01-13 VITALS
HEIGHT: 59 IN | DIASTOLIC BLOOD PRESSURE: 70 MMHG | HEART RATE: 60 BPM | WEIGHT: 111.4 LBS | BODY MASS INDEX: 22.46 KG/M2 | SYSTOLIC BLOOD PRESSURE: 180 MMHG

## 2021-01-13 DIAGNOSIS — Z96.651 AFTERCARE FOLLOWING RIGHT KNEE JOINT REPLACEMENT SURGERY: ICD-10-CM

## 2021-01-13 DIAGNOSIS — I89.0 LYMPHEDEMA: ICD-10-CM

## 2021-01-13 DIAGNOSIS — Z47.1 AFTERCARE FOLLOWING RIGHT KNEE JOINT REPLACEMENT SURGERY: ICD-10-CM

## 2021-01-13 DIAGNOSIS — Z96.651 STATUS POST TOTAL RIGHT KNEE REPLACEMENT USING CEMENT: Primary | ICD-10-CM

## 2021-01-13 PROCEDURE — 99024 POSTOP FOLLOW-UP VISIT: CPT | Performed by: ORTHOPAEDIC SURGERY

## 2021-01-13 NOTE — PROGRESS NOTES
Assessment/Plan:  1  Status post total right knee replacement using cement     2  Aftercare following right knee joint replacement surgery     3  Lymphedema       Ann Garcia is a very pleasant 80-year-old female presenting today for follow-up in an interval check 4 weeks after undergoing a right total knee arthroplasty  She does seem to be doing better than her 2 week postop appointment  She still does have discomfort, which we believe is muscular in nature  Her prosthesis is stable on exam and previous images  Her incision has healed nicely and there is no concern for infection or DVT  She will need to continue with aspirin twice a day for the next 2 weeks to complete her DVT prophylaxis  We encouraged her to continue elevating to limit the edema of her operative extremity  She may also continue with ice and pain medication as needed  We will plan to see her back in 3 weeks for re-evaluation  She will not need new x-rays  She and her son expressed understanding all of her questions were addressed today    Subjective: 4 weeks post-op    Patient ID: Priscilla Adkins is a 80 y o  female  Ann Garcia is a pleasant [de-identified] year old female presenting today for an interval check 4 weeks after undergoing a right total knee arthroplasty  She and her son report that her pain has been better  She has been able to participate with physical therapy  She has been taking the oxycodone as needed in aspirin for DVT prophylaxis  The swelling in her leg has started to go down  She does admit that she has not been keeping it elevated as often as she should  She still has significant discomfort in the medial aspect of her leg and lower leg  Review of Systems   Constitutional: Positive for activity change  HENT: Negative  Eyes: Negative  Respiratory: Negative  Cardiovascular: Positive for leg swelling  Gastrointestinal: Negative  Endocrine: Negative  Genitourinary: Negative      Musculoskeletal: Positive for arthralgias, gait problem, joint swelling and myalgias  Skin: Negative  Allergic/Immunologic: Negative  Hematological: Negative  Psychiatric/Behavioral: Negative  Past Medical History:   Diagnosis Date    Anesthesia complication       Yrs ago had "anxiety" reaction not sure while sedated, pt states sensitive to anesthesia effects    Anxiety     stress at home    Arthritis     Cataract, right     Last Assessed:16    Eye hemorrhage     left eye currently 16    History of shingles 2015    Hypertension     Mitral valve stenosis, mild        Past Surgical History:   Procedure Laterality Date    CARPAL TUNNEL RELEASE Left     CATARACT EXTRACTION      CATARACT EXTRACTION W/ INTRAOCULAR LENS IMPLANT Left 10/11/2016    Procedure: EXTRACTION EXTRACAPSULAR CATARACT PHACO INTRAOCULAR LENS (IOL); Surgeon: Rosanne Rocha MD;  Location: [facility] MAIN OR;  Service:     CERVICAL CONE BIOPSY      COLONOSCOPY      EYE SURGERY Left     muscle repair    FL OPEN RX FEMUR FX+INTRAMED NINI Right 2019    Procedure: INSERTION NAIL IM FEMUR ANTEGRADE (TROCHANTERIC); Surgeon: Charmayne Must, DO;  Location: [address] [place] [address];  Service: Orthopedics    FL TOTAL KNEE ARTHROPLASTY Right 12/15/2020    Procedure: ARTHROPLASTY KNEE TOTAL;  Surgeon: Charmayne Must, DO;  Location: [address] [place] [address];  Service: Orthopedics    FL XCAPSL CTRC RMVL INSJ IO LENS PROSTH W/O ECP Right 2016    Procedure: EXTRACTION EXTRACAPSULAR CATARACT PHACO INTRAOCULAR LENS (IOL);   Surgeon: Rosanne Rocha MD;  Location: [facility] MAIN OR;  Service: Ophthalmology    TONSILLECTOMY         Family History   Problem Relation Age of Onset    Heart disease Mother         MI  at age 71   [facility] Arthritis Mother     GI problems Father         bleeding ulcer    Arthritis Sister     Sleep apnea Sister     Irritable bowel syndrome Sister     Cancer Sister         skin cancer    Heart disease Brother         CABG (5)   [facility] Cancer Brother         skin cancer    Heart disease Maternal Aunt        Social History     Occupational History    Not on file   Tobacco Use    Smoking status: Never Smoker    Smokeless tobacco: Never Used   Substance and Sexual Activity    Alcohol use: Yes     Comment: rarely    Drug use: No    Sexual activity: Not on file         Current Outpatient Medications:     acetaminophen (TYLENOL) 325 mg tablet, Take 3 tablets (975 mg total) by mouth every 8 (eight) hours, Disp:  , Rfl: 0    aspirin (ECOTRIN) 325 mg EC tablet, Take 1 tablet (325 mg total) by mouth 2 (two) times a day, Disp: 84 tablet, Rfl: 0    Calcium Carb-Cholecalciferol (CALCIUM 600 + D) 600-200 MG-UNIT TABS, Take 1 tablet by mouth daily, Disp: , Rfl:     docusate sodium (COLACE) 100 mg capsule, Take 1 capsule (100 mg total) by mouth 2 (two) times a day, Disp: 60 capsule, Rfl: 0    furosemide (LASIX) 40 mg tablet, Take 1 tablet (40 mg total) by mouth daily As needed for leg swelling, Disp: 90 tablet, Rfl: 3    metoprolol succinate (TOPROL-XL) 50 mg 24 hr tablet, TAKE ONE TABLET DAILY AS DIRECTED, Disp: 90 tablet, Rfl: 2    Multiple Vitamins-Minerals (PRESERVISION AREDS PO), Take by mouth, Disp: , Rfl:     oxyCODONE (ROXICODONE) 5 mg immediate release tablet, Take 1 tablet (5 mg total) by mouth every 4 (four) hours as needed for moderate painMax Daily Amount: 30 mg, Disp: 30 tablet, Rfl: 0    potassium chloride (K-DUR,KLOR-CON) 20 mEq tablet, TAKE 1 TABLET (20 MEQ TOTAL) BY MOUTH DAILY, Disp: 30 tablet, Rfl: 5    Allergies   Allergen Reactions    Indocin [Indomethacin]      hypertension       Objective:  Vitals:    01/13/21 1457   BP: (!) 180/70   Pulse: 60       Body mass index is 22 5 kg/m²  Right Knee Exam     Muscle Strength   The patient has normal right knee strength  Tenderness   The patient is experiencing tenderness in the medial joint line, LCL and medial retinaculum      Range of Motion   Extension: normal Right knee extension: 0  Flexion: normal Right knee flexion: 120  Tests   Varus: negative Valgus: negative  Drawer:  Anterior - negative      Patellar apprehension: negative    Other   Erythema: absent  Scars: present  Sensation: normal  Pulse: present  Swelling: mild  Effusion: no effusion present    Comments:  Anterior incision well healed  Collateral ligaments stable at 0, 30, 90°  Thigh and calf soft nontender  Ecchymosis resolved  Lymphedema right lower extremity improved  Grossly distally neurovascularly intact  Ambulates slowly with an antalgic gait on the right with use of a cane          Observations     Right Knee   Negative for effusion  Physical Exam  Vitals signs and nursing note reviewed  Constitutional:       Appearance: She is well-developed  Comments: Body mass index is 22 5 kg/m²  HENT:      Head: Normocephalic and atraumatic  Right Ear: External ear normal       Left Ear: External ear normal    Eyes:      Extraocular Movements: Extraocular movements intact  Neck:      Musculoskeletal: Normal range of motion  Cardiovascular:      Rate and Rhythm: Normal rate  Pulmonary:      Effort: Pulmonary effort is normal    Abdominal:      Palpations: Abdomen is soft  Musculoskeletal:      Right knee: She exhibits no effusion  Comments: See ortho exam   Skin:     General: Skin is warm and dry  Neurological:      Mental Status: She is alert and oriented to person, place, and time  Psychiatric:         Mood and Affect: Mood normal          Behavior: Behavior normal          Thought Content:  Thought content normal          Judgment: Judgment normal

## 2021-01-14 ENCOUNTER — OFFICE VISIT (OUTPATIENT)
Dept: PHYSICAL THERAPY | Facility: CLINIC | Age: 86
End: 2021-01-14
Payer: MEDICARE

## 2021-01-14 DIAGNOSIS — M25.561 CHRONIC PAIN OF RIGHT KNEE: ICD-10-CM

## 2021-01-14 DIAGNOSIS — Z01.812 PRE-OPERATIVE LABORATORY EXAMINATION: ICD-10-CM

## 2021-01-14 DIAGNOSIS — G89.29 CHRONIC PAIN OF RIGHT KNEE: ICD-10-CM

## 2021-01-14 DIAGNOSIS — M17.11 PRIMARY OSTEOARTHRITIS OF RIGHT KNEE: Primary | ICD-10-CM

## 2021-01-14 PROCEDURE — 97140 MANUAL THERAPY 1/> REGIONS: CPT | Performed by: PHYSICAL THERAPIST

## 2021-01-14 PROCEDURE — 97110 THERAPEUTIC EXERCISES: CPT | Performed by: PHYSICAL THERAPIST

## 2021-01-14 NOTE — PROGRESS NOTES
Daily Note     Today's date: 2021  Patient name: Pooja Chris  : 1935  MRN: [id number]  Referring provider: Jermaine Cosme  Dx:   Encounter Diagnosis     ICD-10-CM    1  Primary osteoarthritis of right knee  M17 11    2  Chronic pain of right knee  M25 561     G89 29    3  Pre-operative laboratory examination  Z01 812                   Subjective: She saw Dr David Whitaker who was pleased with her progress  Objective: See treatment diary below      Assessment: Tolerated treatment well  Patient would benefit from continued PT to improve strength and ROM of right leg  She remains unsure while walking and would benefit from continued use of rolling walker  Plan: Continue per plan of care        Precautions:  Hypertension, Lymphedema    Specialty Daily Treatment Diary     Manuals 21    Visit # 6 7 8 9    PF mobs 2 min  2' 2'    Stretch HS Quad 4 min  4' 4'    Knee PROM 4 min  4' 4'            Warm-up        NuStep 10 min 10 min 10' 10'    Neuro Re-Ed        Qset 30 30 30 30    Wt shift 20 20  20    Heel raises 20 20 20 20                    Ther Ex        Mini squat 10 12 10 20    Side step 10 ft X 4 10 ft x 4  5 x 10 ft    Knee flex 20 20 20 20    Knee ext 20 20 30 20    SLR 5 AA  5x  AA 5x  AA    Heel slides 30 30 30 30    Standing hip Abd  20 20  20    Ther Activity                        Gait Training        W/ rw                Modalities        CP 10 min  5 min post session  10 min

## 2021-01-19 ENCOUNTER — OFFICE VISIT (OUTPATIENT)
Dept: PHYSICAL THERAPY | Facility: CLINIC | Age: 86
End: 2021-01-19
Payer: MEDICARE

## 2021-01-19 DIAGNOSIS — G89.29 CHRONIC PAIN OF RIGHT KNEE: ICD-10-CM

## 2021-01-19 DIAGNOSIS — M17.11 PRIMARY OSTEOARTHRITIS OF RIGHT KNEE: Primary | ICD-10-CM

## 2021-01-19 DIAGNOSIS — M25.561 CHRONIC PAIN OF RIGHT KNEE: ICD-10-CM

## 2021-01-19 PROCEDURE — 97140 MANUAL THERAPY 1/> REGIONS: CPT

## 2021-01-19 PROCEDURE — 97110 THERAPEUTIC EXERCISES: CPT

## 2021-01-19 PROCEDURE — 97112 NEUROMUSCULAR REEDUCATION: CPT

## 2021-01-19 NOTE — PROGRESS NOTES
Daily Note      Today's date: 2021  Patient name: Trinidad Duran  : 1935  MRN: [id number]  Referring provider: Tayla Feliciano  Dx:   Encounter Diagnosis     ICD-10-CM    1  Primary osteoarthritis of right knee  M17 11    2  Chronic pain of right knee  M25 561     G89 29        Subjective: Pt reports that her right knee is "doing fine" and has no pain in the knee, however "everything else hurts " Pt attributes this to the weather  Pt states that last night her sleep was interrupted  Pt states that usually she is able to sleep without issues  Objective: See treatment diary below    Assessment: Pt tolerated treatment well  Pt continues to present with antalgic gait with [facility] [place]  Pt takes several seconds in order to initiate gait and increase speed, however appears to be stable with quad cane  Pt was introduced to prostretch exercise with standby assistance due to excessive posterior leaning  Pt was provided with verbal and tactile cues to maintain center of mass over her feet in order to prevent LOB episodes or possible sliding of wooden step  Pt demonstrated good understanding of this  Pt continues to present with TTP of R quads throughout and also appears to be limited by lymphedema  Pt also presents with localized swelling in the popliteal fossa with TTP, however noted improvement of pain following manual therapy  Plan: Continue per plan of care  Progress note during next visit           Precautions:  Hypertension, Lymphedema    Specialty Daily Treatment Diary     Manuals 21   Visit # 6 7 8 9 10   PF mobs 2 min  2' 2'    Stretch HS Quad 4 min  4' 4' R HS   Knee PROM 4 min  4' 4' Flex/ext   STM     R quads, med/lat knee joint   Warm-up        NuStep 10 min 10 min 10' 10' 10'   Neuro Re-Ed        Qset 30 30 30 30 20x   Wt shift 20 20  20    Heel raises 20 20 20 20 20x                   Ther Ex        Mini squat 10 12 10 20 20x   Side step 10 ft X 4 10 ft x 4  5 x 10 ft    Knee flex 20 20 20 20 20x   Knee ext 20 20 30 20 20x   SLR 5 AA  5x  AA 5x  AA 2x5 AA   Heel slides 30 30 30 30 30x   Standing hip Abd  20 20  20    Prostretch     5x10s           Ther Activity                        Gait Training        W/ rw                Modalities        CP 10 min  5 min post session  10 min 5 min post

## 2021-01-21 ENCOUNTER — EVALUATION (OUTPATIENT)
Dept: PHYSICAL THERAPY | Facility: CLINIC | Age: 86
End: 2021-01-21
Payer: MEDICARE

## 2021-01-21 DIAGNOSIS — G89.29 CHRONIC PAIN OF RIGHT KNEE: ICD-10-CM

## 2021-01-21 DIAGNOSIS — M25.561 CHRONIC PAIN OF RIGHT KNEE: ICD-10-CM

## 2021-01-21 DIAGNOSIS — M17.11 PRIMARY OSTEOARTHRITIS OF RIGHT KNEE: Primary | ICD-10-CM

## 2021-01-21 DIAGNOSIS — Z01.812 PRE-OPERATIVE LABORATORY EXAMINATION: ICD-10-CM

## 2021-01-21 PROCEDURE — 97110 THERAPEUTIC EXERCISES: CPT | Performed by: PHYSICAL THERAPIST

## 2021-01-21 PROCEDURE — 97140 MANUAL THERAPY 1/> REGIONS: CPT | Performed by: PHYSICAL THERAPIST

## 2021-01-21 NOTE — PROGRESS NOTES
PT Re-Evaluation    Today's date: 2021  Patient name: Nusrat Blair  : 1935  MRN: [id number]  Referring provider: Sandy Morin  Dx:   Encounter Diagnosis     ICD-10-CM    1  Primary osteoarthritis of right knee  M17 11    2  Chronic pain of right knee  M25 561     G89 29    3  Pre-operative laboratory examination  Z01 812                   Assessment  Assessment details: Nusrat Blair presents with pain, decreased LE range of motion, decreased LE strength, fair balance, impaired function and decreased activity tolerance  Patient's clinical presentation is consistent with their referring diagnosis of Primary osteoarthritis of right knee, Chronic pain of right knee and Pre-operative laboratory examination  The pt presents with functional limitations of ADLs, recreational activities, ambulation, performing household chores and stair negotiation  Pt would benefit from continued physical therapy services to address these limitations and maximize function  Impairments: abnormal gait, abnormal muscle firing, abnormal muscle tone, abnormal or restricted ROM, abnormal movement, activity intolerance, impaired balance, impaired physical strength, lacks appropriate home exercise program, pain with function, weight-bearing intolerance, poor posture  and poor body mechanics  Understanding of Dx/Px/POC: good   Prognosis: good    Goals  Short term goals  (5 weeks)  1  Patient will have no pain right knee  --  PARTIALLY MET  2   Patient will have full range of motion right knee  --  MET  3  Patient will report a 50% improvement with activities of daily living   --  PARTIALLY MET  4  Patient will decrease girth of right knee by 1 to 2 cm    --  MET    Long term goals - (10 weeks)  1  Patient will be independent with home exercise program  --  PARTIALLY MET  2  Patient will have no gait deviations ambulating on level surfaces  --  PARTIALLY MET  3    Patient will report 80 % improvement with activity of daily living function  --  NOT MET  4  Patient will negotiate stairs up and down pain free with use of one railing    --  NOT MET      Plan  Patient would benefit from: PT eval and skilled physical therapy  Planned modality interventions: cryotherapy  Planned therapy interventions: ADL training, balance/weight bearing training, joint mobilization, manual therapy, massage, neuromuscular re-education, patient education, postural training, strengthening, stretching, functional ROM exercises, therapeutic activities, therapeutic exercise, gait training, home exercise program and abdominal trunk stabilization  Frequency: 2x week  Duration in visits: 8  Duration in weeks: 4  Treatment plan discussed with: patient        Subjective Evaluation    History of Present Illness  Mechanism of injury: She has trouble walking and a lot of difficulty using the stairs  Pain persists in the knee and in her right shin and at times prevents her from sleeping  Pain  Current pain ratin  At best pain ratin  At worst pain ratin  Location: Right knee and shin  Quality: sharp, tight and discomfort  Relieving factors: rest  Aggravating factors: walking and stair climbing    Social Support    Employment status: not working  Exercise history: house work    Patient Goals  Patient goals for therapy: decreased pain and independence with ADLs/IADLs  Patient's goals regarding treatment: walk normally, secure right knee  Objective     Palpation     Right   Hypertonic in the distal biceps femoris, distal semimembranosus and distal semitendinosus       Active Range of Motion   Left Knee   Flexion: 128 degrees   Extension: 0 degrees     Right Knee   Flexion: 103 degrees   Extension: -3 degrees     Mobility   Patellar Mobility:     Right Knee   Hypomobile: medial and lateral     Strength/Myotome Testing     Left Knee   Flexion: 4  Extension: 4    Right Knee   Flexion: 4-  Extension: 4-    Additional Strength Details  Emelia Richmond has difficulty performing SLR independently and fatigues quickly from the exercise  Swelling     Left Knee Girth Measurement (cm)   Joint line: 36 9 cm    Right Knee Girth Measurement (cm)   Joint line: 37 5 (Unable to measure joint line girth this session due to tight fitting jeans    Will measure this post-op ) cm    Ambulation   Weight-Bearing Status   Assistive device used: small base quad cane    Observational Gait   Gait: antalgic     Functional Assessment        Comments  She has difficulty with sit to stand and needs min assist from chair height             Precautions:  Hypertension, Lymphedema    Specialty Daily Treatment Diary     Manuals 1/7/21 1/12/21 1/14/21 1/19/21 1/21/21   Visit # 7 8 9 10 11   PF mobs   2' 2'      Stretch HS Quad   4' 4' R HS 5'   Knee PROM   4' 4' Flex/ext    STM       R quads, med/lat knee joint 5'   Warm-up            NuStep 10 min 10' 10' 10' 10'   Neuro Re-Ed            Qset 30 30 30 20x 30   Wt shift 20   20   --   Heel raises 20 20 20 20x 20                             Ther Ex            Mini squat 12 10 20 20x 20   Side step 10 ft x 4   5 x 10 ft   5 x 10 ft   Knee flex 20 20 20 20x 20   Knee ext 20 30 20 20x 20   SLR   5x  AA 5x  AA 2x5 AA 2 x 5 AA   Heel slides 30 30 30 30x 30   Standing hip Abd 20 20  20   20   Prostretch       5x10s                 Ther Activity                                      Gait Training            W/ rw                         Modalities            CP   5 min post session  10 min 5 min post  5 min

## 2021-01-25 DIAGNOSIS — Z96.651 STATUS POST TOTAL RIGHT KNEE REPLACEMENT USING CEMENT: ICD-10-CM

## 2021-01-25 RX ORDER — DOCUSATE SODIUM 100 MG
CAPSULE ORAL
Qty: 60 CAPSULE | Refills: 0 | Status: SHIPPED | OUTPATIENT
Start: 2021-01-25 | End: 2021-03-25 | Stop reason: ALTCHOICE

## 2021-01-26 ENCOUNTER — OFFICE VISIT (OUTPATIENT)
Dept: PHYSICAL THERAPY | Facility: CLINIC | Age: 86
End: 2021-01-26
Payer: MEDICARE

## 2021-01-26 ENCOUNTER — TELEPHONE (OUTPATIENT)
Dept: OBGYN CLINIC | Facility: CLINIC | Age: 86
End: 2021-01-26

## 2021-01-26 DIAGNOSIS — Z96.651 STATUS POST TOTAL RIGHT KNEE REPLACEMENT USING CEMENT: ICD-10-CM

## 2021-01-26 DIAGNOSIS — Z96.651 AFTERCARE FOLLOWING RIGHT KNEE JOINT REPLACEMENT SURGERY: ICD-10-CM

## 2021-01-26 DIAGNOSIS — M17.11 PRIMARY OSTEOARTHRITIS OF RIGHT KNEE: Primary | ICD-10-CM

## 2021-01-26 DIAGNOSIS — Z47.1 AFTERCARE FOLLOWING RIGHT KNEE JOINT REPLACEMENT SURGERY: ICD-10-CM

## 2021-01-26 DIAGNOSIS — G89.29 CHRONIC PAIN OF RIGHT KNEE: ICD-10-CM

## 2021-01-26 DIAGNOSIS — M25.561 CHRONIC PAIN OF RIGHT KNEE: ICD-10-CM

## 2021-01-26 PROCEDURE — 97110 THERAPEUTIC EXERCISES: CPT

## 2021-01-26 PROCEDURE — 97112 NEUROMUSCULAR REEDUCATION: CPT

## 2021-01-26 RX ORDER — OXYCODONE HYDROCHLORIDE 5 MG/1
5 TABLET ORAL EVERY 4 HOURS PRN
Qty: 25 TABLET | Refills: 0 | Status: SHIPPED | OUTPATIENT
Start: 2021-01-26 | End: 2021-03-18

## 2021-01-26 NOTE — PROGRESS NOTES
Daily Note      Today's date: 2021  Patient name: Ricardo Tracy  : 1935  MRN: [id number]  Referring provider: Hamilton Galindo  Dx:   Encounter Diagnosis     ICD-10-CM    1  Primary osteoarthritis of right knee  M17 11    2  Chronic pain of right knee  M25 561     G89 29        Subjective: Pt reports that she has 4/10 right knee pain, which she states is "not that bad " Pt states her pain changes from day to day without clear reason why  Objective: See treatment diary below    Assessment: Pt tolerated treatment well  Pt introduced to step ups on 4" step while using B/L rails and utilizes both arms to push up on railings  Pt provided with verbal cues to stand with upright posture in order to encourage lumbar extension and knee extension while performing steps  Pt demonstrates good awareness that she stands with forward flexed posture, however is inconsistent in self-correction  Pt was under the impression that at the end of initially scheduled appointments, she had apply for more visits, however educated pt that she may continue  Will advise primary PT on plan to continue PT with pt  Plan: Continue per plan of care  Progress treatment as tolerated            Precautions:  Hypertension, Lymphedema    Specialty Daily Treatment Diary     Manuals 21   Visit # 12 8 9 10 11   PF mobs   2' 2'      Stretch HS Quad   4' 4' R HS 5'   Knee PROM   4' 4' Flex/ext    STM       R quads, med/lat knee joint 5'   Warm-up            NuStep 10 min 10' 10' 10' 10'   Neuro Re-Ed            Qset 20x 30 30 20x 30   Wt shift    20   --   Heel raises 20x 20 20 20x 20                             Ther Ex            Mini squat 20x 10 20 20x 20   Side step 8x10 ft    5 x 10 ft   5 x 10 ft   Knee flex 20x 20 20 20x 20   SAQ 20x       Knee ext 20x 30 20 20x 20   SLR   5x  AA 5x  AA 2x5 AA 2 x 5 AA   Heel slides 20x 30 30 30x 30   Standing hip Abd  20  20   20   Prostretch       5x10s     Step up 2x10 4" step            Ther Activity                                      Gait Training            W/ rw                         Modalities            CP  8 min post  5 min post session  10 min 5 min post  5 min

## 2021-01-28 ENCOUNTER — OFFICE VISIT (OUTPATIENT)
Dept: PHYSICAL THERAPY | Facility: CLINIC | Age: 86
End: 2021-01-28
Payer: MEDICARE

## 2021-01-28 DIAGNOSIS — M17.11 PRIMARY OSTEOARTHRITIS OF RIGHT KNEE: Primary | ICD-10-CM

## 2021-01-28 DIAGNOSIS — G89.29 CHRONIC PAIN OF RIGHT KNEE: ICD-10-CM

## 2021-01-28 DIAGNOSIS — M25.561 CHRONIC PAIN OF RIGHT KNEE: ICD-10-CM

## 2021-01-28 DIAGNOSIS — Z01.812 PRE-OPERATIVE LABORATORY EXAMINATION: ICD-10-CM

## 2021-01-28 PROCEDURE — 97110 THERAPEUTIC EXERCISES: CPT

## 2021-01-28 PROCEDURE — 97112 NEUROMUSCULAR REEDUCATION: CPT

## 2021-01-28 NOTE — PROGRESS NOTES
Daily Note      Today's date: 2021  Patient name: Diane Bruno  : 1935  MRN: [id number]  Referring provider: Alhaji Singh  Dx:   Encounter Diagnosis     ICD-10-CM    1  Primary osteoarthritis of right knee  M17 11    2  Chronic pain of right knee  M25 561     G89 29    3  Pre-operative laboratory examination  Z01 812        Subjective: Pt reports no new complaints prior to session  Objective: See treatment diary below    Assessment: Pt tolerated treatment well  Pt tolerated exercises well and demo good endurance with standing exercises  Plan: Continue per plan of care  Progress treatment as tolerated            Precautions:  Hypertension, Lymphedema    Specialty Daily Treatment Diary     Manuals 21   Visit # 12 13  10 11   PF mobs          Stretch HS Quad     R HS 5'   Knee PROM     Flex/ext    STM     R quads, med/lat knee joint 5'   Warm-up          NuStep 10 min 10 min   10' 10'   Neuro Re-Ed          Qset 20x   20x 30   Wt shift      --   Heel raises 20x 20x  20x 20                         Ther Ex          Mini squat 20x 20x  20x 20   Side step 8x10 ft  8x10ft    5 x 10 ft   Knee flex 20x 20x  20x 20   SAQ 20x 20x      Knee ext 20x 20x  20x 20   SLR     2x5 AA 2 x 5 AA   Heel slides 20x 20x  30x 30   Standing hip Abd      20   Prostretch     5x10s     Step up 2x10 4" step   2x10 4"       Ther Activity                                Gait Training          W/ rw                     Modalities          CP  8 min post  10 min  5 min post  5 min

## 2021-02-02 ENCOUNTER — APPOINTMENT (OUTPATIENT)
Dept: PHYSICAL THERAPY | Facility: CLINIC | Age: 86
End: 2021-02-02
Payer: MEDICARE

## 2021-02-03 ENCOUNTER — APPOINTMENT (OUTPATIENT)
Dept: PHYSICAL THERAPY | Facility: CLINIC | Age: 86
End: 2021-02-03
Payer: MEDICARE

## 2021-02-04 ENCOUNTER — OFFICE VISIT (OUTPATIENT)
Dept: OBGYN CLINIC | Facility: CLINIC | Age: 86
End: 2021-02-04

## 2021-02-04 ENCOUNTER — OFFICE VISIT (OUTPATIENT)
Dept: PHYSICAL THERAPY | Facility: CLINIC | Age: 86
End: 2021-02-04
Payer: MEDICARE

## 2021-02-04 VITALS
HEART RATE: 60 BPM | SYSTOLIC BLOOD PRESSURE: 150 MMHG | WEIGHT: 109 LBS | HEIGHT: 59 IN | BODY MASS INDEX: 21.97 KG/M2 | DIASTOLIC BLOOD PRESSURE: 70 MMHG

## 2021-02-04 DIAGNOSIS — I89.0 LYMPHEDEMA: ICD-10-CM

## 2021-02-04 DIAGNOSIS — G89.29 CHRONIC PAIN OF RIGHT KNEE: ICD-10-CM

## 2021-02-04 DIAGNOSIS — Z47.1 AFTERCARE FOLLOWING RIGHT KNEE JOINT REPLACEMENT SURGERY: ICD-10-CM

## 2021-02-04 DIAGNOSIS — Z96.651 AFTERCARE FOLLOWING RIGHT KNEE JOINT REPLACEMENT SURGERY: ICD-10-CM

## 2021-02-04 DIAGNOSIS — M25.561 CHRONIC PAIN OF RIGHT KNEE: ICD-10-CM

## 2021-02-04 DIAGNOSIS — M17.11 PRIMARY OSTEOARTHRITIS OF RIGHT KNEE: Primary | ICD-10-CM

## 2021-02-04 DIAGNOSIS — Z96.651 STATUS POST TOTAL RIGHT KNEE REPLACEMENT USING CEMENT: Primary | ICD-10-CM

## 2021-02-04 DIAGNOSIS — Z01.812 PRE-OPERATIVE LABORATORY EXAMINATION: ICD-10-CM

## 2021-02-04 PROCEDURE — 97140 MANUAL THERAPY 1/> REGIONS: CPT | Performed by: PHYSICAL THERAPIST

## 2021-02-04 PROCEDURE — 99024 POSTOP FOLLOW-UP VISIT: CPT | Performed by: ORTHOPAEDIC SURGERY

## 2021-02-04 PROCEDURE — 97110 THERAPEUTIC EXERCISES: CPT | Performed by: PHYSICAL THERAPIST

## 2021-02-04 NOTE — PROGRESS NOTES
Daily Note      Today's date: 2021  Patient name: Marcin Hernandez  : 1935  MRN: [id number]  Referring provider: Gisela Obrien  Dx:   Encounter Diagnosis     ICD-10-CM    1  Primary osteoarthritis of right knee  M17 11    2  Chronic pain of right knee  M25 561     G89 29    3  Pre-operative laboratory examination  Z01 812        Subjective: Pt reports stiffness right knee  Pain is lessening over time  Objective: See treatment diary below    Assessment: Pt tolerated treatment well  She was able to complete sit to stand without upper extremity assist from 24" table  Plan: Continue per plan of care  Progress treatment as tolerated            Precautions:  Hypertension, Lymphedema    Specialty Daily Treatment Diary     Manuals 21     Visit # 12 13 14     PF mobs    2'     Stretch HS Quad    4'     Knee PROM    4'     STM         Warm-up          NuStep 10 min 10 min  10 min     Neuro Re-Ed         Qset 20x  20     Heel raises 20x 20x 20                          Ther Ex          Mini squat 20x 20x 20     Side step 8x10 ft  8x10ft 8 x 10 ft     Knee flex 20x 20x 20     SAQ 20x 20x 20     Knee ext 20x 20x 20     SLR    2 x 5     Heel slides 20x 20x 30     Standing hip Abd        Prostretch          Step up 2x10 4" step   2x10 4" 10   4"     Ther Activity                                Gait Training                               Modalities          CP  8 min post  10 min 10 min

## 2021-02-04 NOTE — PROGRESS NOTES
Assessment/Plan:  1  Status post total right knee replacement using cement     2  Aftercare following right knee joint replacement surgery     3  Lymphedema       Racquel Varma is a pleasant  43-year-old female presenting today 6 weeks after a right total knee arthroplasty  She has done exceptionally well over past 2 weeks and has significantly improved her comfort, function, and lower extremity edema  The prosthesis remains stable on exam  Throughout her full range of motion  We explained that it is normal to still have some stiffness and soreness at this stage postoperatively, but are very happy that her pain has significantly lessened  She will need to continue to work with physical therapy to gain in during this in strength of her right lower extremity  Her incision has healed nicely and she has completed her DVT prophylaxis  We will plan to see her back in 6 weeks with x-rays on arrival of her right knee for her 3 month postop appointment  She and her son expressed understanding all of her questions were addressed today    Subjective: 6 weeks s/p right TKA    Patient ID: Vikram Covington is a 80 y o  female  Racquel Varma is a pleasant 43-year-old female practice presenting today now 6 weeks after undergoing a right total knee arthroplasty  She reports that over the past week, knee pain has significantly improved  She has been improving with physical therapy as well  She does feel some stiffness and some apprehension after initially standing before she is able to get up and go, but otherwise is doing well on stairs and ambulating  She is still using a cane  Her son reports she has not been complaining of knee pain as much  She has completed her DVT prophylaxis  She denies any new injuries symptoms    Review of Systems   Constitutional: Negative  HENT: Negative  Eyes: Negative  Respiratory: Negative  Cardiovascular: Positive for leg swelling  Gastrointestinal: Negative  Endocrine: Negative  Genitourinary: Negative  Musculoskeletal: Positive for arthralgias, joint swelling and myalgias  Skin: Negative  Allergic/Immunologic: Negative  Neurological: Negative  Hematological: Negative  Psychiatric/Behavioral: Negative  Past Medical History:   Diagnosis Date    Anesthesia complication       Yrs ago had "anxiety" reaction not sure while sedated, pt states sensitive to anesthesia effects    Anxiety     stress at home    Arthritis     Cataract, right     Last Assessed:16    Eye hemorrhage     left eye currently 16    History of shingles 2015    Hypertension     Mitral valve stenosis, mild        Past Surgical History:   Procedure Laterality Date    CARPAL TUNNEL RELEASE Left     CATARACT EXTRACTION      CATARACT EXTRACTION W/ INTRAOCULAR LENS IMPLANT Left 10/11/2016    Procedure: EXTRACTION EXTRACAPSULAR CATARACT PHACO INTRAOCULAR LENS (IOL); Surgeon: John Snow MD;  Location: [facility] MAIN OR;  Service:     CERVICAL CONE BIOPSY      COLONOSCOPY      EYE SURGERY Left     muscle repair    TX OPEN RX FEMUR FX+INTRAMED NINI Right 2019    Procedure: INSERTION NAIL IM FEMUR ANTEGRADE (TROCHANTERIC); Surgeon: Sebas Castillo DO;  Location: [address] [place] [address];  Service: Orthopedics    TX TOTAL KNEE ARTHROPLASTY Right 12/15/2020    Procedure: ARTHROPLASTY KNEE TOTAL;  Surgeon: Sebas Castillo DO;  Location: [address] [place] [address];  Service: Orthopedics    TX XCAPSL CTRC RMVL INSJ IO LENS PROSTH W/O ECP Right 2016    Procedure: EXTRACTION EXTRACAPSULAR CATARACT PHACO INTRAOCULAR LENS (IOL);   Surgeon: John Snow MD;  Location: [facility] MAIN OR;  Service: Ophthalmology    TONSILLECTOMY         Family History   Problem Relation Age of Onset    Heart disease Mother         MI  at age 71   [place] Arthritis Mother     GI problems Father         bleeding ulcer    Arthritis Sister     Sleep apnea Sister     Irritable bowel syndrome Sister     Cancer Sister         skin cancer    Heart disease Brother         CABG (5)    Cancer Brother         skin cancer    Heart disease Maternal Aunt        Social History     Occupational History    Not on file   Tobacco Use    Smoking status: Never Smoker    Smokeless tobacco: Never Used   Substance and Sexual Activity    Alcohol use: Yes     Comment: rarely    Drug use: No    Sexual activity: Not on file         Current Outpatient Medications:     acetaminophen (TYLENOL) 325 mg tablet, Take 3 tablets (975 mg total) by mouth every 8 (eight) hours, Disp:  , Rfl: 0    aspirin (ECOTRIN) 325 mg EC tablet, Take 1 tablet (325 mg total) by mouth 2 (two) times a day, Disp: 84 tablet, Rfl: 0    Calcium Carb-Cholecalciferol (CALCIUM 600 + D) 600-200 MG-UNIT TABS, Take 1 tablet by mouth daily, Disp: , Rfl:     furosemide (LASIX) 40 mg tablet, Take 1 tablet (40 mg total) by mouth daily As needed for leg swelling, Disp: 90 tablet, Rfl: 3    metoprolol succinate (TOPROL-XL) 50 mg 24 hr tablet, TAKE ONE TABLET DAILY AS DIRECTED, Disp: 90 tablet, Rfl: 2    Multiple Vitamins-Minerals (PRESERVISION AREDS PO), Take by mouth, Disp: , Rfl:     oxyCODONE (ROXICODONE) 5 mg immediate release tablet, Take 1 tablet (5 mg total) by mouth every 4 (four) hours as needed for moderate painMax Daily Amount: 30 mg, Disp: 25 tablet, Rfl: 0    potassium chloride (K-DUR,KLOR-CON) 20 mEq tablet, TAKE 1 TABLET (20 MEQ TOTAL) BY MOUTH DAILY, Disp: 30 tablet, Rfl: 5    Stool Softener 100 MG capsule, TAKE 1 CAPSULE (100 MG TOTAL) BY MOUTH 2 (TWO) TIMES A DAY, Disp: 60 capsule, Rfl: 0    Allergies   Allergen Reactions    Indocin [Indomethacin]      hypertension       Objective:  Vitals:    02/04/21 1347   BP: 150/70   Pulse: 60       Body mass index is 22 02 kg/m²  Right Knee Exam     Muscle Strength   The patient has normal right knee strength  Tenderness   The patient is experiencing tenderness in the medial joint line and medial retinaculum      Range of Motion   Extension: normal Right knee extension: 0  Flexion: normal Right knee flexion: 120  Tests   Varus: negative Valgus: negative  Drawer:  Anterior - negative      Patellar apprehension: negative    Other   Erythema: absent  Scars: present  Sensation: normal  Pulse: present  Swelling: mild  Effusion: no effusion present    Comments:  Anterior incision well healed  Prosthesis stable at 0, 30, 90°  Thigh and calf soft nontender  Lymphedema right lower extremity significantly improved - at baseline  Grossly distally neurovascularly intact  Ambulates slowly with a slightly antalgic gait on the right with use of a cane          Observations     Right Knee   Negative for effusion  Physical Exam  Vitals signs and nursing note reviewed  Constitutional:       Appearance: She is well-developed  Comments: Body mass index is 22 02 kg/m²  HENT:      Head: Normocephalic and atraumatic  Right Ear: External ear normal       Left Ear: External ear normal    Eyes:      Extraocular Movements: Extraocular movements intact  Neck:      Musculoskeletal: Normal range of motion  Cardiovascular:      Rate and Rhythm: Normal rate  Pulmonary:      Effort: Pulmonary effort is normal    Abdominal:      Palpations: Abdomen is soft  Musculoskeletal:      Right knee: She exhibits no effusion  Comments: See ortho exam   Skin:     General: Skin is warm and dry  Neurological:      Mental Status: She is alert and oriented to person, place, and time  Psychiatric:         Mood and Affect: Mood normal          Behavior: Behavior normal          Thought Content:  Thought content normal          Judgment: Judgment normal

## 2021-02-05 ENCOUNTER — OFFICE VISIT (OUTPATIENT)
Dept: PHYSICAL THERAPY | Facility: CLINIC | Age: 86
End: 2021-02-05
Payer: MEDICARE

## 2021-02-05 DIAGNOSIS — M17.11 PRIMARY OSTEOARTHRITIS OF RIGHT KNEE: Primary | ICD-10-CM

## 2021-02-05 DIAGNOSIS — M25.561 CHRONIC PAIN OF RIGHT KNEE: ICD-10-CM

## 2021-02-05 DIAGNOSIS — G89.29 CHRONIC PAIN OF RIGHT KNEE: ICD-10-CM

## 2021-02-05 PROCEDURE — 97112 NEUROMUSCULAR REEDUCATION: CPT

## 2021-02-05 PROCEDURE — 97110 THERAPEUTIC EXERCISES: CPT

## 2021-02-05 NOTE — PROGRESS NOTES
Daily Note      Today's date: 2021  Patient name: Sanford Castillo  : 1935  MRN: [id number]  Referring provider: Miranda Cullen  Dx:   Encounter Diagnosis     ICD-10-CM    1  Primary osteoarthritis of right knee  M17 11    2  Chronic pain of right knee  M25 561     G89 29        Subjective: Pt reports that she feels achy and sore, however she feels this is not from exercising yesterday  Pt states she normally has these aches and took Tramadol this morning  Objective: See treatment diary below    Assessment: Pt tolerated treatment well  Pt reported pain with active knee flexion in supine this visit  Pt demonstrates slight increase in right knee flexion PROM this visit while short sitting on plinth  Pt educated on safe ambulation on stairs at home  Pt acknowledges that she required UE support in order to go up the stairs  Plan: Continue per plan of care  Progress treatment as tolerated            Precautions:  Hypertension, Lymphedema    Specialty Daily Treatment Diary     Manuals 21    Visit # 12 13 14 15    PF mobs    2'     Stretch HS Quad    4'     Knee PROM    4' 5'     STM         Warm-up          NuStep 10 min 10 min  10 min 10 min    Neuro Re-Ed         Qset 20x  20 20x    Heel raises 20x 20x 20 20x                         Ther Ex          Mini squat 20x 20x 20 20x    Side step 8x10 ft  8x10ft 8 x 10 ft     Knee flex 20x 20x 20 20x    SAQ 20x 20x 20 20x    Knee ext 20x 20x 20 20x    SLR    2 x 5 2x5    Heel slides 20x 20x 30 20x    Standing hip Abd        Prostretch          Step up 2x10 4" step   2x10 4" 10   4" 10x 4"    Ther Activity                                Gait Training                               Modalities          CP  8 min post  10 min 10 min 10 min

## 2021-02-09 ENCOUNTER — OFFICE VISIT (OUTPATIENT)
Dept: PHYSICAL THERAPY | Facility: CLINIC | Age: 86
End: 2021-02-09
Payer: MEDICARE

## 2021-02-09 DIAGNOSIS — M25.561 CHRONIC PAIN OF RIGHT KNEE: ICD-10-CM

## 2021-02-09 DIAGNOSIS — G89.29 CHRONIC PAIN OF RIGHT KNEE: ICD-10-CM

## 2021-02-09 DIAGNOSIS — M17.11 PRIMARY OSTEOARTHRITIS OF RIGHT KNEE: Primary | ICD-10-CM

## 2021-02-09 PROCEDURE — 97110 THERAPEUTIC EXERCISES: CPT

## 2021-02-09 PROCEDURE — 97112 NEUROMUSCULAR REEDUCATION: CPT

## 2021-02-09 NOTE — PROGRESS NOTES
Daily Note      Today's date: 2021  Patient name: Chao Irvin  : 1935  MRN: [id number]  Referring provider: Kely Dennis  Dx:   Encounter Diagnosis     ICD-10-CM    1  Primary osteoarthritis of right knee  M17 11    2  Chronic pain of right knee  M25 561     G89 29        Subjective: Pt reports that her right knee feels stiff, which she attributes to the cold and damp weather  Pt denies pain in the right knee currently  Objective: See treatment diary below    Assessment: Pt tolerated treatment well  Pt introduced to prostretch and noted stretching throughout the right distal hamstrings and calf  Pt's gastrocs flexibility restriction may be influencing her lack of full extension during midstance phase of gait  Plan: Continue per plan of care  Progress treatment as tolerated            Precautions:  Hypertension, Lymphedema    Specialty Daily Treatment Diary     Manuals 21   Visit # 12 13 14 15 16   PF mobs    2'     Stretch HS Quad    4'     Knee PROM    4' 5'     STM         Warm-up          NuStep 10 min 10 min  10 min 10 min 10 min   Neuro Re-Ed         Qset 20x  20 20x 20x   Heel raises 20x 20x 20 20x                         Ther Ex          Mini squat 20x 20x 20 20x    Side step 8x10 ft  8x10ft 8 x 10 ft  8x10 ft    Knee flex 20x 20x 20 20x Step lunge 20x   SAQ 20x 20x 20 20x    Knee ext 20x 20x 20 20x 20x   SLR    2 x 5 2x5 2x10   Heel slides 20x 20x 30 20x 20x   Standing hip Abd        Prostretch      5x10s    Step up 2x10 4" step   2x10 4" 10   4" 10x 4" 10x 4"   Ther Activity                                Gait Training                               Modalities          CP  8 min post  10 min 10 min 10 min  10 min

## 2021-02-11 ENCOUNTER — OFFICE VISIT (OUTPATIENT)
Dept: PHYSICAL THERAPY | Facility: CLINIC | Age: 86
End: 2021-02-11
Payer: MEDICARE

## 2021-02-11 DIAGNOSIS — G89.29 CHRONIC PAIN OF RIGHT KNEE: ICD-10-CM

## 2021-02-11 DIAGNOSIS — M17.11 PRIMARY OSTEOARTHRITIS OF RIGHT KNEE: Primary | ICD-10-CM

## 2021-02-11 DIAGNOSIS — M25.561 CHRONIC PAIN OF RIGHT KNEE: ICD-10-CM

## 2021-02-11 PROCEDURE — 97110 THERAPEUTIC EXERCISES: CPT | Performed by: PHYSICAL THERAPIST

## 2021-02-11 PROCEDURE — 97112 NEUROMUSCULAR REEDUCATION: CPT | Performed by: PHYSICAL THERAPIST

## 2021-02-11 NOTE — PROGRESS NOTES
Daily Note      Today's date: 2021  Patient name: Mukul Arteaga  : 1935  MRN: [id number]  Referring provider: Alex Santos  Dx:   Encounter Diagnosis     ICD-10-CM    1  Primary osteoarthritis of right knee  M17 11    2  Chronic pain of right knee  M25 561     G89 29        Subjective: Pt reports that her right knee feels stiff, which she attributes to the cold and damp weather  Pt denies pain in the right knee currently  Pt s/p R TKR on 12/15  Pt has full flight of steps that she needs to perform at home to her bedroom, which she states she needs to utilize rail and cane, step to pattern with occasional step over step pattern, per pt report  Objective: See treatment diary below    Assessment: Pt tolerated treatment well  Pt progressing towards PT goals and pleased with progress  Plan: Continue per plan of care  Progress treatment as tolerated            Precautions:  Hypertension, Lymphedema    Specialty Daily Treatment Diary     Manuals 21   Visit # 17  14 15 16   PF mobs    2'     Stretch HS Quad    4'     Knee PROM  3'  4' 5'     STM  4' STM R knee       Warm-up          NuStep 10 min  10 min  10 min 10 min 10 min   Neuro Re-Ed         Qset 20x  20 20x 20x   Heel raises 20x 20x 20 20x                         Ther Ex          Mini squat 20x 20x 20 20x    Side step 8x10 ft  8x10ft 8 x 10 ft  8x10 ft    Knee flex Step lunge x20 20x 20 20x Step lunge 20x   SAQ 20x 20x 20 20x    Knee ext 20x 20x 20 20x 20x   SLR  2x10  2 x 5 2x5 2x10   Heel slides 20x 20x 30 20x 20x   Standing hip Abd        Prostretch  5x 10 sec    5x10s    Step up 2x10 4" step   2x10 4" 10   4" 10x 4" 10x 4"   Ther Activity                                Gait Training                               Modalities          CP  8 min post  10 min 10 min 10 min  10 min

## 2021-02-15 ENCOUNTER — TELEPHONE (OUTPATIENT)
Dept: OBGYN CLINIC | Facility: OTHER | Age: 86
End: 2021-02-15

## 2021-02-15 NOTE — TELEPHONE ENCOUNTER
Dental Office called wondering if antibiotic is required prior to dental procedure and if so what do you prefer?     C/b # [phone]

## 2021-02-16 ENCOUNTER — OFFICE VISIT (OUTPATIENT)
Dept: PHYSICAL THERAPY | Facility: CLINIC | Age: 86
End: 2021-02-16
Payer: MEDICARE

## 2021-02-16 DIAGNOSIS — M25.561 CHRONIC PAIN OF RIGHT KNEE: ICD-10-CM

## 2021-02-16 DIAGNOSIS — G89.29 CHRONIC PAIN OF RIGHT KNEE: ICD-10-CM

## 2021-02-16 DIAGNOSIS — M17.11 PRIMARY OSTEOARTHRITIS OF RIGHT KNEE: Primary | ICD-10-CM

## 2021-02-16 PROCEDURE — 97110 THERAPEUTIC EXERCISES: CPT

## 2021-02-16 PROCEDURE — 97112 NEUROMUSCULAR REEDUCATION: CPT

## 2021-02-16 NOTE — PROGRESS NOTES
Daily Note      Today's date: 2021  Patient name: Ricardo Tracy  : 1935  MRN: [id number]  Referring provider: Hamilton Galindo  Dx:   Encounter Diagnosis     ICD-10-CM    1  Primary osteoarthritis of right knee  M17 11    2  Chronic pain of right knee  M25 561     G89 29        Subjective: Pt reports that she has 2/10 pain in the right knee  Pt states that her knee hurts while exercising it, but overall much better than it has been since surgery  Objective: See treatment diary below    Assessment: Pt tolerated treatment well  Pt progressed to sidestepping on uneven surface  Pt continues to require BUE support, however was verbally cued to use flat hands, rather than tight  while sidestepping  Pt continues to fatigue quickly with stair climbing on 4" step  Plan: Continue per plan of care  Progress treatment as tolerated            Precautions:  Hypertension, Lymphedema    Specialty Daily Treatment Diary     Manuals 21   Visit # 17 18 14 15 16   PF mobs    2'     Stretch HS Quad   4'  4'     Knee PROM  3' 4'  4' 5'     STM  4' STM R knee       Warm-up          NuStep 10 min  10 min  10 min 10 min 10 min   Neuro Re-Ed         Qset 20x 20x 20 20x 20x   Heel raises 20x 20x 20 20x                         Ther Ex          Mini squat 20x 20x 20 20x    Side step 8x10 ft  6x10 ft on foam beam 8 x 10 ft  8x10 ft    Knee flex Step lunge x20 Step lunge 20x 20 20x Step lunge 20x   SAQ 20x 20x 20 20x    Knee ext 20x 20x 20 20x 20x   SLR  2x10 2x10 2 x 5 2x5 2x10   Heel slides 20x 20x 30 20x 20x   Standing hip Abd        Prostretch  5x 10 sec 5x10s   5x10s    Step up 2x10 4" step   2x10 4" 10   4" 10x 4" 10x 4"           Ther Activity                                Gait Training                               Modalities          CP  8 min post  10 min 10 min 10 min  10 min

## 2021-02-16 NOTE — TELEPHONE ENCOUNTER
Yes she needs antibiotic prophylaxis prior to any dental procedure for the remainder of her life  I prefer amoxicillin 500 mg, 4 tabs by mouth, 60 minutes prior to the procedure

## 2021-02-18 ENCOUNTER — APPOINTMENT (OUTPATIENT)
Dept: PHYSICAL THERAPY | Facility: CLINIC | Age: 86
End: 2021-02-18
Payer: MEDICARE

## 2021-02-19 ENCOUNTER — OFFICE VISIT (OUTPATIENT)
Dept: PHYSICAL THERAPY | Facility: CLINIC | Age: 86
End: 2021-02-19
Payer: MEDICARE

## 2021-02-19 DIAGNOSIS — M25.561 CHRONIC PAIN OF RIGHT KNEE: ICD-10-CM

## 2021-02-19 DIAGNOSIS — Z01.812 PRE-OPERATIVE LABORATORY EXAMINATION: ICD-10-CM

## 2021-02-19 DIAGNOSIS — G89.29 CHRONIC PAIN OF RIGHT KNEE: ICD-10-CM

## 2021-02-19 DIAGNOSIS — M17.11 PRIMARY OSTEOARTHRITIS OF RIGHT KNEE: Primary | ICD-10-CM

## 2021-02-19 PROCEDURE — 97112 NEUROMUSCULAR REEDUCATION: CPT | Performed by: PHYSICAL THERAPIST

## 2021-02-19 PROCEDURE — 97110 THERAPEUTIC EXERCISES: CPT | Performed by: PHYSICAL THERAPIST

## 2021-02-19 NOTE — PROGRESS NOTES
Daily Note      Today's date: 2021  Patient name: Pooja Chris  : 1935  MRN: [id number]  Referring provider: Jermaine Cosme  Dx:   Encounter Diagnosis     ICD-10-CM    1  Primary osteoarthritis of right knee  M17 11    2  Chronic pain of right knee  M25 561     G89 29    3  Pre-operative laboratory examination  Z01 812        Subjective: Pt reports that she has 3/10 pain in the right knee  Pt states that she still has trouble performing stairs    Objective: See treatment diary below    Assessment: Pt tolerated treatment well  Paxton Garcia has excellent ROM but would benefit from continued strengthening to improve functional activities  Plan: Continue per plan of care  Progress treatment as tolerated            Precautions:  Hypertension, Lymphedema    Specialty Daily Treatment Diary     Manuals 21     Visit # 17 18 19     PF mobs         Stretch HS Quad   4'  4'     Knee PROM  3' 4'  4'     STM  4' STM R knee       AROM   0 to 119 deg             Warm-up         NuStep 10 min  10 min  10'     Neuro Re-Ed         Qset 20x 20x 20     Heel raises 20x 20x 20                         Ther Ex         Mini squat 20x 20x 20     Side step 8x10 ft  6x10 ft on foam beam 6 x 10 ft     Knee flex Step lunge x20 Step lunge 20x 20     SAQ 20x 20x 20     Knee ext 20x 20x 20     SLR  2x10 2x10 20     Heel slides 20x 20x 30     Standing hip Abd   --     Prostretch  5x 10 sec 5x10s       Step up 2x10 4" step   2x10 4" 2 x 10 4"             Ther Activity                               Gait Training                               Modalities          CP  8 min post  10 min 10 min

## 2021-02-22 ENCOUNTER — APPOINTMENT (OUTPATIENT)
Dept: PHYSICAL THERAPY | Facility: CLINIC | Age: 86
End: 2021-02-22
Payer: MEDICARE

## 2021-02-23 ENCOUNTER — OFFICE VISIT (OUTPATIENT)
Dept: PHYSICAL THERAPY | Facility: CLINIC | Age: 86
End: 2021-02-23
Payer: MEDICARE

## 2021-02-23 DIAGNOSIS — M25.561 CHRONIC PAIN OF RIGHT KNEE: ICD-10-CM

## 2021-02-23 DIAGNOSIS — G89.29 CHRONIC PAIN OF RIGHT KNEE: ICD-10-CM

## 2021-02-23 DIAGNOSIS — M17.11 PRIMARY OSTEOARTHRITIS OF RIGHT KNEE: Primary | ICD-10-CM

## 2021-02-23 PROCEDURE — 97110 THERAPEUTIC EXERCISES: CPT

## 2021-02-23 PROCEDURE — 97112 NEUROMUSCULAR REEDUCATION: CPT

## 2021-02-23 NOTE — PROGRESS NOTES
Daily Note      Today's date: 2021  Patient name: Courtney Barrow  : 1935  MRN: [id number]  Referring provider: Tevin Hernandez  Dx:   Encounter Diagnosis     ICD-10-CM    1  Primary osteoarthritis of right knee  M17 11    2  Chronic pain of right knee  M25 561     G89 29        Subjective: Pt reports that her right knee feels about the same  Pt reports 2/10 pain in the right knee  Objective: See treatment diary below    Assessment: Pt tolerated treatment well  Pt performed bar squats and presented initially with bilateral genu valgus  Pt responded fair to verbal cues and demonstration to correct dynamic valgus  Pt provided with manual cues at both knees to perform hip abduction  Pt responded well to manual cuing and correct form was maintained after tactile cuing was removed  Pt introduced to forward step downs and at first displayed apprehension, but was able to perform well with BUE support on railings  Plan: Continue per plan of care  Progress treatment as tolerated            Precautions:  Hypertension, Lymphedema    Specialty Daily Treatment Diary     Manuals 21    Visit # 17 18 19 20    PF mobs         Stretch HS Quad   4'  4' 4'     Knee PROM  3' 4'  4' 4'    STM  4' STM R knee       AROM   0 to 119 deg             Warm-up         NuStep 10 min  10 min  10' 10'     Neuro Re-Ed         Qset 20x 20x 20     Heel raises 20x 20x 20 20x                        Ther Ex         Mini squat 20x 20x 20 20x    Side step 8x10 ft  6x10 ft on foam beam 6 x 10 ft     Knee flex Step lunge x20 Step lunge 20x 20 Step lunge 10x10s    SAQ 20x 20x 20     Knee ext 20x 20x 20 20x    SLR  2x10 2x10 20 20x    Heel slides 20x 20x 30 20x    Standing hip Abd   --     Prostretch  5x 10 sec 5x10s  5x10s     Step up 2x10 4" step   2x10 4" 2 x 10 4" 2x10 4"    Step down     10x 4"     Ther Activity                               Gait Training                               Modalities        CP  8 min post  10 min 10 min 10 min post

## 2021-02-24 ENCOUNTER — OFFICE VISIT (OUTPATIENT)
Dept: PHYSICAL THERAPY | Facility: CLINIC | Age: 86
End: 2021-02-24
Payer: MEDICARE

## 2021-02-24 DIAGNOSIS — M17.11 PRIMARY OSTEOARTHRITIS OF RIGHT KNEE: Primary | ICD-10-CM

## 2021-02-24 DIAGNOSIS — Z01.812 PRE-OPERATIVE LABORATORY EXAMINATION: ICD-10-CM

## 2021-02-24 DIAGNOSIS — G89.29 CHRONIC PAIN OF RIGHT KNEE: ICD-10-CM

## 2021-02-24 DIAGNOSIS — M25.561 CHRONIC PAIN OF RIGHT KNEE: ICD-10-CM

## 2021-02-24 PROCEDURE — 97110 THERAPEUTIC EXERCISES: CPT | Performed by: PHYSICAL THERAPIST

## 2021-02-24 PROCEDURE — 97140 MANUAL THERAPY 1/> REGIONS: CPT | Performed by: PHYSICAL THERAPIST

## 2021-02-24 NOTE — PROGRESS NOTES
PT Re-Evaluation    Today's date: 2021  Patient name: Lewis Casper  : 1935  MRN: [id number]  Referring provider: Carrol Smith  Dx:   Encounter Diagnosis     ICD-10-CM    1  Primary osteoarthritis of right knee  M17 11    2  Chronic pain of right knee  M25 561     G89 29    3  Pre-operative laboratory examination  Z01 812                   Assessment  Assessment details: Lewis Casper presents with pain, decreased LE range of motion, decreased LE strength, fair balance, impaired function and decreased activity tolerance  Patient's clinical presentation is consistent with their referring diagnosis of Primary osteoarthritis of right knee, Chronic pain of right knee and Pre-operative laboratory examination  The pt presents with continued functional limitations of ADLs, recreational activities, ambulation, performing household chores and stair negotiation  She would benefit from more PT  Impairments: abnormal gait, abnormal muscle firing, abnormal muscle tone, abnormal or restricted ROM, abnormal movement, activity intolerance, impaired balance, impaired physical strength, lacks appropriate home exercise program, pain with function, weight-bearing intolerance, poor posture  and poor body mechanics  Understanding of Dx/Px/POC: good   Prognosis: good    Goals  Short term goals  (5 weeks)  1  Patient will have no pain right knee  --  MET  2   Patient will have full range of motion right knee  --  MET  3  Patient will report a 50% improvement with activities of daily living   --  MET  4  Patient will decrease girth of right knee by 1 to 2 cm    --  MET    Long term goals - (10 weeks)  1  Patient will be independent with home exercise program  --  PARTIALLY MET  2  Patient will have no gait deviations ambulating on level surfaces  --  PARTIALLY MET  3  Patient will report 80 % improvement with activity of daily living function  --  PARTIALLY  MET  4    Patient will negotiate stairs up and down pain free with use of one railing    --  PARTIALLY MET      Plan  Patient would benefit from: skilled physical therapy  Planned modality interventions: cryotherapy  Planned therapy interventions: ADL training, balance/weight bearing training, joint mobilization, manual therapy, massage, neuromuscular re-education, patient education, postural training, strengthening, stretching, functional ROM exercises, therapeutic activities, therapeutic exercise, gait training, home exercise program and abdominal trunk stabilization  Frequency: 2x week  Duration in visits: 6  Duration in weeks: 3  Treatment plan discussed with: patient        Subjective Evaluation    History of Present Illness  Mechanism of injury: Overall she feels about 60 % better  Her pain level has significantly reduced but she still has soreness from time to time  She has trouble negotiating stairs still  She notes that walking with the cane can feel unsteady  Pain  Current pain ratin  At best pain ratin  At worst pain ratin  Location: Right knee and shin  Quality: tight, discomfort and dull ache  Relieving factors: rest  Aggravating factors: walking and stair climbing    Social Support    Employment status: not working  Exercise history: house work    Patient Goals  Patient goals for therapy: decreased pain and independence with ADLs/IADLs  Patient's goals regarding treatment: walk normally, secure right knee  Objective     Palpation     Right   Hypertonic in the distal semimembranosus and distal semitendinosus       Active Range of Motion   Left Knee   Flexion: 128 degrees   Extension: 0 degrees     Right Knee   Flexion: 109 degrees   Extension: -2 degrees     Mobility   Patellar Mobility:     Right Knee   WFL: medial, lateral, superior and inferior    Strength/Myotome Testing     Left Knee   Flexion: 4  Extension: 4    Right Knee   Flexion: 4  Extension: 4-    Additional Strength Details  She is independent with SLR but needs frequent rest periods    Swelling     Left Knee Girth Measurement (cm)   Joint line: 36 9 cm    Right Knee Girth Measurement (cm)   Joint line: 37 4 (Unable to measure joint line girth this session due to tight fitting jeans    Will measure this post-op ) cm    Ambulation   Weight-Bearing Status   Assistive device used: small base quad cane    Observational Gait   Gait: within functional limits     Functional Assessment        Comments  She has difficulty with sit to stand and needs arm push to perform independently             Precautions:  Hypertension, Lymphedema    Specialty Daily Treatment Diary      Manuals 2/11/21 2/16/21 2/19/21 2/23/21 2/24/21   Visit # 17 18 19 20  21   PF mobs             Stretch HS Quad   4'  4' 4'   4'   Knee PROM  3' 4'  4' 4'  6'   STM  4' STM R knee           AROM     0 to 119 deg                     Warm-up             NuStep 10 min  10 min  10' 10'   10'   Neuro Re-Ed             Qset 20x 20x 20    30   Heel raises 20x 20x 20 20x  20                               Ther Ex             Mini squat 20x 20x 20 20x  20   Side step 8x10 ft  6x10 ft on foam beam 6 x 10 ft    6 x 10 ft   Knee flex Step lunge x20 Step lunge 20x 20 Step lunge 10x10s     SAQ 20x 20x 20       Knee ext 20x 20x 20 20x  20   SLR  2x10 2x10 20 20x  20   Heel slides 20x 20x 30 20x  20   Standing hip Abd     --       Prostretch  5x 10 sec 5x10s   5x10s      Step up 2x10 4" step   2x10 4" 2 x 10 4" 2x10 4"  2 x 10  6"   Step down        10x 4"      Ther Activity                                         Gait Training                                         Modalities             CP  8 min post  10 min 10 min 10 min post  5 min

## 2021-02-25 ENCOUNTER — APPOINTMENT (OUTPATIENT)
Dept: PHYSICAL THERAPY | Facility: CLINIC | Age: 86
End: 2021-02-25
Payer: MEDICARE

## 2021-03-03 ENCOUNTER — OFFICE VISIT (OUTPATIENT)
Dept: PHYSICAL THERAPY | Facility: CLINIC | Age: 86
End: 2021-03-03
Payer: MEDICARE

## 2021-03-03 DIAGNOSIS — M17.11 PRIMARY OSTEOARTHRITIS OF RIGHT KNEE: Primary | ICD-10-CM

## 2021-03-03 DIAGNOSIS — Z01.812 PRE-OPERATIVE LABORATORY EXAMINATION: ICD-10-CM

## 2021-03-03 DIAGNOSIS — G89.29 CHRONIC PAIN OF RIGHT KNEE: ICD-10-CM

## 2021-03-03 DIAGNOSIS — M25.561 CHRONIC PAIN OF RIGHT KNEE: ICD-10-CM

## 2021-03-03 PROCEDURE — 97110 THERAPEUTIC EXERCISES: CPT

## 2021-03-03 PROCEDURE — 97140 MANUAL THERAPY 1/> REGIONS: CPT

## 2021-03-03 PROCEDURE — 97112 NEUROMUSCULAR REEDUCATION: CPT

## 2021-03-03 NOTE — PROGRESS NOTES
Daily Note     Today's date: 3/3/2021  Patient name: Nusrat Blair  : 1935  MRN: [id number]  Referring provider: Sandy Morin  Dx:   Encounter Diagnosis     ICD-10-CM    1  Primary osteoarthritis of right knee  M17 11    2  Chronic pain of right knee  M25 561     G89 29    3  Pre-operative laboratory examination  Z01 812        Start Time: 2  Stop Time: 1530  Total time in clinic (min): 45 minutes    Subjective: Pt reports she drove for the first time today, "I still have problems with stairs"      Objective: See treatment diary below      Assessment: Tolerated treatment well  Patient demonstrated fatigue post treatment and would benefit from continued PT  Pt demo difficulty with stair negotiation  Plan: Continue per plan of care        Precautions:  Hypertension, Lymphedema    Specialty Daily Treatment Diary      Manuals 2/16/21 2/19/21 2/23/21  2/24/21 3/3/21   Visit # 18 19 20  21 22   PF mobs            Stretch HS Quad 4'  4' 4'   4' 4'   Knee PROM 4'  4' 4'  6' 6'   STM            AROM   0 to 119 deg                     Warm-up            NuStep 10 min  10' 10'   10' 10'   Neuro Re-Ed            Qset 20x 20    30 30   Heel raises 20x 20 20x  20 20                             Ther Ex            Mini squat 20x 20 20x  20 20   Side step 6x10 ft on foam beam 6 x 10 ft    6 x 10 ft 6 x 10 ft   Knee flex Step lunge 20x 20 Step lunge 10x10s      SAQ 20x 20        Knee ext 20x 20 20x  20 20   SLR 2x10 20 20x  20 20   Heel slides 20x 30 20x  20 20   Standing hip Abd   --        Prostretch 5x10s   5x10s       Step up 2x10 4" 2 x 10 4" 2x10 4"  2 x 10  6" 2x10 6"   Step down      10x 4"       Ther Activity                                      Gait Training                                      Modalities            CP 10 min 10 min 10 min post  5 min

## 2021-03-04 ENCOUNTER — OFFICE VISIT (OUTPATIENT)
Dept: PHYSICAL THERAPY | Facility: CLINIC | Age: 86
End: 2021-03-04
Payer: MEDICARE

## 2021-03-04 DIAGNOSIS — G89.29 CHRONIC PAIN OF RIGHT KNEE: ICD-10-CM

## 2021-03-04 DIAGNOSIS — M17.11 PRIMARY OSTEOARTHRITIS OF RIGHT KNEE: Primary | ICD-10-CM

## 2021-03-04 DIAGNOSIS — Z01.812 PRE-OPERATIVE LABORATORY EXAMINATION: ICD-10-CM

## 2021-03-04 DIAGNOSIS — M25.561 CHRONIC PAIN OF RIGHT KNEE: ICD-10-CM

## 2021-03-04 PROCEDURE — 97110 THERAPEUTIC EXERCISES: CPT | Performed by: PHYSICAL THERAPIST

## 2021-03-04 PROCEDURE — 97140 MANUAL THERAPY 1/> REGIONS: CPT | Performed by: PHYSICAL THERAPIST

## 2021-03-04 NOTE — PROGRESS NOTES
Daily Note     Today's date: 3/4/2021  Patient name: Marilu Shultz  : 1935  MRN: [id number]  Referring provider: Barbie Silva  Dx:   Encounter Diagnosis     ICD-10-CM    1  Primary osteoarthritis of right knee  M17 11    2  Chronic pain of right knee  M25 561     G89 29    3  Pre-operative laboratory examination  Z01 812                   Subjective: Pt reports 2/10 right knee  Objective: See treatment diary below      Assessment: Tolerated treatment well  Ramonita Wisdom still has mild swelling remaining right knee  Plan: Continue per plan of care        Precautions:  Hypertension, Lymphedema    Specialty Daily Treatment Diary      Manuals 2/19/21 2/23/21  2/24/21 3/3/21 3/4/21   Visit # 19 20  21 22 23   PF mobs           Stretch HS Quad 4' 4'   4' 4' 4'   Knee PROM 4' 4'  6' 6' 6'   STM           AROM 0 to 119 deg      0 to 118 deg               Warm-up           NuStep 10' 10'   10' 10' 10'   Neuro Re-Ed           Qset 20    30 30 30   Heel raises 20 20x  20 20 20                           Ther Ex           Mini squat 20 20x  20 20 20   Side step 6 x 10 ft    6 x 10 ft 6 x 10 ft 6 x 10 ft   Knee flex 20 Step lunge 10x10s       SAQ 20         Knee ext 20 20x  20 20 30   SLR 20 20x  20 20 20   Heel slides 30 20x  20 20 30   Standing hip Abd --         Prostretch   5x10s        Step up 2 x 10 4" 2x10 4"  2 x 10  6" 2x10 6" 20   6"   Step down    10x 4"         Leg press        20   45#               Gait Training                                   Modalities           CP 10 min 10 min post  5 min  5 min

## 2021-03-08 ENCOUNTER — OFFICE VISIT (OUTPATIENT)
Dept: PHYSICAL THERAPY | Facility: CLINIC | Age: 86
End: 2021-03-08
Payer: MEDICARE

## 2021-03-08 DIAGNOSIS — Z01.812 PRE-OPERATIVE LABORATORY EXAMINATION: ICD-10-CM

## 2021-03-08 DIAGNOSIS — M17.11 PRIMARY OSTEOARTHRITIS OF RIGHT KNEE: Primary | ICD-10-CM

## 2021-03-08 DIAGNOSIS — M25.561 CHRONIC PAIN OF RIGHT KNEE: ICD-10-CM

## 2021-03-08 DIAGNOSIS — G89.29 CHRONIC PAIN OF RIGHT KNEE: ICD-10-CM

## 2021-03-08 PROCEDURE — 97110 THERAPEUTIC EXERCISES: CPT | Performed by: PHYSICAL THERAPIST

## 2021-03-08 PROCEDURE — 97140 MANUAL THERAPY 1/> REGIONS: CPT | Performed by: PHYSICAL THERAPIST

## 2021-03-08 NOTE — PROGRESS NOTES
Daily Note     Today's date: 3/8/2021  Patient name: Trinidad Duran  : 1935  MRN: [id number]  Referring provider: Tayla Feliciano  Dx:   Encounter Diagnosis     ICD-10-CM    1  Primary osteoarthritis of right knee  M17 11    2  Chronic pain of right knee  M25 561     G89 29    3  Pre-operative laboratory examination  Z01 812                   Subjective: Pt reports that her knee feels good but she has joint soreness throughout the rest of her body  Objective: See treatment diary below      Assessment: Tolerated treatment well  She would benefit from adding balance training into her program       Plan: Continue per plan of care    Add SLS     Precautions:  Hypertension, Lymphedema    Specialty Daily Treatment Diary      Manuals 2/23/21  2/24/21 3/3/21 3/4/21 3/8/21   Visit # 20  21 22 23 24   PF mobs          Stretch HS Quad 4'   4' 4' 4' 4'   Knee PROM 4'  6' 6' 6' 6'   STM          AROM      0 to 118 deg               Warm-up          NuStep 10'   10' 10' 10' 10'   Neuro Re-Ed          Qset    30 30 30 30   Heel raises 20x  20 20 20 20    SLS                     Ther Ex          Mini squat 20x  20 20 20 30   Side step    6 x 10 ft 6 x 10 ft 6 x 10 ft 6 x 10 ft   Knee flex Step lunge 10x10s        Knee ext 20x  20 20 30 30   SLR 20x  20 20 20 20   Heel slides 20x  20 20 30 30   Prostretch 5x10s         Step up 2x10 4"  2 x 10  6" 2x10 6" 20   6" 20   6"   Step down  10x 4"          Leg press      20   45# 30   55#                      Gait Training                                Modalities          CP 10 min post  5 min  5 min 5 min

## 2021-03-11 ENCOUNTER — OFFICE VISIT (OUTPATIENT)
Dept: PHYSICAL THERAPY | Facility: CLINIC | Age: 86
End: 2021-03-11
Payer: MEDICARE

## 2021-03-11 DIAGNOSIS — Z01.812 PRE-OPERATIVE LABORATORY EXAMINATION: ICD-10-CM

## 2021-03-11 DIAGNOSIS — G89.29 CHRONIC PAIN OF RIGHT KNEE: ICD-10-CM

## 2021-03-11 DIAGNOSIS — M17.11 PRIMARY OSTEOARTHRITIS OF RIGHT KNEE: Primary | ICD-10-CM

## 2021-03-11 DIAGNOSIS — M25.561 CHRONIC PAIN OF RIGHT KNEE: ICD-10-CM

## 2021-03-11 PROCEDURE — 97110 THERAPEUTIC EXERCISES: CPT | Performed by: PHYSICAL THERAPIST

## 2021-03-11 PROCEDURE — 97140 MANUAL THERAPY 1/> REGIONS: CPT | Performed by: PHYSICAL THERAPIST

## 2021-03-11 NOTE — PROGRESS NOTES
Daily Note     Today's date: 3/11/2021  Patient name: Dain Lake  : 1935  MRN: [id number]  Referring provider: Janie Thakkar  Dx:   Encounter Diagnosis     ICD-10-CM    1  Primary osteoarthritis of right knee  M17 11    2  Chronic pain of right knee  M25 561     G89 29    3  Pre-operative laboratory examination  Z01 812                   Subjective: Pt reports that her largest deficit is stairs  No pain      Objective: See treatment diary below      Assessment: Tolerated treatment well  She has weakness remaining and would benefit from attempting higher step ups next session  Plan: Continue per plan of care         Precautions:  Hypertension, Lymphedema    Specialty Daily Treatment Diary      Manuals  2/24/21 3/3/21 3/4/21 3/8/21 3/11/21   Visit #  21 22 23 24 25   PF mobs         Stretch HS Quad  4' 4' 4' 4' 4'   Knee PROM  6' 6' 6' 6' 6'   STM         AROM    0 to 118 deg  0 to 119 deg             Warm-up         NuStep  10' 10' 10' 10' 10'   Neuro Re-Ed         Qset  30 30 30 30    Heel raises  20 20 20 20 20    SLS      10             Ther Ex         Mini squat  20 20 20 30 30   Side step  6 x 10 ft 6 x 10 ft 6 x 10 ft 6 x 10 ft 6 x 10 ft   Knee flex         Knee ext  20 20 30 30 30   SLR  20 20 20 20 30   Heel slides  20 20 30 30 30    Step up  2 x 10  6" 2x10 6" 20   6" 20   6" 20   6"    Leg press    20   45# 30   55# 30  65#                     Gait Training                             Modalities         CP  5 min  5 min 5 min 10 min

## 2021-03-15 ENCOUNTER — OFFICE VISIT (OUTPATIENT)
Dept: PHYSICAL THERAPY | Facility: CLINIC | Age: 86
End: 2021-03-15
Payer: MEDICARE

## 2021-03-15 DIAGNOSIS — G89.29 CHRONIC PAIN OF RIGHT KNEE: ICD-10-CM

## 2021-03-15 DIAGNOSIS — M17.11 PRIMARY OSTEOARTHRITIS OF RIGHT KNEE: Primary | ICD-10-CM

## 2021-03-15 DIAGNOSIS — Z01.812 PRE-OPERATIVE LABORATORY EXAMINATION: ICD-10-CM

## 2021-03-15 DIAGNOSIS — M25.561 CHRONIC PAIN OF RIGHT KNEE: ICD-10-CM

## 2021-03-15 PROCEDURE — 97112 NEUROMUSCULAR REEDUCATION: CPT

## 2021-03-15 PROCEDURE — 97110 THERAPEUTIC EXERCISES: CPT

## 2021-03-15 NOTE — PROGRESS NOTES
Daily Note     Today's date: 3/15/2021  Patient name: Payton Bloch  : 1935  MRN: [id number]  Referring provider: Donna Person  Dx:   Encounter Diagnosis     ICD-10-CM    1  Primary osteoarthritis of right knee  M17 11    2  Chronic pain of right knee  M25 561     G89 29    3  Pre-operative laboratory examination  Z01 812        Start Time: 8742  Stop Time: 1400  Total time in clinic (min): 45 minutes    Subjective: Pt reports she was having difficulty lifting leg when putting shoes, socks, pants on  Objective: See treatment diary below      Assessment: Tolerated treatment well  She has weakness remaining and would benefit from attempting higher step ups next session  Plan: Continue per plan of care         Precautions:  Hypertension, Lymphedema    Specialty Daily Treatment Diary      Manuals 3/3/21 3/4/21 3/8/21 3/11/21 3/15/21   Visit # 22 23 24 25 26   PF mobs        Stretch HS Quad 4' 4' 4' 4' 4'   Knee PROM 6' 6' 6' 6' 6'   STM        AROM  0 to 118 deg  0 to 119 deg 0 to             Warm-up        NuStep 10' 10' 10' 10' 11'   Neuro Re-Ed        Qset 30 30 30     Heel raises 20 20 20 20 20    SLS    10             Ther Ex        Mini squat 20 20 30 30 30   Side step 6 x 10 ft 6 x 10 ft 6 x 10 ft 6 x 10 ft 6 x 10ft   Knee flex        Knee ext 20 30 30 30 30   SLR 20 20 20 30 30   Heel slides 20 30 30 30 30    Step up 2x10 6" 20   6" 20   6" 20   6" 20    Leg press  20   45# 30   55# 30  65# 30 69#   Hip flex with tband     Yellow loop 20x            Gait Training                          Modalities        CP  5 min 5 min 10 min 10'

## 2021-03-17 ENCOUNTER — OFFICE VISIT (OUTPATIENT)
Dept: PHYSICAL THERAPY | Facility: CLINIC | Age: 86
End: 2021-03-17
Payer: MEDICARE

## 2021-03-17 DIAGNOSIS — M17.11 PRIMARY OSTEOARTHRITIS OF RIGHT KNEE: Primary | ICD-10-CM

## 2021-03-17 DIAGNOSIS — Z01.812 PRE-OPERATIVE LABORATORY EXAMINATION: ICD-10-CM

## 2021-03-17 DIAGNOSIS — M25.561 CHRONIC PAIN OF RIGHT KNEE: ICD-10-CM

## 2021-03-17 DIAGNOSIS — G89.29 CHRONIC PAIN OF RIGHT KNEE: ICD-10-CM

## 2021-03-17 PROCEDURE — 97140 MANUAL THERAPY 1/> REGIONS: CPT | Performed by: PHYSICAL THERAPIST

## 2021-03-17 PROCEDURE — 97110 THERAPEUTIC EXERCISES: CPT | Performed by: PHYSICAL THERAPIST

## 2021-03-17 NOTE — PROGRESS NOTES
Daily Note     Today's date: 3/17/2021  Patient name: Chao Irvin  : 1935  MRN: [id number]  Referring provider: Kely Dennis  Dx:   Encounter Diagnosis     ICD-10-CM    1  Primary osteoarthritis of right knee  M17 11    2  Chronic pain of right knee  M25 561     G89 29    3  Pre-operative laboratory examination  Z01 812                   Subjective: Pt reports slight discomfort in the knee of 3/10  Continues to have soreness to the touch on the inner thigh area  Objective: See treatment diary below      Assessment: Tolerated treatment well  Addition of seated self hamstring stretch with VCs and TCs for correct form  Patient had decrease in knee discomfort with PROM knee extension with slight tibia distraction  She has weakness remaining and would benefit from attempting higher step ups next session  Plan: Continue per plan of care         Precautions:  Hypertension, Lymphedema    Specialty Daily Treatment Diary      Manuals 3/17/2021    3/15/21   Visit # 27    26   PF mobs        Stretch HS Quad 3 reps 30 sec seated     4'   Knee PROM 6'    6'   STM        AROM     0 to             Warm-up        NuStep 10'    11'   Neuro Re-Ed        Qset 30       Heel raises 20    20    SLS                 Ther Ex        Mini squat 20    30   Side step 4 x 15 ft    6 x 10ft   Knee flex        Knee ext 20    30   SLR 20    30   Heel slides 20    30    Step up 2x10 6"    20    Leg press     30 69#   Hip flex with tband     Yellow loop 20x            Gait Training                          Modalities        CP     10'

## 2021-03-18 ENCOUNTER — APPOINTMENT (OUTPATIENT)
Dept: RADIOLOGY | Facility: CLINIC | Age: 86
End: 2021-03-18
Payer: MEDICARE

## 2021-03-18 ENCOUNTER — OFFICE VISIT (OUTPATIENT)
Dept: OBGYN CLINIC | Facility: CLINIC | Age: 86
End: 2021-03-18
Payer: MEDICARE

## 2021-03-18 VITALS
WEIGHT: 114.2 LBS | BODY MASS INDEX: 23.02 KG/M2 | HEIGHT: 59 IN | SYSTOLIC BLOOD PRESSURE: 185 MMHG | HEART RATE: 57 BPM | DIASTOLIC BLOOD PRESSURE: 70 MMHG

## 2021-03-18 DIAGNOSIS — Z96.651 AFTERCARE FOLLOWING RIGHT KNEE JOINT REPLACEMENT SURGERY: ICD-10-CM

## 2021-03-18 DIAGNOSIS — Z96.651 STATUS POST TOTAL RIGHT KNEE REPLACEMENT USING CEMENT: ICD-10-CM

## 2021-03-18 DIAGNOSIS — Z96.651 STATUS POST TOTAL RIGHT KNEE REPLACEMENT USING CEMENT: Primary | ICD-10-CM

## 2021-03-18 DIAGNOSIS — Z47.1 AFTERCARE FOLLOWING RIGHT KNEE JOINT REPLACEMENT SURGERY: ICD-10-CM

## 2021-03-18 PROCEDURE — 99213 OFFICE O/P EST LOW 20 MIN: CPT | Performed by: ORTHOPAEDIC SURGERY

## 2021-03-18 PROCEDURE — 73562 X-RAY EXAM OF KNEE 3: CPT

## 2021-03-18 RX ORDER — TRAMADOL HYDROCHLORIDE 50 MG/1
50 TABLET ORAL AS NEEDED
COMMUNITY
End: 2022-06-21 | Stop reason: ALTCHOICE

## 2021-03-18 NOTE — PROGRESS NOTES
Assessment/Plan:  1  Status post total right knee replacement using cement  XR knee 3 vw right non injury   2  Aftercare following right knee joint replacement surgery        Paxton Garcia is a pleasant 44-year-old female presenting 3 months after undergoing a right total arthroplasty  She is doing very well at this time  Prosthesis is stable imaging and exam   She has already regained full range of motion, and we feel it is reasonable for her to discontinue therapy this time  We will leave that to the discretion of her and her therapist   She may return to all activities of daily living and enjoyment without restriction hearing and she understands of the knee for antibiotic prophylaxis before any dental or GI procedures  We will plan to see her in 9 months, which will kandi the anniversary of her surgery  She will need x-rays on arrival of her right knee at that  She and her son her aware to call with any questions or concerns in the interim  All questions addressed today    Subjective: Three months status post right total knee arthroplasty    Patient ID: Pooja Chris is a 80 y o  female  Paxton Garcia is a pleasant 44-year-old female presenting today 3 months after undergoing a right arthroplasty she is better  She has noted about 80& improvement her symptoms  She still working with physical therapy and had discomfort after yesterday's session  She also reports difficulty going down stairs  Her son is here with her today and states that she has been much more mobile and not been complaining nearly as frequently  She has been able to get in and out of cars without difficulty able up and down stairs  She still using a cane in ambulation    Review of Systems   Constitutional: Negative  HENT: Negative  Eyes: Negative  Respiratory: Negative  Cardiovascular: Positive for leg swelling  Gastrointestinal: Negative  Endocrine: Negative  Genitourinary: Negative      Musculoskeletal: Positive for joint swelling and myalgias  Skin: Negative  Allergic/Immunologic: Negative  Neurological: Negative  Hematological: Negative  Psychiatric/Behavioral: Negative  Past Medical History:   Diagnosis Date    Anesthesia complication       Yrs ago had "anxiety" reaction not sure while sedated, pt states sensitive to anesthesia effects    Anxiety     stress at home    Arthritis     Cataract, right     Last Assessed:16    Eye hemorrhage     left eye currently 16    History of shingles 2015    Hypertension     Mitral valve stenosis, mild        Past Surgical History:   Procedure Laterality Date    CARPAL TUNNEL RELEASE Left     CATARACT EXTRACTION      CATARACT EXTRACTION W/ INTRAOCULAR LENS IMPLANT Left 10/11/2016    Procedure: EXTRACTION EXTRACAPSULAR CATARACT PHACO INTRAOCULAR LENS (IOL); Surgeon: Tatianna Spencer MD;  Location: [facility] MAIN OR;  Service:     CERVICAL CONE BIOPSY      COLONOSCOPY      EYE SURGERY Left     muscle repair    ND OPEN RX FEMUR FX+INTRAMED NINI Right 2019    Procedure: INSERTION NAIL IM FEMUR ANTEGRADE (TROCHANTERIC); Surgeon: Wendy Sandoval DO;  Location: [address] [place] [address];  Service: Orthopedics    ND TOTAL KNEE ARTHROPLASTY Right 12/15/2020    Procedure: ARTHROPLASTY KNEE TOTAL;  Surgeon: Wendy Sandoval DO;  Location: [address] [place] [address];  Service: Orthopedics    ND XCAPSL CTRC RMVL INSJ IO LENS PROSTH W/O ECP Right 2016    Procedure: EXTRACTION EXTRACAPSULAR CATARACT PHACO INTRAOCULAR LENS (IOL);   Surgeon: Tatianna Spencer MD;  Location: [facility] MAIN OR;  Service: Ophthalmology    TONSILLECTOMY         Family History   Problem Relation Age of Onset    Heart disease Mother         MI  at age 71   [facility] Arthritis Mother     GI problems Father         bleeding ulcer    Arthritis Sister     Sleep apnea Sister     Irritable bowel syndrome Sister     Cancer Sister         skin cancer    Heart disease Brother         CABG (11)    Cancer Brother skin cancer    Heart disease Maternal Aunt        Social History     Occupational History    Not on file   Tobacco Use    Smoking status: Never Smoker    Smokeless tobacco: Never Used   Substance and Sexual Activity    Alcohol use: Yes     Comment: rarely    Drug use: No    Sexual activity: Not on file         Current Outpatient Medications:     acetaminophen (TYLENOL) 325 mg tablet, Take 3 tablets (975 mg total) by mouth every 8 (eight) hours, Disp:  , Rfl: 0    aspirin (ECOTRIN) 325 mg EC tablet, Take 1 tablet (325 mg total) by mouth 2 (two) times a day, Disp: 84 tablet, Rfl: 0    Calcium Carb-Cholecalciferol (CALCIUM 600 + D) 600-200 MG-UNIT TABS, Take 1 tablet by mouth daily, Disp: , Rfl:     furosemide (LASIX) 40 mg tablet, Take 1 tablet (40 mg total) by mouth daily As needed for leg swelling, Disp: 90 tablet, Rfl: 3    metoprolol succinate (TOPROL-XL) 50 mg 24 hr tablet, TAKE ONE TABLET DAILY AS DIRECTED, Disp: 90 tablet, Rfl: 2    Multiple Vitamins-Minerals (PRESERVISION AREDS PO), Take by mouth, Disp: , Rfl:     potassium chloride (K-DUR,KLOR-CON) 20 mEq tablet, TAKE 1 TABLET (20 MEQ TOTAL) BY MOUTH DAILY, Disp: 30 tablet, Rfl: 5    Stool Softener 100 MG capsule, TAKE 1 CAPSULE (100 MG TOTAL) BY MOUTH 2 (TWO) TIMES A DAY, Disp: 60 capsule, Rfl: 0    traMADol (ULTRAM) 50 mg tablet, Take 50 mg by mouth as needed for moderate pain Usually takes before therapy, Disp: , Rfl:     Allergies   Allergen Reactions    Indocin [Indomethacin]      hypertension       Objective:  Vitals:    03/18/21 1358   BP: (!) 185/70   Pulse: 57       Body mass index is 23 07 kg/m²  Right Knee Exam     Muscle Strength   The patient has normal right knee strength  Tenderness   The patient is experiencing tenderness in the medial retinaculum  Range of Motion   Extension: normal Right knee extension: 0  Flexion: normal Right knee flexion: 120       Tests   Varus: negative Valgus: negative  Drawer:  Anterior - negative      Patellar apprehension: negative    Other   Erythema: absent  Scars: present  Sensation: normal  Pulse: present  Swelling: mild  Effusion: no effusion present    Comments:  Anterior incision well healed  Prosthesis stable at 0, 30, 90°  Thigh and calf soft nontender  Lymphedema right lower extremity at baseline  Grossly distally neurovascularly intact  Ambulates slowly with use of a cane   patella tracked flat and midline without crepitance          Observations     Right Knee   Negative for effusion  Physical Exam  Vitals signs and nursing note reviewed  Constitutional:       Appearance: She is well-developed  Comments: Body mass index is 23 07 kg/m²  HENT:      Head: Normocephalic and atraumatic  Right Ear: External ear normal       Left Ear: External ear normal    Eyes:      Extraocular Movements: Extraocular movements intact  Neck:      Musculoskeletal: Normal range of motion  Cardiovascular:      Rate and Rhythm: Normal rate  Pulmonary:      Effort: Pulmonary effort is normal    Abdominal:      Palpations: Abdomen is soft  Musculoskeletal:      Right knee: She exhibits no effusion  Comments: See ortho exam   Skin:     General: Skin is warm and dry  Neurological:      Mental Status: She is alert and oriented to person, place, and time  Mental status is at baseline  Psychiatric:         Mood and Affect: Mood normal          Behavior: Behavior normal          Thought Content: Thought content normal          Judgment: Judgment normal          I have personally reviewed pertinent films in PACS  Of the x-rays taken today of her right knee compared them to previous films  There has been no change in alignment the total knee prosthesis remains well aligned  And cemented    The distal stem of long TFN is also unchanged from previous films

## 2021-03-24 ENCOUNTER — OFFICE VISIT (OUTPATIENT)
Dept: PHYSICAL THERAPY | Facility: CLINIC | Age: 86
End: 2021-03-24
Payer: MEDICARE

## 2021-03-24 DIAGNOSIS — M25.561 CHRONIC PAIN OF RIGHT KNEE: ICD-10-CM

## 2021-03-24 DIAGNOSIS — Z01.812 PRE-OPERATIVE LABORATORY EXAMINATION: ICD-10-CM

## 2021-03-24 DIAGNOSIS — G89.29 CHRONIC PAIN OF RIGHT KNEE: ICD-10-CM

## 2021-03-24 DIAGNOSIS — M17.11 PRIMARY OSTEOARTHRITIS OF RIGHT KNEE: Primary | ICD-10-CM

## 2021-03-24 PROCEDURE — 97140 MANUAL THERAPY 1/> REGIONS: CPT | Performed by: PHYSICAL THERAPIST

## 2021-03-24 PROCEDURE — 97110 THERAPEUTIC EXERCISES: CPT | Performed by: PHYSICAL THERAPIST

## 2021-03-24 NOTE — PROGRESS NOTES
Daily Note     Today's date: 3/24/2021  Patient name: Mariaa Garcia  : 1935  MRN: [id number]  Referring provider: Matt Rivera  Dx:   Encounter Diagnosis     ICD-10-CM    1  Primary osteoarthritis of right knee  M17 11    2  Chronic pain of right knee  M25 561     G89 29    3  Pre-operative laboratory examination  Z01 812                   Subjective: Pt reports slight discomfort today possibly due to the weather  Objective: See treatment diary below      Assessment: Tolerated treatment well  We attempted an 8 inch step up today  She had some trouble with the first few repetitions but then improved with cues to transition body weight forward  Plan: Continue per plan of care         Precautions:  Hypertension, Lymphedema    Specialty Daily Treatment Diary      Manuals 3/17/2021 3/24/21      Visit # 27 28      PF mobs        Stretch HS Quad 3 reps 30 sec seated  4'      Knee PROM 6' 6'      STM        AROM                 Warm-up        NuStep 10' 10'      Neuro Re-Ed        Qset 30 30      Heel raises 20 20       SLS  10               Ther Ex        Mini squat 20 20      Side step 4 x 15 ft 4 x 15 ft      Knee flex        Knee ext 20 30      SLR 20 20      Heel slides 20 30       Step up 2x10 6" 20  8"       Leg press  30   55#      Hip flex with tband                 Gait Training                          Modalities        CP  5 min

## 2021-03-25 ENCOUNTER — OFFICE VISIT (OUTPATIENT)
Dept: CARDIOLOGY CLINIC | Facility: CLINIC | Age: 86
End: 2021-03-25
Payer: MEDICARE

## 2021-03-25 VITALS
DIASTOLIC BLOOD PRESSURE: 80 MMHG | HEIGHT: 59 IN | OXYGEN SATURATION: 99 % | TEMPERATURE: 97.4 F | SYSTOLIC BLOOD PRESSURE: 168 MMHG | HEART RATE: 60 BPM | BODY MASS INDEX: 23.39 KG/M2 | WEIGHT: 116 LBS

## 2021-03-25 DIAGNOSIS — I35.0 MODERATE AORTIC STENOSIS: ICD-10-CM

## 2021-03-25 DIAGNOSIS — I50.32 CHRONIC DIASTOLIC CONGESTIVE HEART FAILURE (HCC): ICD-10-CM

## 2021-03-25 DIAGNOSIS — Z96.651 STATUS POST TOTAL RIGHT KNEE REPLACEMENT USING CEMENT: ICD-10-CM

## 2021-03-25 DIAGNOSIS — E78.5 DYSLIPIDEMIA: ICD-10-CM

## 2021-03-25 DIAGNOSIS — I89.0 LYMPHEDEMA OF BOTH LOWER EXTREMITIES: ICD-10-CM

## 2021-03-25 DIAGNOSIS — I10 ESSENTIAL HYPERTENSION: Primary | ICD-10-CM

## 2021-03-25 PROCEDURE — 99214 OFFICE O/P EST MOD 30 MIN: CPT | Performed by: INTERNAL MEDICINE

## 2021-03-25 RX ORDER — RIBOFLAVIN (VITAMIN B2) 100 MG
100 TABLET ORAL DAILY
COMMUNITY

## 2021-03-25 NOTE — PROGRESS NOTES
Office Cardiology Progress Note  Chavez Leslie 80 y o  female MRN: [id number]  03/25/21  5:11 PM      ASSESSMENT:    1  Recent recurrence chronic fatigue, sleepiness and tiredness, with hypothyroidism ruled out on recent lab work  Symptoms could be related to reactive depression  2  Asymptomatic moderate aortic stenosis   Seems to also have murmur of mitral regurgitation   Had moderate aortic stenosis and regurgitation with mean gradient of 22 mmHg as well as 1+ MR/MAC on TTE 05/14/2019   Also had moderate LVH with 60% LVEF and elevated mean left atrial filling pressure  3  Significant improvement in chronic left more than right lower extremity lymphedema with associated stasis dermatitis and contribution from underlying venous insufficiency of right lower extremity, confirmed on venous reflux study of 79/17/5707  FJB complicating slowly healing superficial ulcerations of right lower medial calf , now resolved  4  Previously controlled essential hypertension,  With average home blood pressure of 130/66 between 11/14 and 11/18/2020   5  Improved dyslipidemia, with residual increase in LDL     6  History of successful right knee total arthroplasty on 12/15/2020 with 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  Plan       Patient Instructions     1  Beginning in about two weeks, do blood pressures at home morning and evening, three readings at a time one after the other, for four consecutive days, recording all of the readings on the blood pressure data sheet given to you by Dr Toni Berrios  2   Take blood pressures after resting for at least 15 minutes while seated in a chair or at a table with arm supported on arm rest or table  Do 3 readings, one after the other, both morning and evening for 4 consecutive days  3   Write down each and every reading with date and time and get list of readings to the office of Dr Toni Berrios for his review    4   Our office will contact you with further instructions and the results of this review  5  Call the phone number Dr Leti Chi gave you to arrange for COVID vaccination  6  Cardiology follow-up approximately six months with lipids, CMP, EKG  7  Resume use of LymphAPress compression pumps for lymphedema  HPI    This 80 y o  female  denies new cardiopulmonary and medical symptoms  She does complain of sleeping a lot, increased lower extremity edema recently, depression over the loss of her  and brother  The patient underwent right total knee arthroplasty on 12/15/2020 and was slow to recover from that but  Is now improving with ongoing physical therapy  She is being seen in follow-up of the below listed diagnoses  Encounter Diagnoses   Name Primary?  Essential hypertension Yes    Chronic diastolic congestive heart failure (HCC)     Moderate aortic stenosis     Dyslipidemia     Lymphedema of both lower extremities     Status post total right knee replacement using cement         Review of Systems    All other systems negative, except as noted in history of present illness    Historical Information   Past Medical History:   Diagnosis Date    Anesthesia complication       Yrs ago had "anxiety" reaction not sure while sedated, pt states sensitive to anesthesia effects    Anxiety     stress at home    Arthritis     Cataract, right     Last Assessed:5/11/16    Eye hemorrhage     left eye currently 5/12/16    History of shingles 05/2015    Hypertension     Mitral valve stenosis, mild      Past Surgical History:   Procedure Laterality Date    CARPAL TUNNEL RELEASE Left     CATARACT EXTRACTION      CATARACT EXTRACTION W/ INTRAOCULAR LENS IMPLANT Left 10/11/2016    Procedure: EXTRACTION EXTRACAPSULAR CATARACT PHACO INTRAOCULAR LENS (IOL);   Surgeon: John Snow MD;  Location: [facility] MAIN OR;  Service:     CERVICAL CONE BIOPSY      COLONOSCOPY      EYE SURGERY Left     muscle repair    VT OPEN RX FEMUR FX+INTRAMED NINI Right 7/23/2019 Procedure: INSERTION NAIL IM FEMUR ANTEGRADE (TROCHANTERIC); Surgeon: Katharine Christensen DO;  Location: [address] [place] [address];  Service: Orthopedics    CO TOTAL KNEE ARTHROPLASTY Right 12/15/2020    Procedure: ARTHROPLASTY KNEE TOTAL;  Surgeon: Katharine Christensen DO;  Location: [address] [place] [address];  Service: Orthopedics    CO XCAPSL CTRC RMVL INSJ IO LENS PROSTH W/O ECP Right 2016    Procedure: EXTRACTION EXTRACAPSULAR CATARACT PHACO INTRAOCULAR LENS (IOL); Surgeon: Kaitlin Dias MD;  Location: [facility] MAIN OR;  Service: Ophthalmology    TONSILLECTOMY       Social History     Substance and Sexual Activity   Alcohol Use Yes    Comment: rarely     Social History     Substance and Sexual Activity   Drug Use No     Social History     Tobacco Use   Smoking Status Never Smoker   Smokeless Tobacco Never Used       Family History:  Family History   Problem Relation Age of Onset    Heart disease Mother         MI  at age 71   [facility] Arthritis Mother     GI problems Father         bleeding ulcer    Arthritis Sister     Sleep apnea Sister     Irritable bowel syndrome Sister     Cancer Sister         skin cancer    Heart disease Brother         CABG (5)   Edlore [facility] Cancer Brother         skin cancer    Heart disease Maternal Aunt          Meds/Allergies     Prior to Admission medications    Medication Sig Start Date End Date Taking?  Authorizing Provider   acetaminophen (TYLENOL) 325 mg tablet Take 3 tablets (975 mg total) by mouth every 8 (eight) hours 20  Yes Nicolle Crook PA-C   Ascorbic Acid (vitamin C) 100 MG tablet Take 100 mg by mouth daily   Yes Historical Provider, MD   aspirin (ECOTRIN) 325 mg EC tablet Take 1 tablet (325 mg total) by mouth 2 (two) times a day 20  Yes Nicolle Crook PA-C   Calcium Carb-Cholecalciferol (CALCIUM 600 + D) 600-200 MG-UNIT TABS Take 1 tablet by mouth daily 13  Yes Historical Provider, MD   furosemide (LASIX) 40 mg tablet Take 1 tablet (40 mg total) by mouth daily As needed for leg swelling 11/2/20  Yes Sol Alvarez MD   metoprolol succinate (TOPROL-XL) 50 mg 24 hr tablet TAKE ONE TABLET DAILY AS DIRECTED 10/16/20  Yes Sol Alvarez MD   Multiple Vitamins-Minerals (PRESERVISION AREDS PO) Take by mouth   Yes Historical Provider, MD   potassium chloride (K-DUR,KLOR-CON) 20 mEq tablet TAKE 1 TABLET (20 MEQ TOTAL) BY MOUTH DAILY 10/16/20  Yes Slo Alvarez MD   traMADol (ULTRAM) 50 mg tablet Take 50 mg by mouth as needed for moderate pain Usually takes before therapy   Yes Historical Provider, MD   Stool Softener 100 MG capsule TAKE 1 CAPSULE (100 MG TOTAL) BY MOUTH 2 (TWO) TIMES A DAY  Patient not taking: Reported on 3/25/2021 1/25/21 3/25/21  Darrius Tracy PA-C       Allergies   Allergen Reactions    Indocin [Indomethacin]      hypertension         Vitals:    03/25/21 1251   BP: 168/80   BP Location: Right arm   Patient Position: Sitting   Cuff Size: Standard   Pulse: 60   Temp: (!) 97 4 °F (36 3 °C)   TempSrc: Temporal   SpO2: 99%   Weight: 52 6 kg (116 lb)   Height: 4' 11" (1 499 m)       Body mass index is 23 43 kg/m²   12 1 pound weight gain in approximately 4-1/2 months following a 10 2 pound weight loss in approximately four months    Physical Exam:    General Appearance:  Alert, cooperative, no distress, appears stated age   Head:  Normocephalic, without obvious abnormality, atraumatic   Eyes:  PERRL, conjunctiva/corneas clear, EOM's intact,   both eyes   Ears:  Normal TM's and external ear canals, both ears   Nose: Nares normal, septum midline, mucosa normal, no drainage or sinus tenderness   Throat: Lips, mucosa, and tongue normal; teeth and gums normal   Neck: Supple, symmetrical, trachea midline, no adenopathy, thyroid: not enlarged, symmetric, no tenderness/mass/nodules, no carotid bruit or JVD   Back:   Symmetric, no curvature, ROM normal, no CVA tenderness   Lungs:   Clear to auscultation bilaterally, respirations unlabored   Chest Wall:  No tenderness or deformity   Heart:  Regular rate and rhythm, S1, S2 normal,  Grade 2-3/6 blowing medium frequency systolic ejection murmur, heard at right upper sternal border with radiation to left sternal border, apex, anterior axillary line with intact A2 and no rub or gallop   Abdomen:   Soft, non-tender, bowel sounds active all four quadrants,  no masses, no organomegaly   Extremities: Extremities normal, atraumatic, no cyanosis or edema; there are osteoarthritic changes in the joints of the fingers and in the left knee  There is a right total knee arthroplasty in place  Pulses: 2+ and symmetric   Skin: Skin showed normal color, texture, turgor and no rashes or lesions   Lymph nodes: Cervical, supraclavicular, and axillary nodes normal   Neurologic: Normal         Cardiographics    ECG 03/25/21:     no ECG done today    IMAGING:    Xr Chest Pa & Lateral    Result Date: 10/27/2020  Impression No acute cardiopulmonary disease   Workstation performed: [id number]     X-ray of right knee 03/18/2021:    Unremarkable appearance of total knee arthroplasty        LAB REVIEW:      Lab Results   Component Value Date    SODIUM 141 12/16/2020    K 4 9 12/16/2020     12/16/2020    CO2 30 12/16/2020    ANIONGAP 13 0 10/13/2015    BUN 19 12/16/2020    CREATININE 0 83 12/16/2020    GLUCOSE 87 10/13/2015    GLUF 130 (H) 12/16/2020    CALCIUM 8 4 12/16/2020    AST 19 11/10/2020    ALT 19 11/10/2020    ALKPHOS 101 11/10/2020    PROT 6 8 10/13/2015    BILITOT 0 6 10/13/2015    EGFR 65 12/16/2020     Lab Results   Component Value Date    CHOLESTEROL 186 11/10/2020    CHOLESTEROL 212 (H) 07/01/2020    CHOLESTEROL 178 03/25/2019     Lab Results   Component Value Date    HDL 57 11/10/2020    HDL 55 07/01/2020    HDL 53 03/25/2019       Lab Results   Component Value Date    LDLCALC 114 (H) 11/10/2020    LDLCALC 138 (H) 07/01/2020    LDLCALC 114 (H) 03/25/2019     No components found for: [facility]  Lab Results   Component Value Date TRIG 74 11/10/2020    TRIG 95 07/01/2020    TRIG 55 03/25/2019     CBC 12/16/2020:  Normal for age  CMP 12/16/2020:  Normal except for fasting glucose 130          Neha Jimenez MD

## 2021-03-25 NOTE — PATIENT INSTRUCTIONS
1  Beginning in about two weeks, do blood pressures at home morning and evening, three readings at a time one after the other, for four consecutive days, recording all of the readings on the blood pressure data sheet given to you by Dr Louie Chambers  2   Take blood pressures after resting for at least 15 minutes while seated in a chair or at a table with arm supported on arm rest or table  Do 3 readings, one after the other, both morning and evening for 4 consecutive days  3   Write down each and every reading with date and time and get list of readings to the office of Dr Louie Chambers for his review  4   Our office will contact you with further instructions and the results of this review  5  Call the phone number Dr Louie Chambers gave you to arrange for COVID vaccination  6  Cardiology follow-up approximately six months with lipids, CMP, EKG

## 2021-03-31 ENCOUNTER — OFFICE VISIT (OUTPATIENT)
Dept: PHYSICAL THERAPY | Facility: CLINIC | Age: 86
End: 2021-03-31
Payer: MEDICARE

## 2021-03-31 DIAGNOSIS — E87.6 HYPOKALEMIA: ICD-10-CM

## 2021-03-31 DIAGNOSIS — M17.11 PRIMARY OSTEOARTHRITIS OF RIGHT KNEE: Primary | ICD-10-CM

## 2021-03-31 DIAGNOSIS — M25.561 CHRONIC PAIN OF RIGHT KNEE: ICD-10-CM

## 2021-03-31 DIAGNOSIS — G89.29 CHRONIC PAIN OF RIGHT KNEE: ICD-10-CM

## 2021-03-31 PROCEDURE — 97112 NEUROMUSCULAR REEDUCATION: CPT

## 2021-03-31 PROCEDURE — 97110 THERAPEUTIC EXERCISES: CPT

## 2021-03-31 RX ORDER — POTASSIUM CHLORIDE 20 MEQ/1
TABLET, EXTENDED RELEASE ORAL
Qty: 30 TABLET | Refills: 5 | Status: SHIPPED | OUTPATIENT
Start: 2021-03-31 | End: 2022-06-21 | Stop reason: ALTCHOICE

## 2021-03-31 NOTE — PROGRESS NOTES
Daily Note     Today's date: 3/31/2021  Patient name: Quinn Ramirez  : 1935  MRN: [id number]  Referring provider: Marquise Figueroa  Dx:   Encounter Diagnosis     ICD-10-CM    1  Primary osteoarthritis of right knee  M17 11    2  Chronic pain of right knee  M25 561     G89 29                   Subjective: :Pt notes that she is generally sore and achy today, no specific reason why  Low back is a but sore as well  Feels she is improving overall  Objective: requires initial cueing for form w/ stairs but is able to correct and maintain  Assessment: Tolerated treatment well  Patient demonstrated fatigue post treatment and would benefit from continued PT  Fatigue but no increase in pain by end  TTP and increased tissue density noted through medial HS tendons, mod relief following gentle soft tissue work  Will benefit from progressions per primary PT  Plan: Continue per plan of care        Precautions:  Hypertension, Lymphedema    Specialty Daily Treatment Diary      Manuals 3/17/2021 3/24/21 3/31/21     Visit # 27 28 29     PF mobs        Stretch HS Quad 3 reps 30 sec seated  4'      Knee PROM 6' 6'      STM   To medial thigh/HS x 3-4 mins     AROM                 Warm-up        NuStep 10' 10' 10'     Neuro Re-Ed        Qset 30 30 High march x 20 B     Heel raises 20 20 exag gait x 4 laps      SLS  10 Squats at bar x20         8" step up x 20     Ther Ex        Mini squat 20 20 x20     Side step 4 x 15 ft 4 x 15 ft 4x15 ft     Knee flex        Knee ext 20 30 LAQ x 20 B     SLR 20 20 x20     Heel slides 20 30 x20      Step up 2x10 6" 20  8" 6" x 20      Leg press  30   55#      Hip flex with tband   Knee PROM x 3-4 mins         Bridge x20     Gait Training                          Modalities        CP  5 min 5 min post

## 2021-04-07 ENCOUNTER — OFFICE VISIT (OUTPATIENT)
Dept: PHYSICAL THERAPY | Facility: CLINIC | Age: 86
End: 2021-04-07
Payer: MEDICARE

## 2021-04-07 DIAGNOSIS — M25.561 CHRONIC PAIN OF RIGHT KNEE: ICD-10-CM

## 2021-04-07 DIAGNOSIS — M17.11 PRIMARY OSTEOARTHRITIS OF RIGHT KNEE: Primary | ICD-10-CM

## 2021-04-07 DIAGNOSIS — G89.29 CHRONIC PAIN OF RIGHT KNEE: ICD-10-CM

## 2021-04-07 DIAGNOSIS — Z01.812 PRE-OPERATIVE LABORATORY EXAMINATION: ICD-10-CM

## 2021-04-07 PROCEDURE — 97140 MANUAL THERAPY 1/> REGIONS: CPT | Performed by: PHYSICAL THERAPIST

## 2021-04-07 PROCEDURE — 97110 THERAPEUTIC EXERCISES: CPT | Performed by: PHYSICAL THERAPIST

## 2021-04-07 NOTE — PROGRESS NOTES
Daily Note     Today's date: 2021  Patient name: Bryson Iverson  : 1935  MRN: [id number]  Referring provider: Kendall Foley  Dx:   Encounter Diagnosis     ICD-10-CM    1  Primary osteoarthritis of right knee  M17 11    2  Chronic pain of right knee  M25 561     G89 29    3  Pre-operative laboratory examination  Z01 812                   Subjective: :Pt notes that she feels at least 90% better overall        Objective:  Treatment per flow sheet      Assessment:  She has achieved all PT and personal goals      Plan:  Discharge at this time     Precautions:  Hypertension, Lymphedema    Specialty Daily Treatment Diary      Manuals 3/17/2021 3/24/21 3/31/21 4/7/21    Visit # 27 28 29 30    PF mobs        Stretch HS Quad 3 reps 30 sec seated  4'  4'    Knee PROM 6' 6'  4'    STM   To medial thigh/HS x 3-4 mins 2'    AROM    0 to 120 deg             Warm-up        NuStep 10' 10' 10' 10'    Neuro Re-Ed        Qset 30 30 High march x 20 B     Heel raises 20 20 exag gait x 4 laps 4     SLS  10 Squats at bar x20 20        8" step up x 20 20    Ther Ex        Mini squat 20 20 x20 20    Side step 4 x 15 ft 4 x 15 ft 4x15 ft 8 x 6 ft    Knee flex        Knee ext 20 30 LAQ x 20 B 30    SLR 20 20 x20 20    Heel slides 20 30 x20 30     Step up 2x10 6" 20  8" 6" x 20 --     Leg press  30   55#  30   55#    Hip flex with tband   Knee PROM x 3-4 mins         Bridge x20 20    Gait Training                          Modalities        CP  5 min 5 min post 5 min

## 2021-04-08 NOTE — PROGRESS NOTES
PT Discharge    Patient has done well with PT  She feels at least 90% better overall  AROM right knee 0 to 120 degrees  She has functional strength right leg  Patient has achieved all goals at this time and will continue to perform HEP independently  D/C at this time

## 2021-12-07 ENCOUNTER — OFFICE VISIT (OUTPATIENT)
Dept: CARDIOLOGY CLINIC | Facility: CLINIC | Age: 86
End: 2021-12-07
Payer: MEDICARE

## 2021-12-07 VITALS
BODY MASS INDEX: 23.59 KG/M2 | DIASTOLIC BLOOD PRESSURE: 80 MMHG | HEART RATE: 72 BPM | HEIGHT: 59 IN | OXYGEN SATURATION: 98 % | WEIGHT: 117 LBS | SYSTOLIC BLOOD PRESSURE: 176 MMHG | TEMPERATURE: 98.7 F

## 2021-12-07 DIAGNOSIS — I10 ESSENTIAL HYPERTENSION: ICD-10-CM

## 2021-12-07 DIAGNOSIS — E78.5 DYSLIPIDEMIA: ICD-10-CM

## 2021-12-07 DIAGNOSIS — I89.0 LYMPHEDEMA OF BOTH LOWER EXTREMITIES: ICD-10-CM

## 2021-12-07 DIAGNOSIS — I50.32 CHRONIC DIASTOLIC CONGESTIVE HEART FAILURE (HCC): ICD-10-CM

## 2021-12-07 DIAGNOSIS — I07.1 MILD TRICUSPID INSUFFICIENCY: ICD-10-CM

## 2021-12-07 DIAGNOSIS — I34.0 NON-RHEUMATIC MITRAL REGURGITATION: ICD-10-CM

## 2021-12-07 DIAGNOSIS — M17.11 PRIMARY OSTEOARTHRITIS OF RIGHT KNEE: ICD-10-CM

## 2021-12-07 DIAGNOSIS — I35.0 AORTIC STENOSIS, MODERATE: ICD-10-CM

## 2021-12-07 DIAGNOSIS — I10 UNCONTROLLED HYPERTENSION: Primary | ICD-10-CM

## 2021-12-07 PROCEDURE — 93000 ELECTROCARDIOGRAM COMPLETE: CPT | Performed by: INTERNAL MEDICINE

## 2021-12-07 PROCEDURE — 99214 OFFICE O/P EST MOD 30 MIN: CPT | Performed by: INTERNAL MEDICINE

## 2021-12-28 DIAGNOSIS — I10 ESSENTIAL HYPERTENSION: ICD-10-CM

## 2021-12-28 RX ORDER — METOPROLOL SUCCINATE 50 MG/1
50 TABLET, EXTENDED RELEASE ORAL DAILY
Qty: 90 TABLET | Refills: 0 | Status: SHIPPED | OUTPATIENT
Start: 2021-12-28 | End: 2022-04-07 | Stop reason: SDUPTHER

## 2021-12-29 ENCOUNTER — CLINICAL SUPPORT (OUTPATIENT)
Dept: CARDIOLOGY CLINIC | Facility: CLINIC | Age: 86
End: 2021-12-29
Payer: MEDICARE

## 2021-12-29 VITALS
OXYGEN SATURATION: 98 % | WEIGHT: 116.2 LBS | DIASTOLIC BLOOD PRESSURE: 92 MMHG | TEMPERATURE: 97.6 F | SYSTOLIC BLOOD PRESSURE: 164 MMHG | BODY MASS INDEX: 23.43 KG/M2 | HEART RATE: 74 BPM | HEIGHT: 59 IN

## 2021-12-29 DIAGNOSIS — I10 HYPERTENSION, UNSPECIFIED TYPE: Primary | ICD-10-CM

## 2021-12-29 PROCEDURE — 99212 OFFICE O/P EST SF 10 MIN: CPT

## 2022-01-03 ENCOUNTER — TELEPHONE (OUTPATIENT)
Dept: CARDIOLOGY CLINIC | Facility: CLINIC | Age: 87
End: 2022-01-03

## 2022-01-03 NOTE — TELEPHONE ENCOUNTER
----- Message from Lorraine Shepherd MD sent at 12/29/2021  5:10 PM EST -----  Regarding: Home blood pressure measurements  Notify patient that starting in 3-4 weeks, she should do blood pressures at home as per protocol outlined on our blood pressure data sheets  Have her  at least one or more of these sheets at our office and review the details of the procedure with her  In summary, she should do blood pressures morning and evening, three readings at a time, 1-2 minutes apart, all charted on the blood pressure sheet for four consecutive days  It is important that she be resting for a minimum of 15 minutes before taking her blood pressure and that she carefully reviewed the instructions written on the blood pressure data sheet in the right lower corner  Have her call us, if she has any questions  She should return the blood pressure data sheet to our office after four days of data are entered  She has the option of emailing the blood pressure data sheet as an attachment or uploading it as a file to the [employer] application and sending it to us that way  You can give her my work e-mail address, if she wishes to use e-mail to send us the information      Dr Samuel Moreno

## 2022-01-05 ENCOUNTER — APPOINTMENT (OUTPATIENT)
Dept: RADIOLOGY | Facility: CLINIC | Age: 87
End: 2022-01-05
Payer: MEDICARE

## 2022-01-05 ENCOUNTER — OFFICE VISIT (OUTPATIENT)
Dept: OBGYN CLINIC | Facility: CLINIC | Age: 87
End: 2022-01-05
Payer: MEDICARE

## 2022-01-05 VITALS
HEIGHT: 59 IN | DIASTOLIC BLOOD PRESSURE: 70 MMHG | WEIGHT: 114.6 LBS | SYSTOLIC BLOOD PRESSURE: 180 MMHG | HEART RATE: 64 BPM | BODY MASS INDEX: 23.1 KG/M2

## 2022-01-05 DIAGNOSIS — Z96.651 STATUS POST TOTAL RIGHT KNEE REPLACEMENT USING CEMENT: ICD-10-CM

## 2022-01-05 DIAGNOSIS — Z96.651 STATUS POST TOTAL RIGHT KNEE REPLACEMENT USING CEMENT: Primary | ICD-10-CM

## 2022-01-05 DIAGNOSIS — Z96.651 AFTERCARE FOLLOWING RIGHT KNEE JOINT REPLACEMENT SURGERY: ICD-10-CM

## 2022-01-05 DIAGNOSIS — Z47.1 AFTERCARE FOLLOWING RIGHT KNEE JOINT REPLACEMENT SURGERY: ICD-10-CM

## 2022-01-05 PROCEDURE — 99214 OFFICE O/P EST MOD 30 MIN: CPT | Performed by: ORTHOPAEDIC SURGERY

## 2022-01-05 PROCEDURE — 73562 X-RAY EXAM OF KNEE 3: CPT

## 2022-01-05 NOTE — PROGRESS NOTES
Assessment/Plan:  1  Status post total right knee replacement using cement  XR knee 3 vw right non injury   2  Aftercare following right knee joint replacement surgery  XR knee 3 vw right non injury     Scribe Attestation    I,:  Thomas Cardenas am acting as a scribe while in the presence of the attending physician :       I,:  Penny Zarco, DO personally performed the services described in this documentation    as scribed in my presence :         Wallace Cordero is a pleasant 45-year-old female who returns today for follow-up evaluation 1 year status post right total knee arthroplasty  I am very pleased with her imaging and her clinical presentation today in the office  She may continue with activity to her tolerance  She understands she continues require prophylactic antibiotics prior to any dental or GI procedure moving forward  We will see her back on as-needed basis  Subjective: Follow-up evaluation 1 year status post right total knee arthroplasty    Patient ID: Kaleigh Baxter is a 80 y o  female who returns today for follow-up evaluation 1 year status post right total knee arthroplasty  She reports that her knee has been doing well  She denies any significant pain about her right knee at rest or with activity  She is very pleased with the results of her surgery  She denies any new injury or trauma  Review of Systems   Constitutional: Negative for activity change, chills, fever and unexpected weight change  HENT: Negative for hearing loss, nosebleeds and sore throat  Eyes: Negative for pain, redness and visual disturbance  Respiratory: Negative for cough, shortness of breath and wheezing  Cardiovascular: Negative for chest pain, palpitations and leg swelling  Gastrointestinal: Negative for abdominal pain, nausea and vomiting  Endocrine: Negative for polydipsia and polyuria  Genitourinary: Negative for dysuria and hematuria     Musculoskeletal: Negative for arthralgias, joint swelling and myalgias  See HPI   Skin: Negative for rash and wound  Neurological: Negative for dizziness, numbness and headaches  Psychiatric/Behavioral: Negative for decreased concentration and suicidal ideas  The patient is not nervous/anxious  Past Medical History:   Diagnosis Date    Anesthesia complication       Yrs ago had "anxiety" reaction not sure while sedated, pt states sensitive to anesthesia effects    Anxiety     stress at home    Arthritis     Cataract, right     Last Assessed:16    Eye hemorrhage     left eye currently 16    History of shingles 2015    Hypertension     Mitral valve stenosis, mild        Past Surgical History:   Procedure Laterality Date    CARPAL TUNNEL RELEASE Left     CATARACT EXTRACTION      CATARACT EXTRACTION W/ INTRAOCULAR LENS IMPLANT Left 10/11/2016    Procedure: EXTRACTION EXTRACAPSULAR CATARACT PHACO INTRAOCULAR LENS (IOL); Surgeon: Basia Choudhury MD;  Location: [facility] MAIN OR;  Service:     CERVICAL CONE BIOPSY      COLONOSCOPY      EYE SURGERY Left     muscle repair    RI OPEN RX FEMUR FX+INTRAMED NINI Right 2019    Procedure: INSERTION NAIL IM FEMUR ANTEGRADE (TROCHANTERIC); Surgeon: Johnny Moya DO;  Location: [address] [place] [address];  Service: Orthopedics    RI TOTAL KNEE ARTHROPLASTY Right 12/15/2020    Procedure: ARTHROPLASTY KNEE TOTAL;  Surgeon: Johnny Moya DO;  Location: [address] [place] [address];  Service: Orthopedics    RI XCAPSL CTRC RMVL INSJ IO LENS PROSTH W/O ECP Right 2016    Procedure: EXTRACTION EXTRACAPSULAR CATARACT PHACO INTRAOCULAR LENS (IOL);   Surgeon: Basia Choudhury MD;  Location: [facility] MAIN OR;  Service: Ophthalmology    TONSILLECTOMY         Family History   Problem Relation Age of Onset    Heart disease Mother         MI  at age 71   Tati Lama Arthritis Mother     GI problems Father         bleeding ulcer    Arthritis Sister     Sleep apnea Sister     Irritable bowel syndrome Sister     Cancer Sister skin cancer    Heart disease Brother         CABG (5)    Cancer Brother         skin cancer    Heart disease Maternal Aunt        Social History     Occupational History    Not on file   Tobacco Use    Smoking status: Never Smoker    Smokeless tobacco: Never Used   Vaping Use    Vaping Use: Never used   Substance and Sexual Activity    Alcohol use: Yes     Comment: rarely    Drug use: No    Sexual activity: Not on file         Current Outpatient Medications:     acetaminophen (TYLENOL) 325 mg tablet, Take 3 tablets (975 mg total) by mouth every 8 (eight) hours, Disp:  , Rfl: 0    Ascorbic Acid (vitamin C) 100 MG tablet, Take 100 mg by mouth daily, Disp: , Rfl:     aspirin (ECOTRIN) 325 mg EC tablet, Take 1 tablet (325 mg total) by mouth 2 (two) times a day, Disp: 84 tablet, Rfl: 0    Calcium Carb-Cholecalciferol (CALCIUM 600 + D) 600-200 MG-UNIT TABS, Take 1 tablet by mouth daily, Disp: , Rfl:     furosemide (LASIX) 40 mg tablet, Take 1 tablet (40 mg total) by mouth daily As needed for leg swelling, Disp: 90 tablet, Rfl: 3    metoprolol succinate (TOPROL-XL) 50 mg 24 hr tablet, Take 1 tablet (50 mg total) by mouth daily As directed, Disp: 90 tablet, Rfl: 0    Multiple Vitamins-Minerals (PRESERVISION AREDS PO), Take by mouth, Disp: , Rfl:     potassium chloride (K-DUR,KLOR-CON) 20 mEq tablet, TAKE 1 TABLET DAILY, Disp: 30 tablet, Rfl: 5    traMADol (ULTRAM) 50 mg tablet, Take 50 mg by mouth as needed for moderate pain Usually takes before therapy, Disp: , Rfl:     Allergies   Allergen Reactions    Indocin [Indomethacin]      hypertension       Objective:  Vitals:    01/05/22 1433   BP: (!) 180/70   Pulse: 64       Body mass index is 23 15 kg/m²  Right Knee Exam     Muscle Strength   The patient has normal right knee strength  Tenderness   The patient is experiencing no tenderness  Range of Motion   Extension: normal Right knee extension: 0  Flexion: normal Right knee flexion: 130  Tests   Varus: negative Valgus: negative  Drawer:  Anterior - negative      Patellar apprehension: negative    Other   Erythema: absent  Scars: present  Sensation: normal  Pulse: present  Swelling: mild  Effusion: no effusion present    Comments:  Stable at 0, 30 and 90°  Neurovascular intact distally  No warmth or erythema  Patella tracked flat and midline          Observations     Right Knee   Negative for effusion  Physical Exam  Vitals and nursing note reviewed  Constitutional:       Appearance: She is well-developed  HENT:      Head: Normocephalic and atraumatic  Eyes:      General: No scleral icterus  Conjunctiva/sclera: Conjunctivae normal    Cardiovascular:      Rate and Rhythm: Normal rate  Pulmonary:      Effort: Pulmonary effort is normal  No respiratory distress  Musculoskeletal:      Cervical back: Normal range of motion and neck supple  Right knee: No effusion  Comments: As noted in HPI   Skin:     General: Skin is warm and dry  Neurological:      Mental Status: She is alert and oriented to person, place, and time  Psychiatric:         Behavior: Behavior normal          I have personally reviewed pertinent films in PACS  X-ray of the right knee obtained on 01/05/2022 reviewed demonstrating a well-positioned and aligned total knee arthroplasty without evidence of failure  There is no new fracture, dislocation, lytic or blastic lesion

## 2022-04-07 DIAGNOSIS — I10 ESSENTIAL HYPERTENSION: ICD-10-CM

## 2022-04-07 RX ORDER — METOPROLOL SUCCINATE 50 MG/1
50 TABLET, EXTENDED RELEASE ORAL DAILY
Qty: 90 TABLET | Refills: 0 | Status: SHIPPED | OUTPATIENT
Start: 2022-04-07 | End: 2022-04-21 | Stop reason: SDUPTHER

## 2022-04-07 NOTE — TELEPHONE ENCOUNTER
Patient called and asked if she is up to date for pneumonia vaccine or if she needs one  She can be reached at [phone]    Thank you

## 2022-04-08 NOTE — PROGRESS NOTES
Office Cardiology Progress Note  Courtney Barrow 80 y o  female MRN: [id number]  01/07/20  3:27 PM      ASSESSMENT:    Chronic fatigue and tiredness, possibly an adverse drug reaction to furosemide  2  Asymptomatic moderate aortic stenosis  3  Chronic left more than right lower extremity lymphedema with associated stasis dermatitis and contribution from underlying venous insufficiency of right lower extremity, confirmed on venous reflux study of 11/13/2019  4  Possible essential hypertension, but not assessed out of office  Plan       Patient Instructions     1  Discussed in detail the possible use of a compression pump for her right greater than left bilateral lymphedema of lower extremities, which persists despite use of diuretics, exercise, elevation, and class 1 compression  2  Because of her caregiver status for her  and her caregiver schedule, the patient is somewhat skeptical about her ability to comply with twice a day application of the compression device  3  She will consider short-term additional daytime help to see if that would allow her more time to use the compression pumps during the daytime hours for several weeks, prior to using them at night only  4  She will let us know if she would like us to order the [employer] compression pump system  5  Recommended a trial of further tapering of the dose of furosemide from 40 milligrams daily to usage several days a week and as needed  6  Patient will give us a call and let us know if she wants us to proceed with ordering this   compression system  7  Cardiology follow-up approximately six months with EKG  HPI    This 80 y o  female  denies new cardiopulmonary and medical symptoms  She has a similar degree of fatigue and tiredness and complains of easy falling asleep whenever she sits down    She has no set bedtime and often falls asleep at night and goes up to bed at 2 or 3:00 a m  she does climb her stairs two or 3 times a day without any major difficulty, though she is somewhat limited by arthritic pain of her right knee  She does use a cane when she ambulates out of her home  Her son normally picks upper groceries and she may drive to the bank or [employer] to  some apparel items  She does have outside help several hours a day but believe she is too busy in the morning with getting herself organized for her 's care that she would be unable to find several hours during the morning to apply a compression pump system  The patient has been consistently using support hosiery, leg elevation, and ambulation at home should try to manage her lower extremity edema  She has also tried both high and moderate dose furosemide, none of which has caused any improvement  The patient did reduce her furosemide from bid to once daily at my instruction two months ago  This made no difference in the degree of swelling or in her fatigue or tiredness  The patient is seen in follow-up of the below listed diagnoses  Encounter Diagnoses   Name Primary?  Moderate aortic stenosis Yes    Edema of both legs     Chronic venous stasis dermatitis of both lower extremities     Other fatigue         Review of Systems    All other systems negative, except as noted in history of present illness    Historical Information   Past Medical History:   Diagnosis Date    Anesthesia complication       Yrs ago had "anxiety" reaction not sure while sedated, pt states sensitive to anesthesia effects    Anxiety     stress at home    Arthritis     Cataract, right     Last Assessed:5/11/16    Eye hemorrhage     left eye currently 5/12/16    History of shingles 05/2015    Hypertension     Mitral valve stenosis, mild      Past Surgical History:   Procedure Laterality Date    CARPAL TUNNEL RELEASE Left     CATARACT EXTRACTION W/ INTRAOCULAR LENS IMPLANT Left 10/11/2016    Procedure: EXTRACTION EXTRACAPSULAR CATARACT PHACO INTRAOCULAR LENS (IOL); Surgeon: Vinny Mcclendon MD;  Location: [facility] MAIN OR;  Service:     CERVICAL CONE BIOPSY      COLONOSCOPY      EYE SURGERY Left     muscle repair    NC OPEN RX FEMUR FX+INTRAMED NINI Right 2019    Procedure: INSERTION NAIL IM FEMUR ANTEGRADE (TROCHANTERIC); Surgeon: González Mojica DO;  Location: [address] [place] [address];  Service: Orthopedics    NC XCAPSL CTRC RMVL INSJ IO LENS PROSTH W/O ECP Right 2016    Procedure: EXTRACTION EXTRACAPSULAR CATARACT PHACO INTRAOCULAR LENS (IOL); Surgeon: Vinny Mcclendon MD;  Location: [facility] MAIN OR;  Service: Ophthalmology    TONSILLECTOMY       Social History     Substance and Sexual Activity   Alcohol Use Yes    Comment: rarely     Social History     Substance and Sexual Activity   Drug Use No     Social History     Tobacco Use   Smoking Status Never Smoker   Smokeless Tobacco Never Used       Family History:  Family History   Problem Relation Age of Onset    Heart disease Mother         MI  at age 71   [facility] Arthritis Mother     GI problems Father         bleeding ulcer    Arthritis Sister     Sleep apnea Sister     Irritable bowel syndrome Sister     Cancer Sister         skin cancer    Heart disease Brother         CABG (5)   [facility] Cancer Brother         skin cancer    Heart disease Maternal Aunt          Meds/Allergies     Prior to Admission medications    Medication Sig Start Date End Date Taking?  Authorizing Provider   Calcium Carb-Cholecalciferol (CALCIUM 600 + D) 600-200 MG-UNIT TABS Take 1 tablet by mouth daily 13  Yes Historical Provider, MD   furosemide (LASIX) 40 mg tablet Take 40 mg by mouth daily    Yes Historical Provider, MD   metoprolol succinate (TOPROL-XL) 50 mg 24 hr tablet  10/7/19  Yes Historical Provider, MD   Multiple Vitamins-Minerals (PRESERVISION AREDS PO) Take by mouth   Yes Historical Provider, MD   potassium chloride (K-DUR,KLOR-CON) 20 mEq tablet Take 1 tablet (20 mEq total) by mouth daily 19  Yes Viki Swartz MD acetaminophen (TYLENOL) 325 mg tablet Take 3 tablets (975 mg total) by mouth every 8 (eight) hours  Patient not taking: Reported on 1/7/2020 7/25/19   JUAN Rincon       Allergies   Allergen Reactions    Indocin [Indomethacin]      hypertension         Vitals:    01/07/20 1451   BP: 130/52   BP Location: Left arm   Patient Position: Sitting   Cuff Size: Standard   Pulse: 64   SpO2: 98%   Weight: 52 2 kg (115 lb)   Height: 4' 11" (1 499 m)       Body mass index is 23 23 kg/m²  5 pound weight gain in approximately two months  Physical Exam:    General Appearance:  Alert, cooperative, no distress, appears stated age   Head:  Normocephalic, without obvious abnormality, atraumatic   Eyes:  PERRL, conjunctiva/corneas clear, EOM's intact,   both eyes   Ears:  Normal TM's and external ear canals, both ears   Nose: Nares normal, septum midline, mucosa normal, no drainage or sinus tenderness   Throat: Lips, mucosa, and tongue normal; teeth and gums normal   Neck: Supple, symmetrical, trachea midline, no adenopathy, thyroid: not enlarged, symmetric, no tenderness/mass/nodules, no carotid bruit or JVD   Back:   Symmetric, no curvature, ROM normal, no CVA tenderness   Lungs:   Clear to auscultation bilaterally, respirations unlabored   Chest Wall:  No tenderness or deformity   Heart:  Regular rate and rhythm, S1, S2 normal, grade 3/6 aortic systolic ejection murmur, radiating to manubrium, left more than right neck, left sternal border, and apex with muffling of A2 and no rub or gallop   Abdomen:   Soft, non-tender, bowel sounds active all four quadrants,  no masses, no organomegaly   Extremities: Extremities normal, atraumatic, no cyanosis with 2+ right pretibial and ankle edema and 1+ left pretibial/ankle edema     Pulses: 2+ and symmetric   Skin: Skin showed normal color, texture, turgor and no rashes or lesions   Lymph nodes: Cervical, supraclavicular, and axillary nodes normal   Neurologic: Normal Cardiographics    ECG:    Not applicable    IMAGING:    Venous reflux study 11/13/2019:    One incompetent  in right proximal to mid calf  There was venous reflux documented in that distal thigh at the level of the right greater saphenous vein and and the proximal calf as well      LAB REVIEW:      Lab Results   Component Value Date    SODIUM 144 10/18/2019    K 3 7 10/18/2019     10/18/2019    CO2 33 (H) 10/18/2019    ANIONGAP 13 0 10/13/2015    BUN 25 10/18/2019    CREATININE 0 76 10/18/2019    GLUCOSE 87 10/13/2015    GLUF 80 10/18/2019    CALCIUM 9 2 10/18/2019    AST 22 10/18/2019    ALT 13 10/18/2019    ALKPHOS 121 (H) 10/18/2019    PROT 6 8 10/13/2015    BILITOT 0 6 10/13/2015    EGFR 72 10/18/2019     Lab Results   Component Value Date    CHOLESTEROL 178 03/25/2019     Lab Results   Component Value Date    HDL 53 03/25/2019    HDL 59 10/13/2015     No results found for: LDLCHOLEST  Lab Results   Component Value Date    LDLCALC 114 (H) 03/25/2019    LDLCALC 122 10/13/2015     No components found for: [facility]  Lab Results   Component Value Date    TRIG 55 03/25/2019    TRIG 48 10/13/2015               Dorene Alvarado MD No

## 2022-04-21 ENCOUNTER — OFFICE VISIT (OUTPATIENT)
Dept: INTERNAL MEDICINE CLINIC | Facility: CLINIC | Age: 87
End: 2022-04-21
Payer: MEDICARE

## 2022-04-21 VITALS
BODY MASS INDEX: 23.95 KG/M2 | TEMPERATURE: 98.8 F | SYSTOLIC BLOOD PRESSURE: 140 MMHG | HEIGHT: 59 IN | WEIGHT: 118.8 LBS | HEART RATE: 67 BPM | OXYGEN SATURATION: 98 % | DIASTOLIC BLOOD PRESSURE: 86 MMHG

## 2022-04-21 DIAGNOSIS — Z13.29 SCREENING FOR HYPOTHYROIDISM: ICD-10-CM

## 2022-04-21 DIAGNOSIS — Z23 NEED FOR VACCINATION: ICD-10-CM

## 2022-04-21 DIAGNOSIS — I89.0 LYMPHEDEMA: ICD-10-CM

## 2022-04-21 DIAGNOSIS — I50.32 CHRONIC DIASTOLIC CONGESTIVE HEART FAILURE (HCC): ICD-10-CM

## 2022-04-21 DIAGNOSIS — E78.5 HYPERLIPIDEMIA, UNSPECIFIED HYPERLIPIDEMIA TYPE: ICD-10-CM

## 2022-04-21 DIAGNOSIS — I10 ESSENTIAL HYPERTENSION: ICD-10-CM

## 2022-04-21 DIAGNOSIS — I89.0 LYMPHEDEMA OF BOTH LOWER EXTREMITIES: ICD-10-CM

## 2022-04-21 DIAGNOSIS — Z00.00 HEALTHCARE MAINTENANCE: ICD-10-CM

## 2022-04-21 DIAGNOSIS — I35.0 AORTIC STENOSIS, MODERATE: Primary | ICD-10-CM

## 2022-04-21 DIAGNOSIS — M19.90 ARTHRITIS: ICD-10-CM

## 2022-04-21 DIAGNOSIS — Z13.0 SCREENING FOR DEFICIENCY ANEMIA: ICD-10-CM

## 2022-04-21 PROBLEM — M17.11 PRIMARY OSTEOARTHRITIS OF RIGHT KNEE: Status: RESOLVED | Noted: 2020-09-17 | Resolved: 2022-04-21

## 2022-04-21 PROBLEM — M25.561 CHRONIC PAIN OF RIGHT KNEE: Status: RESOLVED | Noted: 2019-08-01 | Resolved: 2022-04-21

## 2022-04-21 PROBLEM — G89.29 CHRONIC PAIN OF RIGHT KNEE: Status: RESOLVED | Noted: 2019-08-01 | Resolved: 2022-04-21

## 2022-04-21 PROCEDURE — 90677 PCV20 VACCINE IM: CPT

## 2022-04-21 PROCEDURE — 1123F ACP DISCUSS/DSCN MKR DOCD: CPT

## 2022-04-21 PROCEDURE — 99215 OFFICE O/P EST HI 40 MIN: CPT | Performed by: INTERNAL MEDICINE

## 2022-04-21 PROCEDURE — G0438 PPPS, INITIAL VISIT: HCPCS | Performed by: INTERNAL MEDICINE

## 2022-04-21 RX ORDER — METOPROLOL SUCCINATE 50 MG/1
50 TABLET, EXTENDED RELEASE ORAL DAILY
Qty: 90 TABLET | Refills: 0 | Status: SHIPPED | OUTPATIENT
Start: 2022-04-21

## 2022-04-21 NOTE — ASSESSMENT & PLAN NOTE
Wt Readings from Last 3 Encounters:   04/21/22 53 9 kg (118 lb 12 8 oz)   01/05/22 52 kg (114 lb 9 6 oz)   12/29/21 52 7 kg (116 lb 3 2 oz)     Diastolic congestive heart failure remains stable continue Lasix at 40 mg daily and potassium chloride at 20 mEq daily comprehensive metabolic profile has been requested to evaluate electrolytes and kidney performance patient understands the importance of avoiding high salt foods

## 2022-04-21 NOTE — ASSESSMENT & PLAN NOTE
Systolic murmur of aortic stenosis continues unchanged  The patient has decreasing energy level may be related to her aortic stenosis  She denies any lightheadedness syncope chest pain or palpitations    Recommend continuation of observation and surveillance

## 2022-04-21 NOTE — ASSESSMENT & PLAN NOTE
Chronic lymphedema of both lower extremities with erythema of the skin of both lower extremities  Patient denies any discomfort in the legs  I recommend the use of compression stockings over the patient is unable to apply the stockings due to advanced arthritis of both hands  She should avoid high salt foods elevate her legs as much as possible during the day and report any increase in swelling

## 2022-04-21 NOTE — PROGRESS NOTES
Assessment and Plan:     Problem List Items Addressed This Visit     None           Preventive health issues were discussed with patient, and age appropriate screening tests were ordered as noted in patient's After Visit Summary  Personalized health advice and appropriate referrals for health education or preventive services given if needed, as noted in patient's After Visit Summary  History of Present Illness:     Patient presents for Medicare Annual Wellness visit    Patient Care Team:  Margret Kolb MD as PCP - Charlene Wise MD     Problem List:     Patient Active Problem List   Diagnosis    Essential hypertension    Aortic stenosis, moderate    Atrial dilatation, bilateral    Mild tricuspid insufficiency    Non-rheumatic mitral regurgitation    Chronic diastolic congestive heart failure (HCC)    Localized edema    Other fatigue    Mitral valve stenosis, mild    Chronic pain of right knee    Hypokalemia    Current moderate episode of major depressive disorder without prior episode ([facility] [address] )    Hyperlipidemia    Lymphedema    Primary osteoarthritis of right knee    Status post total right knee replacement using cement    Dyslipidemia    Lymphedema of both lower extremities    Uncontrolled hypertension      Past Medical and Surgical History:     Past Medical History:   Diagnosis Date    Anesthesia complication       Yrs ago had "anxiety" reaction not sure while sedated, pt states sensitive to anesthesia effects    Anxiety     stress at home    Arthritis     Cataract, right     Last Assessed:5/11/16    Eye hemorrhage     left eye currently 5/12/16    History of shingles 05/2015    Hypertension     Mitral valve stenosis, mild      Past Surgical History:   Procedure Laterality Date    CARPAL TUNNEL RELEASE Left     CATARACT EXTRACTION      CATARACT EXTRACTION W/ INTRAOCULAR LENS IMPLANT Left 10/11/2016    Procedure: EXTRACTION EXTRACAPSULAR CATARACT PHACO INTRAOCULAR LENS (IOL); Surgeon: Ida Lopes MD;  Location: [facility] MAIN OR;  Service:     CERVICAL CONE BIOPSY      COLONOSCOPY      EYE SURGERY Left     muscle repair    MN OPEN RX FEMUR FX+INTRAMED NINI Right 2019    Procedure: INSERTION NAIL IM FEMUR ANTEGRADE (TROCHANTERIC); Surgeon: Jaime Avilez DO;  Location: [address] [place] [address];  Service: Orthopedics    MN TOTAL KNEE ARTHROPLASTY Right 12/15/2020    Procedure: ARTHROPLASTY KNEE TOTAL;  Surgeon: Jaime Avilez DO;  Location: [address] [place] [address];  Service: Orthopedics    MN XCAPSL CTRC RMVL INSJ IO LENS PROSTH W/O ECP Right 2016    Procedure: EXTRACTION EXTRACAPSULAR CATARACT PHACO INTRAOCULAR LENS (IOL);   Surgeon: Ida Lopes MD;  Location: [facility] MAIN OR;  Service: Ophthalmology    TONSILLECTOMY        Family History:     Family History   Problem Relation Age of Onset    Heart disease Mother         MI  at age 71   [facility] Arthritis Mother     GI problems Father         bleeding ulcer    Arthritis Sister     Sleep apnea Sister     Irritable bowel syndrome Sister     Cancer Sister         skin cancer    Heart disease Brother         CABG (5)   [facility] Cancer Brother         skin cancer    Heart disease Maternal Aunt       Social History:     Social History     Socioeconomic History    Marital status: /Civil Union     Spouse name: Not on file    Number of children: Not on file    Years of education: Not on file    Highest education level: Not on file   Occupational History    Not on file   Tobacco Use    Smoking status: Never Smoker    Smokeless tobacco: Never Used   Vaping Use    Vaping Use: Never used   Substance and Sexual Activity    Alcohol use: Yes     Comment: rarely    Drug use: No    Sexual activity: Not on file   Other Topics Concern    Not on file   Social History Narrative    Not on file     Social Determinants of Health     Financial Resource Strain: Not on file   Food Insecurity: Not on file   Transportation Needs: Not on file   Physical Activity: Not on file   Stress: Not on file   Social Connections: Not on file   Intimate Partner Violence: Not on file   Housing Stability: Not on file      Medications and Allergies:     Current Outpatient Medications   Medication Sig Dispense Refill    acetaminophen (TYLENOL) 325 mg tablet Take 3 tablets (975 mg total) by mouth every 8 (eight) hours  0    Ascorbic Acid (vitamin C) 100 MG tablet Take 100 mg by mouth daily      aspirin (ECOTRIN) 325 mg EC tablet Take 1 tablet (325 mg total) by mouth 2 (two) times a day 84 tablet 0    Calcium Carb-Cholecalciferol (CALCIUM 600 + D) 600-200 MG-UNIT TABS Take 1 tablet by mouth daily      furosemide (LASIX) 40 mg tablet Take 1 tablet (40 mg total) by mouth daily As needed for leg swelling 90 tablet 3    metoprolol succinate (TOPROL-XL) 50 mg 24 hr tablet Take 1 tablet (50 mg total) by mouth daily As directed 90 tablet 0    Multiple Vitamins-Minerals (PRESERVISION AREDS PO) Take by mouth      potassium chloride (K-DUR,KLOR-CON) 20 mEq tablet TAKE 1 TABLET DAILY 30 tablet 5    traMADol (ULTRAM) 50 mg tablet Take 50 mg by mouth as needed for moderate pain Usually takes before therapy       No current facility-administered medications for this visit  Allergies   Allergen Reactions    Indocin [Indomethacin]      hypertension      Immunizations:     Immunization History   Administered Date(s) Administered    Influenza Split High Dose Preservative Free IM 09/30/2016    Influenza, high dose seasonal 0 7 mL 10/19/2018, 10/21/2019, 11/02/2020    Influenza, seasonal, injectable 01/05/2014    Pneumococcal Conjugate 13-Valent 02/22/2019      Health Maintenance: There are no preventive care reminders to display for this patient        Topic Date Due    COVID-19 Vaccine (1) Never done    DTaP,Tdap,and Td Vaccines (1 - Tdap) Never done    Pneumococcal Vaccine: 65+ Years (1 of 2 - PPSV23) 04/19/2019    Influenza Vaccine (1) 09/01/2021 Medicare Health Risk Assessment:     There were no vitals taken for this visit  Paxton Garcia is here for her Subsequent Wellness visit  Health Risk Assessment:   Patient rates overall health as fair  Patient feels that their physical health rating is same  Patient is dissatisfied with their life  Eyesight was rated as slightly worse  Hearing was rated as same  Patient feels that their emotional and mental health rating is slightly worse  Patients states they are sometimes angry  Patient states they are often unusually tired/fatigued  Pain experienced in the last 7 days has been some  Patient's pain rating has been 4/10  Patient states that she has experienced no weight loss or gain in last 6 months  Depression Screening:   PHQ-9 Score: 7      Fall Risk Screening: In the past year, patient has experienced: no history of falling in past year      Urinary Incontinence Screening:   Patient has leaked urine accidently in the last six months  Home Safety:  Patient does not have trouble with stairs inside or outside of their home  Patient has working smoke alarms and has working carbon monoxide detector  Home safety hazards include: none  Nutrition:   Current diet is Regular  Medications:   Patient is currently taking over-the-counter supplements  OTC medications include: see medication list  Patient is able to manage medications  Activities of Daily Living (ADLs)/Instrumental Activities of Daily Living (IADLs):   Walk and transfer into and out of bed and chair?: Yes  Dress and groom yourself?: Yes    Bathe or shower yourself?: Yes    Feed yourself?  Yes  Do your laundry/housekeeping?: Yes  Manage your money, pay your bills and track your expenses?: Yes  Make your own meals?: Yes    Do your own shopping?: Yes    Previous Hospitalizations:   Any hospitalizations or ED visits within the last 12 months?: No      Advance Care Planning:   Living will: No    Durable POA for healthcare: No    Advanced directive: No      PREVENTIVE SCREENINGS      Cardiovascular Screening:    General: Screening Not Indicated and History Lipid Disorder      Colorectal Cancer Screening:     General: Screening Not Indicated      Cervical Cancer Screening:    General: Screening Not Indicated      Osteoporosis Screening:    General: Screening Not Indicated      Abdominal Aortic Aneurysm (AAA) Screening:        General: Screening Not Indicated      Lung Cancer Screening:     General: Screening Not Indicated      Hepatitis C Screening:    General: Screening Not Indicated    Screening, Brief Intervention, and Referral to Treatment (SBIRT)    Screening    Typical number of drinks in a week: 0    Single Item Drug Screening:  How often have you used an illegal drug (including marijuana) or a prescription medication for non-medical reasons in the past year? never    Single Item Drug Screen Score: 0  Interpretation: Negative screen for possible drug use disorder    Review of Current Opioid Use    Opioid Risk Tool (ORT) Interpretation: Complete Opioid Risk Tool (ORT)      Gurdeep Diaz MD

## 2022-04-21 NOTE — PROGRESS NOTES
Assessment/Plan:    Essential hypertension  Blood pressure assessment indicates good control blood pressure recommend continuation of metoprolol succinate at 50 mg daily  Comprehensive metabolic profile has been requested for evaluation of electrolytes and kidney performance  Aortic stenosis, moderate  Systolic murmur of aortic stenosis continues unchanged  The patient has decreasing energy level may be related to her aortic stenosis  She denies any lightheadedness syncope chest pain or palpitations  Recommend continuation of observation and surveillance    Chronic diastolic congestive heart failure ([facility] [address] )  Wt Readings from Last 3 Encounters:   04/21/22 53 9 kg (118 lb 12 8 oz)   01/05/22 52 kg (114 lb 9 6 oz)   12/29/21 52 7 kg (116 lb 3 2 oz)     Diastolic congestive heart failure remains stable continue Lasix at 40 mg daily and potassium chloride at 20 mEq daily comprehensive metabolic profile has been requested to evaluate electrolytes and kidney performance patient understands the importance of avoiding high salt foods        Hyperlipidemia  An updated lipid profile has been requested to evaluate patient's lipid risk will review the results with the patient upon completion  Lymphedema of both lower extremities  Chronic lymphedema of both lower extremities with erythema of the skin of both lower extremities  Patient denies any discomfort in the legs  I recommend the use of compression stockings over the patient is unable to apply the stockings due to advanced arthritis of both hands  She should avoid high salt foods elevate her legs as much as possible during the day and report any increase in swelling  Arthritis  Advanced deformity of arthritis of both hands amazing the patient indicates she has no pain associated with this arthritis process  She does have decrease in dexterity due to the deformity of the joints of the hands    As the patient experiences no pain at this time no intervention is necessary       Diagnoses and all orders for this visit:    Hyperlipidemia, unspecified hyperlipidemia type  -     Lipid panel; Future    Essential hypertension  -     Comprehensive metabolic panel; Future  -     metoprolol succinate (TOPROL-XL) 50 mg 24 hr tablet; Take 1 tablet (50 mg total) by mouth daily As directed    Screening for deficiency anemia  -     CBC and differential; Future    Aortic stenosis, moderate    Screening for hypothyroidism  -     TSH, 3rd generation with Free T4 reflex; Future    Need for vaccination  -     Pneumococcal Conjugate Vaccine 20-valent (Pcv20)    Lymphedema    Lymphedema of both lower extremities        Subjective:      Patient ID: Catalina Carroll is a 80 y o  female  This 51-year-old female patient returns today for a medical evaluation  She is here for complete physical exam having been homebound through the past several months due to COVID risk  The patient indicates no cardiac symptoms of chest pain or palpitations and does not seem to have any significant shortness of breath  She does have symptoms of fatigue which I have explained to her may be you the results of her aortic valve stenosis  We have requested full chemistry testing to evaluate for other possible causes of her fatigue  Patient has had no recent signs or symptoms of COVID-19 infection and I have recommended that she obtain the Prevnar 20 vaccine today and should obtain the 2nd boost on COVID vaccine    She continues to have significant arthritis of both hands which limits greatly her dexterity  The following portions of the patient's history were reviewed and updated as appropriate:   She  has a past medical history of Anesthesia complication, Anxiety, Arthritis, Cataract, right, Eye hemorrhage, History of shingles (05/2015), Hypertension, and Mitral valve stenosis, mild    She   Patient Active Problem List    Diagnosis Date Noted    Dyslipidemia 03/25/2021    Lymphedema of both lower extremities 03/25/2021    Status post total right knee replacement using cement 12/15/2020    Hyperlipidemia 07/07/2020    Current moderate episode of major depressive disorder without prior episode ([facility] [address] ) 09/17/2019    Hypokalemia 08/09/2019    Mitral valve stenosis, mild     Arthritis 10/19/2018    Chronic diastolic congestive heart failure ([facility] [address] ) 05/10/2018    Localized edema 05/10/2018    Other fatigue 05/10/2018    Aortic stenosis, moderate 05/11/2016    Atrial dilatation, bilateral 05/11/2016    Mild tricuspid insufficiency 05/11/2016    Non-rheumatic mitral regurgitation 05/11/2016    Essential hypertension 08/22/2013     She  has a past surgical history that includes Tonsillectomy; Eye surgery (Left); Carpal tunnel release (Left); Cervical cone biopsy; Colonoscopy; Cataract extraction w/ intraocular lens implant (Left, 10/11/2016); pr xcapsl ctrc rmvl insj io lens prosth w/o ecp (Right, 5/17/2016); pr open rx femur fx+intramed natalie (Right, 7/23/2019); Cataract extraction; and pr total knee arthroplasty (Right, 12/15/2020)  Her family history includes Arthritis in her mother and sister; Cancer in her brother and sister; GI problems in her father; Heart disease in her brother, maternal aunt, and mother; Irritable bowel syndrome in her sister; Sleep apnea in her sister  She  reports that she has never smoked  She has never used smokeless tobacco  She reports current alcohol use  She reports that she does not use drugs    Current Outpatient Medications   Medication Sig Dispense Refill    acetaminophen (TYLENOL) 325 mg tablet Take 3 tablets (975 mg total) by mouth every 8 (eight) hours  0    Ascorbic Acid (vitamin C) 100 MG tablet Take 100 mg by mouth daily      aspirin (ECOTRIN) 325 mg EC tablet Take 1 tablet (325 mg total) by mouth 2 (two) times a day 84 tablet 0    furosemide (LASIX) 40 mg tablet Take 1 tablet (40 mg total) by mouth daily As needed for leg swelling 90 tablet 3    metoprolol succinate (TOPROL-XL) 50 mg 24 hr tablet Take 1 tablet (50 mg total) by mouth daily As directed 90 tablet 0    Multiple Vitamins-Minerals (PRESERVISION AREDS PO) Take by mouth      traMADol (ULTRAM) 50 mg tablet Take 50 mg by mouth as needed for moderate pain Usually takes before therapy      potassium chloride (K-DUR,KLOR-CON) 20 mEq tablet TAKE 1 TABLET DAILY 30 tablet 5     No current facility-administered medications for this visit       Review of Systems   Constitutional: Positive for fatigue  Cardiovascular: Positive for leg swelling  Musculoskeletal: Positive for arthralgias and gait problem  All other systems reviewed and are negative  Objective:      /86   Pulse 67   Temp 98 8 °F (37 1 °C)   Ht 4' 11" (1 499 m)   Wt 53 9 kg (118 lb 12 8 oz)   SpO2 98%   BMI 23 99 kg/m²          Physical Exam  Vitals reviewed  Constitutional:       General: She is not in acute distress  Appearance: Normal appearance  She is well-developed  She is not ill-appearing  HENT:      Head: Normocephalic  Right Ear: Hearing, tympanic membrane, ear canal and external ear normal       Left Ear: Hearing, tympanic membrane, ear canal and external ear normal       Nose: Nose normal  No mucosal edema  Mouth/Throat:      Mouth: Mucous membranes are moist       Pharynx: Oropharynx is clear  Uvula midline  Eyes:      General: Lids are normal          Right eye: No discharge  Left eye: No discharge  Extraocular Movements: Extraocular movements intact  Conjunctiva/sclera: Conjunctivae normal       Pupils: Pupils are equal, round, and reactive to light  Neck:      Thyroid: No thyromegaly  Vascular: No carotid bruit or JVD  Cardiovascular:      Rate and Rhythm: Normal rate and regular rhythm  Heart sounds: Murmur heard  Pulmonary:      Effort: Pulmonary effort is normal  No respiratory distress  Breath sounds: Normal breath sounds   No wheezing, rhonchi or rales    Abdominal:      General: Bowel sounds are normal  There is no distension  Palpations: Abdomen is soft  There is no mass  Tenderness: There is no abdominal tenderness  There is no guarding  Musculoskeletal:         General: Swelling, tenderness and deformity present  Normal range of motion  Cervical back: Normal range of motion and neck supple  No rigidity or tenderness  Right lower leg: Edema present  Left lower leg: Edema present  Comments: Bilateral lower extremity lymphedema with skin reddened due to skin distension    Significant arthritic D deteriorates shin of both hands with limited dexterity   Lymphadenopathy:      Cervical: No cervical adenopathy  Skin:     General: Skin is warm and dry  Coloration: Skin is not jaundiced or pale  Findings: Erythema present  Comments: Erythema of both lower extremities in region of lymphedema   Neurological:      Mental Status: She is alert and oriented to person, place, and time  Mental status is at baseline  Deep Tendon Reflexes: Reflexes are normal and symmetric  Reflexes normal    Psychiatric:         Mood and Affect: Mood normal          Speech: Speech normal          Behavior: Behavior normal  Behavior is cooperative  Thought Content:  Thought content normal          Judgment: Judgment normal

## 2022-04-21 NOTE — ASSESSMENT & PLAN NOTE
An updated lipid profile has been requested to evaluate patient's lipid risk will review the results with the patient upon completion

## 2022-04-21 NOTE — ASSESSMENT & PLAN NOTE
Advanced deformity of arthritis of both hands amazing the patient indicates she has no pain associated with this arthritis process  She does have decrease in dexterity due to the deformity of the joints of the hands    As the patient experiences no pain at this time no intervention is necessary

## 2022-04-21 NOTE — ASSESSMENT & PLAN NOTE
Blood pressure assessment indicates good control blood pressure recommend continuation of metoprolol succinate at 50 mg daily  Comprehensive metabolic profile has been requested for evaluation of electrolytes and kidney performance

## 2022-05-19 ENCOUNTER — APPOINTMENT (OUTPATIENT)
Dept: LAB | Facility: HOSPITAL | Age: 87
End: 2022-05-19
Payer: MEDICARE

## 2022-05-19 DIAGNOSIS — E78.5 DYSLIPIDEMIA: ICD-10-CM

## 2022-05-19 DIAGNOSIS — I10 ESSENTIAL HYPERTENSION: ICD-10-CM

## 2022-05-19 DIAGNOSIS — I35.0 AORTIC STENOSIS, MODERATE: ICD-10-CM

## 2022-05-19 DIAGNOSIS — Z13.0 SCREENING FOR DEFICIENCY ANEMIA: ICD-10-CM

## 2022-05-19 DIAGNOSIS — Z13.29 SCREENING FOR HYPOTHYROIDISM: ICD-10-CM

## 2022-05-19 DIAGNOSIS — E78.5 HYPERLIPIDEMIA, UNSPECIFIED HYPERLIPIDEMIA TYPE: ICD-10-CM

## 2022-05-19 DIAGNOSIS — I50.32 CHRONIC DIASTOLIC CONGESTIVE HEART FAILURE (HCC): ICD-10-CM

## 2022-05-19 LAB
ALBUMIN SERPL BCP-MCNC: 3.9 G/DL (ref 3.5–5)
ALP SERPL-CCNC: 104 U/L (ref 46–116)
ALT SERPL W P-5'-P-CCNC: 18 U/L (ref 12–78)
ANION GAP SERPL CALCULATED.3IONS-SCNC: 6 MMOL/L (ref 4–13)
AST SERPL W P-5'-P-CCNC: 22 U/L (ref 5–45)
BASOPHILS # BLD AUTO: 0.09 THOUSANDS/ΜL (ref 0–0.1)
BASOPHILS NFR BLD AUTO: 1 % (ref 0–1)
BILIRUB SERPL-MCNC: 0.7 MG/DL (ref 0.2–1)
BUN SERPL-MCNC: 21 MG/DL (ref 5–25)
CALCIUM SERPL-MCNC: 9.1 MG/DL (ref 8.3–10.1)
CHLORIDE SERPL-SCNC: 106 MMOL/L (ref 100–108)
CHOLEST SERPL-MCNC: 204 MG/DL
CO2 SERPL-SCNC: 29 MMOL/L (ref 21–32)
CREAT SERPL-MCNC: 0.75 MG/DL (ref 0.6–1.3)
EOSINOPHIL # BLD AUTO: 0.13 THOUSAND/ΜL (ref 0–0.61)
EOSINOPHIL NFR BLD AUTO: 2 % (ref 0–6)
ERYTHROCYTE [DISTWIDTH] IN BLOOD BY AUTOMATED COUNT: 13.5 % (ref 11.6–15.1)
GFR SERPL CREATININE-BSD FRML MDRD: 72 ML/MIN/1.73SQ M
GLUCOSE P FAST SERPL-MCNC: 92 MG/DL (ref 65–99)
HCT VFR BLD AUTO: 45.1 % (ref 34.8–46.1)
HDLC SERPL-MCNC: 54 MG/DL
HGB BLD-MCNC: 14.3 G/DL (ref 11.5–15.4)
IMM GRANULOCYTES # BLD AUTO: 0.02 THOUSAND/UL (ref 0–0.2)
IMM GRANULOCYTES NFR BLD AUTO: 0 % (ref 0–2)
LDLC SERPL CALC-MCNC: 137 MG/DL (ref 0–100)
LYMPHOCYTES # BLD AUTO: 1.73 THOUSANDS/ΜL (ref 0.6–4.47)
LYMPHOCYTES NFR BLD AUTO: 27 % (ref 14–44)
MCH RBC QN AUTO: 28.7 PG (ref 26.8–34.3)
MCHC RBC AUTO-ENTMCNC: 31.7 G/DL (ref 31.4–37.4)
MCV RBC AUTO: 90 FL (ref 82–98)
MONOCYTES # BLD AUTO: 0.47 THOUSAND/ΜL (ref 0.17–1.22)
MONOCYTES NFR BLD AUTO: 7 % (ref 4–12)
NEUTROPHILS # BLD AUTO: 4.07 THOUSANDS/ΜL (ref 1.85–7.62)
NEUTS SEG NFR BLD AUTO: 63 % (ref 43–75)
NONHDLC SERPL-MCNC: 150 MG/DL
NRBC BLD AUTO-RTO: 0 /100 WBCS
PLATELET # BLD AUTO: 192 THOUSANDS/UL (ref 149–390)
PMV BLD AUTO: 11.4 FL (ref 8.9–12.7)
POTASSIUM SERPL-SCNC: 4 MMOL/L (ref 3.5–5.3)
PROT SERPL-MCNC: 7.1 G/DL (ref 6.4–8.2)
RBC # BLD AUTO: 4.99 MILLION/UL (ref 3.81–5.12)
SODIUM SERPL-SCNC: 141 MMOL/L (ref 136–145)
TRIGL SERPL-MCNC: 64 MG/DL
TSH SERPL DL<=0.05 MIU/L-ACNC: 1.76 UIU/ML (ref 0.45–4.5)
WBC # BLD AUTO: 6.51 THOUSAND/UL (ref 4.31–10.16)

## 2022-05-19 PROCEDURE — 84443 ASSAY THYROID STIM HORMONE: CPT

## 2022-05-19 PROCEDURE — 80053 COMPREHEN METABOLIC PANEL: CPT

## 2022-05-19 PROCEDURE — 85025 COMPLETE CBC W/AUTO DIFF WBC: CPT

## 2022-05-19 PROCEDURE — 36415 COLL VENOUS BLD VENIPUNCTURE: CPT

## 2022-05-19 PROCEDURE — 80061 LIPID PANEL: CPT

## 2022-06-21 ENCOUNTER — OFFICE VISIT (OUTPATIENT)
Dept: CARDIOLOGY CLINIC | Facility: CLINIC | Age: 87
End: 2022-06-21
Payer: MEDICARE

## 2022-06-21 VITALS
WEIGHT: 118 LBS | HEART RATE: 61 BPM | HEIGHT: 59 IN | OXYGEN SATURATION: 100 % | TEMPERATURE: 98 F | BODY MASS INDEX: 23.79 KG/M2 | SYSTOLIC BLOOD PRESSURE: 190 MMHG | DIASTOLIC BLOOD PRESSURE: 70 MMHG

## 2022-06-21 DIAGNOSIS — I35.0 AORTIC STENOSIS, MODERATE: ICD-10-CM

## 2022-06-21 DIAGNOSIS — I10 UNCONTROLLED HYPERTENSION: Primary | ICD-10-CM

## 2022-06-21 DIAGNOSIS — I35.1 MODERATE AORTIC REGURGITATION: ICD-10-CM

## 2022-06-21 DIAGNOSIS — I89.0 LYMPHEDEMA OF BOTH LOWER EXTREMITIES: ICD-10-CM

## 2022-06-21 DIAGNOSIS — E78.5 DYSLIPIDEMIA: ICD-10-CM

## 2022-06-21 DIAGNOSIS — I10 ESSENTIAL HYPERTENSION: ICD-10-CM

## 2022-06-21 DIAGNOSIS — Z96.651 STATUS POST TOTAL RIGHT KNEE REPLACEMENT USING CEMENT: ICD-10-CM

## 2022-06-21 DIAGNOSIS — I34.0 NON-RHEUMATIC MITRAL REGURGITATION: ICD-10-CM

## 2022-06-21 PROCEDURE — 99214 OFFICE O/P EST MOD 30 MIN: CPT | Performed by: INTERNAL MEDICINE

## 2022-06-21 PROCEDURE — 93000 ELECTROCARDIOGRAM COMPLETE: CPT | Performed by: INTERNAL MEDICINE

## 2022-06-21 RX ORDER — CHLORTHALIDONE 25 MG/1
TABLET ORAL
Qty: 16 TABLET | Refills: 5 | Status: SHIPPED | OUTPATIENT
Start: 2022-06-21

## 2022-06-21 NOTE — PATIENT INSTRUCTIONS
1  Dr Leti Chi will do a calculation of your average blood pressure at home and our office will then call you with further instructions on your medication  2  We will try to lower the dose of the metoprolol to see if that will help with the tiredness and sleepiness and will even consider discontinuing it and substituting something else in the future  For some people, metoprolol can sometimes cause tiredness and sleepiness  3  Cardiology follow-up approximately six months with EKG

## 2022-06-21 NOTE — PROGRESS NOTES
Office Cardiology Progress Note  Catalina Carroll 80 y o  female MRN: [id number]  06/21/22  2:57 PM      ASSESSMENT:      1   Chronic fatigue, sleepiness and tiredness, with hypothyroidism ruled out on  previous lab work  Symptoms could be related to metoprolol side effects and less likely to aortic stenosis  2  Asymptomatic moderate aortic stenosis   Seems to also have murmur of mitral regurgitation   Had moderate aortic stenosis and regurgitation with mean gradient of 22 mmHg as well as 1+ MR/MAC on TTE 05/14/2019   Also had moderate LVH with 60% LVEF and elevated mean left atrial filling pressure  3  Significant improvement in chronic left more than right lower extremity lymphedema with associated stasis dermatitis and contribution from underlying venous insufficiency of right lower extremity, confirmed on venous reflux study of 11/13/2019  Had complicating slowly healing superficial ulcerations of right lower medial calf , now resolved  4  Previously controlled essential hypertension,with average home blood pressure of 130/66 between 11/14 and 11/18/2020  Now with uncontrolled hypertension with recent average home blood pressure of 156/77 between 6/17 and 06/19/2022  Home blood pressure unit judged to be accurate as of 12/29/2021     5  Persistent mild to moderate dyslipidemia, with residual increase in LDL as of 05/19/2021     6  History of successful right knee total arthroplasty on 12/15/2020 with 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  Plan       Patient Instructions     1  Dr Zachary Hopson will do a calculation of your average blood pressure at home and our office will then call you with further instructions on your medication  2  We will try to lower the dose of the metoprolol to see if that will help with the tiredness and sleepiness and will even consider discontinuing it and substituting something else in the future    For some people, metoprolol can sometimes cause tiredness and sleepiness  3  Cardiology follow-up approximately six months with EKG  HPI    This 80 y o  female  denies new cardiopulmonary and medical symptoms  She complains of continued tiredness, fatigue, and sleepiness with aortic stenosis raised by her primary care physician as a potential cause  Her chronic lymphedema has been under control with some redness in the left above ankle region    The patient's blood pressures at home between 6/17 and 06/19/2022 with one additional set of readings on 06/21/2022 averaged 156/77  The patient's home blood pressure unit was confirmed to be accurate at our office on 12/29/2021  The patient is being seen in follow-up of the below listed diagnoses  Encounter Diagnoses   Name Primary?  Uncontrolled hypertension Yes    Essential hypertension     Aortic stenosis, moderate     Moderate aortic regurgitation     Non-rheumatic mitral regurgitation     Lymphedema of both lower extremities     Dyslipidemia     Status post total right knee replacement using cement         Review of Systems    All other systems negative, except as noted in history of present illness    Historical Information   Past Medical History:   Diagnosis Date    Anesthesia complication       Yrs ago had "anxiety" reaction not sure while sedated, pt states sensitive to anesthesia effects    Anxiety     stress at home    Arthritis     Cataract, right     Last Assessed:5/11/16    Eye hemorrhage     left eye currently 5/12/16    History of shingles 05/2015    Hypertension     Mitral valve stenosis, mild      Past Surgical History:   Procedure Laterality Date    CARPAL TUNNEL RELEASE Left     CATARACT EXTRACTION      CATARACT EXTRACTION W/ INTRAOCULAR LENS IMPLANT Left 10/11/2016    Procedure: EXTRACTION EXTRACAPSULAR CATARACT PHACO INTRAOCULAR LENS (IOL);   Surgeon: Sigrid Moya MD;  Location: [facility] MAIN OR;  Service:    Lloyd Delgado CERVICAL CONE BIOPSY      COLONOSCOPY      EYE SURGERY Left muscle repair    CA OPEN RX FEMUR FX+INTRAMED NINI Right 2019    Procedure: INSERTION NAIL IM FEMUR ANTEGRADE (TROCHANTERIC); Surgeon: Naldo Beard DO;  Location: [address] [place] [address];  Service: Orthopedics    CA TOTAL KNEE ARTHROPLASTY Right 12/15/2020    Procedure: ARTHROPLASTY KNEE TOTAL;  Surgeon: Naldo Beard DO;  Location: [address] [place] [address];  Service: Orthopedics    CA XCAPSL CTRC RMVL INSJ IO LENS PROSTH W/O ECP Right 2016    Procedure: EXTRACTION EXTRACAPSULAR CATARACT PHACO INTRAOCULAR LENS (IOL); Surgeon: Lilly Saenz MD;  Location: [facility] MAIN OR;  Service: Ophthalmology    TONSILLECTOMY       Social History     Substance and Sexual Activity   Alcohol Use Yes    Comment: rarely     Social History     Substance and Sexual Activity   Drug Use No     Social History     Tobacco Use   Smoking Status Never Smoker   Smokeless Tobacco Never Used       Family History:  Family History   Problem Relation Age of Onset    Heart disease Mother         MI  at age 71   [facility] Arthritis Mother     GI problems Father         bleeding ulcer    Arthritis Sister     Sleep apnea Sister     Irritable bowel syndrome Sister     Cancer Sister         skin cancer    Heart disease Brother         CABG (5)   [facility] Cancer Brother         skin cancer    Heart disease Maternal Aunt          Meds/Allergies     Prior to Admission medications    Medication Sig Start Date End Date Taking?  Authorizing Provider   acetaminophen (TYLENOL) 325 mg tablet Take 3 tablets (975 mg total) by mouth every 8 (eight) hours 20  Yes Omar Elkins PA-C   Ascorbic Acid (vitamin C) 100 MG tablet Take 100 mg by mouth daily   Yes Historical Provider, MD   aspirin (ECOTRIN) 325 mg EC tablet Take 1 tablet (325 mg total) by mouth 2 (two) times a day 20  Yes Omar Elkins PA-C   metoprolol succinate (TOPROL-XL) 50 mg 24 hr tablet Take 1 tablet (50 mg total) by mouth daily As directed 22  Yes Joey Santos MD   Multiple Vitamins-Minerals (PRESERVISION AREDS PO) Take by mouth   Yes Historical Provider, MD   potassium chloride (K-DUR,KLOR-CON) 20 mEq tablet TAKE 1 TABLET DAILY 3/31/21 6/21/22 Yes Damari Sahu MD   furosemide (LASIX) 40 mg tablet Take 1 tablet (40 mg total) by mouth daily As needed for leg swelling  Patient not taking: Reported on 6/21/2022 11/2/20 6/21/22  Damari Sahu MD   traMADol Flaquito Gil[place]) 50 mg tablet Take 50 mg by mouth as needed for moderate pain Usually takes before therapy  Patient not taking: Reported on 6/21/2022 6/21/22  Historical Provider, MD       Allergies   Allergen Reactions    Indocin [Indomethacin]      hypertension         Vitals:    06/21/22 1357   BP: (!) 190/70   BP Location: Left arm   Patient Position: Sitting   Cuff Size: Standard   Pulse: 61   Temp: 98 °F (36 7 °C)   SpO2: 100%   Weight: 53 5 kg (118 lb)   Height: 4' 11" (1 499 m)       Body mass index is 23 83 kg/m²    1 pound weight gain in approximately 6-1/2 months and 2 pound weight gain in approximately 1 25 years    Physical Exam:    General Appearance:  Alert, cooperative, no distress, appears stated age   Head:  Normocephalic, without obvious abnormality, atraumatic   Eyes:  PERRL, conjunctiva/corneas clear, EOM's intact,   both eyes   Ears:  Normal TM's and external ear canals, both ears   Nose: Nares normal, septum midline, mucosa normal, no drainage or sinus tenderness   Throat: Lips, mucosa, and tongue normal; teeth and gums normal   Neck: Supple, symmetrical, trachea midline, no adenopathy, thyroid: not enlarged, symmetric, no tenderness/mass/nodules, no carotid bruit or JVD   Back:   Symmetric, no curvature, ROM normal, no CVA tenderness   Lungs:   Clear to auscultation bilaterally, respirations unlabored   Chest Wall:  No tenderness or deformity   Heart:  Regular rate and rhythm, S1, S2 normal, no murmur, rub or gallop   Abdomen:   Soft, non-tender, bowel sounds active all four quadrants,  no masses, no organomegaly   Extremities: Extremities normal, atraumatic, no cyanosis or edema with patient wearing support hosiery  Pulses: 1+ and symmetric   Skin: Skin showed normal color, texture, turgor and no rashes or lesions   Lymph nodes: Cervical, supraclavicular, and axillary nodes normal   Neurologic: Normal         Cardiographics    ECG 06/21/22:    Normal sinus rhythm at 61 bpm   Minimal voltage criteria for LVH with secondary T inversion in lead aVL  Poor R-wave progression  New nonspecific T flattening in lead one with otherwise unchanged pattern since 12/07/2021    IMAGING:    No Chest XR results available for this patient            LAB REVIEW:      Lab Results   Component Value Date    SODIUM 141 05/19/2022    K 4 0 05/19/2022     05/19/2022    CO2 29 05/19/2022    ANIONGAP 13 0 10/13/2015    BUN 21 05/19/2022    CREATININE 0 75 05/19/2022    GLUCOSE 87 10/13/2015    GLUF 92 05/19/2022    CALCIUM 9 1 05/19/2022    AST 22 05/19/2022    ALT 18 05/19/2022    ALKPHOS 104 05/19/2022    PROT 6 8 10/13/2015    BILITOT 0 6 10/13/2015    EGFR 72 05/19/2022     Lab Results   Component Value Date    CHOLESTEROL 204 (H) 05/19/2022    CHOLESTEROL 186 11/10/2020    CHOLESTEROL 212 (H) 07/01/2020     Lab Results   Component Value Date    HDL 54 05/19/2022    HDL 57 11/10/2020    HDL 55 07/01/2020       Lab Results   Component Value Date    LDLCALC 137 (H) 05/19/2022    LDLCALC 114 (H) 11/10/2020    LDLCALC 138 (H) 07/01/2020     No components found for: [facility]  Lab Results   Component Value Date    TRIG 64 05/19/2022    TRIG 74 11/10/2020    TRIG 95 07/01/2020       CBC, TSH 05/19/2022:  Normal        Jeb Pedro MD

## 2022-06-22 ENCOUNTER — TELEPHONE (OUTPATIENT)
Dept: CARDIOLOGY CLINIC | Facility: CLINIC | Age: 87
End: 2022-06-22

## 2022-06-22 NOTE — PROGRESS NOTES
Message sent to clinical staff on 2022:    Notify patient that Dr Toni Berrios calculated her average blood pressure at home between  and 2022, also including the blood pressure is done on the day of her appointment on 2022  The average overall blood pressure is high and is 156/77  The top number should run 139 or less and the bottom number is fine  I am recommending the followin  Begin chlorthalidone 25 milligrams four days a week, taking it once a day every Monday, Wednesday, Friday, and   Do not take it on Tuesday, Thursday, or Saturday  2  Reduce metoprolol to 1/2 tablet daily  3  Starting in one month or around  or , do four consecutive days of blood pressures at home morning and evening, three readings at a time, one or 2 minutes apart, writing them all down on a blood pressure data sheet from Dr Johann Reza office  Return the blood pressure paper to our office by e-mail, fax, or by dropping at off, so Dr Toni Berrios can review it  You can give her my work e-mail address if she wants to e-mail the paper to us  You can also give her the office fax number of [phone]   4  Ask her to follow a diet that is low in sodium, avoiding processed foods, lunch meats, smoked meats, canned soups or vegetables and to minimize adding salt to her foods  She should also avoid salty snacks like potato chips or pretzels  5  We will call her with the results of her next blood pressure readings taken next month      Dr Toni Berrios

## 2022-06-22 NOTE — TELEPHONE ENCOUNTER
----- Message from Maira Hernandez MD sent at 2022  9:35 PM EDT -----  Regarding: Home blood pressure readings  Notify patient that Dr Bryson Odonnell calculated her average blood pressure at home between  and 2022, also including the blood pressure is done on the day of her appointment on 2022  The average overall blood pressure is high and is 156/77  The top number should run 139 or less and the bottom number is fine  I am recommending the followin  Begin chlorthalidone 25 milligrams four days a week, taking it once a day every Monday, Wednesday, Friday, and   Do not take it on Tuesday, Thursday, or Saturday  2  Reduce metoprolol to 1/2 tablet daily  3  Starting in one month or around  or , do four consecutive days of blood pressures at home morning and evening, three readings at a time, one or 2 minutes apart, writing them all down on a blood pressure data sheet from Dr Arben Hanks office  Return the blood pressure paper to our office by e-mail, fax, or by dropping at off, so Dr Bryson Odonnell can review it  You can give her my work e-mail address if she wants to e-mail the paper to us  You can also give her the office fax number of [phone]   4  Ask her to follow a diet that is low in sodium, avoiding processed foods, lunch meats, smoked meats, canned soups or vegetables and to minimize adding salt to her foods  She should also avoid salty snacks like potato chips or pretzels  5  We will call her with the results of her next blood pressure readings taken next month      Dr Bryson Odonnell

## 2022-07-13 ENCOUNTER — HOSPITAL ENCOUNTER (OUTPATIENT)
Dept: NON INVASIVE DIAGNOSTICS | Facility: HOSPITAL | Age: 87
Discharge: HOME | End: 2022-07-13
Attending: INTERNAL MEDICINE
Payer: MEDICARE

## 2022-07-13 VITALS
HEART RATE: 62 BPM | BODY MASS INDEX: 23.79 KG/M2 | WEIGHT: 118 LBS | HEIGHT: 59 IN | DIASTOLIC BLOOD PRESSURE: 70 MMHG | SYSTOLIC BLOOD PRESSURE: 190 MMHG

## 2022-07-13 DIAGNOSIS — I89.0 LYMPHEDEMA OF BOTH LOWER EXTREMITIES: ICD-10-CM

## 2022-07-13 DIAGNOSIS — I35.0 AORTIC STENOSIS, MODERATE: ICD-10-CM

## 2022-07-13 DIAGNOSIS — I35.1 MODERATE AORTIC REGURGITATION: ICD-10-CM

## 2022-07-13 DIAGNOSIS — I10 ESSENTIAL HYPERTENSION: ICD-10-CM

## 2022-07-13 PROCEDURE — 93306 TTE W/DOPPLER COMPLETE: CPT | Performed by: INTERNAL MEDICINE

## 2022-07-13 PROCEDURE — 93306 TTE W/DOPPLER COMPLETE: CPT

## 2022-07-15 LAB
AORTIC ROOT: 2.4 CM
AORTIC VALVE MEAN VELOCITY: 24.7 M/S
APICAL FOUR CHAMBER EJECTION FRACTION: 58 %
AV AREA BY CONTINUOUS VTI: 0.9 CM2
AV AREA PEAK VELOCITY: 0.9 CM2
AV LVOT MEAN GRADIENT: 3 MMHG
AV LVOT PEAK GRADIENT: 5 MMHG
AV MEAN GRADIENT: 28 MMHG
AV PEAK GRADIENT: 43 MMHG
AV VALVE AREA: 0.9 CM2
AV VELOCITY RATIO: 0.34
DOP CALC AO PEAK VEL: 3.27 M/S
DOP CALC AO VTI: 75.79 CM
DOP CALC LVOT AREA: 2.54 CM2
DOP CALC LVOT DIAMETER: 1.8 CM
DOP CALC LVOT PEAK VEL VTI: 26.69 CM
DOP CALC LVOT PEAK VEL: 1.12 M/S
DOP CALC LVOT STROKE INDEX: 44.9 ML/M2
DOP CALC LVOT STROKE VOLUME: 67.88 CM3
DOP CALC MV VTI: 42.1 CM
E WAVE DECELERATION TIME: 417 MS
FRACTIONAL SHORTENING: 33 % (ref 28–44)
INTERVENTRICULAR SEPTUM IN DIASTOLE (PARASTERNAL SHORT AXIS VIEW): 1.6 CM
INTERVENTRICULAR SEPTUM: 1.6 CM (ref 0.6–1.1)
LAAS-AP2: 33.2 CM2
LAAS-AP4: 26 CM2
LEFT ATRIUM AREA SYSTOLE SINGLE PLANE A4C: 24.5 CM2
LEFT ATRIUM SIZE: 3.3 CM
LEFT INTERNAL DIMENSION IN SYSTOLE: 2.8 CM (ref 2.1–4)
LEFT VENTRICULAR INTERNAL DIMENSION IN DIASTOLE: 4.2 CM (ref 3.5–6)
LEFT VENTRICULAR POSTERIOR WALL IN END DIASTOLE: 1.1 CM
LEFT VENTRICULAR STROKE VOLUME: 49 ML
LVSV (TEICH): 49 ML
MITRAL REGURGITATION PEAK VELOCITY: 6.15 M/S
MITRAL VALVE MEAN INFLOW VELOCITY: 4.63 M/S
MITRAL VALVE REGURGITANT PEAK GRADIENT: 151 MMHG
MV E'TISSUE VEL-LAT: 5 CM/S
MV E'TISSUE VEL-SEP: 3 CM/S
MV MEAN GRADIENT: 2 MMHG
MV PEAK A VEL: 1.05 M/S
MV PEAK E VEL: 104 CM/S
MV PEAK GRADIENT: 7 MMHG
MV STENOSIS PRESSURE HALF TIME: 121 MS
MV VALVE AREA BY CONTINUITY EQUATION: 1.61 CM2
MV VALVE AREA P 1/2 METHOD: 1.82 CM2
RIGHT ATRIUM AREA SYSTOLE A4C: 14.5 CM2
RIGHT VENTRICLE ID DIMENSION: 3.5 CM
SL CV DOP CALC MV VTI RETROGRADE: 97 CM
SL CV LEFT ATRIUM LENGTH A2C: 5.7 CM
SL CV LV EF: 63
SL CV MV MEAN GRADIENT RETROGRADE: 97 MMHG
SL CV PED ECHO LEFT VENTRICLE DIASTOLIC VOLUME (MOD BIPLANE) 2D: 78 ML
SL CV PED ECHO LEFT VENTRICLE SYSTOLIC VOLUME (MOD BIPLANE) 2D: 28 ML
TR MAX PG: 27 MMHG
TR PEAK VELOCITY: 2.6 M/S
TRICUSPID VALVE PEAK REGURGITATION VELOCITY: 2.6 M/S

## 2022-07-15 NOTE — RESULT ENCOUNTER NOTE
Notify patient that her echocardiogram showed moderately severe narrowing of her aortic valve but was unchanged from prior study  Remind her that she is due to take her blood pressures at home in about one week and should get them back to us as soon as possible after that  We will contact her with instructions in results after that      Dr Shahrzad Burk

## 2022-07-19 ENCOUNTER — TELEPHONE (OUTPATIENT)
Dept: CARDIOLOGY CLINIC | Facility: CLINIC | Age: 87
End: 2022-07-19

## 2022-07-19 NOTE — TELEPHONE ENCOUNTER
----- Message from Shelly Dahl MD sent at 7/15/2022  3:58 PM EDT -----  Notify patient that her echocardiogram showed moderately severe narrowing of her aortic valve but was unchanged from prior study  Remind her that she is due to take her blood pressures at home in about one week and should get them back to us as soon as possible after that  We will contact her with instructions in results after that      Dr Matt Schroeder

## 2022-10-20 ENCOUNTER — TELEPHONE (OUTPATIENT)
Dept: INTERNAL MEDICINE CLINIC | Facility: CLINIC | Age: 87
End: 2022-10-20

## 2022-10-20 DIAGNOSIS — I10 ESSENTIAL HYPERTENSION: Primary | ICD-10-CM

## 2022-10-20 DIAGNOSIS — E78.5 HYPERLIPIDEMIA, UNSPECIFIED HYPERLIPIDEMIA TYPE: ICD-10-CM

## 2022-10-20 NOTE — TELEPHONE ENCOUNTER
Orders placed for a lipid profile and comprehensive metabolic profile please have her do those with a 10-12 hour fast thank you

## 2022-10-20 NOTE — TELEPHONE ENCOUNTER
Pt has an appoint coming up  Is there any blood work you want?   She will fast and use a [facility] lab

## 2022-10-26 ENCOUNTER — APPOINTMENT (OUTPATIENT)
Dept: LAB | Facility: HOSPITAL | Age: 87
End: 2022-10-26

## 2022-10-26 DIAGNOSIS — E78.5 HYPERLIPIDEMIA, UNSPECIFIED HYPERLIPIDEMIA TYPE: ICD-10-CM

## 2022-10-26 DIAGNOSIS — I10 ESSENTIAL HYPERTENSION: ICD-10-CM

## 2022-10-26 LAB
ALBUMIN SERPL BCP-MCNC: 4.1 G/DL (ref 3.5–5)
ALP SERPL-CCNC: 101 U/L (ref 46–116)
ALT SERPL W P-5'-P-CCNC: 13 U/L (ref 12–78)
ANION GAP SERPL CALCULATED.3IONS-SCNC: 6 MMOL/L (ref 4–13)
AST SERPL W P-5'-P-CCNC: 21 U/L (ref 5–45)
BILIRUB SERPL-MCNC: 0.73 MG/DL (ref 0.2–1)
BUN SERPL-MCNC: 26 MG/DL (ref 5–25)
CALCIUM SERPL-MCNC: 9.4 MG/DL (ref 8.3–10.1)
CHLORIDE SERPL-SCNC: 102 MMOL/L (ref 96–108)
CHOLEST SERPL-MCNC: 204 MG/DL
CO2 SERPL-SCNC: 32 MMOL/L (ref 21–32)
CREAT SERPL-MCNC: 0.86 MG/DL (ref 0.6–1.3)
GFR SERPL CREATININE-BSD FRML MDRD: 60 ML/MIN/1.73SQ M
GLUCOSE P FAST SERPL-MCNC: 97 MG/DL (ref 65–99)
HDLC SERPL-MCNC: 46 MG/DL
LDLC SERPL CALC-MCNC: 142 MG/DL (ref 0–100)
NONHDLC SERPL-MCNC: 158 MG/DL
POTASSIUM SERPL-SCNC: 4.1 MMOL/L (ref 3.5–5.3)
PROT SERPL-MCNC: 7.3 G/DL (ref 6.4–8.4)
SODIUM SERPL-SCNC: 140 MMOL/L (ref 135–147)
TRIGL SERPL-MCNC: 81 MG/DL

## 2022-11-02 ENCOUNTER — OFFICE VISIT (OUTPATIENT)
Dept: INTERNAL MEDICINE CLINIC | Facility: CLINIC | Age: 87
End: 2022-11-02

## 2022-11-02 VITALS
WEIGHT: 111.6 LBS | SYSTOLIC BLOOD PRESSURE: 112 MMHG | DIASTOLIC BLOOD PRESSURE: 70 MMHG | HEART RATE: 90 BPM | TEMPERATURE: 98.5 F | BODY MASS INDEX: 22.54 KG/M2 | OXYGEN SATURATION: 98 %

## 2022-11-02 DIAGNOSIS — M19.90 ARTHRITIS: ICD-10-CM

## 2022-11-02 DIAGNOSIS — R53.83 OTHER FATIGUE: ICD-10-CM

## 2022-11-02 DIAGNOSIS — Z23 ENCOUNTER FOR IMMUNIZATION: ICD-10-CM

## 2022-11-02 DIAGNOSIS — I10 ESSENTIAL HYPERTENSION: ICD-10-CM

## 2022-11-02 DIAGNOSIS — E78.5 HYPERLIPIDEMIA, UNSPECIFIED HYPERLIPIDEMIA TYPE: ICD-10-CM

## 2022-11-02 DIAGNOSIS — I35.0 AORTIC STENOSIS, MODERATE: Primary | ICD-10-CM

## 2022-11-02 DIAGNOSIS — I50.32 CHRONIC DIASTOLIC CONGESTIVE HEART FAILURE (HCC): ICD-10-CM

## 2022-11-02 PROBLEM — Z96.651 STATUS POST TOTAL RIGHT KNEE REPLACEMENT USING CEMENT: Status: RESOLVED | Noted: 2020-12-15 | Resolved: 2022-11-02

## 2022-11-02 NOTE — ASSESSMENT & PLAN NOTE
Lipid profile reviewed with patient does show somewhat of an upward trend in her total cholesterol and LDL which we have identified as being related to cheese consumption  Recommend that she reduce cheese consumption on a daily basis and continue a careful low-cholesterol diet

## 2022-11-02 NOTE — ASSESSMENT & PLAN NOTE
Advanced deformities of arthritis in both hands Tylenol recommended on an as-needed basis 1000 mg Q 8 hours

## 2022-11-02 NOTE — ASSESSMENT & PLAN NOTE
Fatigue and increased sleep habits reviewed with the patient today suspect most likely of issue is her advancing aortic stenosis  Blood work has show no evidence of thyroid deficiency nor any anemia  No major chemical imbalances on her comprehensive metabolic profile

## 2022-11-02 NOTE — PROGRESS NOTES
Name: Shauna Felton      : 1935      MRN: [id number]  Encounter Provider: Raymundo Melchor MD  Encounter Date: 2022   Encounter department: [facility]    Assessment & Plan     1  Encounter for immunization  -     influenza vaccine, high-dose, PF 0 7 mL (FLUZONE HIGH-DOSE)    2  Essential hypertension  Assessment & Plan:  Blood pressure assessment today confirms adequate control of hypertension patient is currently on a combination of metoprolol succinate 25 mg daily and chlorthalidone 25 mg 4 days a week  and   Metabolic profile pertaining to electrolyte balance and kidney performance was reviewed with the patient today  3  Aortic stenosis, moderate  Assessment & Plan:  I have reviewed the patient's most recent notes from her cardiologist as well as her most recent echocardiogram which confirms the presence of moderate aortic stenosis  She has no syncopal episodes nor any lightheadedness but does have an increase in fatigue which I suspect may be related to the aortic stenosis condition  Recommend continued follow-up with Cardiology  4  Arthritis  Assessment & Plan:  Advanced deformities of arthritis in both hands Tylenol recommended on an as-needed basis 1000 mg Q 8 hours  5  Other fatigue  Assessment & Plan:  Fatigue and increased sleep habits reviewed with the patient today suspect most likely of issue is her advancing aortic stenosis  Blood work has show no evidence of thyroid deficiency nor any anemia  No major chemical imbalances on her comprehensive metabolic profile  6  Chronic diastolic congestive heart failure [facility])  Assessment & Plan:  Wt Readings from Last 3 Encounters:   22 50 6 kg (111 lb 9 6 oz)   22 53 5 kg (118 lb)   22 53 5 kg (118 lb)         Patient continues under treatment for the diastolic congestive heart failure    Weight is down approximately 7 lb but she continues to display peripheral edema in lower extremities  Lungs are clear on examination today  She is on chlorthalidone 25 mg 4 days a week as directed by her cardiologist       7  Hyperlipidemia, unspecified hyperlipidemia type  Assessment & Plan:  Lipid profile reviewed with patient does show somewhat of an upward trend in her total cholesterol and LDL which we have identified as being related to cheese consumption  Recommend that she reduce cheese consumption on a daily basis and continue a careful low-cholesterol diet  Subjective      This 42-year-old female patient returns today for a routine 6 month follow-up visit  Her concern at this time is regarding increased fatigue symptoms with increase in sleeping during the day  She indicates she can literally fall asleep at any time  She denies any chest pain patient has had no syncopal episodes  She reports no recent infections symptoms  She is noted to have significant of moderate aortic stenosis as well as a history of hypertension  Recent blood work was reviewed with the patient today including most recent thyroid testing comprehensive metabolic profile and lipid testing  Review of Systems   Constitutional: Positive for fatigue  All other systems reviewed and are negative  Current Outpatient Medications on File Prior to Visit   Medication Sig   • acetaminophen (TYLENOL) 325 mg tablet Take 3 tablets (975 mg total) by mouth every 8 (eight) hours   • Ascorbic Acid (vitamin C) 100 MG tablet Take 100 mg by mouth daily   • aspirin (ECOTRIN) 325 mg EC tablet Take 1 tablet (325 mg total) by mouth 2 (two) times a day   • chlorthalidone 25 mg tablet Take 1 tablet 4 days a week every Monday, Wednesday, Friday, and Sunday     • metoprolol succinate (TOPROL-XL) 50 mg 24 hr tablet Take 1 tablet (50 mg total) by mouth daily As directed (Patient taking differently: Take 50 mg by mouth daily As directed  Take half a tablet QD)   • Multiple Vitamins-Minerals (PRESERVISION AREDS PO) Take by mouth       Objective     /70   Pulse 90   Temp 98 5 °F (36 9 °C)   Wt 50 6 kg (111 lb 9 6 oz)   SpO2 98%   BMI 22 54 kg/m²     Physical Exam  Vitals reviewed  Constitutional:       General: She is not in acute distress  Appearance: Normal appearance  She is well-developed  She is not ill-appearing  HENT:      Right Ear: Hearing, tympanic membrane, ear canal and external ear normal       Left Ear: Hearing, tympanic membrane, ear canal and external ear normal       Nose: Rhinorrhea present  No mucosal edema  Mouth/Throat:      Pharynx: Uvula midline  Posterior oropharyngeal erythema present  No oropharyngeal exudate  Eyes:      General: Lids are normal       Conjunctiva/sclera: Conjunctivae normal       Pupils: Pupils are equal, round, and reactive to light  Neck:      Thyroid: No thyromegaly  Vascular: No carotid bruit or JVD  Cardiovascular:      Rate and Rhythm: Normal rate and regular rhythm  Heart sounds: Murmur heard  Pulmonary:      Effort: Pulmonary effort is normal  No respiratory distress  Breath sounds: Normal breath sounds  Abdominal:      General: Bowel sounds are normal       Palpations: Abdomen is soft  Musculoskeletal:         General: Normal range of motion  Right lower leg: Edema present  Left lower leg: Edema present  Comments: Bilateral lower extremity edema +1   Lymphadenopathy:      Cervical: No cervical adenopathy  Skin:     General: Skin is warm and dry  Neurological:      Mental Status: She is alert and oriented to person, place, and time  Mental status is at baseline  Deep Tendon Reflexes: Reflexes are normal and symmetric  Reflexes normal    Psychiatric:         Speech: Speech normal          Behavior: Behavior normal  Behavior is cooperative  Thought Content:  Thought content normal          Judgment: Judgment normal        Jose M Griffiths MD

## 2022-11-02 NOTE — ASSESSMENT & PLAN NOTE
Blood pressure assessment today confirms adequate control of hypertension patient is currently on a combination of metoprolol succinate 25 mg daily and chlorthalidone 25 mg 4 days a week Monday Wednesday Friday and Sunday  Metabolic profile pertaining to electrolyte balance and kidney performance was reviewed with the patient today

## 2022-11-02 NOTE — ASSESSMENT & PLAN NOTE
Wt Readings from Last 3 Encounters:   11/02/22 50 6 kg (111 lb 9 6 oz)   07/13/22 53 5 kg (118 lb)   06/21/22 53 5 kg (118 lb)         Patient continues under treatment for the diastolic congestive heart failure  Weight is down approximately 7 lb but she continues to display peripheral edema in lower extremities  Lungs are clear on examination today    She is on chlorthalidone 25 mg 4 days a week as directed by her cardiologist

## 2022-11-02 NOTE — ASSESSMENT & PLAN NOTE
I have reviewed the patient's most recent notes from her cardiologist as well as her most recent echocardiogram which confirms the presence of moderate aortic stenosis  She has no syncopal episodes nor any lightheadedness but does have an increase in fatigue which I suspect may be related to the aortic stenosis condition  Recommend continued follow-up with Cardiology

## 2022-12-02 DIAGNOSIS — I10 UNCONTROLLED HYPERTENSION: ICD-10-CM

## 2022-12-02 RX ORDER — CHLORTHALIDONE 25 MG/1
TABLET ORAL
Qty: 16 TABLET | Refills: 5 | Status: SHIPPED | OUTPATIENT
Start: 2022-12-02

## 2022-12-06 ENCOUNTER — OFFICE VISIT (OUTPATIENT)
Dept: CARDIOLOGY CLINIC | Facility: CLINIC | Age: 87
End: 2022-12-06

## 2022-12-06 VITALS
TEMPERATURE: 98.2 F | SYSTOLIC BLOOD PRESSURE: 190 MMHG | WEIGHT: 113 LBS | BODY MASS INDEX: 22.78 KG/M2 | HEIGHT: 59 IN | HEART RATE: 63 BPM | OXYGEN SATURATION: 97 % | DIASTOLIC BLOOD PRESSURE: 70 MMHG

## 2022-12-06 DIAGNOSIS — I34.0 NON-RHEUMATIC MITRAL REGURGITATION: ICD-10-CM

## 2022-12-06 DIAGNOSIS — I35.1 MODERATE AORTIC REGURGITATION: ICD-10-CM

## 2022-12-06 DIAGNOSIS — I05.0 MITRAL VALVE STENOSIS, MILD: ICD-10-CM

## 2022-12-06 DIAGNOSIS — M15.9 PRIMARY OSTEOARTHRITIS INVOLVING MULTIPLE JOINTS: ICD-10-CM

## 2022-12-06 DIAGNOSIS — G47.10 HYPERSOMNIA, PERSISTENT: Primary | ICD-10-CM

## 2022-12-06 DIAGNOSIS — I50.32 CHRONIC DIASTOLIC CONGESTIVE HEART FAILURE (HCC): ICD-10-CM

## 2022-12-06 DIAGNOSIS — I10 ESSENTIAL HYPERTENSION: ICD-10-CM

## 2022-12-06 DIAGNOSIS — I35.0 AORTIC STENOSIS, MODERATE: ICD-10-CM

## 2022-12-06 DIAGNOSIS — I89.0 LYMPHEDEMA OF BOTH LOWER EXTREMITIES: ICD-10-CM

## 2022-12-06 DIAGNOSIS — E78.5 DYSLIPIDEMIA: ICD-10-CM

## 2022-12-06 DIAGNOSIS — I07.1 MILD TRICUSPID INSUFFICIENCY: ICD-10-CM

## 2022-12-06 PROBLEM — M15.0 PRIMARY OSTEOARTHRITIS INVOLVING MULTIPLE JOINTS: Status: ACTIVE | Noted: 2022-12-06

## 2022-12-06 NOTE — PATIENT INSTRUCTIONS
Continue present medication  There is no evidence of a cardiac cause for the excessive sleepiness  As there is a possibility of primary narcolepsy, perhaps your primary care doctor would consider prescribing Provigil or referring you to a sleep disorder specialist   Elderly patients with cardiovascular disease are listed under contraindications and cautions with this particular medication  Cardiology follow-up with home blood pressure readings in approximately 6 months, taken during the week prior to appointment  These should be done mid to late morning or early afternoon and again in the early evening, 3 readings at a time 1-2 minutes apart, recorded on blood pressure data sheet from our office  You can bring the paper with you when you come for your appointment in 6 months  Give us a call if you have any other questions or concerns

## 2022-12-07 NOTE — PROGRESS NOTES
Office Cardiology Progress Note  Naida Akbar 80 y o  female MRN: [id number]  12/06/22  11:31 PM      ASSESSMENT:    1   Chronic fatigue, sleepiness and tiredness, with hypothyroidism ruled out on  previous lab work  Symptoms could be related to metoprolol side effects and less likely from stable moderate aortic stenosis  She also may have some sort of hypersomnia of uncertain cause  2  Asymptomatic moderate aortic stenosis   Seems to also have murmur of mitral regurgitation   Had moderate aortic stenosis and regurgitation with mean gradient of 22 mmHg as well as 1+ MR/MAC on TTE 05/14/2019   Also had moderate LVH with 60% LVEF and elevated mean left atrial filling pressure  More recent TTE 7/13/2022 shows no major changes except for slight increase in mean aortic valve gradient to 28 mmHg  3  Significant improvement in chronic left more than right lower extremity lymphedema with associated stasis dermatitis and contribution from underlying venous insufficiency of right lower extremity, confirmed on venous reflux study of 11/13/2019  Had complicating slowly healing superficial ulcerations of right lower medial calf, now resolved  4  Previously controlled essential hypertension,with average home blood pressure of 130/66 between 11/14 and 11/18/2020   Now with improvement in uncontrolled hypertension with recent average home blood pressure of 141/74, decreased from 156/77 between 6/17 and 06/19/2022     Home blood pressure unit judged to be accurate as of 12/29/2021     5  Persistent mild to moderate dyslipidemia, with residual increase in LDL as of  10/26/2022     6  History of successful right knee total arthroplasty on 12/15/2020 with 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  Plan       Patient Instructions     1  Continue present medication  2  There is no evidence of a cardiac cause for the excessive sleepiness    3  As there is a possibility of primary narcolepsy, perhaps your primary care doctor would consider prescribing Provigil or referring you to a sleep disorder specialist   Elderly patients with cardiovascular disease are listed under contraindications and cautions with this particular medication  4  Cardiology follow-up with home blood pressure readings in approximately 6 months, taken during the week prior to appointment  These should be done mid to late morning or early afternoon and again in the early evening, 3 readings at a time 1-2 minutes apart, recorded on blood pressure data sheet from our office  You can bring the paper with you when you come for your appointment in 6 months  5  Give us a call if you have any other questions or concerns  HPI    This 80 y o  female  denies new cardiopulmonary and medical symptoms  The patient complains of persistent sleepiness whenever she sits down for any length of time, resulting in her sleeping for hours at a time  She denies any chest pain, dyspnea, orthopnea, paroxysmal dyspnea, palpitations, cough, sputum production, wheezing, fever, chills  Her lymphedema has been stable  The patient's average home blood pressure between 12/1 and 12/4/2022 was 141/74, significantly improved from 156/77 six months earlier  The patient is also being seen in follow-up of the below listed diagnoses    Encounter Diagnoses   Name Primary? • Hypersomnia, persistent Yes   • Essential hypertension    • Chronic diastolic congestive heart failure (HCC)    • Aortic stenosis, moderate    • Moderate aortic regurgitation    • Non-rheumatic mitral regurgitation    • Mitral valve stenosis, mild    • Mild tricuspid insufficiency    • Lymphedema of both lower extremities    • Dyslipidemia    • Primary osteoarthritis involving multiple joints         Review of Systems    All other systems negative, except as noted in history of present illness    Historical Information   Past Medical History:   Diagnosis Date   • Anesthesia complication       Yrs ago had "anxiety" reaction not sure while sedated, pt states sensitive to anesthesia effects   • Anxiety     stress at home   • Arthritis    • Cataract, right     Last Assessed:16   • Eye hemorrhage     left eye currently 16   • History of shingles 2015   • Hypertension    • Mitral valve stenosis, mild      Past Surgical History:   Procedure Laterality Date   • CARPAL TUNNEL RELEASE Left    • CATARACT EXTRACTION     • CATARACT EXTRACTION W/ INTRAOCULAR LENS IMPLANT Left 10/11/2016    Procedure: EXTRACTION EXTRACAPSULAR CATARACT PHACO INTRAOCULAR LENS (IOL); Surgeon: Temi Hendricks MD;  Location: [facility] OR;  Service:    • CERVICAL CONE BIOPSY     • COLONOSCOPY     • EYE SURGERY Left     muscle repair   • AL OPEN RX FEMUR FX+INTRAMED NINI Right 2019    Procedure: INSERTION NAIL IM FEMUR ANTEGRADE (TROCHANTERIC); Surgeon: Junior Omalley DO;  Location: [address] [place] [address];  Service: Orthopedics   • AL TOTAL KNEE ARTHROPLASTY Right 12/15/2020    Procedure: ARTHROPLASTY KNEE TOTAL;  Surgeon: Junior Omalley DO;  Location: [address] [place] [address];  Service: Orthopedics   • AL XCAPSL CTRC RMVL INSJ IO LENS PROSTH W/O ECP Right 2016    Procedure: EXTRACTION EXTRACAPSULAR CATARACT PHACO INTRAOCULAR LENS (IOL);   Surgeon: Temi Hendricks MD;  Location: [facility] OR;  Service: Ophthalmology   • TONSILLECTOMY       Social History     Substance and Sexual Activity   Alcohol Use Yes    Comment: rarely     Social History     Substance and Sexual Activity   Drug Use No     Social History     Tobacco Use   Smoking Status Never   Smokeless Tobacco Never       Family History:  Family History   Problem Relation Age of Onset   • Heart disease Mother         MI  at age 71   • Arthritis Mother    • GI problems Father         bleeding ulcer   • Arthritis Sister    • Sleep apnea Sister    • Irritable bowel syndrome Sister    • Cancer Sister         skin cancer   • Heart disease Brother         CABG (5)   • Cancer Brother skin cancer   • Heart disease Maternal Aunt          Meds/Allergies     Prior to Admission medications    Medication Sig Start Date End Date Taking? Authorizing Provider   acetaminophen (TYLENOL) 325 mg tablet Take 3 tablets (975 mg total) by mouth every 8 (eight) hours 12/16/20  Yes Nehal Zarco PA-C   Ascorbic Acid (vitamin C) 100 MG tablet Take 100 mg by mouth daily   Yes Historical Provider, MD   aspirin (ECOTRIN) 325 mg EC tablet Take 1 tablet (325 mg total) by mouth 2 (two) times a day 12/16/20  Yes Nehal Zarco PA-C   chlorthalidone 25 mg tablet TAKE 1 TABLET 4 DAYS A WEEK EVERY MONDAY, WEDNESDAY, FRIDAY, AND SUNDAY 12/2/22  Yes Jennifer Ellis MD   metoprolol succinate (TOPROL-XL) 50 mg 24 hr tablet Take 1 tablet (50 mg total) by mouth daily As directed  Patient taking differently: Take 50 mg by mouth daily As directed  Take half a tablet QD 4/21/22  Yes Alexi Baez MD   Multiple Vitamins-Minerals (PRESERVISION AREDS PO) Take by mouth   Yes Historical Provider, MD       Allergies   Allergen Reactions   • Indocin [Indomethacin]      hypertension         Vitals:    12/06/22 1259   BP: (!) 190/70   BP Location: Left arm   Patient Position: Sitting   Cuff Size: Standard   Pulse: 63   Temp: 98 2 °F (36 8 °C)   SpO2: 97%   Weight: 51 3 kg (113 lb)   Height: 4' 11" (1 499 m)       Body mass index is 22 82 kg/m²    5 pound weight loss in approximately 5-1/2 months    Physical Exam:    General Appearance:  Alert, cooperative, no distress, appears stated age   Head:  Normocephalic, without obvious abnormality, atraumatic   Eyes:  PERRL, conjunctiva/corneas clear, EOM's intact,   both eyes   Ears:  Normal TM's and external ear canals, both ears   Nose: Nares normal, septum midline, mucosa normal, no drainage or sinus tenderness   Throat: Lips, mucosa, and tongue normal; teeth and gums normal   Neck: Supple, symmetrical, trachea midline, no adenopathy, thyroid: not enlarged, symmetric, no tenderness/mass/nodules, no carotid bruit or JVD   Back:   Symmetric, no curvature, ROM normal, no CVA tenderness   Lungs:   Clear to auscultation bilaterally, respirations unlabored   Chest Wall:  No tenderness or deformity   Heart:  Regular rate and rhythm, S1, S2 normal, grade 2/6 aortic systolic ejection murmur transmitted towards manubrium and bilateral neck with intact A2, radiation along right and left sternal borders, and no rub or gallop   Abdomen:   Soft, non-tender, bowel sounds active all four quadrants,  no masses, no organomegaly   Extremities: Extremities normal, atraumatic, no cyanosis with chronic bilateral mainly nonpitting lymphedema noted  Pulses: 2+ and symmetric   Skin: Skin showed normal color, texture, turgor and no rashes or lesions   Lymph nodes: Cervical, supraclavicular, and axillary nodes normal   Neurologic: Normal         Cardiographics    ECG 12/06/22:    Normal sinus rhythm at 63 bpm  IVCD  LVH with repolarization abnormality  Poor R wave progression  Leftward frontal plane QRS axis  Abnormal ECG, unchanged from 6/21/2022      IMAGING:    No Chest XR results available for this patient  Transthoracic echocardiogram 7/13/2022: Moderate concentric LVH with 63% LVEF and elevated left atrial filling pressure with diastolic dysfunction  Sigmoid septum  Moderate to severe left atrial enlargement  Moderately calcified aortic valve leaflets with moderately reduced mobility  Moderate aortic stenosis with peak and mean velocities of 43 and 28 mmHg  Mild MAC with 1+ MR  Trace TR  Compared to prior study, increase in mean aortic valve gradient from 20 to 28 mmHg with no other changes        LAB REVIEW:      Lab Results   Component Value Date    SODIUM 140 10/26/2022    K 4 1 10/26/2022     10/26/2022    CO2 32 10/26/2022    ANIONGAP 13 0 10/13/2015    BUN 26 (H) 10/26/2022    CREATININE 0 86 10/26/2022    GLUCOSE 87 10/13/2015    GLUF 97 10/26/2022    CALCIUM 9 4 10/26/2022    AST 21 10/26/2022    ALT 13 10/26/2022    ALKPHOS 101 10/26/2022    PROT 6 8 10/13/2015    BILITOT 0 6 10/13/2015    EGFR 60 10/26/2022     Lab Results   Component Value Date    CHOLESTEROL 204 (H) 10/26/2022    CHOLESTEROL 204 (H) 05/19/2022    CHOLESTEROL 186 11/10/2020     Lab Results   Component Value Date    HDL 46 (L) 10/26/2022    HDL 54 05/19/2022    HDL 57 11/10/2020       Lab Results   Component Value Date    LDLCALC 142 (H) 10/26/2022    LDLCALC 137 (H) 05/19/2022    LDLCALC 114 (H) 11/10/2020     No components found for: [facility]  Lab Results   Component Value Date    TRIG 81 10/26/2022    TRIG 64 05/19/2022    TRIG 74 11/10/2020               Brad Cisneros MD

## 2023-01-19 ENCOUNTER — TELEPHONE (OUTPATIENT)
Dept: OBGYN CLINIC | Facility: CLINIC | Age: 88
End: 2023-01-19

## 2023-01-19 NOTE — TELEPHONE ENCOUNTER
Pt left  on clinical line requesting antibiotic refill for dental visit on Monday  Please send to pharmacy on file      Cb# [phone]

## 2023-01-20 DIAGNOSIS — Z96.651 STATUS POST TOTAL RIGHT KNEE REPLACEMENT USING CEMENT: Primary | ICD-10-CM

## 2023-01-20 RX ORDER — AMOXICILLIN 500 MG/1
2000 TABLET, FILM COATED ORAL
Qty: 4 TABLET | Refills: 2 | Status: SHIPPED | OUTPATIENT
Start: 2023-01-20 | End: 2023-04-20

## 2023-02-20 DIAGNOSIS — I10 ESSENTIAL HYPERTENSION: ICD-10-CM

## 2023-02-20 RX ORDER — METOPROLOL SUCCINATE 50 MG/1
50 TABLET, EXTENDED RELEASE ORAL DAILY
Qty: 90 TABLET | Refills: 0 | Status: SHIPPED | OUTPATIENT
Start: 2023-02-20

## 2023-03-06 ENCOUNTER — TELEPHONE (OUTPATIENT)
Dept: GASTROENTEROLOGY | Facility: CLINIC | Age: 88
End: 2023-03-06

## 2023-03-06 NOTE — TELEPHONE ENCOUNTER
Dr Timur Justin, please see chart  Due to pt's age do you still want colon recall?   Thanks Yanira Cantor

## 2023-05-01 ENCOUNTER — TELEPHONE (OUTPATIENT)
Dept: INTERNAL MEDICINE CLINIC | Facility: CLINIC | Age: 88
End: 2023-05-01

## 2023-05-01 DIAGNOSIS — I10 ESSENTIAL HYPERTENSION: Primary | ICD-10-CM

## 2023-05-01 DIAGNOSIS — E78.5 HYPERLIPIDEMIA, UNSPECIFIED HYPERLIPIDEMIA TYPE: ICD-10-CM

## 2023-05-01 NOTE — TELEPHONE ENCOUNTER
I would like the patient to do a lipid profile and a comprehensive metabolic profile    She should fast for 10 to 12 hours the night before testing request is in the system thank you

## 2023-05-02 ENCOUNTER — APPOINTMENT (OUTPATIENT)
Dept: LAB | Facility: HOSPITAL | Age: 88
End: 2023-05-02

## 2023-05-02 DIAGNOSIS — I10 ESSENTIAL HYPERTENSION: ICD-10-CM

## 2023-05-02 DIAGNOSIS — E78.5 HYPERLIPIDEMIA, UNSPECIFIED HYPERLIPIDEMIA TYPE: ICD-10-CM

## 2023-05-02 LAB
ALBUMIN SERPL BCP-MCNC: 4.1 G/DL (ref 3.5–5)
ALP SERPL-CCNC: 86 U/L (ref 34–104)
ALT SERPL W P-5'-P-CCNC: 10 U/L (ref 7–52)
ANION GAP SERPL CALCULATED.3IONS-SCNC: 6 MMOL/L (ref 4–13)
AST SERPL W P-5'-P-CCNC: 18 U/L (ref 13–39)
BILIRUB SERPL-MCNC: 0.84 MG/DL (ref 0.2–1)
BUN SERPL-MCNC: 21 MG/DL (ref 5–25)
CALCIUM SERPL-MCNC: 9.2 MG/DL (ref 8.4–10.2)
CHLORIDE SERPL-SCNC: 105 MMOL/L (ref 96–108)
CHOLEST SERPL-MCNC: 191 MG/DL
CO2 SERPL-SCNC: 29 MMOL/L (ref 21–32)
CREAT SERPL-MCNC: 0.72 MG/DL (ref 0.6–1.3)
GFR SERPL CREATININE-BSD FRML MDRD: 75 ML/MIN/1.73SQ M
GLUCOSE P FAST SERPL-MCNC: 94 MG/DL (ref 65–99)
HDLC SERPL-MCNC: 53 MG/DL
LDLC SERPL CALC-MCNC: 120 MG/DL (ref 0–100)
NONHDLC SERPL-MCNC: 138 MG/DL
POTASSIUM SERPL-SCNC: 3.6 MMOL/L (ref 3.5–5.3)
PROT SERPL-MCNC: 6.5 G/DL (ref 6.4–8.4)
SODIUM SERPL-SCNC: 140 MMOL/L (ref 135–147)
TRIGL SERPL-MCNC: 88 MG/DL

## 2023-05-03 ENCOUNTER — OFFICE VISIT (OUTPATIENT)
Dept: INTERNAL MEDICINE CLINIC | Facility: CLINIC | Age: 88
End: 2023-05-03

## 2023-05-03 VITALS
DIASTOLIC BLOOD PRESSURE: 78 MMHG | WEIGHT: 115.4 LBS | HEART RATE: 74 BPM | HEIGHT: 59 IN | BODY MASS INDEX: 23.26 KG/M2 | TEMPERATURE: 98 F | OXYGEN SATURATION: 98 % | SYSTOLIC BLOOD PRESSURE: 128 MMHG

## 2023-05-03 DIAGNOSIS — F32.1 CURRENT MODERATE EPISODE OF MAJOR DEPRESSIVE DISORDER WITHOUT PRIOR EPISODE (HCC): ICD-10-CM

## 2023-05-03 DIAGNOSIS — I35.0 AORTIC STENOSIS, MODERATE: ICD-10-CM

## 2023-05-03 DIAGNOSIS — K21.9 GASTROESOPHAGEAL REFLUX DISEASE WITHOUT ESOPHAGITIS: Primary | ICD-10-CM

## 2023-05-03 DIAGNOSIS — I10 ESSENTIAL HYPERTENSION: ICD-10-CM

## 2023-05-03 DIAGNOSIS — I89.0 LYMPHEDEMA OF BOTH LOWER EXTREMITIES: ICD-10-CM

## 2023-05-03 DIAGNOSIS — E78.5 HYPERLIPIDEMIA, UNSPECIFIED HYPERLIPIDEMIA TYPE: ICD-10-CM

## 2023-05-03 DIAGNOSIS — F32.89 OTHER DEPRESSION: ICD-10-CM

## 2023-05-03 RX ORDER — SERTRALINE HYDROCHLORIDE 25 MG/1
25 TABLET, FILM COATED ORAL DAILY
Qty: 30 TABLET | Refills: 1 | Status: SHIPPED | OUTPATIENT
Start: 2023-05-03 | End: 2023-07-02

## 2023-05-03 RX ORDER — OMEPRAZOLE 20 MG/1
20 CAPSULE, DELAYED RELEASE ORAL DAILY
Qty: 30 CAPSULE | Refills: 1 | Status: SHIPPED | OUTPATIENT
Start: 2023-05-03

## 2023-05-03 RX ORDER — BRIMONIDINE TARTRATE 2 MG/ML
SOLUTION/ DROPS OPHTHALMIC
COMMUNITY
Start: 2023-04-25

## 2023-05-03 NOTE — ASSESSMENT & PLAN NOTE
Lipid profile performed for this visit reviewed with the patient today recommend continuation of a low-cholesterol diet

## 2023-05-03 NOTE — ASSESSMENT & PLAN NOTE
Symptoms of epigastric discomfort described by the patient seem to be relieved with antacid type medication  We will place her on omeprazole 20 mg once a day for 4 weeks I have advised her to avoid high acid foods and also decrease consumption of coffee    Follow-up assessment in 4 weeks has been requested

## 2023-05-03 NOTE — ASSESSMENT & PLAN NOTE
Heart murmur of aortic stenosis remains unchanged patient has had no syncopal episodes nor any report of lightheadedness  Continue current surveillance and follow-up with cardiology

## 2023-05-03 NOTE — PROGRESS NOTES
Name: Quin Camejo      : 1935      MRN: [id number]  Encounter Provider: Baldo Raya MD  Encounter Date: 5/3/2023   Encounter department: [address] [facility]     1  Gastroesophageal reflux disease without esophagitis  Assessment & Plan:  Symptoms of epigastric discomfort described by the patient seem to be relieved with antacid type medication  We will place her on omeprazole 20 mg once a day for 4 weeks I have advised her to avoid high acid foods and also decrease consumption of coffee  Follow-up assessment in 4 weeks has been requested    Orders:  -     omeprazole (PriLOSEC) 20 mg delayed release capsule; Take 1 capsule (20 mg total) by mouth daily    2  Other depression  -     sertraline (ZOLOFT) 25 mg tablet; Take 1 tablet (25 mg total) by mouth daily    3  Essential hypertension  Assessment & Plan:  And assessment of the patient's hypertension today confirms good control reading is 128/78 she describes no signs or symptoms of uncontrolled hypertension or hypotension  Recommend continuation of metoprolol succinate at 50 mg daily and chlorthalidone 25 mg 4 days a week  and   I have reviewed the patient's comprehensive metabolic profile which reveals normal electrolytes and kidney performance  4  Aortic stenosis, moderate  Assessment & Plan:  Heart murmur of aortic stenosis remains unchanged patient has had no syncopal episodes nor any report of lightheadedness  Continue current surveillance and follow-up with cardiology  5  Lymphedema of both lower extremities  Assessment & Plan:  Chronic lymphedema both lower extremities continues no pulmonary edema appreciated on examination today continue current diuretic therapy      6   Hyperlipidemia, unspecified hyperlipidemia type  Assessment & Plan:  Lipid profile performed for this visit reviewed with the patient today recommend continuation of a low-cholesterol diet      7  Current moderate episode of major depressive disorder without prior episode [facility])  Assessment & Plan:  Symptoms suggestive of a depression condition with decreased motivation and lack of energy  Seems to have not much opportunity for socialization  Patient does fall asleep easily during the day  Will try sertraline 25 mg daily in the morning for a period of 4 weeks with follow-up assessment in the office  Subjective      This pleasant 44-year-old female patient presents today for a routine 4-month visit  She reports experiencing symptoms of stomach irritation significant for possible gastritis and some mild acid reflux symptoms  She has been using some occasional antacids which do seem to provide her with relief  The patient also indicates that she has been experiencing symptoms of fatigue lack of motivation and ambition  It appears that she is somewhat socially isolated with not a lot of neighbors or friends to spend time with  After discussion today she does seem to have some signs and symptoms of mild depression  She denies any symptoms of chest pain or palpitations  She does have chronic lymphedema type swelling in both lower extremities  She has advanced arthritis with deformity of her hands bilaterally  Review of Systems   Constitutional: Positive for fatigue  Gastrointestinal:        Stomach irritation   Musculoskeletal: Positive for arthralgias  Psychiatric/Behavioral: Positive for decreased concentration and dysphoric mood  All other systems reviewed and are negative        Current Outpatient Medications on File Prior to Visit   Medication Sig    acetaminophen (TYLENOL) 325 mg tablet Take 3 tablets (975 mg total) by mouth every 8 (eight) hours    Ascorbic Acid (vitamin C) 100 MG tablet Take 100 mg by mouth daily    aspirin (ECOTRIN) 325 mg EC tablet Take 1 tablet (325 mg total) by mouth 2 (two) times a day    brimonidine tartrate 0 2 % ophthalmic solution "   chlorthalidone 25 mg tablet TAKE 1 TABLET 4 DAYS A WEEK EVERY MONDAY, WEDNESDAY, FRIDAY, AND SUNDAY   COVID-19 At Home Test [facility] COVID-19 AG HOME TEST VI)     metoprolol succinate (TOPROL-XL) 50 mg 24 hr tablet TAKE 1 TABLET (50 MG TOTAL) BY MOUTH DAILY AS DIRECTED    Multiple Vitamins-Minerals (PRESERVISION AREDS PO) Take by mouth       Objective     /78   Pulse 74   Temp 98 °F (36 7 °C)   Ht 4' 11\" (1 499 m)   Wt 52 3 kg (115 lb 6 4 oz)   SpO2 98%   BMI 23 31 kg/m²     Physical Exam  Vitals reviewed  Constitutional:       General: She is not in acute distress  Appearance: Normal appearance  She is well-developed  She is not ill-appearing  HENT:      Right Ear: Hearing and external ear normal       Left Ear: Hearing and external ear normal       Nose: Nose normal  No mucosal edema  Mouth/Throat:      Pharynx: Uvula midline  Eyes:      General: Lids are normal       Conjunctiva/sclera: Conjunctivae normal       Pupils: Pupils are equal, round, and reactive to light  Neck:      Thyroid: No thyromegaly  Vascular: No carotid bruit or JVD  Cardiovascular:      Rate and Rhythm: Normal rate and regular rhythm  Heart sounds: Murmur heard  Pulmonary:      Effort: Pulmonary effort is normal  No respiratory distress  Breath sounds: Normal breath sounds  No wheezing or rhonchi  Abdominal:      General: Bowel sounds are normal       Palpations: Abdomen is soft  Musculoskeletal:         General: Normal range of motion  Right lower leg: Edema present  Left lower leg: Edema present  Lymphadenopathy:      Cervical: No cervical adenopathy  Skin:     General: Skin is warm and dry  Neurological:      Mental Status: She is alert and oriented to person, place, and time  Deep Tendon Reflexes: Reflexes are normal and symmetric  Psychiatric:         Speech: Speech normal          Behavior: Behavior normal  Behavior is cooperative           Thought " Content:  Thought content normal          Judgment: Judgment normal        Baldo Raya MD

## 2023-05-03 NOTE — ASSESSMENT & PLAN NOTE
Symptoms suggestive of a depression condition with decreased motivation and lack of energy  Seems to have not much opportunity for socialization  Patient does fall asleep easily during the day  Will try sertraline 25 mg daily in the morning for a period of 4 weeks with follow-up assessment in the office

## 2023-05-03 NOTE — ASSESSMENT & PLAN NOTE
And assessment of the patient's hypertension today confirms good control reading is 128/78 she describes no signs or symptoms of uncontrolled hypertension or hypotension  Recommend continuation of metoprolol succinate at 50 mg daily and chlorthalidone 25 mg 4 days a week Monday Wednesday Friday and Sunday  I have reviewed the patient's comprehensive metabolic profile which reveals normal electrolytes and kidney performance

## 2023-05-03 NOTE — ASSESSMENT & PLAN NOTE
Chronic lymphedema both lower extremities continues no pulmonary edema appreciated on examination today continue current diuretic therapy

## 2023-05-24 DIAGNOSIS — I10 UNCONTROLLED HYPERTENSION: ICD-10-CM

## 2023-05-24 RX ORDER — CHLORTHALIDONE 25 MG/1
TABLET ORAL
Qty: 16 TABLET | Refills: 5 | Status: SHIPPED | OUTPATIENT
Start: 2023-05-24

## 2023-05-31 ENCOUNTER — OFFICE VISIT (OUTPATIENT)
Dept: INTERNAL MEDICINE CLINIC | Facility: CLINIC | Age: 88
End: 2023-05-31

## 2023-05-31 VITALS
WEIGHT: 115.2 LBS | HEIGHT: 59 IN | DIASTOLIC BLOOD PRESSURE: 82 MMHG | HEART RATE: 66 BPM | BODY MASS INDEX: 23.22 KG/M2 | SYSTOLIC BLOOD PRESSURE: 130 MMHG | OXYGEN SATURATION: 96 % | TEMPERATURE: 98.3 F

## 2023-05-31 DIAGNOSIS — M15.9 PRIMARY OSTEOARTHRITIS INVOLVING MULTIPLE JOINTS: ICD-10-CM

## 2023-05-31 DIAGNOSIS — I50.32 CHRONIC DIASTOLIC CONGESTIVE HEART FAILURE (HCC): ICD-10-CM

## 2023-05-31 DIAGNOSIS — K21.9 GASTROESOPHAGEAL REFLUX DISEASE WITHOUT ESOPHAGITIS: Primary | ICD-10-CM

## 2023-05-31 DIAGNOSIS — I10 ESSENTIAL HYPERTENSION: ICD-10-CM

## 2023-05-31 DIAGNOSIS — R60.0 LOCALIZED EDEMA: ICD-10-CM

## 2023-05-31 DIAGNOSIS — F32.1 CURRENT MODERATE EPISODE OF MAJOR DEPRESSIVE DISORDER WITHOUT PRIOR EPISODE (HCC): ICD-10-CM

## 2023-05-31 NOTE — ASSESSMENT & PLAN NOTE
Reviewed with the patient today proper dosing of the omeprazole 20 mg should be taken prior to breakfast meal   She frequently does not take it before breakfast and minimizing some of its benefit  We will continue with the medication hopefully she will dose it prior to her first food and beverage of the day

## 2023-05-31 NOTE — ASSESSMENT & PLAN NOTE
Persistent low-grade edema of both lower extremities recommend avoidance of high salt foods and continuation of chlorthalidone diuretic therapy

## 2023-05-31 NOTE — ASSESSMENT & PLAN NOTE
Ongoing arthritis of multiple joints and especially of the patient's hands    Recommend continuation of Tylenol 650 mg every 8 hours as needed for discomfort

## 2023-05-31 NOTE — PROGRESS NOTES
Name: Rae Moon      : 1935      MRN: [id number]  Encounter Provider: Heriberto Guardado MD  Encounter Date: 2023   Encounter department: [facility]     1  Gastroesophageal reflux disease without esophagitis  Assessment & Plan:  Reviewed with the patient today proper dosing of the omeprazole 20 mg should be taken prior to breakfast meal   She frequently does not take it before breakfast and minimizing some of its benefit  We will continue with the medication hopefully she will dose it prior to her first food and beverage of the day  2  Essential hypertension  Assessment & Plan:  Pressure assessment today confirms the patient's blood pressure to be in reasonable range is 130/82 continue current blood pressure medication      3  Primary osteoarthritis involving multiple joints  Assessment & Plan:  Ongoing arthritis of multiple joints and especially of the patient's hands  Recommend continuation of Tylenol 650 mg every 8 hours as needed for discomfort      4  Localized edema  Assessment & Plan:  Persistent low-grade edema of both lower extremities recommend avoidance of high salt foods and continuation of chlorthalidone diuretic therapy  5  Current moderate episode of major depressive disorder without prior episode [facility])  Assessment & Plan:  Symptoms of depression have clearly improved since the initiation of sertraline at 25 mg daily recommend continuation of current therapy      6  Chronic diastolic congestive heart failure [facility])  Assessment & Plan:  Wt Readings from Last 3 Encounters:   23 52 3 kg (115 lb 3 2 oz)   23 52 3 kg (115 lb 6 4 oz)   22 51 3 kg (113 lb)     Since weight remains quite stable at 115 mg  She does have some peripheral edema she continues on her Thalitone 25 mg  and Friday and                  Subjective      This 68-year-old female patient returns to our office today for follow-up assessment  At the time of her last visit with us she was placed on omeprazole medication for acid reflux symptoms  Its not clear the patient knew what time of the day to take the medication despite our instructions  I do not believe she was taking it in the morning prior to breakfast we clarified this during today's visit and she will make sure she takes it prior to breakfast each day  Patient was also placed on a antidepressant after symptoms of depression were quite evident on her last visit  She seems more upbeat and positive on our visit today believe that the sertraline medication at 25 mg daily has had a positive impact on her emotional outlook  Patient has had ongoing issues with severe advanced arthritis with deformities of both hands  Continues to use Tylenol for control of her discomfort  Cardiac status remains stable to present time denying any chest pain palpitations or shortness of breath  Has an upcoming follow-up visit with her cardiologist     Review of Systems   Gastrointestinal:        Acid reflux   Musculoskeletal: Positive for arthralgias  Psychiatric/Behavioral: Positive for dysphoric mood  All other systems reviewed and are negative  Current Outpatient Medications on File Prior to Visit   Medication Sig   • acetaminophen (TYLENOL) 325 mg tablet Take 3 tablets (975 mg total) by mouth every 8 (eight) hours   • Ascorbic Acid (vitamin C) 100 MG tablet Take 100 mg by mouth daily   • aspirin (ECOTRIN) 325 mg EC tablet Take 1 tablet (325 mg total) by mouth 2 (two) times a day   • brimonidine tartrate 0 2 % ophthalmic solution    • chlorthalidone 25 mg tablet TAKE 1 TABLET 4 DAYS A WEEK EVERY MONDAY, WEDNESDAY, FRIDAY, AND SUNDAY     • COVID-19 At Home Test [facility] COVID-19 AG HOME TEST VI)    • metoprolol succinate (TOPROL-XL) 50 mg 24 hr tablet TAKE 1 TABLET (50 MG TOTAL) BY MOUTH DAILY AS DIRECTED   • Multiple Vitamins-Minerals (PRESERVISION AREDS PO) Take by "mouth   • omeprazole (PriLOSEC) 20 mg delayed release capsule Take 1 capsule (20 mg total) by mouth daily   • sertraline (ZOLOFT) 25 mg tablet Take 1 tablet (25 mg total) by mouth daily       Objective     /82   Pulse 66   Temp 98 3 °F (36 8 °C)   Ht 4' 11\" (1 499 m)   Wt 52 3 kg (115 lb 3 2 oz)   SpO2 96%   BMI 23 27 kg/m²     Physical Exam  Vitals reviewed  Constitutional:       General: She is not in acute distress  Appearance: Normal appearance  She is well-developed  She is not ill-appearing  HENT:      Right Ear: Hearing and external ear normal       Left Ear: Hearing and external ear normal       Nose: Nose normal  No mucosal edema  Mouth/Throat:      Pharynx: Uvula midline  Eyes:      General: Lids are normal       Conjunctiva/sclera: Conjunctivae normal       Pupils: Pupils are equal, round, and reactive to light  Neck:      Thyroid: No thyromegaly  Vascular: No carotid bruit or JVD  Cardiovascular:      Rate and Rhythm: Normal rate and regular rhythm  Heart sounds: Normal heart sounds  No murmur heard  Pulmonary:      Effort: Pulmonary effort is normal  No respiratory distress  Breath sounds: Normal breath sounds  No wheezing, rhonchi or rales  Abdominal:      General: Bowel sounds are normal       Palpations: Abdomen is soft  Musculoskeletal:         General: Normal range of motion  Right lower leg: Edema present  Left lower leg: Edema present  Comments: Trace edema both lower extremities   Lymphadenopathy:      Cervical: No cervical adenopathy  Skin:     General: Skin is warm and dry  Neurological:      Mental Status: She is alert and oriented to person, place, and time  Deep Tendon Reflexes: Reflexes are normal and symmetric  Psychiatric:         Speech: Speech normal          Behavior: Behavior normal  Behavior is cooperative  Thought Content:  Thought content normal          Judgment: Judgment normal        Kaylen Redd Alex Bianchi MD

## 2023-05-31 NOTE — ASSESSMENT & PLAN NOTE
Pressure assessment today confirms the patient's blood pressure to be in reasonable range is 130/82 continue current blood pressure medication

## 2023-05-31 NOTE — ASSESSMENT & PLAN NOTE
Symptoms of depression have clearly improved since the initiation of sertraline at 25 mg daily recommend continuation of current therapy

## 2023-05-31 NOTE — ASSESSMENT & PLAN NOTE
Wt Readings from Last 3 Encounters:   05/31/23 52 3 kg (115 lb 3 2 oz)   05/03/23 52 3 kg (115 lb 6 4 oz)   12/06/22 51 3 kg (113 lb)     Since weight remains quite stable at 115 mg  She does have some peripheral edema she continues on her Thalitone 25 mg Monday Wednesday and Friday and Sunday

## 2023-06-14 DIAGNOSIS — K21.9 GASTROESOPHAGEAL REFLUX DISEASE WITHOUT ESOPHAGITIS: ICD-10-CM

## 2023-06-14 RX ORDER — OMEPRAZOLE 20 MG/1
CAPSULE, DELAYED RELEASE ORAL
Qty: 30 CAPSULE | Refills: 1 | Status: SHIPPED | OUTPATIENT
Start: 2023-06-14

## 2023-06-28 ENCOUNTER — OFFICE VISIT (OUTPATIENT)
Dept: CARDIOLOGY CLINIC | Facility: CLINIC | Age: 88
End: 2023-06-28
Payer: MEDICARE

## 2023-06-28 VITALS
OXYGEN SATURATION: 96 % | BODY MASS INDEX: 22.84 KG/M2 | DIASTOLIC BLOOD PRESSURE: 72 MMHG | HEIGHT: 59 IN | WEIGHT: 113.3 LBS | SYSTOLIC BLOOD PRESSURE: 150 MMHG | HEART RATE: 66 BPM

## 2023-06-28 DIAGNOSIS — I35.0 MODERATE CALCIFIC AORTIC STENOSIS: Primary | ICD-10-CM

## 2023-06-28 DIAGNOSIS — I10 ESSENTIAL HYPERTENSION: ICD-10-CM

## 2023-06-28 DIAGNOSIS — I89.0 LYMPHEDEMA OF BOTH LOWER EXTREMITIES: ICD-10-CM

## 2023-06-28 DIAGNOSIS — G47.10 HYPERSOMNIA, PERSISTENT: ICD-10-CM

## 2023-06-28 DIAGNOSIS — E78.5 DYSLIPIDEMIA: ICD-10-CM

## 2023-06-28 DIAGNOSIS — M15.9 PRIMARY OSTEOARTHRITIS INVOLVING MULTIPLE JOINTS: ICD-10-CM

## 2023-06-28 DIAGNOSIS — I35.1 MODERATE AORTIC REGURGITATION: ICD-10-CM

## 2023-06-28 PROCEDURE — 99214 OFFICE O/P EST MOD 30 MIN: CPT | Performed by: INTERNAL MEDICINE

## 2023-06-28 PROCEDURE — 93000 ELECTROCARDIOGRAM COMPLETE: CPT | Performed by: INTERNAL MEDICINE

## 2023-06-28 NOTE — PROGRESS NOTES
Office Cardiology Progress Note  Sangeetha Israel 80 y o  female MRN: [id number]  06/28/23  11:00 AM      ASSESSMENT:    1   Chronic fatigue, sleepiness and tiredness, with hypothyroidism ruled out on  previous lab work  Symptoms could be related to metoprolol side effects and less likely from stable moderate aortic stenosis  She also may have some sort of primary hypersomnia of uncertain cause  2  Asymptomatic moderate aortic stenosis   Seems to also have murmur of mitral regurgitation   Had moderate aortic stenosis and regurgitation with mean gradient of 22 mmHg as well as 1+ MR/MAC on TTE 05/14/2019   Also had moderate LVH with 60% LVEF and elevated mean left atrial filling pressure  More recent TTE 7/13/2022 shows no major changes except for slight increase in mean aortic valve gradient to 28 mmHg  3  Significant improvement in chronic left more than right lower extremity lymphedema with associated stasis dermatitis and contribution from underlying venous insufficiency of right lower extremity, confirmed on venous reflux study of 11/13/2019  Had complicating slowly healing superficial ulcerations of right lower medial calf, now resolved  4  Previously controlled essential hypertension,with average home blood pressure of 130/66 between 11/14 and 11/18/2020   Now with improvement in uncontrolled hypertension with recent average home blood pressure of  143/76 from 6/18 through 6/28/2023 and previously 141/74, decreased from 156/77 between 6/17 and 06/19/2022     Home blood pressure unit judged to be accurate as of 12/29/2021     5  Persistent mild to moderate dyslipidemia, with residual increase in LDL as of   5/2/2023     6  History of successful right knee total arthroplasty on 12/15/2020 with 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  Plan       Patient Instructions     1  Continue current medication  2  Cardiology follow-up approximately 6 months with EKG  HPI    This 80 y o  "female  denies new cardiopulmonary and medical symptoms  She continues to complain of persistent sleepiness for hours at a time when she sits to rest   She has no new cardiopulmonary symptoms  Her lymphedema has been under control without use of compression pumps  The patient presents with home blood pressure readings taken between 6/17 and 6/20/2023 as well as between 6/25 and 6/28/2023  Her overall average blood pressure for that period of time was   143/76, using a total of 24 readings for the 2 weeks  The patient is also being seen in follow-up of the below listed diagnoses  Encounter Diagnoses   Name Primary? • Moderate calcific aortic stenosis Yes   • Moderate aortic regurgitation    • Essential hypertension    • Hypersomnia, persistent    • Lymphedema of both lower extremities    • Dyslipidemia    • Primary osteoarthritis involving multiple joints         Review of Systems    All other systems negative, except as noted in history of present illness    Historical Information   Past Medical History:   Diagnosis Date   • Anesthesia complication       Yrs ago had \"anxiety\" reaction not sure while sedated, pt states sensitive to anesthesia effects   • Anxiety     stress at home   • Arthritis    • Cataract, right     Last Assessed:5/11/16   • Eye hemorrhage     left eye currently 5/12/16   • History of shingles 05/2015   • Hypertension    • Mitral valve stenosis, mild      Past Surgical History:   Procedure Laterality Date   • CARPAL TUNNEL RELEASE Left    • CATARACT EXTRACTION     • CATARACT EXTRACTION W/ INTRAOCULAR LENS IMPLANT Left 10/11/2016    Procedure: EXTRACTION EXTRACAPSULAR CATARACT PHACO INTRAOCULAR LENS (IOL);   Surgeon: Marcin Brush MD;  Location: [facility] MAIN OR;  Service:    • CERVICAL CONE BIOPSY     • COLONOSCOPY     • EYE SURGERY Left     muscle repair   • ME ARTHRP KNE CONDYLE&PLATU MEDIAL&LAT COMPARTMENTS Right 12/15/2020    Procedure: ARTHROPLASTY KNEE TOTAL;  Surgeon: " Rayne March DO;  Location: [address] [place] [address];  Service: Orthopedics   • [place] OPTX FEM SHFT FX W/INSJ IMED IMPLT W/WO SCREW Right 2019    Procedure: INSERTION NAIL IM FEMUR ANTEGRADE (TROCHANTERIC); Surgeon: Rayne March DO;  Location: [address] [place] [address];  Service: Orthopedics   • [place] XCAPSL CTRC RMVL INSJ IO LENS PROSTH W/O ECP Right 2016    Procedure: EXTRACTION EXTRACAPSULAR CATARACT PHACO INTRAOCULAR LENS (IOL); Surgeon: Jigar Alvarado MD;  Location: [facility] MAIN OR;  Service: Ophthalmology   • TONSILLECTOMY       Social History     Substance and Sexual Activity   Alcohol Use Yes    Comment: rarely     Social History     Substance and Sexual Activity   Drug Use No     Social History     Tobacco Use   Smoking Status Never   Smokeless Tobacco Never       Family History:  Family History   Problem Relation Age of Onset   • Heart disease Mother         MI  at age 71   • Arthritis Mother    • GI problems Father         bleeding ulcer   • Arthritis Sister    • Sleep apnea Sister    • Irritable bowel syndrome Sister    • Cancer Sister         skin cancer   • Heart disease Brother         CABG (5)   • Cancer Brother         skin cancer   • Heart disease Maternal Aunt          Meds/Allergies     Prior to Admission medications    Medication Sig Start Date End Date Taking? Authorizing Provider   acetaminophen (TYLENOL) 325 mg tablet Take 3 tablets (975 mg total) by mouth every 8 (eight) hours 20  Yes Alisha Sanchez PA-C   Ascorbic Acid (vitamin C) 100 MG tablet Take 100 mg by mouth daily   Yes Historical Provider, MD   aspirin (ECOTRIN) 325 mg EC tablet Take 1 tablet (325 mg total) by mouth 2 (two) times a day 20  Yes Alisha Sanchez PA-C   brimonidine tartrate 0 2 % ophthalmic solution  23  Yes Historical Provider, MD   chlorthalidone 25 mg tablet TAKE 1 TABLET 4 DAYS A WEEK EVERY MONDAY, WEDNESDAY, FRIDAY, AND 23  Yes Verónica Marinelli MD   metoprolol succinate (TOPROL-XL) 50 mg 24 hr "tablet TAKE 1 TABLET (50 MG TOTAL) BY MOUTH DAILY AS DIRECTED  Patient taking differently: Take 50 mg by mouth daily Taking 25 mg cutting 50 mg 1/2 1x daily 2/20/23  Yes Eliza Lee MD   Multiple Vitamins-Minerals (PRESERVISION AREDS PO) Take by mouth   Yes Historical Provider, MD   omeprazole (PriLOSEC) 20 mg delayed release capsule TAKE 1 CAPSULE DAILY 6/14/23  Yes Eliza Lee MD   sertraline (ZOLOFT) 25 mg tablet Take 1 tablet (25 mg total) by mouth daily 5/3/23 7/2/23 Yes Eliza Lee MD   COVID-19 At Home Test ([facility] [address] TEST VI)  3/30/23   Historical Provider, MD       Allergies   Allergen Reactions   • Indocin [Indomethacin]      hypertension         Vitals:    06/28/23 1007   BP: 150/72   BP Location: Right arm   Patient Position: Sitting   Cuff Size: Standard   Pulse: 66   SpO2: 96%   Weight: 51 4 kg (113 lb 4 8 oz)   Height: 4' 11\" (1 499 m)       Body mass index is 22 88 kg/m²  0 3 pound weight loss in approximately 7 months and 5 3 pound weight loss in approximately 1 1-year    Physical Exam:    General Appearance:  Alert, cooperative, no distress, appears stated age   Head:  Normocephalic, without obvious abnormality, atraumatic   Eyes:  PERRL, conjunctiva/corneas clear, EOM's intact,   both eyes   Ears:  Normal TM's and external ear canals, both ears   Nose: Nares normal, septum midline, mucosa normal, no drainage or sinus tenderness   Throat: Lips, mucosa, and tongue normal; teeth and gums normal   Neck: Supple, symmetrical, trachea midline, no adenopathy, thyroid: not enlarged, symmetric, no tenderness/mass/nodules, no carotid bruit or JVD with transmitted aortic systolic ejection murmur in bilateral neck     Back:   Symmetric, no curvature, ROM normal, no CVA tenderness   Lungs:   Clear to auscultation bilaterally, respirations unlabored   Chest Wall:  No tenderness or deformity   Heart:  Regular rate and rhythm, S1, S2 normal, grade 2-5/6 aortic systolic " "ejection murmur best heard along right sternal border with radiation to left sternal border and towards apex with some muffling of A2, and no rub or gallop   Abdomen:   Soft, non-tender, bowel sounds active all four quadrants,  no masses, no organomegaly   Extremities: Extremities normal, atraumatic, no cyanosis with trace-1+ pitting pretibial and ankle edema bilaterally with mild redness over the left more than right pretibial region   Pulses: Trace-1+  and symmetric with feet equally warm   Skin: Skin showed normal color, texture, turgor and no rashes or lesions   Lymph nodes: Cervical, supraclavicular, and axillary nodes normal   Neurologic: Normal         Cardiographics    ECG 06/28/23:    Normal sinus rhythm at 66 bpm  Incomplete right bundle branch block  Possible old anteroseptal infarct  Isolated T inversion in lead aVL  Abnormal ECG, quite similar to 12/6/2022    IMAGING:    No Chest XR results available for this patient            LAB REVIEW:      Lab Results   Component Value Date    SODIUM 140 05/02/2023    K 3 6 05/02/2023     05/02/2023    CO2 29 05/02/2023    ANIONGAP 13 0 10/13/2015    BUN 21 05/02/2023    CREATININE 0 72 05/02/2023    GLUCOSE 87 10/13/2015    GLUF 94 05/02/2023    CALCIUM 9 2 05/02/2023    AST 18 05/02/2023    ALT 10 05/02/2023    ALKPHOS 86 05/02/2023    PROT 6 8 10/13/2015    BILITOT 0 6 10/13/2015    EGFR 75 05/02/2023     Lab Results   Component Value Date    CHOLESTEROL 191 05/02/2023    CHOLESTEROL 204 (H) 10/26/2022    CHOLESTEROL 204 (H) 05/19/2022     Lab Results   Component Value Date    HDL 53 05/02/2023    HDL 46 (L) 10/26/2022    HDL 54 05/19/2022       Lab Results   Component Value Date    LDLCALC 120 (H) 05/02/2023    LDLCALC 142 (H) 10/26/2022    LDLCALC 137 (H) 05/19/2022     No components found for: \"DIRECTLDLREFLEX\"  Lab Results   Component Value Date    TRIG 88 05/02/2023    TRIG 81 10/26/2022    TRIG 64 05/19/2022               Verónica Marinelli MD  "

## 2023-07-31 ENCOUNTER — RA CDI HCC (OUTPATIENT)
Dept: OTHER | Facility: HOSPITAL | Age: 88
End: 2023-07-31

## 2023-08-07 ENCOUNTER — OFFICE VISIT (OUTPATIENT)
Dept: INTERNAL MEDICINE CLINIC | Facility: CLINIC | Age: 88
End: 2023-08-07
Payer: MEDICARE

## 2023-08-07 VITALS
OXYGEN SATURATION: 99 % | SYSTOLIC BLOOD PRESSURE: 132 MMHG | DIASTOLIC BLOOD PRESSURE: 82 MMHG | WEIGHT: 113.8 LBS | HEART RATE: 67 BPM | BODY MASS INDEX: 22.94 KG/M2 | HEIGHT: 59 IN | TEMPERATURE: 97.8 F

## 2023-08-07 DIAGNOSIS — K21.9 GASTROESOPHAGEAL REFLUX DISEASE WITHOUT ESOPHAGITIS: ICD-10-CM

## 2023-08-07 DIAGNOSIS — I89.0 LYMPHEDEMA OF BOTH LOWER EXTREMITIES: ICD-10-CM

## 2023-08-07 DIAGNOSIS — I35.0 MODERATE CALCIFIC AORTIC STENOSIS: Primary | ICD-10-CM

## 2023-08-07 DIAGNOSIS — F32.1 CURRENT MODERATE EPISODE OF MAJOR DEPRESSIVE DISORDER WITHOUT PRIOR EPISODE (HCC): ICD-10-CM

## 2023-08-07 DIAGNOSIS — I10 ESSENTIAL HYPERTENSION: ICD-10-CM

## 2023-08-07 PROBLEM — G47.10 HYPERSOMNIA, PERSISTENT: Status: RESOLVED | Noted: 2023-06-28 | Resolved: 2023-08-07

## 2023-08-07 PROCEDURE — 99214 OFFICE O/P EST MOD 30 MIN: CPT | Performed by: INTERNAL MEDICINE

## 2023-08-07 NOTE — ASSESSMENT & PLAN NOTE
Acid reflux symptoms seem to be stable at the present time patient should continue omeprazole at 20 mg daily.

## 2023-08-07 NOTE — PROGRESS NOTES
Name: Rahul Valdez      : 1935      MRN: [id number]  Encounter Provider: Nneka Hays MD  Encounter Date: 2023   Encounter department: [facility]    Assessment & Plan     1. Moderate calcific aortic stenosis  Assessment & Plan:  No change in the intensity of the patient's aortic valve stenosis. She does have some fatigue issues as well as some shortness of breath on exertion likely the symptoms are related to the aortic valve stenosis. Continues a medical management patient continue current therapy advised      2. Essential hypertension  Assessment & Plan:  Blood pressure assessment today confirms adequate control of hypertension recommend continuation of chlorthalidone 25 mg daily and metoprolol succinate 50 mg 1/2 tablet daily      3. Gastroesophageal reflux disease without esophagitis  Assessment & Plan:  Acid reflux symptoms seem to be stable at the present time patient should continue omeprazole at 20 mg daily. 4. Current moderate episode of major depressive disorder without prior episode [facility])  Assessment & Plan:  The patient's depression symptoms appear to be under good control recommend that she continue sertraline at 25 mg daily no adverse effects noted      5. Lymphedema of both lower extremities  Assessment & Plan:  Chronic lymphedema symptoms reviewed with the patient she currently has bilateral lower leg edema with some redness of the skin due to skin distention from the edema. No indication of cellulitis or skin breakdown. Recommend that the patient's start using her lymphedema pumps on a daily basis again to see if they can help control her edema. She is reluctant to wear walking's compression nature during the summer months will wear them during the fall and winter months. Elevation of legs advised           Subjective     This pleasant 80year-old female patient presents today for a routine 4-month follow-up visit.   She continues with chronic edema both lower extremities with some skin irritation due to skin distention from the edema. She has a diagnosis of bilateral lymphedema. She does have lymphedema pumps at home but has not been using them consistently. Patient has had no cardiac symptoms of chest pain palpitations. She has a aortic stenosis diagnosis and describes no symptoms of lightheadedness or presyncope. She does have symptoms of fatigue which are likely related to her aortic stenosis. During today's visit with the patient I reviewed her most recent cardiology visit and consultation notes. Agree with their assessments. Review of Systems   Constitutional: Positive for fatigue. Respiratory: Positive for shortness of breath. Cardiovascular: Positive for leg swelling. All other systems reviewed and are negative. Past Medical History:   Diagnosis Date   • Anesthesia complication     . Yrs ago had "anxiety" reaction not sure while sedated, pt states sensitive to anesthesia effects   • Anxiety     stress at home   • Arthritis    • Cataract, right     Last Assessed:5/11/16   • Eye hemorrhage     left eye currently 5/12/16   • History of shingles 05/2015   • Hypertension    • Mitral valve stenosis, mild      Past Surgical History:   Procedure Laterality Date   • CARPAL TUNNEL RELEASE Left    • CATARACT EXTRACTION     • CATARACT EXTRACTION W/ INTRAOCULAR LENS IMPLANT Left 10/11/2016    Procedure: EXTRACTION EXTRACAPSULAR CATARACT PHACO INTRAOCULAR LENS (IOL);   Surgeon: Nick Amanda MD;  Location: [facility] MAIN OR;  Service:    • CERVICAL CONE BIOPSY     • COLONOSCOPY     • EYE SURGERY Left     muscle repair   • WA ARTHRP KNE CONDYLE&PLATU MEDIAL&LAT COMPARTMENTS Right 12/15/2020    Procedure: ARTHROPLASTY KNEE TOTAL;  Surgeon: Linda Agudelo DO;  Location: [facility];  Service: Orthopedics   • WA OPTX FEM SHFT FX W/INSJ IMED IMPLT W/WO SCREW Right 7/23/2019    Procedure: INSERTION NAIL IM FEMUR ANTEGRADE (TROCHANTERIC); Surgeon: Leroy Bush DO;  Location: [facility];  Service: Orthopedics   • GA XCAPSL CTRC RMVL INSJ IO LENS PROSTH W/O ECP Right 2016    Procedure: EXTRACTION EXTRACAPSULAR CATARACT PHACO INTRAOCULAR LENS (IOL);   Surgeon: Tabatha Fitch MD;  Location: [facility] MAIN OR;  Service: Ophthalmology   • TONSILLECTOMY       Family History   Problem Relation Age of Onset   • Heart disease Mother         MI  at age 71   • Arthritis Mother    • GI problems Father         bleeding ulcer   • Arthritis Sister    • Sleep apnea Sister    • Irritable bowel syndrome Sister    • Cancer Sister         skin cancer   • Heart disease Brother         CABG (11)   • Cancer Brother         skin cancer   • Heart disease Maternal Aunt      Social History     Socioeconomic History   • Marital status: /Civil Union     Spouse name: Not on file   • Number of children: Not on file   • Years of education: Not on file   • Highest education level: Not on file   Occupational History   • Not on file   Tobacco Use   • Smoking status: Never   • Smokeless tobacco: Never   Vaping Use   • Vaping Use: Never used   Substance and Sexual Activity   • Alcohol use: Yes     Comment: rarely   • Drug use: No   • Sexual activity: Not on file   Other Topics Concern   • Not on file   Social History Narrative   • Not on file     Social Determinants of Health     Financial Resource Strain: Not on file   Food Insecurity: Not on file   Transportation Needs: Not on file   Physical Activity: Not on file   Stress: Not on file   Social Connections: Not on file   Intimate Partner Violence: Not on file   Housing Stability: Not on file     Current Outpatient Medications on File Prior to Visit   Medication Sig   • acetaminophen (TYLENOL) 325 mg tablet Take 3 tablets (975 mg total) by mouth every 8 (eight) hours   • Ascorbic Acid (vitamin C) 100 MG tablet Take 100 mg by mouth daily   • aspirin (ECOTRIN) 325 mg EC tablet Take 1 tablet (325 mg total) by mouth 2 (two) times a day   • brimonidine tartrate 0.2 % ophthalmic solution    • chlorthalidone 25 mg tablet TAKE 1 TABLET 4 DAYS A WEEK EVERY MONDAY, WEDNESDAY, FRIDAY, AND SUNDAY. • COVID-19 At Home Test (FLOWFLEX COVID-19 AG HOME TEST VI)    • metoprolol succinate (TOPROL-XL) 50 mg 24 hr tablet TAKE 1 TABLET (50 MG TOTAL) BY MOUTH DAILY AS DIRECTED (Patient taking differently: Take 50 mg by mouth daily Taking 25 mg cutting 50 mg 1/2 1x daily)   • Multiple Vitamins-Minerals (PRESERVISION AREDS PO) Take by mouth   • omeprazole (PriLOSEC) 20 mg delayed release capsule TAKE 1 CAPSULE DAILY   • sertraline (ZOLOFT) 25 mg tablet Take 1 tablet (25 mg total) by mouth daily     Allergies   Allergen Reactions   • Indocin [Indomethacin] Other (See Comments)     hypertension     Immunization History   Administered Date(s) Administered   • INFLUENZA 11/02/2022   • Influenza Split High Dose Preservative Free IM 09/30/2016   • Influenza, high dose seasonal 0.7 mL 10/19/2018, 10/21/2019, 11/02/2020, 11/02/2022   • Influenza, seasonal, injectable 01/05/2014   • Pneumococcal Conjugate 13-Valent 02/22/2019   • Pneumococcal Conjugate Vaccine 20-valent (Pcv20), Polysace 04/21/2022, 04/21/2022       Objective     /82   Pulse 67   Temp 97.8 °F (36.6 °C) (Tympanic)   Ht 4' 11" (1.499 m)   Wt 51.6 kg (113 lb 12.8 oz)   SpO2 99%   BMI 22.98 kg/m²     Physical Exam  Vitals reviewed. Constitutional:       General: She is not in acute distress. Appearance: Normal appearance. She is well-developed. She is not ill-appearing. HENT:      Right Ear: Hearing and external ear normal.      Left Ear: Hearing and external ear normal.      Nose: Nose normal. No mucosal edema. Mouth/Throat:      Pharynx: Uvula midline. Eyes:      General: Lids are normal.      Conjunctiva/sclera: Conjunctivae normal.      Pupils: Pupils are equal, round, and reactive to light. Neck:      Thyroid: No thyromegaly.       Vascular: No carotid bruit or JVD. Cardiovascular:      Rate and Rhythm: Normal rate and regular rhythm. Heart sounds: Murmur heard. Pulmonary:      Effort: Pulmonary effort is normal. No respiratory distress. Breath sounds: Normal breath sounds. No wheezing, rhonchi or rales. Abdominal:      General: Bowel sounds are normal.      Palpations: Abdomen is soft. Musculoskeletal:         General: Normal range of motion. Right lower leg: Edema present. Left lower leg: Edema present. Lymphadenopathy:      Cervical: No cervical adenopathy. Skin:     General: Skin is warm and dry. Neurological:      Mental Status: She is alert and oriented to person, place, and time. Mental status is at baseline. Deep Tendon Reflexes: Reflexes are normal and symmetric. Reflexes normal.   Psychiatric:         Speech: Speech normal.         Behavior: Behavior normal. Behavior is cooperative. Thought Content:  Thought content normal.         Judgment: Judgment normal.       Terese Fisher MD

## 2023-08-07 NOTE — ASSESSMENT & PLAN NOTE
No change in the intensity of the patient's aortic valve stenosis. She does have some fatigue issues as well as some shortness of breath on exertion likely the symptoms are related to the aortic valve stenosis.   Continues a medical management patient continue current therapy advised

## 2023-08-07 NOTE — ASSESSMENT & PLAN NOTE
Blood pressure assessment today confirms adequate control of hypertension recommend continuation of chlorthalidone 25 mg daily and metoprolol succinate 50 mg 1/2 tablet daily

## 2023-08-07 NOTE — ASSESSMENT & PLAN NOTE
Chronic lymphedema symptoms reviewed with the patient she currently has bilateral lower leg edema with some redness of the skin due to skin distention from the edema. No indication of cellulitis or skin breakdown. Recommend that the patient's start using her lymphedema pumps on a daily basis again to see if they can help control her edema. She is reluctant to wear walking's compression nature during the summer months will wear them during the fall and winter months.   Elevation of legs advised

## 2023-08-07 NOTE — ASSESSMENT & PLAN NOTE
The patient's depression symptoms appear to be under good control recommend that she continue sertraline at 25 mg daily no adverse effects noted

## 2023-08-16 DIAGNOSIS — K21.9 GASTROESOPHAGEAL REFLUX DISEASE WITHOUT ESOPHAGITIS: ICD-10-CM

## 2023-08-16 DIAGNOSIS — F32.89 OTHER DEPRESSION: ICD-10-CM

## 2023-08-16 RX ORDER — SERTRALINE HYDROCHLORIDE 25 MG/1
25 TABLET, FILM COATED ORAL DAILY
Qty: 30 TABLET | Refills: 1 | Status: SHIPPED | OUTPATIENT
Start: 2023-08-16

## 2023-08-16 RX ORDER — OMEPRAZOLE 20 MG/1
CAPSULE, DELAYED RELEASE ORAL
Qty: 30 CAPSULE | Refills: 1 | Status: SHIPPED | OUTPATIENT
Start: 2023-08-16

## 2023-08-23 DIAGNOSIS — I10 ESSENTIAL HYPERTENSION: ICD-10-CM

## 2023-08-23 RX ORDER — METOPROLOL SUCCINATE 50 MG/1
50 TABLET, EXTENDED RELEASE ORAL DAILY
Qty: 90 TABLET | Refills: 0 | Status: SHIPPED | OUTPATIENT
Start: 2023-08-23

## 2023-10-19 DIAGNOSIS — K21.9 GASTROESOPHAGEAL REFLUX DISEASE WITHOUT ESOPHAGITIS: ICD-10-CM

## 2023-10-19 RX ORDER — OMEPRAZOLE 20 MG/1
CAPSULE, DELAYED RELEASE ORAL
Qty: 30 CAPSULE | Refills: 1 | Status: SHIPPED | OUTPATIENT
Start: 2023-10-19

## 2023-11-17 DIAGNOSIS — I10 UNCONTROLLED HYPERTENSION: ICD-10-CM

## 2023-11-20 RX ORDER — CHLORTHALIDONE 25 MG/1
TABLET ORAL
Qty: 16 TABLET | Refills: 5 | Status: SHIPPED | OUTPATIENT
Start: 2023-11-20

## 2023-12-26 ENCOUNTER — OFFICE VISIT (OUTPATIENT)
Dept: CARDIOLOGY CLINIC | Facility: CLINIC | Age: 88
End: 2023-12-26
Payer: MEDICARE

## 2023-12-26 VITALS
DIASTOLIC BLOOD PRESSURE: 70 MMHG | WEIGHT: 112 LBS | HEIGHT: 59 IN | OXYGEN SATURATION: 97 % | BODY MASS INDEX: 22.58 KG/M2 | HEART RATE: 66 BPM | SYSTOLIC BLOOD PRESSURE: 162 MMHG

## 2023-12-26 DIAGNOSIS — M15.9 PRIMARY OSTEOARTHRITIS INVOLVING MULTIPLE JOINTS: ICD-10-CM

## 2023-12-26 DIAGNOSIS — E78.5 DYSLIPIDEMIA: ICD-10-CM

## 2023-12-26 DIAGNOSIS — I34.0 NON-RHEUMATIC MITRAL REGURGITATION: ICD-10-CM

## 2023-12-26 DIAGNOSIS — I89.0 LYMPHEDEMA OF BOTH LOWER EXTREMITIES: ICD-10-CM

## 2023-12-26 DIAGNOSIS — I49.1 PREMATURE ATRIAL CONTRACTIONS: ICD-10-CM

## 2023-12-26 DIAGNOSIS — G47.11 HYPERSOMNIA WITH LONG SLEEP TIME, IDIOPATHIC: ICD-10-CM

## 2023-12-26 DIAGNOSIS — I35.1 MODERATE AORTIC REGURGITATION: ICD-10-CM

## 2023-12-26 DIAGNOSIS — I35.0 MODERATE CALCIFIC AORTIC STENOSIS: Primary | ICD-10-CM

## 2023-12-26 DIAGNOSIS — I10 ESSENTIAL HYPERTENSION: ICD-10-CM

## 2023-12-26 PROCEDURE — 99214 OFFICE O/P EST MOD 30 MIN: CPT | Performed by: INTERNAL MEDICINE

## 2023-12-26 PROCEDURE — 93000 ELECTROCARDIOGRAM COMPLETE: CPT | Performed by: INTERNAL MEDICINE

## 2023-12-26 RX ORDER — METOPROLOL SUCCINATE 50 MG/1
25 TABLET, EXTENDED RELEASE ORAL DAILY
Start: 2023-12-26

## 2023-12-26 NOTE — PATIENT INSTRUCTIONS
Continue present medication.  Recommend cardiology follow-up approximately 6 months with EKG, lipid panel, CMP, with blood work to be ordered by primary care physician Dr. Howell.

## 2023-12-26 NOTE — PROGRESS NOTES
Office Cardiology Progress Note  Jackie Urbina 88 y.o. female MRN: [id number]  12/26/23  4:40 PM      ASSESSMENT:    1.  Chronic fatigue, sleepiness and tiredness/hypersomnia, with hypothyroidism ruled out on  previous lab work. Symptoms could be related to metoprolol side effects and less likely from stable moderate aortic stenosis.  She also may have some sort of primary hypersomnia of uncertain cause.  2. Asymptomatic moderate aortic stenosis.  Seems to also have murmur of mitral regurgitation.  Had moderate aortic stenosis and regurgitation with mean gradient of 22 mmHg as well as 1+ MR/MAC on TTE 05/14/2019.  Also had moderate LVH with 60% LVEF and elevated mean left atrial filling pressure.  More recent TTE 7/13/2022 shows no major changes except for slight increase in mean aortic valve gradient to 28 mmHg.  May have hypertensive heart disease without heart failure and LVH related to aortic valve disease.  3. Significant improvement in chronic left more than right lower extremity lymphedema with associated stasis dermatitis and contribution from underlying venous insufficiency of right lower extremity, confirmed on venous reflux study of 11/13/2019. Had complicating slowly healing superficial ulcerations of right lower medial calf, now resolved.  4. Previously controlled essential hypertension,with average home blood pressure of 130/66 between 11/14 and 11/18/2020.  Now with improvement in uncontrolled hypertension with recent average home blood pressure of  143/76 from 6/18 through 6/28/2023 and previously 141/74, decreased from 156/77 between 6/17 and 06/19/2022.    Home blood pressure unit judged to be accurate as of 12/29/2021.    5. Persistent mild to moderate dyslipidemia, with residual increase in LDL as of   5/2/2023.    6. History of successful right knee total arthroplasty on 12/15/2020 with 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.  7.  Asymptomatic premature atrial  "contractions.      Plan       Patient Instructions     Continue present medication.  Recommend cardiology follow-up approximately 6 months with EKG, lipid panel, CMP, with blood work to be ordered by primary care physician Dr. Howell.    HPI    This 88 y.o. female  denies new cardiopulmonary and medical symptoms.    She continues to experience hypersomnia, stable level of exertional fatigue with back pain.  She denies dyspnea, chest pain, dizziness, syncope, near syncope.    The patient lives alone and has no family, friends, or neighbors regularly in touch with her.  She refuses to get an emergency call system.    She is still independent with activities of daily living and drives to the grocery store and doctor appointments.    The patient is also being seen in follow-up of the below listed diagnoses.    Encounter Diagnoses   Name Primary?    Moderate calcific aortic stenosis Yes    Moderate aortic regurgitation     Non-rheumatic mitral regurgitation     Essential hypertension     Dyslipidemia     Lymphedema of both lower extremities     Hypersomnia with long sleep time, idiopathic     Primary osteoarthritis involving multiple joints         Review of Systems    All other systems negative, except as noted in history of present illness    Historical Information   Past Medical History:   Diagnosis Date    Anesthesia complication     .Yrs ago had \"anxiety\" reaction not sure while sedated, pt states sensitive to anesthesia effects    Anxiety     stress at home    Arthritis     Cataract, right     Last Assessed:5/11/16    Eye hemorrhage     left eye currently 5/12/16    History of shingles 05/2015    Hypertension     Mitral valve stenosis, mild      Past Surgical History:   Procedure Laterality Date    CARPAL TUNNEL RELEASE Left     CATARACT EXTRACTION      CATARACT EXTRACTION W/ INTRAOCULAR LENS IMPLANT Left 10/11/2016    Procedure: EXTRACTION EXTRACAPSULAR CATARACT PHACO INTRAOCULAR LENS (IOL);  Surgeon: Wale" MD Gonzalez;  Location: [facility] MAIN OR;  Service:     CERVICAL CONE BIOPSY      COLONOSCOPY      EYE SURGERY Left     muscle repair    NM ARTHRP KNE CONDYLE&PLATU MEDIAL&LAT COMPARTMENTS Right 12/15/2020    Procedure: ARTHROPLASTY KNEE TOTAL;  Surgeon: Greg Mitchell DO;  Location: [facility];  Service: Orthopedics    NM OPTX FEM SHFT FX W/INSJ IMED IMPLT W/WO SCREW Right 2019    Procedure: INSERTION NAIL IM FEMUR ANTEGRADE (TROCHANTERIC);  Surgeon: Greg Mitchell DO;  Location: [facility] MAIN OR;  Service: Orthopedics    NM XCAPSL CTRC RMVL INSJ IO LENS PROSTH W/O ECP Right 2016    Procedure: EXTRACTION EXTRACAPSULAR CATARACT PHACO INTRAOCULAR LENS (IOL);  Surgeon: Wale Roth MD;  Location: [facility] MAIN OR;  Service: Ophthalmology    TONSILLECTOMY       Social History     Substance and Sexual Activity   Alcohol Use Yes    Comment: rarely     Social History     Substance and Sexual Activity   Drug Use No     Social History     Tobacco Use   Smoking Status Never   Smokeless Tobacco Never       Family History:  Family History   Problem Relation Age of Onset    Heart disease Mother         MI  at age 69    Arthritis Mother     GI problems Father         bleeding ulcer    Arthritis Sister     Sleep apnea Sister     Irritable bowel syndrome Sister     Cancer Sister         skin cancer    Heart disease Brother         CABG (5)    Cancer Brother         skin cancer    Heart disease Maternal Aunt          Meds/Allergies     Prior to Admission medications    Medication Sig Start Date End Date Taking? Authorizing Provider   acetaminophen (TYLENOL) 325 mg tablet Take 3 tablets (975 mg total) by mouth every 8 (eight) hours 20  Yes Yogesh Martell PA-C   Ascorbic Acid (vitamin C) 100 MG tablet Take 100 mg by mouth daily   Yes Historical Provider, MD   aspirin (ECOTRIN) 325 mg EC tablet Take 1 tablet (325 mg total) by mouth 2 (two) times a day 20  Yes Yogesh Martell PA-C  "  brimonidine tartrate 0.2 % ophthalmic solution  4/25/23  Yes Historical Provider, MD   chlorthalidone 25 mg tablet TAKE 1 TABLET 4 DAYS A WEEK EVERY MONDAY, WEDNESDAY, FRIDAY, AND SUNDAY. 11/20/23  Yes Raoul He MD   COVID-19 At Home Test (FLOWFLEX COVID-19 AG HOME TEST VI)  3/30/23  Yes Historical Provider, MD   metoprolol succinate (TOPROL-XL) 50 mg 24 hr tablet Take 0.5 tablets (25 mg total) by mouth daily 12/26/23  Yes Raoul He MD   Multiple Vitamins-Minerals (PRESERVISION AREDS PO) Take by mouth   Yes Historical Provider, MD   omeprazole (PriLOSEC) 20 mg delayed release capsule TAKE 1 CAPSULE DAILY 10/19/23  Yes Tom Howell MD   sertraline (ZOLOFT) 25 mg tablet TAKE 1 TABLET DAILY 8/16/23  Yes Tom Howell MD   metoprolol succinate (TOPROL-XL) 50 mg 24 hr tablet TAKE 1 TABLET DAILY AS DIRECTED 8/23/23 12/26/23 Yes Tom Howell MD       Allergies   Allergen Reactions    Indocin [Indomethacin] Other (See Comments)     hypertension         Vitals:    12/26/23 1351   BP: 162/70   BP Location: Left arm   Patient Position: Sitting   Cuff Size: Standard   Pulse: 66   SpO2: 97%   Weight: 50.8 kg (112 lb)   Height: 4' 11\" (1.499 m)       Body mass index is 22.62 kg/m².  No significant change in weight in approximately 6 months.    Physical Exam:    General Appearance:  Alert, cooperative, no distress, appears stated age   Head:  Normocephalic, without obvious abnormality, atraumatic   Eyes:  PERRL, conjunctiva/corneas clear, EOM's intact,   both eyes   Ears:  Normal TM's and external ear canals, both ears   Nose: Nares normal, septum midline, mucosa normal, no drainage or sinus tenderness   Throat: Lips, mucosa, and tongue normal; teeth and gums normal   Neck: Supple, symmetrical, trachea midline, no adenopathy, thyroid: not enlarged, symmetric, no tenderness/mass/nodules, no carotid bruit or JVD with transmitted aortic systolic ejection murmur in bilateral neck.   Back:   " Symmetric, no curvature, ROM normal, no CVA tenderness   Lungs:   Clear to auscultation bilaterally, respirations unlabored   Chest Wall:  No tenderness or deformity   Heart:  Regular rate and rhythm, S1, S2 normal, grade 3/6 aortic systolic ejection murmur best heard along right and left sternal borders with radiation to neck and towards apex with some muffling of A2, as well as higher pitched grade 3/6 apical systolic ejection murmur radiating to anterior axillary line with no rub or gallop   Abdomen:   Soft, non-tender, bowel sounds active all four quadrants,  no masses, no organomegaly   Extremities: Extremities normal, atraumatic, no cyanosis with bilateral symmetrical 1+ nonpitting lymphedema of both legs.   Pulses: 1+ and symmetric   Skin: Skin showed normal color, texture, turgor and no rashes or lesions   Lymph nodes: Cervical, supraclavicular, and axillary nodes normal   Neurologic: Normal         Cardiographics    ECG 12/26/23:    Normal sinus rhythm at 66 bpm  Single premature atrial contraction  Cannot rule out prior septal infarct  LVH voltage with secondary ST-T abnormalities in leads I, aVL, V6  Abnormal ECG  New premature atrial contraction with no other changes since 6/28/2023.    IMAGING:    No Chest XR results available for this patient.          LAB REVIEW:      Lab Results   Component Value Date    SODIUM 140 05/02/2023    K 3.6 05/02/2023     05/02/2023    CO2 29 05/02/2023    ANIONGAP 13.0 10/13/2015    BUN 21 05/02/2023    CREATININE 0.72 05/02/2023    GLUCOSE 87 10/13/2015    GLUF 94 05/02/2023    CALCIUM 9.2 05/02/2023    AST 18 05/02/2023    ALT 10 05/02/2023    ALKPHOS 86 05/02/2023    PROT 6.8 10/13/2015    BILITOT 0.6 10/13/2015    EGFR 75 05/02/2023     Lab Results   Component Value Date    CHOLESTEROL 191 05/02/2023    CHOLESTEROL 204 (H) 10/26/2022    CHOLESTEROL 204 (H) 05/19/2022     Lab Results   Component Value Date    HDL 53 05/02/2023    HDL 46 (L) 10/26/2022    HDL 54  "05/19/2022       Lab Results   Component Value Date    LDLCALC 120 (H) 05/02/2023    LDLCALC 142 (H) 10/26/2022    LDLCALC 137 (H) 05/19/2022     No components found for: \"DIRECTLDLREFLEX\"  Lab Results   Component Value Date    TRIG 88 05/02/2023    TRIG 81 10/26/2022    TRIG 64 05/19/2022               Raoul He MD  "

## 2024-01-23 DIAGNOSIS — K21.9 GASTROESOPHAGEAL REFLUX DISEASE WITHOUT ESOPHAGITIS: ICD-10-CM

## 2024-01-23 RX ORDER — OMEPRAZOLE 20 MG/1
CAPSULE, DELAYED RELEASE ORAL
Qty: 30 CAPSULE | Refills: 1 | Status: SHIPPED | OUTPATIENT
Start: 2024-01-23

## 2024-03-19 DIAGNOSIS — I10 ESSENTIAL HYPERTENSION: ICD-10-CM

## 2024-03-20 RX ORDER — METOPROLOL SUCCINATE 50 MG/1
25 TABLET, EXTENDED RELEASE ORAL DAILY
Qty: 45 TABLET | Refills: 1 | Status: SHIPPED | OUTPATIENT
Start: 2024-03-20

## 2024-04-05 DIAGNOSIS — K21.9 GASTROESOPHAGEAL REFLUX DISEASE WITHOUT ESOPHAGITIS: ICD-10-CM

## 2024-04-05 RX ORDER — OMEPRAZOLE 20 MG/1
CAPSULE, DELAYED RELEASE ORAL
Qty: 30 CAPSULE | Refills: 1 | Status: SHIPPED | OUTPATIENT
Start: 2024-04-05

## 2024-06-18 DIAGNOSIS — I10 UNCONTROLLED HYPERTENSION: ICD-10-CM

## 2024-06-18 DIAGNOSIS — K21.9 GASTROESOPHAGEAL REFLUX DISEASE WITHOUT ESOPHAGITIS: ICD-10-CM

## 2024-06-19 RX ORDER — CHLORTHALIDONE 25 MG/1
TABLET ORAL
Qty: 16 TABLET | Refills: 5 | Status: SHIPPED | OUTPATIENT
Start: 2024-06-19

## 2024-06-19 RX ORDER — OMEPRAZOLE 20 MG/1
CAPSULE, DELAYED RELEASE ORAL
Qty: 30 CAPSULE | Refills: 5 | Status: SHIPPED | OUTPATIENT
Start: 2024-06-19

## 2024-07-01 ENCOUNTER — TELEPHONE (OUTPATIENT)
Age: 89
End: 2024-07-01

## 2024-07-01 DIAGNOSIS — I10 UNCONTROLLED HYPERTENSION: Primary | ICD-10-CM

## 2024-07-01 DIAGNOSIS — Z13.0 SCREENING FOR DEFICIENCY ANEMIA: ICD-10-CM

## 2024-07-01 DIAGNOSIS — E78.5 HYPERLIPIDEMIA, UNSPECIFIED HYPERLIPIDEMIA TYPE: ICD-10-CM

## 2024-07-01 NOTE — TELEPHONE ENCOUNTER
Patient called to scheduled her overdue medicare annual wellness visit and follow up. She is asking if Dr Howell will please place lab orders for her to complete before she comes in to see him.      Please call patient and let her know.

## 2024-07-01 NOTE — TELEPHONE ENCOUNTER
Please notify the patient that orders have been placed at the [facility]'s laboratory for upcoming physical exam.  Thank you

## 2024-07-22 DIAGNOSIS — I10 ESSENTIAL HYPERTENSION: ICD-10-CM

## 2024-07-23 RX ORDER — METOPROLOL SUCCINATE 50 MG/1
25 TABLET, EXTENDED RELEASE ORAL DAILY
Qty: 45 TABLET | Refills: 1 | Status: SHIPPED | OUTPATIENT
Start: 2024-07-23

## 2024-07-30 ENCOUNTER — TELEPHONE (OUTPATIENT)
Age: 89
End: 2024-07-30

## 2024-08-22 ENCOUNTER — APPOINTMENT (EMERGENCY)
Dept: RADIOLOGY | Facility: HOSPITAL | Age: 89
End: 2024-08-22
Payer: MEDICARE

## 2024-08-22 ENCOUNTER — HOSPITAL ENCOUNTER (EMERGENCY)
Facility: HOSPITAL | Age: 89
Discharge: HOME | End: 2024-08-22
Attending: EMERGENCY MEDICINE
Payer: MEDICARE

## 2024-08-22 VITALS
SYSTOLIC BLOOD PRESSURE: 221 MMHG | TEMPERATURE: 97.3 F | RESPIRATION RATE: 18 BRPM | OXYGEN SATURATION: 100 % | DIASTOLIC BLOOD PRESSURE: 105 MMHG | HEART RATE: 58 BPM

## 2024-08-22 DIAGNOSIS — S46.919A SHOULDER STRAIN: ICD-10-CM

## 2024-08-22 DIAGNOSIS — M25.512 ACUTE PAIN OF LEFT SHOULDER: Primary | ICD-10-CM

## 2024-08-22 LAB
ALBUMIN SERPL BCG-MCNC: 4.3 G/DL (ref 3.5–5)
ALP SERPL-CCNC: 97 U/L (ref 34–104)
ALT SERPL W P-5'-P-CCNC: 7 U/L (ref 7–52)
ANION GAP SERPL CALCULATED.3IONS-SCNC: 11 MMOL/L (ref 4–13)
AST SERPL W P-5'-P-CCNC: 21 U/L (ref 13–39)
BASOPHILS # BLD AUTO: 0.1 THOUSANDS/ÂΜL (ref 0–0.1)
BASOPHILS NFR BLD AUTO: 1 % (ref 0–1)
BILIRUB SERPL-MCNC: 0.53 MG/DL (ref 0.2–1)
BUN SERPL-MCNC: 34 MG/DL (ref 5–25)
CALCIUM SERPL-MCNC: 10.4 MG/DL (ref 8.4–10.2)
CARDIAC TROPONIN I PNL SERPL HS: 10 NG/L
CHLORIDE SERPL-SCNC: 106 MMOL/L (ref 96–108)
CO2 SERPL-SCNC: 24 MMOL/L (ref 21–32)
CREAT SERPL-MCNC: 0.78 MG/DL (ref 0.6–1.3)
EOSINOPHIL # BLD AUTO: 0.28 THOUSAND/ÂΜL (ref 0–0.61)
EOSINOPHIL NFR BLD AUTO: 3 % (ref 0–6)
ERYTHROCYTE [DISTWIDTH] IN BLOOD BY AUTOMATED COUNT: 13.6 % (ref 11.6–15.1)
GFR SERPL CREATININE-BSD FRML MDRD: 67 ML/MIN/1.73SQ M
GLUCOSE SERPL-MCNC: 112 MG/DL (ref 65–140)
HCT VFR BLD AUTO: 42.7 % (ref 34.8–46.1)
HGB BLD-MCNC: 13.6 G/DL (ref 11.5–15.4)
IMM GRANULOCYTES # BLD AUTO: 0.03 THOUSAND/UL (ref 0–0.2)
IMM GRANULOCYTES NFR BLD AUTO: 0 % (ref 0–2)
LYMPHOCYTES # BLD AUTO: 2.65 THOUSANDS/ÂΜL (ref 0.6–4.47)
LYMPHOCYTES NFR BLD AUTO: 27 % (ref 14–44)
MCH RBC QN AUTO: 28.8 PG (ref 26.8–34.3)
MCHC RBC AUTO-ENTMCNC: 31.9 G/DL (ref 31.4–37.4)
MCV RBC AUTO: 90 FL (ref 82–98)
MONOCYTES # BLD AUTO: 0.79 THOUSAND/ÂΜL (ref 0.17–1.22)
MONOCYTES NFR BLD AUTO: 8 % (ref 4–12)
NEUTROPHILS # BLD AUTO: 6.14 THOUSANDS/ÂΜL (ref 1.85–7.62)
NEUTS SEG NFR BLD AUTO: 61 % (ref 43–75)
NRBC BLD AUTO-RTO: 0 /100 WBCS
PLATELET # BLD AUTO: 198 THOUSANDS/UL (ref 149–390)
PMV BLD AUTO: 11.2 FL (ref 8.9–12.7)
POTASSIUM SERPL-SCNC: 3.7 MMOL/L (ref 3.5–5.3)
PROT SERPL-MCNC: 6.9 G/DL (ref 6.4–8.4)
RBC # BLD AUTO: 4.73 MILLION/UL (ref 3.81–5.12)
SODIUM SERPL-SCNC: 141 MMOL/L (ref 135–147)
WBC # BLD AUTO: 9.99 THOUSAND/UL (ref 4.31–10.16)

## 2024-08-22 PROCEDURE — 99284 EMERGENCY DEPT VISIT MOD MDM: CPT

## 2024-08-22 PROCEDURE — 93005 ELECTROCARDIOGRAM TRACING: CPT

## 2024-08-22 PROCEDURE — 85025 COMPLETE CBC W/AUTO DIFF WBC: CPT | Performed by: EMERGENCY MEDICINE

## 2024-08-22 PROCEDURE — 84484 ASSAY OF TROPONIN QUANT: CPT | Performed by: EMERGENCY MEDICINE

## 2024-08-22 PROCEDURE — 80053 COMPREHEN METABOLIC PANEL: CPT | Performed by: EMERGENCY MEDICINE

## 2024-08-22 PROCEDURE — 99284 EMERGENCY DEPT VISIT MOD MDM: CPT | Performed by: EMERGENCY MEDICINE

## 2024-08-22 PROCEDURE — 73060 X-RAY EXAM OF HUMERUS: CPT

## 2024-08-22 PROCEDURE — 73030 X-RAY EXAM OF SHOULDER: CPT

## 2024-08-22 PROCEDURE — 36415 COLL VENOUS BLD VENIPUNCTURE: CPT

## 2024-08-22 RX ORDER — FENTANYL CITRATE 50 UG/ML
25 INJECTION, SOLUTION INTRAMUSCULAR; INTRAVENOUS ONCE
Status: DISCONTINUED | OUTPATIENT
Start: 2024-08-22 | End: 2024-08-22

## 2024-08-22 RX ORDER — ACETAMINOPHEN 325 MG/1
975 TABLET ORAL ONCE
Status: COMPLETED | OUTPATIENT
Start: 2024-08-22 | End: 2024-08-22

## 2024-08-22 RX ORDER — LIDOCAINE 50 MG/G
1 PATCH TOPICAL ONCE
Status: DISCONTINUED | OUTPATIENT
Start: 2024-08-22 | End: 2024-08-22 | Stop reason: HOSPADM

## 2024-08-22 RX ORDER — ACETAMINOPHEN 10 MG/ML
1000 INJECTION, SOLUTION INTRAVENOUS ONCE
Status: DISCONTINUED | OUTPATIENT
Start: 2024-08-22 | End: 2024-08-22

## 2024-08-22 RX ADMIN — ACETAMINOPHEN 975 MG: 325 TABLET ORAL at 19:56

## 2024-08-22 RX ADMIN — LIDOCAINE 1 PATCH: 50 PATCH CUTANEOUS at 20:54

## 2024-08-22 NOTE — ED ATTENDING ATTESTATION
8/22/2024  I, Tom Narayan MD, saw and evaluated the patient. I have discussed the patient with the resident/non-physician practitioner and agree with the resident's/non-physician practitioner's findings, Plan of Care, and MDM as documented in the resident's/non-physician practitioner's note, except where noted. All available labs and Radiology studies were reviewed.  I was present for key portions of any procedure(s) performed by the resident/non-physician practitioner and I was immediately available to provide assistance.       At this point I agree with the current assessment done in the Emergency Department.  I have conducted an independent evaluation of this patient a history and physical is as fol  The patient presents for evaluation of left shoulder pain  The patient started at 4 PM when she reached overhead to grab something she felt sudden pain in her left shoulder brought her arm back down to her side and states that anytime she tries to move the shoulder it hurts  She is not on anticoagulation and had no direct impact type injury no numbness or tingling distally she is able to move her wrist and elbow without difficulty  Pulses are equal  Patient does have significant osteoarthritis  Left shoulder tenderness is noted anteriorly and laterally with decreased range of motion there is no tenderness over the clavicle or AC joint  Seems to be a anterior hematoma there is no redness or warmth noted no crepitation noted  Differential considerations would include shoulder dislocation, rotator cuff tear, biceps tendon rupture calcific tendinitis or fracture  ED Course         Critical Care Time  Procedures

## 2024-08-22 NOTE — ED NOTES
Pt states she reached up to pull herself into the car and afterwards her arm and shoulder have been hurting.     Sara Barajas RN  08/22/24 [id number]

## 2024-08-23 ENCOUNTER — VBI (OUTPATIENT)
Dept: INTERNAL MEDICINE CLINIC | Facility: CLINIC | Age: 89
End: 2024-08-23

## 2024-08-23 ENCOUNTER — PATIENT OUTREACH (OUTPATIENT)
Dept: CASE MANAGEMENT | Facility: OTHER | Age: 89
End: 2024-08-23

## 2024-08-23 LAB
ATRIAL RATE: 62 BPM
P AXIS: 73 DEGREES
PR INTERVAL: 194 MS
QRS AXIS: -1 DEGREES
QRSD INTERVAL: 116 MS
QT INTERVAL: 406 MS
QTC INTERVAL: 412 MS
T WAVE AXIS: 81 DEGREES
VENTRICULAR RATE: 62 BPM

## 2024-08-23 PROCEDURE — 93010 ELECTROCARDIOGRAM REPORT: CPT | Performed by: INTERNAL MEDICINE

## 2024-08-23 NOTE — PROGRESS NOTES
ANNELIESE SW received referral from  ED.  No referral comments and no notes regarding referral in Chart Review or SDOH flags noted.  ANNELIESE JAY reached out to patient whom has questions about her f/u care.  Patient states that she has been waiting for the Orthopedic Surgeon to call her for scheduling and is aware of OPPT referral placed.  SHALINIM MISTY provided her with contact information for both OP and Orthopedic Surgery in [place] and encouraged her to call to schedule with them both.  Patient very appreciative and expresses no concerns for SW at this time.  Referral closed.

## 2024-08-23 NOTE — TELEPHONE ENCOUNTER
08/23/24 10:10 AM    Patient contacted post ED visit, [facility] department spoke with patient/caregiver and outreach was successful.    Thank you.  Rachael Rucker  [facility]

## 2024-08-23 NOTE — DISCHARGE INSTRUCTIONS
You are seen emergency department for left shoulder pain.  You are placed in a sling.  Use use the sling until you see orthopedics or until the pain gets better.  Additionally we are referring you to orthopedics and physical therapy.  It is important that you follow-up with both especially orthopedics if your shoulder is not getting better.  Additionally you should follow-up with your primary care doctor if is not getting better so they can order an MRI prior to your orthopedics appointment.  If you have any numbness or tingling to your hand or arm please return to the emergency department.

## 2024-08-25 NOTE — ED PROVIDER NOTES
Final Diagnoses:     1. Acute pain of left shoulder    2. Shoulder strain        ED Course as of 08/25/24 0128   Thu Aug 22, 2024   1937 ECG 12 lead  Compared to prior October 27 2020.  Sinus rhythm, normal axis normal intervals.  New biphasic T waves in 2 3 and aVF as well as V3 and V4.  Could also be slight depressions in the anterior leads.  Abnormal EKG.     Nursing Triage:     Chief Complaint   Patient presents with    Arm Pain     Pt complains of L arm pain starting today. Denies chest pain, jaw pain, and SOB.     HPI:   This is a 89 y.o. female presenting for evaluation of left arm pain.   Relevant past medical history anxiety, arthritis, hypertension, mitral valve stenosis,.  Patient coming in complaining of left arm pain.  She states that earlier this afternoon evening she reached overhead and felt a sudden pain in her left shoulder.  Says that anytime she tries to range her shoulder it hurts.  She states that she is able to move her hand and forearm.  She denies any sensory deficits in her distal arm.  She says she does have a history of osteoarthritis but no problems with her shoulder.  She denies any fall or surgeries on the shoulder.  ASSESSMENT + PLAN:   Will obtain x-ray.  Likely muscle strain versus rotator cuff soft tissue injury versus fracture will place in a sling.    X-ray showed no abnormalities of the bone.  Will discharge with a sling and Ortho follow-up.  Return follow-up precautions given.    Physical:   Physical Exam  Vitals and nursing note reviewed.   Constitutional:       Appearance: Normal appearance.   HENT:      Head: Atraumatic.      Mouth/Throat:      Mouth: Mucous membranes are moist.      Pharynx: No oropharyngeal exudate or posterior oropharyngeal erythema.   Eyes:      Extraocular Movements: Extraocular movements intact.      Pupils: Pupils are equal, round, and reactive to light.   Cardiovascular:      Rate and Rhythm: Normal rate and regular rhythm.      Pulses: Normal  "pulses.      Heart sounds: Normal heart sounds.   Pulmonary:      Effort: Pulmonary effort is normal.      Breath sounds: Normal breath sounds.   Abdominal:      General: There is no distension.      Palpations: Abdomen is soft.      Tenderness: There is no abdominal tenderness.   Musculoskeletal:         General: Normal range of motion.      Cervical back: Normal range of motion.      Comments: Pain on passive range of motion of the left shoulder unable to move because of the pain.  Neurovascularly intact distally.  Able to move at the elbow and wrist.   Skin:     General: Skin is warm and dry.      Capillary Refill: Capillary refill takes less than 2 seconds.   Neurological:      General: No focal deficit present.      Mental Status: She is alert and oriented to person, place, and time.      Cranial Nerves: No cranial nerve deficit.       ED Triage Vitals   Temperature Pulse Respirations Blood Pressure SpO2   08/22/24 1739 08/22/24 1739 08/22/24 1839 08/22/24 1743 08/22/24 1739   (!) 97.3 °F (36.3 °C) 58 18 (!) 221/105 100 %      Temp Source Heart Rate Source Patient Position - Orthostatic VS BP Location FiO2 (%)   08/22/24 1739 -- -- -- --   Temporal          Pain Score       08/22/24 1956       8         Vitals:    08/22/24 1739 08/22/24 1743 08/22/24 1839   BP:  (!) 221/105    TempSrc: Temporal     Pulse: 58     Resp:   18   Temp: (!) 97.3 °F (36.3 °C)       No results found for: \"POCGLU\"    - There are no obvious limitations to social determinants of care.   - Nursing note reviewed.   - Vitals reviewed.   - Orders placed by myself.    - Previous chart was reviewed  - No language barrier.   - History obtained from [facility].    - There are no limitations to the history obtained:     Past Medical:    has a past medical history of Anesthesia complication, Anxiety, Arthritis, Cataract, right, Eye hemorrhage, History of shingles (05/2015), Hypertension, and Mitral valve stenosis, mild.    Past Surgical:    has a past " surgical history that includes Tonsillectomy; Eye surgery (Left); Carpal tunnel release (Left); Cervical cone biopsy; Colonoscopy; Cataract extraction w/ intraocular lens implant (Left, 10/11/2016); pr xcapsl ctrc rmvl insj io lens prosth w/o ecp (Right, 5/17/2016); pr optx fem shft fx w/insj imed implt w/wo screw (Right, 7/23/2019); Cataract extraction; and pr arthrp kne condyle&platu medial&lat compartments (Right, 12/15/2020).    Social:     Social History     Substance and Sexual Activity   Alcohol Use Yes    Comment: rarely     Social History     Tobacco Use   Smoking Status Never   Smokeless Tobacco Never     Social History     Substance and Sexual Activity   Drug Use No       Code Status: Prior  Advance Directive and Living Will:      Power of :    POLST:    Medications   acetaminophen (TYLENOL) tablet 975 mg (975 mg Oral Given 8/22/24 1956)     XR shoulder 2+ views LEFT   Final Result      No acute osseous abnormality.         Computerized Assisted Algorithm (CAA) may have been used to analyze all applicable images.         Workstation performed: [id number]         XR humerus LEFT   Final Result      No acute osseous abnormality.         Computerized Assisted Algorithm (CAA) may have been used to analyze all applicable images.         Workstation performed: [id number]           Orders Placed This Encounter   Procedures    XR shoulder 2+ views LEFT    XR humerus LEFT    CBC and differential    Comprehensive metabolic panel    HS Troponin 0hr (reflex protocol)    Ambulatory Referral to Physical Therapy    Ambulatory Referral to Orthopedic Surgery    Ambulatory Referral to Social Work Care Management Program    ECG 12 lead    ECG 12 lead     Labs Reviewed   COMPREHENSIVE METABOLIC PANEL - Abnormal       Result Value Ref Range Status    Sodium 141  135 - 147 mmol/L Final    Potassium 3.7  3.5 - 5.3 mmol/L Final    Chloride 106  96 - 108 mmol/L Final    CO2 24  21 - 32 mmol/L Final    ANION GAP 11  4 - 13  "mmol/L Final    BUN 34 (*) 5 - 25 mg/dL Final    Creatinine 0.78  0.60 - 1.30 mg/dL Final    Comment: Standardized to IDMS reference method    Glucose 112  65 - 140 mg/dL Final    Comment: If the patient is fasting, the ADA then defines impaired fasting glucose as > 100 mg/dL and diabetes as > or equal to 123 mg/dL.    Calcium 10.4 (*) 8.4 - 10.2 mg/dL Final    AST 21  13 - 39 U/L Final    ALT 7  7 - 52 U/L Final    Comment: Specimen collection should occur prior to Sulfasalazine administration due to the potential for falsely depressed results.     Alkaline Phosphatase 97  34 - 104 U/L Final    Total Protein 6.9  6.4 - 8.4 g/dL Final    Albumin 4.3  3.5 - 5.0 g/dL Final    Total Bilirubin 0.53  0.20 - 1.00 mg/dL Final    Comment: Use of this assay is not recommended for patients undergoing treatment with eltrombopag due to the potential for falsely elevated results.  N-acetyl-p-benzoquinone imine (metabolite of Acetaminophen) will generate erroneously low results in samples for patients that have taken an overdose of Acetaminophen.    eGFR 67  ml/min/1.73sq m Final    Narrative:     National Kidney Disease Foundation guidelines for Chronic Kidney Disease (CKD):     Stage 1 with normal or high GFR (GFR > 90 mL/min/1.73 square meters)    Stage 2 Mild CKD (GFR = 60-89 mL/min/1.73 square meters)    Stage 3A Moderate CKD (GFR = 45-59 mL/min/1.73 square meters)    Stage 3B Moderate CKD (GFR = 30-44 mL/min/1.73 square meters)    Stage 4 Severe CKD (GFR = 15-29 mL/min/1.73 square meters)    Stage 5 End Stage CKD (GFR <15 mL/min/1.73 square meters)  Note: GFR calculation is accurate only with a steady state creatinine   HS TROPONIN I 0HR - Normal    hs TnI 0hr 10  \"Refer to ACS Flowchart\"- see link ng/L Final    Comment:                                              Initial (time 0) result  If >=50 ng/L, Myocardial injury suggested ;  Type of myocardial injury and treatment strategy  to be determined.  If 5-49 ng/L, a delta " result at 2 hours and or 4 hours will be needed to further evaluate.  If <4 ng/L, and chest pain has been >3 hours since onset, patient may qualify for discharge based on the HEART score in the ED.  If <5 ng/L and <3hours since onset of chest pain, a delta result at 2 hours will be needed to further evaluate.    HS Troponin 99th Percentile URL of a Health Population=12 ng/L with a 95% Confidence Interval of 8-18 ng/L.    Second Troponin (time 2 hours)  If calculated delta >= 20 ng/L,  Myocardial injury suggested ; Type of myocardial injury and treatment strategy to be determined.  If 5-49 ng/L and the calculated delta is 5-19 ng/L, consult medical service for evaluation.  Continue evaluation for ischemia on ecg and other possible etiology and repeat hs troponin at 4 hours.  If delta is <5 ng/L at 2 hours, consider discharge based on risk stratification via the HEART score (if in ED), or SHEILA risk score in IP/Observation.    HS Troponin 99th Percentile URL of a Health Population=12 ng/L with a 95% Confidence Interval of 8-18 ng/L.   CBC AND DIFFERENTIAL    WBC 9.99  4.31 - 10.16 Thousand/uL Final    RBC 4.73  3.81 - 5.12 Million/uL Final    Hemoglobin 13.6  11.5 - 15.4 g/dL Final    Hematocrit 42.7  34.8 - 46.1 % Final    MCV 90  82 - 98 fL Final    MCH 28.8  26.8 - 34.3 pg Final    MCHC 31.9  31.4 - 37.4 g/dL Final    RDW 13.6  11.6 - 15.1 % Final    MPV 11.2  8.9 - 12.7 fL Final    Platelets 198  149 - 390 Thousands/uL Final    nRBC 0  /100 WBCs Final    Segmented % 61  43 - 75 % Final    Immature Grans % 0  0 - 2 % Final    Lymphocytes % 27  14 - 44 % Final    Monocytes % 8  4 - 12 % Final    Eosinophils Relative 3  0 - 6 % Final    Basophils Relative 1  0 - 1 % Final    Absolute Neutrophils 6.14  1.85 - 7.62 Thousands/µL Final    Absolute Immature Grans 0.03  0.00 - 0.20 Thousand/uL Final    Absolute Lymphocytes 2.65  0.60 - 4.47 Thousands/µL Final    Absolute Monocytes 0.79  0.17 - 1.22 Thousand/µL Final     Eosinophils Absolute 0.28  0.00 - 0.61 Thousand/µL Final    Basophils Absolute 0.10  0.00 - 0.10 Thousands/µL Final       Time reflects when diagnosis was documented in both MDM as applicable and the Disposition within this note       Time User Action Codes Description Comment    8/22/2024  8:44 PM Greg Wong [M25.512] Acute pain of left shoulder     8/22/2024  8:44 PM Greg Wong [S46.919A] Shoulder strain           ED Disposition       ED Disposition   Discharge    Condition   Stable    Date/Time   Thu Aug 22, 2024  8:43 PM    Comment   Jackie Urbina discharge to home/self care.                   Follow-up Information    None       Discharge Medication List as of 8/22/2024  8:46 PM        CONTINUE these medications which have NOT CHANGED    Details   acetaminophen (TYLENOL) 325 mg tablet Take 3 tablets (975 mg total) by mouth every 8 (eight) hours, Starting Wed 12/16/2020, No Print      Ascorbic Acid (vitamin C) 100 MG tablet Take 100 mg by mouth daily, Historical Med      aspirin (ECOTRIN) 325 mg EC tablet Take 1 tablet (325 mg total) by mouth 2 (two) times a day, Starting Wed 12/16/2020, Normal      brimonidine tartrate 0.2 % ophthalmic solution Starting Tue 4/25/2023, Historical Med      chlorthalidone 25 mg tablet TAKE 1 TABLET 4 DAYS A WEEK EVERY MONDAY, WEDNESDAY, FRIDAY, AND SUNDAY., Normal      COVID-19 At Home Test (FLOWFLEX COVID-19 AG HOME TEST VI) Starting Thu 3/30/2023, Historical Med      metoprolol succinate (TOPROL-XL) 50 mg 24 hr tablet TAKE 1/2 TABLET BY MOUTH DAILY, Starting Tue 7/23/2024, Normal      Multiple Vitamins-Minerals (PRESERVISION AREDS PO) Take by mouth, Historical Med      omeprazole (PriLOSEC) 20 mg delayed release capsule TAKE 1 CAPSULE DAILY, Normal      sertraline (ZOLOFT) 25 mg tablet TAKE 1 TABLET DAILY, Starting Wed 8/16/2023, Normal             Prior to Admission Medications   Prescriptions Last Dose Informant Patient Reported? Taking?   Ascorbic Acid (vitamin C)  "100 MG tablet  Self Yes No   Sig: Take 100 mg by mouth daily   COVID-19 At Home Test (FLOWFLEX COVID-19 AG HOME TEST VI)  Self Yes No   Multiple Vitamins-Minerals (PRESERVISION AREDS PO)  Self Yes No   Sig: Take by mouth   acetaminophen (TYLENOL) 325 mg tablet  Self No No   Sig: Take 3 tablets (975 mg total) by mouth every 8 (eight) hours   aspirin (ECOTRIN) 325 mg EC tablet  Self No No   Sig: Take 1 tablet (325 mg total) by mouth 2 (two) times a day   brimonidine tartrate 0.2 % ophthalmic solution  Self Yes No   chlorthalidone 25 mg tablet   No No   Sig: TAKE 1 TABLET 4 DAYS A WEEK EVERY MONDAY, WEDNESDAY, FRIDAY, AND SUNDAY.   metoprolol succinate (TOPROL-XL) 50 mg 24 hr tablet   No No   Sig: TAKE 1/2 TABLET BY MOUTH DAILY   omeprazole (PriLOSEC) 20 mg delayed release capsule   No No   Sig: TAKE 1 CAPSULE DAILY   sertraline (ZOLOFT) 25 mg tablet   No No   Sig: TAKE 1 TABLET DAILY      Facility-Administered Medications: None                        Portions of the record may have been created with voice recognition software. Occasional wrong word or \"sound a like\" substitutions may have occurred due to the inherent limitations of voice recognition software. Read the chart carefully and recognize, using context, where substitutions have occurred.     Greg Wong MD  08/25/24 0130    "

## 2024-08-26 ENCOUNTER — OFFICE VISIT (OUTPATIENT)
Dept: OBGYN CLINIC | Facility: CLINIC | Age: 89
End: 2024-08-26
Payer: MEDICARE

## 2024-08-26 VITALS
DIASTOLIC BLOOD PRESSURE: 51 MMHG | HEIGHT: 59 IN | BODY MASS INDEX: 22.58 KG/M2 | SYSTOLIC BLOOD PRESSURE: 190 MMHG | HEART RATE: 51 BPM | WEIGHT: 112 LBS

## 2024-08-26 DIAGNOSIS — M25.512 ACUTE PAIN OF LEFT SHOULDER: ICD-10-CM

## 2024-08-26 DIAGNOSIS — M12.812 ROTATOR CUFF ARTHROPATHY OF LEFT SHOULDER: ICD-10-CM

## 2024-08-26 DIAGNOSIS — S46.119A LABRAL TEAR OF LONG HEAD OF BICEPS TENDON, INITIAL ENCOUNTER: Primary | ICD-10-CM

## 2024-08-26 DIAGNOSIS — S46.919A SHOULDER STRAIN: ICD-10-CM

## 2024-08-26 PROCEDURE — 99203 OFFICE O/P NEW LOW 30 MIN: CPT | Performed by: ORTHOPAEDIC SURGERY

## 2024-08-26 NOTE — PROGRESS NOTES
Assessment/Plan:  1. Labral tear of long head of biceps tendon, initial encounter        2. Acute pain of left shoulder  Ambulatory Referral to Orthopedic Surgery      3. Shoulder strain  Ambulatory Referral to Orthopedic Surgery      4. Rotator cuff arthropathy of left shoulder          Scribe Attestation      I,:  Eddy Tena MA am acting as a scribe while in the presence of the attending physician.:       I,:  Joni Gonzalez MD personally performed the services described in this documentation    as scribed in my presence.:               Jackie and I reviewed her x-rays together which were normal.  She does have significant amount of bruising in the upper arm and evidence of a Fdiel deformity.  It is likely that she suffered a proximal bicep tendon tear in the presence of rotator cuff arthropathy.  We discussed that she does have a high riding humeral head which is typically evident of rotator cuff dysfunction.  She has likely had a rotator cuff tear for quite some time.  We discussed treatment options moving forward.  Proximal bicep tendon tears typically do not require surgical intervention though she will have a lifelong deformity.  I did offer her physical therapy for the rotator cuff arthropathy.  In addition I offered a steroid injection today to decrease her pain.  She does not feel that she is in pain enough for steroid injection today and would prefer home exercises over formal physical therapy.  She was provided a physician directed home exercise program today.  If she continues to have discomfort over the next 2 to 3 weeks I asked that she contact my office and we can reconsider steroid injection.  She was amenable to this plan of care and had no further questions.  Jackie is hypertensive today.  She has no headache or dizziness.  I would like her to contact her primary care physician.  She verbally stated she understood.    Subjective:   Jackie Urbina is a 89 y.o. female who presents for  "evaluation of left shoulder pain.  She states this past Thursday she reached to pull herself up into a car and experienced a painful pop in the left shoulder.  She developed bruising in her upper arm.  She states her pain was bad enough that she was seen in the ED that day.  The pain was located about the anterior aspect of the left shoulder and would radiate into the upper arm.  Her pain has been improving daily.  She states the shoulder was giving her occasional pain prior to recent injury.  Today she denies radiating pain or distal paresthesias.      Review of Systems   Constitutional:  Negative for chills, fever and unexpected weight change.   HENT:  Negative for hearing loss, nosebleeds and sore throat.    Eyes:  Negative for pain, redness and visual disturbance.   Respiratory:  Negative for cough, shortness of breath and wheezing.    Cardiovascular:  Negative for chest pain, palpitations and leg swelling.   Gastrointestinal:  Negative for abdominal pain, nausea and vomiting.   Endocrine: Negative for polydipsia and polyuria.   Genitourinary:  Negative for dysuria and hematuria.   Musculoskeletal:         See HPI   Skin:  Negative for rash and wound.   Neurological:  Negative for dizziness, numbness and headaches.   Psychiatric/Behavioral:  Negative for decreased concentration and suicidal ideas. The patient is not nervous/anxious.          Past Medical History:   Diagnosis Date    Anesthesia complication     .Yrs ago had \"anxiety\" reaction not sure while sedated, pt states sensitive to anesthesia effects    Anxiety     stress at home    Arthritis     Cataract, right     Last Assessed:5/11/16    Eye hemorrhage     left eye currently 5/12/16    History of shingles 05/2015    Hypertension     Mitral valve stenosis, mild        Past Surgical History:   Procedure Laterality Date    CARPAL TUNNEL RELEASE Left     CATARACT EXTRACTION      CATARACT EXTRACTION W/ INTRAOCULAR LENS IMPLANT Left 10/11/2016    Procedure: " EXTRACTION EXTRACAPSULAR CATARACT PHACO INTRAOCULAR LENS (IOL);  Surgeon: Wale Roth MD;  Location: [facility] MAIN OR;  Service:     CERVICAL CONE BIOPSY      COLONOSCOPY      EYE SURGERY Left     muscle repair    FL ARTHRP KNE CONDYLE&PLATU MEDIAL&LAT COMPARTMENTS Right 12/15/2020    Procedure: ARTHROPLASTY KNEE TOTAL;  Surgeon: Greg Mitchell DO;  Location: [facility] MAIN OR;  Service: Orthopedics    FL OPTX FEM SHFT FX W/INSJ IMED IMPLT W/WO SCREW Right 2019    Procedure: INSERTION NAIL IM FEMUR ANTEGRADE (TROCHANTERIC);  Surgeon: Greg Mitchell DO;  Location: [facility] MAIN OR;  Service: Orthopedics    FL XCAPSL CTRC RMVL INSJ IO LENS PROSTH W/O ECP Right 2016    Procedure: EXTRACTION EXTRACAPSULAR CATARACT PHACO INTRAOCULAR LENS (IOL);  Surgeon: Wale Roth MD;  Location: [facility] MAIN OR;  Service: Ophthalmology    TONSILLECTOMY         Family History   Problem Relation Age of Onset    Heart disease Mother         MI  at age 69    Arthritis Mother     GI problems Father         bleeding ulcer    Arthritis Sister     Sleep apnea Sister     Irritable bowel syndrome Sister     Cancer Sister         skin cancer    Heart disease Brother         CABG (5)    Cancer Brother         skin cancer    Heart disease Maternal Aunt        Social History     Occupational History    Not on file   Tobacco Use    Smoking status: Never    Smokeless tobacco: Never   Vaping Use    Vaping status: Never Used   Substance and Sexual Activity    Alcohol use: Yes     Comment: rarely    Drug use: No    Sexual activity: Not on file         Current Outpatient Medications:     Ascorbic Acid (vitamin C) 100 MG tablet, Take 100 mg by mouth daily, Disp: , Rfl:     aspirin (ECOTRIN) 325 mg EC tablet, Take 1 tablet (325 mg total) by mouth 2 (two) times a day, Disp: 84 tablet, Rfl: 0    brimonidine tartrate 0.2 % ophthalmic solution, , Disp: , Rfl:     chlorthalidone 25 mg tablet, TAKE 1 TABLET 4 DAYS A WEEK EVERY MONDAY, WEDNESDAY,  FRIDAY, AND SUNDAY., Disp: 16 tablet, Rfl: 5    metoprolol succinate (TOPROL-XL) 50 mg 24 hr tablet, TAKE 1/2 TABLET BY MOUTH DAILY, Disp: 45 tablet, Rfl: 1    Multiple Vitamins-Minerals (PRESERVISION AREDS PO), Take by mouth, Disp: , Rfl:     omeprazole (PriLOSEC) 20 mg delayed release capsule, TAKE 1 CAPSULE DAILY, Disp: 30 capsule, Rfl: 5    sertraline (ZOLOFT) 25 mg tablet, TAKE 1 TABLET DAILY, Disp: 30 tablet, Rfl: 1    acetaminophen (TYLENOL) 325 mg tablet, Take 3 tablets (975 mg total) by mouth every 8 (eight) hours (Patient not taking: Reported on 8/26/2024), Disp:  , Rfl: 0    COVID-19 At Home Test (FLOWFLEX COVID-19 AG HOME TEST VI), , Disp: , Rfl:     Allergies   Allergen Reactions    Indocin [Indomethacin] Other (See Comments)     hypertension       Objective:  Vitals:    08/26/24 1141   BP: (!) 195/64   Pulse:        Left Shoulder Exam     Range of Motion   Active abduction:  140   External rotation:  70   Forward flexion:  150   Internal rotation 0 degrees:  Lumbar     Muscle Strength   Abduction: 4/5   Internal rotation: 5/5   External rotation: 5/5   Biceps: 4/5     Other   Sensation: normal  Pulse: present     Comments:  Ecchymosis proximal arm medially.    Fidel deformity            Physical Exam  Vitals reviewed.   Constitutional:       Appearance: She is well-developed.   HENT:      Head: Normocephalic and atraumatic.   Eyes:      General:         Right eye: No discharge.         Left eye: No discharge.      Conjunctiva/sclera: Conjunctivae normal.   Cardiovascular:      Rate and Rhythm: Regular rhythm.   Pulmonary:      Effort: Pulmonary effort is normal. No respiratory distress.      Breath sounds: No stridor.   Musculoskeletal:      Cervical back: Normal range of motion and neck supple.      Comments: As noted in the HPI.   Skin:     General: Skin is warm and dry.   Neurological:      Mental Status: She is alert and oriented to person, place, and time.   Psychiatric:         Behavior:  Behavior normal.         I have personally reviewed pertinent films in PACS and my interpretation is as follows:/  X-rays of the left shoulder taken on 8/22/24 show evidence of superiorly migrated humeral head.  There is evidence of wear to the superior glenoid.      This document was created using speech voice recognition software.   Grammatical errors, random word insertions, pronoun errors, and incomplete sentences are an occasional consequence of this system due to software limitations, ambient noise, and hardware issues.   Any formal questions or concerns about content, text, or information contained within the body of this dictation should be directly addressed to the provider for clarification.

## 2024-08-27 NOTE — PROGRESS NOTES
Patient and son given lovenox information  explanation given to son about lovenox injection  Patient's son reported he had given himself lovenox injections in the past  Patient's son able to administer lovenox injection to patient  Patient's son comfortable administering injection  The patient's goals for the shift include      The clinical goals for the shift include patient will remain free from falls and injury this shift      Problem: Pain - Adult  Goal: Verbalizes/displays adequate comfort level or baseline comfort level  Outcome: Progressing     Problem: Safety - Adult  Goal: Free from fall injury  Outcome: Progressing     Problem: Discharge Planning  Goal: Discharge to home or other facility with appropriate resources  Outcome: Progressing     Problem: Chronic Conditions and Co-morbidities  Goal: Patient's chronic conditions and co-morbidity symptoms are monitored and maintained or improved  Outcome: Progressing     Problem: Respiratory  Goal: Minimal/no exertional discomfort or dyspnea this shift  Outcome: Progressing  Goal: Wean oxygen to maintain O2 saturation per order/standard this shift  Outcome: Progressing     Problem: Dialysis  Goal: I will slow progression of end-stage renal disease through participating in dialysis 3 times a week  Outcome: Progressing

## 2024-09-11 ENCOUNTER — TELEPHONE (OUTPATIENT)
Age: 89
End: 2024-09-11

## 2024-09-13 ENCOUNTER — APPOINTMENT (OUTPATIENT)
Dept: LAB | Facility: HOSPITAL | Age: 89
End: 2024-09-13
Payer: MEDICARE

## 2024-09-13 DIAGNOSIS — E78.5 HYPERLIPIDEMIA, UNSPECIFIED HYPERLIPIDEMIA TYPE: ICD-10-CM

## 2024-09-13 DIAGNOSIS — I10 UNCONTROLLED HYPERTENSION: ICD-10-CM

## 2024-09-13 DIAGNOSIS — Z13.0 SCREENING FOR DEFICIENCY ANEMIA: ICD-10-CM

## 2024-09-13 LAB
ALBUMIN SERPL BCG-MCNC: 4.1 G/DL (ref 3.5–5)
ALP SERPL-CCNC: 82 U/L (ref 34–104)
ALT SERPL W P-5'-P-CCNC: 9 U/L (ref 7–52)
ANION GAP SERPL CALCULATED.3IONS-SCNC: 9 MMOL/L (ref 4–13)
AST SERPL W P-5'-P-CCNC: 18 U/L (ref 13–39)
BACTERIA UR QL AUTO: ABNORMAL /HPF
BASOPHILS # BLD AUTO: 0.07 THOUSANDS/ΜL (ref 0–0.1)
BASOPHILS NFR BLD AUTO: 1 % (ref 0–1)
BILIRUB SERPL-MCNC: 0.73 MG/DL (ref 0.2–1)
BILIRUB UR QL STRIP: NEGATIVE
BUN SERPL-MCNC: 24 MG/DL (ref 5–25)
CALCIUM SERPL-MCNC: 9.5 MG/DL (ref 8.4–10.2)
CHLORIDE SERPL-SCNC: 104 MMOL/L (ref 96–108)
CHOLEST SERPL-MCNC: 199 MG/DL
CLARITY UR: CLEAR
CO2 SERPL-SCNC: 28 MMOL/L (ref 21–32)
COLOR UR: YELLOW
CREAT SERPL-MCNC: 0.69 MG/DL (ref 0.6–1.3)
EOSINOPHIL # BLD AUTO: 0.17 THOUSAND/ΜL (ref 0–0.61)
EOSINOPHIL NFR BLD AUTO: 2 % (ref 0–6)
ERYTHROCYTE [DISTWIDTH] IN BLOOD BY AUTOMATED COUNT: 13.9 % (ref 11.6–15.1)
GFR SERPL CREATININE-BSD FRML MDRD: 77 ML/MIN/1.73SQ M
GLUCOSE P FAST SERPL-MCNC: 94 MG/DL (ref 65–99)
GLUCOSE UR STRIP-MCNC: NEGATIVE MG/DL
HCT VFR BLD AUTO: 42.3 % (ref 34.8–46.1)
HDLC SERPL-MCNC: 53 MG/DL
HGB BLD-MCNC: 13.4 G/DL (ref 11.5–15.4)
HGB UR QL STRIP.AUTO: NEGATIVE
IMM GRANULOCYTES # BLD AUTO: 0.01 THOUSAND/UL (ref 0–0.2)
IMM GRANULOCYTES NFR BLD AUTO: 0 % (ref 0–2)
KETONES UR STRIP-MCNC: NEGATIVE MG/DL
LDLC SERPL CALC-MCNC: 127 MG/DL (ref 0–100)
LEUKOCYTE ESTERASE UR QL STRIP: ABNORMAL
LYMPHOCYTES # BLD AUTO: 2.1 THOUSANDS/ΜL (ref 0.6–4.47)
LYMPHOCYTES NFR BLD AUTO: 29 % (ref 14–44)
MCH RBC QN AUTO: 29.1 PG (ref 26.8–34.3)
MCHC RBC AUTO-ENTMCNC: 31.7 G/DL (ref 31.4–37.4)
MCV RBC AUTO: 92 FL (ref 82–98)
MONOCYTES # BLD AUTO: 0.52 THOUSAND/ΜL (ref 0.17–1.22)
MONOCYTES NFR BLD AUTO: 7 % (ref 4–12)
MUCOUS THREADS UR QL AUTO: ABNORMAL
NEUTROPHILS # BLD AUTO: 4.36 THOUSANDS/ΜL (ref 1.85–7.62)
NEUTS SEG NFR BLD AUTO: 61 % (ref 43–75)
NITRITE UR QL STRIP: NEGATIVE
NON-SQ EPI CELLS URNS QL MICRO: ABNORMAL /HPF
NONHDLC SERPL-MCNC: 146 MG/DL
NRBC BLD AUTO-RTO: 0 /100 WBCS
PH UR STRIP.AUTO: 6.5 [PH]
PLATELET # BLD AUTO: 206 THOUSANDS/UL (ref 149–390)
PMV BLD AUTO: 10.7 FL (ref 8.9–12.7)
POTASSIUM SERPL-SCNC: 3.5 MMOL/L (ref 3.5–5.3)
PROT SERPL-MCNC: 6.6 G/DL (ref 6.4–8.4)
PROT UR STRIP-MCNC: NEGATIVE MG/DL
RBC # BLD AUTO: 4.61 MILLION/UL (ref 3.81–5.12)
RBC #/AREA URNS AUTO: ABNORMAL /HPF
SODIUM SERPL-SCNC: 141 MMOL/L (ref 135–147)
SP GR UR STRIP.AUTO: 1.02 (ref 1–1.03)
TRIGL SERPL-MCNC: 97 MG/DL
UROBILINOGEN UR STRIP-ACNC: <2 MG/DL
WBC # BLD AUTO: 7.23 THOUSAND/UL (ref 4.31–10.16)
WBC #/AREA URNS AUTO: ABNORMAL /HPF
WBC CLUMPS # UR AUTO: PRESENT /UL

## 2024-09-13 PROCEDURE — 85025 COMPLETE CBC W/AUTO DIFF WBC: CPT

## 2024-09-13 PROCEDURE — 81001 URINALYSIS AUTO W/SCOPE: CPT

## 2024-09-13 PROCEDURE — 36415 COLL VENOUS BLD VENIPUNCTURE: CPT

## 2024-09-13 PROCEDURE — 80053 COMPREHEN METABOLIC PANEL: CPT

## 2024-09-13 PROCEDURE — 80061 LIPID PANEL: CPT

## 2024-09-19 ENCOUNTER — OFFICE VISIT (OUTPATIENT)
Age: 89
End: 2024-09-19
Payer: MEDICARE

## 2024-09-19 VITALS
HEART RATE: 68 BPM | HEIGHT: 59 IN | TEMPERATURE: 98.1 F | WEIGHT: 113 LBS | DIASTOLIC BLOOD PRESSURE: 80 MMHG | RESPIRATION RATE: 18 BRPM | OXYGEN SATURATION: 98 % | BODY MASS INDEX: 22.78 KG/M2 | SYSTOLIC BLOOD PRESSURE: 164 MMHG

## 2024-09-19 DIAGNOSIS — Z23 ENCOUNTER FOR IMMUNIZATION: ICD-10-CM

## 2024-09-19 DIAGNOSIS — E78.5 HYPERLIPIDEMIA, UNSPECIFIED HYPERLIPIDEMIA TYPE: ICD-10-CM

## 2024-09-19 DIAGNOSIS — F32.1 CURRENT MODERATE EPISODE OF MAJOR DEPRESSIVE DISORDER WITHOUT PRIOR EPISODE (HCC): ICD-10-CM

## 2024-09-19 DIAGNOSIS — M15.9 PRIMARY OSTEOARTHRITIS INVOLVING MULTIPLE JOINTS: ICD-10-CM

## 2024-09-19 DIAGNOSIS — R53.83 OTHER FATIGUE: Primary | ICD-10-CM

## 2024-09-19 DIAGNOSIS — I50.32 CHRONIC DIASTOLIC CONGESTIVE HEART FAILURE (HCC): ICD-10-CM

## 2024-09-19 DIAGNOSIS — I10 ESSENTIAL HYPERTENSION: ICD-10-CM

## 2024-09-19 PROCEDURE — G0439 PPPS, SUBSEQ VISIT: HCPCS | Performed by: INTERNAL MEDICINE

## 2024-09-19 PROCEDURE — 99214 OFFICE O/P EST MOD 30 MIN: CPT | Performed by: INTERNAL MEDICINE

## 2024-09-19 RX ORDER — LISINOPRIL 5 MG/1
5 TABLET ORAL DAILY
Qty: 30 TABLET | Refills: 3 | Status: SHIPPED | OUTPATIENT
Start: 2024-09-19

## 2024-09-19 NOTE — PATIENT INSTRUCTIONS
Medicare Preventive Visit Patient Instructions  Thank you for completing your Welcome to Medicare Visit or Medicare Annual Wellness Visit today. Your next wellness visit will be due in one year (9/20/2025).  The screening/preventive services that you may require over the next 5-10 years are detailed below. Some tests may not apply to you based off risk factors and/or age. Screening tests ordered at today's visit but not completed yet may show as past due. Also, please note that scanned in results may not display below.  Preventive Screenings:  Service Recommendations Previous Testing/Comments   Colorectal Cancer Screening  * Colonoscopy    * Fecal Occult Blood Test (FOBT)/Fecal Immunochemical Test (FIT)  * Fecal DNA/Cologuard Test  * Flexible Sigmoidoscopy Age: 45-75 years old   Colonoscopy: every 10 years (may be performed more frequently if at higher risk)  OR  FOBT/FIT: every 1 year  OR  Cologuard: every 3 years  OR  Sigmoidoscopy: every 5 years  Screening may be recommended earlier than age 45 if at higher risk for colorectal cancer. Also, an individualized decision between you and your healthcare provider will decide whether screening between the ages of 76-85 would be appropriate. Colonoscopy: 03/04/2013  FOBT/FIT: Not on file  Cologuard: Not on file  Sigmoidoscopy: Not on file    Screening Not Indicated     Breast Cancer Screening Age: 40+ years old  Frequency: every 1-2 years  Not required if history of left and right mastectomy Mammogram: 03/29/2013        Cervical Cancer Screening Between the ages of 21-29, pap smear recommended once every 3 years.   Between the ages of 30-65, can perform pap smear with HPV co-testing every 5 years.   Recommendations may differ for women with a history of total hysterectomy, cervical cancer, or abnormal pap smears in past. Pap Smear: Not on file    Screening Not Indicated   Hepatitis C Screening Once for adults born between 1945 and 1965  More frequently in patients at  high risk for Hepatitis C Hep C Antibody: Not on file        Diabetes Screening 1-2 times per year if you're at risk for diabetes or have pre-diabetes Fasting glucose: 94 mg/dL (9/13/2024)  A1C: 5.4 % (10/27/2020)  Screening Current   Cholesterol Screening Once every 5 years if you don't have a lipid disorder. May order more often based on risk factors. Lipid panel: 09/13/2024    Screening Not Indicated  History Lipid Disorder     Other Preventive Screenings Covered by Medicare:  Abdominal Aortic Aneurysm (AAA) Screening: covered once if your at risk. You're considered to be at risk if you have a family history of AAA.  Lung Cancer Screening: covers low dose CT scan once per year if you meet all of the following conditions: (1) Age 55-77; (2) No signs or symptoms of lung cancer; (3) Current smoker or have quit smoking within the last 15 years; (4) You have a tobacco smoking history of at least 20 pack years (packs per day multiplied by number of years you smoked); (5) You get a written order from a healthcare provider.  Glaucoma Screening: covered annually if you're considered high risk: (1) You have diabetes OR (2) Family history of glaucoma OR (3)  aged 50 and older OR (4)  American aged 65 and older  Osteoporosis Screening: covered every 2 years if you meet one of the following conditions: (1) You're estrogen deficient and at risk for osteoporosis based off medical history and other findings; (2) Have a vertebral abnormality; (3) On glucocorticoid therapy for more than 3 months; (4) Have primary hyperparathyroidism; (5) On osteoporosis medications and need to assess response to drug therapy.   Last bone density test (DXA Scan): Not on file.  HIV Screening: covered annually if you're between the age of 15-65. Also covered annually if you are younger than 15 and older than 65 with risk factors for HIV infection. For pregnant patients, it is covered up to 3 times per  pregnancy.    Immunizations:  Immunization Recommendations   Influenza Vaccine Annual influenza vaccination during flu season is recommended for all persons aged >= 6 months who do not have contraindications   Pneumococcal Vaccine   * Pneumococcal conjugate vaccine = PCV13 (Prevnar 13), PCV15 (Vaxneuvance), PCV20 (Prevnar 20)  * Pneumococcal polysaccharide vaccine = PPSV23 (Pneumovax) Adults 19-65 yo with certain risk factors or if 65+ yo  If never received any pneumonia vaccine: recommend Prevnar 20 (PCV20)  Give PCV20 if previously received 1 dose of PCV13 or PPSV23   Hepatitis B Vaccine 3 dose series if at intermediate or high risk (ex: diabetes, end stage renal disease, liver disease)   Respiratory syncytial virus (RSV) Vaccine - COVERED BY MEDICARE PART D  * RSVPreF3 (Arexvy) CDC recommends that adults 60 years of age and older may receive a single dose of RSV vaccine using shared clinical decision-making (SCDM)   Tetanus (Td) Vaccine - COST NOT COVERED BY MEDICARE PART B Following completion of primary series, a booster dose should be given every 10 years to maintain immunity against tetanus. Td may also be given as tetanus wound prophylaxis.   Tdap Vaccine - COST NOT COVERED BY MEDICARE PART B Recommended at least once for all adults. For pregnant patients, recommended with each pregnancy.   Shingles Vaccine (Shingrix) - COST NOT COVERED BY MEDICARE PART B  2 shot series recommended in those 19 years and older who have or will have weakened immune systems or those 50 years and older     Health Maintenance Due:  There are no preventive care reminders to display for this patient.  Immunizations Due:      Topic Date Due   • COVID-19 Vaccine (1 - 2023-24 season) Never done   • Influenza Vaccine (1) 09/01/2024     Advance Directives   What are advance directives?  Advance directives are legal documents that state your wishes and plans for medical care. These plans are made ahead of time in case you lose your  ability to make decisions for yourself. Advance directives can apply to any medical decision, such as the treatments you want, and if you want to donate organs.   What are the types of advance directives?  There are many types of advance directives, and each state has rules about how to use them. You may choose a combination of any of the following:  Living will:  This is a written record of the treatment you want. You can also choose which treatments you do not want, which to limit, and which to stop at a certain time. This includes surgery, medicine, IV fluid, and tube feedings.   Durable power of  for healthcare (DPAHC):  This is a written record that states who you want to make healthcare choices for you when you are unable to make them for yourself. This person, called a proxy, is usually a family member or a friend. You may choose more than 1 proxy.  Do not resuscitate (DNR) order:  A DNR order is used in case your heart stops beating or you stop breathing. It is a request not to have certain forms of treatment, such as CPR. A DNR order may be included in other types of advance directives.  Medical directive:  This covers the care that you want if you are in a coma, near death, or unable to make decisions for yourself. You can list the treatments you want for each condition. Treatment may include pain medicine, surgery, blood transfusions, dialysis, IV or tube feedings, and a ventilator (breathing machine).  Values history:  This document has questions about your views, beliefs, and how you feel and think about life. This information can help others choose the care that you would choose.  Why are advance directives important?  An advance directive helps you control your care. Although spoken wishes may be used, it is better to have your wishes written down. Spoken wishes can be misunderstood, or not followed. Treatments may be given even if you do not want them. An advance directive may make it easier  for your family to make difficult choices about your care.   Urinary Incontinence   Urinary incontinence (UI)  is when you lose control of your bladder. UI develops because your bladder cannot store or empty urine properly. The 3 most common types of UI are stress incontinence, urge incontinence, or both.  Medicines:   May be given to help strengthen your bladder control. Report any side effects of medication to your healthcare provider.  Do pelvic muscle exercises often:  Your pelvic muscles help you stop urinating. Squeeze these muscles tight for 5 seconds, then relax for 5 seconds. Gradually work up to squeezing for 10 seconds. Do 3 sets of 15 repetitions a day, or as directed. This will help strengthen your pelvic muscles and improve bladder control.  Train your bladder:  Go to the bathroom at set times, such as every 2 hours, even if you do not feel the urge to go. You can also try to hold your urine when you feel the urge to go. For example, hold your urine for 5 minutes when you feel the urge to go. As that becomes easier, hold your urine for 10 minutes.   Self-care:   Keep a UI record.  Write down how often you leak urine and how much you leak. Make a note of what you were doing when you leaked urine.  Drink liquids as directed. You may need to limit the amount of liquid you drink to help control your urine leakage. Do not drink any liquid right before you go to bed. Limit or do not have drinks that contain caffeine or alcohol.   Prevent constipation.  Eat a variety of high-fiber foods. Good examples are high-fiber cereals, beans, vegetables, and whole-grain breads. Walking is the best way to trigger your intestines to have a bowel movement.  Exercise regularly and maintain a healthy weight.  Weight loss and exercise will decrease pressure on your bladder and help you control your leakage.   Use a catheter as directed  to help empty your bladder. A catheter is a tiny, plastic tube that is put into your  bladder to drain your urine.   Go to behavior therapy as directed.  Behavior therapy may be used to help you learn to control your urge to urinate.     © Copyright [employer] 2018 Information is for End User's use only and may not be sold, redistributed or otherwise used for commercial purposes. All illustrations and images included in CareNotes® are the copyrighted property of [employer] or [employer]

## 2024-09-19 NOTE — PROGRESS NOTES
Ambulatory Visit  Name: Jackie Urbina      : 1935      MRN: [id number]  Encounter Provider: Tom Howell MD  Encounter Date: 2024   Encounter department: [facility]    Assessment & Plan  Encounter for immunization         Essential hypertension  Blood pressure assessment today shows an increase in blood pressure over her normal level with a reading of 164/80.  Patient has had recent visit to the ER for injury to her left arm and also visit to orthopedics both of those visits demonstrated similarly elevated blood pressure reading.  Patient's current medication that impacts blood pressure control includes metoprolol succinate 50 mg 1/2 tablet daily and chlorthalidone 25 mg 4 days a week.  Of asked the patient to initiate lisinopril at 5 mg daily to complement her other medications for blood pressure control I indicated to the patient that this elevation in blood pressure may be a factor in her current fatigue symptoms.  I like to see her in 4 weeks for follow-up assessment of her blood pressure.    Orders:    lisinopril (ZESTRIL) 5 mg tablet; Take 1 tablet (5 mg total) by mouth daily    Chronic diastolic congestive heart failure (HCC)  Wt Readings from Last 3 Encounters:   24 51.3 kg (113 lb)   24 50.8 kg (112 lb)   23 50.8 kg (112 lb)       Congestive heart failure assessment today confirms a relatively stable body weight with no significant peripheral edema on exam.  Patient continues to be cautious with consumption of high salt foods and continues on chlorthalidone 25 mg 1 tablet 4 days a week  and              Primary osteoarthritis involving multiple joints  I have recommended continuation of Tylenol 1000 mg every 8 hours as needed for control of arthritic symptoms.         Current moderate episode of major depressive disorder without prior episode (HCC)  Current assessment of depression indicates persistent symptoms  of mild depression at this time.  Quality of life fatigue and arthritis are all contributing factors to her circumstantial depression symptoms I recommend continuation of sertraline at 25 mg daily depression Screening Follow-up Plan: Patient's depression screening was positive with a PHQ-9 score of 11.            Hyperlipidemia, unspecified hyperlipidemia type  Current lipid profile reviewed with the patient shows elevation in LDL recommend adjustment to diet in an effort to reduce risk for progressive and atherosclerotic disease of the arteries         Other fatigue  Fatigue symptoms reviewed with the patient likely multifactorial including advancing age medication of the beta-blocker tight and present hypertension.  We have initiated new medication for blood pressure we will reevaluate patient in 1 month.  May consider trying to wean her off metoprolol to see if it helps with energy level.            Preventive health issues were discussed with patient, and age appropriate screening tests were ordered as noted in patient's After Visit Summary. Personalized health advice and appropriate referrals for health education or preventive services given if needed, as noted in patient's After Visit Summary.    History of Present Illness     This 89-year-old female patient presents to our office today for an annual physical examination.  Her most significant concern today is regarding an increase in fatigue symptoms of the past several months.  She denies any cardiac symptoms of chest pain palpitations or shortness of breath.  Has had no recent infection type symptoms.    She does have chronic arthritis condition with significant deformity of her hands and also arthritic symptoms in weightbearing joints.       Patient Care Team:  Tom Howell MD as PCP - General  Rohith Calvillo MD    Review of Systems   Constitutional:  Positive for fatigue.   Musculoskeletal:  Positive for arthralgias.     Medical History  Reviewed by provider this encounter:  Meds  Problems       Annual Wellness Visit Questionnaire   Jackie is here for her Subsequent Wellness visit. Last Medicare Wellness visit information reviewed, patient interviewed, no change since last AWV.     Health Risk Assessment:   Patient rates overall health as fair. Patient feels that their physical health rating is slightly worse. Patient is dissatisfied with their life. Eyesight was rated as same. Hearing was rated as same. Patient feels that their emotional and mental health rating is same. Patients states they are sometimes angry. Patient states they are always unusually tired/fatigued. Pain experienced in the last 7 days has been some. Patient's pain rating has been 5/10. Patient states that she has experienced no weight loss or gain in last 6 months.     Depression Screening:   PHQ-9 Score: 11      PREVENTIVE SCREENINGS      Cardiovascular Screening:    General: Screening Not Indicated and History Lipid Disorder      Diabetes Screening:     General: Screening Current      Colorectal Cancer Screening:     General: Screening Not Indicated      Cervical Cancer Screening:    General: Screening Not Indicated      Lung Cancer Screening:     General: Screening Not Indicated    Social Determinants of Health     Food Insecurity: No Food Insecurity (9/19/2024)    Hunger Vital Sign     Worried About Running Out of Food in the Last Year: Never true     Ran Out of Food in the Last Year: Never true   Transportation Needs: No Transportation Needs (9/19/2024)    PRAPARE - Transportation     Lack of Transportation (Medical): No     Lack of Transportation (Non-Medical): No   Housing Stability: Low Risk  (9/19/2024)    Housing Stability Vital Sign     Unable to Pay for Housing in the Last Year: No     Number of Times Moved in the Last Year: 0     Homeless in the Last Year: No   Utilities: Not At Risk (9/19/2024)    [facility] Utilities     Threatened with loss of utilities: No     No  "results found.    Objective     /80   Pulse 68   Temp 98.1 °F (36.7 °C)   Resp 18   Ht 4' 11\" (1.499 m)   Wt 51.3 kg (113 lb)   SpO2 98%   BMI 22.82 kg/m²     Physical Exam  Vitals and nursing note reviewed.   Constitutional:       General: She is not in acute distress.     Appearance: She is well-developed.   HENT:      Head: Normocephalic and atraumatic.      Right Ear: Tympanic membrane, ear canal and external ear normal.      Left Ear: Tympanic membrane, ear canal and external ear normal.      Nose: Nose normal.      Mouth/Throat:      Mouth: Mucous membranes are moist.      Pharynx: Oropharynx is clear.   Eyes:      Conjunctiva/sclera: Conjunctivae normal.      Pupils: Pupils are equal, round, and reactive to light.   Neck:      Vascular: No carotid bruit.   Cardiovascular:      Rate and Rhythm: Normal rate and regular rhythm.      Heart sounds: Murmur heard.   Pulmonary:      Effort: Pulmonary effort is normal. No respiratory distress.      Breath sounds: Normal breath sounds. No wheezing, rhonchi or rales.   Abdominal:      General: Bowel sounds are normal. There is no distension.      Palpations: Abdomen is soft. There is no mass.      Tenderness: There is no abdominal tenderness. There is no guarding.   Musculoskeletal:      Cervical back: Normal range of motion and neck supple. No rigidity or tenderness.      Right lower leg: No edema.      Left lower leg: No edema.      Comments: Arthritic deformity of the hands bilaterally   Lymphadenopathy:      Cervical: No cervical adenopathy.   Skin:     General: Skin is warm and dry.      Capillary Refill: Capillary refill takes less than 2 seconds.   Neurological:      General: No focal deficit present.      Mental Status: She is alert. Mental status is at baseline.   Psychiatric:         Mood and Affect: Mood normal.         Behavior: Behavior normal.         Thought Content: Thought content normal.         Judgment: Judgment normal.         "

## 2024-09-20 NOTE — ASSESSMENT & PLAN NOTE
Current lipid profile reviewed with the patient shows elevation in LDL recommend adjustment to diet in an effort to reduce risk for progressive and atherosclerotic disease of the arteries

## 2024-09-20 NOTE — ASSESSMENT & PLAN NOTE
Wt Readings from Last 3 Encounters:   09/19/24 51.3 kg (113 lb)   08/26/24 50.8 kg (112 lb)   12/26/23 50.8 kg (112 lb)       Congestive heart failure assessment today confirms a relatively stable body weight with no significant peripheral edema on exam.  Patient continues to be cautious with consumption of high salt foods and continues on chlorthalidone 25 mg 1 tablet 4 days a week Monday Wednesday Friday and Sunday

## 2024-09-20 NOTE — ASSESSMENT & PLAN NOTE
Fatigue symptoms reviewed with the patient likely multifactorial including advancing age medication of the beta-blocker tight and present hypertension.  We have initiated new medication for blood pressure we will reevaluate patient in 1 month.  May consider trying to wean her off metoprolol to see if it helps with energy level.

## 2024-09-20 NOTE — ASSESSMENT & PLAN NOTE
Blood pressure assessment today shows an increase in blood pressure over her normal level with a reading of 164/80.  Patient has had recent visit to the ER for injury to her left arm and also visit to orthopedics both of those visits demonstrated similarly elevated blood pressure reading.  Patient's current medication that impacts blood pressure control includes metoprolol succinate 50 mg 1/2 tablet daily and chlorthalidone 25 mg 4 days a week.  Of asked the patient to initiate lisinopril at 5 mg daily to complement her other medications for blood pressure control I indicated to the patient that this elevation in blood pressure may be a factor in her current fatigue symptoms.  I like to see her in 4 weeks for follow-up assessment of her blood pressure.    Orders:    lisinopril (ZESTRIL) 5 mg tablet; Take 1 tablet (5 mg total) by mouth daily

## 2024-09-20 NOTE — ASSESSMENT & PLAN NOTE
I have recommended continuation of Tylenol 1000 mg every 8 hours as needed for control of arthritic symptoms.

## 2024-09-20 NOTE — ASSESSMENT & PLAN NOTE
Current assessment of depression indicates persistent symptoms of mild depression at this time.  Quality of life fatigue and arthritis are all contributing factors to her circumstantial depression symptoms I recommend continuation of sertraline at 25 mg daily depression Screening Follow-up Plan: Patient's depression screening was positive with a PHQ-9 score of 11.

## 2024-10-10 DIAGNOSIS — F32.89 OTHER DEPRESSION: ICD-10-CM

## 2024-10-10 RX ORDER — SERTRALINE HYDROCHLORIDE 25 MG/1
25 TABLET, FILM COATED ORAL DAILY
Qty: 30 TABLET | Refills: 1 | Status: SHIPPED | OUTPATIENT
Start: 2024-10-10

## 2024-10-23 ENCOUNTER — OFFICE VISIT (OUTPATIENT)
Age: 89
End: 2024-10-23
Payer: MEDICARE

## 2024-10-23 VITALS
HEART RATE: 65 BPM | HEIGHT: 59 IN | TEMPERATURE: 97.8 F | SYSTOLIC BLOOD PRESSURE: 126 MMHG | WEIGHT: 111.4 LBS | DIASTOLIC BLOOD PRESSURE: 74 MMHG | BODY MASS INDEX: 22.46 KG/M2 | OXYGEN SATURATION: 97 %

## 2024-10-23 DIAGNOSIS — I10 ESSENTIAL HYPERTENSION: Primary | ICD-10-CM

## 2024-10-23 DIAGNOSIS — Z13.29 SCREENING FOR HYPOTHYROIDISM: ICD-10-CM

## 2024-10-23 DIAGNOSIS — R53.83 OTHER FATIGUE: ICD-10-CM

## 2024-10-23 DIAGNOSIS — Z23 ENCOUNTER FOR IMMUNIZATION: ICD-10-CM

## 2024-10-23 PROCEDURE — 99214 OFFICE O/P EST MOD 30 MIN: CPT | Performed by: INTERNAL MEDICINE

## 2024-10-23 PROCEDURE — G0008 ADMIN INFLUENZA VIRUS VAC: HCPCS | Performed by: INTERNAL MEDICINE

## 2024-10-23 PROCEDURE — 90662 IIV NO PRSV INCREASED AG IM: CPT | Performed by: INTERNAL MEDICINE

## 2024-10-23 RX ORDER — LISINOPRIL 5 MG/1
5 TABLET ORAL DAILY
Qty: 90 TABLET | Refills: 3 | Status: SHIPPED | OUTPATIENT
Start: 2024-10-23

## 2024-10-23 NOTE — PROGRESS NOTES
"Ambulatory Visit  Name: Jackie Urbina      : 1935      MRN: [id number]  Encounter Provider: Tom Howell MD  Encounter Date: 10/23/2024   Encounter department: [facility] INTERNAL MEDICINE    Assessment & Plan  Screening for hypothyroidism    Orders:    TSH, 3rd generation with Free T4 reflex; Future    Encounter for immunization    Orders:    influenza vaccine, high-dose, PF 0.5 mL (Fluzone High Dose)    Essential hypertension  Blood pressure assessment today confirms improved control recommend continuation of lisinopril 5 mg daily metoprolol succinate 25 mg daily and chlorthalidone 25 mg on  and Friday.    Orders:    lisinopril (ZESTRIL) 5 mg tablet; Take 1 tablet (5 mg total) by mouth daily    Other fatigue  Fatigue symptoms reviewed with the patient she indicates that she frequently can fall asleep if not actively engaged in activities.  I have reviewed her most recent blood work with the patient today.  At the present time I believe her fatigue may well be related to her documented moderate severity of the atrial stenosis.  Certainly the low dose of metoprolol succinate that she is on currently may compound some of her fatigue symptoms as well.              History of Present Illness     This pleasant 89-year-old female patient returns today for a follow-up blood pressure assessment.  She was found to have elevated blood pressure at the time of her last visit with us and she was placed on lisinopril 5 mg daily to complement her existing medications of metoprolol succinate 25 mg daily and chlorthalidone 25 mg on  and Sundays.  She reports no adverse effects from the lisinopril.      Review of Systems   Constitutional:  Positive for fatigue.   All other systems reviewed and are negative.    Past Medical History:   Diagnosis Date    Anesthesia complication     .Yrs ago had \"anxiety\" reaction not sure while sedated, pt states sensitive to " anesthesia effects    Anxiety     stress at home    Arthritis     Cataract, right     Last Assessed:16    Eye hemorrhage     left eye currently 16    History of shingles 2015    Hypertension     Mitral valve stenosis, mild      Past Surgical History:   Procedure Laterality Date    CARPAL TUNNEL RELEASE Left     CATARACT EXTRACTION      CATARACT EXTRACTION W/ INTRAOCULAR LENS IMPLANT Left 10/11/2016    Procedure: EXTRACTION EXTRACAPSULAR CATARACT PHACO INTRAOCULAR LENS (IOL);  Surgeon: Wale Roth MD;  Location: [facility] MAIN OR;  Service:     CERVICAL CONE BIOPSY      COLONOSCOPY      EYE SURGERY Left     muscle repair    SC ARTHRP KNE CONDYLE&PLATU MEDIAL&LAT COMPARTMENTS Right 12/15/2020    Procedure: ARTHROPLASTY KNEE TOTAL;  Surgeon: Greg Mitchell DO;  Location: [facility] MAIN OR;  Service: Orthopedics    SC OPTX FEM SHFT FX W/INSJ IMED IMPLT W/WO SCREW Right 2019    Procedure: INSERTION NAIL IM FEMUR ANTEGRADE (TROCHANTERIC);  Surgeon: Greg Mitchell DO;  Location: [facility] MAIN OR;  Service: Orthopedics    SC XCAPSL CTRC RMVL INSJ IO LENS PROSTH W/O ECP Right 2016    Procedure: EXTRACTION EXTRACAPSULAR CATARACT PHACO INTRAOCULAR LENS (IOL);  Surgeon: Wale Roth MD;  Location: [facility] MAIN OR;  Service: Ophthalmology    TONSILLECTOMY       Family History   Problem Relation Age of Onset    Heart disease Mother         MI  at age 69    Arthritis Mother     GI problems Father         bleeding ulcer    Arthritis Sister     Sleep apnea Sister     Irritable bowel syndrome Sister     Cancer Sister         skin cancer    Heart disease Brother         CABG (5)    Cancer Brother         skin cancer    Heart disease Maternal Aunt      Social History     Tobacco Use    Smoking status: Never    Smokeless tobacco: Never   Vaping Use    Vaping status: Never Used   Substance and Sexual Activity    Alcohol use: Yes     Comment: rarely    Drug use: No    Sexual activity: Not on file     Current  "Outpatient Medications on File Prior to Visit   Medication Sig    Ascorbic Acid (vitamin C) 100 MG tablet Take 100 mg by mouth daily    aspirin (ECOTRIN) 325 mg EC tablet Take 1 tablet (325 mg total) by mouth 2 (two) times a day    brimonidine tartrate 0.2 % ophthalmic solution     chlorthalidone 25 mg tablet TAKE 1 TABLET 4 DAYS A WEEK EVERY MONDAY, WEDNESDAY, FRIDAY, AND SUNDAY.    metoprolol succinate (TOPROL-XL) 50 mg 24 hr tablet TAKE 1/2 TABLET BY MOUTH DAILY    Multiple Vitamins-Minerals (PRESERVISION AREDS PO) Take by mouth    omeprazole (PriLOSEC) 20 mg delayed release capsule TAKE 1 CAPSULE DAILY    sertraline (ZOLOFT) 25 mg tablet TAKE 1 TABLET DAILY    [DISCONTINUED] lisinopril (ZESTRIL) 5 mg tablet Take 1 tablet (5 mg total) by mouth daily     Allergies   Allergen Reactions    Indocin [Indomethacin] Other (See Comments)     hypertension     Immunization History   Administered Date(s) Administered    INFLUENZA 11/02/2022    Influenza Split High Dose Preservative Free IM 09/30/2016, 10/23/2024    Influenza, high dose seasonal 0.7 mL 10/19/2018, 10/21/2019, 11/02/2020, 11/02/2022    Influenza, seasonal, injectable 01/05/2014    Pneumococcal Conjugate 13-Valent 02/22/2019    Pneumococcal Conjugate Vaccine 20-valent (Pcv20), Polysace 04/21/2022, 04/21/2022     Objective     /74   Pulse 65   Temp 97.8 °F (36.6 °C) (Tympanic)   Ht 4' 11\" (1.499 m)   Wt 50.5 kg (111 lb 6.4 oz)   SpO2 97%   BMI 22.50 kg/m²     Physical Exam  Vitals and nursing note reviewed.   Constitutional:       General: She is not in acute distress.     Appearance: She is well-developed.   HENT:      Head: Normocephalic and atraumatic.   Eyes:      Conjunctiva/sclera: Conjunctivae normal.   Cardiovascular:      Rate and Rhythm: Normal rate and regular rhythm.      Heart sounds: Murmur heard.   Pulmonary:      Effort: Pulmonary effort is normal. No respiratory distress.      Breath sounds: Normal breath sounds.   Abdominal:      " Palpations: Abdomen is soft.      Tenderness: There is no abdominal tenderness.   Musculoskeletal:         General: No swelling.      Cervical back: Neck supple.   Skin:     General: Skin is warm and dry.      Capillary Refill: Capillary refill takes less than 2 seconds.   Neurological:      Mental Status: She is alert.   Psychiatric:         Mood and Affect: Mood normal.

## 2024-10-23 NOTE — ASSESSMENT & PLAN NOTE
Blood pressure assessment today confirms improved control recommend continuation of lisinopril 5 mg daily metoprolol succinate 25 mg daily and chlorthalidone 25 mg on Sunday Monday Wednesday and Friday.    Orders:    lisinopril (ZESTRIL) 5 mg tablet; Take 1 tablet (5 mg total) by mouth daily

## 2024-10-23 NOTE — ASSESSMENT & PLAN NOTE
Fatigue symptoms reviewed with the patient she indicates that she frequently can fall asleep if not actively engaged in activities.  I have reviewed her most recent blood work with the patient today.  At the present time I believe her fatigue may well be related to her documented moderate severity of the atrial stenosis.  Certainly the low dose of metoprolol succinate that she is on currently may compound some of her fatigue symptoms as well.

## 2024-10-31 ENCOUNTER — OFFICE VISIT (OUTPATIENT)
Dept: CARDIOLOGY CLINIC | Facility: CLINIC | Age: 89
End: 2024-10-31
Payer: MEDICARE

## 2024-10-31 VITALS
HEART RATE: 81 BPM | SYSTOLIC BLOOD PRESSURE: 130 MMHG | HEIGHT: 59 IN | WEIGHT: 110 LBS | OXYGEN SATURATION: 96 % | DIASTOLIC BLOOD PRESSURE: 74 MMHG | BODY MASS INDEX: 22.18 KG/M2

## 2024-10-31 DIAGNOSIS — I35.1 MODERATE AORTIC REGURGITATION: ICD-10-CM

## 2024-10-31 DIAGNOSIS — I34.0 NON-RHEUMATIC MITRAL REGURGITATION: ICD-10-CM

## 2024-10-31 DIAGNOSIS — I10 ESSENTIAL HYPERTENSION: ICD-10-CM

## 2024-10-31 DIAGNOSIS — E78.5 DYSLIPIDEMIA: ICD-10-CM

## 2024-10-31 DIAGNOSIS — I07.1 MILD TRICUSPID INSUFFICIENCY: ICD-10-CM

## 2024-10-31 DIAGNOSIS — I50.32 CHRONIC DIASTOLIC CONGESTIVE HEART FAILURE (HCC): ICD-10-CM

## 2024-10-31 DIAGNOSIS — I49.1 PREMATURE ATRIAL CONTRACTIONS: ICD-10-CM

## 2024-10-31 DIAGNOSIS — I05.0 MITRAL VALVE STENOSIS, MILD: ICD-10-CM

## 2024-10-31 DIAGNOSIS — I35.0 MODERATE CALCIFIC AORTIC STENOSIS: Primary | ICD-10-CM

## 2024-10-31 PROCEDURE — 99214 OFFICE O/P EST MOD 30 MIN: CPT | Performed by: INTERNAL MEDICINE

## 2024-10-31 RX ORDER — METOPROLOL SUCCINATE 25 MG/1
12.5 TABLET, EXTENDED RELEASE ORAL DAILY
Qty: 45 TABLET | Refills: 3 | Status: SHIPPED | OUTPATIENT
Start: 2024-10-31

## 2024-10-31 RX ORDER — LISINOPRIL 10 MG/1
10 TABLET ORAL DAILY
Qty: 90 TABLET | Refills: 2 | Status: SHIPPED | OUTPATIENT
Start: 2024-10-31

## 2024-10-31 NOTE — ASSESSMENT & PLAN NOTE
-Most recent lipid panel showed elevated LDL.  She is 89 years old and not currently on statin therapy.  May continue to defer therapy at this time.

## 2024-10-31 NOTE — ASSESSMENT & PLAN NOTE
-Patient's last echocardiogram was in 2022.  She has moderate valve disease present of aortic and mitral valves.  Discussed with her in detail.  Will schedule for 2D echocardiogram for further evaluation.

## 2024-10-31 NOTE — ASSESSMENT & PLAN NOTE
Wt Readings from Last 3 Encounters:   10/31/24 49.9 kg (110 lb)   10/23/24 50.5 kg (111 lb 6.4 oz)   09/19/24 51.3 kg (113 lb)   -Patient with evidence of hypertensive heart disease with LVH present.  Importance of blood pressure control reviewed with her.  She has no evidence of acute congestion.

## 2024-10-31 NOTE — PROGRESS NOTES
Subjective:     Jackie Urbina is a 89 y.o. female  who presents to the office today for follow-up of aortic valve stenosis.  Her main complaint is fatigue.  She notes fatigue with minimal exertion such as vacuuming or walking short distances.  She needs to sit down or rest.  She denies any shortness of breath or chest pain.  Symptoms have been present for several years.    The patient was previously following with Dr. He as she has a history of aortic stenosis which on last echocardiogram in 2022 was moderate.  Her mean gradient at that time was 28 mmHg.  In addition, she has hypertension with LVH.  She has bilateral lower extremity edema secondary to lymphedema with associated stasis dermatitis.     The following portions of the patient's history were reviewed and updated as appropriate: allergies, current medications, past family history, past medical history, past social history, past surgical history, and problem list.    Review of Systems  Review of Systems   Constitutional:  Positive for fatigue.   Respiratory:  Negative for shortness of breath.    Cardiovascular:  Positive for leg swelling. Negative for chest pain and palpitations.   Musculoskeletal:  Positive for arthralgias, gait problem and myalgias.   All other systems reviewed and are negative.       Current Outpatient Medications on File Prior to Visit   Medication Sig Dispense Refill    Ascorbic Acid (vitamin C) 100 MG tablet Take 100 mg by mouth daily      aspirin (ECOTRIN) 325 mg EC tablet Take 1 tablet (325 mg total) by mouth 2 (two) times a day 84 tablet 0    brimonidine tartrate 0.2 % ophthalmic solution       chlorthalidone 25 mg tablet TAKE 1 TABLET 4 DAYS A WEEK EVERY MONDAY, WEDNESDAY, FRIDAY, AND SUNDAY. 16 tablet 5    lisinopril (ZESTRIL) 5 mg tablet Take 1 tablet (5 mg total) by mouth daily 90 tablet 3    metoprolol succinate (TOPROL-XL) 50 mg 24 hr tablet TAKE 1/2 TABLET BY MOUTH DAILY 45 tablet 1    Multiple Vitamins-Minerals  "(PRESERVISION AREDS PO) Take by mouth      omeprazole (PriLOSEC) 20 mg delayed release capsule TAKE 1 CAPSULE DAILY 30 capsule 5    sertraline (ZOLOFT) 25 mg tablet TAKE 1 TABLET DAILY 30 tablet 1     No current facility-administered medications on file prior to visit.          Objective:      Physical Exam  /74 (BP Location: Left arm, Patient Position: Sitting, Cuff Size: Standard)   Pulse 81   Ht 4' 11\" (1.499 m)   Wt 49.9 kg (110 lb)   SpO2 96%   BMI 22.22 kg/m²    Physical Exam   Constitutional: She appears healthy. No distress.   Eyes: Pupils are equal, round, and reactive to light. Conjunctivae are normal.   Neck: No JVD present.   Cardiovascular: Normal rate, regular rhythm and normal heart sounds. Exam reveals no gallop and no friction rub.   No murmur heard.  Pulmonary/Chest: Effort normal and breath sounds normal. She has no wheezes. She has no rales.   Musculoskeletal:         General: No tenderness, deformity or edema.      Cervical back: Normal range of motion and neck supple.   Neurological: She is alert and oriented to person, place, and time.   Skin: Skin is warm and dry.        Cardiographics    See above    ECG: NSR with LVH    Lab Review   Lab Results   Component Value Date     10/13/2015    K 3.5 09/13/2024    K 3.8 10/13/2015     09/13/2024     10/13/2015    CO2 28 09/13/2024    CO2 29 10/13/2015    BUN 24 09/13/2024    BUN 26 (H) 10/13/2015    CREATININE 0.69 09/13/2024    CREATININE 0.8 10/13/2015    GLUCOSE 87 10/13/2015    CALCIUM 9.5 09/13/2024    CALCIUM 9.1 10/13/2015     Lab Results   Component Value Date    WBC 7.23 09/13/2024    WBC 6.0 10/13/2015    HGB 13.4 09/13/2024    HGB 13.0 10/13/2015    HCT 42.3 09/13/2024    HCT 40.3 10/13/2015    MCV 92 09/13/2024    MCV 87.1 10/13/2015     09/13/2024     10/13/2015     Lab Results   Component Value Date    CHOL 191 10/13/2015    TRIG 97 09/13/2024    TRIG 48 10/13/2015    HDL 53 09/13/2024    HDL " 59 10/13/2015         Lab Results   Component Value Date    CHOL 191 10/13/2015    TRIG 97 09/13/2024    TRIG 48 10/13/2015    HDL 53 09/13/2024    HDL 59 10/13/2015    LDLCALC 127 (H) 09/13/2024    LDLCALC 122 10/13/2015      Assessment:     Assessment & Plan  Moderate calcific aortic stenosis  -Patient's last echocardiogram was in 2022.  She has moderate valve disease present of aortic and mitral valves.  Discussed with her in detail.  Will schedule for 2D echocardiogram for further evaluation.  Essential hypertension  -Blood pressure is well-controlled.  She has chronic fatigue.  She is currently using metoprolol 25 mg daily.  Will reduce to 12.5 mg daily to see if fatigue improves.  Will also increase lisinopril to 10 mg daily.  -Will follow-up to reassess blood pressure  Moderate aortic regurgitation  -Echocardiogram reviewed  Chronic diastolic congestive heart failure (HCC)  Wt Readings from Last 3 Encounters:   10/31/24 49.9 kg (110 lb)   10/23/24 50.5 kg (111 lb 6.4 oz)   09/19/24 51.3 kg (113 lb)   -Patient with evidence of hypertensive heart disease with LVH present.  Importance of blood pressure control reviewed with her.  She has no evidence of acute congestion.    Non-rheumatic mitral regurgitation  -Echocardiogram ordered  Dyslipidemia  -Most recent lipid panel showed elevated LDL.  She is 89 years old and not currently on statin therapy.  May continue to defer therapy at this time.  Mild tricuspid insufficiency  -Echocardiogram ordered  Mitral valve stenosis, mild  -Echocardiogram ordered

## 2024-10-31 NOTE — ASSESSMENT & PLAN NOTE
-Blood pressure is well-controlled.  She has chronic fatigue.  She is currently using metoprolol 25 mg daily.  Will reduce to 12.5 mg daily to see if fatigue improves.  Will also increase lisinopril to 10 mg daily.  -Will follow-up to reassess blood pressure

## 2024-11-05 ENCOUNTER — APPOINTMENT (OUTPATIENT)
Dept: LAB | Facility: HOSPITAL | Age: 89
End: 2024-11-05
Payer: MEDICARE

## 2024-11-05 DIAGNOSIS — Z13.29 SCREENING FOR HYPOTHYROIDISM: ICD-10-CM

## 2024-11-05 LAB — TSH SERPL DL<=0.05 MIU/L-ACNC: 2.54 UIU/ML (ref 0.45–4.5)

## 2024-11-05 PROCEDURE — 84443 ASSAY THYROID STIM HORMONE: CPT

## 2024-11-05 PROCEDURE — 36415 COLL VENOUS BLD VENIPUNCTURE: CPT

## 2024-11-13 ENCOUNTER — HOSPITAL ENCOUNTER (OUTPATIENT)
Dept: NON INVASIVE DIAGNOSTICS | Facility: HOSPITAL | Age: 89
Discharge: HOME | End: 2024-11-13
Attending: INTERNAL MEDICINE
Payer: MEDICARE

## 2024-11-13 VITALS
HEART RATE: 62 BPM | DIASTOLIC BLOOD PRESSURE: 62 MMHG | BODY MASS INDEX: 22.18 KG/M2 | HEIGHT: 59 IN | WEIGHT: 110 LBS | SYSTOLIC BLOOD PRESSURE: 142 MMHG

## 2024-11-13 DIAGNOSIS — I10 UNCONTROLLED HYPERTENSION: ICD-10-CM

## 2024-11-13 DIAGNOSIS — I35.1 MODERATE AORTIC REGURGITATION: ICD-10-CM

## 2024-11-13 DIAGNOSIS — F32.89 OTHER DEPRESSION: ICD-10-CM

## 2024-11-13 LAB
AORTIC ROOT: 3.3 CM
AORTIC VALVE MEAN VELOCITY: 25.4 M/S
APICAL FOUR CHAMBER EJECTION FRACTION: 66 %
AV AREA BY CONTINUOUS VTI: 0.8 CM2
AV AREA PEAK VELOCITY: 0.9 CM2
AV LVOT MEAN GRADIENT: 2 MMHG
AV LVOT PEAK GRADIENT: 4 MMHG
AV MEAN GRADIENT: 29 MMHG
AV PEAK GRADIENT: 47 MMHG
AV VALVE AREA: 0.81 CM2
AV VELOCITY RATIO: 0.31
BSA FOR ECHO PROCEDURE: 1.43 M2
DOP CALC AO PEAK VEL: 3.43 M/S
DOP CALC AO VTI: 89.67 CM
DOP CALC LVOT AREA: 2.83 CM2
DOP CALC LVOT CARDIAC INDEX: 2.84 L/MIN/M2
DOP CALC LVOT CARDIAC OUTPUT: 4.07 L/MIN
DOP CALC LVOT DIAMETER: 1.9 CM
DOP CALC LVOT PEAK VEL VTI: 25.58 CM
DOP CALC LVOT PEAK VEL: 1.05 M/S
DOP CALC LVOT STROKE INDEX: 51 ML/M2
DOP CALC LVOT STROKE VOLUME: 72.49
DOP CALC MV VTI: 51.55 CM
E WAVE DECELERATION TIME: 312 MS
E/A RATIO: 1.87
FRACTIONAL SHORTENING: 25 (ref 28–44)
INTERVENTRICULAR SEPTUM IN DIASTOLE (PARASTERNAL SHORT AXIS VIEW): 2.2 CM
INTERVENTRICULAR SEPTUM: 2.2 CM (ref 0.6–1.1)
LAAS-AP2: 34 CM2
LAAS-AP4: 26.5 CM2
LEFT ATRIUM SIZE: 4.1 CM
LEFT ATRIUM VOLUME (MOD BIPLANE): 113 ML
LEFT ATRIUM VOLUME INDEX (MOD BIPLANE): 79 ML/M2
LEFT INTERNAL DIMENSION IN SYSTOLE: 2.7 CM (ref 2.1–4)
LEFT VENTRICULAR INTERNAL DIMENSION IN DIASTOLE: 3.6 CM (ref 3.5–6)
LEFT VENTRICULAR POSTERIOR WALL IN END DIASTOLE: 1.2 CM
LEFT VENTRICULAR STROKE VOLUME: 30 ML
LVSV (TEICH): 30 ML
MV E'TISSUE VEL-LAT: 7 CM/S
MV E'TISSUE VEL-SEP: 4 CM/S
MV MEAN GRADIENT: 3 MMHG
MV PEAK A VEL: 0.78 M/S
MV PEAK E VEL: 146 CM/S
MV PEAK GRADIENT: 10 MMHG
MV STENOSIS PRESSURE HALF TIME: 90 MS
MV VALVE AREA BY CONTINUITY EQUATION: 1.41 CM2
MV VALVE AREA P 1/2 METHOD: 2.44
RIGHT ATRIUM AREA SYSTOLE A4C: 15.2 CM2
RIGHT VENTRICLE ID DIMENSION: 2.7 CM
SL CV LEFT ATRIUM LENGTH A2C: 6.8 CM
SL CV PED ECHO LEFT VENTRICLE DIASTOLIC VOLUME (MOD BIPLANE) 2D: 56 ML
SL CV PED ECHO LEFT VENTRICLE SYSTOLIC VOLUME (MOD BIPLANE) 2D: 26 ML
TR MAX PG: 36 MMHG
TR PEAK VELOCITY: 3 M/S
TRICUSPID ANNULAR PLANE SYSTOLIC EXCURSION: 2.4 CM
TRICUSPID VALVE PEAK REGURGITATION VELOCITY: 3.02 M/S

## 2024-11-13 PROCEDURE — 93306 TTE W/DOPPLER COMPLETE: CPT

## 2024-11-13 PROCEDURE — 93306 TTE W/DOPPLER COMPLETE: CPT | Performed by: INTERNAL MEDICINE

## 2024-11-14 ENCOUNTER — RESULTS FOLLOW-UP (OUTPATIENT)
Dept: CARDIOLOGY CLINIC | Facility: CLINIC | Age: 89
End: 2024-11-14

## 2024-11-14 RX ORDER — CHLORTHALIDONE 25 MG/1
TABLET ORAL
Qty: 16 TABLET | Refills: 5 | Status: SHIPPED | OUTPATIENT
Start: 2024-11-14

## 2024-11-14 RX ORDER — SERTRALINE HYDROCHLORIDE 25 MG/1
25 TABLET, FILM COATED ORAL DAILY
Qty: 30 TABLET | Refills: 5 | Status: SHIPPED | OUTPATIENT
Start: 2024-11-14

## 2024-11-15 NOTE — TELEPHONE ENCOUNTER
----- Message from Karolina Little DO sent at 11/14/2024 10:59 PM EST -----  Can you please let the patient know echo was unchanged from last one.

## 2024-12-24 ENCOUNTER — OFFICE VISIT (OUTPATIENT)
Dept: CARDIOLOGY CLINIC | Facility: CLINIC | Age: 89
End: 2024-12-24
Payer: MEDICARE

## 2024-12-24 VITALS
HEART RATE: 67 BPM | SYSTOLIC BLOOD PRESSURE: 168 MMHG | DIASTOLIC BLOOD PRESSURE: 72 MMHG | WEIGHT: 111.5 LBS | HEIGHT: 59 IN | BODY MASS INDEX: 22.48 KG/M2 | OXYGEN SATURATION: 99 %

## 2024-12-24 DIAGNOSIS — I35.0 MODERATE CALCIFIC AORTIC STENOSIS: ICD-10-CM

## 2024-12-24 DIAGNOSIS — I05.0 MITRAL VALVE STENOSIS, MILD: ICD-10-CM

## 2024-12-24 DIAGNOSIS — E78.5 HYPERLIPIDEMIA, UNSPECIFIED HYPERLIPIDEMIA TYPE: ICD-10-CM

## 2024-12-24 DIAGNOSIS — I49.1 PREMATURE ATRIAL CONTRACTIONS: ICD-10-CM

## 2024-12-24 DIAGNOSIS — I07.1 MILD TRICUSPID INSUFFICIENCY: ICD-10-CM

## 2024-12-24 DIAGNOSIS — I34.0 NON-RHEUMATIC MITRAL REGURGITATION: ICD-10-CM

## 2024-12-24 DIAGNOSIS — I51.7 ATRIAL DILATATION, BILATERAL: ICD-10-CM

## 2024-12-24 DIAGNOSIS — I10 ESSENTIAL HYPERTENSION: ICD-10-CM

## 2024-12-24 DIAGNOSIS — I50.32 CHRONIC DIASTOLIC CONGESTIVE HEART FAILURE (HCC): Primary | ICD-10-CM

## 2024-12-24 DIAGNOSIS — I35.1 MODERATE AORTIC REGURGITATION: ICD-10-CM

## 2024-12-24 PROCEDURE — 99214 OFFICE O/P EST MOD 30 MIN: CPT | Performed by: INTERNAL MEDICINE

## 2024-12-24 NOTE — ASSESSMENT & PLAN NOTE
- She has moderate valve disease present of aortic and mitral valves. Discussed with her in detail.

## 2024-12-24 NOTE — PROGRESS NOTES
Subjective:     Jackie Urbina is a 89 y.o. female  who presents to the office today for follow-up of aortic valve stenosis and hypertension.   During her last visit, metoprolol was reduced to 12.5 mg daily because she was complaining of fatigue.   She notes fatigue with minimal exertion such as vacuuming or walking short distances.  She needs to sit down or rest.  She denies any shortness of breath or chest pain.  Symptoms have been present for several years.  There was no change with reduction in metoprolol.  Echocardiogram was done which showed moderate aortic stenosis.     The following portions of the patient's history were reviewed and updated as appropriate: allergies, current medications, past family history, past medical history, past social history, past surgical history, and problem list.    Review of Systems  Review of Systems   Constitutional:  Positive for fatigue.   Respiratory:  Negative for shortness of breath.    Cardiovascular:  Positive for leg swelling. Negative for chest pain and palpitations.   Musculoskeletal:  Positive for arthralgias and myalgias.   All other systems reviewed and are negative.       Current Outpatient Medications on File Prior to Visit   Medication Sig Dispense Refill    Ascorbic Acid (vitamin C) 100 MG tablet Take 100 mg by mouth daily      aspirin (ECOTRIN) 325 mg EC tablet Take 1 tablet (325 mg total) by mouth 2 (two) times a day 84 tablet 0    brimonidine tartrate 0.2 % ophthalmic solution       chlorthalidone 25 mg tablet TAKE 1 TABLET 4 DAYS A WEEK EVERY MONDAY, WEDNESDAY, FRIDAY, AND SUNDAY. 16 tablet 5    lisinopril (ZESTRIL) 10 mg tablet Take 1 tablet (10 mg total) by mouth daily 90 tablet 2    metoprolol succinate (TOPROL-XL) 25 mg 24 hr tablet Take 0.5 tablets (12.5 mg total) by mouth daily 45 tablet 3    Multiple Vitamins-Minerals (PRESERVISION AREDS PO) Take by mouth      omeprazole (PriLOSEC) 20 mg delayed release capsule TAKE 1 CAPSULE DAILY 30 capsule 5     "sertraline (ZOLOFT) 25 mg tablet TAKE 1 TABLET DAILY 30 tablet 5     No current facility-administered medications on file prior to visit.          Objective:      Physical Exam  /72 (BP Location: Right arm, Patient Position: Sitting, Cuff Size: Standard)   Pulse 67   Ht 4' 11\" (1.499 m)   Wt 50.6 kg (111 lb 8 oz)   SpO2 99%   BMI 22.52 kg/m²    Physical Exam   Constitutional: She appears healthy. No distress.   Eyes: Pupils are equal, round, and reactive to light. Conjunctivae are normal.   Neck: No JVD present.   Cardiovascular: Normal rate and regular rhythm. Exam reveals no gallop and no friction rub.   Murmur heard.  Systolic murmur is present.  Pulmonary/Chest: Effort normal and breath sounds normal. She has no wheezes. She has no rales.   Musculoskeletal:         General: No tenderness, deformity or edema.      Cervical back: Normal range of motion and neck supple.   Neurological: She is alert and oriented to person, place, and time.   Skin: Skin is warm and dry.        Cardiographics    See above    ECG: NSR with LVH    Lab Review   Lab Results   Component Value Date     10/13/2015    K 3.5 09/13/2024    K 3.8 10/13/2015     09/13/2024     10/13/2015    CO2 28 09/13/2024    CO2 29 10/13/2015    BUN 24 09/13/2024    BUN 26 (H) 10/13/2015    CREATININE 0.69 09/13/2024    CREATININE 0.8 10/13/2015    GLUCOSE 87 10/13/2015    CALCIUM 9.5 09/13/2024    CALCIUM 9.1 10/13/2015     Lab Results   Component Value Date    WBC 7.23 09/13/2024    WBC 6.0 10/13/2015    HGB 13.4 09/13/2024    HGB 13.0 10/13/2015    HCT 42.3 09/13/2024    HCT 40.3 10/13/2015    MCV 92 09/13/2024    MCV 87.1 10/13/2015     09/13/2024     10/13/2015     Lab Results   Component Value Date    CHOL 191 10/13/2015    TRIG 97 09/13/2024    TRIG 48 10/13/2015    HDL 53 09/13/2024    HDL 59 10/13/2015         Lab Results   Component Value Date    CHOL 191 10/13/2015    TRIG 97 09/13/2024    TRIG 48 " 10/13/2015    HDL 53 09/13/2024    HDL 59 10/13/2015    LDLCALC 127 (H) 09/13/2024    LDLCALC 122 10/13/2015      Assessment:     Assessment & Plan  Moderate calcific aortic stenosis  - She has moderate valve disease present of aortic and mitral valves. Discussed with her in detail.   Atrial dilatation, bilateral  - BP control  Chronic diastolic congestive heart failure (HCC)  Wt Readings from Last 3 Encounters:   12/24/24 50.6 kg (111 lb 8 oz)   11/13/24 49.9 kg (110 lb)   10/31/24 49.9 kg (110 lb)     -Patient with evidence of hypertensive heart disease with LVH present.  Importance of blood pressure control reviewed with her.  She has no evidence of acute congestion.     Essential hypertension  - Blood pressure is elevated today.  Continue to monitor and return metoprolol to 25 mg daily if no improvement.   Mitral valve stenosis, mild  - No change from previous  Premature atrial contractions  - Continue metoprolol  Hyperlipidemia, unspecified hyperlipidemia type  - No indication for statin for primary prevention.

## 2024-12-24 NOTE — ASSESSMENT & PLAN NOTE
Wt Readings from Last 3 Encounters:   12/24/24 50.6 kg (111 lb 8 oz)   11/13/24 49.9 kg (110 lb)   10/31/24 49.9 kg (110 lb)     -Patient with evidence of hypertensive heart disease with LVH present.  Importance of blood pressure control reviewed with her.  She has no evidence of acute congestion.

## 2024-12-24 NOTE — ASSESSMENT & PLAN NOTE
- Blood pressure is elevated today.  Continue to monitor and return metoprolol to 25 mg daily if no improvement.

## 2025-01-29 ENCOUNTER — HOSPITAL ENCOUNTER (INPATIENT)
Facility: HOSPITAL | Age: OVER 89
LOS: 2 days | Discharge: HOME | DRG: 194 | End: 2025-02-01
Attending: STUDENT IN AN ORGANIZED HEALTH CARE EDUCATION/TRAINING PROGRAM | Admitting: STUDENT IN AN ORGANIZED HEALTH CARE EDUCATION/TRAINING PROGRAM
Payer: MEDICARE

## 2025-01-29 ENCOUNTER — APPOINTMENT (EMERGENCY)
Dept: RADIOLOGY | Facility: HOSPITAL | Age: OVER 89
DRG: 194 | End: 2025-01-29
Payer: MEDICARE

## 2025-01-29 DIAGNOSIS — R79.89 ELEVATED TROPONIN: ICD-10-CM

## 2025-01-29 DIAGNOSIS — J10.1 INFLUENZA A VIRUS PRESENT: ICD-10-CM

## 2025-01-29 DIAGNOSIS — I89.0 LYMPHEDEMA: ICD-10-CM

## 2025-01-29 DIAGNOSIS — R05.9 COUGH: Primary | ICD-10-CM

## 2025-01-29 DIAGNOSIS — I10 ESSENTIAL HYPERTENSION: ICD-10-CM

## 2025-01-29 PROBLEM — J81.1 PULMONARY EDEMA: Status: ACTIVE | Noted: 2025-01-29

## 2025-01-29 LAB
2HR DELTA HS TROPONIN: -85 NG/L
4HR DELTA HS TROPONIN: -162 NG/L
ANION GAP SERPL CALCULATED.3IONS-SCNC: 11 MMOL/L (ref 4–13)
BASOPHILS # BLD AUTO: 0.07 THOUSANDS/ΜL (ref 0–0.1)
BASOPHILS NFR BLD AUTO: 2 % (ref 0–1)
BNP SERPL-MCNC: 316 PG/ML (ref 0–100)
BNP SERPL-MCNC: 328 PG/ML (ref 0–100)
BUN SERPL-MCNC: 33 MG/DL (ref 5–25)
CALCIUM SERPL-MCNC: 8.5 MG/DL (ref 8.4–10.2)
CARDIAC TROPONIN I PNL SERPL HS: 1007 NG/L (ref ?–50)
CARDIAC TROPONIN I PNL SERPL HS: 1084 NG/L (ref ?–50)
CARDIAC TROPONIN I PNL SERPL HS: 1169 NG/L (ref ?–50)
CHLORIDE SERPL-SCNC: 104 MMOL/L (ref 96–108)
CO2 SERPL-SCNC: 25 MMOL/L (ref 21–32)
CREAT SERPL-MCNC: 0.78 MG/DL (ref 0.6–1.3)
D DIMER PPP FEU-MCNC: 1.14 UG/ML FEU
EOSINOPHIL # BLD AUTO: 0.06 THOUSAND/ΜL (ref 0–0.61)
EOSINOPHIL NFR BLD AUTO: 1 % (ref 0–6)
ERYTHROCYTE [DISTWIDTH] IN BLOOD BY AUTOMATED COUNT: 13.8 % (ref 11.6–15.1)
FLUAV AG UPPER RESP QL IA.RAPID: POSITIVE
FLUBV AG UPPER RESP QL IA.RAPID: NEGATIVE
GFR SERPL CREATININE-BSD FRML MDRD: 67 ML/MIN/1.73SQ M
GLUCOSE SERPL-MCNC: 92 MG/DL (ref 65–140)
HCT VFR BLD AUTO: 37.3 % (ref 34.8–46.1)
HGB BLD-MCNC: 11.7 G/DL (ref 11.5–15.4)
IMM GRANULOCYTES # BLD AUTO: 0.01 THOUSAND/UL (ref 0–0.2)
IMM GRANULOCYTES NFR BLD AUTO: 0 % (ref 0–2)
LYMPHOCYTES # BLD AUTO: 1.67 THOUSANDS/ΜL (ref 0.6–4.47)
LYMPHOCYTES NFR BLD AUTO: 38 % (ref 14–44)
MCH RBC QN AUTO: 28.8 PG (ref 26.8–34.3)
MCHC RBC AUTO-ENTMCNC: 31.4 G/DL (ref 31.4–37.4)
MCV RBC AUTO: 92 FL (ref 82–98)
MONOCYTES # BLD AUTO: 0.65 THOUSAND/ΜL (ref 0.17–1.22)
MONOCYTES NFR BLD AUTO: 15 % (ref 4–12)
NEUTROPHILS # BLD AUTO: 1.94 THOUSANDS/ΜL (ref 1.85–7.62)
NEUTS SEG NFR BLD AUTO: 44 % (ref 43–75)
NRBC BLD AUTO-RTO: 0 /100 WBCS
PLATELET # BLD AUTO: 179 THOUSANDS/UL (ref 149–390)
PMV BLD AUTO: 10.8 FL (ref 8.9–12.7)
POTASSIUM SERPL-SCNC: 3.7 MMOL/L (ref 3.5–5.3)
RBC # BLD AUTO: 4.06 MILLION/UL (ref 3.81–5.12)
SARS-COV+SARS-COV-2 AG RESP QL IA.RAPID: NEGATIVE
SODIUM SERPL-SCNC: 140 MMOL/L (ref 135–147)
WBC # BLD AUTO: 4.4 THOUSAND/UL (ref 4.31–10.16)

## 2025-01-29 PROCEDURE — 99285 EMERGENCY DEPT VISIT HI MDM: CPT

## 2025-01-29 PROCEDURE — 71250 CT THORAX DX C-: CPT

## 2025-01-29 PROCEDURE — 99223 1ST HOSP IP/OBS HIGH 75: CPT | Performed by: FAMILY MEDICINE

## 2025-01-29 PROCEDURE — 84145 PROCALCITONIN (PCT): CPT | Performed by: FAMILY MEDICINE

## 2025-01-29 PROCEDURE — 87811 SARS-COV-2 COVID19 W/OPTIC: CPT | Performed by: STUDENT IN AN ORGANIZED HEALTH CARE EDUCATION/TRAINING PROGRAM

## 2025-01-29 PROCEDURE — 85379 FIBRIN DEGRADATION QUANT: CPT | Performed by: STUDENT IN AN ORGANIZED HEALTH CARE EDUCATION/TRAINING PROGRAM

## 2025-01-29 PROCEDURE — 83880 ASSAY OF NATRIURETIC PEPTIDE: CPT | Performed by: STUDENT IN AN ORGANIZED HEALTH CARE EDUCATION/TRAINING PROGRAM

## 2025-01-29 PROCEDURE — 85025 COMPLETE CBC W/AUTO DIFF WBC: CPT | Performed by: STUDENT IN AN ORGANIZED HEALTH CARE EDUCATION/TRAINING PROGRAM

## 2025-01-29 PROCEDURE — 93005 ELECTROCARDIOGRAM TRACING: CPT

## 2025-01-29 PROCEDURE — 84484 ASSAY OF TROPONIN QUANT: CPT | Performed by: STUDENT IN AN ORGANIZED HEALTH CARE EDUCATION/TRAINING PROGRAM

## 2025-01-29 PROCEDURE — 71275 CT ANGIOGRAPHY CHEST: CPT

## 2025-01-29 PROCEDURE — 87804 INFLUENZA ASSAY W/OPTIC: CPT | Performed by: STUDENT IN AN ORGANIZED HEALTH CARE EDUCATION/TRAINING PROGRAM

## 2025-01-29 PROCEDURE — 99285 EMERGENCY DEPT VISIT HI MDM: CPT | Performed by: STUDENT IN AN ORGANIZED HEALTH CARE EDUCATION/TRAINING PROGRAM

## 2025-01-29 PROCEDURE — 36415 COLL VENOUS BLD VENIPUNCTURE: CPT | Performed by: STUDENT IN AN ORGANIZED HEALTH CARE EDUCATION/TRAINING PROGRAM

## 2025-01-29 PROCEDURE — 80048 BASIC METABOLIC PNL TOTAL CA: CPT | Performed by: STUDENT IN AN ORGANIZED HEALTH CARE EDUCATION/TRAINING PROGRAM

## 2025-01-29 RX ORDER — FUROSEMIDE 10 MG/ML
20 INJECTION INTRAMUSCULAR; INTRAVENOUS ONCE
Status: COMPLETED | OUTPATIENT
Start: 2025-01-29 | End: 2025-01-29

## 2025-01-29 RX ORDER — OSELTAMIVIR PHOSPHATE 30 MG/1
30 CAPSULE ORAL EVERY 12 HOURS SCHEDULED
Status: DISCONTINUED | OUTPATIENT
Start: 2025-01-29 | End: 2025-02-01 | Stop reason: HOSPADM

## 2025-01-29 RX ORDER — ASPIRIN 81 MG/1
324 TABLET, CHEWABLE ORAL ONCE
Status: COMPLETED | OUTPATIENT
Start: 2025-01-29 | End: 2025-01-29

## 2025-01-29 RX ADMIN — ASPIRIN 81 MG CHEWABLE TABLET 324 MG: 81 TABLET CHEWABLE at 18:47

## 2025-01-29 RX ADMIN — IOHEXOL 85 ML: 350 INJECTION, SOLUTION INTRAVENOUS at 18:20

## 2025-01-29 RX ADMIN — FUROSEMIDE 20 MG: 10 INJECTION, SOLUTION INTRAMUSCULAR; INTRAVENOUS at 23:51

## 2025-01-29 NOTE — ED PROVIDER NOTES
"Time reflects when diagnosis was documented in both MDM as applicable and the Disposition within this note       Time User Action Codes Description Comment    1/29/2025  7:09 PM Frantz Berry Add [R05.9] Cough     1/29/2025  7:09 PM Frantz Berry Add [R79.89] Elevated troponin           ED Disposition       ED Disposition   Admit    Condition   Stable    Date/Time   Wed Jan 29, 2025  7:09 PM    Comment   Case was discussed with MISTI and the patient's admission status was agreed to be Admission Status: observation status to the service of Dr. Eddy .               Assessment & Plan       Medical Decision Making  Patient is a 89 y.o. female who presents to the ED for a cough.  Patient is nontoxic, well-appearing.     Differential includes but is not limited to: viral URI, pneumonia. Low suspicion for ACS. Doubt PE.     Plan: Labs, imaging, reassess                    Amount and/or Complexity of Data Reviewed  Labs: ordered. Decision-making details documented in ED Course.  Radiology: ordered.    Risk  OTC drugs.  Prescription drug management.  Decision regarding hospitalization.        ED Course as of 01/29/25 1910   Wed Jan 29, 2025   1800 Influenza A Rapid Antigen(!): Positive   1802 D-Dimer, Quant(!): 1.14   1841 Troponin significant elevated.  Discussed with cards.  No heparin at this time.  Will give aspirin.  Pending PE study read   1909 Discussed with cards. No heparin. Discussed with SLIM. Accepts admission       Medications   iohexol (OMNIPAQUE) 350 MG/ML injection (MULTI-DOSE) 85 mL (85 mL Intravenous Given 1/29/25 1820)   aspirin chewable tablet 324 mg (324 mg Oral Given 1/29/25 1847)       ED Risk Strat Scores                                              History of Present Illness       Chief Complaint   Patient presents with    Cough     Cough for a couple of weeks       Past Medical History:   Diagnosis Date    Anesthesia complication     .Yrs ago had \"anxiety\" reaction not sure while sedated, pt " states sensitive to anesthesia effects    Anxiety     stress at home    Arthritis     Cataract, right     Last Assessed:16    Eye hemorrhage     left eye currently 16    History of shingles 2015    Hypertension     Mitral valve stenosis, mild       Past Surgical History:   Procedure Laterality Date    CARPAL TUNNEL RELEASE Left     CATARACT EXTRACTION      CATARACT EXTRACTION W/ INTRAOCULAR LENS IMPLANT Left 10/11/2016    Procedure: EXTRACTION EXTRACAPSULAR CATARACT PHACO INTRAOCULAR LENS (IOL);  Surgeon: Wale Roth MD;  Location: [facility] MAIN OR;  Service:     CERVICAL CONE BIOPSY      COLONOSCOPY      EYE SURGERY Left     muscle repair    CO ARTHRP KNE CONDYLE&PLATU MEDIAL&LAT COMPARTMENTS Right 12/15/2020    Procedure: ARTHROPLASTY KNEE TOTAL;  Surgeon: Greg Mitchell DO;  Location: [facility] MAIN OR;  Service: Orthopedics    CO OPTX FEM SHFT FX W/INSJ IMED IMPLT W/WO SCREW Right 2019    Procedure: INSERTION NAIL IM FEMUR ANTEGRADE (TROCHANTERIC);  Surgeon: Greg Mitchell DO;  Location: [facility] MAIN OR;  Service: Orthopedics    CO XCAPSL CTRC RMVL INSJ IO LENS PROSTH W/O ECP Right 2016    Procedure: EXTRACTION EXTRACAPSULAR CATARACT PHACO INTRAOCULAR LENS (IOL);  Surgeon: Wale Roth MD;  Location: [facility] MAIN OR;  Service: Ophthalmology    TONSILLECTOMY        Family History   Problem Relation Age of Onset    Heart disease Mother         MI  at age 69    Arthritis Mother     GI problems Father         bleeding ulcer    Arthritis Sister     Sleep apnea Sister     Irritable bowel syndrome Sister     Cancer Sister         skin cancer    Heart disease Brother         CABG (5)    Cancer Brother         skin cancer    Heart disease Maternal Aunt       Social History     Tobacco Use    Smoking status: Never    Smokeless tobacco: Never   Vaping Use    Vaping status: Never Used   Substance Use Topics    Alcohol use: Yes     Comment: rarely    Drug use: No      E-Cigarette/Vaping    E-Cigarette  Use Never User       E-Cigarette/Vaping Substances    Nicotine No     THC No     CBD No       I have reviewed and agree with the history as documented.     89-year-old female, past medical history including hypertension, aortic stenosis, CHF, who presents the emergency room for cough.  Patient states she has a cough for about a month.  Initially got better but over the past couple days it is returned.  Productive of whitish sputum.  No modifying factors.  Associated with shortness of breath with coughing.  No chest pain.  No fevers or chills.  No sick contacts.  No nausea, vomiting.  No other complaints or concerns.      Cough      Review of Systems   Respiratory:  Positive for cough.    All other systems reviewed and are negative.          Objective       ED Triage Vitals [01/29/25 1707]   Temperature Pulse Blood Pressure Respirations SpO2 Patient Position - Orthostatic VS   97.5 °F (36.4 °C) 63 142/65 (!) 24 98 % Sitting      Temp Source Heart Rate Source BP Location FiO2 (%) Pain Score    Tympanic Monitor Right arm -- No Pain      Vitals      Date and Time Temp Pulse SpO2 Resp BP Pain Score FACES Pain Rating User   01/29/25 1707 97.5 °F (36.4 °C) 63 98 % 24 142/65 No Pain -- LS            Physical Exam  Vitals and nursing note reviewed.   Constitutional:       General: She is not in acute distress.     Appearance: She is well-developed. She is not ill-appearing, toxic-appearing or diaphoretic.   HENT:      Head: Normocephalic and atraumatic.      Right Ear: External ear normal.      Left Ear: External ear normal.      Nose: Nose normal.   Eyes:      General: Lids are normal. No scleral icterus.  Cardiovascular:      Rate and Rhythm: Normal rate and regular rhythm.      Heart sounds: Normal heart sounds. No murmur heard.     No friction rub. No gallop.   Pulmonary:      Effort: Pulmonary effort is normal. No respiratory distress.      Breath sounds: Normal breath sounds. No wheezing or rales.   Abdominal:       Palpations: Abdomen is soft.      Tenderness: There is no abdominal tenderness. There is no guarding or rebound.   Musculoskeletal:         General: No deformity. Normal range of motion.      Cervical back: Normal range of motion and neck supple.   Skin:     General: Skin is warm and dry.   Neurological:      General: No focal deficit present.      Mental Status: She is alert.   Psychiatric:         Mood and Affect: Mood normal.         Behavior: Behavior normal.         Results Reviewed       Procedure Component Value Units Date/Time    B-Type Natriuretic Peptide(BNP) [[id number]] Collected: 01/29/25 1737    Lab Status: In process Specimen: Blood from Arm, Left Updated: 01/29/25 1819    HS Troponin I 2hr [[id number]]     Lab Status: No result Specimen: Blood     HS Troponin 0hr (reflex protocol) [[id number]]  (Abnormal) Collected: 01/29/25 1737    Lab Status: Final result Specimen: Blood from Arm, Left Updated: 01/29/25 1814     hs TnI 0hr 1,169 ng/L     Basic metabolic panel [[id number]]  (Abnormal) Collected: 01/29/25 1737    Lab Status: Final result Specimen: Blood from Arm, Left Updated: 01/29/25 1810     Sodium 140 mmol/L      Potassium 3.7 mmol/L      Chloride 104 mmol/L      CO2 25 mmol/L      ANION GAP 11 mmol/L      BUN 33 mg/dL      Creatinine 0.78 mg/dL      Glucose 92 mg/dL      Calcium 8.5 mg/dL      eGFR 67 ml/min/1.73sq m     Narrative:      National Kidney Disease Foundation guidelines for Chronic Kidney Disease (CKD):     Stage 1 with normal or high GFR (GFR > 90 mL/min/1.73 square meters)    Stage 2 Mild CKD (GFR = 60-89 mL/min/1.73 square meters)    Stage 3A Moderate CKD (GFR = 45-59 mL/min/1.73 square meters)    Stage 3B Moderate CKD (GFR = 30-44 mL/min/1.73 square meters)    Stage 4 Severe CKD (GFR = 15-29 mL/min/1.73 square meters)    Stage 5 End Stage CKD (GFR <15 mL/min/1.73 square meters)  Note: GFR calculation is accurate only with a steady state creatinine    D-dimer, quantitative [[id number]]   (Abnormal) Collected: 01/29/25 1737    Lab Status: Final result Specimen: Blood from Arm, Left Updated: 01/29/25 1802     D-Dimer, Quant 1.14 ug/ml FEU     Narrative:      In the evaluation for possible pulmonary embolism, in the appropriate (Well's Score of 4 or less) patient, the age adjusted d-dimer cutoff for this patient can be calculated as:    Age x 0.01 (in ug/mL) for Age-adjusted D-dimer exclusion threshold for a patient over 50 years.    FLU/COVID Rapid Antigen (30 min. TAT) - Preferred screening test in ED [[id number]]  (Abnormal) Collected: 01/29/25 1737    Lab Status: Final result Specimen: Nares from Nose Updated: 01/29/25 1800     SARS COV Rapid Antigen Negative     Influenza A Rapid Antigen Positive     Influenza B Rapid Antigen Negative    Narrative:      This test has been performed using the [employer] Gladys 2 FLU+SARS Antigen test under the Emergency Use Authorization (EUA). This test has been validated by the  and verified by the performing laboratory. The Gladys uses lateral flow immunofluorescent sandwich assay to detect SARS-COV, Influenza A and Influenza B Antigen.     The Quidel Gladys 2 SARS Antigen test does not differentiate between SARS-CoV and SARS-CoV-2.     Negative results are presumptive and may be confirmed with a molecular assay, if necessary, for patient management. Negative results do not rule out SARS-CoV-2 or influenza infection and should not be used as the sole basis for treatment or patient management decisions. A negative test result may occur if the level of antigen in a sample is below the limit of detection of this test.     Positive results are indicative of the presence of viral antigens, but do not rule out bacterial infection or co-infection with other viruses.     All test results should be used as an adjunct to clinical observations and other information available to the provider.    FOR PEDIATRIC PATIENTS - copy/paste COVID Guidelines URL to browser:  [email or web address]    B-Type Natriuretic Peptide(BNP) [[id number]] Collected: 01/29/25 1737    Lab Status: In process Specimen: Blood from Arm, Left Updated: 01/29/25 1745    CBC and differential [[id number]]  (Abnormal) Collected: 01/29/25 1737    Lab Status: Final result Specimen: Blood from Arm, Left Updated: 01/29/25 1744     WBC 4.40 Thousand/uL      RBC 4.06 Million/uL      Hemoglobin 11.7 g/dL      Hematocrit 37.3 %      MCV 92 fL      MCH 28.8 pg      MCHC 31.4 g/dL      RDW 13.8 %      MPV 10.8 fL      Platelets 179 Thousands/uL      nRBC 0 /100 WBCs      Segmented % 44 %      Immature Grans % 0 %      Lymphocytes % 38 %      Monocytes % 15 %      Eosinophils Relative 1 %      Basophils Relative 2 %      Absolute Neutrophils 1.94 Thousands/µL      Absolute Immature Grans 0.01 Thousand/uL      Absolute Lymphocytes 1.67 Thousands/µL      Absolute Monocytes 0.65 Thousand/µL      Eosinophils Absolute 0.06 Thousand/µL      Basophils Absolute 0.07 Thousands/µL             CTA chest pe study   Final Interpretation by Steve Earl MD (01/29 1855)      No pulmonary embolism.      Cardiomegaly with worsening diffuse groundglass opacities suggesting pulmonary edema. Trace bilateral effusions. Worsening bronchial wall thickening likely related to interstitial edema. Findings suggest CHF.      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      Subpleural 4 mm right upper lobe nodule.      Workstation performed: [id number]         CT chest wo contrast   Final Interpretation by Moody Pardo MD (01/29 1820)      Bilateral lower lobe reactive/infectious bronchial wall thickening with mild left lower lobe groundglass opacity, concerning for aspiration/infectious pneumonitis and less likely, atelectasis. Trace left pleural effusion      Apparent 1.8 x 1.2 cm saccular aneurysm involving the  proximal abdominal aorta with significant calcifications. Nonemergent CTA aorta can be obtained for better assessment, as clinically warranted.      Age-indeterminate L1 compression fracture deformity with 60% height loss. Clinical correlation is recommended.      Moderate cardiomegaly with aortic valve calcifications, compatible with aortic valve stenosis, as reported on recent echocardiogram 11/13/2024.      Subpleural 4 mm right middle lobe pulmonary nodule. Based on current Fleischner Society 2017 Guidelines on incidental pulmonary nodule, no routine follow-up is needed if the patient is low risk. If the patient is high risk, optional follow-up chest CT at    12 months can be considered.      The study was marked in EPIC for immediate notification.            Workstation performed: [id number]             ECG 12 Lead Documentation Only    Date/Time: 1/29/2025 6:40 PM    Performed by: Frantz Berry DO  Authorized by: Frantz Berry DO    ECG reviewed by me, the ED Provider: yes    Patient location:  ED  Interpretation:     Interpretation: abnormal    Rate:     ECG rate:  58    ECG rate assessment: normal    Rhythm:     Rhythm: sinus rhythm    Ectopy:     Ectopy: none    QRS:     QRS axis:  Normal  Conduction:     Conduction: normal    ST segments:     ST segments:  Non-specific    Elevation:  V1 and V2    Depression:  V5 and V6  T waves:     T waves: normal        ED Medication and Procedure Management   Prior to Admission Medications   Prescriptions Last Dose Informant Patient Reported? Taking?   Ascorbic Acid (vitamin C) 100 MG tablet  Self Yes No   Sig: Take 100 mg by mouth daily   Multiple Vitamins-Minerals (PRESERVISION AREDS PO)  Self Yes No   Sig: Take by mouth   aspirin (ECOTRIN) 325 mg EC tablet  Self No No   Sig: Take 1 tablet (325 mg total) by mouth 2 (two) times a day   brimonidine tartrate 0.2 % ophthalmic solution  Self Yes No   chlorthalidone 25 mg tablet   No No   Sig: TAKE 1 TABLET 4 DAYS A  WEEK EVERY MONDAY, WEDNESDAY, FRIDAY, AND SUNDAY.   lisinopril (ZESTRIL) 10 mg tablet   No No   Sig: Take 1 tablet (10 mg total) by mouth daily   metoprolol succinate (TOPROL-XL) 25 mg 24 hr tablet   No No   Sig: Take 0.5 tablets (12.5 mg total) by mouth daily   omeprazole (PriLOSEC) 20 mg delayed release capsule  Self No No   Sig: TAKE 1 CAPSULE DAILY   sertraline (ZOLOFT) 25 mg tablet   No No   Sig: TAKE 1 TABLET DAILY      Facility-Administered Medications: None     Patient's Medications   Discharge Prescriptions    No medications on file     No discharge procedures on file.  ED SEPSIS DOCUMENTATION   Time reflects when diagnosis was documented in both MDM as applicable and the Disposition within this note       Time User Action Codes Description Comment    1/29/2025  7:09 PM Frantz Berry [R05.9] Cough     1/29/2025  7:09 PM Frantz Berry [R79.89] Elevated troponin                  Frantz Berry DO  01/29/25 1910

## 2025-01-30 ENCOUNTER — APPOINTMENT (OUTPATIENT)
Dept: NON INVASIVE DIAGNOSTICS | Facility: HOSPITAL | Age: OVER 89
DRG: 194 | End: 2025-01-30
Payer: MEDICARE

## 2025-01-30 PROBLEM — I89.0 LYMPHEDEMA: Status: ACTIVE | Noted: 2025-01-29

## 2025-01-30 PROBLEM — R93.89 ABNORMAL CT OF THE CHEST: Status: ACTIVE | Noted: 2025-01-30

## 2025-01-30 LAB
ALBUMIN SERPL BCG-MCNC: 3.5 G/DL (ref 3.5–5)
ALP SERPL-CCNC: 83 U/L (ref 34–104)
ALT SERPL W P-5'-P-CCNC: 11 U/L (ref 7–52)
ANION GAP SERPL CALCULATED.3IONS-SCNC: 15 MMOL/L (ref 4–13)
AORTIC ROOT: 3.1 CM
AORTIC VALVE MEAN VELOCITY: 28.7 M/S
AST SERPL W P-5'-P-CCNC: 33 U/L (ref 13–39)
AV AREA BY CONTINUOUS VTI: 0.6 CM2
AV AREA PEAK VELOCITY: 0.7 CM2
AV LVOT MEAN GRADIENT: 1 MMHG
AV LVOT PEAK GRADIENT: 3 MMHG
AV MEAN PRESS GRAD SYS DOP V1V2: 38 MMHG
AV ORIFICE AREA US: 0.56 CM2
AV PEAK GRADIENT: 69 MMHG
AV VELOCITY RATIO: 0.16
AV VMAX SYS DOP: 4.15 M/S
BILIRUB SERPL-MCNC: 0.38 MG/DL (ref 0.2–1)
BSA FOR ECHO PROCEDURE: 1.46 M2
BUN SERPL-MCNC: 32 MG/DL (ref 5–25)
CALCIUM SERPL-MCNC: 8.6 MG/DL (ref 8.4–10.2)
CHLORIDE SERPL-SCNC: 103 MMOL/L (ref 96–108)
CHOLEST SERPL-MCNC: 126 MG/DL (ref ?–200)
CO2 SERPL-SCNC: 22 MMOL/L (ref 21–32)
CREAT SERPL-MCNC: 0.73 MG/DL (ref 0.6–1.3)
DOP CALC AO VTI: 99.23 CM
DOP CALC LVOT AREA: 3.46 CM2
DOP CALC LVOT CARDIAC INDEX: 1.92 L/MIN/M2
DOP CALC LVOT CARDIAC OUTPUT: 2.8 L/MIN
DOP CALC LVOT DIAMETER: 2.1 CM
DOP CALC LVOT PEAK VEL VTI: 15.95 CM
DOP CALC LVOT PEAK VEL: 0.84 M/S
DOP CALC LVOT STROKE INDEX: 36.3 ML/M2
DOP CALC LVOT STROKE VOLUME: 55.22
E WAVE DECELERATION TIME: 218 MS
E/A RATIO: 1.8
ERYTHROCYTE [DISTWIDTH] IN BLOOD BY AUTOMATED COUNT: 13.6 % (ref 11.6–15.1)
FRACTIONAL SHORTENING: 30 (ref 28–44)
GFR SERPL CREATININE-BSD FRML MDRD: 73 ML/MIN/1.73SQ M
GLUCOSE P FAST SERPL-MCNC: 79 MG/DL (ref 65–99)
GLUCOSE SERPL-MCNC: 79 MG/DL (ref 65–140)
HCT VFR BLD AUTO: 36.4 % (ref 34.8–46.1)
HDLC SERPL-MCNC: 33 MG/DL
HGB BLD-MCNC: 11.7 G/DL (ref 11.5–15.4)
INTERVENTRICULAR SEPTUM IN DIASTOLE (PARASTERNAL SHORT AXIS VIEW): 1.4 CM
INTERVENTRICULAR SEPTUM: 1.4 CM (ref 0.6–1.1)
L PNEUMO1 AG UR QL IA.RAPID: NEGATIVE
LAAS-AP2: 19.3 CM2
LAAS-AP4: 25.7 CM2
LDLC SERPL CALC-MCNC: 75 MG/DL (ref 0–100)
LEFT ATRIUM AREA SYSTOLE SINGLE PLANE A4C: 24 CM2
LEFT ATRIUM SIZE: 3.9 CM
LEFT ATRIUM VOLUME (MOD BIPLANE): 72 ML
LEFT ATRIUM VOLUME INDEX (MOD BIPLANE): 49.3 ML/M2
LEFT INTERNAL DIMENSION IN SYSTOLE: 2.8 CM (ref 2.1–4)
LEFT VENTRICULAR INTERNAL DIMENSION IN DIASTOLE: 4 CM (ref 3.5–6)
LEFT VENTRICULAR POSTERIOR WALL IN END DIASTOLE: 1.2 CM
LEFT VENTRICULAR STROKE VOLUME: 40 ML
LVSV (TEICH): 40 ML
MAGNESIUM SERPL-MCNC: 2 MG/DL (ref 1.9–2.7)
MCH RBC QN AUTO: 29.1 PG (ref 26.8–34.3)
MCHC RBC AUTO-ENTMCNC: 32.1 G/DL (ref 31.4–37.4)
MCV RBC AUTO: 91 FL (ref 82–98)
MV E'TISSUE VEL-LAT: 4 CM/S
MV E'TISSUE VEL-SEP: 4 CM/S
MV PEAK A VEL: 0.83 M/S
MV PEAK E VEL: 149 CM/S
MV STENOSIS PRESSURE HALF TIME: 63 MS
MV VALVE AREA P 1/2 METHOD: 3.49
NONHDLC SERPL-MCNC: 93 MG/DL
PLATELET # BLD AUTO: 187 THOUSANDS/UL (ref 149–390)
PMV BLD AUTO: 11.6 FL (ref 8.9–12.7)
POTASSIUM SERPL-SCNC: 3.2 MMOL/L (ref 3.5–5.3)
PROCALCITONIN SERPL-MCNC: 0.11 NG/ML
PROCALCITONIN SERPL-MCNC: 0.11 NG/ML
PROT SERPL-MCNC: 6 G/DL (ref 6.4–8.4)
PV PEAK GRADIENT: 2 MMHG
RBC # BLD AUTO: 4.02 MILLION/UL (ref 3.81–5.12)
RIGHT ATRIUM AREA SYSTOLE A4C: 14.6 CM2
RIGHT VENTRICLE ID DIMENSION: 3.5 CM
S PNEUM AG UR QL: NEGATIVE
SL CV LEFT ATRIUM LENGTH A2C: 5.4 CM
SL CV LV EF: 50
SL CV PED ECHO LEFT VENTRICLE DIASTOLIC VOLUME (MOD BIPLANE) 2D: 70 ML
SL CV PED ECHO LEFT VENTRICLE SYSTOLIC VOLUME (MOD BIPLANE) 2D: 29 ML
SODIUM SERPL-SCNC: 140 MMOL/L (ref 135–147)
TR MAX PG: 40 MMHG
TR PEAK VELOCITY: 3.2 M/S
TRICUSPID ANNULAR PLANE SYSTOLIC EXCURSION: 2.5 CM
TRICUSPID VALVE PEAK REGURGITATION VELOCITY: 3.15 M/S
TRIGL SERPL-MCNC: 92 MG/DL (ref ?–150)
WBC # BLD AUTO: 4.86 THOUSAND/UL (ref 4.31–10.16)

## 2025-01-30 PROCEDURE — 93306 TTE W/DOPPLER COMPLETE: CPT

## 2025-01-30 PROCEDURE — 93306 TTE W/DOPPLER COMPLETE: CPT | Performed by: INTERNAL MEDICINE

## 2025-01-30 PROCEDURE — 80053 COMPREHEN METABOLIC PANEL: CPT | Performed by: FAMILY MEDICINE

## 2025-01-30 PROCEDURE — 99232 SBSQ HOSP IP/OBS MODERATE 35: CPT | Performed by: STUDENT IN AN ORGANIZED HEALTH CARE EDUCATION/TRAINING PROGRAM

## 2025-01-30 PROCEDURE — 83735 ASSAY OF MAGNESIUM: CPT | Performed by: FAMILY MEDICINE

## 2025-01-30 PROCEDURE — 99222 1ST HOSP IP/OBS MODERATE 55: CPT | Performed by: INTERNAL MEDICINE

## 2025-01-30 PROCEDURE — 87449 NOS EACH ORGANISM AG IA: CPT | Performed by: FAMILY MEDICINE

## 2025-01-30 PROCEDURE — 80061 LIPID PANEL: CPT | Performed by: FAMILY MEDICINE

## 2025-01-30 PROCEDURE — 85027 COMPLETE CBC AUTOMATED: CPT | Performed by: FAMILY MEDICINE

## 2025-01-30 PROCEDURE — 84145 PROCALCITONIN (PCT): CPT | Performed by: FAMILY MEDICINE

## 2025-01-30 RX ORDER — SERTRALINE HYDROCHLORIDE 25 MG/1
25 TABLET, FILM COATED ORAL DAILY
Status: DISCONTINUED | OUTPATIENT
Start: 2025-01-30 | End: 2025-02-01 | Stop reason: HOSPADM

## 2025-01-30 RX ORDER — POTASSIUM CHLORIDE 14.9 MG/ML
20 INJECTION INTRAVENOUS ONCE
Status: COMPLETED | OUTPATIENT
Start: 2025-01-30 | End: 2025-01-30

## 2025-01-30 RX ORDER — BENZONATATE 100 MG/1
100 CAPSULE ORAL 3 TIMES DAILY PRN
Status: DISCONTINUED | OUTPATIENT
Start: 2025-01-30 | End: 2025-01-31

## 2025-01-30 RX ORDER — ASPIRIN 81 MG/1
324 TABLET, CHEWABLE ORAL DAILY
Status: DISCONTINUED | OUTPATIENT
Start: 2025-01-30 | End: 2025-02-01 | Stop reason: HOSPADM

## 2025-01-30 RX ORDER — POTASSIUM CHLORIDE 20MEQ/15ML
20 LIQUID (ML) ORAL
Status: DISCONTINUED | OUTPATIENT
Start: 2025-01-30 | End: 2025-01-30

## 2025-01-30 RX ORDER — BRIMONIDINE TARTRATE 2 MG/ML
1 SOLUTION/ DROPS OPHTHALMIC 2 TIMES DAILY
Status: DISCONTINUED | OUTPATIENT
Start: 2025-01-30 | End: 2025-02-01 | Stop reason: HOSPADM

## 2025-01-30 RX ORDER — POTASSIUM CHLORIDE 1500 MG/1
20 TABLET, EXTENDED RELEASE ORAL ONCE
Status: COMPLETED | OUTPATIENT
Start: 2025-01-30 | End: 2025-01-30

## 2025-01-30 RX ORDER — METOPROLOL SUCCINATE 25 MG/1
12.5 TABLET, EXTENDED RELEASE ORAL DAILY
Status: DISCONTINUED | OUTPATIENT
Start: 2025-01-30 | End: 2025-02-01 | Stop reason: HOSPADM

## 2025-01-30 RX ORDER — AMLODIPINE BESYLATE 2.5 MG/1
2.5 TABLET ORAL DAILY
Status: DISCONTINUED | OUTPATIENT
Start: 2025-01-30 | End: 2025-02-01 | Stop reason: HOSPADM

## 2025-01-30 RX ORDER — PANTOPRAZOLE SODIUM 40 MG/1
40 TABLET, DELAYED RELEASE ORAL
Status: DISCONTINUED | OUTPATIENT
Start: 2025-01-30 | End: 2025-02-01 | Stop reason: HOSPADM

## 2025-01-30 RX ORDER — ENOXAPARIN SODIUM 100 MG/ML
40 INJECTION SUBCUTANEOUS DAILY
Status: DISCONTINUED | OUTPATIENT
Start: 2025-01-30 | End: 2025-02-01 | Stop reason: HOSPADM

## 2025-01-30 RX ORDER — LISINOPRIL 10 MG/1
10 TABLET ORAL DAILY
Status: DISCONTINUED | OUTPATIENT
Start: 2025-01-30 | End: 2025-02-01 | Stop reason: HOSPADM

## 2025-01-30 RX ORDER — FUROSEMIDE 10 MG/ML
20 INJECTION INTRAMUSCULAR; INTRAVENOUS ONCE
Status: COMPLETED | OUTPATIENT
Start: 2025-01-30 | End: 2025-01-30

## 2025-01-30 RX ADMIN — BRIMONIDINE TARTRATE 1 DROP: 2 SOLUTION/ DROPS OPHTHALMIC at 17:09

## 2025-01-30 RX ADMIN — SERTRALINE HYDROCHLORIDE 25 MG: 25 TABLET ORAL at 09:08

## 2025-01-30 RX ADMIN — FUROSEMIDE 20 MG: 10 INJECTION, SOLUTION INTRAMUSCULAR; INTRAVENOUS at 14:54

## 2025-01-30 RX ADMIN — BENZONATATE 100 MG: 100 CAPSULE ORAL at 23:25

## 2025-01-30 RX ADMIN — ASPIRIN 81 MG CHEWABLE TABLET 324 MG: 81 TABLET CHEWABLE at 14:53

## 2025-01-30 RX ADMIN — OSELTAMAVIR PHOSPHATE 30 MG: 30 CAPSULE ORAL at 20:32

## 2025-01-30 RX ADMIN — PANTOPRAZOLE SODIUM 40 MG: 40 TABLET, DELAYED RELEASE ORAL at 06:00

## 2025-01-30 RX ADMIN — BRIMONIDINE TARTRATE 1 DROP: 2 SOLUTION/ DROPS OPHTHALMIC at 09:08

## 2025-01-30 RX ADMIN — LISINOPRIL 10 MG: 10 TABLET ORAL at 09:09

## 2025-01-30 RX ADMIN — OSELTAMAVIR PHOSPHATE 30 MG: 30 CAPSULE ORAL at 09:08

## 2025-01-30 RX ADMIN — ENOXAPARIN SODIUM 40 MG: 40 INJECTION SUBCUTANEOUS at 09:09

## 2025-01-30 RX ADMIN — POTASSIUM CHLORIDE 20 MEQ: 1500 TABLET, EXTENDED RELEASE ORAL at 17:09

## 2025-01-30 RX ADMIN — POTASSIUM CHLORIDE 20 MEQ: 14.9 INJECTION, SOLUTION INTRAVENOUS at 14:53

## 2025-01-30 RX ADMIN — METOPROLOL SUCCINATE 12.5 MG: 25 TABLET, FILM COATED, EXTENDED RELEASE ORAL at 09:09

## 2025-01-30 RX ADMIN — OSELTAMAVIR PHOSPHATE 30 MG: 30 CAPSULE ORAL at 00:04

## 2025-01-30 RX ADMIN — AMLODIPINE BESYLATE 2.5 MG: 2.5 TABLET ORAL at 14:53

## 2025-01-30 NOTE — ASSESSMENT & PLAN NOTE
high-sensitivity troponin: 1169 (0hr), 1084 (2hr), and 1007 (4hr)  12 lead EKG: ST flattening seen in lateral leads  continue Toprol-XL 12.5 mg daily  will add Norvasc 2.5 mg once a day

## 2025-01-30 NOTE — ASSESSMENT & PLAN NOTE
history of lymphedema  she states she has not been using her lymphedema pumps  also notes she may have missed some of her medications  currently on IV Lasix will continue to monitor closely and most likely transition to PO in a.m.

## 2025-01-30 NOTE — PROGRESS NOTES
Progress Note - Hospitalist   Name: Jackie Urbina 89 y.o. female I MRN: [id number]  Unit/Bed#: [address] [place] [address] Date of Admission: 1/29/2025   Date of Service: 1/30/2025 I Hospital Day: 0    Assessment & Plan  Influenza A virus present  Patient presented to the ER with on and off productive cough for about 3 weeks.  States it would feel like it was getting better but then worsened again  Initial CT showed possible aspiration/infectious pneumonitis however CTA showed findings suggestive of pulmonary edema/CHF  Found to be influenza A positive  Continue Tamiflu, WBC / procalcitonin normal. Monitor off antibiotics for now.    Pulmonary edema  Patient with worsening leg edema compared to baseline.  Was not taking her medications for at least about 2 weeks recently but then started taking it again  Recent echo with EF of 60 to 65% and grade 1 diastolic dysfunction.  Moderate to severe aortic valve stenosis along with mild mitral valve stenosis. Given acute presentation repeat Echo pending.  Received lasix last night, redose again for this morning.   Daily weights, I/Os  I/O, daily weights  Elevated troponin  EKG nonischemic and patient without chest pain  Likely in the setting of influenza infection, pulmonary edema  Continue aspirin.  Check lipid panel  Currently on NPO until evaluated by cardiology.     Results from last 7 days   Lab Units 01/29/25  2125 01/29/25  1932 01/29/25  1737   HS TNI 0HR ng/L  --   --  1,169*   HS TNI 2HR ng/L  --  1,084*  --    HS TNI 4HR ng/L 1,007*  --   --        Abnormal CT of the chest  On imaging noted to have 1.8 x 1.2 saccular aneurysm involving proximal abdominal aorta with significant calcifications.  Nonemergent CTA aorta as clinically warranted recommended  L1 compression deformity noted which is age-indeterminate.  Patient denies any significant back pain  4 mm right middle lobe pulmonary nodule noted.  Per guidelines, no follow-up recommended if patient is low risk but if high  risk can get CT chest at 12 months  Essential hypertension  Continue Toprol-XL, lisinopril and monitor blood pressures  Hold chlorthalidone as patient receiving Lasix  Aortic valve stenosis  As noted above patient with moderate to severe aortic valve stenosis on echo    VTE Pharmacologic Prophylaxis: VTE Score: 3 Moderate Risk (Score 3-4) - Pharmacological DVT Prophylaxis Ordered: enoxaparin (Lovenox).    Mobility:   Basic Mobility Inpatient Raw Score: 22  JH-HLM Goal: 7: Walk 25 feet or more  JH-HLM Achieved: 6: Walk 10 steps or more    Discussions with Specialists or Other Care Team Provider: cardiology    Education and Discussions with Family / Patient: patient, called son Immanuel    Current Length of Stay: 0 day(s)  Current Patient Status: Observation   Certification Statement: The patient will continue to require additional inpatient hospital stay due to iv diuretics, cards eval, influenza amangement  Discharge Plan: Anticipate discharge in 24-48 hrs to home.    Code Status: Level 1 - Full Code    Subjective   Patient seen and examined. Reports some improvement of her symptoms. No new complaints overnight.     Objective   Vitals:   Temp (24hrs), Av.9 °F (36.6 °C), Min:97.5 °F (36.4 °C), Max:98.7 °F (37.1 °C)    Temp:  [97.5 °F (36.4 °C)-98.7 °F (37.1 °C)] 97.5 °F (36.4 °C)  HR:  [61-69] 69  Resp:  [17-24] 19  BP: (129-175)/(59-86) 166/74  SpO2:  [95 %-98 %] 96 %  Body mass index is 21.87 kg/m².     Input and Output Summary (last 24 hours):   No intake or output data in the 24 hours ending 25 1019    Physical Exam  Vitals reviewed.   Constitutional:       General: She is not in acute distress.  HENT:      Head: Normocephalic.      Nose: Nose normal.      Mouth/Throat:      Mouth: Mucous membranes are moist.   Eyes:      General: No scleral icterus.  Cardiovascular:      Rate and Rhythm: Normal rate and regular rhythm.      Heart sounds: Murmur heard.   Pulmonary:      Effort: Pulmonary effort is normal.  No respiratory distress.      Breath sounds: Rales present. No wheezing.   Abdominal:      General: There is no distension.      Palpations: Abdomen is soft.      Tenderness: There is no abdominal tenderness.   Skin:     General: Skin is warm.   Neurological:      Mental Status: She is alert.   Psychiatric:         Mood and Affect: Mood normal.         Behavior: Behavior normal.       Lines/Drains:        Telemetry:  Telemetry Orders (From admission, onward)               24 Hour Telemetry Monitoring  Continuous x 24 Hours (Telem)        Expiring   Question:  Reason for 24 Hour Telemetry  Answer:  Patients with yuri/adalberto/endocarditis; cardiac contusion                     Indication for Continued Telemetry Use: Acute MI/Unstable Angina/Rule out ACS               Lab Results: I have reviewed the following results:   Results from last 7 days   Lab Units 01/30/25 0420 01/29/25  1737   WBC Thousand/uL 4.86 4.40   HEMOGLOBIN g/dL 11.7 11.7   PLATELETS Thousands/uL 187 179   MCV fL 91 92     Results from last 7 days   Lab Units 01/30/25  0420 01/29/25  1737   SODIUM mmol/L 140 140   POTASSIUM mmol/L 3.2* 3.7   CHLORIDE mmol/L 103 104   CO2 mmol/L 22 25   ANION GAP mmol/L 15* 11   BUN mg/dL 32* 33*   CREATININE mg/dL 0.73 0.78   CALCIUM mg/dL 8.6 8.5   ALBUMIN g/dL 3.5  --    TOTAL BILIRUBIN mg/dL 0.38  --    ALK PHOS U/L 83  --    ALT U/L 11  --    AST U/L 33  --    EGFR ml/min/1.73sq m 73 67   GLUCOSE RANDOM mg/dL 79 92     Results from last 7 days   Lab Units 01/30/25  0420   MAGNESIUM mg/dL 2.0         Results from last 7 days   Lab Units 01/29/25 2125 01/29/25  1932 01/29/25  1737   HS TNI 0HR ng/L  --   --  1,169*   HS TNI 2HR ng/L  --  1,084*  --    HS TNI 4HR ng/L 1,007*  --   --           Results from last 7 days   Lab Units 01/30/25  0420   PROCALCITONIN ng/ml 0.11                   Recent Cultures (last 7 days):   Results from last 7 days   Lab Units 01/30/25  0427   LEGIONELLA URINARY ANTIGEN  Negative        Imaging:  Reviewed radiology reports from this admission: ct chest / cta pe study    Last 24 Hours Medication List:     Current Facility-Administered Medications:     aspirin chewable tablet 324 mg, Daily    brimonidine tartrate 0.2 % ophthalmic solution 1 drop, BID    enoxaparin (LOVENOX) subcutaneous injection 40 mg, Daily    lisinopril (ZESTRIL) tablet 10 mg, Daily    metoprolol succinate (TOPROL-XL) 24 hr tablet 12.5 mg, Daily    oseltamivir (TAMIFLU) capsule 30 mg, Q12H MAXIM    pantoprazole (PROTONIX) EC tablet 40 mg, Early Morning    sertraline (ZOLOFT) tablet 25 mg, Daily      **Please Note: This note may have been constructed using a voice recognition system.**

## 2025-01-30 NOTE — ASSESSMENT & PLAN NOTE
Initial troponin 1169 which trended down to 1084 --> 1007  EKG nonischemic and patient without chest pain  Likely in the setting of influenza infection, pulmonary edema  Case was discussed with cardiology by ER and as patient is asymptomatic, no heparin drip recommended  N.p.o. after midnight until seen by cardiology in case of any cardiac testing  Continue aspirin.  Check lipid panel

## 2025-01-30 NOTE — ASSESSMENT & PLAN NOTE
Patient with worsening leg edema compared to baseline.  Was not taking her medications for at least about 2 weeks recently but then started taking it again  Recent echo with EF of 60 to 65% and grade 1 diastolic dysfunction.  Moderate to severe aortic valve stenosis along with mild mitral valve stenosis. Given acute presentation repeat Echo pending.  Received lasix last night, redose again for this morning.   Daily weights, I/Os  I/O, daily weights

## 2025-01-30 NOTE — ASSESSMENT & PLAN NOTE
11/13/2024 TTE: EF visibly estimated 60 to 65% come with normal systolic function and RV function was normal. Left atrium was severely dilated and right atrium was moderately dilated. Aortic valve with moderate to severe reduced mobility and moderate to severe aortic stenosis with mean/peak gradient of 29/47 mmHg and INDY of 0.88 cm²  1/30/2025 TTE: ordered per primary team

## 2025-01-30 NOTE — H&P
H&P - Hospitalist   Name: Jackie Urbina 89 y.o. female I MRN: [id number]  Unit/Bed#: ED 04 I Date of Admission: 1/29/2025   Date of Service: 1/29/2025 I Hospital Day: 0     Assessment & Plan  Influenza A virus present  Patient presented to the ER with on and off productive cough for about 3 weeks.  States it would feel like it was getting better but then worsened again  Initial CT showed possible aspiration/infectious pneumonitis however CTA showed findings suggestive of pulmonary edema/CHF  Found to be influenza A positive  Start Tamiflu.  Discussed with patient/family if her initial cough was also related to flu then given timing of symptoms Tamiflu less likely to be helpful  Check sputum culture if able.  Doubtful pneumonia, check urine antigens, procalcitonin now and again in a.m.  Repeat labs in a.m.    Pulmonary edema  Patient with worsening leg edema compared to baseline.  Was not taking her medications for at least about 2 weeks recently but then started taking it again  Recent echo with EF of 60 to 65% and grade 1 diastolic dysfunction.  Moderate to severe aortic valve stenosis along with mild mitral valve stenosis  Initial CT showed bilateral lower lobe bronchial wall thickening with mild left lower lobe groundglass opacity which could be aspiration/infectious pneumonitis.  CTA however showed cardiomegaly with worsening groundglass opacities which is more suggestive of pulmonary edema with trace bilateral effusions, bronchial wall thickening likely interstitial edema suggestive of CHF  Will trial one-time dose of 20 mg of IV Lasix and monitor response.  May need further doses in a.m.  I/O, daily weights  Elevated troponin  Initial troponin 1169 which trended down to 1084 --> 1007  EKG nonischemic and patient without chest pain  Likely in the setting of influenza infection, pulmonary edema  Case was discussed with cardiology by ER and as patient is asymptomatic, no heparin drip recommended  N.p.o. after  "midnight until seen by cardiology in case of any cardiac testing  Continue aspirin.  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  Nonemergent CTA aorta as clinically warranted recommended  L1 compression deformity noted which is age-indeterminate.  Patient denies any significant back pain  4 mm right middle lobe pulmonary nodule noted.  Per guidelines, no follow-up recommended if patient is low risk but if high risk can get CT chest at 12 months  Essential hypertension  Continue Toprol-XL, lisinopril and monitor blood pressures  Hold chlorthalidone as patient receiving Lasix  Aortic valve stenosis  As noted above patient with moderate to severe aortic valve stenosis on echo      VTE Pharmacologic Prophylaxis:    Lovenox  Code Status: Level 1 - Full Code   Discussion with family: Updated  (son and son significant other) at bedside.    Anticipated Length of Stay: Patient will be admitted on an observation basis with an anticipated length of stay of less than 2 midnights secondary to influenza, elevated troponin, pulmonary edema.    History of Present Illness   Chief Complaint: Jamir Urbina is a 89 y.o. female with a PMH of hypertension, aortic valve stenosis who presents with on and off cough over the last 3 weeks.  States cough is mostly dry but sometimes brings up thick whitish sputum.  Patient states that the cough seemed to be getting better but then worsened again.  Denies any recent travels but does visit her sister at a nursing home.  States she took 3 doses of Robitussin at home with no relief.  Patient denies any chest pain but reports \"strange feeling\" in her chest last week.  Patient also reports being noncompliant with her medications over the last 2 weeks but started taking it again.  Patient does have chronic lymphedema but leg edema is worse compared to " "baseline.  States she has not been using her compression boots either    Review of Systems   Constitutional:  Negative for appetite change, chills and fever.   HENT:  Negative for congestion.    Eyes:  Negative for photophobia.   Respiratory:  Positive for cough. Negative for chest tightness and shortness of breath.    Cardiovascular:  Positive for leg swelling. Negative for chest pain.   Gastrointestinal:  Negative for abdominal pain, blood in stool, nausea and vomiting.   Genitourinary:  Positive for decreased urine volume. Negative for dysuria, frequency and hematuria.   Musculoskeletal:  Negative for back pain.   Skin:  Negative for wound.   Neurological:  Negative for headaches.   Hematological:  Does not bruise/bleed easily.   Psychiatric/Behavioral:  Negative for agitation.        Historical Information   Past Medical History:   Diagnosis Date    Anesthesia complication     .Yrs ago had \"anxiety\" reaction not sure while sedated, pt states sensitive to anesthesia effects    Anxiety     stress at home    Arthritis     Cataract, right     Last Assessed:5/11/16    Eye hemorrhage     left eye currently 5/12/16    History of shingles 05/2015    Hypertension     Mitral valve stenosis, mild      Past Surgical History:   Procedure Laterality Date    CARPAL TUNNEL RELEASE Left     CATARACT EXTRACTION      CATARACT EXTRACTION W/ INTRAOCULAR LENS IMPLANT Left 10/11/2016    Procedure: EXTRACTION EXTRACAPSULAR CATARACT PHACO INTRAOCULAR LENS (IOL);  Surgeon: Wale Roth MD;  Location: [facility] MAIN OR;  Service:     CERVICAL CONE BIOPSY      COLONOSCOPY      EYE SURGERY Left     muscle repair    ME ARTHRP KNE CONDYLE&PLATU MEDIAL&LAT COMPARTMENTS Right 12/15/2020    Procedure: ARTHROPLASTY KNEE TOTAL;  Surgeon: Greg Mitchell DO;  Location: [facility] MAIN OR;  Service: Orthopedics    ME OPTX FEM SHFT FX W/INSJ IMED IMPLT W/WO SCREW Right 7/23/2019    Procedure: INSERTION NAIL IM FEMUR ANTEGRADE (TROCHANTERIC);  Surgeon: Greg GARCIA" DO Stephen;  Location: [facility] MAIN OR;  Service: Orthopedics    NV XCAPSL CTRC RMVL INSJ IO LENS PROSTH W/O ECP Right 2016    Procedure: EXTRACTION EXTRACAPSULAR CATARACT PHACO INTRAOCULAR LENS (IOL);  Surgeon: Wale Roth MD;  Location: [facility] MAIN OR;  Service: Ophthalmology    TONSILLECTOMY       Social History     Tobacco Use    Smoking status: Never    Smokeless tobacco: Never   Vaping Use    Vaping status: Never Used   Substance and Sexual Activity    Alcohol use: Yes     Comment: rarely    Drug use: No    Sexual activity: Not on file     E-Cigarette/Vaping    E-Cigarette Use Never User      E-Cigarette/Vaping Substances    Nicotine No     THC No     CBD No      Family History   Problem Relation Age of Onset    Heart disease Mother         MI  at age 69    Arthritis Mother     GI problems Father         bleeding ulcer    Arthritis Sister     Sleep apnea Sister     Irritable bowel syndrome Sister     Cancer Sister         skin cancer    Heart disease Brother         CABG (5)    Cancer Brother         skin cancer    Heart disease Maternal Aunt      Social History:  Marital Status: /Civil Union   Occupation: None  Patient Pre-hospital Living Situation: Alone  Patient Pre-hospital Level of Mobility: Uses cane occasionally  Patient Pre-hospital Diet Restrictions: None    Meds/Allergies   I have reviewed home medications using recent Epic encounter.  Prior to Admission medications    Medication Sig Start Date End Date Taking? Authorizing Provider   Ascorbic Acid (vitamin C) 100 MG tablet Take 100 mg by mouth daily    Historical Provider, MD   aspirin (ECOTRIN) 325 mg EC tablet Take 1 tablet (325 mg total) by mouth 2 (two) times a day 20   Yogesh Martell PA-C   brimonidine tartrate 0.2 % ophthalmic solution  23   Historical Provider, MD   chlorthalidone 25 mg tablet TAKE 1 TABLET 4 DAYS A WEEK EVERY MONDAY, WEDNESDAY, FRIDAY, AND . 24   Tom Howell MD    lisinopril (ZESTRIL) 10 mg tablet Take 1 tablet (10 mg total) by mouth daily 10/31/24   Karolina Chir, DO   metoprolol succinate (TOPROL-XL) 25 mg 24 hr tablet Take 0.5 tablets (12.5 mg total) by mouth daily 10/31/24   Karolina Chir, DO   Multiple Vitamins-Minerals (PRESERVISION AREDS PO) Take by mouth    Historical Provider, MD   omeprazole (PriLOSEC) 20 mg delayed release capsule TAKE 1 CAPSULE DAILY 6/19/24   Tom Howell MD   sertraline (ZOLOFT) 25 mg tablet TAKE 1 TABLET DAILY 11/14/24   Tom Howell MD     Allergies   Allergen Reactions    Indocin [Indomethacin] Other (See Comments)     hypertension       Objective :  Temp:  [97.5 °F (36.4 °C)] 97.5 °F (36.4 °C)  HR:  [61-69] 61  BP: (142-175)/(65-80) 175/80  Resp:  [24] 24  SpO2:  [96 %-98 %] 97 %  O2 Device: None (Room air)    Physical Exam  Vitals reviewed.   Constitutional:       General: She is not in acute distress.     Appearance: She is not toxic-appearing.   HENT:      Head: Normocephalic and atraumatic.   Eyes:      Conjunctiva/sclera: Conjunctivae normal.   Cardiovascular:      Rate and Rhythm: Normal rate and regular rhythm.      Heart sounds: Murmur heard.   Pulmonary:      Effort: Pulmonary effort is normal. No respiratory distress.      Breath sounds: No wheezing.      Comments: Some mild crackles noted  Abdominal:      General: Bowel sounds are normal. There is no distension.      Palpations: Abdomen is soft.      Tenderness: There is no abdominal tenderness.   Musculoskeletal:      Comments: Bilateral lower extremity pitting edema noted   Neurological:      Mental Status: She is alert and oriented to person, place, and time.   Psychiatric:         Mood and Affect: Mood normal.          Lines/Drains:            Lab Results: I have reviewed the following results:  Results from last 7 days   Lab Units 01/29/25  1737   WBC Thousand/uL 4.40   HEMOGLOBIN g/dL 11.7   HEMATOCRIT % 37.3   PLATELETS Thousands/uL 179   SEGS PCT % 44    LYMPHO PCT % 38   MONO PCT % 15*   EOS PCT % 1     Results from last 7 days   Lab Units 01/29/25  1737   SODIUM mmol/L 140   POTASSIUM mmol/L 3.7   CHLORIDE mmol/L 104   CO2 mmol/L 25   BUN mg/dL 33*   CREATININE mg/dL 0.78   ANION GAP mmol/L 11   CALCIUM mg/dL 8.5   GLUCOSE RANDOM mg/dL 92             Lab Results   Component Value Date    HGBA1C 5.4 10/27/2020           Imaging Results Review: I reviewed radiology reports from this admission including: CTA chest and CT chest.  Other Study Results Review: Other studies reviewed include: Echo    Administrative Statements     ** Please Note: This note has been constructed using a voice recognition system. **

## 2025-01-30 NOTE — ASSESSMENT & PLAN NOTE
blood pressures elevated  continue Toprol-XL 12.5 mg daily  continue lisinopril 10 mg once a day  will add Norvasc 2.5 mg daily  close monitoring of blood pressure and avoid profound hypotension in this elderly female with aortic valve stenosis

## 2025-01-30 NOTE — ASSESSMENT & PLAN NOTE
Patient presented to the ER with on and off productive cough for about 3 weeks.  States it would feel like it was getting better but then worsened again  Initial CT showed possible aspiration/infectious pneumonitis however CTA showed findings suggestive of pulmonary edema/CHF  Found to be influenza A positive  Start Tamiflu.  Discussed with patient/family if her initial cough was also related to flu then given timing of symptoms Tamiflu less likely to be helpful  Check sputum culture if able.  Doubtful pneumonia, check urine antigens, procalcitonin now and again in a.m.  Repeat labs in a.m.

## 2025-01-30 NOTE — ASSESSMENT & PLAN NOTE
EKG nonischemic and patient without chest pain  Likely in the setting of influenza infection, pulmonary edema  Continue aspirin.  Check lipid panel  Currently on NPO until evaluated by cardiology.     Results from last 7 days   Lab Units 01/29/25 2125 01/29/25 1932 01/29/25  1737   HS TNI 0HR ng/L  --   --  1,169*   HS TNI 2HR ng/L  --  1,084*  --    HS TNI 4HR ng/L 1,007*  --   --

## 2025-01-30 NOTE — ASSESSMENT & PLAN NOTE
Continue Toprol-XL, lisinopril and monitor blood pressures  Hold chlorthalidone as patient receiving Lasix

## 2025-01-30 NOTE — CONSULTS
Consultation - Cardiology   Name: Jackie Urbina 89 y.o. female I MRN: [id number]  Unit/Bed#: [address] [place] [address] Date of Admission: 1/29/2025   Date of Service: 1/30/2025 I Hospital Day: 0   Inpatient consult to Cardiology  Consult performed by: JUAN Bronson  Consult ordered by: Honey Eddy DO        Physician Requesting Evaluation: Yogesh Montoya MD   Reason for Evaluation / Principal Problem: lower extremity edema, shortness of breath, elevated troponins    Assessment & Plan  Influenza A virus present  rapid influenza screen positive for influenza A  1/29/2025 CT chest: bilateral lower lobe reactive/infectious bronchial wall thickening with mild left lower lobe ground glass opacities concerning for aspiration versus infectious pneumonitis  patient started on Tamiflu per primary team  Essential hypertension  blood pressures elevated  continue Toprol-XL 12.5 mg daily  continue lisinopril 10 mg once a day  will add Norvasc 2.5 mg daily  close monitoring of blood pressure and avoid profound hypotension in this elderly female with aortic valve stenosis  Aortic valve stenosis  11/13/2024 TTE: EF visibly estimated 60 to 65% come with normal systolic function and RV function was normal. Left atrium was severely dilated and right atrium was moderately dilated. Aortic valve with moderate to severe reduced mobility and moderate to severe aortic stenosis with mean/peak gradient of 29/47 mmHg and INDY of 0.88 cm²  1/30/2025 TTE: ordered per primary team  Elevated troponin  high-sensitivity troponin: 1169 (0hr), 1084 (2hr), and 1007 (4hr)  12 lead EKG: ST flattening seen in lateral leads  continue Toprol-XL 12.5 mg daily  will add Norvasc 2.5 mg once a day  Lymphedema  history of lymphedema  she states she has not been using her lymphedema pumps  also notes she may have missed some of her medications  currently on IV Lasix will continue to monitor closely and most likely transition to PO in a.m.      History of Present  Illness   Jackie Urbina is a 89 y.o. female who presented to the emergency room with 2 to 3 week history of cold like symptoms. She states she had congestion and thought she was coming down with a cold. She notes despite taking over-the-counter medications did not improve. She also noted lower extremity edema, but she states she does have a history of chronic lymphedema and has not been using her lymphedema pumps.     In the emergency room her BNP was 316. CT chest noted bilateral lower lobe reactive/infectious bronchial wall thickening with mild left lower lobe ground glass opacities concerning for aspiration versus infectious pneumonitis. Rapid viral screen was positive for influenza A. CT PE protocol was negative for pulmonary embolus.    2D echocardiogram performed on 11/13/2024 noted EF visibly estimated 60-65% with normal systolic function, RV function was normal, left atrium is severely dilated and right atrium is moderately dilated. Aortic valve noted moderate thickening and moderate to severe reduced mobility with moderate to severe aortic stenosis with mean/peak gradient of 29/47 mmHg and INDY 0.88 cm² there is moderate mitral and tricuspid valve regurgitation        Review of Systems   Constitutional:  Positive for activity change and fatigue.   HENT:  Positive for congestion. Negative for ear pain, nosebleeds, sneezing and trouble swallowing.    Eyes: Negative.  Negative for photophobia and visual disturbance.   Respiratory:  Positive for cough, chest tightness and shortness of breath.    Cardiovascular:  Positive for leg swelling.   Gastrointestinal:  Negative for abdominal distention, constipation, diarrhea and vomiting.   Endocrine: Negative.  Negative for polydipsia, polyphagia and polyuria.   Genitourinary: Negative.  Negative for difficulty urinating.   Musculoskeletal: Negative.    Neurological: Negative.  Negative for dizziness, syncope, weakness and light-headedness.   Hematological: Negative.   "  Psychiatric/Behavioral: Negative.       I have reviewed the patient's PMH, PSH, Social History, Family History, Meds, and Allergies  Historical Information   Past Medical History:   Diagnosis Date    Anesthesia complication     .Yrs ago had \"anxiety\" reaction not sure while sedated, pt states sensitive to anesthesia effects    Anxiety     stress at home    Arthritis     Cataract, right     Last Assessed:5/11/16    Eye hemorrhage     left eye currently 5/12/16    History of shingles 05/2015    Hypertension     Mitral valve stenosis, mild      Past Surgical History:   Procedure Laterality Date    CARPAL TUNNEL RELEASE Left     CATARACT EXTRACTION      CATARACT EXTRACTION W/ INTRAOCULAR LENS IMPLANT Left 10/11/2016    Procedure: EXTRACTION EXTRACAPSULAR CATARACT PHACO INTRAOCULAR LENS (IOL);  Surgeon: Wale Roth MD;  Location: [facility] MAIN OR;  Service:     CERVICAL CONE BIOPSY      COLONOSCOPY      EYE SURGERY Left     muscle repair    CT ARTHRP KNE CONDYLE&PLATU MEDIAL&LAT COMPARTMENTS Right 12/15/2020    Procedure: ARTHROPLASTY KNEE TOTAL;  Surgeon: Greg Mitchell DO;  Location: [facility] MAIN OR;  Service: Orthopedics    CT OPTX FEM SHFT FX W/INSJ IMED IMPLT W/WO SCREW Right 7/23/2019    Procedure: INSERTION NAIL IM FEMUR ANTEGRADE (TROCHANTERIC);  Surgeon: Greg Mitchell DO;  Location: [facility] MAIN OR;  Service: Orthopedics    CT XCAPSL CTRC RMVL INSJ IO LENS PROSTH W/O ECP Right 5/17/2016    Procedure: EXTRACTION EXTRACAPSULAR CATARACT PHACO INTRAOCULAR LENS (IOL);  Surgeon: Wale Roth MD;  Location: [facility] MAIN OR;  Service: Ophthalmology    TONSILLECTOMY       Social History     Tobacco Use    Smoking status: Never    Smokeless tobacco: Never   Vaping Use    Vaping status: Never Used   Substance and Sexual Activity    Alcohol use: Yes     Comment: rarely    Drug use: No    Sexual activity: Not on file     E-Cigarette/Vaping    E-Cigarette Use Never User      E-Cigarette/Vaping Substances    Nicotine No     THC " No     CBD No      Family History   Problem Relation Age of Onset    Heart disease Mother         MI  at age 69    Arthritis Mother     GI problems Father         bleeding ulcer    Arthritis Sister     Sleep apnea Sister     Irritable bowel syndrome Sister     Cancer Sister         skin cancer    Heart disease Brother         CABG (5)    Cancer Brother         skin cancer    Heart disease Maternal Aunt      Social History     Tobacco Use    Smoking status: Never    Smokeless tobacco: Never   Vaping Use    Vaping status: Never Used   Substance and Sexual Activity    Alcohol use: Yes     Comment: rarely    Drug use: No    Sexual activity: Not on file       Current Facility-Administered Medications:     aspirin chewable tablet 324 mg, Daily    brimonidine tartrate 0.2 % ophthalmic solution 1 drop, BID    enoxaparin (LOVENOX) subcutaneous injection 40 mg, Daily    furosemide (LASIX) injection 20 mg, Once    lisinopril (ZESTRIL) tablet 10 mg, Daily    metoprolol succinate (TOPROL-XL) 24 hr tablet 12.5 mg, Daily    oseltamivir (TAMIFLU) capsule 30 mg, Q12H MAXIM    pantoprazole (PROTONIX) EC tablet 40 mg, Early Morning    potassium chloride 20 mEq IVPB (premix), Once    sertraline (ZOLOFT) tablet 25 mg, Daily  Prior to Admission Medications   Prescriptions Last Dose Informant Patient Reported? Taking?   Ascorbic Acid (vitamin C) 100 MG tablet  Self Yes No   Sig: Take 100 mg by mouth daily   Multiple Vitamins-Minerals (PRESERVISION AREDS PO)  Self Yes No   Sig: Take by mouth   aspirin (ECOTRIN) 325 mg EC tablet  Self No No   Sig: Take 1 tablet (325 mg total) by mouth 2 (two) times a day   brimonidine tartrate 0.2 % ophthalmic solution  Self Yes No   chlorthalidone 25 mg tablet   No No   Sig: TAKE 1 TABLET 4 DAYS A WEEK EVERY MONDAY, WEDNESDAY, FRIDAY, AND .   lisinopril (ZESTRIL) 10 mg tablet   No No   Sig: Take 1 tablet (10 mg total) by mouth daily   metoprolol succinate (TOPROL-XL) 25 mg 24 hr tablet   No No    Sig: Take 0.5 tablets (12.5 mg total) by mouth daily   omeprazole (PriLOSEC) 20 mg delayed release capsule  Self No No   Sig: TAKE 1 CAPSULE DAILY   sertraline (ZOLOFT) 25 mg tablet   No No   Sig: TAKE 1 TABLET DAILY      Facility-Administered Medications: None     Indocin [indomethacin]    Objective :  Temp:  [97.5 °F (36.4 °C)-98.7 °F (37.1 °C)] 97.5 °F (36.4 °C)  HR:  [61-69] 69  BP: (129-175)/(59-86) 166/74  Resp:  [17-24] 19  SpO2:  [95 %-98 %] 96 %  O2 Device: None (Room air)  Orthostatic Blood Pressures      Flowsheet Row Most Recent Value   Blood Pressure 166/74 filed at 01/30/2025 0902   Patient Position - Orthostatic VS Sitting filed at 01/30/2025 0040          First Weight: Weight - Scale: 50.5 kg (111 lb 6.4 oz) (01/30/25 0040)  Vitals:    01/30/25 0040 01/30/25 0556   Weight: 50.5 kg (111 lb 6.4 oz) 50.8 kg (112 lb)       Physical Exam  Vitals and nursing note reviewed.   Constitutional:       General: She is not in acute distress.     Appearance: Normal appearance. She is normal weight.   HENT:      Right Ear: External ear normal.      Left Ear: External ear normal.   Eyes:      General: No scleral icterus.        Right eye: No discharge.         Left eye: No discharge.   Cardiovascular:      Rate and Rhythm: Normal rate.      Pulses: Normal pulses.      Heart sounds: Murmur heard.   Pulmonary:      Effort: Pulmonary effort is normal.      Breath sounds: Examination of the right-lower field reveals decreased breath sounds. Examination of the left-lower field reveals decreased breath sounds. Decreased breath sounds, rhonchi and rales present.      Comments: Moist occasionally productive cough  Abdominal:      General: Bowel sounds are normal. There is no distension.      Palpations: Abdomen is soft.   Musculoskeletal:      Right lower leg: Edema present.      Left lower leg: Edema present.   Skin:     General: Skin is warm and dry.      Capillary Refill: Capillary refill takes less than 2 seconds.  "  Neurological:      General: No focal deficit present.      Mental Status: She is alert and oriented to person, place, and time. Mental status is at baseline.   Psychiatric:         Mood and Affect: Mood normal.           Lab Results: I have reviewed the following results:  Results from last 7 days   Lab Units 01/30/25  0420 01/29/25  1737   WBC Thousand/uL 4.86 4.40   HEMOGLOBIN g/dL 11.7 11.7   HEMATOCRIT % 36.4 37.3   PLATELETS Thousands/uL 187 179     Results from last 7 days   Lab Units 01/30/25  0420 01/29/25  1737   POTASSIUM mmol/L 3.2* 3.7   CHLORIDE mmol/L 103 104   CO2 mmol/L 22 25   BUN mg/dL 32* 33*   CREATININE mg/dL 0.73 0.78   CALCIUM mg/dL 8.6 8.5         Lab Results   Component Value Date    HGBA1C 5.4 10/27/2020     No results found for: \"CKTOTAL\", \"CKMB\", \"CKMBINDEX\", \"TROPONINI\"    12 lead EKG notes subtle ST elevation seen in lead V1 and V2 with flattening of ST seen in V5 and V6    VTE Prophylaxis: VTE covered by:  enoxaparin, Subcutaneous, 40 mg at 01/30/25 0909    and Sequential compression device (Venodyne)     Ilana MARTINEZ  Cardiology  "

## 2025-01-30 NOTE — ASSESSMENT & PLAN NOTE
rapid influenza screen positive for influenza A  1/29/2025 CT chest: bilateral lower lobe reactive/infectious bronchial wall thickening with mild left lower lobe ground glass opacities concerning for aspiration versus infectious pneumonitis  patient started on Tamiflu per primary team

## 2025-01-30 NOTE — ASSESSMENT & PLAN NOTE
Patient presented to the ER with on and off productive cough for about 3 weeks.  States it would feel like it was getting better but then worsened again  Initial CT showed possible aspiration/infectious pneumonitis however CTA showed findings suggestive of pulmonary edema/CHF  Found to be influenza A positive  Continue Tamiflu, WBC / procalcitonin normal. Monitor off antibiotics for now.

## 2025-01-30 NOTE — ASSESSMENT & PLAN NOTE
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  Nonemergent CTA aorta as clinically warranted recommended  L1 compression deformity noted which is age-indeterminate.  Patient denies any significant back pain  4 mm right middle lobe pulmonary nodule noted.  Per guidelines, no follow-up recommended if patient is low risk but if high risk can get CT chest at 12 months

## 2025-01-30 NOTE — ASSESSMENT & PLAN NOTE
Patient with worsening leg edema compared to baseline.  Was not taking her medications for at least about 2 weeks recently but then started taking it again  Recent echo with EF of 60 to 65% and grade 1 diastolic dysfunction.  Moderate to severe aortic valve stenosis along with mild mitral valve stenosis  Initial CT showed bilateral lower lobe bronchial wall thickening with mild left lower lobe groundglass opacity which could be aspiration/infectious pneumonitis.  CTA however showed cardiomegaly with worsening groundglass opacities which is more suggestive of pulmonary edema with trace bilateral effusions, bronchial wall thickening likely interstitial edema suggestive of CHF  Will trial one-time dose of 20 mg of IV Lasix and monitor response.  May need further doses in a.m.  I/O, daily weights

## 2025-01-31 LAB
ANION GAP SERPL CALCULATED.3IONS-SCNC: 12 MMOL/L (ref 4–13)
BUN SERPL-MCNC: 27 MG/DL (ref 5–25)
CALCIUM SERPL-MCNC: 8.1 MG/DL (ref 8.4–10.2)
CHLORIDE SERPL-SCNC: 104 MMOL/L (ref 96–108)
CO2 SERPL-SCNC: 24 MMOL/L (ref 21–32)
CREAT SERPL-MCNC: 0.73 MG/DL (ref 0.6–1.3)
ERYTHROCYTE [DISTWIDTH] IN BLOOD BY AUTOMATED COUNT: 13.5 % (ref 11.6–15.1)
GFR SERPL CREATININE-BSD FRML MDRD: 73 ML/MIN/1.73SQ M
GLUCOSE SERPL-MCNC: 91 MG/DL (ref 65–140)
HCT VFR BLD AUTO: 34.5 % (ref 34.8–46.1)
HGB BLD-MCNC: 11 G/DL (ref 11.5–15.4)
MAGNESIUM SERPL-MCNC: 1.9 MG/DL (ref 1.9–2.7)
MCH RBC QN AUTO: 29.2 PG (ref 26.8–34.3)
MCHC RBC AUTO-ENTMCNC: 31.9 G/DL (ref 31.4–37.4)
MCV RBC AUTO: 92 FL (ref 82–98)
PHOSPHATE SERPL-MCNC: 3.2 MG/DL (ref 2.3–4.1)
PLATELET # BLD AUTO: 175 THOUSANDS/UL (ref 149–390)
PMV BLD AUTO: 11.4 FL (ref 8.9–12.7)
POTASSIUM SERPL-SCNC: 3.7 MMOL/L (ref 3.5–5.3)
PROCALCITONIN SERPL-MCNC: 0.1 NG/ML
RBC # BLD AUTO: 3.77 MILLION/UL (ref 3.81–5.12)
SODIUM SERPL-SCNC: 140 MMOL/L (ref 135–147)
WBC # BLD AUTO: 4.98 THOUSAND/UL (ref 4.31–10.16)

## 2025-01-31 PROCEDURE — 84145 PROCALCITONIN (PCT): CPT | Performed by: STUDENT IN AN ORGANIZED HEALTH CARE EDUCATION/TRAINING PROGRAM

## 2025-01-31 PROCEDURE — 99232 SBSQ HOSP IP/OBS MODERATE 35: CPT | Performed by: INTERNAL MEDICINE

## 2025-01-31 PROCEDURE — 99232 SBSQ HOSP IP/OBS MODERATE 35: CPT

## 2025-01-31 PROCEDURE — 84100 ASSAY OF PHOSPHORUS: CPT | Performed by: STUDENT IN AN ORGANIZED HEALTH CARE EDUCATION/TRAINING PROGRAM

## 2025-01-31 PROCEDURE — 83735 ASSAY OF MAGNESIUM: CPT | Performed by: STUDENT IN AN ORGANIZED HEALTH CARE EDUCATION/TRAINING PROGRAM

## 2025-01-31 PROCEDURE — 85027 COMPLETE CBC AUTOMATED: CPT | Performed by: STUDENT IN AN ORGANIZED HEALTH CARE EDUCATION/TRAINING PROGRAM

## 2025-01-31 PROCEDURE — 80048 BASIC METABOLIC PNL TOTAL CA: CPT | Performed by: STUDENT IN AN ORGANIZED HEALTH CARE EDUCATION/TRAINING PROGRAM

## 2025-01-31 RX ORDER — GUAIFENESIN 100 MG/5ML
200 SOLUTION ORAL EVERY 4 HOURS PRN
Status: DISCONTINUED | OUTPATIENT
Start: 2025-01-31 | End: 2025-02-01 | Stop reason: HOSPADM

## 2025-01-31 RX ORDER — AMLODIPINE BESYLATE 2.5 MG/1
2.5 TABLET ORAL DAILY
Qty: 30 TABLET | Refills: 5 | Status: SHIPPED | OUTPATIENT
Start: 2025-02-01

## 2025-01-31 RX ORDER — BENZONATATE 100 MG/1
100 CAPSULE ORAL 3 TIMES DAILY
Status: DISCONTINUED | OUTPATIENT
Start: 2025-01-31 | End: 2025-02-01 | Stop reason: HOSPADM

## 2025-01-31 RX ORDER — FUROSEMIDE 10 MG/ML
20 INJECTION INTRAMUSCULAR; INTRAVENOUS ONCE
Status: COMPLETED | OUTPATIENT
Start: 2025-01-31 | End: 2025-01-31

## 2025-01-31 RX ADMIN — OSELTAMAVIR PHOSPHATE 30 MG: 30 CAPSULE ORAL at 09:26

## 2025-01-31 RX ADMIN — ASPIRIN 81 MG CHEWABLE TABLET 324 MG: 81 TABLET CHEWABLE at 09:26

## 2025-01-31 RX ADMIN — OSELTAMAVIR PHOSPHATE 30 MG: 30 CAPSULE ORAL at 21:25

## 2025-01-31 RX ADMIN — BENZONATATE 100 MG: 100 CAPSULE ORAL at 21:25

## 2025-01-31 RX ADMIN — BRIMONIDINE TARTRATE 1 DROP: 2 SOLUTION/ DROPS OPHTHALMIC at 17:02

## 2025-01-31 RX ADMIN — BENZONATATE 100 MG: 100 CAPSULE ORAL at 17:00

## 2025-01-31 RX ADMIN — BRIMONIDINE TARTRATE 1 DROP: 2 SOLUTION/ DROPS OPHTHALMIC at 09:36

## 2025-01-31 RX ADMIN — SERTRALINE HYDROCHLORIDE 25 MG: 25 TABLET ORAL at 09:25

## 2025-01-31 RX ADMIN — GUAIFENESIN 200 MG: 200 SOLUTION ORAL at 21:24

## 2025-01-31 RX ADMIN — PANTOPRAZOLE SODIUM 40 MG: 40 TABLET, DELAYED RELEASE ORAL at 06:45

## 2025-01-31 RX ADMIN — BENZONATATE 100 MG: 100 CAPSULE ORAL at 10:50

## 2025-01-31 RX ADMIN — LISINOPRIL 10 MG: 10 TABLET ORAL at 09:26

## 2025-01-31 RX ADMIN — GUAIFENESIN 200 MG: 200 SOLUTION ORAL at 17:00

## 2025-01-31 RX ADMIN — METOPROLOL SUCCINATE 12.5 MG: 25 TABLET, FILM COATED, EXTENDED RELEASE ORAL at 09:25

## 2025-01-31 RX ADMIN — AMLODIPINE BESYLATE 2.5 MG: 2.5 TABLET ORAL at 09:26

## 2025-01-31 RX ADMIN — ENOXAPARIN SODIUM 40 MG: 40 INJECTION SUBCUTANEOUS at 09:25

## 2025-01-31 RX ADMIN — FUROSEMIDE 20 MG: 10 INJECTION, SOLUTION INTRAMUSCULAR; INTRAVENOUS at 14:17

## 2025-01-31 NOTE — PROGRESS NOTES
Progress Note - Hospitalist   Name: Jackie Urbina 89 y.o. female I MRN: [id number]  Unit/Bed#: [address] [place] [address] Date of Admission: 1/29/2025   Date of Service: 1/31/2025 I Hospital Day: 1    Assessment & Plan  Influenza A virus present  Patient presented to the ER with on and off productive cough for about 3 weeks.  States it would feel like it was getting better but then worsened again  Initial CT showed possible aspiration/infectious pneumonitis however CTA showed findings suggestive of pulmonary edema/CHF  Found to be influenza A positive  Continue Tamiflu, WBC / procalcitonin normal. Monitor off antibiotics for now.  Currently stable on room air  Lymphedema  Patient with worsening leg edema compared to baseline.  Was not taking her medications for at least about 2 weeks recently but then started taking it again  Recent echo with EF of 60 to 65% and grade 1 diastolic dysfunction.  Moderate to severe aortic valve stenosis along with mild mitral valve stenosis. Given acute presentation repeat Echo pending.  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  Nonemergent CTA aorta as clinically warranted recommended  L1 compression deformity noted which is age-indeterminate.  Patient denies any significant back pain  4 mm right middle lobe pulmonary nodule noted.  Per guidelines, no follow-up recommended if patient is low risk but if high risk can get CT chest at 12 months  Essential  hypertension  Continue Toprol XL 12.5 mg daily and lisinopril 10 mg daily  Started on amlodipine 2.5 mg daily  Hold chlorthalidone as patient receiving lasix  Aortic valve stenosis  As noted above patient with moderate to severe aortic valve stenosis on echo  Patient to follow up with primary cardiologist to discuss possible AVR    VTE Pharmacologic Prophylaxis: VTE Score: 3 Moderate Risk (Score 3-4) - Pharmacological DVT Prophylaxis Ordered: enoxaparin (Lovenox).    Mobility:   Basic Mobility Inpatient Raw Score: 23  JH-HLM Goal: 7: Walk 25 feet or more  JH-HLM Achieved: 7: Walk 25 feet or more  JH-HLM Goal achieved. Continue to encourage appropriate mobility.    Patient Centered Rounds: I performed bedside rounds with nursing staff today.   Discussions with Specialists or Other Care Team Provider: cardiology, rn, cm    Education and Discussions with Family / Patient: Patient declined call to .     Current Length of Stay: 1 day(s)  Current Patient Status: Inpatient   Certification Statement: The patient will continue to require additional inpatient hospital stay due to IV lasix  Discharge Plan: Anticipate discharge in 24-48 hrs to home.    Code Status: Level 1 - Full Code    Subjective   Patient reports ongoing, intermittent dry cough. Denies current chest pain or shortness of breath.     Objective :  Temp:  [97.9 °F (36.6 °C)-99.3 °F (37.4 °C)] 98.5 °F (36.9 °C)  HR:  [55-65] 59  BP: (107-180)/(53-81) 137/62  Resp:  [17-20] 20  SpO2:  [95 %-97 %] 97 %  O2 Device: None (Room air)    Body mass index is 21.87 kg/m².     Input and Output Summary (last 24 hours):     Intake/Output Summary (Last 24 hours) at 1/31/2025 1448  Last data filed at 1/30/2025 2201  Gross per 24 hour   Intake 680 ml   Output 150 ml   Net 530 ml       Physical Exam  Vitals and nursing note reviewed.   Constitutional:       General: She is not in acute distress.     Appearance: She is well-developed.   Cardiovascular:      Rate and  Rhythm: Normal rate and regular rhythm.      Heart sounds: Murmur heard.   Pulmonary:      Effort: Pulmonary effort is normal. No respiratory distress.      Breath sounds: Normal breath sounds.   Abdominal:      Palpations: Abdomen is soft.      Tenderness: There is no abdominal tenderness.   Musculoskeletal:         General: No swelling.   Skin:     General: Skin is warm and dry.      Capillary Refill: Capillary refill takes less than 2 seconds.   Neurological:      Mental Status: She is alert.   Psychiatric:         Mood and Affect: Mood normal.       Lines/Drains:        Lab Results: I have reviewed the following results:   Results from last 7 days   Lab Units 01/31/25 0440 01/30/25 0420 01/29/25  1737   WBC Thousand/uL 4.98   < > 4.40   HEMOGLOBIN g/dL 11.0*   < > 11.7   HEMATOCRIT % 34.5*   < > 37.3   PLATELETS Thousands/uL 175   < > 179   SEGS PCT %  --   --  44   LYMPHO PCT %  --   --  38   MONO PCT %  --   --  15*   EOS PCT %  --   --  1    < > = values in this interval not displayed.     Results from last 7 days   Lab Units 01/31/25 0440 01/30/25  0420   SODIUM mmol/L 140 140   POTASSIUM mmol/L 3.7 3.2*   CHLORIDE mmol/L 104 103   CO2 mmol/L 24 22   BUN mg/dL 27* 32*   CREATININE mg/dL 0.73 0.73   ANION GAP mmol/L 12 15*   CALCIUM mg/dL 8.1* 8.6   ALBUMIN g/dL  --  3.5   TOTAL BILIRUBIN mg/dL  --  0.38   ALK PHOS U/L  --  83   ALT U/L  --  11   AST U/L  --  33   GLUCOSE RANDOM mg/dL 91 79                 Results from last 7 days   Lab Units 01/31/25 0440 01/30/25  0420 01/29/25  2125   PROCALCITONIN ng/ml 0.10 0.11 0.11       Recent Cultures (last 7 days):   Results from last 7 days   Lab Units 01/30/25  0427   LEGIONELLA URINARY ANTIGEN  Negative       Imaging Results Review: I reviewed radiology reports from this admission including: CT chest.  Other Study Results Review: No additional pertinent studies reviewed.    Last 24 Hours Medication List:     Current Facility-Administered Medications:      amLODIPine (NORVASC) tablet 2.5 mg, Daily    aspirin chewable tablet 324 mg, Daily    benzonatate (TESSALON PERLES) capsule 100 mg, TID    brimonidine tartrate 0.2 % ophthalmic solution 1 drop, BID    enoxaparin (LOVENOX) subcutaneous injection 40 mg, Daily    guaiFENesin (ROBITUSSIN) oral liquid 200 mg, Q4H PRN    lisinopril (ZESTRIL) tablet 10 mg, Daily    metoprolol succinate (TOPROL-XL) 24 hr tablet 12.5 mg, Daily    oseltamivir (TAMIFLU) capsule 30 mg, Q12H MAXIM    pantoprazole (PROTONIX) EC tablet 40 mg, Early Morning    sertraline (ZOLOFT) tablet 25 mg, Daily    Administrative Statements   Today, Patient Was Seen By: Rosanne Heard PA-C    **Please Note: This note may have been constructed using a voice recognition system.**

## 2025-01-31 NOTE — ASSESSMENT & PLAN NOTE
Continue Toprol XL 12.5 mg daily and lisinopril 10 mg daily  Started on amlodipine 2.5 mg daily  Hold chlorthalidone as patient receiving lasix

## 2025-01-31 NOTE — ASSESSMENT & PLAN NOTE
As noted above patient with moderate to severe aortic valve stenosis on echo  Patient to follow up with primary cardiologist to discuss possible AVR

## 2025-01-31 NOTE — ASSESSMENT & PLAN NOTE
history of lymphedema  she states she has not been using her lymphedema pumps  also notes she may have missed some of her medications  repeat Lasix 20 mg IV times one

## 2025-01-31 NOTE — ASSESSMENT & PLAN NOTE
11/13/2024 TTE: EF visibly estimated 60 to 65% come with normal systolic function and RV function was normal. Left atrium was severely dilated and right atrium was moderately dilated. Aortic valve with moderate to severe reduced mobility and moderate to severe aortic stenosis with mean/peak gradient of 29/47 mmHg and INDY of 0.88 cm²  1/30/2025 TTE: EF visibly estimated 50% with grade 2 diastolic dysfunction, left atrium is severely dilated with volume index of 49.3 ML/M2, aortic valve is moderately thickened with moderate calcification and severe reduced mobility. There is severe stenosis with mean gradient of 38 mmHg and peak gradient of 70 mm Mercury. INDY is 0.65 cm²  patient is to follow up with her primary cardiologist, Dr. Little to discuss possible AVR

## 2025-01-31 NOTE — PLAN OF CARE
Problem: CARDIOVASCULAR - ADULT  Goal: Maintains optimal cardiac output and hemodynamic stability  Description: INTERVENTIONS:  - Monitor I/O, vital signs and rhythm  - Monitor for S/S and trends of decreased cardiac output  - Administer and titrate ordered vasoactive medications to optimize hemodynamic stability  - Assess quality of pulses, skin color and temperature  - Assess for signs of decreased coronary artery perfusion  - Instruct patient to report change in severity of symptoms  Outcome: Progressing  Goal: Absence of cardiac dysrhythmias or at baseline rhythm  Description: INTERVENTIONS:  - Continuous cardiac monitoring, vital signs, obtain 12 lead EKG if ordered  - Administer antiarrhythmic and heart rate control medications as ordered  - Monitor electrolytes and administer replacement therapy as ordered  Outcome: Progressing     Problem: RESPIRATORY - ADULT  Goal: Achieves optimal ventilation and oxygenation  Description: INTERVENTIONS:  - Assess for changes in respiratory status  - Assess for changes in mentation and behavior  - Position to facilitate oxygenation and minimize respiratory effort  - Oxygen administered by appropriate delivery if ordered  - Initiate smoking cessation education as indicated  - Encourage broncho-pulmonary hygiene including cough, deep breathe, Incentive Spirometry  - Assess the need for suctioning and aspirate as needed  - Assess and instruct to report SOB or any respiratory difficulty  - Respiratory Therapy support as indicated  Outcome: Progressing     Problem: INFECTION - ADULT  Goal: Absence or prevention of progression during hospitalization  Description: INTERVENTIONS:  - Assess and monitor for signs and symptoms of infection  - Monitor lab/diagnostic results  - Monitor all insertion sites, i.e. indwelling lines, tubes, and drains  - Administer medications as ordered  - Instruct and encourage patient and family to use good hand hygiene technique  - Identify and  instruct in appropriate isolation precautions for identified infection/condition  Outcome: Progressing

## 2025-01-31 NOTE — ASSESSMENT & PLAN NOTE
Patient with worsening leg edema compared to baseline.  Was not taking her medications for at least about 2 weeks recently but then started taking it again  Recent echo with EF of 60 to 65% and grade 1 diastolic dysfunction.  Moderate to severe aortic valve stenosis along with mild mitral valve stenosis. Given acute presentation repeat Echo pending.  Receiving IV Lasix per cardiology  Patient reports swelling improving

## 2025-01-31 NOTE — ASSESSMENT & PLAN NOTE
blood pressures improved  continue Toprol-XL 12.5 mg daily  continue lisinopril 10 mg once a day  continue Norvasc 2.5 mg daily  close monitoring of blood pressure and avoid profound hypotension in this elderly female with aortic valve stenosis

## 2025-01-31 NOTE — ASSESSMENT & PLAN NOTE
Patient presented to the ER with on and off productive cough for about 3 weeks.  States it would feel like it was getting better but then worsened again  Initial CT showed possible aspiration/infectious pneumonitis however CTA showed findings suggestive of pulmonary edema/CHF  Found to be influenza A positive  Continue Tamiflu, WBC / procalcitonin normal. Monitor off antibiotics for now.  Currently stable on room air

## 2025-01-31 NOTE — ASSESSMENT & PLAN NOTE
high-sensitivity troponin: 1169 (0hr), 1084 (2hr), and 1007 (4hr)  12 lead EKG: ST flattening seen in lateral leads  continue Toprol-XL 12.5 mg daily  continue Norvasc 2.5 mg once a day

## 2025-01-31 NOTE — PROGRESS NOTES
Progress Note - Cardiology   Name: Jackie Urbina 89 y.o. female I MRN: [id number]  Unit/Bed#: [address] [place] [address] Date of Admission: 1/29/2025   Date of Service: 1/31/2025 I Hospital Day: 1    Assessment & Plan  Influenza A virus present  rapid influenza screen positive for influenza A  1/29/2025 CT chest: bilateral lower lobe reactive/infectious bronchial wall thickening with mild left lower lobe ground glass opacities concerning for aspiration versus infectious pneumonitis  patient started on Tamiflu per primary team  Essential hypertension  blood pressures improved  continue Toprol-XL 12.5 mg daily  continue lisinopril 10 mg once a day  continue Norvasc 2.5 mg daily  close monitoring of blood pressure and avoid profound hypotension in this elderly female with aortic valve stenosis  Aortic valve stenosis  11/13/2024 TTE: EF visibly estimated 60 to 65% come with normal systolic function and RV function was normal. Left atrium was severely dilated and right atrium was moderately dilated. Aortic valve with moderate to severe reduced mobility and moderate to severe aortic stenosis with mean/peak gradient of 29/47 mmHg and INDY of 0.88 cm²  1/30/2025 TTE: EF visibly estimated 50% with grade 2 diastolic dysfunction, left atrium is severely dilated with volume index of 49.3 ML/M2, aortic valve is moderately thickened with moderate calcification and severe reduced mobility. There is severe stenosis with mean gradient of 38 mmHg and peak gradient of 70 mm Mercury. INDY is 0.65 cm²  patient is to follow up with her primary cardiologist, Dr. Little to discuss possible AVR  Elevated troponin  high-sensitivity troponin: 1169 (0hr), 1084 (2hr), and 1007 (4hr)  12 lead EKG: ST flattening seen in lateral leads  continue Toprol-XL 12.5 mg daily  continue Norvasc 2.5 mg once a day  Lymphedema  history of lymphedema  she states she has not been using her lymphedema pumps  also notes she may have missed some of her medications  repeat Lasix  20 mg IV times one    Subjective   Chief Complaint: no cardiac complaints offered. Patient complaining of persistent cough. Was not aware she had tessalon pearls ordered on an as-needed basis. We have changed this to standing order for patient.      Objective :  Temp:  [97.8 °F (36.6 °C)-99.3 °F (37.4 °C)] 98.5 °F (36.9 °C)  HR:  [55-65] 65  BP: (107-180)/(53-81) 180/81  Resp:  [17-20] 20  SpO2:  [95 %-97 %] 97 %  O2 Device: None (Room air)  Orthostatic Blood Pressures      Flowsheet Row Most Recent Value   Blood Pressure 180/81 filed at 01/31/2025 0924   Patient Position - Orthostatic VS Sitting filed at 01/31/2025 0924          First Weight: Weight - Scale: 50.5 kg (111 lb 6.4 oz) (01/30/25 0040)  Vitals:    01/30/25 0556 01/30/25 1156   Weight: 50.8 kg (112 lb) 50.8 kg (112 lb)     Physical Exam  Vitals and nursing note reviewed.   Constitutional:       General: She is not in acute distress.     Appearance: Normal appearance. She is normal weight.   HENT:      Right Ear: External ear normal.      Left Ear: External ear normal.   Eyes:      General: No scleral icterus.        Right eye: No discharge.         Left eye: No discharge.   Cardiovascular:      Rate and Rhythm: Normal rate and regular rhythm.      Pulses: Normal pulses.      Heart sounds: Murmur heard.   Pulmonary:      Effort: Pulmonary effort is normal.      Comments: Course breath sounds  Abdominal:      General: Bowel sounds are normal. There is no distension.      Palpations: Abdomen is soft.   Musculoskeletal:      Right lower leg: No edema.      Left lower leg: No edema.   Skin:     General: Skin is warm and dry.      Capillary Refill: Capillary refill takes less than 2 seconds.   Neurological:      Mental Status: She is alert and oriented to person, place, and time. Mental status is at baseline.           Lab Results: I have reviewed the following results:  Results from last 7 days   Lab Units 01/31/25  0440 01/30/25  0420 01/29/25  1737   WBC  "Thousand/uL 4.98 4.86 4.40   HEMOGLOBIN g/dL 11.0* 11.7 11.7   HEMATOCRIT % 34.5* 36.4 37.3   PLATELETS Thousands/uL 175 187 179     Results from last 7 days   Lab Units 01/31/25  0440 01/30/25  0420 01/29/25  1737   POTASSIUM mmol/L 3.7 3.2* 3.7   CHLORIDE mmol/L 104 103 104   CO2 mmol/L 24 22 25   BUN mg/dL 27* 32* 33*   CREATININE mg/dL 0.73 0.73 0.78   CALCIUM mg/dL 8.1* 8.6 8.5         Lab Results   Component Value Date    HGBA1C 5.4 10/27/2020     No results found for: \"CKTOTAL\", \"CKMB\", \"CKMBINDEX\", \"TROPONINI\"      VTE Pharmacologic Prophylaxis: VTE covered by:  enoxaparin, Subcutaneous, 40 mg at 01/31/25 0925     VTE Mechanical Prophylaxis: sequential compression device    Ilana MARTINEZ  Cardiology  "

## 2025-01-31 NOTE — ASSESSMENT & PLAN NOTE
EKG nonischemic and patient without chest pain  Likely in the setting of influenza infection, pulmonary edema  Continue aspirin  Cardiology following    Results from last 7 days   Lab Units 01/29/25 2125 01/29/25  1932 01/29/25  1737   HS TNI 0HR ng/L  --   --  1,169*   HS TNI 2HR ng/L  --  1,084*  --    HS TNI 4HR ng/L 1,007*  --   --

## 2025-02-01 VITALS
HEART RATE: 55 BPM | RESPIRATION RATE: 16 BRPM | SYSTOLIC BLOOD PRESSURE: 115 MMHG | OXYGEN SATURATION: 95 % | BODY MASS INDEX: 22.29 KG/M2 | WEIGHT: 113.54 LBS | HEIGHT: 60 IN | TEMPERATURE: 98.8 F | DIASTOLIC BLOOD PRESSURE: 58 MMHG

## 2025-02-01 LAB
ANION GAP SERPL CALCULATED.3IONS-SCNC: 11 MMOL/L (ref 4–13)
BUN SERPL-MCNC: 22 MG/DL (ref 5–25)
CALCIUM SERPL-MCNC: 9.1 MG/DL (ref 8.4–10.2)
CHLORIDE SERPL-SCNC: 105 MMOL/L (ref 96–108)
CO2 SERPL-SCNC: 24 MMOL/L (ref 21–32)
CREAT SERPL-MCNC: 0.65 MG/DL (ref 0.6–1.3)
ERYTHROCYTE [DISTWIDTH] IN BLOOD BY AUTOMATED COUNT: 13.3 % (ref 11.6–15.1)
GFR SERPL CREATININE-BSD FRML MDRD: 78 ML/MIN/1.73SQ M
GLUCOSE SERPL-MCNC: 98 MG/DL (ref 65–140)
HCT VFR BLD AUTO: 36.4 % (ref 34.8–46.1)
HGB BLD-MCNC: 11.5 G/DL (ref 11.5–15.4)
MCH RBC QN AUTO: 28.7 PG (ref 26.8–34.3)
MCHC RBC AUTO-ENTMCNC: 31.6 G/DL (ref 31.4–37.4)
MCV RBC AUTO: 91 FL (ref 82–98)
PLATELET # BLD AUTO: 183 THOUSANDS/UL (ref 149–390)
PMV BLD AUTO: 11.5 FL (ref 8.9–12.7)
POTASSIUM SERPL-SCNC: 3.8 MMOL/L (ref 3.5–5.3)
RBC # BLD AUTO: 4.01 MILLION/UL (ref 3.81–5.12)
SODIUM SERPL-SCNC: 140 MMOL/L (ref 135–147)
WBC # BLD AUTO: 6.95 THOUSAND/UL (ref 4.31–10.16)

## 2025-02-01 PROCEDURE — 85027 COMPLETE CBC AUTOMATED: CPT

## 2025-02-01 PROCEDURE — 80048 BASIC METABOLIC PNL TOTAL CA: CPT

## 2025-02-01 PROCEDURE — 99239 HOSP IP/OBS DSCHRG MGMT >30: CPT

## 2025-02-01 RX ORDER — FUROSEMIDE 20 MG/1
20 TABLET ORAL DAILY
Status: DISCONTINUED | OUTPATIENT
Start: 2025-02-01 | End: 2025-02-01 | Stop reason: HOSPADM

## 2025-02-01 RX ORDER — BENZONATATE 100 MG/1
100 CAPSULE ORAL 3 TIMES DAILY
Qty: 20 CAPSULE | Refills: 0 | Status: SHIPPED | OUTPATIENT
Start: 2025-02-01

## 2025-02-01 RX ORDER — FUROSEMIDE 20 MG/1
20 TABLET ORAL DAILY
Qty: 30 TABLET | Refills: 0 | Status: SHIPPED | OUTPATIENT
Start: 2025-02-01 | End: 2025-02-14

## 2025-02-01 RX ORDER — OSELTAMIVIR PHOSPHATE 30 MG/1
30 CAPSULE ORAL EVERY 12 HOURS SCHEDULED
Qty: 4 CAPSULE | Refills: 0 | Status: SHIPPED | OUTPATIENT
Start: 2025-02-01 | End: 2025-02-03

## 2025-02-01 RX ADMIN — AMLODIPINE BESYLATE 2.5 MG: 2.5 TABLET ORAL at 08:34

## 2025-02-01 RX ADMIN — ENOXAPARIN SODIUM 40 MG: 40 INJECTION SUBCUTANEOUS at 08:34

## 2025-02-01 RX ADMIN — LISINOPRIL 10 MG: 10 TABLET ORAL at 08:35

## 2025-02-01 RX ADMIN — PANTOPRAZOLE SODIUM 40 MG: 40 TABLET, DELAYED RELEASE ORAL at 05:30

## 2025-02-01 RX ADMIN — ASPIRIN 81 MG CHEWABLE TABLET 324 MG: 81 TABLET CHEWABLE at 08:34

## 2025-02-01 RX ADMIN — OSELTAMAVIR PHOSPHATE 30 MG: 30 CAPSULE ORAL at 08:35

## 2025-02-01 RX ADMIN — SERTRALINE HYDROCHLORIDE 25 MG: 25 TABLET ORAL at 08:35

## 2025-02-01 RX ADMIN — BRIMONIDINE TARTRATE 1 DROP: 2 SOLUTION/ DROPS OPHTHALMIC at 08:35

## 2025-02-01 RX ADMIN — METOPROLOL SUCCINATE 12.5 MG: 25 TABLET, FILM COATED, EXTENDED RELEASE ORAL at 08:34

## 2025-02-01 RX ADMIN — BENZONATATE 100 MG: 100 CAPSULE ORAL at 08:35

## 2025-02-01 RX ADMIN — FUROSEMIDE 20 MG: 20 TABLET ORAL at 12:32

## 2025-02-01 RX ADMIN — GUAIFENESIN 200 MG: 200 SOLUTION ORAL at 05:51

## 2025-02-01 NOTE — DISCHARGE SUMMARY
Discharge Summary - Hospitalist   Name: Jackie Urbina 89 y.o. female I MRN: [id number]  Unit/Bed#: [address] [place] [address] Date of Admission: 1/29/2025   Date of Service: 2/1/2025 I Hospital Day: 2     Assessment & Plan  Influenza A virus present  Patient presented to the ER with on and off productive cough for about 3 weeks.  States it would feel like it was getting better but then worsened again  Initial CT showed possible aspiration/infectious pneumonitis however CTA showed findings suggestive of pulmonary edema/CHF  Found to be influenza A positive  Continue Tamiflu, WBC / procalcitonin normal. Monitor off antibiotics.  Currently stable on room air  Patient reports cough had been going on for weeks to months, possibly in setting of mid volume overload and now in setting of influenza A  If no improvement after resolution of acute presentation, consider outpatient follow up with pulmonology  Lymphedema/ volume overload  Patient with worsening leg edema compared to baseline.  Was not taking her medications for at least about 2 weeks recently but then started taking it again  CT chest with findings of pulmonary edema/CHF    Recent echo with EF of 60 to 65% and grade 1 diastolic dysfunction.  Moderate to severe aortic valve stenosis along with mild mitral valve stenosis  Repeat ECHO this admission showed EF 50%, systolic function low normal, GIIDD, severe aortic stenosis  Cardiology following, received IV lasix  Recommended to continue lasix 20 mg daily on discharge  Outpatient follow up  Elevated troponin  EKG nonischemic and patient without chest pain  Likely in the setting of influenza infection, pulmonary edema  Continue aspirin  Cardiology following    Results from last 7 days   Lab Units 01/29/25  2125 01/29/25  1932 01/29/25  1737   HS TNI 0HR ng/L  --   --  1,169*   HS TNI 2HR ng/L  --  1,084*  --    HS TNI 4HR ng/L 1,007*  --   --      Abnormal CT of the chest  On imaging noted to have 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  Nonemergent CTA aorta as clinically warranted recommended  L1 compression deformity noted which is age-indeterminate.  Patient denies any significant back pain  4 mm right middle lobe pulmonary nodule noted.  Per guidelines, no follow-up recommended if patient is low risk but if high risk can get CT chest at 12 months  Essential hypertension  Continue Toprol XL 12.5 mg daily and lisinopril 10 mg daily  Started on amlodipine 2.5 mg daily  Hold chlorthalidone  Per discussion with cardiology, start lasix 20 mg daily on discharge  Follow up with primary cardiologist  Aortic valve stenosis  ECHO shows severe aortic stenosis  Patient to follow up with primary cardiologist to discuss possible AVR per cardiology     Medical Problems       Resolved Problems  Date Reviewed: 2/1/2025   None       Discharging Physician / Practitioner: Rosanne Heard PA-C  PCP: Tom Howell MD  Admission Date:   Admission Orders (From admission, onward)       Ordered        01/30/25 1507  INPATIENT ADMISSION  Once            01/29/25 1909  Place in Observation  Once                          Discharge Date: 02/01/25    Consultations During Hospital Stay:  Cardiology    Procedures Performed:   none    Significant Findings / Test Results:   CT chest: Bilateral lower lobe reactive/infectious bronchial wall thickening with mild left lower lobe groundglass opacity, concerning for aspiration/infectious pneumonitis and less likely, atelectasis. Trace left pleural effusion. Apparent 1.8 x 1.2 cm saccular aneurysm involving the proximal abdominal aorta with significant calcifications. Nonemergent CTA aorta can be obtained for better assessment, as clinically warranted. Age-indeterminate L1 compression fracture deformity with 60% height loss. Clinical correlation is recommended. Moderate cardiomegaly with aortic valve calcifications, compatible with aortic valve stenosis,  as reported on recent echocardiogram 11/13/2024. Subpleural 4 mm right middle lobe pulmonary nodule. Based on current Fleischner Society 2017 Guidelines on incidental pulmonary nodule, no routine follow-up is needed if the patient is low risk. If the patient is high risk, optional follow-up chest CT at 12 months can be considered.  CTA chest pe study: No pulmonary embolism. Cardiomegaly with worsening diffuse groundglass opacities suggesting pulmonary edema. Trace bilateral effusions. Worsening bronchial wall thickening likely related to interstitial edema. Findings suggest CHF. 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 Subpleural 4 mm right upper lobe nodule.     Incidental Findings:   Apparent 1.8 x 1.2 cm saccular aneurysm involving the proximal abdominal aorta with significant calcifications. Nonemergent CTA aorta can be obtained for better assessment, as clinically warranted.   Age-indeterminate L1 compression fracture deformity with 60% height loss. Clinical correlation is recommended.   Subpleural 4 mm right middle lobe pulmonary nodule. Based on current Fleischner Society 2017 Guidelines on incidental pulmonary nodule, no routine follow-up is needed if the patient is low risk. If the patient is high risk, optional follow-up chest CT at 12 months can be considered.  I reviewed the above mentioned incidental findings with the patient and/or family and they expressed understanding.    Test Results Pending at Discharge (will require follow up):   none     Outpatient Tests Requested:  none    Complications:  none    Reason for Admission: influenza, pulmonary edema    Hospital Course:   Jackie Urbina is a 89 y.o. female patient who originally presented to the hospital on 1/29/2025 due to on and off dry cough for few weeks to months. Patient tested positive for influenza A in ED. CTA chest was negative for PE, showed findings of pulmonary  edema and CHF. Patient reports chronic lymphedema, but worsening swelling recently. BNP was elevated at 328. Cardiology was consulted and patient was diuresed with IV diuretics. Repeat ECHO showed slight drop in EF to 50% as well as severe aortic stenosis.  Patient has remained stable on room air. Recommended to transition to lasix 20 mg daily on discharge per discussion with cardiology. Also started on low dose amlodipine and chlorthalidone will be held. Patient to follow up with her primary cardiologist for aortic stenosis and further management. If patient continues to have cough after resolution of acute issues, may benefit from follow up with pulmonology. Cleared by cardiology for discharge.    Please see above list of diagnoses and related plan for additional information.     Condition at Discharge: stable    Discharge Day Visit / Exam:   Subjective:  Patient reports she feels ready to go home today. Continues with intermittent dry cough. Denies chest pain or shortness of breath. Reports leg swelling has improved. States she is eager to start moving around again and needs to go home.   Vitals: Blood Pressure: 157/83 (02/01/25 0834)  Pulse: 61 (02/01/25 0834)  Temperature: 98.8 °F (37.1 °C) (02/01/25 0834)  Temp Source: Oral (02/01/25 0834)  Respirations: 20 (02/01/25 0834)  Height: 5' (152.4 cm) (01/30/25 1156)  Weight - Scale: 51.5 kg (113 lb 8.6 oz) (02/01/25 0600)  SpO2: 96 % (02/01/25 0834)  Physical Exam  Vitals and nursing note reviewed.   Constitutional:       General: She is not in acute distress.     Appearance: She is well-developed.   Cardiovascular:      Rate and Rhythm: Normal rate and regular rhythm.      Heart sounds: Murmur heard.   Pulmonary:      Effort: Pulmonary effort is normal. No respiratory distress.      Breath sounds: Normal breath sounds.      Comments: Saturating mid 90s on room air  Abdominal:      Palpations: Abdomen is soft.      Tenderness: There is no abdominal tenderness.    Musculoskeletal:         General: No swelling.      Right lower leg: Edema (improved) present.      Left lower leg: Edema (improved) present.   Skin:     General: Skin is warm and dry.      Capillary Refill: Capillary refill takes less than 2 seconds.   Neurological:      Mental Status: She is alert.   Psychiatric:         Mood and Affect: Mood normal.        Discussion with Family: Attempted to update  (son) via phone. Unable to contact.    Discharge instructions/Information to patient and family:   See after visit summary for information provided to patient and family.      Provisions for Follow-Up Care:  See after visit summary for information related to follow-up care and any pertinent home health orders.      Mobility at time of Discharge:   Basic Mobility Inpatient Raw Score: 23  JH-HLM Goal: 7: Walk 25 feet or more  JH-HLM Achieved: 7: Walk 25 feet or more  HLM Goal achieved. Continue to encourage appropriate mobility.     Disposition:   Home    Planned Readmission: none    Discharge Medications:  See after visit summary for reconciled discharge medications provided to patient and/or family.      Administrative Statements   Discharge Statement:  I have spent a total time of 60 minutes in caring for this patient on the day of the visit/encounter. >30 minutes of time was spent on: Diagnostic results, Risks and benefits of tx options, Instructions for management, Patient and family education, Importance of tx compliance, Counseling / Coordination of care, Documenting in the medical record, Reviewing / ordering tests, medicine, procedures  , and Communicating with other healthcare professionals .    **Please Note: This note may have been constructed using a voice recognition system**

## 2025-02-01 NOTE — INCIDENTAL FINDINGS
The following findings require follow up:  Radiographic finding   Findings:  Apparent 1.8 x 1.2 cm saccular aneurysm involving the proximal abdominal aorta with significant calcifications. Nonemergent CTA aorta can be obtained for better assessment, as clinically warranted.   Age-indeterminate L1 compression fracture deformity with 60% height loss. Clinical correlation is recommended.   Subpleural 4 mm right middle lobe pulmonary nodule. Based on current Fleischner Society 2017 Guidelines on incidental pulmonary nodule, no routine follow-up is needed if the patient is low risk. If the patient is high risk, optional follow-up chest CT at 12 months can be considered.  I reviewed the abov     Follow up required: CTA aorta and/or CT chest if desired   Follow up should be done within 1 month(s)    Please notify the following clinician to assist with the follow up:   Dr. Tom Howell MD    Incidental finding results were discussed with the Patient by Rosanne Heard PA-C on 02/01/25.   They expressed understanding and all questions answered.

## 2025-02-01 NOTE — ASSESSMENT & PLAN NOTE
Patient with worsening leg edema compared to baseline.  Was not taking her medications for at least about 2 weeks recently but then started taking it again  CT chest with findings of pulmonary edema/CHF    Recent echo with EF of 60 to 65% and grade 1 diastolic dysfunction.  Moderate to severe aortic valve stenosis along with mild mitral valve stenosis  Repeat ECHO this admission showed EF 50%, systolic function low normal, GIIDD, severe aortic stenosis  Cardiology following, received IV lasix  Recommended to continue lasix 20 mg daily on discharge  Outpatient follow up

## 2025-02-01 NOTE — ASSESSMENT & PLAN NOTE
ECHO shows severe aortic stenosis  Patient to follow up with primary cardiologist to discuss possible AVR per cardiology

## 2025-02-01 NOTE — ASSESSMENT & PLAN NOTE
Patient presented to the ER with on and off productive cough for about 3 weeks.  States it would feel like it was getting better but then worsened again  Initial CT showed possible aspiration/infectious pneumonitis however CTA showed findings suggestive of pulmonary edema/CHF  Found to be influenza A positive  Continue Tamiflu, WBC / procalcitonin normal. Monitor off antibiotics.  Currently stable on room air  Patient reports cough had been going on for weeks to months, possibly in setting of mid volume overload and now in setting of influenza A  If no improvement after resolution of acute presentation, consider outpatient follow up with pulmonology

## 2025-02-01 NOTE — ASSESSMENT & PLAN NOTE
Continue Toprol XL 12.5 mg daily and lisinopril 10 mg daily  Started on amlodipine 2.5 mg daily  Hold chlorthalidone  Per discussion with cardiology, start lasix 20 mg daily on discharge  Follow up with primary cardiologist

## 2025-02-03 ENCOUNTER — TRANSITIONAL CARE MANAGEMENT (OUTPATIENT)
Age: OVER 89
End: 2025-02-03

## 2025-02-03 LAB
ATRIAL RATE: 58 BPM
ATRIAL RATE: 65 BPM
P AXIS: 81 DEGREES
P AXIS: 88 DEGREES
PR INTERVAL: 176 MS
PR INTERVAL: 180 MS
QRS AXIS: -6 DEGREES
QRS AXIS: -7 DEGREES
QRSD INTERVAL: 102 MS
QRSD INTERVAL: 112 MS
QT INTERVAL: 424 MS
QT INTERVAL: 438 MS
QTC INTERVAL: 429 MS
QTC INTERVAL: 440 MS
T WAVE AXIS: 101 DEGREES
T WAVE AXIS: 74 DEGREES
VENTRICULAR RATE: 58 BPM
VENTRICULAR RATE: 65 BPM

## 2025-02-03 PROCEDURE — 93010 ELECTROCARDIOGRAM REPORT: CPT | Performed by: INTERNAL MEDICINE

## 2025-02-05 ENCOUNTER — TELEPHONE (OUTPATIENT)
Age: OVER 89
End: 2025-02-05

## 2025-02-05 NOTE — TELEPHONE ENCOUNTER
"Hello,    The following message was sent via e-mail to the leadership team:     Please advise if you can help facilitate the following overbook request:    Patient Name: Jackie Urbina    Patient MRN: [id number]     Call back #: [phone]     Insurance: medicare/[employer] [facility]    Department:Cardiology    Speciality: General Cardiology    Reason for overbook request: PATIENT REQUEST    Comments (Write \"N/a\" if no comments): She was in the hospital from 1/29-2/1/2025 for dry cough for a few weeks to months. Was told to follow up with her cardiologist.    Requested doctor and location: Dr Little/[facility]    Date of current appointment: 8/19/2025      Thank you.       "

## 2025-02-13 NOTE — PROGRESS NOTES
Transition of Care Visit  Name: Jackie Urbina      : 1935      MRN: [id number]  Encounter Provider: Uriel Beebe DO  Encounter Date: 2025   Encounter department: [facility] INTERNAL MEDICINE    Assessment & Plan  Influenza A virus present  Patient presents to clinic today for TCM visit following recent hospitalization at [facility] from 2025 through 2025 for influenza A infection.    Patient initially presented to hospital due to on and off dry cough for few weeks to months.    She was found to be influenza A positive in the emergency department.    CTA chest was negative for PE but findings were consistent with pulmonary edema and CHF.    Patient treated with Tamiflu and eventually cough improved.  Stable at this time.  Issue now resolved.       Severe aortic stenosis  She reports chronic lymphedema but that it was worse recently.    Cardiology was consulted while patient was recently inpatient and patient was diuresed with IV diuretics.    Echocardiogram showed slight drop in EF to 50% as well as severe aortic stenosis.  She remained stable on room air during hospitalization.    She eventually transitioned to Lasix 20 mg daily on discharge per discussion with cardiology.    She was also initiated on low-dose amlodipine and chlorthalidone was held in place of furosemide.    She will follow-up with her cardiologist for rate of stenosis and further management.  Orders:    Basic metabolic panel; Future    Bilateral lower extremity edema  Patient complains of persistent bilateral lower extremity edema.  Does not seem to be improved since discharge from hospital.  Patient also with erythematous rash and skin peeling over bilateral lower extremities seems consistent with early stages of venous stasis dermatitis.  Explained to patient that I believe bilateral lower extremity edema is likely related to severe aortic stenosis along with underlying chronic  lymphedema.  She states that this is worse than it has ever been before with her lymphedema.  She remains compliant with her furosemide 20 mg daily however she does report that she is not urinating much throughout the day.  Advised patient to increase furosemide to 40 mg daily.  She is agreeable to also repeating BMP in 1 week to assess for electrolyte abnormalities over kidney dysfunction.  She will call her cardiologist office to schedule follow-up appointment following hospitalization.  Orders:    Basic metabolic panel; Future    triamcinolone (KENALOG) 0.1 % cream; Apply topically 2 (two) times a day    Lymphedema/ volume overload  See above.  History of lymphedema bilateral lower extremities.  Now worse than usual in setting of worsening aortic stenosis.  Lasix increased.  Follow-up with cardiology.  Orders:    furosemide (LASIX) 20 mg tablet; Take 2 tablets (40 mg total) by mouth daily    Basic metabolic panel; Future    triamcinolone (KENALOG) 0.1 % cream; Apply topically 2 (two) times a day    Chronic diastolic congestive heart failure (HCC)  Wt Readings from Last 3 Encounters:   02/14/25 50.8 kg (112 lb)   02/01/25 51.5 kg (113 lb 8.6 oz)   12/24/24 50.6 kg (111 lb 8 oz)       Echocardiogram was performed while patient was inpatient on 01/30/2025.  EF 50% however there is grade 2 diastolic dysfunction.  Patient with persistent lower extremity edema and fatigue.  This is likely attributable to heart failure and severe aortic stenosis.  Increase Lasix.  Follow-up with cardiology.             Current moderate episode of major depressive disorder without prior episode (HCC)  Stable.  Continue sertraline 25 mg daily.              History of Present Illness     Transitional Care Management Review:   Jackie Urbina is a 89 y.o. female here for TCM follow up.     During the TCM phone call patient stated:  TCM Call       Date and time call was made  2/3/2025  8:30 AM    Hospital care reviewed  Records reviewed     Patient was hospitialized at  [facility]    Date of Admission  01/29/25    Date of discharge  02/01/25    Diagnosis  Influenza A virus present    Disposition  Home          TCM Call       Scheduled for follow up?  Yes    I have advised the patient to call PCP with any new or worsening symptoms  Angelina Wachter M.A    Counseling  Patient    Comments  Patient appointment scheduled for 8/22/19          Ms. Jackie Urbina is an 89-year-old female with past medical history of hypertension, chronic diastolic CHF, depression, hyperlipidemia.  Patient presents to clinic today for TCM visit following recent hospitalization at [facility] from 01/29/2025 through 02/01/2025 for influenza A infection.  Patient initially presented to hospital due to on and off dry cough for few weeks to months.  She was found to be influenza A positive in the emergency department.  CTA chest was negative for PE but findings were consistent with pulmonary edema and CHF.  She reports chronic lymphedema but that it was worse recently.  Cardiology was consulted and patient was diuresed with IV diuretics.  Echocardiogram showed slight drop in EF to 50% as well as severe aortic stenosis.  She remained stable on room air during hospitalization.  She eventually transition to Lasix 20 mg daily on discharge per discussion with cardiology.  She was also initiated on low-dose amlodipine and chlorthalidone was held.  She will follow-up with her cardiologist for rate of stenosis and further management.      Review of Systems   Constitutional:  Positive for fatigue. Negative for chills and fever.   HENT:  Negative for congestion, rhinorrhea and sore throat.    Respiratory:  Negative for cough, shortness of breath and wheezing.    Cardiovascular:  Positive for leg swelling. Negative for chest pain.   Gastrointestinal:  Negative for abdominal distention, abdominal pain, constipation, diarrhea, nausea and vomiting.   Genitourinary:   Negative for difficulty urinating and dysuria.   Musculoskeletal:  Negative for arthralgias and back pain.   Skin:  Negative for rash and wound.   Neurological:  Negative for dizziness, syncope, weakness, light-headedness and headaches.   Psychiatric/Behavioral:  Negative for agitation, behavioral problems and confusion.      Objective   BP (!) 178/80   Pulse 66   Temp 98.4 °F (36.9 °C)   Resp 18   Ht 5' (1.524 m)   Wt 50.8 kg (112 lb)   SpO2 97%   BMI 21.87 kg/m²     Physical Exam  Constitutional:       Appearance: Normal appearance.   HENT:      Right Ear: External ear normal.      Left Ear: External ear normal.   Cardiovascular:      Rate and Rhythm: Normal rate and regular rhythm.      Pulses: Normal pulses.      Heart sounds: Normal heart sounds. No murmur heard.  Pulmonary:      Effort: Pulmonary effort is normal. No respiratory distress.      Breath sounds: Normal breath sounds. No wheezing, rhonchi or rales.   Abdominal:      General: Abdomen is flat. Bowel sounds are normal. There is no distension.      Palpations: Abdomen is soft.      Tenderness: There is no abdominal tenderness.   Musculoskeletal:      Right lower leg: No edema.      Left lower leg: No edema.   Skin:     General: Skin is warm and dry.   Neurological:      Mental Status: She is alert and oriented to person, place, and time.   Psychiatric:         Mood and Affect: Mood normal.         Behavior: Behavior normal.         Thought Content: Thought content normal.       Medications have been reviewed by provider in current encounter

## 2025-02-14 ENCOUNTER — OFFICE VISIT (OUTPATIENT)
Age: OVER 89
End: 2025-02-14

## 2025-02-14 VITALS
HEIGHT: 60 IN | TEMPERATURE: 98.4 F | HEART RATE: 66 BPM | SYSTOLIC BLOOD PRESSURE: 178 MMHG | BODY MASS INDEX: 21.99 KG/M2 | RESPIRATION RATE: 18 BRPM | OXYGEN SATURATION: 97 % | WEIGHT: 112 LBS | DIASTOLIC BLOOD PRESSURE: 80 MMHG

## 2025-02-14 DIAGNOSIS — F32.1 CURRENT MODERATE EPISODE OF MAJOR DEPRESSIVE DISORDER WITHOUT PRIOR EPISODE (HCC): ICD-10-CM

## 2025-02-14 DIAGNOSIS — R60.0 BILATERAL LOWER EXTREMITY EDEMA: ICD-10-CM

## 2025-02-14 DIAGNOSIS — J10.1 INFLUENZA A VIRUS PRESENT: ICD-10-CM

## 2025-02-14 DIAGNOSIS — I89.0 LYMPHEDEMA: ICD-10-CM

## 2025-02-14 DIAGNOSIS — I50.32 CHRONIC DIASTOLIC CONGESTIVE HEART FAILURE (HCC): ICD-10-CM

## 2025-02-14 DIAGNOSIS — I35.0 SEVERE AORTIC STENOSIS: Primary | ICD-10-CM

## 2025-02-14 RX ORDER — FUROSEMIDE 20 MG/1
40 TABLET ORAL DAILY
Qty: 60 TABLET | Refills: 0 | Status: SHIPPED | OUTPATIENT
Start: 2025-02-14

## 2025-02-14 RX ORDER — TRIAMCINOLONE ACETONIDE 1 MG/G
CREAM TOPICAL 2 TIMES DAILY
Qty: 80 G | Refills: 0 | Status: SHIPPED | OUTPATIENT
Start: 2025-02-14

## 2025-02-14 NOTE — ASSESSMENT & PLAN NOTE
Patient presents to clinic today for TCM visit following recent hospitalization at [facility] from 01/29/2025 through 02/01/2025 for influenza A infection.    Patient initially presented to hospital due to on and off dry cough for few weeks to months.    She was found to be influenza A positive in the emergency department.    CTA chest was negative for PE but findings were consistent with pulmonary edema and CHF.    Patient treated with Tamiflu and eventually cough improved.  Stable at this time.  Issue now resolved.

## 2025-02-14 NOTE — ASSESSMENT & PLAN NOTE
See above.  History of lymphedema bilateral lower extremities.  Now worse than usual in setting of worsening aortic stenosis.  Lasix increased.  Follow-up with cardiology.  Orders:    furosemide (LASIX) 20 mg tablet; Take 2 tablets (40 mg total) by mouth daily    Basic metabolic panel; Future    triamcinolone (KENALOG) 0.1 % cream; Apply topically 2 (two) times a day

## 2025-02-14 NOTE — ASSESSMENT & PLAN NOTE
Wt Readings from Last 3 Encounters:   02/14/25 50.8 kg (112 lb)   02/01/25 51.5 kg (113 lb 8.6 oz)   12/24/24 50.6 kg (111 lb 8 oz)       Echocardiogram was performed while patient was inpatient on 01/30/2025.  EF 50% however there is grade 2 diastolic dysfunction.  Patient with persistent lower extremity edema and fatigue.  This is likely attributable to heart failure and severe aortic stenosis.  Increase Lasix.  Follow-up with cardiology.

## 2025-02-14 NOTE — PATIENT INSTRUCTIONS
Please call your cardiologist's office to follow-up on scheduling a hospital follow-up visit.  Please increase your dose of furosemide to 2 pills (40 mg total) by mouth each day.   Please have your blood work done 1 week from now to check electrolytes and kidney function.

## 2025-02-24 ENCOUNTER — APPOINTMENT (OUTPATIENT)
Dept: LAB | Facility: HOSPITAL | Age: OVER 89
End: 2025-02-24
Payer: MEDICARE

## 2025-02-24 DIAGNOSIS — R60.0 BILATERAL LOWER EXTREMITY EDEMA: ICD-10-CM

## 2025-02-24 DIAGNOSIS — I35.0 SEVERE AORTIC STENOSIS: ICD-10-CM

## 2025-02-24 DIAGNOSIS — I89.0 LYMPHEDEMA: ICD-10-CM

## 2025-02-24 LAB
ANION GAP SERPL CALCULATED.3IONS-SCNC: 10 MMOL/L (ref 4–13)
BUN SERPL-MCNC: 37 MG/DL (ref 5–25)
CALCIUM SERPL-MCNC: 9.3 MG/DL (ref 8.4–10.2)
CHLORIDE SERPL-SCNC: 102 MMOL/L (ref 96–108)
CO2 SERPL-SCNC: 28 MMOL/L (ref 21–32)
CREAT SERPL-MCNC: 0.97 MG/DL (ref 0.6–1.3)
GFR SERPL CREATININE-BSD FRML MDRD: 51 ML/MIN/1.73SQ M
GLUCOSE SERPL-MCNC: 96 MG/DL (ref 65–140)
POTASSIUM SERPL-SCNC: 3.5 MMOL/L (ref 3.5–5.3)
SODIUM SERPL-SCNC: 140 MMOL/L (ref 135–147)

## 2025-02-24 PROCEDURE — 80048 BASIC METABOLIC PNL TOTAL CA: CPT

## 2025-02-24 PROCEDURE — 36415 COLL VENOUS BLD VENIPUNCTURE: CPT

## 2025-02-25 ENCOUNTER — RESULTS FOLLOW-UP (OUTPATIENT)
Age: OVER 89
End: 2025-02-25

## 2025-03-03 DIAGNOSIS — R60.0 BILATERAL LOWER EXTREMITY EDEMA: ICD-10-CM

## 2025-03-03 DIAGNOSIS — I89.0 LYMPHEDEMA: ICD-10-CM

## 2025-03-03 RX ORDER — TRIAMCINOLONE ACETONIDE 1 MG/G
CREAM TOPICAL 2 TIMES DAILY
Qty: 160 G | Refills: 3 | Status: ON HOLD | OUTPATIENT
Start: 2025-03-03

## 2025-03-07 ENCOUNTER — OFFICE VISIT (OUTPATIENT)
Dept: CARDIAC SURGERY | Facility: CLINIC | Age: OVER 89
End: 2025-03-07
Payer: MEDICARE

## 2025-03-07 ENCOUNTER — PATIENT MESSAGE (OUTPATIENT)
Dept: CARDIOLOGY CLINIC | Facility: CLINIC | Age: OVER 89
End: 2025-03-07

## 2025-03-07 ENCOUNTER — TELEPHONE (OUTPATIENT)
Dept: CARDIOLOGY CLINIC | Facility: CLINIC | Age: OVER 89
End: 2025-03-07

## 2025-03-07 ENCOUNTER — PREP FOR PROCEDURE (OUTPATIENT)
Dept: CARDIOLOGY CLINIC | Facility: CLINIC | Age: OVER 89
End: 2025-03-07

## 2025-03-07 VITALS
HEART RATE: 57 BPM | WEIGHT: 108 LBS | BODY MASS INDEX: 21.2 KG/M2 | SYSTOLIC BLOOD PRESSURE: 194 MMHG | DIASTOLIC BLOOD PRESSURE: 81 MMHG | OXYGEN SATURATION: 97 % | HEIGHT: 60 IN

## 2025-03-07 DIAGNOSIS — Z13.6 ENCOUNTER FOR SCREENING FOR STENOSIS OF CAROTID ARTERY: ICD-10-CM

## 2025-03-07 DIAGNOSIS — I35.0 NONRHEUMATIC AORTIC (VALVE) STENOSIS: Primary | ICD-10-CM

## 2025-03-07 DIAGNOSIS — I35.0 NONRHEUMATIC AORTIC VALVE STENOSIS: Primary | ICD-10-CM

## 2025-03-07 PROCEDURE — 99204 OFFICE O/P NEW MOD 45 MIN: CPT | Performed by: THORACIC SURGERY (CARDIOTHORACIC VASCULAR SURGERY)

## 2025-03-07 NOTE — TELEPHONE ENCOUNTER
----- Message from Delfina DUNCAN sent at 3/7/2025  1:32 PM EST -----  Regarding: Cath  Please schedule patient for:     Pre TAVR Cardiac Cath: to be scheduled in the next few weeks. Patient has Medicare as Primary insurance and had recent bloodwork.    Please schedule with either Dr. Meyer, Dr. Escalera, Dr. Rossi or Dr. Diaz     To be done at: [facility]     To be done by:     Please address any questions regarding this request to Delfina Agosto or Rocio Licona,     Thank you.

## 2025-03-07 NOTE — PROGRESS NOTES
"Consultation - Cardiothoracic Surgery   Jackie Urbina 89 y.o. female MRN: [id number]    Ign[facility] referral    Reason for Consult / Principal Problem: Aortic stenosis, Non-Rheumatic    History of Present Illness: Jackie Urbina is a 89 y.o. year old female who presents for initial outpatient surgical consultation for symptomatic severe aortic stenosis. Patient's PMHx is notable for AS, HTN, IRBBB, chronic diastolic HF, chronic LE edema, 1.8 cm saccular aneurysm distal DTA, OA, GERD, and RUL pulmonary nodule.   Patient was admitted to [facility] 1/29-2/1/25 for CHF and influenza A. She has a known h/o aortic stenosis, followed by Cardiology. Repeat echo during the hospitalization demonstrated progression to severe [facility].    Patient is  accompanied by her daughter-in-paw today She reports progressive cough, worsening fatigue, decline in activity tolerance and BYRD. She reports her cough is worse at night when supine in bed. She has chronic LE edema. Patient states she was started on diuretic but has not noticed an increase in urination. She denies chest pian, lightheadedness, palpitations.     Patient lives alone in her own home. She still drives. She is independent with ADL's. Family is available.    No tobacco, rare alcohol, no drug use.    Dental care in Jan and next appt in April.    Past Medical History:  Past Medical History:   Diagnosis Date    Anesthesia complication     .Yrs ago had \"anxiety\" reaction not sure while sedated, pt states sensitive to anesthesia effects    Anxiety     stress at home    Arthritis     Cataract, right     Last Assessed:5/11/16    Eye hemorrhage     left eye currently 5/12/16    History of shingles 05/2015    Hypertension     Mitral valve stenosis, mild          Past Surgical History:   Past Surgical History:   Procedure Laterality Date    CARPAL TUNNEL RELEASE Left     CATARACT EXTRACTION      CATARACT EXTRACTION W/ INTRAOCULAR LENS IMPLANT Left " 10/11/2016    Procedure: EXTRACTION EXTRACAPSULAR CATARACT PHACO INTRAOCULAR LENS (IOL);  Surgeon: Wale Roth MD;  Location: [facility] MAIN OR;  Service:     CERVICAL CONE BIOPSY      COLONOSCOPY      EYE SURGERY Left     muscle repair    NV ARTHRP KNE CONDYLE&PLATU MEDIAL&LAT COMPARTMENTS Right 12/15/2020    Procedure: ARTHROPLASTY KNEE TOTAL;  Surgeon: Greg Mitchell DO;  Location: [facility] MAIN OR;  Service: Orthopedics    NV OPTX FEM SHFT FX W/INSJ IMED IMPLT W/WO SCREW Right 2019    Procedure: INSERTION NAIL IM FEMUR ANTEGRADE (TROCHANTERIC);  Surgeon: Greg Mitchell DO;  Location: [facility] MAIN OR;  Service: Orthopedics    NV XCAPSL CTRC RMVL INSJ IO LENS PROSTH W/O ECP Right 2016    Procedure: EXTRACTION EXTRACAPSULAR CATARACT PHACO INTRAOCULAR LENS (IOL);  Surgeon: Wale Roth MD;  Location: [facility] MAIN OR;  Service: Ophthalmology    TONSILLECTOMY           Family History:  Family History   Problem Relation Age of Onset    Heart disease Mother         MI  at age 69    Arthritis Mother     GI problems Father         bleeding ulcer    Arthritis Sister     Sleep apnea Sister     Irritable bowel syndrome Sister     Cancer Sister         skin cancer    Heart disease Brother         CABG (5)    Cancer Brother         skin cancer    Heart disease Maternal Aunt          Social History:    Social History     Substance and Sexual Activity   Alcohol Use Yes    Comment: rarely     Social History     Substance and Sexual Activity   Drug Use No     Social History     Tobacco Use   Smoking Status Never   Smokeless Tobacco Never         Home Medications:   Prior to Admission medications    Medication Sig Start Date End Date Taking? Authorizing Provider   amLODIPine (NORVASC) 2.5 mg tablet Take 1 tablet (2.5 mg total) by mouth daily 25  Yes JUAN Bronson   Ascorbic Acid (vitamin C) 100 MG tablet Take 100 mg by mouth daily   Yes Historical Provider, MD   aspirin (ECOTRIN) 325 mg EC tablet Take 1  tablet (325 mg total) by mouth 2 (two) times a day 12/16/20  Yes Yogesh Martell PA-C   benzonatate (TESSALON PERLES) 100 mg capsule Take 1 capsule (100 mg total) by mouth 3 (three) times a day 2/1/25  Yes Rosanne Heard PA-C   brimonidine tartrate 0.2 % ophthalmic solution  4/25/23  Yes Historical Provider, MD   furosemide (LASIX) 20 mg tablet Take 2 tablets (40 mg total) by mouth daily 2/14/25  Yes Uriel Beebe DO   lisinopril (ZESTRIL) 10 mg tablet Take 1 tablet (10 mg total) by mouth daily 10/31/24  Yes Karolina Little DO   metoprolol succinate (TOPROL-XL) 25 mg 24 hr tablet Take 0.5 tablets (12.5 mg total) by mouth daily 10/31/24  Yes Karolina Little DO   Multiple Vitamins-Minerals (PRESERVISION AREDS PO) Take by mouth   Yes Historical Provider, MD   omeprazole (PriLOSEC) 20 mg delayed release capsule TAKE 1 CAPSULE DAILY 6/19/24  Yes Tom Howell MD   sertraline (ZOLOFT) 25 mg tablet TAKE 1 TABLET DAILY 11/14/24  Yes Tom Howell MD   triamcinolone (KENALOG) 0.1 % cream Apply topically 2 (two) times a day 3/3/25  Yes Tom Howell MD       Allergies:  Allergies   Allergen Reactions    Indocin [Indomethacin] Other (See Comments)     hypertension       Review of Systems:  Review of Systems - History obtained from chart review and the patient  General ROS: positive for  - fatigue and change in activity tolerance   negative for - chills, fever, or weight gain  Psychological ROS: negative  Ophthalmic ROS: negative  ENT ROS: negative  Allergy and Immunology ROS: negative  Hematological and Lymphatic ROS: negative  Endocrine ROS: negative  Breast ROS: negative  Respiratory ROS: positive for - cough and orthopnea  negative for - hemoptysis or pleuritic pain  Cardiovascular ROS: positive for - dyspnea on exertion, edema, murmur, and orthopnea  negative for - chest pain, irregular heartbeat, loss of consciousness, paroxysmal nocturnal dyspnea, or rapid heart rate  Gastrointestinal ROS: no  abdominal pain, change in bowel habits, or black or bloody stools  Genito-Urinary ROS: positive for - incontinence  Musculoskeletal ROS: positive for - arthralgias  Neurological ROS: no TIA or stroke symptoms  Dermatological ROS: negative    Vital Signs:     Vitals:    03/07/25 0939 03/07/25 0947   BP: (!) 197/85 (!) 194/81   BP Location: Left arm Right arm   Patient Position: Sitting Sitting   Cuff Size: Child Child   Pulse: 57    SpO2: 97%    Weight: 49 kg (108 lb)    Height: 5' (1.524 m)        Physical Exam:    General: Alert, oriented, frail, petite, no acute distress  HEENT/NECK:  PERRLA.  No jugular venous distention.    Cardiac:Regular rate and rhythm, III/VI systolic murmur    Carotid arteries: 1+ pulses, no bruits  Pulmonary:  Breath sounds clear bilaterally.  Abdomen:  Non-tender, Non-distended.  Positive bowel sounds.  Upper extremities: 2+ radial pulses; brisk capillary refill  Lower extremities: Extremities warm/dry.  PT/DP pulses 1+ bilaterally.  3+ edema B/L; mild cellulitic appearance   Neuro: Alert and oriented X 3.  Sensation is grossly intact.  No focal deficits.  Musculoskeletal: MAEE, stable gait  Skin: Warm/Dry, without rashes or lesions.      Lab Results:       Lab Results   Component Value Date    WBC 6.95 02/01/2025    HGB 11.5 02/01/2025    HCT 36.4 02/01/2025    MCV 91 02/01/2025     02/01/2025     Lab Results   Component Value Date     10/13/2015    SODIUM 140 02/24/2025    K 3.5 02/24/2025     02/24/2025    CO2 28 02/24/2025    ANIONGAP 13.0 10/13/2015    AGAP 10 02/24/2025    BUN 37 (H) 02/24/2025    CREATININE 0.97 02/24/2025    GLUC 96 02/24/2025    GLUF 79 01/30/2025    CALCIUM 9.3 02/24/2025    AST 33 01/30/2025    ALT 11 01/30/2025    ALKPHOS 83 01/30/2025    PROT 6.8 10/13/2015    TP 6.0 (L) 01/30/2025    BILITOT 0.6 10/13/2015    TBILI 0.38 01/30/2025    EGFR 51 02/24/2025         Imaging Studies:     Echocardiogram: 1/30/25       Left Ventricle: Left  ventricular cavity size is normal. Wall thickness is moderately increased. There is mild to moderate concentric hypertrophy. The left ventricular ejection fraction is 50% by visual estimation. Systolic function is low normal. Wall motion is normal. Diastolic function is moderately abnormal, consistent with grade II (pseudonormal) relaxation.  Left atrial filling pressure is elevated.    Left Atrium: The atrium is severely dilated. LA Volume Index (BP) is 49.3 mL/m2.    Aortic Valve: The aortic valve is trileaflet. The leaflets are moderately thickened. The leaflets are moderately calcified. There is severely reduced mobility. There is mild regurgitation. There is severe stenosis. The aortic valve mean gradient is 38 mmHg. The dimensionless velocity index is 0.16. The aortic valve area is 0.65 cm2.  Mean gradient is 40 mmHg peak gradient 70 mmHg.    Mitral Valve: There is moderate annular calcification. There is moderate regurgitation.    Tricuspid Valve: There is mild to moderate regurgitation.  Calculated pulmonary artery pressure around 45 mmHg. There is evidence of mild tricuspid valve stenosis.        Findings    Left Ventricle Left ventricular cavity size is normal. Wall thickness is moderately increased. There is mild to moderate concentric hypertrophy. The left ventricular ejection fraction is 50% by visual estimation. Systolic function is low normal. Wall motion is normal. Diastolic function is moderately abnormal, consistent with grade II (pseudonormal) relaxation.  Left atrial filling pressure is elevated.   Right Ventricle Right ventricular cavity size is normal. Systolic function is normal. Wall thickness is normal.   Left Atrium The atrium is severely dilated. LA Volume Index (BP) is 49.3 mL/m2.   Right Atrium The atrium is normal in size.   Aortic Valve The aortic valve is trileaflet. The leaflets are moderately thickened. The leaflets are moderately calcified. There is severely reduced mobility. There  is mild regurgitation. There is severe stenosis. The aortic valve mean gradient is 38 mmHg. The dimensionless velocity index is 0.16. The aortic valve area is 0.65 cm2.  Mean gradient is 40 mmHg peak gradient 70 mmHg.   Mitral Valve There is moderate annular calcification.  There is moderate regurgitation. There is no evidence of stenosis.   Tricuspid Valve There is mild to moderate regurgitation.  Calculated pulmonary artery pressure around 45 mmHg. There is evidence of mild tricuspid valve stenosis.   Pulmonic Valve There is trace regurgitation. There is no evidence of stenosis.   Ascending Aorta The aortic root is normal in size.   IVC/SVC The inferior vena cava is normal in size. Respirophasic changes were normal.   Pericardium There is no pericardial effusion. The pericardium is normal in appearance.     Left Ventricle Measurements    Function/Volumes   LVOT stroke volume 55.22         LVOT stroke volume index 36.3 ml/m2         Left ventricular stroke volume (2D) 40 mL         LVOT Cardiac Output 2.8 l/min         LVOT Cardiac Index 1.92 l/min/m2         Dimensions   LVIDd 4 cm         LVIDS 2.8 cm         IVSd 1.4 cm         LVPWd 1.2 cm         LVOT area 3.46 cm2         FS 30         Diastolic Filling   MV E' Tissue Velocity Septal 4 cm/s         MV E' Tissue Velocity Lateral 4 cm/s         LA Volume Index (BP) 49.3 mL/m2         E/A ratio 1.8         E wave deceleration time 218 ms         MV Peak E Hiren 149 cm/s         MV Peak A Hiren 0.83 m/s          Report Measurements   AV LVOT peak gradient 3 mmHg              Interventricular Septum Measurements    Shunt Ratio   LVOT peak VTI 15.95 cm         LVOT peak hiren 0.84 m/s              Right Ventricle Measurements    Dimensions   RVID d 3.5 cm         Tricuspid annular plane systolic excursion 2.5 cm               Left Atrium Measurements    Dimensions   LA size 3.9 cm         LA length (A2C) 5.4 cm         JEWEL A4C 24 cm2         Volumes   LA volume (BP) 72  mL         LA Volume Index (BP) 49.3 mL/m2               Right Atrium Measurements    Dimensions   RAA A4C 14.6 cm2               Atrial Septum Measurements    Shunt Ratio   LVOT peak VTI 15.95 cm         LVOT peak hiren 0.84 m/s               Aortic Valve Measurements    Stenosis   Aortic valve peak velocity 4.15 m/s         LVOT peak hiren 0.84 m/s         Ao VTI 99.23 cm         LVOT peak VTI 15.95 cm         AV mean gradient 38 mmHg         LVOT mn grad 1 mmHg         AV peak gradient 69 mmHg         AV LVOT peak gradient 3 mmHg         Area/Dimensions   DVI 0.16         AV valve area 0.56 cm2         AV area by cont VTI 0.6 cm2         AV area peak hiren 0.7 cm2         LVOT diameter 2.1 cm         LVOT area 3.46 cm2               Mitral Valve Measurements    Stenosis   MV stenosis pressure 1/2 time 63 ms         MV valve area p 1/2 method 3.49               Tricuspid Valve Measurements    RVSP Parameters   TR Peak Hiren 3.2 m/s         Triscuspid Valve Regurgitation Peak Gradient 40 mmHg               Pulmonic Valve Measurements    Stenosis   PV peak gradient antegrade 2 mmHg               Aorta Measurements    Aortic Dimensions   Ao root 3.1 cm             CTA PE study: 1/29/25    IMPRESSION:     No pulmonary embolism.     Cardiomegaly with worsening diffuse groundglass opacities suggesting pulmonary edema. Trace bilateral effusions. Worsening bronchial wall thickening likely related to interstitial edema. Findings suggest CHF.     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     Subpleural 4 mm right upper lobe nodule.      Results Review Statement: I personally reviewed the following image studies in PACS and associated radiology reports: Echocardiogram. My interpretation of the radiology images/reports is: AS.    TAVR evaluation Assessment:     [facility]: III    Aortic Stenosis Stage: D1    5 Meter Walk: 5 sec, 6 sec, 6 sec    STS risk score  (preliminary): 9.64%    KCCQ-12 completed    Assessment:  Patient Active Problem List    Diagnosis Date Noted    Abnormal CT of the chest 01/30/2025    Elevated troponin 01/29/2025    Influenza A virus present 01/29/2025    Lymphedema/ volume overload 01/29/2025    Hypersomnia with long sleep time, idiopathic 12/26/2023    Premature atrial contractions 12/26/2023    Gastroesophageal reflux disease without esophagitis 05/03/2023    Primary osteoarthritis involving multiple joints 12/06/2022    Moderate aortic regurgitation 06/21/2022    Dyslipidemia 03/25/2021    Lymphedema of both lower extremities 03/25/2021    Status post total right knee replacement using cement 12/15/2020    Hyperlipidemia 07/07/2020    Current moderate episode of major depressive disorder without prior episode (HCC) 09/17/2019    Hypokalemia 08/09/2019    Mitral valve stenosis, mild     Arthritis 10/19/2018    Chronic diastolic congestive heart failure (HCC) 05/10/2018    Localized edema 05/10/2018    Other fatigue 05/10/2018    Aortic valve stenosis 05/11/2016    Atrial dilatation, bilateral 05/11/2016    Mild tricuspid insufficiency 05/11/2016    Non-rheumatic mitral regurgitation 05/11/2016    Essential hypertension 08/22/2013     Cardiology notes and Hopsital notes reviewed    Impression/Plan:    Jackie Urbina has symptomatic severe aortic stenosis. She will undergo the following testing for transcatheter aortic valve replacement: Gated CTA of the chest/abdomen/pelvis, cardiac catheterization, dental clearance and carotid artery ultrasound.      Once these studies have been completed, Jackie Urbina will follow up in our office to review the results and to be evaluated to confirm the suitability of proceeding with transcatheter aortic valve replacment.     Jackie Urbina was comfortable with our recommendations, and their questions were answered to their satisfaction.    Thank you for allowing us to participate in the care of this patient.      Routine referral to gastroenterology for colonoscopy screening was not indicated, as the patient is over 75 years old    SIGNATURE: JUAN Ricardo  DATE: March 7, 2025  TIME: 9:57 AM

## 2025-03-07 NOTE — TELEPHONE ENCOUNTER
"Patient scheduled for Right + Left Heart Cath on 3/28/25 at [facility] with Dr. Meyer.      Instructions sent to patient through [employer].      Patient aware of all general instructions.    Medication holds:   Furosemide (Lasix) - Do not take morning of procedure.    Lisinopril - Do not take day before and morning of procedure.     Blood Thinners:   N/A    Labs to be done on 3/14/25:  CMP / CBC (Fasting 8 hrs)       Thank you,  Alicia \"Johana\" Ruben      "

## 2025-03-07 NOTE — H&P (VIEW-ONLY)
"Consultation - Cardiothoracic Surgery   Jackie Urbina 89 y.o. female MRN: [id number]    [facility] referral    Reason for Consult / Principal Problem: Aortic stenosis, Non-Rheumatic    History of Present Illness: Jackie Urbina is a 89 y.o. year old female who presents for initial outpatient surgical consultation for symptomatic severe aortic stenosis. Patient's PMHx is notable for AS, HTN, IRBBB, chronic diastolic HF, chronic LE edema, 1.8 cm saccular aneurysm distal DTA, OA, GERD, and RUL pulmonary nodule.   Patient was admitted to [facility] 1/29-2/1/25 for CHF and influenza A. She has a known h/o aortic stenosis, followed by Cardiology. Repeat echo during the hospitalization demonstrated progression to severe AS that met [facility] criteria for evaluation.    Patient is  accompanied by her daughter-in-paw today She reports progressive cough, worsening fatigue, decline in activity tolerance and BYRD. She reports her cough is worse at night when supine in bed. She has chronic LE edema. Patient states she was started on diuretic but has not noticed an increase in urination. She denies chest pian, lightheadedness, palpitations.     Patient lives alone in her own home. She still drives. She is independent with ADL's. Family is available.    No tobacco, rare alcohol, no drug use.    Dental care in Jan and next appt in April.    Past Medical History:  Past Medical History:   Diagnosis Date    Anesthesia complication     .Yrs ago had \"anxiety\" reaction not sure while sedated, pt states sensitive to anesthesia effects    Anxiety     stress at home    Arthritis     Cataract, right     Last Assessed:5/11/16    Eye hemorrhage     left eye currently 5/12/16    History of shingles 05/2015    Hypertension     Mitral valve stenosis, mild          Past Surgical History:   Past Surgical History:   Procedure Laterality Date    CARPAL TUNNEL RELEASE Left     CATARACT EXTRACTION      CATARACT EXTRACTION W/ INTRAOCULAR LENS IMPLANT Left " 10/11/2016    Procedure: EXTRACTION EXTRACAPSULAR CATARACT PHACO INTRAOCULAR LENS (IOL);  Surgeon: Wale Roth MD;  Location: [facility] MAIN OR;  Service:     CERVICAL CONE BIOPSY      COLONOSCOPY      EYE SURGERY Left     muscle repair    NH ARTHRP KNE CONDYLE&PLATU MEDIAL&LAT COMPARTMENTS Right 12/15/2020    Procedure: ARTHROPLASTY KNEE TOTAL;  Surgeon: Greg Mitchell DO;  Location: [facility] MAIN OR;  Service: Orthopedics    NH OPTX FEM SHFT FX W/INSJ IMED IMPLT W/WO SCREW Right 2019    Procedure: INSERTION NAIL IM FEMUR ANTEGRADE (TROCHANTERIC);  Surgeon: Greg Mitchell DO;  Location: [facility] MAIN OR;  Service: Orthopedics    NH XCAPSL CTRC RMVL INSJ IO LENS PROSTH W/O ECP Right 2016    Procedure: EXTRACTION EXTRACAPSULAR CATARACT PHACO INTRAOCULAR LENS (IOL);  Surgeon: Wale Roth MD;  Location: [facility] MAIN OR;  Service: Ophthalmology    TONSILLECTOMY           Family History:  Family History   Problem Relation Age of Onset    Heart disease Mother         MI  at age 69    Arthritis Mother     GI problems Father         bleeding ulcer    Arthritis Sister     Sleep apnea Sister     Irritable bowel syndrome Sister     Cancer Sister         skin cancer    Heart disease Brother         CABG (5)    Cancer Brother         skin cancer    Heart disease Maternal Aunt          Social History:    Social History     Substance and Sexual Activity   Alcohol Use Yes    Comment: rarely     Social History     Substance and Sexual Activity   Drug Use No     Social History     Tobacco Use   Smoking Status Never   Smokeless Tobacco Never         Home Medications:   Prior to Admission medications    Medication Sig Start Date End Date Taking? Authorizing Provider   amLODIPine (NORVASC) 2.5 mg tablet Take 1 tablet (2.5 mg total) by mouth daily 25  Yes JUAN Bronson   Ascorbic Acid (vitamin C) 100 MG tablet Take 100 mg by mouth daily   Yes Historical Provider, MD   aspirin (ECOTRIN) 325 mg EC tablet Take 1  tablet (325 mg total) by mouth 2 (two) times a day 12/16/20  Yes Yogesh Martell PA-C   benzonatate (TESSALON PERLES) 100 mg capsule Take 1 capsule (100 mg total) by mouth 3 (three) times a day 2/1/25  Yes Rosanne Heard PA-C   brimonidine tartrate 0.2 % ophthalmic solution  4/25/23  Yes Historical Provider, MD   furosemide (LASIX) 20 mg tablet Take 2 tablets (40 mg total) by mouth daily 2/14/25  Yes Uriel Beebe DO   lisinopril (ZESTRIL) 10 mg tablet Take 1 tablet (10 mg total) by mouth daily 10/31/24  Yes Karolina Little DO   metoprolol succinate (TOPROL-XL) 25 mg 24 hr tablet Take 0.5 tablets (12.5 mg total) by mouth daily 10/31/24  Yes Karolina Little DO   Multiple Vitamins-Minerals (PRESERVISION AREDS PO) Take by mouth   Yes Historical Provider, MD   omeprazole (PriLOSEC) 20 mg delayed release capsule TAKE 1 CAPSULE DAILY 6/19/24  Yes Tom Howell MD   sertraline (ZOLOFT) 25 mg tablet TAKE 1 TABLET DAILY 11/14/24  Yes Tom Howell MD   triamcinolone (KENALOG) 0.1 % cream Apply topically 2 (two) times a day 3/3/25  Yes Tom Howell MD       Allergies:  Allergies   Allergen Reactions    Indocin [Indomethacin] Other (See Comments)     hypertension       Review of Systems:  Review of Systems - History obtained from chart review and the patient  General ROS: positive for  - fatigue and change in activity tolerance   negative for - chills, fever, or weight gain  Psychological ROS: negative  Ophthalmic ROS: negative  ENT ROS: negative  Allergy and Immunology ROS: negative  Hematological and Lymphatic ROS: negative  Endocrine ROS: negative  Breast ROS: negative  Respiratory ROS: positive for - cough and orthopnea  negative for - hemoptysis or pleuritic pain  Cardiovascular ROS: positive for - dyspnea on exertion, edema, murmur, and orthopnea  negative for - chest pain, irregular heartbeat, loss of consciousness, paroxysmal nocturnal dyspnea, or rapid heart rate  Gastrointestinal ROS: no  abdominal pain, change in bowel habits, or black or bloody stools  Genito-Urinary ROS: positive for - incontinence  Musculoskeletal ROS: positive for - arthralgias  Neurological ROS: no TIA or stroke symptoms  Dermatological ROS: negative    Vital Signs:     Vitals:    03/07/25 0939 03/07/25 0947   BP: (!) 197/85 (!) 194/81   BP Location: Left arm Right arm   Patient Position: Sitting Sitting   Cuff Size: Child Child   Pulse: 57    SpO2: 97%    Weight: 49 kg (108 lb)    Height: 5' (1.524 m)        Physical Exam:    General: Alert, oriented, frail, petite, no acute distress  HEENT/NECK:  PERRLA.  No jugular venous distention.    Cardiac:Regular rate and rhythm, III/VI systolic murmur    Carotid arteries: 1+ pulses, no bruits  Pulmonary:  Breath sounds clear bilaterally.  Abdomen:  Non-tender, Non-distended.  Positive bowel sounds.  Upper extremities: 2+ radial pulses; brisk capillary refill  Lower extremities: Extremities warm/dry.  PT/DP pulses 1+ bilaterally.  3+ edema B/L; mild cellulitic appearance   Neuro: Alert and oriented X 3.  Sensation is grossly intact.  No focal deficits.  Musculoskeletal: MAEE, stable gait  Skin: Warm/Dry, without rashes or lesions.      Lab Results:       Lab Results   Component Value Date    WBC 6.95 02/01/2025    HGB 11.5 02/01/2025    HCT 36.4 02/01/2025    MCV 91 02/01/2025     02/01/2025     Lab Results   Component Value Date     10/13/2015    SODIUM 140 02/24/2025    K 3.5 02/24/2025     02/24/2025    CO2 28 02/24/2025    ANIONGAP 13.0 10/13/2015    AGAP 10 02/24/2025    BUN 37 (H) 02/24/2025    CREATININE 0.97 02/24/2025    GLUC 96 02/24/2025    GLUF 79 01/30/2025    CALCIUM 9.3 02/24/2025    AST 33 01/30/2025    ALT 11 01/30/2025    ALKPHOS 83 01/30/2025    PROT 6.8 10/13/2015    TP 6.0 (L) 01/30/2025    BILITOT 0.6 10/13/2015    TBILI 0.38 01/30/2025    EGFR 51 02/24/2025         Imaging Studies:     Echocardiogram: 1/30/25       Left Ventricle: Left  ventricular cavity size is normal. Wall thickness is moderately increased. There is mild to moderate concentric hypertrophy. The left ventricular ejection fraction is 50% by visual estimation. Systolic function is low normal. Wall motion is normal. Diastolic function is moderately abnormal, consistent with grade II (pseudonormal) relaxation.  Left atrial filling pressure is elevated.    Left Atrium: The atrium is severely dilated. LA Volume Index (BP) is 49.3 mL/m2.    Aortic Valve: The aortic valve is trileaflet. The leaflets are moderately thickened. The leaflets are moderately calcified. There is severely reduced mobility. There is mild regurgitation. There is severe stenosis. The aortic valve mean gradient is 38 mmHg. The dimensionless velocity index is 0.16. The aortic valve area is 0.65 cm2.  Mean gradient is 40 mmHg peak gradient 70 mmHg.    Mitral Valve: There is moderate annular calcification. There is moderate regurgitation.    Tricuspid Valve: There is mild to moderate regurgitation.  Calculated pulmonary artery pressure around 45 mmHg. There is evidence of mild tricuspid valve stenosis.        Findings    Left Ventricle Left ventricular cavity size is normal. Wall thickness is moderately increased. There is mild to moderate concentric hypertrophy. The left ventricular ejection fraction is 50% by visual estimation. Systolic function is low normal. Wall motion is normal. Diastolic function is moderately abnormal, consistent with grade II (pseudonormal) relaxation.  Left atrial filling pressure is elevated.   Right Ventricle Right ventricular cavity size is normal. Systolic function is normal. Wall thickness is normal.   Left Atrium The atrium is severely dilated. LA Volume Index (BP) is 49.3 mL/m2.   Right Atrium The atrium is normal in size.   Aortic Valve The aortic valve is trileaflet. The leaflets are moderately thickened. The leaflets are moderately calcified. There is severely reduced mobility. There  is mild regurgitation. There is severe stenosis. The aortic valve mean gradient is 38 mmHg. The dimensionless velocity index is 0.16. The aortic valve area is 0.65 cm2.  Mean gradient is 40 mmHg peak gradient 70 mmHg.   Mitral Valve There is moderate annular calcification.  There is moderate regurgitation. There is no evidence of stenosis.   Tricuspid Valve There is mild to moderate regurgitation.  Calculated pulmonary artery pressure around 45 mmHg. There is evidence of mild tricuspid valve stenosis.   Pulmonic Valve There is trace regurgitation. There is no evidence of stenosis.   Ascending Aorta The aortic root is normal in size.   IVC/SVC The inferior vena cava is normal in size. Respirophasic changes were normal.   Pericardium There is no pericardial effusion. The pericardium is normal in appearance.     Left Ventricle Measurements    Function/Volumes   LVOT stroke volume 55.22         LVOT stroke volume index 36.3 ml/m2         Left ventricular stroke volume (2D) 40 mL         LVOT Cardiac Output 2.8 l/min         LVOT Cardiac Index 1.92 l/min/m2         Dimensions   LVIDd 4 cm         LVIDS 2.8 cm         IVSd 1.4 cm         LVPWd 1.2 cm         LVOT area 3.46 cm2         FS 30         Diastolic Filling   MV E' Tissue Velocity Septal 4 cm/s         MV E' Tissue Velocity Lateral 4 cm/s         LA Volume Index (BP) 49.3 mL/m2         E/A ratio 1.8         E wave deceleration time 218 ms         MV Peak E Hiren 149 cm/s         MV Peak A Hiren 0.83 m/s          Report Measurements   AV LVOT peak gradient 3 mmHg              Interventricular Septum Measurements    Shunt Ratio   LVOT peak VTI 15.95 cm         LVOT peak hiren 0.84 m/s              Right Ventricle Measurements    Dimensions   RVID d 3.5 cm         Tricuspid annular plane systolic excursion 2.5 cm               Left Atrium Measurements    Dimensions   LA size 3.9 cm         LA length (A2C) 5.4 cm         JEWEL A4C 24 cm2         Volumes   LA volume (BP) 72  mL         LA Volume Index (BP) 49.3 mL/m2               Right Atrium Measurements    Dimensions   RAA A4C 14.6 cm2               Atrial Septum Measurements    Shunt Ratio   LVOT peak VTI 15.95 cm         LVOT peak hiren 0.84 m/s               Aortic Valve Measurements    Stenosis   Aortic valve peak velocity 4.15 m/s         LVOT peak hiren 0.84 m/s         Ao VTI 99.23 cm         LVOT peak VTI 15.95 cm         AV mean gradient 38 mmHg         LVOT mn grad 1 mmHg         AV peak gradient 69 mmHg         AV LVOT peak gradient 3 mmHg         Area/Dimensions   DVI 0.16         AV valve area 0.56 cm2         AV area by cont VTI 0.6 cm2         AV area peak hiren 0.7 cm2         LVOT diameter 2.1 cm         LVOT area 3.46 cm2               Mitral Valve Measurements    Stenosis   MV stenosis pressure 1/2 time 63 ms         MV valve area p 1/2 method 3.49               Tricuspid Valve Measurements    RVSP Parameters   TR Peak Hiren 3.2 m/s         Triscuspid Valve Regurgitation Peak Gradient 40 mmHg               Pulmonic Valve Measurements    Stenosis   PV peak gradient antegrade 2 mmHg               Aorta Measurements    Aortic Dimensions   Ao root 3.1 cm             CTA PE study: 1/29/25    IMPRESSION:     No pulmonary embolism.     Cardiomegaly with worsening diffuse groundglass opacities suggesting pulmonary edema. Trace bilateral effusions. Worsening bronchial wall thickening likely related to interstitial edema. Findings suggest CHF.     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     Subpleural 4 mm right upper lobe nodule.      Results Review Statement: I personally reviewed the following image studies in PACS and associated radiology reports: Echocardiogram. My interpretation of the radiology images/reports is: AS.    TAVR evaluation Assessment:     [facility]: III    Aortic Stenosis Stage: D1    5 Meter Walk: 5 sec, 6 sec, 6 sec    STS risk score  (preliminary): 9.64%    KCCQ-12 completed    Assessment:  Patient Active Problem List    Diagnosis Date Noted    Abnormal CT of the chest 01/30/2025    Elevated troponin 01/29/2025    Influenza A virus present 01/29/2025    Lymphedema/ volume overload 01/29/2025    Hypersomnia with long sleep time, idiopathic 12/26/2023    Premature atrial contractions 12/26/2023    Gastroesophageal reflux disease without esophagitis 05/03/2023    Primary osteoarthritis involving multiple joints 12/06/2022    Moderate aortic regurgitation 06/21/2022    Dyslipidemia 03/25/2021    Lymphedema of both lower extremities 03/25/2021    Status post total right knee replacement using cement 12/15/2020    Hyperlipidemia 07/07/2020    Current moderate episode of major depressive disorder without prior episode (HCC) 09/17/2019    Hypokalemia 08/09/2019    Mitral valve stenosis, mild     Arthritis 10/19/2018    Chronic diastolic congestive heart failure (HCC) 05/10/2018    Localized edema 05/10/2018    Other fatigue 05/10/2018    Aortic valve stenosis 05/11/2016    Atrial dilatation, bilateral 05/11/2016    Mild tricuspid insufficiency 05/11/2016    Non-rheumatic mitral regurgitation 05/11/2016    Essential hypertension 08/22/2013     Cardiology notes and Hopsital notes reviewed    Impression/Plan:    Jackie Urbina has symptomatic severe aortic stenosis. She will undergo the following testing for transcatheter aortic valve replacement: Gated CTA of the chest/abdomen/pelvis, cardiac catheterization, dental clearance and carotid artery ultrasound.      Once these studies have been completed, Jackie Urbina will follow up in our office to review the results and to be evaluated to confirm the suitability of proceeding with transcatheter aortic valve replacment.     Jackie Urbina was comfortable with our recommendations, and their questions were answered to their satisfaction.    Thank you for allowing us to participate in the care of this patient.      Routine referral to gastroenterology for colonoscopy screening was not indicated, as the patient is over 75 years old    SIGNATURE: JUAN Ricardo  DATE: March 7, 2025  TIME: 9:57 AM

## 2025-03-08 DIAGNOSIS — I89.0 LYMPHEDEMA: ICD-10-CM

## 2025-03-10 RX ORDER — FUROSEMIDE 20 MG/1
40 TABLET ORAL DAILY
Qty: 180 TABLET | Refills: 1 | Status: SHIPPED | OUTPATIENT
Start: 2025-03-10

## 2025-03-14 ENCOUNTER — APPOINTMENT (OUTPATIENT)
Dept: LAB | Facility: HOSPITAL | Age: OVER 89
End: 2025-03-14
Attending: INTERNAL MEDICINE
Payer: MEDICARE

## 2025-03-14 LAB
ALBUMIN SERPL BCG-MCNC: 4.2 G/DL (ref 3.5–5)
ALP SERPL-CCNC: 97 U/L (ref 34–104)
ALT SERPL W P-5'-P-CCNC: 14 U/L (ref 7–52)
ANION GAP SERPL CALCULATED.3IONS-SCNC: 9 MMOL/L (ref 4–13)
AST SERPL W P-5'-P-CCNC: 24 U/L (ref 13–39)
BASOPHILS # BLD AUTO: 0.12 THOUSANDS/ÂΜL (ref 0–0.1)
BASOPHILS NFR BLD AUTO: 2 % (ref 0–1)
BILIRUB SERPL-MCNC: 0.61 MG/DL (ref 0.2–1)
BUN SERPL-MCNC: 34 MG/DL (ref 5–25)
CALCIUM SERPL-MCNC: 9.6 MG/DL (ref 8.4–10.2)
CHLORIDE SERPL-SCNC: 105 MMOL/L (ref 96–108)
CO2 SERPL-SCNC: 26 MMOL/L (ref 21–32)
CREAT SERPL-MCNC: 0.91 MG/DL (ref 0.6–1.3)
EOSINOPHIL # BLD AUTO: 0.27 THOUSAND/ÂΜL (ref 0–0.61)
EOSINOPHIL NFR BLD AUTO: 4 % (ref 0–6)
ERYTHROCYTE [DISTWIDTH] IN BLOOD BY AUTOMATED COUNT: 13.8 % (ref 11.6–15.1)
GFR SERPL CREATININE-BSD FRML MDRD: 56 ML/MIN/1.73SQ M
GLUCOSE P FAST SERPL-MCNC: 93 MG/DL (ref 65–99)
HCT VFR BLD AUTO: 41.6 % (ref 34.8–46.1)
HGB BLD-MCNC: 12.9 G/DL (ref 11.5–15.4)
IMM GRANULOCYTES # BLD AUTO: 0.02 THOUSAND/UL (ref 0–0.2)
IMM GRANULOCYTES NFR BLD AUTO: 0 % (ref 0–2)
LYMPHOCYTES # BLD AUTO: 2.25 THOUSANDS/ÂΜL (ref 0.6–4.47)
LYMPHOCYTES NFR BLD AUTO: 31 % (ref 14–44)
MCH RBC QN AUTO: 28.2 PG (ref 26.8–34.3)
MCHC RBC AUTO-ENTMCNC: 31 G/DL (ref 31.4–37.4)
MCV RBC AUTO: 91 FL (ref 82–98)
MONOCYTES # BLD AUTO: 0.67 THOUSAND/ÂΜL (ref 0.17–1.22)
MONOCYTES NFR BLD AUTO: 9 % (ref 4–12)
NEUTROPHILS # BLD AUTO: 3.92 THOUSANDS/ÂΜL (ref 1.85–7.62)
NEUTS SEG NFR BLD AUTO: 54 % (ref 43–75)
NRBC BLD AUTO-RTO: 0 /100 WBCS
PLATELET # BLD AUTO: 198 THOUSANDS/UL (ref 149–390)
PMV BLD AUTO: 11.1 FL (ref 8.9–12.7)
POTASSIUM SERPL-SCNC: 4.2 MMOL/L (ref 3.5–5.3)
PROT SERPL-MCNC: 7 G/DL (ref 6.4–8.4)
RBC # BLD AUTO: 4.58 MILLION/UL (ref 3.81–5.12)
SODIUM SERPL-SCNC: 140 MMOL/L (ref 135–147)
WBC # BLD AUTO: 7.25 THOUSAND/UL (ref 4.31–10.16)

## 2025-03-14 PROCEDURE — 85025 COMPLETE CBC W/AUTO DIFF WBC: CPT

## 2025-03-14 PROCEDURE — 80053 COMPREHEN METABOLIC PANEL: CPT

## 2025-03-14 PROCEDURE — 36415 COLL VENOUS BLD VENIPUNCTURE: CPT

## 2025-03-17 ENCOUNTER — RESULTS FOLLOW-UP (OUTPATIENT)
Dept: CARDIOLOGY CLINIC | Facility: CLINIC | Age: OVER 89
End: 2025-03-17

## 2025-03-20 ENCOUNTER — HOSPITAL ENCOUNTER (OUTPATIENT)
Dept: RADIOLOGY | Facility: HOSPITAL | Age: OVER 89
Discharge: HOME | End: 2025-03-20
Payer: MEDICARE

## 2025-03-20 ENCOUNTER — HOSPITAL ENCOUNTER (OUTPATIENT)
Dept: NON INVASIVE DIAGNOSTICS | Facility: HOSPITAL | Age: OVER 89
Discharge: HOME | End: 2025-03-20
Payer: MEDICARE

## 2025-03-20 DIAGNOSIS — I35.0 NONRHEUMATIC AORTIC VALVE STENOSIS: ICD-10-CM

## 2025-03-20 DIAGNOSIS — Z13.6 ENCOUNTER FOR SCREENING FOR STENOSIS OF CAROTID ARTERY: ICD-10-CM

## 2025-03-20 PROCEDURE — 74174 CTA ABD&PLVS W/CONTRAST: CPT

## 2025-03-20 PROCEDURE — 75572 CT HRT W/3D IMAGE: CPT

## 2025-03-20 PROCEDURE — 93880 EXTRACRANIAL BILAT STUDY: CPT | Performed by: SURGERY

## 2025-03-20 PROCEDURE — 93880 EXTRACRANIAL BILAT STUDY: CPT

## 2025-03-20 RX ADMIN — IOHEXOL 85 ML: 350 INJECTION, SOLUTION INTRAVENOUS at 11:17

## 2025-03-24 DIAGNOSIS — K21.9 GASTROESOPHAGEAL REFLUX DISEASE WITHOUT ESOPHAGITIS: ICD-10-CM

## 2025-03-26 RX ORDER — OMEPRAZOLE 20 MG/1
20 CAPSULE, DELAYED RELEASE ORAL DAILY
Qty: 30 CAPSULE | Refills: 5 | Status: ON HOLD | OUTPATIENT
Start: 2025-03-26

## 2025-03-28 ENCOUNTER — HOSPITAL ENCOUNTER (INPATIENT)
Facility: HOSPITAL | Age: OVER 89
LOS: 1 days | Discharge: HOME | DRG: 322 | End: 2025-03-31
Attending: INTERNAL MEDICINE | Admitting: INTERNAL MEDICINE
Payer: MEDICARE

## 2025-03-28 DIAGNOSIS — S40.021A HEMATOMA OF ARM, RIGHT, INITIAL ENCOUNTER: ICD-10-CM

## 2025-03-28 DIAGNOSIS — I35.0 NONRHEUMATIC AORTIC (VALVE) STENOSIS: ICD-10-CM

## 2025-03-28 DIAGNOSIS — Z95.5 S/P DRUG ELUTING CORONARY STENT PLACEMENT: Primary | ICD-10-CM

## 2025-03-28 DIAGNOSIS — I72.9 PSEUDOANEURYSM (HCC): ICD-10-CM

## 2025-03-28 PROBLEM — I25.10 CORONARY ARTERY DISEASE INVOLVING NATIVE CORONARY ARTERY: Status: ACTIVE | Noted: 2025-03-28

## 2025-03-28 LAB
ANION GAP SERPL CALCULATED.3IONS-SCNC: 6 MMOL/L (ref 4–13)
ATRIAL RATE: 51 BPM
ATRIAL RATE: 58 BPM
BUN SERPL-MCNC: 33 MG/DL (ref 5–25)
CALCIUM SERPL-MCNC: 9.4 MG/DL (ref 8.4–10.2)
CHLORIDE SERPL-SCNC: 106 MMOL/L (ref 96–108)
CO2 SERPL-SCNC: 29 MMOL/L (ref 21–32)
CREAT SERPL-MCNC: 0.82 MG/DL (ref 0.6–1.3)
GFR SERPL CREATININE-BSD FRML MDRD: 63 ML/MIN/1.73SQ M
GLUCOSE P FAST SERPL-MCNC: 101 MG/DL (ref 65–99)
GLUCOSE SERPL-MCNC: 101 MG/DL (ref 65–140)
KCT BLD-ACNC: 195 SEC (ref 89–137)
KCT BLD-ACNC: 274 SEC (ref 89–137)
P AXIS: 73 DEGREES
P AXIS: 89 DEGREES
POTASSIUM SERPL-SCNC: 4.4 MMOL/L (ref 3.5–5.3)
PR INTERVAL: 184 MS
PR INTERVAL: 206 MS
QRS AXIS: -10 DEGREES
QRS AXIS: -11 DEGREES
QRSD INTERVAL: 104 MS
QRSD INTERVAL: 106 MS
QT INTERVAL: 424 MS
QT INTERVAL: 440 MS
QTC INTERVAL: 406 MS
QTC INTERVAL: 417 MS
SODIUM SERPL-SCNC: 141 MMOL/L (ref 135–147)
SPECIMEN SOURCE: ABNORMAL
SPECIMEN SOURCE: ABNORMAL
T WAVE AXIS: 77 DEGREES
T WAVE AXIS: 85 DEGREES
VENTRICULAR RATE: 51 BPM
VENTRICULAR RATE: 58 BPM

## 2025-03-28 PROCEDURE — 92978 ENDOLUMINL IVUS OCT C 1ST: CPT | Performed by: INTERNAL MEDICINE

## 2025-03-28 PROCEDURE — C1725 CATH, TRANSLUMIN NON-LASER: HCPCS | Performed by: INTERNAL MEDICINE

## 2025-03-28 PROCEDURE — C1874 STENT, COATED/COV W/DEL SYS: HCPCS | Performed by: INTERNAL MEDICINE

## 2025-03-28 PROCEDURE — 93010 ELECTROCARDIOGRAM REPORT: CPT | Performed by: INTERNAL MEDICINE

## 2025-03-28 PROCEDURE — C1894 INTRO/SHEATH, NON-LASER: HCPCS | Performed by: INTERNAL MEDICINE

## 2025-03-28 PROCEDURE — C1769 GUIDE WIRE: HCPCS | Performed by: INTERNAL MEDICINE

## 2025-03-28 PROCEDURE — C9600 PERC DRUG-EL COR STENT SING: HCPCS | Performed by: INTERNAL MEDICINE

## 2025-03-28 PROCEDURE — 99152 MOD SED SAME PHYS/QHP 5/>YRS: CPT | Performed by: INTERNAL MEDICINE

## 2025-03-28 PROCEDURE — B2111ZZ FLUOROSCOPY OF MULTIPLE CORONARY ARTERIES USING LOW OSMOLAR CONTRAST: ICD-10-PCS | Performed by: INTERNAL MEDICINE

## 2025-03-28 PROCEDURE — 80048 BASIC METABOLIC PNL TOTAL CA: CPT | Performed by: INTERNAL MEDICINE

## 2025-03-28 PROCEDURE — 99153 MOD SED SAME PHYS/QHP EA: CPT | Performed by: INTERNAL MEDICINE

## 2025-03-28 PROCEDURE — 93454 CORONARY ARTERY ANGIO S&I: CPT | Performed by: INTERNAL MEDICINE

## 2025-03-28 PROCEDURE — C1753 CATH, INTRAVAS ULTRASOUND: HCPCS | Performed by: INTERNAL MEDICINE

## 2025-03-28 PROCEDURE — 92928 PRQ TCAT PLMT NTRAC ST 1 LES: CPT | Performed by: INTERNAL MEDICINE

## 2025-03-28 PROCEDURE — 027034Z DILATION OF CORONARY ARTERY, ONE ARTERY WITH DRUG-ELUTING INTRALUMINAL DEVICE, PERCUTANEOUS APPROACH: ICD-10-PCS | Performed by: INTERNAL MEDICINE

## 2025-03-28 PROCEDURE — 85347 COAGULATION TIME ACTIVATED: CPT

## 2025-03-28 PROCEDURE — 93005 ELECTROCARDIOGRAM TRACING: CPT

## 2025-03-28 PROCEDURE — C1887 CATHETER, GUIDING: HCPCS | Performed by: INTERNAL MEDICINE

## 2025-03-28 DEVICE — STENT ONYXNG25038UX ONYX 2.50X38RX
Type: IMPLANTABLE DEVICE | Site: CORONARY | Status: FUNCTIONAL
Brand: ONYX FRONTIER™

## 2025-03-28 RX ORDER — SERTRALINE HYDROCHLORIDE 25 MG/1
25 TABLET, FILM COATED ORAL DAILY
Status: DISCONTINUED | OUTPATIENT
Start: 2025-03-28 | End: 2025-03-31 | Stop reason: HOSPADM

## 2025-03-28 RX ORDER — CLOPIDOGREL 300 MG/1
600 TABLET, FILM COATED ORAL ONCE
Status: COMPLETED | OUTPATIENT
Start: 2025-03-28 | End: 2025-03-28

## 2025-03-28 RX ORDER — PROTAMINE SULFATE 10 MG/ML
INJECTION, SOLUTION INTRAVENOUS CODE/TRAUMA/SEDATION MEDICATION
Status: DISCONTINUED | OUTPATIENT
Start: 2025-03-28 | End: 2025-03-28 | Stop reason: HOSPADM

## 2025-03-28 RX ORDER — BRIMONIDINE TARTRATE 2 MG/ML
1 SOLUTION/ DROPS OPHTHALMIC EVERY 8 HOURS SCHEDULED
Status: DISCONTINUED | OUTPATIENT
Start: 2025-03-28 | End: 2025-03-28

## 2025-03-28 RX ORDER — ASPIRIN 81 MG/1
324 TABLET, CHEWABLE ORAL DAILY
Status: DISCONTINUED | OUTPATIENT
Start: 2025-03-29 | End: 2025-03-28

## 2025-03-28 RX ORDER — FUROSEMIDE 40 MG/1
40 TABLET ORAL DAILY
Status: DISCONTINUED | OUTPATIENT
Start: 2025-03-29 | End: 2025-03-31 | Stop reason: HOSPADM

## 2025-03-28 RX ORDER — NITROGLYCERIN 20 MG/100ML
INJECTION INTRAVENOUS CODE/TRAUMA/SEDATION MEDICATION
Status: DISCONTINUED | OUTPATIENT
Start: 2025-03-28 | End: 2025-03-28 | Stop reason: HOSPADM

## 2025-03-28 RX ORDER — FENTANYL CITRATE 50 UG/ML
INJECTION, SOLUTION INTRAMUSCULAR; INTRAVENOUS CODE/TRAUMA/SEDATION MEDICATION
Status: DISCONTINUED | OUTPATIENT
Start: 2025-03-28 | End: 2025-03-28 | Stop reason: HOSPADM

## 2025-03-28 RX ORDER — LISINOPRIL 10 MG/1
10 TABLET ORAL DAILY
Status: DISCONTINUED | OUTPATIENT
Start: 2025-03-28 | End: 2025-03-31 | Stop reason: HOSPADM

## 2025-03-28 RX ORDER — VERAPAMIL HYDROCHLORIDE 2.5 MG/ML
INJECTION, SOLUTION INTRAVENOUS CODE/TRAUMA/SEDATION MEDICATION
Status: DISCONTINUED | OUTPATIENT
Start: 2025-03-28 | End: 2025-03-28 | Stop reason: HOSPADM

## 2025-03-28 RX ORDER — SODIUM CHLORIDE 9 MG/ML
50 INJECTION, SOLUTION INTRAVENOUS CONTINUOUS
Status: DISCONTINUED | OUTPATIENT
Start: 2025-03-28 | End: 2025-03-28

## 2025-03-28 RX ORDER — ATORVASTATIN CALCIUM 40 MG/1
40 TABLET, FILM COATED ORAL
Status: DISCONTINUED | OUTPATIENT
Start: 2025-03-28 | End: 2025-03-31 | Stop reason: HOSPADM

## 2025-03-28 RX ORDER — ASPIRIN 81 MG/1
81 TABLET, CHEWABLE ORAL DAILY
Status: DISCONTINUED | OUTPATIENT
Start: 2025-03-29 | End: 2025-03-28

## 2025-03-28 RX ORDER — SODIUM CHLORIDE 9 MG/ML
50 INJECTION, SOLUTION INTRAVENOUS CONTINUOUS
Status: DISPENSED | OUTPATIENT
Start: 2025-03-28 | End: 2025-03-28

## 2025-03-28 RX ORDER — AZITHROMYCIN 250 MG/1
TABLET, FILM COATED ORAL
Qty: 6 TABLET | Refills: 0 | OUTPATIENT
Start: 2025-03-28 | End: 2025-04-01

## 2025-03-28 RX ORDER — BRIMONIDINE TARTRATE 2 MG/ML
1 SOLUTION/ DROPS OPHTHALMIC DAILY
Status: DISCONTINUED | OUTPATIENT
Start: 2025-03-28 | End: 2025-03-31 | Stop reason: HOSPADM

## 2025-03-28 RX ORDER — ASPIRIN 81 MG/1
324 TABLET, CHEWABLE ORAL ONCE
Status: DISCONTINUED | OUTPATIENT
Start: 2025-03-28 | End: 2025-03-28 | Stop reason: HOSPADM

## 2025-03-28 RX ORDER — CLOPIDOGREL BISULFATE 75 MG/1
75 TABLET ORAL DAILY
Status: DISCONTINUED | OUTPATIENT
Start: 2025-03-29 | End: 2025-03-31 | Stop reason: HOSPADM

## 2025-03-28 RX ORDER — MIDAZOLAM HYDROCHLORIDE 2 MG/2ML
INJECTION, SOLUTION INTRAMUSCULAR; INTRAVENOUS CODE/TRAUMA/SEDATION MEDICATION
Status: DISCONTINUED | OUTPATIENT
Start: 2025-03-28 | End: 2025-03-28 | Stop reason: HOSPADM

## 2025-03-28 RX ORDER — LISINOPRIL 10 MG/1
10 TABLET ORAL DAILY
Status: DISCONTINUED | OUTPATIENT
Start: 2025-03-29 | End: 2025-03-28

## 2025-03-28 RX ORDER — ASPIRIN 325 MG
325 TABLET, DELAYED RELEASE (ENTERIC COATED) ORAL 2 TIMES DAILY
Status: DISCONTINUED | OUTPATIENT
Start: 2025-03-29 | End: 2025-03-29

## 2025-03-28 RX ORDER — METOPROLOL SUCCINATE 25 MG/1
12.5 TABLET, EXTENDED RELEASE ORAL DAILY
Status: DISCONTINUED | OUTPATIENT
Start: 2025-03-28 | End: 2025-03-31 | Stop reason: HOSPADM

## 2025-03-28 RX ORDER — AMLODIPINE BESYLATE 2.5 MG/1
2.5 TABLET ORAL DAILY
Status: DISCONTINUED | OUTPATIENT
Start: 2025-03-28 | End: 2025-03-31 | Stop reason: HOSPADM

## 2025-03-28 RX ORDER — PANTOPRAZOLE SODIUM 20 MG/1
20 TABLET, DELAYED RELEASE ORAL
Status: DISCONTINUED | OUTPATIENT
Start: 2025-03-28 | End: 2025-03-31 | Stop reason: HOSPADM

## 2025-03-28 RX ORDER — MULTIVIT WITH MINERALS/LUTEIN
125 TABLET ORAL DAILY
Status: DISCONTINUED | OUTPATIENT
Start: 2025-03-29 | End: 2025-03-31 | Stop reason: HOSPADM

## 2025-03-28 RX ORDER — HEPARIN SODIUM 1000 [USP'U]/ML
INJECTION, SOLUTION INTRAVENOUS; SUBCUTANEOUS CODE/TRAUMA/SEDATION MEDICATION
Status: DISCONTINUED | OUTPATIENT
Start: 2025-03-28 | End: 2025-03-28 | Stop reason: HOSPADM

## 2025-03-28 RX ADMIN — BRIMONIDINE TARTRATE 1 DROP: 2 SOLUTION/ DROPS OPHTHALMIC at 16:59

## 2025-03-28 RX ADMIN — AMLODIPINE BESYLATE 2.5 MG: 2.5 TABLET ORAL at 16:55

## 2025-03-28 RX ADMIN — Medication 2.5 MG: at 22:02

## 2025-03-28 RX ADMIN — SERTRALINE HYDROCHLORIDE 25 MG: 25 TABLET ORAL at 16:56

## 2025-03-28 RX ADMIN — ATORVASTATIN CALCIUM 40 MG: 40 TABLET, FILM COATED ORAL at 16:55

## 2025-03-28 RX ADMIN — METOPROLOL SUCCINATE 12.5 MG: 25 TABLET, EXTENDED RELEASE ORAL at 16:56

## 2025-03-28 RX ADMIN — SODIUM CHLORIDE 147 ML: 0.9 INJECTION, SOLUTION INTRAVENOUS at 09:57

## 2025-03-28 RX ADMIN — LISINOPRIL 10 MG: 10 TABLET ORAL at 16:56

## 2025-03-28 RX ADMIN — Medication 1 TABLET: at 16:56

## 2025-03-28 RX ADMIN — CLOPIDOGREL BISULFATE 600 MG: 300 TABLET, FILM COATED ORAL at 22:02

## 2025-03-28 RX ADMIN — PANTOPRAZOLE SODIUM 20 MG: 20 TABLET, DELAYED RELEASE ORAL at 16:57

## 2025-03-28 NOTE — PLAN OF CARE
Problem: PAIN - ADULT  Goal: Verbalizes/displays adequate comfort level or baseline comfort level  Description: Interventions:- Encourage patient to monitor pain and request assistance- Assess pain using appropriate pain scale- Administer analgesics based on type and severity of pain and evaluate response- Implement non-pharmacological measures as appropriate and evaluate response- Consider cultural and social influences on pain and pain management- Notify physician/advanced practitioner if interventions unsuccessful or patient reports new pain  Outcome: Progressing     Problem: INFECTION - ADULT  Goal: Absence or prevention of progression during hospitalization  Description: INTERVENTIONS:- Assess and monitor for signs and symptoms of infection- Monitor lab/diagnostic results- Monitor all insertion sites, i.e. indwelling lines, tubes, and drains- Monitor endotracheal if appropriate and nasal secretions for changes in amount and color- [place] appropriate cooling/warming therapies per order- Administer medications as ordered- Instruct and encourage patient and family to use good hand hygiene technique- Identify and instruct in appropriate isolation precautions for identified infection/condition  Outcome: Progressing  Goal: Absence of fever/infection during neutropenic period  Description: INTERVENTIONS:- Monitor WBC  Outcome: Progressing     Problem: SAFETY ADULT  Goal: Patient will remain free of falls  Description: INTERVENTIONS:- Educate patient/family on patient safety including physical limitations- Instruct patient to call for assistance with activity - Consult OT/PT to assist with strengthening/mobility - Keep Call bell within reach- Keep bed low and locked with side rails adjusted as appropriate- Keep care items and personal belongings within reach- Initiate and maintain comfort rounds- Make Fall Risk Sign visible to staff- Offer Toileting every 2 Hours, in advance of need- Initiate/Maintain bed/chair  alarm- Obtain necessary fall risk management equipment: non skid socks- Apply yellow socks and bracelet for high fall risk patients- Consider moving patient to room near nurses station  Outcome: Progressing  Goal: Maintain or return to baseline ADL function  Description: INTERVENTIONS:-  Assess patient's ability to carry out ADLs; assess patient's baseline for ADL function and identify physical deficits which impact ability to perform ADLs (bathing, care of mouth/teeth, toileting, grooming, dressing, etc.)- Assess/evaluate cause of self-care deficits - Assess range of motion- Assess patient's mobility; develop plan if impaired- Assess patient's need for assistive devices and provide as appropriate- Encourage maximum independence but intervene and supervise when necessary- Involve family in performance of ADLs- Assess for home care needs following discharge - Consider OT consult to assist with ADL evaluation and planning for discharge- Provide patient education as appropriate  Outcome: Progressing  Goal: Maintains/Returns to pre admission functional level  Description: INTERVENTIONS:- Perform AM-PAC 6 Click Basic Mobility/ Daily Activity assessment daily.- Set and communicate daily mobility goal to care team and patient/family/caregiver. - Collaborate with rehabilitation services on mobility goals if consulted- Perform Range of Motion 3 times a day.- Reposition patient every 2 hours.- Dangle patient 1 times a day- Stand patient 2-3 times a day- Ambulate patient 1-2 times a day- Out of bed to chair 1 times a day - Out of bed for meals 1 times a day- Out of bed for toileting- Record patient progress and toleration of activity level   Outcome: Progressing

## 2025-03-28 NOTE — DISCHARGE SUMMARY
"Discharge Summary - Jackie Urbina 89 y.o. female MRN: [id number]    Unit/Bed#: 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    Right radial artery pseudoaneurysm    /65 (BP Location: Left arm)   Pulse 65   Temp 98.5 °F (36.9 °C) (Oral)   Resp 18   Ht 4' 11\" (1.499 m)   Wt 49.4 kg (109 lb)   SpO2 98%   BMI 22.02 kg/m²     Review of Systems   Constitutional: Negative.    HENT: Negative.     Eyes: Negative.    Respiratory: Negative.     Endocrine: Negative.    Genitourinary: Negative.    Musculoskeletal:         Arm edematous to above elbow grossly ecchymotic       Physical Exam  Vitals reviewed.   Constitutional:       Appearance: Normal appearance.   HENT:      Head: Normocephalic and atraumatic.   Cardiovascular:      Rate and Rhythm: Normal rate and regular rhythm.      Pulses: Normal pulses.      Heart sounds: Murmur heard.   Pulmonary:      Effort: Pulmonary effort is normal.      Breath sounds: Normal breath sounds.   Abdominal:      General: Bowel sounds are normal.      Palpations: Abdomen is soft.   Skin:     General: Skin is warm and dry.      Capillary Refill: Capillary refill takes 2 to 3 seconds.      Comments: Right arm grossly edematous including from her fingers to above her elbow.  Radial pulses +2  Right hand is edematous and has good motor function strength   Neurological:      Mental Status: She is alert.         HPI and Hospital Course: Jackie Urbina, an 89-year-old female, presented to [facility] [place] for outpatient elective cardiac catheterization as part of pre-TAVR evaluation.   She has experienced progressive cough, dyspnea on exertion,  worsening fatigue and decline in activity tolerance.   She was evaluated by Dr. Dr. Gaston on 3/7/2025 for possible TAVR.    Past medical history is significant for severe symptomatic aortic stenosis, hypertension, chronic diastolic heart failure, chronic lower extremity edema, 1.8 cm saccular aneurysm distal DTA, OA, GERD and right upper lobe pulmonary nodule.    Cardiac catheterization was performed via right radial artery.    Finding:  mid LAD lesion 90% stenosis.  Status post successful PCI and placement of Pieter 2.5 x 38 mm drug-eluting stent.  There is 0% residual stenosis postintervention.  Mid circumflex lesion is 100% stenosed and represents a chronic total occlusion.  Second diagonal is 100% stenosed, chronically occluded.  First right posterolateral branch 90% stenosis.  She was loaded with Brilinta 180 mg and aspirin 324 mg.  During admission she was transitioned to Plavix.    Postprocedure, she developed increasing ecchymosis and swelling of her right forearm.  Motor and sensory function remained intact.  A pseudoaneurysm study on 3/29 was positive and vascular surgery consulted.  Fortunately, the pseudoaneurysm thrombosed after compression treatment and she did not require surgical intervention.    Right radial site-arm is grossly edematous from fingers to elbow peripheral pulses +2 has full function of right hand.    Patient has been instructed to keep her right hand and arm elevated above heart level.  She may use alternate ice and heat discomfort.    Patient was seen by Dr. Jackson who agreed that the patient could be discharged to home.  Patient does not have any functional need for visiting nurses.  Her son and girlfriend will be help caring for her.     Media Information    Document Information    Clinical Image - Mobile       Device       03/31/2025 13:04   Attached To:   Hospital Encounter on 3/28/25   Source Information    JUAN Vazquez  [facility] Med Surg/Sd   Document History            Discharge labs:  H&H 10.7 and 33.9  GFR 78 and creatinine 0.66    Discharge plan  Dual antiplatelet therapy with aspirin/Plavix.  Add Atorvastatin 40 mg daily.  Continue with pre TAVR evaluation   Defer cardiac rehab until after TAVR  Follow up appt is scheduled for 4/10/2025 at 11:00 AM at [facility]     Current Facility-Administered Medications:     amLODIPine (NORVASC) tablet 2.5 mg, Daily    aspirin chewable tablet 324 mg, Once    [START ON 3/29/2025] aspirin chewable tablet 81 mg, Daily    brimonidine tartrate 0.2 % ophthalmic solution 1 drop, Daily    clopidogrel (PLAVIX) tablet 600 mg, Once    [START ON 3/29/2025] clopidogrel (PLAVIX) tablet 75 mg, Daily    [START ON 3/29/2025] furosemide (LASIX) tablet 40 mg, Daily    [START ON 3/29/2025] lisinopril (ZESTRIL) tablet 10 mg, Daily    metoprolol succinate (TOPROL-XL) 24 hr tablet 12.5 mg, Daily    multivitamin-minerals (CENTRUM) tablet 1 tablet, Daily    pantoprazole (PROTONIX) EC tablet 20 mg, Early Morning    sertraline (ZOLOFT) tablet 25 mg, Daily    sodium chloride 0.9 % infusion, Continuous    vitamin C tablet 100 mg, Daily    Pertinent Labs/diagnostics:        Lab Results:  Recent Results (from the past 24 hours)   CBC and Platelet    Collection Time: 03/30/25  5:41 AM   Result Value Ref Range    WBC 8.01 4.31 - 10.16 Thousand/uL    RBC 3.81 3.81 - 5.12 Million/uL    Hemoglobin 10.8 (L) 11.5 - 15.4 g/dL    Hematocrit 34.6 (L) 34.8 - 46.1 %    MCV 91 82 - 98 fL    MCH 28.3 26.8 - 34.3 pg    MCHC 31.2 (L) 31.4 - 37.4 g/dL    RDW 13.9 11.6 - 15.1 %    Platelets 157 149 - 390 Thousands/uL    MPV 11.3 8.9 - 12.7 fL   Basic metabolic panel    Collection Time: 03/30/25  5:41 AM   Result Value Ref Range    Sodium 140 135 - 147 mmol/L    Potassium 4.1 3.5 - 5.3 mmol/L    Chloride 107 96 - 108 mmol/L     CO2 29 21 - 32 mmol/L    ANION GAP 4 4 - 13 mmol/L    BUN 21 5 - 25 mg/dL    Creatinine 0.70 0.60 - 1.30 mg/dL    Glucose 109 65 - 140 mg/dL    Calcium 8.7 8.4 - 10.2 mg/dL    eGFR 77 ml/min/1.73sq m       Lipid Profile:   Lab Results   Component Value Date    CHOL 191 10/13/2015     Lab Results   Component Value Date    HDL 33 (L) 01/30/2025    HDL 53 09/13/2024    HDL 53 05/02/2023     Lab Results   Component Value Date    LDLCALC 75 01/30/2025    LDLCALC 127 (H) 09/13/2024    LDLCALC 120 (H) 05/02/2023     Lab Results   Component Value Date    TRIG 92 01/30/2025    TRIG 97 09/13/2024    TRIG 88 05/02/2023         Tele: NSR    Discharge instructions/Information to patient and family:   See after visit summary for information provided to patient and family.      Provisions for Follow-Up Care:  See after visit summary for information related to follow-up care and any pertinent home health orders.      Planned Readmission: No    Discharge Statement:  I spent 45 minutes minutes discharging the patient. This time was spent on the day of discharge. I had direct contact with the patient on the day of discharge. Additional documentation is required if more than 30 minutes were spent on discharge.     ** Please Note: Fluency Direct Dictation voice to text software may have been used in the creation of this document. **

## 2025-03-28 NOTE — INTERVAL H&P NOTE
"Update: (This section must be completed if the H&P was completed greater than 24 hrs to procedure or admission)    H&P reviewed. After examining the patient, I find no changed to the H&P since it had been written.    Jackie Urbina, an 89-year-old female, presents to [facility] for outpatient elective cardiac catheterization as part of pre-TAVR evaluation.    BP (!) 181/81 (BP Location: Right arm)   Pulse 65   Temp 97.8 °F (36.6 °C) (Oral)   Resp 16   Ht 4' 11\" (1.499 m)   Wt 47.6 kg (105 lb)   SpO2 99%   BMI 21.21 kg/m²     Risk/benefits discussed with patient and consent obtained.     Patient re-evaluated. Accept as history and physical.    JUAN Baca/March 28, 2025/11:16 AM  "

## 2025-03-28 NOTE — DISCHARGE INSTR - AVS FIRST PAGE
1. Please see the post angioplasty discharge instructions.   No heavy lifting, greater than 10 lbs. or strenuous activity for 5 days.  Follow angioplasty discharge instructions.    2.Remove band aid tomorrow.  Shower and wash area- wrist gently with soap and water- beginning tomorrow. Rinse and pat dry.  Apply new water seal band aid.  Repeat this process for 5 days. No powders, creams lotions or antibiotic ointments  for 5 days.  No tub baths, hot tubs or swimming for 5 days.     3. Call [facility]'s Cardiology Office ([phone]) if you develop a fever, redness or drainage at your wrist access site.      4. No driving for 2 days    5. Do not stop aspirin or Plavix (clopiogrel) any reason without a cardiologist’s consent, or the stent could block up and cause a heart attack.    6. Stent card and book.

## 2025-03-29 ENCOUNTER — APPOINTMENT (OUTPATIENT)
Dept: NON INVASIVE DIAGNOSTICS | Facility: HOSPITAL | Age: OVER 89
DRG: 322 | End: 2025-03-29
Payer: MEDICARE

## 2025-03-29 PROBLEM — S40.021A HEMATOMA OF ARM, RIGHT, INITIAL ENCOUNTER: Status: ACTIVE | Noted: 2025-03-29

## 2025-03-29 LAB
ANION GAP SERPL CALCULATED.3IONS-SCNC: 8 MMOL/L (ref 4–13)
BUN SERPL-MCNC: 30 MG/DL (ref 5–25)
CALCIUM SERPL-MCNC: 8.6 MG/DL (ref 8.4–10.2)
CHLORIDE SERPL-SCNC: 107 MMOL/L (ref 96–108)
CO2 SERPL-SCNC: 24 MMOL/L (ref 21–32)
CREAT SERPL-MCNC: 0.76 MG/DL (ref 0.6–1.3)
ERYTHROCYTE [DISTWIDTH] IN BLOOD BY AUTOMATED COUNT: 13.7 % (ref 11.6–15.1)
GFR SERPL CREATININE-BSD FRML MDRD: 69 ML/MIN/1.73SQ M
GLUCOSE P FAST SERPL-MCNC: 107 MG/DL (ref 65–99)
GLUCOSE SERPL-MCNC: 107 MG/DL (ref 65–140)
HCT VFR BLD AUTO: 37.1 % (ref 34.8–46.1)
HGB BLD-MCNC: 11.4 G/DL (ref 11.5–15.4)
MCH RBC QN AUTO: 28.3 PG (ref 26.8–34.3)
MCHC RBC AUTO-ENTMCNC: 30.7 G/DL (ref 31.4–37.4)
MCV RBC AUTO: 92 FL (ref 82–98)
PLATELET # BLD AUTO: 166 THOUSANDS/UL (ref 149–390)
PMV BLD AUTO: 11 FL (ref 8.9–12.7)
POTASSIUM SERPL-SCNC: 4.1 MMOL/L (ref 3.5–5.3)
RBC # BLD AUTO: 4.03 MILLION/UL (ref 3.81–5.12)
SODIUM SERPL-SCNC: 139 MMOL/L (ref 135–147)
WBC # BLD AUTO: 8.1 THOUSAND/UL (ref 4.31–10.16)

## 2025-03-29 PROCEDURE — 80048 BASIC METABOLIC PNL TOTAL CA: CPT

## 2025-03-29 PROCEDURE — 93926 LOWER EXTREMITY STUDY: CPT

## 2025-03-29 PROCEDURE — 99232 SBSQ HOSP IP/OBS MODERATE 35: CPT | Performed by: INTERNAL MEDICINE

## 2025-03-29 PROCEDURE — 85027 COMPLETE CBC AUTOMATED: CPT

## 2025-03-29 PROCEDURE — 99223 1ST HOSP IP/OBS HIGH 75: CPT | Performed by: PHYSICIAN ASSISTANT

## 2025-03-29 RX ORDER — OXYCODONE HYDROCHLORIDE 5 MG/1
5 TABLET ORAL ONCE AS NEEDED
Status: COMPLETED | OUTPATIENT
Start: 2025-03-29 | End: 2025-03-29

## 2025-03-29 RX ORDER — ASPIRIN 81 MG/1
81 TABLET ORAL DAILY
Status: DISCONTINUED | OUTPATIENT
Start: 2025-03-29 | End: 2025-03-31 | Stop reason: HOSPADM

## 2025-03-29 RX ADMIN — BRIMONIDINE TARTRATE 1 DROP: 2 SOLUTION/ DROPS OPHTHALMIC at 09:17

## 2025-03-29 RX ADMIN — Medication 1 TABLET: at 09:13

## 2025-03-29 RX ADMIN — CLOPIDOGREL BISULFATE 75 MG: 75 TABLET, FILM COATED ORAL at 09:12

## 2025-03-29 RX ADMIN — METOPROLOL SUCCINATE 12.5 MG: 25 TABLET, EXTENDED RELEASE ORAL at 09:12

## 2025-03-29 RX ADMIN — LISINOPRIL 10 MG: 10 TABLET ORAL at 09:13

## 2025-03-29 RX ADMIN — Medication 125 MG: at 09:16

## 2025-03-29 RX ADMIN — AMLODIPINE BESYLATE 2.5 MG: 2.5 TABLET ORAL at 09:12

## 2025-03-29 RX ADMIN — ATORVASTATIN CALCIUM 40 MG: 40 TABLET, FILM COATED ORAL at 16:37

## 2025-03-29 RX ADMIN — SERTRALINE HYDROCHLORIDE 25 MG: 25 TABLET ORAL at 09:13

## 2025-03-29 RX ADMIN — Medication 2.5 MG: at 15:28

## 2025-03-29 RX ADMIN — FUROSEMIDE 40 MG: 40 TABLET ORAL at 09:14

## 2025-03-29 RX ADMIN — OXYCODONE HYDROCHLORIDE 5 MG: 5 TABLET ORAL at 03:22

## 2025-03-29 RX ADMIN — Medication 2.5 MG: at 09:14

## 2025-03-29 RX ADMIN — ASPIRIN 81 MG: 81 TABLET, COATED ORAL at 09:12

## 2025-03-29 NOTE — PLAN OF CARE
Problem: INFECTION - ADULT  Goal: Absence or prevention of progression during hospitalization  Description: INTERVENTIONS:- Assess and monitor for signs and symptoms of infection- Monitor lab/diagnostic results- Monitor all insertion sites, i.e. indwelling lines, tubes, and drains- Monitor endotracheal if appropriate and nasal secretions for changes in amount and color- [place] appropriate cooling/warming therapies per order- Administer medications as ordered- Instruct and encourage patient and family to use good hand hygiene technique- Identify and instruct in appropriate isolation precautions for identified infection/condition  Outcome: Progressing  Goal: Absence of fever/infection during neutropenic period  Description: INTERVENTIONS:- Monitor WBC  Outcome: Progressing     Problem: SAFETY ADULT  Goal: Patient will remain free of falls  Description: INTERVENTIONS:- Educate patient/family on patient safety including physical limitations- Instruct patient to call for assistance with activity - Consult OT/PT to assist with strengthening/mobility - Keep Call bell within reach- Keep bed low and locked with side rails adjusted as appropriate- Keep care items and personal belongings within reach- Initiate and maintain comfort rounds- Make Fall Risk Sign visible to staff- Offer Toileting every  Hours, in advance of need- Initiate/Maintain alarm- Obtain necessary fall risk management equipment: - Apply yellow socks and bracelet for high fall risk patients- Consider moving patient to room near nurses station  Outcome: Progressing  Goal: Maintain or return to baseline ADL function  Description: INTERVENTIONS:-  Assess patient's ability to carry out ADLs; assess patient's baseline for ADL function and identify physical deficits which impact ability to perform ADLs (bathing, care of mouth/teeth, toileting, grooming, dressing, etc.)- Assess/evaluate cause of self-care deficits - Assess range of motion- Assess patient's  mobility; develop plan if impaired- Assess patient's need for assistive devices and provide as appropriate- Encourage maximum independence but intervene and supervise when necessary- Involve family in performance of ADLs- Assess for home care needs following discharge - Consider OT consult to assist with ADL evaluation and planning for discharge- Provide patient education as appropriate  Outcome: Progressing  Goal: Maintains/Returns to pre admission functional level  Description: INTERVENTIONS:- Perform AM-PAC 6 Click Basic Mobility/ Daily Activity assessment daily.- Set and communicate daily mobility goal to care team and patient/family/caregiver. - Collaborate with rehabilitation services on mobility goals if consulted- Perform Range of Motion  times a day.- Reposition patient every  hours.- Dangle patient times a day- Stand patient  times a day- Ambulate patient  times a day- Out of bed to chair  times a day - Out of bed for meals  times a day- Out of bed for toileting- Record patient progress and toleration of activity level   Outcome: Progressing     Problem: Knowledge Deficit  Goal: Patient/family/caregiver demonstrates understanding of disease process, treatment plan, medications, and discharge instructions  Description: Complete learning assessment and assess knowledge base.Interventions:- Provide teaching at level of understanding- Provide teaching via preferred learning methods  Outcome: Progressing

## 2025-03-29 NOTE — CONSULTS
Consultation - Vascular Surgery   Jackie Urbina 89 y.o. female MRN: [id number]  Unit/Bed#: [facility] 428-01 Encounter: [id number]      Assessment:  Jackie Urbina is a 89 y.o. female with right radial hematoma s/p cardiac cath 3/28.  Vascular consulted for partially thrombosed PSA Rt Radial A. 1.6cm x 2.4cm, Neck of 2.4    PMHx significant for  notable for AS, HTN, IRBBB, chronic diastolic HF, chronic LE edema, 1.8 cm saccular aneurysm distal DTA, OA, GERD, and RUL pulmonary nodule.      Plan:  Patient with Rt Radial PSA. Upon our evaluation the patient had eaten this morning as such a conservative approach was opted for. We will attempt compression of the PSA to allow thrombosis of the PSA with TR band. We will plan to deflate the TR band in 3-4 hours after initial placement and replace with compression dressing and coban wrap up to her elbow. Plan to re-scan her in the AM to evaluate PSA, if no improvement we will take her to the OR for primary repair.   NPO at MN  Rest of care per primary team.     _______________________________________________________________  Physician Requesting Consult: Darwin Meyer DO    Additional consultants: Cardiolgy    Reason for Consult / Principal Problem: Right arm swelling and bruising.       HPI: Jackie Urbina is a 89 y.o. year old female who was admitted on 3/28/2025 with complaints of right arm swelling and bruising, Patient states that she underwent a cardiac cath the previous day and accessed through the radial artery. After the procedure, the patient noticed increased swelling and bruising. The patient was then admitted into the hospital with compression dressing over her hand. She stated that her hand continued to increase in size and and causes her pain however denies any numbness at this time. She denies any fevers, chills, shortness of breath, nausea or vomiting.      Historical Information   Past Medical History:   Diagnosis Date    Anesthesia complication     .Yrs  "ago had \"anxiety\" reaction not sure while sedated, pt states sensitive to anesthesia effects    Anxiety     stress at home    Arthritis     Cataract, right     Last Assessed:5/11/16    Eye hemorrhage     left eye currently 5/12/16    History of shingles 05/2015    Hypertension     Mitral valve stenosis, mild      Past Surgical History:   Procedure Laterality Date    CARPAL TUNNEL RELEASE Left     CATARACT EXTRACTION      CATARACT EXTRACTION W/ INTRAOCULAR LENS IMPLANT Left 10/11/2016    Procedure: EXTRACTION EXTRACAPSULAR CATARACT PHACO INTRAOCULAR LENS (IOL);  Surgeon: Wale Roth MD;  Location: [facility] MAIN OR;  Service:     CERVICAL CONE BIOPSY      COLONOSCOPY      EYE SURGERY Left     muscle repair    WV ARTHRP KNE CONDYLE&PLATU MEDIAL&LAT COMPARTMENTS Right 12/15/2020    Procedure: ARTHROPLASTY KNEE TOTAL;  Surgeon: Greg Mitchell DO;  Location: [facility] MAIN OR;  Service: Orthopedics    WV OPTX FEM SHFT FX W/INSJ IMED IMPLT W/WO SCREW Right 07/23/2019    Procedure: INSERTION NAIL IM FEMUR ANTEGRADE (TROCHANTERIC);  Surgeon: Greg Mitchell DO;  Location: [facility] MAIN OR;  Service: Orthopedics    WV XCAPSL CTRC RMVL INSJ IO LENS PROSTH W/O ECP Right 05/17/2016    Procedure: EXTRACTION EXTRACAPSULAR CATARACT PHACO INTRAOCULAR LENS (IOL);  Surgeon: Wale Roth MD;  Location: [facility] MAIN OR;  Service: Ophthalmology    TONSILLECTOMY       Social History   Social History     Substance and Sexual Activity   Alcohol Use Yes    Comment: rarely     Social History     Substance and Sexual Activity   Drug Use No     Social History     Tobacco Use   Smoking Status Never   Smokeless Tobacco Never     Family History: Family history non-contributory}    Meds/Allergies     Home meds:   Prior to Admission medications    Medication Sig Start Date End Date Taking? Authorizing Provider   amLODIPine (NORVASC) 2.5 mg tablet Take 1 tablet (2.5 mg total) by mouth daily 2/1/25  Yes JUAN Bronson   Ascorbic Acid (vitamin C) 100 MG " tablet Take 100 mg by mouth daily   Yes Historical Provider, MD   aspirin (ECOTRIN) 325 mg EC tablet Take 1 tablet (325 mg total) by mouth 2 (two) times a day 12/16/20  Yes Yogesh Martell PA-C   benzonatate (TESSALON PERLES) 100 mg capsule Take 1 capsule (100 mg total) by mouth 3 (three) times a day 2/1/25  Yes Rosanne Heard PA-C   brimonidine tartrate 0.2 % ophthalmic solution  4/25/23  Yes Historical Provider, MD   furosemide (LASIX) 20 mg tablet TAKE 2 TABLETS (40 MG TOTAL) BY MOUTH DAILY. 3/10/25  Yes Tom Howell MD   metoprolol succinate (TOPROL-XL) 25 mg 24 hr tablet Take 0.5 tablets (12.5 mg total) by mouth daily 10/31/24  Yes Karolina Little DO   Multiple Vitamins-Minerals (PRESERVISION AREDS PO) Take by mouth   Yes Historical Provider, MD   omeprazole (PriLOSEC) 20 mg delayed release capsule TAKE ONE CAPSULE DAILY 3/26/25  Yes Tom Howell MD   sertraline (ZOLOFT) 25 mg tablet TAKE 1 TABLET DAILY 11/14/24  Yes Tom Howell MD   triamcinolone (KENALOG) 0.1 % cream Apply topically 2 (two) times a day 3/3/25  Yes Tom Howell MD   lisinopril (ZESTRIL) 10 mg tablet Take 1 tablet (10 mg total) by mouth daily 10/31/24   Karolina Little DO     Scheduled Meds:  Current Facility-Administered Medications   Medication Dose Route Frequency Provider Last Rate    amLODIPine  2.5 mg Oral Daily JUAN Baca      ascorbic acid  125 mg Oral Daily JUAN Baca      aspirin  81 mg Oral Daily JUAN Dubose      atorvastatin  40 mg Oral Daily With Dinner JUAN Baca      brimonidine tartrate  1 drop Left Eye Daily JUAN Baca      clopidogrel  75 mg Oral Daily JUAN Baca      furosemide  40 mg Oral Daily Teresa Mckeon, JUAN      lisinopril  10 mg Oral Daily JUAN Baca      metoprolol succinate  12.5 mg Oral Daily JUAN Baca      multivitamin-minerals  1 tablet Oral Daily JUAN Baca      oxyCODONJOHANN   "2.5 mg Oral Q6H PRN Margarette Garcia DO      pantoprazole  20 mg Oral Early Morning Teresa Grecsek, CRNP      sertraline  25 mg Oral Daily Teresa Grecsek, CRNP       Continuous Infusions:   PRN Meds:    oxyCODONE    ALLERGIES:   Allergies   Allergen Reactions    Indocin [Indomethacin] Other (See Comments)     hypertension       Review of Systems:  See hPI    Objective   Vitals:  Blood pressure 124/55, pulse 63, temperature 98.3 °F (36.8 °C), resp. rate 18, height 4' 11\" (1.499 m), weight 50.3 kg (110 lb 14.3 oz), SpO2 100%.  Body mass index is 22.4 kg/m².    SpO2: SpO2: 100 %, SpO2 Activity: SpO2 Activity: At Rest    I/Os:   I/O         03/27 0701  03/28 0700 03/28 0701  03/29 0700 03/29 0701 03/30 0700    P.O.  180 360    Total Intake(mL/kg)  180 (3.6) 360 (7.2)    Urine (mL/kg/hr)  0     Total Output  0     Net  +180 +360           Unmeasured Urine Occurrence   1 x             Intake/Output Summary (Last 24 hours) at 3/29/2025 1419  Last data filed at 3/29/2025 1300  Gross per 24 hour   Intake 540 ml   Output 0 ml   Net 540 ml        Invasive Lines/Tubes:  Invasive Devices       Peripheral Intravenous Line  Duration             Peripheral IV 03/28/25 Left;Proximal;Ventral (anterior) Forearm 1 day                    Physical Exam  General appearance: alert, appears stated age, and cooperative  Head: Normocephalic, without obvious abnormality, atraumatic  Eyes: conjunctivae/corneas clear, EOM's intact.  Throat: lips, mucosa, and tongue normal; teeth and gums normal  Neck: no adenopathy, no carotid bruit, no JVD, supple, symmetrical, trachea midline, and thyroid not enlarged, symmetric, no tenderness/mass/nodules  Lungs: clear to auscultation bilaterally, Non-labored breathing   Chest wall: no tenderness  Heart: regular rate and rhythm  Abdomen: soft, non-tender; bowel sounds normal; no masses,  no organomegaly  Extremities: edema of the RUE  Skin: cyanosis - constant and varicosities - generalized  Neurologic: " "Grossly normal    Vascular Exam:    Focused evaluation of of the right upper extremity revealed ecchymosis and erythema of ranging from the distal tips of the fingers to the elbow.  Significant edema was also appreciated along this area.  Sensation appeared to be intact patient was done with approximately touch.   strength appeared weak secondary to pain.      Pulse exam:  Right: Palpable radial pulse, palpable radial pulse.    Left: Palpable radial pulse, palpable radial pulse    Invasive Lines/Tubes:  Invasive Devices       Peripheral Intravenous Line  Duration             Peripheral IV 03/28/25 Left;Proximal;Ventral (anterior) Forearm 1 day                    Lab Results and Cultures:   COVID:   Last COVID19 Screening Values       None           CBC:   Results from last 7 days   Lab Units 03/29/25  0517   WBC Thousand/uL 8.10   HEMOGLOBIN g/dL 11.4*   HEMATOCRIT % 37.1   PLATELETS Thousands/uL 166     BMP/CMP:  Results from last 7 days   Lab Units 03/29/25  0517 03/28/25  1002   POTASSIUM mmol/L 4.1 4.4   CHLORIDE mmol/L 107 106   CO2 mmol/L 24 29   BUN mg/dL 30* 33*   CREATININE mg/dL 0.76 0.82   CALCIUM mg/dL 8.6 9.4     Coags:     Lipid panel:     HgbA1c:   Lab Results   Component Value Date    HGBA1C 5.4 10/27/2020       Urinalysis:   Lab Results   Component Value Date    COLORU Yellow 09/13/2024    CLARITYU Clear 09/13/2024    SPECGRAV 1.020 09/13/2024    PHUR 6.5 09/13/2024    LEUKOCYTESUR Moderate (A) 09/13/2024    NITRITE Negative 09/13/2024    GLUCOSEU Negative 09/13/2024    KETONESU Negative 09/13/2024    BILIRUBINUR Negative 09/13/2024    BLOODU Negative 09/13/2024   ,   Urine Culture: No results found for: \"URINECX\"  Wound Culure: No results found for: \"WOUNDCULT\"  Blood Culture: No results found for: \"BLOODCX\"      Imaging Studies: Results Review Statement: I personally reviewed the following image studies in PACS and associated radiology reports: Ultrasound(s).   EKG, Pathology, and Other " Studies: Results Review Statement: I reviewed radiology reports from this admission including: procedure reports.  VTE Prophylaxis: VTE covered by:    None     CTA chest abdomen pelvis w wo contrast TAVR  Result Date: 3/21/2025  Impression Measurements and analysis to allow for planning of Transcatheter Aortic Valve Repair as above. 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 Workstation performed: [id number]     CTA chest pe study  Result Date: 1/29/2025  Impression No pulmonary embolism. Cardiomegaly with worsening diffuse groundglass opacities suggesting pulmonary edema. Trace bilateral effusions. Worsening bronchial wall thickening likely related to interstitial edema. Findings suggest CHF. 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 Subpleural 4 mm right upper lobe nodule. Workstation performed: [id number]      No CTA results available for this patient.       Code Status: Level 1 - Full Code  Advance Directive and Living Will:      Power of :    POLST:      Counseling / Coordination of Care  Counseling/Coordination of Care: Total floor / unit time spent today 30 minutes. Greater than 50% of total time was spent with the patient and / or family counseling and / or coordination of care. A description of the counseling / coordination of care: Treatment plan        Nola Miller PA-C  3/29/2025

## 2025-03-29 NOTE — ASSESSMENT & PLAN NOTE
Wt Readings from Last 3 Encounters:   03/29/25 50.3 kg (110 lb 14.3 oz)   03/07/25 49 kg (108 lb)   02/14/25 50.8 kg (112 lb)   Stable  Continue Lasix 40 mg PO daily

## 2025-03-29 NOTE — QUICK NOTE
I was notified by RN that patient had hematoma form after R radial cath today. I evaluated patient at the bedside. Right arm with ecchymosis and swelling from MCP to ~3 inches below elbow. Patient stated her arm was painful, but no numbness or tingling in her hand. Radial pulse 2+, strength 5/5, sensation intact.     Arm was wrapped with compression dressing. Patient given oxycodone PRN for pain. Will continue to monitor, if any Neurovascular changes, will contact vascular surgery and get CTA of the upper extremity.

## 2025-03-29 NOTE — PROGRESS NOTES
Progress Note - Cardiology   Name: Jackie Urbina 89 y.o. female I MRN: [id number]  Unit/Bed#: [facility] 428-01 I Date of Admission: 3/28/2025   Date of Service: 3/29/2025 I Hospital Day: 0     Assessment & Plan  Coronary artery disease involving native coronary artery  Underwent cardiac catheterization on 3/28 as part of TAVR workup  Mid LAD 90% stenosis,  of D2,  of mid circumflex with distal circumflex filled by collaterals from third RPL, first RPL 90% stenosis.  She underwent PCI/LUIS to mid LAD lesion  DAPT with ASA and Plavix  Unfortunately developed right arm hematoma - see plan below.  Otherwise, doing well post procedure without any complaints of shortness of breath or chest pain.  Normal sinus rhythm on telemetry.  Continue DAPT with ASA and Plavix. Will decrease ASA to 81 mg daily.   Patient was taking 2 full strength ASA in the morning and 1 full strength ASA at night for back pain. Educated on the need to avoid taking this much aspirin in addition to the Plavix.  Continue telemetry monitoring one more day  Hematoma of arm, right, initial encounter  Progressive ecchymosis and swelling of right forearm noted post procedure/overnight  2+ radial pulse on exam today with intact motor and sensory function of hand/fingers but persistently swollen  Pseudoaneurysm study in progress  Gentle compression and elevation of right arm once doppler study completed  Will be kept overnight for further monitoring  Nonrheumatic aortic (valve) stenosis  Severe AS, MG 40 mmHg  Undergoing TAVR work up  Chronic heart failure with preserved ejection fraction (HFpEF) ([facility])  Wt Readings from Last 3 Encounters:   03/29/25 50.3 kg (110 lb 14.3 oz)   03/07/25 49 kg (108 lb)   02/14/25 50.8 kg (112 lb)   Stable  Continue Lasix 40 mg PO daily      Subjective  Review of Systems   Constitutional: Negative for chills, malaise/fatigue and weight gain.   Cardiovascular:  Positive for leg swelling. Negative for chest pain, dyspnea on  "exertion, orthopnea, palpitations and syncope.   Respiratory:  Negative for cough, shortness of breath, sleep disturbances due to breathing and sputum production.    Endocrine: Negative.    Hematologic/Lymphatic: Bruises/bleeds easily.   Gastrointestinal:  Negative for bloating, nausea and vomiting.   Genitourinary: Negative.    Neurological:  Negative for dizziness, light-headedness and weakness.   Psychiatric/Behavioral:  Negative for altered mental status.    All other systems reviewed and are negative.      Objective:   Vitals: Blood pressure 148/59, pulse 57, temperature 98 °F (36.7 °C), resp. rate 18, height 4' 11\" (1.499 m), weight 50.3 kg (110 lb 14.3 oz), SpO2 99%., Body mass index is 22.4 kg/m².,     Systolic (24hrs), Av , Min:121 , Max:187     Diastolic (24hrs), Av, Min:52, Max:81        Intake/Output Summary (Last 24 hours) at 3/29/2025 0834  Last data filed at 3/29/2025 0001  Gross per 24 hour   Intake 180 ml   Output 0 ml   Net 180 ml     Wt Readings from Last 3 Encounters:   25 50.3 kg (110 lb 14.3 oz)   25 49 kg (108 lb)   25 50.8 kg (112 lb)     Telemetry Review: NSR, PVCs    Physical Exam  Vitals reviewed.   Constitutional:       General: She is not in acute distress.  Neck:      Vascular: No hepatojugular reflux or JVD.   Cardiovascular:      Rate and Rhythm: Normal rate and regular rhythm.      Pulses: Normal pulses.      Heart sounds: Murmur heard.      Systolic murmur is present with a grade of 3/6.      No friction rub. No gallop.   Pulmonary:      Effort: Pulmonary effort is normal. No respiratory distress.      Breath sounds: No rales.   Abdominal:      General: Bowel sounds are normal. There is no distension.      Palpations: Abdomen is soft.      Tenderness: There is no abdominal tenderness.   Musculoskeletal:         General: No tenderness. Normal range of motion.      Cervical back: Neck supple.      Right lower le+ Pitting Edema present.      Left lower " le+ Pitting Edema present.   Skin:     General: Skin is warm and dry.      Findings: No erythema.      Comments: Right radial cath site- dressing C/D/I. No active bleeding noted. 2+ radial pulse with intact function of right hand. Significantly ecchymotic and swollen.   Neurological:      Mental Status: She is alert and oriented to person, place, and time.   Psychiatric:         Mood and Affect: Mood normal.         LABORATORY RESULTS      CBC with diff:   Results from last 7 days   Lab Units 25  0517   WBC Thousand/uL 8.10   HEMOGLOBIN g/dL 11.4*   HEMATOCRIT % 37.1   MCV fL 92   PLATELETS Thousands/uL 166   RBC Million/uL 4.03   MCH pg 28.3   MCHC g/dL 30.7*   RDW % 13.7   MPV fL 11.0       CMP:  Results from last 7 days   Lab Units 25  0517 25  1002   POTASSIUM mmol/L 4.1 4.4   CHLORIDE mmol/L 107 106   CO2 mmol/L 24 29   BUN mg/dL 30* 33*   CREATININE mg/dL 0.76 0.82   CALCIUM mg/dL 8.6 9.4   EGFR ml/min/1.73sq m 69 63       BMP:  Results from last 7 days   Lab Units 25  0517 25  1002   POTASSIUM mmol/L 4.1 4.4   CHLORIDE mmol/L 107 106   CO2 mmol/L 24 29   BUN mg/dL 30* 33*   CREATININE mg/dL 0.76 0.82   CALCIUM mg/dL 8.6 9.4       Lab Results   Component Value Date    CREATININE 0.76 2025    CREATININE 0.82 2025    CREATININE 0.91 2025       Lab Results   Component Value Date    NTBNP 820 (H) 2018                                   Lipid Profile:   Lab Results   Component Value Date    CHOL 191 10/13/2015     Lab Results   Component Value Date    HDL 33 (L) 2025    HDL 53 2024    HDL 53 2023     Lab Results   Component Value Date    LDLCALC 75 2025    LDLCALC 127 (H) 2024    LDLCALC 120 (H) 2023     Lab Results   Component Value Date    TRIG 92 2025    TRIG 97 2024    TRIG 88 2023       Meds/Allergies   all current active meds have been reviewed and current meds:   Current Facility-Administered  Medications:     amLODIPine (NORVASC) tablet 2.5 mg, Daily    ascorbic acid (VITAMIN C) tablet 125 mg, Daily    aspirin (ECOTRIN LOW STRENGTH) EC tablet 81 mg, Daily    atorvastatin (LIPITOR) tablet 40 mg, Daily With Dinner    brimonidine tartrate 0.2 % ophthalmic solution 1 drop, Daily    clopidogrel (PLAVIX) tablet 75 mg, Daily    furosemide (LASIX) tablet 40 mg, Daily    lisinopril (ZESTRIL) tablet 10 mg, Daily    metoprolol succinate (TOPROL-XL) 24 hr tablet 12.5 mg, Daily    multivitamin-minerals (CENTRUM) tablet 1 tablet, Daily    oxyCODONE (ROXICODONE) split tablet 2.5 mg, Q6H PRN    pantoprazole (PROTONIX) EC tablet 20 mg, Early Morning    sertraline (ZOLOFT) tablet 25 mg, Daily    Medications Prior to Admission:     amLODIPine (NORVASC) 2.5 mg tablet    Ascorbic Acid (vitamin C) 100 MG tablet    aspirin (ECOTRIN) 325 mg EC tablet    benzonatate (TESSALON PERLES) 100 mg capsule    brimonidine tartrate 0.2 % ophthalmic solution    furosemide (LASIX) 20 mg tablet    metoprolol succinate (TOPROL-XL) 25 mg 24 hr tablet    Multiple Vitamins-Minerals (PRESERVISION AREDS PO)    omeprazole (PriLOSEC) 20 mg delayed release capsule    sertraline (ZOLOFT) 25 mg tablet    triamcinolone (KENALOG) 0.1 % cream    lisinopril (ZESTRIL) 10 mg tablet         Counseling / Coordination of Care  Total floor / unit time spent today 20 minutes.  Greater than 50% of total time was spent with the patient and / or family counseling and / or coordination of care.      ** Please Note: Dragon 360 Dictation voice to text software may have been used in the creation of this document. **

## 2025-03-29 NOTE — PLAN OF CARE
Problem: PAIN - ADULT  Goal: Verbalizes/displays adequate comfort level or baseline comfort level  Description: Interventions:- Encourage patient to monitor pain and request assistance- Assess pain using appropriate pain scale- Administer analgesics based on type and severity of pain and evaluate response- Implement non-pharmacological measures as appropriate and evaluate response- Consider cultural and social influences on pain and pain management- Notify physician/advanced practitioner if interventions unsuccessful or patient reports new pain  Outcome: Progressing     Problem: INFECTION - ADULT  Goal: Absence or prevention of progression during hospitalization  Description: INTERVENTIONS:- Assess and monitor for signs and symptoms of infection- Monitor lab/diagnostic results- Monitor all insertion sites, i.e. indwelling lines, tubes, and drains- Monitor endotracheal if appropriate and nasal secretions for changes in amount and color- [place] appropriate cooling/warming therapies per order- Administer medications as ordered- Instruct and encourage patient and family to use good hand hygiene technique- Identify and instruct in appropriate isolation precautions for identified infection/condition  Outcome: Progressing  Goal: Absence of fever/infection during neutropenic period  Description: INTERVENTIONS:- Monitor WBC  Outcome: Progressing

## 2025-03-29 NOTE — ASSESSMENT & PLAN NOTE
Underwent cardiac catheterization on 3/28 as part of TAVR workup  Mid LAD 90% stenosis,  of D2,  of mid circumflex with distal circumflex filled by collaterals from third RPL, first RPL 90% stenosis.  She underwent PCI/LUIS to mid LAD lesion  DAPT with ASA and Plavix  Unfortunately developed right arm hematoma - see plan below.  Otherwise, doing well post procedure without any complaints of shortness of breath or chest pain.  Normal sinus rhythm on telemetry.  Continue DAPT with ASA and Plavix. Will decrease ASA to 81 mg daily.   Patient was taking 2 full strength ASA in the morning and 1 full strength ASA at night for back pain. Educated on the need to avoid taking this much aspirin in addition to the Plavix.  Continue telemetry monitoring one more day

## 2025-03-29 NOTE — ASSESSMENT & PLAN NOTE
Progressive ecchymosis and swelling of right forearm noted post procedure/overnight  2+ radial pulse on exam today with intact motor and sensory function of hand/fingers but persistently swollen  Pseudoaneurysm study in progress  Gentle compression and elevation of right arm once doppler study completed  Will be kept overnight for further monitoring

## 2025-03-30 ENCOUNTER — APPOINTMENT (OUTPATIENT)
Dept: NON INVASIVE DIAGNOSTICS | Facility: HOSPITAL | Age: OVER 89
DRG: 322 | End: 2025-03-30
Payer: MEDICARE

## 2025-03-30 PROBLEM — T81.718A PSEUDOANEURYSM FOLLOWING PROCEDURE (HCC): Status: ACTIVE | Noted: 2025-03-30

## 2025-03-30 PROBLEM — I72.9 PSEUDOANEURYSM FOLLOWING PROCEDURE (HCC): Status: ACTIVE | Noted: 2025-03-30

## 2025-03-30 LAB
ANION GAP SERPL CALCULATED.3IONS-SCNC: 4 MMOL/L (ref 4–13)
BUN SERPL-MCNC: 21 MG/DL (ref 5–25)
CALCIUM SERPL-MCNC: 8.7 MG/DL (ref 8.4–10.2)
CHLORIDE SERPL-SCNC: 107 MMOL/L (ref 96–108)
CO2 SERPL-SCNC: 29 MMOL/L (ref 21–32)
CREAT SERPL-MCNC: 0.7 MG/DL (ref 0.6–1.3)
ERYTHROCYTE [DISTWIDTH] IN BLOOD BY AUTOMATED COUNT: 13.9 % (ref 11.6–15.1)
GFR SERPL CREATININE-BSD FRML MDRD: 77 ML/MIN/1.73SQ M
GLUCOSE SERPL-MCNC: 109 MG/DL (ref 65–140)
HCT VFR BLD AUTO: 34.6 % (ref 34.8–46.1)
HGB BLD-MCNC: 10.8 G/DL (ref 11.5–15.4)
MCH RBC QN AUTO: 28.3 PG (ref 26.8–34.3)
MCHC RBC AUTO-ENTMCNC: 31.2 G/DL (ref 31.4–37.4)
MCV RBC AUTO: 91 FL (ref 82–98)
PLATELET # BLD AUTO: 157 THOUSANDS/UL (ref 149–390)
PMV BLD AUTO: 11.3 FL (ref 8.9–12.7)
POTASSIUM SERPL-SCNC: 4.1 MMOL/L (ref 3.5–5.3)
RBC # BLD AUTO: 3.81 MILLION/UL (ref 3.81–5.12)
SODIUM SERPL-SCNC: 140 MMOL/L (ref 135–147)
WBC # BLD AUTO: 8.01 THOUSAND/UL (ref 4.31–10.16)

## 2025-03-30 PROCEDURE — 99232 SBSQ HOSP IP/OBS MODERATE 35: CPT | Performed by: SURGERY

## 2025-03-30 PROCEDURE — 85027 COMPLETE CBC AUTOMATED: CPT | Performed by: NURSE PRACTITIONER

## 2025-03-30 PROCEDURE — 99232 SBSQ HOSP IP/OBS MODERATE 35: CPT | Performed by: INTERNAL MEDICINE

## 2025-03-30 PROCEDURE — NC001 PR NO CHARGE: Performed by: INTERNAL MEDICINE

## 2025-03-30 PROCEDURE — 93926 LOWER EXTREMITY STUDY: CPT

## 2025-03-30 PROCEDURE — 93926 LOWER EXTREMITY STUDY: CPT | Performed by: SURGERY

## 2025-03-30 PROCEDURE — 80048 BASIC METABOLIC PNL TOTAL CA: CPT | Performed by: NURSE PRACTITIONER

## 2025-03-30 RX ADMIN — LISINOPRIL 10 MG: 10 TABLET ORAL at 08:48

## 2025-03-30 RX ADMIN — PANTOPRAZOLE SODIUM 20 MG: 20 TABLET, DELAYED RELEASE ORAL at 05:56

## 2025-03-30 RX ADMIN — SERTRALINE HYDROCHLORIDE 25 MG: 25 TABLET ORAL at 08:47

## 2025-03-30 RX ADMIN — BRIMONIDINE TARTRATE 1 DROP: 2 SOLUTION/ DROPS OPHTHALMIC at 08:48

## 2025-03-30 RX ADMIN — CLOPIDOGREL BISULFATE 75 MG: 75 TABLET, FILM COATED ORAL at 08:47

## 2025-03-30 RX ADMIN — METOPROLOL SUCCINATE 12.5 MG: 25 TABLET, EXTENDED RELEASE ORAL at 08:48

## 2025-03-30 RX ADMIN — ASPIRIN 81 MG: 81 TABLET, COATED ORAL at 08:47

## 2025-03-30 RX ADMIN — FUROSEMIDE 40 MG: 40 TABLET ORAL at 08:48

## 2025-03-30 RX ADMIN — Medication 125 MG: at 08:49

## 2025-03-30 RX ADMIN — AMLODIPINE BESYLATE 2.5 MG: 2.5 TABLET ORAL at 08:47

## 2025-03-30 RX ADMIN — Medication 1 TABLET: at 08:47

## 2025-03-30 RX ADMIN — ATORVASTATIN CALCIUM 40 MG: 40 TABLET, FILM COATED ORAL at 16:56

## 2025-03-30 NOTE — ASSESSMENT & PLAN NOTE
Underwent cardiac catheterization on 3/28 as part of TAVR workup  Mid LAD 90% stenosis,  of D2,  of mid circumflex with distal circumflex filled by collaterals from third RPL, first RPL 90% stenosis.  She underwent PCI/LUIS to mid LAD lesion  Unfortunately developed right arm hematoma with +pseudoaneurysm study on 3/29 - see plan below.  Continue DAPT with ASA 81 mg and Plavix  Patient was taking 2 full strength ASA in the morning and 1 full strength ASA at night for back pain. Educated on the need to avoid taking this much aspirin in addition to the Plavix.

## 2025-03-30 NOTE — PROGRESS NOTES
"Progress Note - Vascular Surgery   Jackie Urbina 89 y.o. female MRN: [id number]  Unit/Bed#: [facility] 428-01 Encounter: [id number]    Assessment:  89 yo F w/ R radial PSA s/p cardiac cath via R radial access on 3/28.    Plan:  R radial PSA now thrombosed on repeat PSA study after compression yesterday  Continue aspirin and plavix  Recommend moderate compression with ace wrap and elevation of the RUE to help with swelling  No vascular intervention planned  Remainder of care per primary team. Please reach out with questions or concerns    ** Please contact the [facility] Vascular Surgery Resident/AP role for any questions or concerns that might arise     Subjective/Objective    Subjective: No acute events overnight. No new complaints. Pain is well controlled. Denies M/S changes in RUE.     Objective:     Blood pressure 130/65, pulse 63, temperature 98.5 °F (36.9 °C), resp. rate 18, height 4' 11\" (1.499 m), weight 49.4 kg (109 lb), SpO2 98%.,Body mass index is 22.02 kg/m².      Intake/Output Summary (Last 24 hours) at 3/30/2025 0901  Last data filed at 3/29/2025 1300  Gross per 24 hour   Intake 360 ml   Output --   Net 360 ml       Invasive Devices       Peripheral Intravenous Line  Duration             Peripheral IV 03/28/25 Left;Proximal;Ventral (anterior) Forearm 1 day                    Physical Exam:   GEN: NAD  HEENT: NCAT, EOMI  CV: Regular rate, normotensive. Palpable R radial pulse, no appreciable thrill or PSA  Lung: Normal effort, saturating well on room air  Ab: Soft, NT/ND  Extrem: No CCE. Extensive expected swelling and ecchymosis of the RUE including the hand  Neuro: A+Ox3, M/S intact in RUE    Lab, Imaging and other studies:I have personally reviewed pertinent lab results.     VTE Pharmacologic Prophylaxis: VTE covered by:    None     VTE Mechanical Prophylaxis: sequential compression device    Recent Results (from the past 36 hours)   CBC    Collection Time: 03/29/25  5:17 AM   Result Value Ref Range    WBC 8.10 " 4.31 - 10.16 Thousand/uL    RBC 4.03 3.81 - 5.12 Million/uL    Hemoglobin 11.4 (L) 11.5 - 15.4 g/dL    Hematocrit 37.1 34.8 - 46.1 %    MCV 92 82 - 98 fL    MCH 28.3 26.8 - 34.3 pg    MCHC 30.7 (L) 31.4 - 37.4 g/dL    RDW 13.7 11.6 - 15.1 %    Platelets 166 149 - 390 Thousands/uL    MPV 11.0 8.9 - 12.7 fL   Basic metabolic panel    Collection Time: 03/29/25  5:17 AM   Result Value Ref Range    Sodium 139 135 - 147 mmol/L    Potassium 4.1 3.5 - 5.3 mmol/L    Chloride 107 96 - 108 mmol/L    CO2 24 21 - 32 mmol/L    ANION GAP 8 4 - 13 mmol/L    BUN 30 (H) 5 - 25 mg/dL    Creatinine 0.76 0.60 - 1.30 mg/dL    Glucose 107 65 - 140 mg/dL    Glucose, Fasting 107 (H) 65 - 99 mg/dL    Calcium 8.6 8.4 - 10.2 mg/dL    eGFR 69 ml/min/1.73sq m   CBC and Platelet    Collection Time: 03/30/25  5:41 AM   Result Value Ref Range    WBC 8.01 4.31 - 10.16 Thousand/uL    RBC 3.81 3.81 - 5.12 Million/uL    Hemoglobin 10.8 (L) 11.5 - 15.4 g/dL    Hematocrit 34.6 (L) 34.8 - 46.1 %    MCV 91 82 - 98 fL    MCH 28.3 26.8 - 34.3 pg    MCHC 31.2 (L) 31.4 - 37.4 g/dL    RDW 13.9 11.6 - 15.1 %    Platelets 157 149 - 390 Thousands/uL    MPV 11.3 8.9 - 12.7 fL   Basic metabolic panel    Collection Time: 03/30/25  5:41 AM   Result Value Ref Range    Sodium 140 135 - 147 mmol/L    Potassium 4.1 3.5 - 5.3 mmol/L    Chloride 107 96 - 108 mmol/L    CO2 29 21 - 32 mmol/L    ANION GAP 4 4 - 13 mmol/L    BUN 21 5 - 25 mg/dL    Creatinine 0.70 0.60 - 1.30 mg/dL    Glucose 109 65 - 140 mg/dL    Calcium 8.7 8.4 - 10.2 mg/dL    eGFR 77 ml/min/1.73sq m

## 2025-03-30 NOTE — ASSESSMENT & PLAN NOTE
Progressive ecchymosis and swelling of right forearm noted post procedure  Pseudoaneurysm study positive on 3/29, vascular surgery consulted- appreciate their evaluation and recommendations.  Repeat pseudo study 3/30- right radial PSA thrombosed after compression yesterday. No plan for surgical intervention.  Moderate compression with ACE wrap in place today. Swelling and pain improved.   Monitor additional night in hospital, hopefully stable for d/c home tomorrow.

## 2025-03-30 NOTE — PROGRESS NOTES
Progress Note - Cardiology   Name: Jackie Urbina 89 y.o. female I MRN: [id number]  [facility] 428[facility] I Date of Admission: 3/28/2025   Date of Service: 3/30/2025 I Hospital Day: 0     Assessment & Plan  Coronary artery disease involving native coronary artery  Underwent cardiac catheterization on 3/28 as part of TAVR workup  Mid LAD 90% stenosis,  of D2,  of mid circumflex with distal circumflex filled by collaterals from third RPL, first RPL 90% stenosis.  She underwent PCI/LUIS to mid LAD lesion  Unfortunately developed right arm hematoma with +pseudoaneurysm study on 3/29 - see plan below.  Continue DAPT with ASA 81 mg and Plavix  Patient was taking 2 full strength ASA in the morning and 1 full strength ASA at night for back pain. Educated on the need to avoid taking this much aspirin in addition to the Plavix.  Right radial pseudoaneurysm  Progressive ecchymosis and swelling of right forearm noted post procedure  Pseudoaneurysm study positive on 3/29, vascular surgery consulted- appreciate their evaluation and recommendations.  Repeat pseudo study 3/30- right radial PSA thrombosed after compression yesterday. No plan for surgical intervention.  Moderate compression with ACE wrap in place today. Swelling and pain improved.   Monitor additional night in hospital, hopefully stable for d/c home tomorrow.   Nonrheumatic aortic (valve) stenosis  Severe AS, MG 40 mmHg  Undergoing TAVR work up  Chronic heart failure with preserved ejection fraction (HFpEF) ([facility])  Wt Readings from Last 3 Encounters:   03/30/25 49.4 kg (109 lb)   03/07/25 49 kg (108 lb)   02/14/25 50.8 kg (112 lb)   Stable  Continue Lasix 40 mg PO daily    Subjective  Review of Systems   Constitutional: Negative for chills, malaise/fatigue and weight gain.   Cardiovascular:  Negative for chest pain, dyspnea on exertion, leg swelling, orthopnea, palpitations and syncope.   Respiratory:  Negative for cough, shortness of breath, sleep disturbances  "due to breathing and sputum production.    Endocrine: Negative.    Hematologic/Lymphatic: Negative.    Gastrointestinal:  Negative for bloating and nausea.   Genitourinary: Negative.    Neurological:  Negative for dizziness, light-headedness and weakness.   Psychiatric/Behavioral:  Negative for altered mental status.    All other systems reviewed and are negative.      Objective:   Vitals: Blood pressure 130/65, pulse 65, temperature 98.5 °F (36.9 °C), temperature source Oral, resp. rate 18, height 4' 11\" (1.499 m), weight 49.4 kg (109 lb), SpO2 98%., Body mass index is 22.02 kg/m².,     Systolic (24hrs), Av , Min:124 , Max:155     Diastolic (24hrs), Av, Min:55, Max:67      Intake/Output Summary (Last 24 hours) at 3/30/2025 1009  Last data filed at 3/30/2025 0900  Gross per 24 hour   Intake 120 ml   Output --   Net 120 ml     Wt Readings from Last 3 Encounters:   25 49.4 kg (109 lb)   25 49 kg (108 lb)   25 50.8 kg (112 lb)     Telemetry Review: NSR    Physical Exam  Vitals reviewed.   Constitutional:       General: She is not in acute distress.  Neck:      Vascular: No hepatojugular reflux or JVD.   Cardiovascular:      Rate and Rhythm: Normal rate and regular rhythm.      Heart sounds: Normal heart sounds. No murmur heard.     No friction rub. No gallop.   Pulmonary:      Effort: Pulmonary effort is normal. No respiratory distress.      Breath sounds: No rales.   Abdominal:      General: Bowel sounds are normal. There is no distension.      Palpations: Abdomen is soft.      Tenderness: There is no abdominal tenderness.   Musculoskeletal:         General: No tenderness. Normal range of motion.      Cervical back: Neck supple.   Skin:     General: Skin is warm and dry.      Capillary Refill: Capillary refill takes less than 2 seconds.      Findings: No erythema.      Comments: Right arm wrapped in ACE wrap. Moderate swelling noted to hand but improved from yesterday.  Intact motor and " sensory function in right hand.   Neurological:      Mental Status: She is alert and oriented to person, place, and time.   Psychiatric:         Mood and Affect: Mood normal.       LABORATORY RESULTS      CBC with diff:   Results from last 7 days   Lab Units 03/30/25  0541 03/29/25  0517   WBC Thousand/uL 8.01 8.10   HEMOGLOBIN g/dL 10.8* 11.4*   HEMATOCRIT % 34.6* 37.1   MCV fL 91 92   PLATELETS Thousands/uL 157 166   RBC Million/uL 3.81 4.03   MCH pg 28.3 28.3   MCHC g/dL 31.2* 30.7*   RDW % 13.9 13.7   MPV fL 11.3 11.0     CMP:  Results from last 7 days   Lab Units 03/30/25  0541 03/29/25  0517 03/28/25  1002   POTASSIUM mmol/L 4.1 4.1 4.4   CHLORIDE mmol/L 107 107 106   CO2 mmol/L 29 24 29   BUN mg/dL 21 30* 33*   CREATININE mg/dL 0.70 0.76 0.82   CALCIUM mg/dL 8.7 8.6 9.4   EGFR ml/min/1.73sq m 77 69 63     BMP:  Results from last 7 days   Lab Units 03/30/25  0541 03/29/25  0517 03/28/25  1002   POTASSIUM mmol/L 4.1 4.1 4.4   CHLORIDE mmol/L 107 107 106   CO2 mmol/L 29 24 29   BUN mg/dL 21 30* 33*   CREATININE mg/dL 0.70 0.76 0.82   CALCIUM mg/dL 8.7 8.6 9.4     Lab Results   Component Value Date    CREATININE 0.70 03/30/2025    CREATININE 0.76 03/29/2025    CREATININE 0.82 03/28/2025     Lab Results   Component Value Date    NTBNP 820 (H) 05/22/2018       Lipid Profile:   Lab Results   Component Value Date    CHOL 191 10/13/2015     Lab Results   Component Value Date    HDL 33 (L) 01/30/2025    HDL 53 09/13/2024    HDL 53 05/02/2023     Lab Results   Component Value Date    LDLCALC 75 01/30/2025    LDLCALC 127 (H) 09/13/2024    LDLCALC 120 (H) 05/02/2023     Lab Results   Component Value Date    TRIG 92 01/30/2025    TRIG 97 09/13/2024    TRIG 88 05/02/2023     Meds/Allergies   all current active meds have been reviewed and current meds:   Current Facility-Administered Medications:     amLODIPine (NORVASC) tablet 2.5 mg, Daily    ascorbic acid (VITAMIN C) tablet 125 mg, Daily    aspirin (ECOTRIN LOW STRENGTH)  EC tablet 81 mg, Daily    atorvastatin (LIPITOR) tablet 40 mg, Daily With Dinner    brimonidine tartrate 0.2 % ophthalmic solution 1 drop, Daily    clopidogrel (PLAVIX) tablet 75 mg, Daily    furosemide (LASIX) tablet 40 mg, Daily    lisinopril (ZESTRIL) tablet 10 mg, Daily    metoprolol succinate (TOPROL-XL) 24 hr tablet 12.5 mg, Daily    multivitamin-minerals (CENTRUM) tablet 1 tablet, Daily    oxyCODONE (ROXICODONE) split tablet 2.5 mg, Q6H PRN    pantoprazole (PROTONIX) EC tablet 20 mg, Early Morning    sertraline (ZOLOFT) tablet 25 mg, Daily    Medications Prior to Admission:     amLODIPine (NORVASC) 2.5 mg tablet    Ascorbic Acid (vitamin C) 100 MG tablet    aspirin (ECOTRIN) 325 mg EC tablet    benzonatate (TESSALON PERLES) 100 mg capsule    brimonidine tartrate 0.2 % ophthalmic solution    furosemide (LASIX) 20 mg tablet    metoprolol succinate (TOPROL-XL) 25 mg 24 hr tablet    Multiple Vitamins-Minerals (PRESERVISION AREDS PO)    omeprazole (PriLOSEC) 20 mg delayed release capsule    sertraline (ZOLOFT) 25 mg tablet    triamcinolone (KENALOG) 0.1 % cream    lisinopril (ZESTRIL) 10 mg tablet         Counseling / Coordination of Care  Total floor / unit time spent today 20 minutes.  Greater than 50% of total time was spent with the patient and / or family counseling and / or coordination of care.      ** Please Note: Dragon 360 Dictation voice to text software may have been used in the creation of this document. **

## 2025-03-30 NOTE — ASSESSMENT & PLAN NOTE
Wt Readings from Last 3 Encounters:   03/30/25 49.4 kg (109 lb)   03/07/25 49 kg (108 lb)   02/14/25 50.8 kg (112 lb)   Stable  Continue Lasix 40 mg PO daily

## 2025-03-30 NOTE — PLAN OF CARE
Problem: PAIN - ADULT  Goal: Verbalizes/displays adequate comfort level or baseline comfort level  Description: Interventions:- Encourage patient to monitor pain and request assistance- Assess pain using appropriate pain scale- Administer analgesics based on type and severity of pain and evaluate response- Implement non-pharmacological measures as appropriate and evaluate response- Consider cultural and social influences on pain and pain management- Notify physician/advanced practitioner if interventions unsuccessful or patient reports new pain  Outcome: Progressing     Problem: INFECTION - ADULT  Goal: Absence or prevention of progression during hospitalization  Description: INTERVENTIONS:- Assess and monitor for signs and symptoms of infection- Monitor lab/diagnostic results- Monitor all insertion sites, i.e. indwelling lines, tubes, and drains- Monitor endotracheal if appropriate and nasal secretions for changes in amount and color- [place] appropriate cooling/warming therapies per order- Administer medications as ordered- Instruct and encourage patient and family to use good hand hygiene technique- Identify and instruct in appropriate isolation precautions for identified infection/condition  Outcome: Progressing

## 2025-03-31 ENCOUNTER — TRANSITIONAL CARE MANAGEMENT (OUTPATIENT)
Age: OVER 89
End: 2025-03-31

## 2025-03-31 VITALS
WEIGHT: 108.8 LBS | TEMPERATURE: 98.5 F | SYSTOLIC BLOOD PRESSURE: 118 MMHG | BODY MASS INDEX: 21.93 KG/M2 | OXYGEN SATURATION: 99 % | HEART RATE: 68 BPM | HEIGHT: 59 IN | DIASTOLIC BLOOD PRESSURE: 58 MMHG | RESPIRATION RATE: 18 BRPM

## 2025-03-31 LAB
ANION GAP SERPL CALCULATED.3IONS-SCNC: 6 MMOL/L (ref 4–13)
BUN SERPL-MCNC: 20 MG/DL (ref 5–25)
CALCIUM SERPL-MCNC: 8.8 MG/DL (ref 8.4–10.2)
CHLORIDE SERPL-SCNC: 107 MMOL/L (ref 96–108)
CO2 SERPL-SCNC: 27 MMOL/L (ref 21–32)
CREAT SERPL-MCNC: 0.66 MG/DL (ref 0.6–1.3)
ERYTHROCYTE [DISTWIDTH] IN BLOOD BY AUTOMATED COUNT: 13.8 % (ref 11.6–15.1)
GFR SERPL CREATININE-BSD FRML MDRD: 78 ML/MIN/1.73SQ M
GLUCOSE SERPL-MCNC: 101 MG/DL (ref 65–140)
HCT VFR BLD AUTO: 33.9 % (ref 34.8–46.1)
HGB BLD-MCNC: 10.7 G/DL (ref 11.5–15.4)
MCH RBC QN AUTO: 28.5 PG (ref 26.8–34.3)
MCHC RBC AUTO-ENTMCNC: 31.6 G/DL (ref 31.4–37.4)
MCV RBC AUTO: 90 FL (ref 82–98)
PLATELET # BLD AUTO: 156 THOUSANDS/UL (ref 149–390)
PMV BLD AUTO: 11.3 FL (ref 8.9–12.7)
POTASSIUM SERPL-SCNC: 3.9 MMOL/L (ref 3.5–5.3)
RBC # BLD AUTO: 3.76 MILLION/UL (ref 3.81–5.12)
SODIUM SERPL-SCNC: 140 MMOL/L (ref 135–147)
WBC # BLD AUTO: 6.87 THOUSAND/UL (ref 4.31–10.16)

## 2025-03-31 PROCEDURE — 80048 BASIC METABOLIC PNL TOTAL CA: CPT | Performed by: NURSE PRACTITIONER

## 2025-03-31 PROCEDURE — 85027 COMPLETE CBC AUTOMATED: CPT | Performed by: NURSE PRACTITIONER

## 2025-03-31 RX ORDER — ATORVASTATIN CALCIUM 40 MG/1
40 TABLET, FILM COATED ORAL
Qty: 30 TABLET | Refills: 3 | Status: SHIPPED | OUTPATIENT
Start: 2025-03-31

## 2025-03-31 RX ORDER — CLOPIDOGREL BISULFATE 75 MG/1
75 TABLET ORAL DAILY
Qty: 30 TABLET | Refills: 11 | Status: SHIPPED | OUTPATIENT
Start: 2025-04-01

## 2025-03-31 RX ORDER — ASPIRIN 81 MG/1
81 TABLET ORAL DAILY
Start: 2025-04-01

## 2025-03-31 RX ADMIN — METOPROLOL SUCCINATE 12.5 MG: 25 TABLET, EXTENDED RELEASE ORAL at 08:31

## 2025-03-31 RX ADMIN — AMLODIPINE BESYLATE 2.5 MG: 2.5 TABLET ORAL at 08:31

## 2025-03-31 RX ADMIN — PANTOPRAZOLE SODIUM 20 MG: 20 TABLET, DELAYED RELEASE ORAL at 05:40

## 2025-03-31 RX ADMIN — SERTRALINE HYDROCHLORIDE 25 MG: 25 TABLET ORAL at 08:31

## 2025-03-31 RX ADMIN — ASPIRIN 81 MG: 81 TABLET, COATED ORAL at 08:31

## 2025-03-31 RX ADMIN — Medication 1 TABLET: at 08:31

## 2025-03-31 RX ADMIN — CLOPIDOGREL BISULFATE 75 MG: 75 TABLET, FILM COATED ORAL at 08:31

## 2025-03-31 RX ADMIN — BRIMONIDINE TARTRATE 1 DROP: 2 SOLUTION/ DROPS OPHTHALMIC at 08:32

## 2025-03-31 RX ADMIN — FUROSEMIDE 40 MG: 40 TABLET ORAL at 08:31

## 2025-03-31 RX ADMIN — LISINOPRIL 10 MG: 10 TABLET ORAL at 08:31

## 2025-03-31 RX ADMIN — Medication 125 MG: at 08:32

## 2025-03-31 NOTE — PLAN OF CARE
Problem: PAIN - ADULT  Goal: Verbalizes/displays adequate comfort level or baseline comfort level  Description: Interventions:- Encourage patient to monitor pain and request assistance- Assess pain using appropriate pain scale- Administer analgesics based on type and severity of pain and evaluate response- Implement non-pharmacological measures as appropriate and evaluate response- Consider cultural and social influences on pain and pain management- Notify physician/advanced practitioner if interventions unsuccessful or patient reports new pain  Outcome: Progressing     Problem: INFECTION - ADULT  Goal: Absence or prevention of progression during hospitalization  Description: INTERVENTIONS:- Assess and monitor for signs and symptoms of infection- Monitor lab/diagnostic results- Monitor all insertion sites, i.e. indwelling lines, tubes, and drains- Monitor endotracheal if appropriate and nasal secretions for changes in amount and color- [place] appropriate cooling/warming therapies per order- Administer medications as ordered- Instruct and encourage patient and family to use good hand hygiene technique- Identify and instruct in appropriate isolation precautions for identified infection/condition  Outcome: Progressing

## 2025-03-31 NOTE — RESTORATIVE TECHNICIAN NOTE
Restorative Technician Note      Patient Name: Jackie Urbina     Restorative Tech Visit Date: 03/31/25  Note Type: Mobility  Patient Position Upon Consult: Seated edge of bed  Activity Performed: Ambulated (to bathroom)  Assistive Device: Other (Comment) (No AD)  Patient Position at End of Consult: Other (comment) (Pt in bathroom left in the care of RN)    CAYLA Verdugo

## 2025-03-31 NOTE — PLAN OF CARE
Problem: PAIN - ADULT  Goal: Verbalizes/displays adequate comfort level or baseline comfort level  Description: Interventions:- Encourage patient to monitor pain and request assistance- Assess pain using appropriate pain scale- Administer analgesics based on type and severity of pain and evaluate response- Implement non-pharmacological measures as appropriate and evaluate response- Consider cultural and social influences on pain and pain management- Notify physician/advanced practitioner if interventions unsuccessful or patient reports new pain  Outcome: Progressing     Problem: INFECTION - ADULT  Goal: Absence or prevention of progression during hospitalization  Description: INTERVENTIONS:- Assess and monitor for signs and symptoms of infection- Monitor lab/diagnostic results- Monitor all insertion sites, i.e. indwelling lines, tubes, and drains- Monitor endotracheal if appropriate and nasal secretions for changes in amount and color- [place] appropriate cooling/warming therapies per order- Administer medications as ordered- Instruct and encourage patient and family to use good hand hygiene technique- Identify and instruct in appropriate isolation precautions for identified infection/condition  Outcome: Progressing     Problem: SAFETY ADULT  Goal: Patient will remain free of falls  Description: INTERVENTIONS:- Educate patient/family on patient safety including physical limitations- Instruct patient to call for assistance with activity - Consult OT/PT to assist with strengthening/mobility - Keep Call bell within reach- Keep bed low and locked with side rails adjusted as appropriate- Keep care items and personal belongings within reach- Initiate and maintain comfort rounds- Make Fall Risk Sign visible to staff- Offer Toileting every  Hours, in advance of need- Initiate/Maintain alarm- Obtain necessary fall risk management equipment: - Apply yellow socks and bracelet for high fall risk patients- Consider moving  patient to room near nurses station  Outcome: Progressing     Problem: DISCHARGE PLANNING  Goal: Discharge to home or other facility with appropriate resources  Description: INTERVENTIONS:- Identify barriers to discharge w/patient and caregiver- Arrange for needed discharge resources and transportation as appropriate- Identify discharge learning needs (meds, wound care, etc.)- Arrange for interpretive services to assist at discharge as needed- Refer to Case Management Department for coordinating discharge planning if the patient needs post-hospital services based on physician/advanced practitioner order or complex needs related to functional status, cognitive ability, or social support system  Outcome: Progressing

## 2025-03-31 NOTE — CASE MANAGEMENT
Case Management Discharge Planning Note    Patient name Jackie Urbina  [id number]  : 1935 Date 3/31/2025       Current Admission Date: 3/28/2025  Current Admission Diagnosis:Coronary artery disease involving native coronary artery   Patient Active Problem List    Diagnosis Date Noted Date Diagnosed    Right radial pseudoaneurysm 2025     Hematoma of arm, right, initial encounter 2025     Coronary artery disease involving native coronary artery 2025     Abnormal CT of the chest 2025     Elevated troponin 2025     Influenza A virus present 2025     Lymphedema/ volume overload 2025     Hypersomnia with long sleep time, idiopathic 2023     Premature atrial contractions 2023     Gastroesophageal reflux disease without esophagitis 2023     Primary osteoarthritis involving multiple joints 2022     Moderate aortic regurgitation 2022     Dyslipidemia 2021     Lymphedema of both lower extremities 2021     Status post total right knee replacement using cement 12/15/2020     Hyperlipidemia 2020     Current moderate episode of major depressive disorder without prior episode ([facility]) 2019     Hypokalemia 2019     Mitral valve stenosis, mild      Arthritis 10/19/2018     Chronic heart failure with preserved ejection fraction (HFpEF) ([facility]) 05/10/2018     Localized edema 05/10/2018     Other fatigue 05/10/2018     Nonrheumatic aortic (valve) stenosis 2016     Atrial dilatation, bilateral 2016     Mild tricuspid insufficiency 2016     Non-rheumatic mitral regurgitation 2016     Essential hypertension 2013       LOS (days): 1  Geometric Mean LOS (GMLOS) (days): 2  Days to GMLOS:0.9     OBJECTIVE:  Risk of Unplanned Readmission Score: 12.6         Current admission status: Inpatient   Preferred Pharmacy:   [facility]/pharm[facility] #[id number] - [place], [place] [address]  [address] [address]  Phone: [phone] Fax: [phone]    Primary Care Provider: Tom Howell MD    Primary Insurance: MEDICARE  Secondary Insurance: BLUE CROSS    DISCHARGE DETAILS:    CM informed by provider that patient may need assistance at home with caregivers. CM met with patient at bedside and introduced self and explained role. Patient declined resources at this time.

## 2025-04-06 NOTE — PROGRESS NOTES
Transition of Care Visit:  Name: Jackie Urbina      : 1935      MRN: [id number]  Encounter Provider: Uriel Beebe DO  Encounter Date: 2025   Encounter department: [facility] INTERNAL MEDICINE    Assessment & Plan  Coronary artery disease involving native coronary artery of native heart without angina pectoris  Patient presents to clinic today for TCM visit following hospitalization at [facility] from 2025 through 2025 for CAD.    Patient presented to the hospital initially for outpatient elective cardiac catheterization as part of pre-TAVR evaluation.  She was experiencing present cough, dyspnea on exertion, worsening fatigue, decline in activity tolerance.    Cardiac catheterization was performed via the right radial artery and there was a mid LAD lesion with 90% stenosis status post successful PCI and placement of LUIS.  Mid circumflex lesion was 100% stenosed second diagonal is 100 stenosis chronically occluded, first right posterolateral branch 90% stenosis.    Patient was given Brilinta 180 mg and aspirin 324 mg eventually transition to Plavix during admission.    She was discharged with plan for dual antiplatelet therapy with aspirin/Plavix.  Atorvastatin 40 mg daily was started.    She will continue with pre-TAVR evaluation.  She is deferred for cardiac rehabilitation until after TAVR.  She has follow-up appointment as outpatient scheduled.  Unfortunately today she states that she has not started Plavix therapy as outpatient as she had difficulties obtaining it from her pharmacy.  She now has Plavix and will start it today.  She we will continue to take aspirin 81 mg daily along with Plavix.  She is aware of follow-up appoint with cardiology and will keep and attend this appointment.       Nonrheumatic aortic (valve) stenosis  Patient with history of severe symptomatic nonrheumatic aortic valve stenosis.  She is currently being seen by  cardiothoracic surgery and is undergoing pre-TAVR evaluations and workups.  See above, she had catheterization and required stenting now on aspirin and Plavix.  Patient has need for dental work and states that she will require 5 teeth extraction.  This is in part of TAVR workup.  Patient is now with new drug-eluting stent and requires dual antiplatelet therapy with aspirin and Plavix.  She also requires tooth extraction as part of TAVR workup.  I have advised patient to contact her cardiothoracic surgeons office for further instruction on coordinating dual antiplatelet therapy and she is in traction.  She will need further instructions on if she is able to hold dual antiplatelet therapy for extraction or if this will need to be delayed.  Patient is accompanied by her daughter-in-law who will aid her and reach out to cardiology office.       Essential hypertension  Stable.  Patient is taking amlodipine 2.5 mg daily, furosemide 40 mg daily, lisinopril 10 mg daily, metoprolol succinate 12.5 mg daily.  Continue at this time.       Chronic heart failure with preserved ejection fraction (HFpEF) ([facility])  Wt Readings from Last 3 Encounters:   04/07/25 48.8 kg (107 lb 9.6 oz)   03/31/25 49.4 kg (108 lb 12.8 oz)   03/07/25 49 kg (108 lb)     Stable.  Euvolemic on examination today.  Continue GDMT               Right radial pseudoaneurysm  Postprocedure she developed increased ecchymosis and swelling of her right forearm.  Motor and sensory function remained intact.  Pseudoaneurysm study on 03/29 was positive and vascular surgery was consulted.    Fortunately the pseudoaneurysm thrombosed after compression treatment and she did not require any surgical intervention.  She was instructed to keep right arm and hand elevated above level of heart and alternate ice and heat for discomfort.   Right radial pseudoaneurysm looks much improved today compared to pictures available in chart review.  Patient still experiencing pain but has  agreed that for the most part pain and swelling have improved.            History of Present Illness     Transitional Care Management Review:   Jackie Urbina is a 89 y.o. female here for TCM follow up.     During the TCM phone call patient stated:  TCM Call (since 3/24/2025)       Date and time call was made  3/31/2025  3:18 PM    Hospital care reviewed  Records reviewed    Patient was hospitialized at  [facility]    Date of Admission  03/28/25    Date of discharge  03/31/25    Diagnosis  Coronary artery disease involving native coronary artery    Disposition  Home          TCM Call (since 3/24/2025)       Scheduled for follow up?  Yes    I have advised the patient to call PCP with any new or worsening symptoms  Angelina Wachter M.A    Living Arrangements  Family members    Support System  [place]-based; Family; Friends    Do you have social support  Yes, as much as I need          Ms. Jackie Urbina is an 89-year-old female with past medical history of hypertension, chronic diastolic CHF, depression, hyperlipidemia.  Patient presents to clinic today for TCM visit following hospitalization at [facility] from 03/28/2025 through 03/31/2025 for CAD.  Patient presented to the hospital initially for outpatient elective cardiac catheterization as part of pre-TAVR evaluation.  She was experiencing present cough, dyspnea on exertion, worsening fatigue, decline in activity tolerance.  Cardiac catheterization was performed via the right radial artery and there was a mid LAD lesion with 90% stenosis status post successful PCI and placement of LUIS.  Mid circumflex lesion was 100% stenosed second diagonal is 100 stenosis chronically occluded, first right posterolateral branch 90% stenosis.  Patient was given Brilinta 180 mg and aspirin 324 mg eventually transition to Plavix during admission.  Postprocedure she developed increased ecchymosis and swelling of her right forearm.  Motor and  "sensory function remained intact.  Pseudoaneurysm study on 03/29 was positive and vascular surgery was consulted.  Fortunately the pseudoaneurysm thrombosed after compression treatment and she did not require any surgical intervention.  She was instructed to keep right arm and hand elevated above level of heart and alternate ice and heat for discomfort.  She was discharged with plan for dual antiplatelet therapy with aspirin/Plavix.  Atorvastatin 40 mg daily was started.  She will continue with pre-TAVR evaluation.  She is deferred for cardiac rehabilitation until after TAVR.  She has follow-up appointment as outpatient scheduled.      Review of Systems   Constitutional:  Negative for chills and fever.   HENT:  Negative for congestion, rhinorrhea and sore throat.    Respiratory:  Negative for cough, shortness of breath and wheezing.    Cardiovascular:  Negative for chest pain and leg swelling.   Gastrointestinal:  Negative for abdominal distention, abdominal pain, constipation, diarrhea, nausea and vomiting.   Genitourinary:  Negative for difficulty urinating and dysuria.   Musculoskeletal:  Negative for arthralgias and back pain.   Skin:  Negative for rash and wound.   Neurological:  Negative for dizziness, syncope, weakness, light-headedness and headaches.   Psychiatric/Behavioral:  Negative for agitation, behavioral problems and confusion.      Objective   /80 (BP Location: Left arm, Patient Position: Sitting)   Pulse 73   Temp 98.3 °F (36.8 °C) (Tympanic)   Ht 4' 11\" (1.499 m)   Wt 48.8 kg (107 lb 9.6 oz)   SpO2 94%   BMI 21.73 kg/m²     Physical Exam  Constitutional:       Appearance: Normal appearance.   HENT:      Right Ear: External ear normal.      Left Ear: External ear normal.   Cardiovascular:      Rate and Rhythm: Normal rate and regular rhythm.      Pulses: Normal pulses.      Heart sounds: Normal heart sounds. No murmur heard.  Pulmonary:      Effort: Pulmonary effort is normal. No " respiratory distress.      Breath sounds: Normal breath sounds. No wheezing, rhonchi or rales.   Abdominal:      General: Abdomen is flat. Bowel sounds are normal. There is no distension.      Palpations: Abdomen is soft.      Tenderness: There is no abdominal tenderness.   Musculoskeletal:      Right lower leg: No edema.      Left lower leg: No edema.   Skin:     General: Skin is warm and dry.      Findings: Bruising (Right radial pseudoaneurysm) present.   Neurological:      Mental Status: She is alert and oriented to person, place, and time.   Psychiatric:         Mood and Affect: Mood normal.         Behavior: Behavior normal.         Thought Content: Thought content normal.       Medications have been reviewed by provider in current encounter

## 2025-04-07 ENCOUNTER — TELEPHONE (OUTPATIENT)
Dept: CARDIAC SURGERY | Facility: CLINIC | Age: OVER 89
End: 2025-04-07

## 2025-04-07 ENCOUNTER — PATIENT MESSAGE (OUTPATIENT)
Dept: CARDIAC SURGERY | Facility: CLINIC | Age: OVER 89
End: 2025-04-07

## 2025-04-07 ENCOUNTER — OFFICE VISIT (OUTPATIENT)
Age: OVER 89
End: 2025-04-07
Payer: MEDICARE

## 2025-04-07 VITALS
SYSTOLIC BLOOD PRESSURE: 166 MMHG | HEART RATE: 73 BPM | BODY MASS INDEX: 21.69 KG/M2 | DIASTOLIC BLOOD PRESSURE: 80 MMHG | HEIGHT: 59 IN | WEIGHT: 107.6 LBS | OXYGEN SATURATION: 94 % | TEMPERATURE: 98.3 F

## 2025-04-07 DIAGNOSIS — T81.718A PSEUDOANEURYSM FOLLOWING PROCEDURE (HCC): ICD-10-CM

## 2025-04-07 DIAGNOSIS — I25.10 CORONARY ARTERY DISEASE INVOLVING NATIVE CORONARY ARTERY OF NATIVE HEART WITHOUT ANGINA PECTORIS: Primary | ICD-10-CM

## 2025-04-07 DIAGNOSIS — I35.0 NONRHEUMATIC AORTIC (VALVE) STENOSIS: ICD-10-CM

## 2025-04-07 DIAGNOSIS — I10 ESSENTIAL HYPERTENSION: ICD-10-CM

## 2025-04-07 DIAGNOSIS — I72.9 PSEUDOANEURYSM FOLLOWING PROCEDURE (HCC): ICD-10-CM

## 2025-04-07 DIAGNOSIS — I50.32 CHRONIC HEART FAILURE WITH PRESERVED EJECTION FRACTION (HFPEF) (HCC): ICD-10-CM

## 2025-04-07 PROCEDURE — 99496 TRANSJ CARE MGMT HIGH F2F 7D: CPT | Performed by: STUDENT IN AN ORGANIZED HEALTH CARE EDUCATION/TRAINING PROGRAM

## 2025-04-07 NOTE — ASSESSMENT & PLAN NOTE
Patient presents to clinic today for TCM visit following hospitalization at [facility] from 03/28/2025 through 03/31/2025 for CAD.    Patient presented to the hospital initially for outpatient elective cardiac catheterization as part of pre-TAVR evaluation.  She was experiencing present cough, dyspnea on exertion, worsening fatigue, decline in activity tolerance.    Cardiac catheterization was performed via the right radial artery and there was a mid LAD lesion with 90% stenosis status post successful PCI and placement of LUIS.  Mid circumflex lesion was 100% stenosed second diagonal is 100 stenosis chronically occluded, first right posterolateral branch 90% stenosis.    Patient was given Brilinta 180 mg and aspirin 324 mg eventually transition to Plavix during admission.    She was discharged with plan for dual antiplatelet therapy with aspirin/Plavix.  Atorvastatin 40 mg daily was started.    She will continue with pre-TAVR evaluation.  She is deferred for cardiac rehabilitation until after TAVR.  She has follow-up appointment as outpatient scheduled.  Unfortunately today she states that she has not started Plavix therapy as outpatient as she had difficulties obtaining it from her pharmacy.  She now has Plavix and will start it today.  She we will continue to take aspirin 81 mg daily along with Plavix.  She is aware of follow-up appoint with cardiology and will keep and attend this appointment.

## 2025-04-07 NOTE — ASSESSMENT & PLAN NOTE
Patient with history of severe symptomatic nonrheumatic aortic valve stenosis.  She is currently being seen by cardiothoracic surgery and is undergoing pre-TAVR evaluations and workups.  See above, she had catheterization and required stenting now on aspirin and Plavix.  Patient has need for dental work and states that she will require 5 teeth extraction.  This is in part of TAVR workup.  Patient is now with new drug-eluting stent and requires dual antiplatelet therapy with aspirin and Plavix.  She also requires tooth extraction as part of TAVR workup.  I have advised patient to contact her cardiothoracic surgeons office for further instruction on coordinating dual antiplatelet therapy and she is in traction.  She will need further instructions on if she is able to hold dual antiplatelet therapy for extraction or if this will need to be delayed.  Patient is accompanied by her daughter-in-law who will aid her and reach out to cardiology office.

## 2025-04-07 NOTE — TELEPHONE ENCOUNTER
Patient called regarding some extractions and being on Plavix/if she should hold it. Asked clinician from the pods to discuss that with Cardiology since that dept prescribed it. She did not have any procedure w/ yet.

## 2025-04-07 NOTE — PATIENT COMMUNICATION
Received call from pt's daughter asking to speak with Dr Gaston's office regarding Plavix hold.  Advised pt's daughter of massage from office staff.  Pt is scheduled to have 5 teeth removed this Wednesday 4/9/25  in preparation for her valve replacement surgery.  Pt's daughter states the pt has not started her Plavix yet but is taking 3 baby Aspirin a day.  She states Dr Howard at [facility] is requesting cardiac clearance prior to extraction on 4/9/25.  Pt last seen by Dr Little on 12/24/24.

## 2025-04-07 NOTE — ASSESSMENT & PLAN NOTE
Stable.  Patient is taking amlodipine 2.5 mg daily, furosemide 40 mg daily, lisinopril 10 mg daily, metoprolol succinate 12.5 mg daily.  Continue at this time.

## 2025-04-07 NOTE — ASSESSMENT & PLAN NOTE
Postprocedure she developed increased ecchymosis and swelling of her right forearm.  Motor and sensory function remained intact.  Pseudoaneurysm study on 03/29 was positive and vascular surgery was consulted.    Fortunately the pseudoaneurysm thrombosed after compression treatment and she did not require any surgical intervention.  She was instructed to keep right arm and hand elevated above level of heart and alternate ice and heat for discomfort.   Right radial pseudoaneurysm looks much improved today compared to pictures available in chart review.  Patient still experiencing pain but has agreed that for the most part pain and swelling have improved.

## 2025-04-07 NOTE — ASSESSMENT & PLAN NOTE
Wt Readings from Last 3 Encounters:   04/07/25 48.8 kg (107 lb 9.6 oz)   03/31/25 49.4 kg (108 lb 12.8 oz)   03/07/25 49 kg (108 lb)     Stable.  Euvolemic on examination today.  Continue GDMT

## 2025-04-08 ENCOUNTER — TELEPHONE (OUTPATIENT)
Age: OVER 89
End: 2025-04-08

## 2025-04-08 ENCOUNTER — TELEPHONE (OUTPATIENT)
Dept: CARDIAC SURGERY | Facility: CLINIC | Age: OVER 89
End: 2025-04-08

## 2025-04-08 NOTE — TELEPHONE ENCOUNTER
Spoke with patient who states she may be getting teeth extracted tomorrow. She also states she has not started taking Plavix. She will confirm dental extraction plan and call me back today.

## 2025-04-08 NOTE — TELEPHONE ENCOUNTER
Caller: Jackie Urbina    Doctor: Dr. Little    Reason for call: She called wanting to know if the office received the from her dentist office.  Please call her a soon as possible.  Her dental appointment is scheduled for tomorrow.    Thank you    Call back#: [phone]

## 2025-04-10 ENCOUNTER — OFFICE VISIT (OUTPATIENT)
Dept: CARDIOLOGY CLINIC | Facility: CLINIC | Age: OVER 89
End: 2025-04-10
Payer: MEDICARE

## 2025-04-10 VITALS
WEIGHT: 108 LBS | BODY MASS INDEX: 21.77 KG/M2 | OXYGEN SATURATION: 98 % | SYSTOLIC BLOOD PRESSURE: 120 MMHG | HEART RATE: 63 BPM | TEMPERATURE: 97.4 F | HEIGHT: 59 IN | DIASTOLIC BLOOD PRESSURE: 60 MMHG

## 2025-04-10 DIAGNOSIS — I35.0 NONRHEUMATIC AORTIC (VALVE) STENOSIS: ICD-10-CM

## 2025-04-10 DIAGNOSIS — I50.32 CHRONIC HEART FAILURE WITH PRESERVED EJECTION FRACTION (HFPEF) (HCC): ICD-10-CM

## 2025-04-10 DIAGNOSIS — E78.5 DYSLIPIDEMIA: ICD-10-CM

## 2025-04-10 DIAGNOSIS — I34.0 NON-RHEUMATIC MITRAL REGURGITATION: ICD-10-CM

## 2025-04-10 DIAGNOSIS — T81.718A PSEUDOANEURYSM FOLLOWING PROCEDURE (HCC): ICD-10-CM

## 2025-04-10 DIAGNOSIS — I72.9 PSEUDOANEURYSM FOLLOWING PROCEDURE (HCC): ICD-10-CM

## 2025-04-10 DIAGNOSIS — I10 ESSENTIAL HYPERTENSION: ICD-10-CM

## 2025-04-10 DIAGNOSIS — I25.10 CORONARY ARTERY DISEASE INVOLVING NATIVE CORONARY ARTERY OF NATIVE HEART WITHOUT ANGINA PECTORIS: Primary | ICD-10-CM

## 2025-04-10 PROCEDURE — 99214 OFFICE O/P EST MOD 30 MIN: CPT

## 2025-04-10 NOTE — TELEPHONE ENCOUNTER
Patient seeing Cardiology Joe this morning.  We did not receive the form from the dental office.  Maybe she can have this done when she sees Cardiology this morning?

## 2025-04-10 NOTE — PROGRESS NOTES
Jackie AMARO Carlitos  1935  [id number]  [facility] [address] [facility] [address] [phone]    1. Coronary artery disease involving native coronary artery of native heart without angina pectoris        2. Right radial pseudoaneurysm        3. Nonrheumatic aortic (valve) stenosis        4. Non-rheumatic mitral regurgitation        5. Essential hypertension        6. Chronic heart failure with preserved ejection fraction (HFpEF) ([facility])        7. Dyslipidemia            Summary/Discussion:  Coronary artery disease:  - underwent cardiac catheterization on 3/28/2025 as part of TAVR workup  mLAD 90% stenosis,  of D2,  of mid circumflex with distal circumflex filled by collaterals from third RPL, first RPL 90% stenosis. S/p PCI/LUIS to mLAD   unfortunately developed right arm hematoma with + pseudoaneurysm study on 3/29/2025 (see plan below)  - currently without symptoms of angina   - continue on uninterrupted DAPT with aspirin and clopidogrel   - continue statin and beta blocker  - right radial cath site with evidence of hematoma. Per patient this has improved from prior. Neurovascular exam WDL  advised her to elevated her right hand and arm above heart level and educated her on being able to use alternate ice/heart for discomfort   - adherence to DAPT therapy reinforced   per patient and daughter who is present during visit today, patient was unable to  her plavix from [facility] pharmacy at time of discharge. Reports that she starting taking yesterday   - diet/lifestyle modifications were discussed   - encourage cardiac rehab     Right radial pseudoaneurysm  - noted to have progressive ecchymosis and swelling of right forearm noted post cardiac catheterization  - pseudoaneurysm study positive on 3/29/2025. Vascular surgery consulted  - repeat pseudo study (3/30/2025): right radial PSA thrombosed. Per documentation no plan for surgical intervention   - recommend  elevation and cold compresses. Educated on being able to take tylenol PRN for pain     Aortic stenosis:  - severe by echo in 1/2025  - currently undergoing TAVR work up  - no clinical s/s of volume overload while on lasix 40 mg daily     Mitral regurgitation:  - mild by echo   - continue to monitor with serial echo at appropriate intervals     Chronic HFpEF:  - stable  - continue lasix 40 mg daily      Hypertension:  -120/60  - continue present medication regimen  - lifestyle modification   - close blood pressure monitoring     Dyslipidemia:  - Lipid Profile:    Latest Reference Range & Units 01/30/25 04:20   Cholesterol See Comment mg/dL 126   Triglycerides See Comment mg/dL 92   HDL >=50 mg/dL 33 (L)   Non-HDL Cholesterol mg/dl 93   LDL Calculated 0 - 100 mg/dL 75   - continue atorvastatin 40 mg daily   - encouraged low cholesterol diet and annual lipid follow up    Interval History: Jackie Urbina is a 89 y.o. year old female with history mentioned in problem list who presents to the office today for a follow up s/p cardiac catheterization.     Today, she does not express any significant cardiac complaints. She denies any chest pain/pressure/discomfort or shortness of breath. She denies lower extremity edema, orthopnea, and PND. She denies lightheadedness, dizziness, and syncope. She denies palpitations.  Reports improvement of her radial pseudoaneurysm.       She will RTO in 1 month at the [place] office or sooner if necessary. She will call with any concerns.       Medical Problems       Problem List       Essential hypertension    Nonrheumatic aortic (valve) stenosis    Atrial dilatation, bilateral    Mild tricuspid insufficiency    Non-rheumatic mitral regurgitation    Chronic heart failure with preserved ejection fraction (HFpEF) ([facility])    Wt Readings from Last 3 Encounters:   04/10/25 49 kg (108 lb)   04/07/25 48.8 kg (107 lb 9.6 oz)   03/31/25 49.4 kg (108 lb 12.8 oz)                 Localized edema     "Other fatigue    Arthritis    Mitral valve stenosis, mild    Hypokalemia    Current moderate episode of major depressive disorder without prior episode (HCC)    Hyperlipidemia    Status post total right knee replacement using cement    Dyslipidemia    Lymphedema of both lower extremities    Moderate aortic regurgitation    Primary osteoarthritis involving multiple joints    Gastroesophageal reflux disease without esophagitis    Hypersomnia with long sleep time, idiopathic    Premature atrial contractions    Elevated troponin    Influenza A virus present    Lymphedema/ volume overload    Abnormal CT of the chest    Coronary artery disease involving native coronary artery    Hematoma of arm, right, initial encounter    Right radial pseudoaneurysm        Past Medical History:   Diagnosis Date    Anesthesia complication     .Yrs ago had \"anxiety\" reaction not sure while sedated, pt states sensitive to anesthesia effects    Anxiety     stress at home    Arthritis     Cataract, right     Last Assessed:5/11/16    Eye hemorrhage     left eye currently 5/12/16    History of shingles 05/2015    Hypertension     Mitral valve stenosis, mild      Social History     Socioeconomic History    Marital status: /Civil Union     Spouse name: Not on file    Number of children: Not on file    Years of education: Not on file    Highest education level: Not on file   Occupational History    Not on file   Tobacco Use    Smoking status: Never    Smokeless tobacco: Never   Vaping Use    Vaping status: Never Used   Substance and Sexual Activity    Alcohol use: Yes     Comment: rarely    Drug use: No    Sexual activity: Not on file   Other Topics Concern    Not on file   Social History Narrative    Not on file     Social Drivers of Health     Financial Resource Strain: Not on file   Food Insecurity: No Food Insecurity (3/30/2025)    Nursing - Inadequate Food Risk Classification     Worried About Running Out of Food in the Last Year: " Never true     Ran Out of Food in the Last Year: Never true     Ran Out of Food in the Last Year: Never true   Transportation Needs: No Transportation Needs (3/30/2025)    Nursing - Transportation Risk Classification     Lack of Transportation: Not on file     Lack of Transportation: No   Physical Activity: Not on file   Stress: Not on file   Social Connections: Not on file   Intimate Partner Violence: Unknown (3/30/2025)    Nursing IPS     Feels Physically and Emotionally Safe: Not on file     Physically Hurt by Someone: Not on file     Humiliated or Emotionally Abused by Someone: Not on file     Physically Hurt by Someone: No     Hurt or Threatened by Someone: No   Housing Stability: Unknown (3/30/2025)    Nursing: Inadequate Housing Risk Classification     Has Housing: Not on file     Worried About Losing Housing: Not on file     Unable to Get Utilities: Not on file     Unable to Pay for Housing in the Last Year: No     Has Housin      Family History   Problem Relation Name Age of Onset    Heart disease Mother          MI  at age 69    Arthritis Mother      GI problems Father          bleeding ulcer    Arthritis Sister      Sleep apnea Sister      Irritable bowel syndrome Sister      Cancer Sister          skin cancer    Heart disease Brother          CABG (5)    Cancer Brother          skin cancer    Heart disease Maternal Aunt       Past Surgical History:   Procedure Laterality Date    CARDIAC CATHETERIZATION N/A 3/28/2025    Procedure: Cardiac RHC/LHC;  Surgeon: Darwin Meyer DO;  Location: [facility];  Service: Cardiology    CARDIAC CATHETERIZATION N/A 3/28/2025    Procedure: Cardiac PCI;  Surgeon: Darwin Meyer DO;  Location: [facility];  Service: Cardiology    CARPAL TUNNEL RELEASE Left     CATARACT EXTRACTION      CATARACT EXTRACTION W/ INTRAOCULAR LENS IMPLANT Left 10/11/2016    Procedure: EXTRACTION EXTRACAPSULAR CATARACT PHACO INTRAOCULAR LENS (IOL);   Surgeon: Wale Roth MD;  Location: [facility] MAIN OR;  Service:     CERVICAL CONE BIOPSY      COLONOSCOPY      EYE SURGERY Left     muscle repair    NV ARTHRP KNE CONDYLE&PLATU MEDIAL&LAT COMPARTMENTS Right 12/15/2020    Procedure: ARTHROPLASTY KNEE TOTAL;  Surgeon: Greg Mitchell DO;  Location: [facility] MAIN OR;  Service: Orthopedics    NV OPTX FEM SHFT FX W/INSJ IMED IMPLT W/WO SCREW Right 07/23/2019    Procedure: INSERTION NAIL IM FEMUR ANTEGRADE (TROCHANTERIC);  Surgeon: Greg Mitchell DO;  Location: [facility] MAIN OR;  Service: Orthopedics    NV XCAPSL CTRC RMVL INSJ IO LENS PROSTH W/O ECP Right 05/17/2016    Procedure: EXTRACTION EXTRACAPSULAR CATARACT PHACO INTRAOCULAR LENS (IOL);  Surgeon: Wale Roth MD;  Location: [facility] MAIN OR;  Service: Ophthalmology    TONSILLECTOMY         Current Outpatient Medications:     amLODIPine (NORVASC) 2.5 mg tablet, Take 1 tablet (2.5 mg total) by mouth daily, Disp: 30 tablet, Rfl: 5    Ascorbic Acid (vitamin C) 100 MG tablet, Take 100 mg by mouth daily, Disp: , Rfl:     aspirin (ECOTRIN LOW STRENGTH) 81 mg EC tablet, Take 1 tablet (81 mg total) by mouth daily, Disp: , Rfl:     atorvastatin (LIPITOR) 40 mg tablet, Take 1 tablet (40 mg total) by mouth daily with dinner, Disp: 30 tablet, Rfl: 3    brimonidine tartrate 0.2 % ophthalmic solution, , Disp: , Rfl:     clopidogrel (PLAVIX) 75 mg tablet, Take 1 tablet (75 mg total) by mouth daily, Disp: 30 tablet, Rfl: 11    furosemide (LASIX) 20 mg tablet, TAKE 2 TABLETS (40 MG TOTAL) BY MOUTH DAILY., Disp: 180 tablet, Rfl: 1    lisinopril (ZESTRIL) 10 mg tablet, Take 1 tablet (10 mg total) by mouth daily, Disp: 90 tablet, Rfl: 2    metoprolol succinate (TOPROL-XL) 25 mg 24 hr tablet, Take 0.5 tablets (12.5 mg total) by mouth daily, Disp: 45 tablet, Rfl: 3    Multiple Vitamins-Minerals (PRESERVISION AREDS PO), Take by mouth, Disp: , Rfl:     omeprazole (PriLOSEC) 20 mg delayed release capsule, TAKE ONE CAPSULE DAILY, Disp: 30 capsule,  "Rfl: 5    triamcinolone (KENALOG) 0.1 % cream, Apply topically 2 (two) times a day, Disp: 160 g, Rfl: 3    benzonatate (TESSALON PERLES) 100 mg capsule, Take 1 capsule (100 mg total) by mouth 3 (three) times a day (Patient not taking: Reported on 4/10/2025), Disp: 20 capsule, Rfl: 0    sertraline (ZOLOFT) 25 mg tablet, TAKE ONE TABLET BY MOUTH DAILY, Disp: 90 tablet, Rfl: 1  Allergies   Allergen Reactions    Indocin [Indomethacin] Other (See Comments)     hypertension       Labs:     Chemistry        Component Value Date/Time     10/13/2015 1320    K 3.9 03/31/2025 0607    K 3.8 10/13/2015 1320     03/31/2025 0607     10/13/2015 1320    CO2 27 03/31/2025 0607    CO2 29 10/13/2015 1320    BUN 20 03/31/2025 0607    BUN 26 (H) 10/13/2015 1320    CREATININE 0.66 03/31/2025 0607    CREATININE 0.8 10/13/2015 1320        Component Value Date/Time    CALCIUM 8.8 03/31/2025 0607    CALCIUM 9.1 10/13/2015 1320    ALKPHOS 97 03/14/2025 1556    ALKPHOS 84 10/13/2015 1320    AST 24 03/14/2025 1556    AST 27 10/13/2015 1320    ALT 14 03/14/2025 1556    ALT 29 10/13/2015 1320    BILITOT 0.6 10/13/2015 1320            Lab Results   Component Value Date    CHOL 191 10/13/2015     Lab Results   Component Value Date    HDL 33 (L) 01/30/2025    HDL 53 09/13/2024    HDL 53 05/02/2023     Lab Results   Component Value Date    LDLCALC 75 01/30/2025    LDLCALC 127 (H) 09/13/2024    LDLCALC 120 (H) 05/02/2023     Lab Results   Component Value Date    TRIG 92 01/30/2025    TRIG 97 09/13/2024    TRIG 88 05/02/2023     No results found for: \"CHOLHDL\"    Imaging: Cardiac catheterization  Addendum Date: 4/4/2025  Addendum:   PCI mLAD with LUIS   Residual branch vessel CAD for which med rx is recommended     Result Date: 4/4/2025  Narrative:   PCI mLAD with LUIS   Residual branch vessel CAD for which med rx is recommended     VAS DUPLEX EVALUATION FOR PSEUDOANEURYSM  Result Date: 3/30/2025  Narrative:  [facility] REPORT " CLINICAL: Indications:  Patient presents with pain, edema, ecchymosis of the right forearm after radial artery access for cardiac cath yesterday. Operative History: 2025-03-28 Cardiac catheterization Risk Factors The patient has history of HTN, Hyperlipidemia and CAD.  FINDINGS:  Right                PSV  Mid Brachial         104  Mid Radial           142  Dist. Radial Artery  145  Mid. Ulnar            98  Dist. Ulnar Artery    88     CONCLUSION:  Impression Evaluation shows a partially thrombosed pseudoaneurysm with active cavity measuring approximately 1.6 cm x 2.4 cm. The neck measures approximately 2.4 mm.  Technical findings given to Dr. Garcia via Secure Chat.  SIGNATURE: Electronically Signed by: MARY MAYORGA MD on 2025-03-30 03:18:29 PM    VAS DUPLEX EVALUATION FOR PSEUDOANEURYSM  Result Date: 3/30/2025  Narrative:  [facility] REPORT CLINICAL: Indications: Patient presented with right arm pain, swelling and diffuse bruising s/p cardiac cath on 3/28/25 via right radial artery access and finding of large pseudoaneurysm (1.6 cm x 2.4 cm). Interval followup study s/p compression therapy. Operative History: 2025-03-28 Cardiac catheterization Risk Factors The patient has history of HTN, Hyperlipidemia and CAD.  FINDINGS:  Right                PSV  Prox. Radial         110  Mid Radial            89  Dist. Radial Artery   57     CONCLUSION:  Impression Widely patent  radial and ulnar arteries bifurcating in the proximal upper arm. Evidence of a fully thrombosed pseudoaneurysm measuring approximately 3.6 x 2.5 cm.  Technical  findings posted in Epic.   SIGNATURE: Electronically Signed by: MARY MAYORGA MD on 2025-03-30 02:24:41 PM    CTA chest abdomen pelvis w wo contrast TAVR  Result Date: 3/21/2025  Narrative: CT ANGIOGRAM OF THE CHEST, ABDOMEN AND PELVIS WITH [AND WITHOUT] IV CONTRAST INDICATION:  Preoperative evaluation for TAVR COMPARISON: Echocardiography performed January 30, 2025. CT pulmonary  angiogram performed 2025 TECHNIQUE:  CT angiogram examination of the chest, abdomen and pelvis was performed according to standard protocol with cardiac gating. Axial, sagittal, and coronal reformatted images were submitted for interpretation. 3D reconstructions were performed an independent workstation, and are supplied for review. This examination, like all CT scans performed in the [facility], was performed utilizing techniques to minimize radiation dose exposure, including the use of iterative reconstruction and automated exposure control. Radiation dose length product (DLP) for this visit: 711.37 mGy-cm . IV Contrast:  85 mL of iohexol Enteric Contrast: Not given. Image Quality: Excellent. Dataset used for reformattin% VASCULAR FINDINGS: Central pulmonary artery enlarged Right atrium and right ventricle are dilated. Left atrial cavity is dilated. Left ventricular myocardium is mildly thinned with some subepicardial fat density suggesting prior myocardial infarction.. Mild mitral annular calcification. Coronary artery origins are in typical position. Moderate to severe coronary artery calcification. Annulus, LVOT and aorta analysis: Typical trileaflet aortic valve morphology. Optimal CT aortic valve plane angles: 3 cusp view: Sinhala 24, cranial 1 Cusp overlap view REY 7, cranial 24 Measurements: Annulus diameter (max x min): 26 x 19 mm. Annulus Area 410 mm2. Annulus Perimeter 73 mm. Annulus to left main coronary ostium: 17 mm. Annulus to right main coronary ostium: 17 mm. Sinus of Valsalva height: 25 mm. Aortic sinus of Valsalva diameter (max x min): 35 x 34 mm. LVOT minimal diameter: 20 mm. Sino-tubular junction minimal diameter: 28 mm. Ascending thoracic aorta maximal diameter: 29 mm. Valve Calcification: Aortic valve calcium score is 2087. Volume of 729 mm3. Thoracic Aorta: Porcelain aorta = no. Aortic root and ascending thoracic aorta free of significant calcification beyond  the sino-tubular junction. Aortic arch is normal in course and caliber with insignificant calcification. Descending thoracic aorta is normal in course, caliber, and contour. Abdominal aorta and pelvic artery measurements: Abdominal aorta: Wide necked saccular aneurysm along the left ventral margin of the upper abdominal aorta, 20 x 14 x 19 mm on image 303/101 and image 306/26. The aneurysm is also well demonstrated on 3D reconstructed image 16 of series 307. The celiac trunk originates from the inferomedial base of this aneurysm and demonstrates greater than 70% atherosclerotic stenosis at its origin but remains patent. Moderate estimated 50% atherosclerotic stenosis in the proximal superior mesenteric artery. Minimal diameter above aortic bifurcation: 17 mm Calcification: Moderate Tortuosity: moderate Right iliofemoral segment: Minimal diameter: 6 mm Calcification: mild Tortuosity: mild Left iliofemoral segment: Minimal diameter: 6 mm Calcification: mild Tortuosity: severe ADDITIONAL FINDINGS: Chest: No clinically significant findings in the lungs, pleura, mediastinum or chest wall. Abdomen: No calcified gallstones. No pericholecystic inflammatory change.  No biliary dilatation. Hepatic cyst. Parapelvic and cortical cyst at the upper pole of the left kidney, 6.2 cm. No acute findings involving liver, pancreas, spleen, adrenal glands, or kidneys. Larger than typical volume of stool in the colon suggesting constipation. Pelvis: No significant findings involving urinary bladder, reproductive structures or pelvic cavity. Abdominopelvic Cavity: No ascites.  No pneumoperitoneum.  No lymphadenopathy. Osseous Structures: No acute fracture or destructive osseous lesion. Lumbar levoscoliosis. Chronic compression deformity at L1. Prominent Schmorl's node in the superior L4 endplate. Advanced glenohumeral degenerative change bilaterally. Orthopedic hardware in the proximal right femur.     Impression: Measurements and analysis  to allow for planning of Transcatheter Aortic Valve Repair as above. 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 Workstation performed: [id number]     VAS carotid complete study  Result Date: 3/20/2025  Narrative:  [facility] REPORT CLINICAL: Indications: Patient presents for a general health evaluation secondary to future TAVR. Patient is asymptomatic at this time. Operative History: No prior heart or vascular surgery Risk Factors The patient has history of HTN. Clinical Right Pressure:  166/ mm Hg, Left Pressure:  170/ mm Hg.  FINDINGS:  Right        Impression  PSV  EDV (cm/s)  Direction of Flow  Ratio  Dist. ICA                 63          22                      0.94  Mid. ICA                 119          27                      1.78  Prox. ICA    1 - 49%      65          14                      0.98  Dist CCA                  67          11                            Mid CCA                   67          14                      1.05  Prox CCA                  64           5                            Ext Carotid               84           0                      1.25  Prox Vert                 43          15  Antegrade                 Subclavian               102           0                             Left         Impression  PSV  EDV (cm/s)  Direction of Flow  Ratio  Dist. ICA                 94          21                      1.39  Mid. ICA                 126          32                      1.86  Prox. ICA    1 - 49%     139          36                      2.06  Dist CCA                  59          18                            Mid CCA                   68          13                      0.89  Prox CCA                  76          13                            Ext Carotid              135          26                      1.99  Prox Vert                 39          10  Antegrade     "             Subclavian                95           0                               CONCLUSION:  Impression RIGHT: There is <50% stenosis noted in the internal carotid artery. Plaque is heterogenous and irregular. Vertebral artery flow is antegrade. There is no significant subclavian artery disease.  LEFT: There is <50% stenosis noted in the internal carotid artery. Plaque is heterogenous and irregular. Vertebral artery flow is antegrade. There is no significant subclavian artery disease.  SIGNATURE: Electronically Signed by: MARY MAYORGA MD on 2025-03-20 10:28:07 PM      ECG:  n/a    Review of Systems   Skin:         Bruising R radial site    All other systems reviewed and are negative.      Vitals:    04/10/25 1100   BP: 120/60   Pulse: 63   Temp: (!) 97.4 °F (36.3 °C)   SpO2: 98%     Vitals:    04/10/25 1100   Weight: 49 kg (108 lb)     Height: 4' 11\" (149.9 cm)   Body mass index is 21.81 kg/m².    Physical Exam  Vitals and nursing note reviewed.   Constitutional:       Appearance: Normal appearance.     Neurological:      Mental Status: She is alert.        "

## 2025-05-25 DIAGNOSIS — F32.89 OTHER DEPRESSION: ICD-10-CM

## 2025-05-26 RX ORDER — SERTRALINE HYDROCHLORIDE 25 MG/1
25 TABLET, FILM COATED ORAL DAILY
Qty: 90 TABLET | Refills: 1 | Status: SHIPPED | OUTPATIENT
Start: 2025-05-26

## 2025-06-12 ENCOUNTER — TELEPHONE (OUTPATIENT)
Dept: CARDIAC SURGERY | Facility: CLINIC | Age: OVER 89
End: 2025-06-12

## 2025-06-12 ENCOUNTER — TELEPHONE (OUTPATIENT)
Age: OVER 89
End: 2025-06-12

## 2025-06-12 NOTE — TELEPHONE ENCOUNTER
Mandy is following up on Jackie's message from yesterday.  I was able to warm transfer her to Mandy in cardiovascular.

## 2025-06-12 NOTE — TELEPHONE ENCOUNTER
Patient's daughter-in- law Dennise , called to inform the team that the  patient got her teeth extracted and is ready to go forward.

## 2025-06-13 ENCOUNTER — TELEPHONE (OUTPATIENT)
Dept: CARDIAC SURGERY | Facility: CLINIC | Age: OVER 89
End: 2025-06-13

## 2025-06-16 NOTE — TELEPHONE ENCOUNTER
Dennise - Listed as friend calling in requesting to speak with Rachel called office and transferred call

## 2025-07-01 ENCOUNTER — OFFICE VISIT (OUTPATIENT)
Dept: CARDIAC SURGERY | Facility: CLINIC | Age: OVER 89
End: 2025-07-01
Payer: MEDICARE

## 2025-07-01 VITALS
HEART RATE: 62 BPM | DIASTOLIC BLOOD PRESSURE: 72 MMHG | HEIGHT: 59 IN | OXYGEN SATURATION: 100 % | BODY MASS INDEX: 21.92 KG/M2 | SYSTOLIC BLOOD PRESSURE: 156 MMHG | WEIGHT: 108.7 LBS

## 2025-07-01 DIAGNOSIS — I25.10 CORONARY ARTERY DISEASE INVOLVING NATIVE CORONARY ARTERY OF NATIVE HEART WITHOUT ANGINA PECTORIS: ICD-10-CM

## 2025-07-01 DIAGNOSIS — Z95.5 S/P DRUG ELUTING CORONARY STENT PLACEMENT: ICD-10-CM

## 2025-07-01 DIAGNOSIS — I35.0 NONRHEUMATIC AORTIC (VALVE) STENOSIS: Primary | ICD-10-CM

## 2025-07-01 DIAGNOSIS — I50.32 CHRONIC HEART FAILURE WITH PRESERVED EJECTION FRACTION (HFPEF) (HCC): ICD-10-CM

## 2025-07-01 DIAGNOSIS — I89.0 LYMPHEDEMA OF BOTH LOWER EXTREMITIES: ICD-10-CM

## 2025-07-01 PROCEDURE — 99215 OFFICE O/P EST HI 40 MIN: CPT | Performed by: STUDENT IN AN ORGANIZED HEALTH CARE EDUCATION/TRAINING PROGRAM

## 2025-07-01 RX ORDER — CHLORHEXIDINE GLUCONATE ORAL RINSE 1.2 MG/ML
15 SOLUTION DENTAL ONCE
OUTPATIENT
Start: 2025-07-01 | End: 2025-07-01

## 2025-07-01 RX ORDER — CEFAZOLIN SODIUM 2 G/50ML
2000 SOLUTION INTRAVENOUS ONCE
OUTPATIENT
Start: 2025-07-01 | End: 2025-07-01

## 2025-07-01 RX ORDER — MUPIROCIN 2 %
OINTMENT (GRAM) TOPICAL 2 TIMES DAILY
Qty: 22 G | Refills: 0 | Status: SHIPPED | OUTPATIENT
Start: 2025-07-01 | End: 2025-07-06

## 2025-07-01 NOTE — PROGRESS NOTES
Pre-Op History & Physical - Cardiothoracic Surgery   Jackie Urbina 89 y.o. female MRN: [id number]    Egnite referral    Reason for Consult / Principal Problem: Aortic stenosis, Non-Rheumatic    History of Present Illness: Jackie Urbina is a 89 y.o. year old female who was previously evaluated in our office by Kristian Gaston M.D. for transcatheter aortic valve replacement.  During this initial consultation, arrangements were made for the following studies to be completed: Gated CTA of the chest/abdomen/pelvis, cardiac catheterization, dental clearance and carotid artery ultrasound.    Jackie Urbina now presents to review the results of these tests and obtain a second surgeon to confirm the suitability of proceeding with transcatheter aortic valve replacment.    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   Patient was admitted to [facility] [place] 1/29-2/1/25 for CHF and influenza A. She has a known h/o aortic stenosis, followed by Cardiology. Repeat echo during the hospitalization demonstrated progression to severe AS that met Egnite criteria for evaluation.   CT imaging during her Jan 2025 hospitalization demonstrated a wide-necked saccular aneurysm of the abdominal aorta (patient unaware of finding until today).     Patient is  accompanied by her daughter-in-law today She reports progressive cough, worsening fatigue, decline in activity tolerance, BYRD and increased daytime somnolence. She has a persistent nocturnal cough, dry but improving. She has chronic LE edema that despite diuretic therapy as increased recently. She has compression boots but has not been using them. She denies chest  "pain, lightheadedness, palpitations.      Patient lives alone in her own home. She still drives. She is independent with ADL's. Family is available.     No tobacco, rare alcohol, no drug use.     Dental care recently and clearance obtained.     Past Medical History:  Past Medical History:   Diagnosis Date    Anesthesia complication     .Yrs ago had \"anxiety\" reaction not sure while sedated, pt states sensitive to anesthesia effects    Anxiety     stress at home    Arthritis     Cataract, right     Last Assessed:5/11/16    Eye hemorrhage     left eye currently 5/12/16    History of shingles 05/2015    Hypertension     Mitral valve stenosis, mild        Past Surgical History:   Past Surgical History:   Procedure Laterality Date    CARDIAC CATHETERIZATION N/A 3/28/2025    Procedure: Cardiac RHC/LHC;  Surgeon: Darwin Meyer DO;  Location: [facility];  Service: Cardiology    CARDIAC CATHETERIZATION N/A 3/28/2025    Procedure: Cardiac PCI;  Surgeon: Darwin Meyer DO;  Location: [facility];  Service: Cardiology    CARPAL TUNNEL RELEASE Left     CATARACT EXTRACTION      CATARACT EXTRACTION W/ INTRAOCULAR LENS IMPLANT Left 10/11/2016    Procedure: EXTRACTION EXTRACAPSULAR CATARACT PHACO INTRAOCULAR LENS (IOL);  Surgeon: Wale Roth MD;  Location: [facility] MAIN OR;  Service:     CERVICAL CONE BIOPSY      COLONOSCOPY      EYE SURGERY Left     muscle repair    SD ARTHRP KNE CONDYLE&PLATU MEDIAL&LAT COMPARTMENTS Right 12/15/2020    Procedure: ARTHROPLASTY KNEE TOTAL;  Surgeon: Greg Mitchell DO;  Location: [facility] MAIN OR;  Service: Orthopedics    SD OPTX FEM SHFT FX W/INSJ IMED IMPLT W/WO SCREW Right 07/23/2019    Procedure: INSERTION NAIL IM FEMUR ANTEGRADE (TROCHANTERIC);  Surgeon: Greg Mitchell DO;  Location: [facility] OR;  Service: Orthopedics    SD XCAPSL CTRC RMVL INSJ IO LENS PROSTH W/O ECP Right 05/17/2016    Procedure: EXTRACTION EXTRACAPSULAR CATARACT PHACO INTRAOCULAR LENS (IOL);  " Surgeon: Wale Roth MD;  Location: [facility] MAIN OR;  Service: Ophthalmology    TONSILLECTOMY         Family History:  Family History   Problem Relation Name Age of Onset    Heart disease Mother          MI  at age 69    Arthritis Mother      GI problems Father          bleeding ulcer    Arthritis Sister      Sleep apnea Sister      Irritable bowel syndrome Sister      Cancer Sister          skin cancer    Heart disease Brother          CABG (5)    Cancer Brother          skin cancer    Heart disease Maternal Aunt         Social History:    Social History     Substance and Sexual Activity   Alcohol Use Yes    Comment: rarely     Social History     Substance and Sexual Activity   Drug Use No     Social History     Tobacco Use   Smoking Status Never   Smokeless Tobacco Never       Home Medications:   Prior to Admission medications    Medication Sig Start Date End Date Taking? Authorizing Provider   amLODIPine (NORVASC) 2.5 mg tablet Take 1 tablet (2.5 mg total) by mouth daily 25  Yes JUAN Bronson   Ascorbic Acid (vitamin C) 100 MG tablet Take 100 mg by mouth in the morning.   Yes Historical Provider, MD   aspirin (ECOTRIN LOW STRENGTH) 81 mg EC tablet Take 1 tablet (81 mg total) by mouth daily 25  Yes JUAN Vazquez   atorvastatin (LIPITOR) 40 mg tablet Take 1 tablet (40 mg total) by mouth daily with dinner 3/31/25  Yes JUAN Vazquez   brimonidine tartrate 0.2 % ophthalmic solution  23  Yes Historical Provider, MD   clopidogrel (PLAVIX) 75 mg tablet Take 1 tablet (75 mg total) by mouth daily 25  Yes JUAN Vazquez   furosemide (LASIX) 20 mg tablet TAKE 2 TABLETS (40 MG TOTAL) BY MOUTH DAILY. 3/10/25  Yes Tom Howell MD   lisinopril (ZESTRIL) 10 mg tablet Take 1 tablet (10 mg total) by mouth daily 10/31/24  Yes Karolina Little DO   metoprolol succinate (TOPROL-XL) 25 mg 24 hr tablet Take 0.5 tablets (12.5 mg total) by mouth daily 10/31/24  Yes Karolina Little DO    Multiple Vitamins-Minerals (PRESERVISION AREDS PO) Take by mouth   Yes Historical Provider, MD   omeprazole (PriLOSEC) 20 mg delayed release capsule TAKE ONE CAPSULE DAILY 3/26/25  Yes Tom Howell MD   sertraline (ZOLOFT) 25 mg tablet TAKE ONE TABLET BY MOUTH DAILY 5/26/25  Yes Tom Howell MD   triamcinolone (KENALOG) 0.1 % cream Apply topically 2 (two) times a day 3/3/25  Yes Tom Howell MD   benzonatate (TESSALON PERLES) 100 mg capsule Take 1 capsule (100 mg total) by mouth 3 (three) times a day  Patient not taking: Reported on 7/1/2025 2/1/25   Rosanne Heard PA-C       Allergies:  Allergies   Allergen Reactions    Indocin [Indomethacin] Other (See Comments)     hypertension       Review of Systems:  Review of Systems - History obtained from chart review and the patient  General ROS: positive for  - fatigue, increased daytime somnolence  and change in activity tolerance  negative for - chills, fever, or weight gain  Psychological ROS: negative  Ophthalmic ROS: negative  ENT ROS: negative  Allergy and Immunology ROS: negative  Hematological and Lymphatic ROS: negative  Endocrine ROS: negative  Breast ROS: negative  Respiratory ROS: positive for - cough, orthopnea, and shortness of breath  negative for - hemoptysis or pleuritic pain  Cardiovascular ROS: positive for - dyspnea on exertion, edema, murmur, and orthopnea  negative for - chest pain, irregular heartbeat, loss of consciousness, palpitations, or paroxysmal nocturnal dyspnea  Gastrointestinal ROS: positive for - constipation  negative for - abdominal pain, appetite loss, or blood in stools  Genito-Urinary ROS: positive for - incontinence  negative for - dysuria or hematuria  Musculoskeletal ROS: positive for - arthralgias  Neurological ROS: no TIA or stroke symptoms  Dermatological ROS: negative    Vital Signs:     Vitals:    07/01/25 1301 07/01/25 1303   BP: 165/74 156/72   BP Location: Left arm Right arm   Patient Position:  "Sitting Sitting   Cuff Size: Child Child   Pulse: 62    SpO2: 100%    Weight: 49.3 kg (108 lb 11.2 oz)    Height: 4' 11\" (1.499 m)        Physical Exam:    General: Alert, oriented, appears stated age/frail, no cute distress  HEENT/NECK:  PERRLA.  No jugular venous distention.    Cardiac:Regular rate and rhythm, III/VI systolic murmur.  Carotids: 1+ pulses, no bruits   Pulmonary:  Breath sounds clear bilaterally.  Abdomen:  Non-tender, Non-distended.  Positive bowel sounds.  Upper extremities: 2+ radial pulses; brisk capillary refill  Lower extremities: Extremities warm/dry. PT/DP pulses 1+ bilaterally.  3+ edema B/L; mild cellulitic appearence  Neuro: Alert and oriented X 3.  Sensation is grossly intact.  No focal deficits.  Musculoskeletal: MAEE, stable gait  Skin: Warm/Dry, without rashes or lesions.    Lab Results:     Lab Results   Component Value Date    WBC 6.87 03/31/2025    HGB 10.7 (L) 03/31/2025    HCT 33.9 (L) 03/31/2025    MCV 90 03/31/2025     03/31/2025     Lab Results   Component Value Date     10/13/2015    SODIUM 140 03/31/2025    K 3.9 03/31/2025     03/31/2025    CO2 27 03/31/2025    ANIONGAP 13.0 10/13/2015    AGAP 6 03/31/2025    BUN 20 03/31/2025    CREATININE 0.66 03/31/2025    GLUC 101 03/31/2025    GLUF 107 (H) 03/29/2025    CALCIUM 8.8 03/31/2025    AST 24 03/14/2025    ALT 14 03/14/2025    ALKPHOS 97 03/14/2025    PROT 6.8 10/13/2015    TP 7.0 03/14/2025    BILITOT 0.6 10/13/2015    TBILI 0.61 03/14/2025    EGFR 78 03/31/2025     Lab Results   Component Value Date    CHOLESTEROL 126 01/30/2025    CHOLESTEROL 199 09/13/2024    CHOLESTEROL 191 05/02/2023     Lab Results   Component Value Date    HDL 33 (L) 01/30/2025    HDL 53 09/13/2024    HDL 53 05/02/2023     Lab Results   Component Value Date    TRIG 92 01/30/2025    TRIG 97 09/13/2024    TRIG 88 05/02/2023     Lab Results   Component Value Date    NONHDLC 93 01/30/2025    NONHDLC 146 09/13/2024    NONHDLC 138 " 05/02/2023       Imaging Studies:     Echocardiogram: 1/30/25       Left Ventricle: Left ventricular cavity size is normal. Wall thickness is moderately increased. There is mild to moderate concentric hypertrophy. The left ventricular ejection fraction is 50% by visual estimation. Systolic function is low normal. Wall motion is normal. Diastolic function is moderately abnormal, consistent with grade II (pseudonormal) relaxation.  Left atrial filling pressure is elevated.    Left Atrium: The atrium is severely dilated. LA Volume Index (BP) is 49.3 mL/m2.    Aortic Valve: The aortic valve is trileaflet. The leaflets are moderately thickened. The leaflets are moderately calcified. There is severely reduced mobility. There is mild regurgitation. There is severe stenosis. The aortic valve mean gradient is 38 mmHg. The dimensionless velocity index is 0.16. The aortic valve area is 0.65 cm2.  Mean gradient is 40 mmHg peak gradient 70 mmHg.    Mitral Valve: There is moderate annular calcification. There is moderate regurgitation.    Tricuspid Valve: There is mild to moderate regurgitation.  Calculated pulmonary artery pressure around 45 mmHg. There is evidence of mild tricuspid valve stenosis.        Findings    Left Ventricle Left ventricular cavity size is normal. Wall thickness is moderately increased. There is mild to moderate concentric hypertrophy. The left ventricular ejection fraction is 50% by visual estimation. Systolic function is low normal. Wall motion is normal. Diastolic function is moderately abnormal, consistent with grade II (pseudonormal) relaxation.  Left atrial filling pressure is elevated.   Right Ventricle Right ventricular cavity size is normal. Systolic function is normal. Wall thickness is normal.   Left Atrium The atrium is severely dilated. LA Volume Index (BP) is 49.3 mL/m2.   Right Atrium The atrium is normal in size.   Aortic Valve The aortic valve is trileaflet. The leaflets are moderately  thickened. The leaflets are moderately calcified. There is severely reduced mobility. There is mild regurgitation. There is severe stenosis. The aortic valve mean gradient is 38 mmHg. The dimensionless velocity index is 0.16. The aortic valve area is 0.65 cm2.  Mean gradient is 40 mmHg peak gradient 70 mmHg.   Mitral Valve There is moderate annular calcification.  There is moderate regurgitation. There is no evidence of stenosis.   Tricuspid Valve There is mild to moderate regurgitation.  Calculated pulmonary artery pressure around 45 mmHg. There is evidence of mild tricuspid valve stenosis.   Pulmonic Valve There is trace regurgitation. There is no evidence of stenosis.   Ascending Aorta The aortic root is normal in size.   IVC/SVC The inferior vena cava is normal in size. Respirophasic changes were normal.   Pericardium There is no pericardial effusion. The pericardium is normal in appearance.     Left Ventricle Measurements    Function/Volumes   LVOT stroke volume 55.22         LVOT stroke volume index 36.3 ml/m2         Left ventricular stroke volume (2D) 40 mL         LVOT Cardiac Output 2.8 l/min         LVOT Cardiac Index 1.92 l/min/m2         Dimensions   LVIDd 4 cm         LVIDS 2.8 cm         IVSd 1.4 cm         LVPWd 1.2 cm         LVOT area 3.46 cm2         FS 30         Diastolic Filling   MV E' Tissue Velocity Septal 4 cm/s         MV E' Tissue Velocity Lateral 4 cm/s         LA Volume Index (BP) 49.3 mL/m2         E/A ratio 1.8         E wave deceleration time 218 ms         MV Peak E Hiren 149 cm/s         MV Peak A Hiren 0.83 m/s          Report Measurements   AV LVOT peak gradient 3 mmHg              Interventricular Septum Measurements    Shunt Ratio   LVOT peak VTI 15.95 cm         LVOT peak hiren 0.84 m/s              Right Ventricle Measurements    Dimensions   RVID d 3.5 cm         Tricuspid annular plane systolic excursion 2.5 cm               Left Atrium Measurements    Dimensions   LA size 3.9 cm          LA length (A2C) 5.4 cm         JEWEL A4C 24 cm2         Volumes   LA volume (BP) 72 mL         LA Volume Index (BP) 49.3 mL/m2               Right Atrium Measurements    Dimensions   RAA A4C 14.6 cm2               Atrial Septum Measurements    Shunt Ratio   LVOT peak VTI 15.95 cm         LVOT peak hiren 0.84 m/s               Aortic Valve Measurements    Stenosis   Aortic valve peak velocity 4.15 m/s         LVOT peak hiren 0.84 m/s         Ao VTI 99.23 cm         LVOT peak VTI 15.95 cm         AV mean gradient 38 mmHg         LVOT mn grad 1 mmHg         AV peak gradient 69 mmHg         AV LVOT peak gradient 3 mmHg         Area/Dimensions   DVI 0.16         AV valve area 0.56 cm2         AV area by cont VTI 0.6 cm2         AV area peak hiren 0.7 cm2         LVOT diameter 2.1 cm         LVOT area 3.46 cm2               Mitral Valve Measurements    Stenosis   MV stenosis pressure 1/2 time 63 ms         MV valve area p 1/2 method 3.49               Tricuspid Valve Measurements    RVSP Parameters   TR Peak Hiren 3.2 m/s         Triscuspid Valve Regurgitation Peak Gradient 40 mmHg               Pulmonic Valve Measurements    Stenosis   PV peak gradient antegrade 2 mmHg               Aorta Measurements    Aortic Dimensions   Ao root 3.1 cm              Gated CTA of the chest/abdomen/pelvis: 3/20/25    VASCULAR FINDINGS:     Central pulmonary artery enlarged  Right atrium and right ventricle are dilated.  Left atrial cavity is dilated.  Left ventricular myocardium is mildly thinned with some subepicardial fat density suggesting prior myocardial infarction..  Mild mitral annular calcification.     Coronary artery origins are in typical position.  Moderate to severe coronary artery calcification.     Annulus, LVOT and aorta analysis:     Typical trileaflet aortic valve morphology.        Optimal CT aortic valve plane angles:  3 cusp view: Kitt[place] 24, cranial 1  Cusp overlap view REY 7, cranial 24     Measurements:     Annulus  diameter (max x min): 26 x 19 mm.  Annulus Area 410 mm2.  Annulus Perimeter 73 mm.     Annulus to left main coronary ostium: 17 mm.  Annulus to right main coronary ostium: 17 mm.  Sinus of Valsalva height: 25 mm.     Aortic sinus of Valsalva diameter (max x min): 35 x 34 mm.  LVOT minimal diameter: 20 mm.     Sino-tubular junction minimal diameter: 28 mm.  Ascending thoracic aorta maximal diameter: 29 mm.     Valve Calcification:     Aortic valve calcium score is 2087.  Volume of 729 mm3.     Thoracic Aorta:     Porcelain aorta = no.  Aortic root and ascending thoracic aorta free of significant calcification beyond the sino-tubular junction.  Aortic arch is normal in course and caliber with insignificant calcification.  Descending thoracic aorta is normal in course, caliber, and contour.     Abdominal aorta and pelvic artery measurements:     Abdominal aorta:     Wide necked saccular aneurysm along the left ventral margin of the upper abdominal aorta, 20 x 14 x 19 mm on image 303/101 and image 306/26. The aneurysm is also well demonstrated on 3D reconstructed image 16 of series 307.     The celiac trunk originates from the inferomedial base of this aneurysm and demonstrates greater than 70% atherosclerotic stenosis at its origin but remains patent. Moderate estimated 50% atherosclerotic stenosis in the proximal superior mesenteric   artery.     Minimal diameter above aortic bifurcation: 17 mm  Calcification: Moderate  Tortuosity: moderate     Right iliofemoral segment:  Minimal diameter: 6 mm  Calcification: mild  Tortuosity: mild     Left iliofemoral segment:  Minimal diameter: 6 mm  Calcification: mild  Tortuosity: severe     ECG:  3/28/25    Sinus bradycardia  Incomplete left bundle branch block    Cardiac catheterization: 3/28/25    Left Main   The vessel exhibits minimal luminal irregularities.      Left Anterior Descending   Mid LAD lesion is 90% stenosed. SHEILA flow is 3. The lesion is type C.      Second  Diagonal Branch   2nd Diag lesion is 100% stenosed. The lesion is chronically occluded.      Left Circumflex   Collaterals   Dist Cx filled by collaterals from 3rd RPL.      Mid Cx lesion is 100% stenosed. SHEILA flow is 3. The lesion is type C. The lesion is chronically occluded.      Right Coronary Artery      First Right Posterolateral Branch   1st RPL lesion is 90% stenosed.        Mid LAD lesion   Angioplasty   Stent     Carotid artery ultrasound: 3/20/25    Impression  RIGHT:  There is <50% stenosis noted in the internal carotid artery. Plaque is  heterogenous and irregular.  Vertebral artery flow is antegrade. There is no significant subclavian artery  disease.     LEFT:  There is <50% stenosis noted in the internal carotid artery. Plaque is  heterogenous and irregular.  Vertebral artery flow is antegrade. There is no significant subclavian artery  disease.    Results Review Statement: I personally reviewed the following image studies in PACS and associated radiology reports: cardiac cath, echo, CTA c/a/p, carotid duplex. My interpretation of the radiology images/reports is: AS.    TAVR evaluation Assessment:     5 Meter Walk Test:      Attempt 1: 5 sec   Attempt 2: 6 sec   Attempt 3: 6 sec    STS Risk Score: 10.3%    Aortic Stenosis Stage: D1    [facility]: III    KCCQ-12 completed    Cardiology notes reviewed    Impression/Plan:    Jackie Urbina has symptomatic severe aortic stenosis. She has undergone testing for transcatheter aortic valve replacement.  The results of these studies have been interpreted by the multidisciplinary TAVR team who have determined the patient to be High risk for open aortic valve replacement based on other pre-existing comobidities not reflected in the STS risk score.     CTA c/a/p re-demonstrates a saccular aneurysm of the upper abdominal aorta, first identified on CT imagine during patient's hospitalization in Jan 2025. Patient was unaware of findings until today. She will be referred  to Vascular surgery when she returns for her 30 day post-op TAVR visit in our office.     The risks, benefits, and alternatives to TAVR were discussed in detail with the patient today. They understand and wish to proceed with transfemoral transcatheter aortic valve replacement.  Informed consent was obtained and a date for the intervention has been set.    Pre-op instructions reviewed with patient and they were instructed to do the following:  ~ Stop multivitamin now  ~ Continue Plavix for LUIS (3/28/25) and continue Lisinopril (GFR 78).    Jackie Urbina was comfortable with our recommendations, and her questions were answered to her satisfaction.       SIGNATURE: JUAN Ricardo  DATE: July 1, 2025  TIME: 1:30 PM

## 2025-07-01 NOTE — H&P (VIEW-ONLY)
Pre-Op History & Physical - Cardiothoracic Surgery   Jackie Urbina 89 y.o. female MRN: [id number]    Egnite referral    Reason for Consult / Principal Problem: Aortic stenosis, Non-Rheumatic    History of Present Illness: Jackie Urbina is a 89 y.o. year old female who was previously evaluated in our office by Kristian Gaston M.D. for transcatheter aortic valve replacement.  During this initial consultation, arrangements were made for the following studies to be completed: Gated CTA of the chest/abdomen/pelvis, cardiac catheterization, dental clearance and carotid artery ultrasound.    Jackie Urbina now presents to review the results of these tests and obtain a second surgeon to confirm the suitability of proceeding with transcatheter aortic valve replacment.    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   Patient was admitted to [facility] 1/29-2/1/25 for CHF and influenza A. She has a known h/o aortic stenosis, followed by Cardiology. Repeat echo during the hospitalization demonstrated progression to severe AS that met Egnite criteria for evaluation.   CT imaging during her Jan 2025 hospitalization demonstrated a wide-necked saccular aneurysm of the abdominal aorta (patient unaware of finding until today).     Patient is  accompanied by her daughter-in-law today She reports progressive cough, worsening fatigue, decline in activity tolerance, BYRD and increased daytime somnolence. She has a persistent nocturnal cough, dry but improving. She has chronic LE edema that despite diuretic therapy as increased recently. She has compression boots but has not been using them. She denies chest  "pain, lightheadedness, palpitations.      Patient lives alone in her own home. She still drives. She is independent with ADL's. Family is available.     No tobacco, rare alcohol, no drug use.     Dental care recently and clearance obtained.     Past Medical History:  Past Medical History:   Diagnosis Date    Anesthesia complication     .Yrs ago had \"anxiety\" reaction not sure while sedated, pt states sensitive to anesthesia effects    Anxiety     stress at home    Arthritis     Cataract, right     Last Assessed:5/11/16    Eye hemorrhage     left eye currently 5/12/16    History of shingles 05/2015    Hypertension     Mitral valve stenosis, mild        Past Surgical History:   Past Surgical History:   Procedure Laterality Date    CARDIAC CATHETERIZATION N/A 3/28/2025    Procedure: Cardiac RHC/LHC;  Surgeon: Darwin Meyer DO;  Location: [facility];  Service: Cardiology    CARDIAC CATHETERIZATION N/A 3/28/2025    Procedure: Cardiac PCI;  Surgeon: Dawrin Meyer DO;  Location: [facility];  Service: Cardiology    CARPAL TUNNEL RELEASE Left     CATARACT EXTRACTION      CATARACT EXTRACTION W/ INTRAOCULAR LENS IMPLANT Left 10/11/2016    Procedure: EXTRACTION EXTRACAPSULAR CATARACT PHACO INTRAOCULAR LENS (IOL);  Surgeon: Wale Roth MD;  Location: [facility] MAIN OR;  Service:     CERVICAL CONE BIOPSY      COLONOSCOPY      EYE SURGERY Left     muscle repair    WV ARTHRP KNE CONDYLE&PLATU MEDIAL&LAT COMPARTMENTS Right 12/15/2020    Procedure: ARTHROPLASTY KNEE TOTAL;  Surgeon: Greg Mitchell DO;  Location: [facility] MAIN OR;  Service: Orthopedics    WV OPTX FEM SHFT FX W/INSJ IMED IMPLT W/WO SCREW Right 07/23/2019    Procedure: INSERTION NAIL IM FEMUR ANTEGRADE (TROCHANTERIC);  Surgeon: Greg Mitchell DO;  Location: [facility] MAIN OR;  Service: Orthopedics    WV XCAPSL CTRC RMVL INSJ IO LENS PROSTH W/O ECP Right 05/17/2016    Procedure: EXTRACTION EXTRACAPSULAR CATARACT PHACO INTRAOCULAR LENS (IOL);  " Surgeon: Wale Roth MD;  Location: [facility] MAIN OR;  Service: Ophthalmology    TONSILLECTOMY         Family History:  Family History   Problem Relation Name Age of Onset    Heart disease Mother          MI  at age 69    Arthritis Mother      GI problems Father          bleeding ulcer    Arthritis Sister      Sleep apnea Sister      Irritable bowel syndrome Sister      Cancer Sister          skin cancer    Heart disease Brother          CABG (5)    Cancer Brother          skin cancer    Heart disease Maternal Aunt         Social History:    Social History     Substance and Sexual Activity   Alcohol Use Yes    Comment: rarely     Social History     Substance and Sexual Activity   Drug Use No     Social History     Tobacco Use   Smoking Status Never   Smokeless Tobacco Never       Home Medications:   Prior to Admission medications    Medication Sig Start Date End Date Taking? Authorizing Provider   amLODIPine (NORVASC) 2.5 mg tablet Take 1 tablet (2.5 mg total) by mouth daily 25  Yes JUAN Bronson   Ascorbic Acid (vitamin C) 100 MG tablet Take 100 mg by mouth in the morning.   Yes Historical Provider, MD   aspirin (ECOTRIN LOW STRENGTH) 81 mg EC tablet Take 1 tablet (81 mg total) by mouth daily 25  Yes JUAN Vazquez   atorvastatin (LIPITOR) 40 mg tablet Take 1 tablet (40 mg total) by mouth daily with dinner 3/31/25  Yes JUAN Vazquez   brimonidine tartrate 0.2 % ophthalmic solution  23  Yes Historical Provider, MD   clopidogrel (PLAVIX) 75 mg tablet Take 1 tablet (75 mg total) by mouth daily 25  Yes JUAN Vazquez   furosemide (LASIX) 20 mg tablet TAKE 2 TABLETS (40 MG TOTAL) BY MOUTH DAILY. 3/10/25  Yes Tom Howell MD   lisinopril (ZESTRIL) 10 mg tablet Take 1 tablet (10 mg total) by mouth daily 10/31/24  Yes Karolina Little DO   metoprolol succinate (TOPROL-XL) 25 mg 24 hr tablet Take 0.5 tablets (12.5 mg total) by mouth daily 10/31/24  Yes Karolina Little DO    Multiple Vitamins-Minerals (PRESERVISION AREDS PO) Take by mouth   Yes Historical Provider, MD   omeprazole (PriLOSEC) 20 mg delayed release capsule TAKE ONE CAPSULE DAILY 3/26/25  Yes Tom Howell MD   sertraline (ZOLOFT) 25 mg tablet TAKE ONE TABLET BY MOUTH DAILY 5/26/25  Yes Tom Howell MD   triamcinolone (KENALOG) 0.1 % cream Apply topically 2 (two) times a day 3/3/25  Yes Tom Howell MD   benzonatate (TESSALON PERLES) 100 mg capsule Take 1 capsule (100 mg total) by mouth 3 (three) times a day  Patient not taking: Reported on 7/1/2025 2/1/25   Rosanne Heard PA-C       Allergies:  Allergies   Allergen Reactions    Indocin [Indomethacin] Other (See Comments)     hypertension       Review of Systems:  Review of Systems - History obtained from chart review and the patient  General ROS: positive for  - fatigue, increased daytime somnolence  and change in activity tolerance  negative for - chills, fever, or weight gain  Psychological ROS: negative  Ophthalmic ROS: negative  ENT ROS: negative  Allergy and Immunology ROS: negative  Hematological and Lymphatic ROS: negative  Endocrine ROS: negative  Breast ROS: negative  Respiratory ROS: positive for - cough, orthopnea, and shortness of breath  negative for - hemoptysis or pleuritic pain  Cardiovascular ROS: positive for - dyspnea on exertion, edema, murmur, and orthopnea  negative for - chest pain, irregular heartbeat, loss of consciousness, palpitations, or paroxysmal nocturnal dyspnea  Gastrointestinal ROS: positive for - constipation  negative for - abdominal pain, appetite loss, or blood in stools  Genito-Urinary ROS: positive for - incontinence  negative for - dysuria or hematuria  Musculoskeletal ROS: positive for - arthralgias  Neurological ROS: no TIA or stroke symptoms  Dermatological ROS: negative    Vital Signs:     Vitals:    07/01/25 1301 07/01/25 1303   BP: 165/74 156/72   BP Location: Left arm Right arm   Patient Position:  "Sitting Sitting   Cuff Size: Child Child   Pulse: 62    SpO2: 100%    Weight: 49.3 kg (108 lb 11.2 oz)    Height: 4' 11\" (1.499 m)        Physical Exam:    General: Alert, oriented, appears stated age/frail, no cute distress  HEENT/NECK:  PERRLA.  No jugular venous distention.    Cardiac:Regular rate and rhythm, III/VI systolic murmur.  Carotids: 1+ pulses, no bruits   Pulmonary:  Breath sounds clear bilaterally.  Abdomen:  Non-tender, Non-distended.  Positive bowel sounds.  Upper extremities: 2+ radial pulses; brisk capillary refill  Lower extremities: Extremities warm/dry. PT/DP pulses 1+ bilaterally.  3+ edema B/L; mild cellulitic appearence  Neuro: Alert and oriented X 3.  Sensation is grossly intact.  No focal deficits.  Musculoskeletal: MAEE, stable gait  Skin: Warm/Dry, without rashes or lesions.    Lab Results:     Lab Results   Component Value Date    WBC 6.87 03/31/2025    HGB 10.7 (L) 03/31/2025    HCT 33.9 (L) 03/31/2025    MCV 90 03/31/2025     03/31/2025     Lab Results   Component Value Date     10/13/2015    SODIUM 140 03/31/2025    K 3.9 03/31/2025     03/31/2025    CO2 27 03/31/2025    ANIONGAP 13.0 10/13/2015    AGAP 6 03/31/2025    BUN 20 03/31/2025    CREATININE 0.66 03/31/2025    GLUC 101 03/31/2025    GLUF 107 (H) 03/29/2025    CALCIUM 8.8 03/31/2025    AST 24 03/14/2025    ALT 14 03/14/2025    ALKPHOS 97 03/14/2025    PROT 6.8 10/13/2015    TP 7.0 03/14/2025    BILITOT 0.6 10/13/2015    TBILI 0.61 03/14/2025    EGFR 78 03/31/2025     Lab Results   Component Value Date    CHOLESTEROL 126 01/30/2025    CHOLESTEROL 199 09/13/2024    CHOLESTEROL 191 05/02/2023     Lab Results   Component Value Date    HDL 33 (L) 01/30/2025    HDL 53 09/13/2024    HDL 53 05/02/2023     Lab Results   Component Value Date    TRIG 92 01/30/2025    TRIG 97 09/13/2024    TRIG 88 05/02/2023     Lab Results   Component Value Date    NONHDLC 93 01/30/2025    NONHDLC 146 09/13/2024    NONHDLC 138 " 05/02/2023       Imaging Studies:     Echocardiogram: 1/30/25       Left Ventricle: Left ventricular cavity size is normal. Wall thickness is moderately increased. There is mild to moderate concentric hypertrophy. The left ventricular ejection fraction is 50% by visual estimation. Systolic function is low normal. Wall motion is normal. Diastolic function is moderately abnormal, consistent with grade II (pseudonormal) relaxation.  Left atrial filling pressure is elevated.    Left Atrium: The atrium is severely dilated. LA Volume Index (BP) is 49.3 mL/m2.    Aortic Valve: The aortic valve is trileaflet. The leaflets are moderately thickened. The leaflets are moderately calcified. There is severely reduced mobility. There is mild regurgitation. There is severe stenosis. The aortic valve mean gradient is 38 mmHg. The dimensionless velocity index is 0.16. The aortic valve area is 0.65 cm2.  Mean gradient is 40 mmHg peak gradient 70 mmHg.    Mitral Valve: There is moderate annular calcification. There is moderate regurgitation.    Tricuspid Valve: There is mild to moderate regurgitation.  Calculated pulmonary artery pressure around 45 mmHg. There is evidence of mild tricuspid valve stenosis.        Findings    Left Ventricle Left ventricular cavity size is normal. Wall thickness is moderately increased. There is mild to moderate concentric hypertrophy. The left ventricular ejection fraction is 50% by visual estimation. Systolic function is low normal. Wall motion is normal. Diastolic function is moderately abnormal, consistent with grade II (pseudonormal) relaxation.  Left atrial filling pressure is elevated.   Right Ventricle Right ventricular cavity size is normal. Systolic function is normal. Wall thickness is normal.   Left Atrium The atrium is severely dilated. LA Volume Index (BP) is 49.3 mL/m2.   Right Atrium The atrium is normal in size.   Aortic Valve The aortic valve is trileaflet. The leaflets are moderately  thickened. The leaflets are moderately calcified. There is severely reduced mobility. There is mild regurgitation. There is severe stenosis. The aortic valve mean gradient is 38 mmHg. The dimensionless velocity index is 0.16. The aortic valve area is 0.65 cm2.  Mean gradient is 40 mmHg peak gradient 70 mmHg.   Mitral Valve There is moderate annular calcification.  There is moderate regurgitation. There is no evidence of stenosis.   Tricuspid Valve There is mild to moderate regurgitation.  Calculated pulmonary artery pressure around 45 mmHg. There is evidence of mild tricuspid valve stenosis.   Pulmonic Valve There is trace regurgitation. There is no evidence of stenosis.   Ascending Aorta The aortic root is normal in size.   IVC/SVC The inferior vena cava is normal in size. Respirophasic changes were normal.   Pericardium There is no pericardial effusion. The pericardium is normal in appearance.     Left Ventricle Measurements    Function/Volumes   LVOT stroke volume 55.22         LVOT stroke volume index 36.3 ml/m2         Left ventricular stroke volume (2D) 40 mL         LVOT Cardiac Output 2.8 l/min         LVOT Cardiac Index 1.92 l/min/m2         Dimensions   LVIDd 4 cm         LVIDS 2.8 cm         IVSd 1.4 cm         LVPWd 1.2 cm         LVOT area 3.46 cm2         FS 30         Diastolic Filling   MV E' Tissue Velocity Septal 4 cm/s         MV E' Tissue Velocity Lateral 4 cm/s         LA Volume Index (BP) 49.3 mL/m2         E/A ratio 1.8         E wave deceleration time 218 ms         MV Peak E Hiren 149 cm/s         MV Peak A Hiren 0.83 m/s          Report Measurements   AV LVOT peak gradient 3 mmHg              Interventricular Septum Measurements    Shunt Ratio   LVOT peak VTI 15.95 cm         LVOT peak hiren 0.84 m/s              Right Ventricle Measurements    Dimensions   RVID d 3.5 cm         Tricuspid annular plane systolic excursion 2.5 cm               Left Atrium Measurements    Dimensions   LA size 3.9 cm          LA length (A2C) 5.4 cm         JEWEL A4C 24 cm2         Volumes   LA volume (BP) 72 mL         LA Volume Index (BP) 49.3 mL/m2               Right Atrium Measurements    Dimensions   RAA A4C 14.6 cm2               Atrial Septum Measurements    Shunt Ratio   LVOT peak VTI 15.95 cm         LVOT peak hiren 0.84 m/s               Aortic Valve Measurements    Stenosis   Aortic valve peak velocity 4.15 m/s         LVOT peak hiren 0.84 m/s         Ao VTI 99.23 cm         LVOT peak VTI 15.95 cm         AV mean gradient 38 mmHg         LVOT mn grad 1 mmHg         AV peak gradient 69 mmHg         AV LVOT peak gradient 3 mmHg         Area/Dimensions   DVI 0.16         AV valve area 0.56 cm2         AV area by cont VTI 0.6 cm2         AV area peak hiren 0.7 cm2         LVOT diameter 2.1 cm         LVOT area 3.46 cm2               Mitral Valve Measurements    Stenosis   MV stenosis pressure 1/2 time 63 ms         MV valve area p 1/2 method 3.49               Tricuspid Valve Measurements    RVSP Parameters   TR Peak Hiren 3.2 m/s         Triscuspid Valve Regurgitation Peak Gradient 40 mmHg               Pulmonic Valve Measurements    Stenosis   PV peak gradient antegrade 2 mmHg               Aorta Measurements    Aortic Dimensions   Ao root 3.1 cm              Gated CTA of the chest/abdomen/pelvis: 3/20/25    VASCULAR FINDINGS:     Central pulmonary artery enlarged  Right atrium and right ventricle are dilated.  Left atrial cavity is dilated.  Left ventricular myocardium is mildly thinned with some subepicardial fat density suggesting prior myocardial infarction..  Mild mitral annular calcification.     Coronary artery origins are in typical position.  Moderate to severe coronary artery calcification.     Annulus, LVOT and aorta analysis:     Typical trileaflet aortic valve morphology.        Optimal CT aortic valve plane angles:  3 cusp view: [place] 24, cranial 1  Cusp overlap view REY 7, cranial 24     Measurements:     Annulus  diameter (max x min): 26 x 19 mm.  Annulus Area 410 mm2.  Annulus Perimeter 73 mm.     Annulus to left main coronary ostium: 17 mm.  Annulus to right main coronary ostium: 17 mm.  Sinus of Valsalva height: 25 mm.     Aortic sinus of Valsalva diameter (max x min): 35 x 34 mm.  LVOT minimal diameter: 20 mm.     Sino-tubular junction minimal diameter: 28 mm.  Ascending thoracic aorta maximal diameter: 29 mm.     Valve Calcification:     Aortic valve calcium score is 2087.  Volume of 729 mm3.     Thoracic Aorta:     Porcelain aorta = no.  Aortic root and ascending thoracic aorta free of significant calcification beyond the sino-tubular junction.  Aortic arch is normal in course and caliber with insignificant calcification.  Descending thoracic aorta is normal in course, caliber, and contour.     Abdominal aorta and pelvic artery measurements:     Abdominal aorta:     Wide necked saccular aneurysm along the left ventral margin of the upper abdominal aorta, 20 x 14 x 19 mm on image 303/101 and image 306/26. The aneurysm is also well demonstrated on 3D reconstructed image 16 of series 307.     The celiac trunk originates from the inferomedial base of this aneurysm and demonstrates greater than 70% atherosclerotic stenosis at its origin but remains patent. Moderate estimated 50% atherosclerotic stenosis in the proximal superior mesenteric   artery.     Minimal diameter above aortic bifurcation: 17 mm  Calcification: Moderate  Tortuosity: moderate     Right iliofemoral segment:  Minimal diameter: 6 mm  Calcification: mild  Tortuosity: mild     Left iliofemoral segment:  Minimal diameter: 6 mm  Calcification: mild  Tortuosity: severe     ECG:  3/28/25    Sinus bradycardia  Incomplete left bundle branch block    Cardiac catheterization: 3/28/25    Left Main   The vessel exhibits minimal luminal irregularities.      Left Anterior Descending   Mid LAD lesion is 90% stenosed. SHEILA flow is 3. The lesion is type C.      Second  Diagonal Branch   2nd Diag lesion is 100% stenosed. The lesion is chronically occluded.      Left Circumflex   Collaterals   Dist Cx filled by collaterals from 3rd RPL.      Mid Cx lesion is 100% stenosed. SHEILA flow is 3. The lesion is type C. The lesion is chronically occluded.      Right Coronary Artery      First Right Posterolateral Branch   1st RPL lesion is 90% stenosed.        Mid LAD lesion   Angioplasty   Stent     Carotid artery ultrasound: 3/20/25    Impression  RIGHT:  There is <50% stenosis noted in the internal carotid artery. Plaque is  heterogenous and irregular.  Vertebral artery flow is antegrade. There is no significant subclavian artery  disease.     LEFT:  There is <50% stenosis noted in the internal carotid artery. Plaque is  heterogenous and irregular.  Vertebral artery flow is antegrade. There is no significant subclavian artery  disease.    Results Review Statement: I personally reviewed the following image studies in PACS and associated radiology reports: cardiac cath, echo, CTA c/a/p, carotid duplex. My interpretation of the radiology images/reports is: AS.    TAVR evaluation Assessment:     5 Meter Walk Test:      Attempt 1: 5 sec   Attempt 2: 6 sec   Attempt 3: 6 sec    STS Risk Score: 10.3%    Aortic Stenosis Stage: D1    [facility]: III    KCCQ-12 completed    Cardiology notes reviewed    Impression/Plan:    Jackie Urbina has symptomatic severe aortic stenosis. She has undergone testing for transcatheter aortic valve replacement.  The results of these studies have been interpreted by the multidisciplinary TAVR team who have determined the patient to be High risk for open aortic valve replacement based on other pre-existing comobidities not reflected in the STS risk score.     CTA c/a/p re-demonstrates a saccular aneurysm of the upper abdominal aorta, first identified on CT imagine during patient's hospitalization in Jan 2025. Patient was unaware of findings until today. She will be referred  to Vascular surgery when she returns for her 30 day post-op TAVR visit in our office.     The risks, benefits, and alternatives to TAVR were discussed in detail with the patient today. They understand and wish to proceed with transfemoral transcatheter aortic valve replacement.  Informed consent was obtained and a date for the intervention has been set.    Pre-op instructions reviewed with patient and they were instructed to do the following:  ~ Stop multivitamin now  ~ Continue Plavix for LUIS (3/28/25) and continue Lisinopril (GFR 78).    Jackie Urbina was comfortable with our recommendations, and her questions were answered to her satisfaction.       SIGNATURE: JUAN Ricardo  DATE: July 1, 2025  TIME: 1:30 PM

## 2025-07-01 NOTE — H&P
Pre-Op History & Physical - Cardiothoracic Surgery   Jackie Urbina 89 y.o. female MRN: [id number]    Egnite referral    Reason for Consult / Principal Problem: Aortic stenosis, Non-Rheumatic    History of Present Illness: Jackie Urbina is a 89 y.o. year old female who was previously evaluated in our office by Kristian Gaston M.D. for transcatheter aortic valve replacement.  During this initial consultation, arrangements were made for the following studies to be completed: Gated CTA of the chest/abdomen/pelvis, cardiac catheterization, dental clearance and carotid artery ultrasound.    Jackie Urbina now presents to review the results of these tests and obtain a second surgeon to confirm the suitability of proceeding with transcatheter aortic valve replacment.    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   Patient was admitted to [facility] 1/29-2/1/25 for CHF and influenza A. She has a known h/o aortic stenosis, followed by Cardiology. Repeat echo during the hospitalization demonstrated progression to severe AS that met Egnite criteria for evaluation.   CT imaging during her Jan 2025 hospitalization demonstrated a wide-necked saccular aneurysm of the abdominal aorta (patient unaware of finding until today).     Patient is  accompanied by her daughter-in-law today She reports progressive cough, worsening fatigue, decline in activity tolerance, BYRD and increased daytime somnolence. She has a persistent nocturnal cough, dry but improving. She has chronic LE edema that despite diuretic therapy as increased recently. She has compression boots but has not been using them. She denies chest  "pain, lightheadedness, palpitations.      Patient lives alone in her own home. She still drives. She is independent with ADL's. Family is available.     No tobacco, rare alcohol, no drug use.     Dental care recently and clearance obtained.     Past Medical History:  Past Medical History:   Diagnosis Date    Anesthesia complication     .Yrs ago had \"anxiety\" reaction not sure while sedated, pt states sensitive to anesthesia effects    Anxiety     stress at home    Arthritis     Cataract, right     Last Assessed:5/11/16    Eye hemorrhage     left eye currently 5/12/16    History of shingles 05/2015    Hypertension     Mitral valve stenosis, mild        Past Surgical History:   Past Surgical History:   Procedure Laterality Date    CARDIAC CATHETERIZATION N/A 3/28/2025    Procedure: Cardiac RHC/LHC;  Surgeon: Darwin Meyer DO;  Location: [facility];  Service: Cardiology    CARDIAC CATHETERIZATION N/A 3/28/2025    Procedure: Cardiac PCI;  Surgeon: Darwin Meyer DO;  Location: [facility];  Service: Cardiology    CARPAL TUNNEL RELEASE Left     CATARACT EXTRACTION      CATARACT EXTRACTION W/ INTRAOCULAR LENS IMPLANT Left 10/11/2016    Procedure: EXTRACTION EXTRACAPSULAR CATARACT PHACO INTRAOCULAR LENS (IOL);  Surgeon: Wale Roth MD;  Location: [facility] MAIN OR;  Service:     CERVICAL CONE BIOPSY      COLONOSCOPY      EYE SURGERY Left     muscle repair    ME ARTHRP KNE CONDYLE&PLATU MEDIAL&LAT COMPARTMENTS Right 12/15/2020    Procedure: ARTHROPLASTY KNEE TOTAL;  Surgeon: Greg Mitchell DO;  Location: [facility] MAIN OR;  Service: Orthopedics    ME OPTX FEM SHFT FX W/INSJ IMED IMPLT W/WO SCREW Right 07/23/2019    Procedure: INSERTION NAIL IM FEMUR ANTEGRADE (TROCHANTERIC);  Surgeon: Greg Mitchell DO;  Location: [facility] MAIN OR;  Service: Orthopedics    ME XCAPSL CTRC RMVL INSJ IO LENS PROSTH W/O ECP Right 05/17/2016    Procedure: EXTRACTION EXTRACAPSULAR CATARACT PHACO INTRAOCULAR LENS (IOL);  " Surgeon: Wale Roth MD;  Location: [facility] MAIN OR;  Service: Ophthalmology    TONSILLECTOMY         Family History:  Family History   Problem Relation Name Age of Onset    Heart disease Mother          MI  at age 69    Arthritis Mother      GI problems Father          bleeding ulcer    Arthritis Sister      Sleep apnea Sister      Irritable bowel syndrome Sister      Cancer Sister          skin cancer    Heart disease Brother          CABG (5)    Cancer Brother          skin cancer    Heart disease Maternal Aunt         Social History:    Social History     Substance and Sexual Activity   Alcohol Use Yes    Comment: rarely     Social History     Substance and Sexual Activity   Drug Use No     Social History     Tobacco Use   Smoking Status Never   Smokeless Tobacco Never       Home Medications:   Prior to Admission medications    Medication Sig Start Date End Date Taking? Authorizing Provider   amLODIPine (NORVASC) 2.5 mg tablet Take 1 tablet (2.5 mg total) by mouth daily 25  Yes JUAN Bronson   Ascorbic Acid (vitamin C) 100 MG tablet Take 100 mg by mouth in the morning.   Yes Historical Provider, MD   aspirin (ECOTRIN LOW STRENGTH) 81 mg EC tablet Take 1 tablet (81 mg total) by mouth daily 25  Yes JUAN Vazquez   atorvastatin (LIPITOR) 40 mg tablet Take 1 tablet (40 mg total) by mouth daily with dinner 3/31/25  Yes JUAN Vazquez   brimonidine tartrate 0.2 % ophthalmic solution  23  Yes Historical Provider, MD   clopidogrel (PLAVIX) 75 mg tablet Take 1 tablet (75 mg total) by mouth daily 25  Yes JUAN Vazquez   furosemide (LASIX) 20 mg tablet TAKE 2 TABLETS (40 MG TOTAL) BY MOUTH DAILY. 3/10/25  Yes Tom Howell MD   lisinopril (ZESTRIL) 10 mg tablet Take 1 tablet (10 mg total) by mouth daily 10/31/24  Yes Karolina Little DO   metoprolol succinate (TOPROL-XL) 25 mg 24 hr tablet Take 0.5 tablets (12.5 mg total) by mouth daily 10/31/24  Yes Karolina Little DO    Multiple Vitamins-Minerals (PRESERVISION AREDS PO) Take by mouth   Yes Historical Provider, MD   omeprazole (PriLOSEC) 20 mg delayed release capsule TAKE ONE CAPSULE DAILY 3/26/25  Yes Tom Howell MD   sertraline (ZOLOFT) 25 mg tablet TAKE ONE TABLET BY MOUTH DAILY 5/26/25  Yes Tom Howell MD   triamcinolone (KENALOG) 0.1 % cream Apply topically 2 (two) times a day 3/3/25  Yes Tom Howell MD   benzonatate (TESSALON PERLES) 100 mg capsule Take 1 capsule (100 mg total) by mouth 3 (three) times a day  Patient not taking: Reported on 7/1/2025 2/1/25   Rosanne Heard PA-C       Allergies:  Allergies   Allergen Reactions    Indocin [Indomethacin] Other (See Comments)     hypertension       Review of Systems:  Review of Systems - History obtained from chart review and the patient  General ROS: positive for  - fatigue, increased daytime somnolence  and change in activity tolerance  negative for - chills, fever, or weight gain  Psychological ROS: negative  Ophthalmic ROS: negative  ENT ROS: negative  Allergy and Immunology ROS: negative  Hematological and Lymphatic ROS: negative  Endocrine ROS: negative  Breast ROS: negative  Respiratory ROS: positive for - cough, orthopnea, and shortness of breath  negative for - hemoptysis or pleuritic pain  Cardiovascular ROS: positive for - dyspnea on exertion, edema, murmur, and orthopnea  negative for - chest pain, irregular heartbeat, loss of consciousness, palpitations, or paroxysmal nocturnal dyspnea  Gastrointestinal ROS: positive for - constipation  negative for - abdominal pain, appetite loss, or blood in stools  Genito-Urinary ROS: positive for - incontinence  negative for - dysuria or hematuria  Musculoskeletal ROS: positive for - arthralgias  Neurological ROS: no TIA or stroke symptoms  Dermatological ROS: negative    Vital Signs:     Vitals:    07/01/25 1301 07/01/25 1303   BP: 165/74 156/72   BP Location: Left arm Right arm   Patient Position:  "Sitting Sitting   Cuff Size: Child Child   Pulse: 62    SpO2: 100%    Weight: 49.3 kg (108 lb 11.2 oz)    Height: 4' 11\" (1.499 m)        Physical Exam:    General: Alert, oriented, appears stated age/frail, no cute distress  HEENT/NECK:  PERRLA.  No jugular venous distention.    Cardiac:Regular rate and rhythm, III/VI systolic murmur.  Carotids: 1+ pulses, no bruits   Pulmonary:  Breath sounds clear bilaterally.  Abdomen:  Non-tender, Non-distended.  Positive bowel sounds.  Upper extremities: 2+ radial pulses; brisk capillary refill  Lower extremities: Extremities warm/dry. PT/DP pulses 1+ bilaterally.  3+ edema B/L; mild cellulitic appearence  Neuro: Alert and oriented X 3.  Sensation is grossly intact.  No focal deficits.  Musculoskeletal: MAEE, stable gait  Skin: Warm/Dry, without rashes or lesions.    Lab Results:     Lab Results   Component Value Date    WBC 6.87 03/31/2025    HGB 10.7 (L) 03/31/2025    HCT 33.9 (L) 03/31/2025    MCV 90 03/31/2025     03/31/2025     Lab Results   Component Value Date     10/13/2015    SODIUM 140 03/31/2025    K 3.9 03/31/2025     03/31/2025    CO2 27 03/31/2025    ANIONGAP 13.0 10/13/2015    AGAP 6 03/31/2025    BUN 20 03/31/2025    CREATININE 0.66 03/31/2025    GLUC 101 03/31/2025    GLUF 107 (H) 03/29/2025    CALCIUM 8.8 03/31/2025    AST 24 03/14/2025    ALT 14 03/14/2025    ALKPHOS 97 03/14/2025    PROT 6.8 10/13/2015    TP 7.0 03/14/2025    BILITOT 0.6 10/13/2015    TBILI 0.61 03/14/2025    EGFR 78 03/31/2025     Lab Results   Component Value Date    CHOLESTEROL 126 01/30/2025    CHOLESTEROL 199 09/13/2024    CHOLESTEROL 191 05/02/2023     Lab Results   Component Value Date    HDL 33 (L) 01/30/2025    HDL 53 09/13/2024    HDL 53 05/02/2023     Lab Results   Component Value Date    TRIG 92 01/30/2025    TRIG 97 09/13/2024    TRIG 88 05/02/2023     Lab Results   Component Value Date    NONHDLC 93 01/30/2025    NONHDLC 146 09/13/2024    NONHDLC 138 " 05/02/2023       Imaging Studies:     Echocardiogram: 1/30/25       Left Ventricle: Left ventricular cavity size is normal. Wall thickness is moderately increased. There is mild to moderate concentric hypertrophy. The left ventricular ejection fraction is 50% by visual estimation. Systolic function is low normal. Wall motion is normal. Diastolic function is moderately abnormal, consistent with grade II (pseudonormal) relaxation.  Left atrial filling pressure is elevated.    Left Atrium: The atrium is severely dilated. LA Volume Index (BP) is 49.3 mL/m2.    Aortic Valve: The aortic valve is trileaflet. The leaflets are moderately thickened. The leaflets are moderately calcified. There is severely reduced mobility. There is mild regurgitation. There is severe stenosis. The aortic valve mean gradient is 38 mmHg. The dimensionless velocity index is 0.16. The aortic valve area is 0.65 cm2.  Mean gradient is 40 mmHg peak gradient 70 mmHg.    Mitral Valve: There is moderate annular calcification. There is moderate regurgitation.    Tricuspid Valve: There is mild to moderate regurgitation.  Calculated pulmonary artery pressure around 45 mmHg. There is evidence of mild tricuspid valve stenosis.        Findings    Left Ventricle Left ventricular cavity size is normal. Wall thickness is moderately increased. There is mild to moderate concentric hypertrophy. The left ventricular ejection fraction is 50% by visual estimation. Systolic function is low normal. Wall motion is normal. Diastolic function is moderately abnormal, consistent with grade II (pseudonormal) relaxation.  Left atrial filling pressure is elevated.   Right Ventricle Right ventricular cavity size is normal. Systolic function is normal. Wall thickness is normal.   Left Atrium The atrium is severely dilated. LA Volume Index (BP) is 49.3 mL/m2.   Right Atrium The atrium is normal in size.   Aortic Valve The aortic valve is trileaflet. The leaflets are moderately  thickened. The leaflets are moderately calcified. There is severely reduced mobility. There is mild regurgitation. There is severe stenosis. The aortic valve mean gradient is 38 mmHg. The dimensionless velocity index is 0.16. The aortic valve area is 0.65 cm2.  Mean gradient is 40 mmHg peak gradient 70 mmHg.   Mitral Valve There is moderate annular calcification.  There is moderate regurgitation. There is no evidence of stenosis.   Tricuspid Valve There is mild to moderate regurgitation.  Calculated pulmonary artery pressure around 45 mmHg. There is evidence of mild tricuspid valve stenosis.   Pulmonic Valve There is trace regurgitation. There is no evidence of stenosis.   Ascending Aorta The aortic root is normal in size.   IVC/SVC The inferior vena cava is normal in size. Respirophasic changes were normal.   Pericardium There is no pericardial effusion. The pericardium is normal in appearance.     Left Ventricle Measurements    Function/Volumes   LVOT stroke volume 55.22         LVOT stroke volume index 36.3 ml/m2         Left ventricular stroke volume (2D) 40 mL         LVOT Cardiac Output 2.8 l/min         LVOT Cardiac Index 1.92 l/min/m2         Dimensions   LVIDd 4 cm         LVIDS 2.8 cm         IVSd 1.4 cm         LVPWd 1.2 cm         LVOT area 3.46 cm2         FS 30         Diastolic Filling   MV E' Tissue Velocity Septal 4 cm/s         MV E' Tissue Velocity Lateral 4 cm/s         LA Volume Index (BP) 49.3 mL/m2         E/A ratio 1.8         E wave deceleration time 218 ms         MV Peak E Hiren 149 cm/s         MV Peak A Hiren 0.83 m/s          Report Measurements   AV LVOT peak gradient 3 mmHg              Interventricular Septum Measurements    Shunt Ratio   LVOT peak VTI 15.95 cm         LVOT peak hiren 0.84 m/s              Right Ventricle Measurements    Dimensions   RVID d 3.5 cm         Tricuspid annular plane systolic excursion 2.5 cm               Left Atrium Measurements    Dimensions   LA size 3.9 cm          LA length (A2C) 5.4 cm         JEWEL A4C 24 cm2         Volumes   LA volume (BP) 72 mL         LA Volume Index (BP) 49.3 mL/m2               Right Atrium Measurements    Dimensions   RAA A4C 14.6 cm2               Atrial Septum Measurements    Shunt Ratio   LVOT peak VTI 15.95 cm         LVOT peak hiren 0.84 m/s               Aortic Valve Measurements    Stenosis   Aortic valve peak velocity 4.15 m/s         LVOT peak hiren 0.84 m/s         Ao VTI 99.23 cm         LVOT peak VTI 15.95 cm         AV mean gradient 38 mmHg         LVOT mn grad 1 mmHg         AV peak gradient 69 mmHg         AV LVOT peak gradient 3 mmHg         Area/Dimensions   DVI 0.16         AV valve area 0.56 cm2         AV area by cont VTI 0.6 cm2         AV area peak hiren 0.7 cm2         LVOT diameter 2.1 cm         LVOT area 3.46 cm2               Mitral Valve Measurements    Stenosis   MV stenosis pressure 1/2 time 63 ms         MV valve area p 1/2 method 3.49               Tricuspid Valve Measurements    RVSP Parameters   TR Peak Hiren 3.2 m/s         Triscuspid Valve Regurgitation Peak Gradient 40 mmHg               Pulmonic Valve Measurements    Stenosis   PV peak gradient antegrade 2 mmHg               Aorta Measurements    Aortic Dimensions   Ao root 3.1 cm              Gated CTA of the chest/abdomen/pelvis: 3/20/25    VASCULAR FINDINGS:     Central pulmonary artery enlarged  Right atrium and right ventricle are dilated.  Left atrial cavity is dilated.  Left ventricular myocardium is mildly thinned with some subepicardial fat density suggesting prior myocardial infarction..  Mild mitral annular calcification.     Coronary artery origins are in typical position.  Moderate to severe coronary artery calcification.     Annulus, LVOT and aorta analysis:     Typical trileaflet aortic valve morphology.        Optimal CT aortic valve plane angles:  3 cusp view: [place] 24, cranial 1  Cusp overlap view REY 7, cranial 24     Measurements:     Annulus  diameter (max x min): 26 x 19 mm.  Annulus Area 410 mm2.  Annulus Perimeter 73 mm.     Annulus to left main coronary ostium: 17 mm.  Annulus to right main coronary ostium: 17 mm.  Sinus of Valsalva height: 25 mm.     Aortic sinus of Valsalva diameter (max x min): 35 x 34 mm.  LVOT minimal diameter: 20 mm.     Sino-tubular junction minimal diameter: 28 mm.  Ascending thoracic aorta maximal diameter: 29 mm.     Valve Calcification:     Aortic valve calcium score is 2087.  Volume of 729 mm3.     Thoracic Aorta:     Porcelain aorta = no.  Aortic root and ascending thoracic aorta free of significant calcification beyond the sino-tubular junction.  Aortic arch is normal in course and caliber with insignificant calcification.  Descending thoracic aorta is normal in course, caliber, and contour.     Abdominal aorta and pelvic artery measurements:     Abdominal aorta:     Wide necked saccular aneurysm along the left ventral margin of the upper abdominal aorta, 20 x 14 x 19 mm on image 303/101 and image 306/26. The aneurysm is also well demonstrated on 3D reconstructed image 16 of series 307.     The celiac trunk originates from the inferomedial base of this aneurysm and demonstrates greater than 70% atherosclerotic stenosis at its origin but remains patent. Moderate estimated 50% atherosclerotic stenosis in the proximal superior mesenteric   artery.     Minimal diameter above aortic bifurcation: 17 mm  Calcification: Moderate  Tortuosity: moderate     Right iliofemoral segment:  Minimal diameter: 6 mm  Calcification: mild  Tortuosity: mild     Left iliofemoral segment:  Minimal diameter: 6 mm  Calcification: mild  Tortuosity: severe     ECG:  3/28/25    Sinus bradycardia  Incomplete left bundle branch block    Cardiac catheterization: 3/28/25    Left Main   The vessel exhibits minimal luminal irregularities.      Left Anterior Descending   Mid LAD lesion is 90% stenosed. SHEILA flow is 3. The lesion is type C.      Second  Diagonal Branch   2nd Diag lesion is 100% stenosed. The lesion is chronically occluded.      Left Circumflex   Collaterals   Dist Cx filled by collaterals from 3rd RPL.      Mid Cx lesion is 100% stenosed. SHEILA flow is 3. The lesion is type C. The lesion is chronically occluded.      Right Coronary Artery      First Right Posterolateral Branch   1st RPL lesion is 90% stenosed.        Mid LAD lesion   Angioplasty   Stent     Carotid artery ultrasound: 3/20/25    Impression  RIGHT:  There is <50% stenosis noted in the internal carotid artery. Plaque is  heterogenous and irregular.  Vertebral artery flow is antegrade. There is no significant subclavian artery  disease.     LEFT:  There is <50% stenosis noted in the internal carotid artery. Plaque is  heterogenous and irregular.  Vertebral artery flow is antegrade. There is no significant subclavian artery  disease.    Results Review Statement: I personally reviewed the following image studies in PACS and associated radiology reports: cardiac cath, echo, CTA c/a/p, carotid duplex. My interpretation of the radiology images/reports is: AS.    TAVR evaluation Assessment:     5 Meter Walk Test:      Attempt 1: 5 sec   Attempt 2: 6 sec   Attempt 3: 6 sec    STS Risk Score: 10.3%    Aortic Stenosis Stage: D1    [facility]: III    KCCQ-12 completed    Cardiology notes reviewed    Impression/Plan:    Jackie Urbina has symptomatic severe aortic stenosis. She has undergone testing for transcatheter aortic valve replacement.  The results of these studies have been interpreted by the multidisciplinary TAVR team who have determined the patient to be High risk for open aortic valve replacement based on other pre-existing comobidities not reflected in the STS risk score.     CTA c/a/p re-demonstrates a saccular aneurysm of the upper abdominal aorta, first identified on CT imagine during patient's hospitalization in Jan 2025. Patient was unaware of findings until today. She will be referred  to Vascular surgery when she returns for her 30 day post-op TAVR visit in our office.     The risks, benefits, and alternatives to TAVR were discussed in detail with the patient today. They understand and wish to proceed with transfemoral transcatheter aortic valve replacement.  Informed consent was obtained and a date for the intervention has been set.    Pre-op instructions reviewed with patient and they were instructed to do the following:  ~ Stop multivitamin now  ~ Continue Plavix for LUIS (3/28/25) and continue Lisinopril (GFR 78).    Jackie Urbina was comfortable with our recommendations, and her questions were answered to her satisfaction.       SIGNATURE: JUAN Ricardo  DATE: July 1, 2025  TIME: 1:30 PM

## 2025-07-11 ENCOUNTER — APPOINTMENT (OUTPATIENT)
Dept: LAB | Facility: HOSPITAL | Age: OVER 89
End: 2025-07-11
Attending: NURSE PRACTITIONER
Payer: MEDICARE

## 2025-07-11 ENCOUNTER — LAB REQUISITION (OUTPATIENT)
Dept: LAB | Facility: HOSPITAL | Age: OVER 89
End: 2025-07-11
Payer: MEDICARE

## 2025-07-11 DIAGNOSIS — I35.0 NONRHEUMATIC AORTIC (VALVE) STENOSIS: ICD-10-CM

## 2025-07-11 LAB
ABO GROUP BLD: NORMAL
ALBUMIN SERPL BCG-MCNC: 4.2 G/DL (ref 3.5–5)
ALP SERPL-CCNC: 105 U/L (ref 34–104)
ALT SERPL W P-5'-P-CCNC: 16 U/L (ref 7–52)
ANION GAP SERPL CALCULATED.3IONS-SCNC: 8 MMOL/L (ref 4–13)
AST SERPL W P-5'-P-CCNC: 27 U/L (ref 13–39)
BILIRUB SERPL-MCNC: 0.75 MG/DL (ref 0.2–1)
BLD GP AB SCN SERPL QL: NEGATIVE
BUN SERPL-MCNC: 29 MG/DL (ref 5–25)
CALCIUM SERPL-MCNC: 9.6 MG/DL (ref 8.4–10.2)
CHLORIDE SERPL-SCNC: 105 MMOL/L (ref 96–108)
CO2 SERPL-SCNC: 28 MMOL/L (ref 21–32)
CREAT SERPL-MCNC: 0.88 MG/DL (ref 0.6–1.3)
ERYTHROCYTE [DISTWIDTH] IN BLOOD BY AUTOMATED COUNT: 13.8 % (ref 11.6–15.1)
GFR SERPL CREATININE-BSD FRML MDRD: 58 ML/MIN/1.73SQ M
GLUCOSE SERPL-MCNC: 96 MG/DL (ref 65–140)
HCT VFR BLD AUTO: 37.2 % (ref 34.8–46.1)
HGB BLD-MCNC: 11.9 G/DL (ref 11.5–15.4)
INR PPP: 1.02 (ref 0.85–1.19)
MCH RBC QN AUTO: 29 PG (ref 26.8–34.3)
MCHC RBC AUTO-ENTMCNC: 32 G/DL (ref 31.4–37.4)
MCV RBC AUTO: 91 FL (ref 82–98)
PLATELET # BLD AUTO: 171 THOUSANDS/UL (ref 149–390)
PMV BLD AUTO: 11.8 FL (ref 8.9–12.7)
POTASSIUM SERPL-SCNC: 4.1 MMOL/L (ref 3.5–5.3)
PROT SERPL-MCNC: 6.8 G/DL (ref 6.4–8.4)
PROTHROMBIN TIME: 13.9 SECONDS (ref 12.3–15)
RBC # BLD AUTO: 4.11 MILLION/UL (ref 3.81–5.12)
RH BLD: NEGATIVE
SODIUM SERPL-SCNC: 141 MMOL/L (ref 135–147)
SPECIMEN EXPIRATION DATE: NORMAL
WBC # BLD AUTO: 6.83 THOUSAND/UL (ref 4.31–10.16)

## 2025-07-11 PROCEDURE — 86850 RBC ANTIBODY SCREEN: CPT | Performed by: NURSE PRACTITIONER

## 2025-07-11 PROCEDURE — 85610 PROTHROMBIN TIME: CPT

## 2025-07-11 PROCEDURE — 87081 CULTURE SCREEN ONLY: CPT

## 2025-07-11 PROCEDURE — 36415 COLL VENOUS BLD VENIPUNCTURE: CPT

## 2025-07-11 PROCEDURE — 86901 BLOOD TYPING SEROLOGIC RH(D): CPT | Performed by: NURSE PRACTITIONER

## 2025-07-11 PROCEDURE — 86900 BLOOD TYPING SEROLOGIC ABO: CPT | Performed by: NURSE PRACTITIONER

## 2025-07-11 PROCEDURE — 80053 COMPREHEN METABOLIC PANEL: CPT

## 2025-07-11 PROCEDURE — 85027 COMPLETE CBC AUTOMATED: CPT

## 2025-07-12 LAB — MRSA NOSE QL CULT: NORMAL

## 2025-07-16 ENCOUNTER — ANESTHESIA EVENT (OUTPATIENT)
Dept: PERIOP | Facility: HOSPITAL | Age: OVER 89
DRG: 266 | End: 2025-07-16
Payer: MEDICARE

## 2025-07-16 RX ORDER — HEPARIN SODIUM 1000 [USP'U]/ML
400 INJECTION, SOLUTION INTRAVENOUS; SUBCUTANEOUS ONCE
Status: CANCELLED | OUTPATIENT
Start: 2025-07-17

## 2025-07-16 RX ORDER — PHENYLEPHRINE HCL IN 0.9% NACL 1 MG/10 ML
200-2000 SYRINGE (ML) INTRAVENOUS ONCE
Status: CANCELLED | OUTPATIENT
Start: 2025-07-17

## 2025-07-16 RX ORDER — DIPHENOXYLATE HYDROCHLORIDE AND ATROPINE SULFATE 2.5; .025 MG/1; MG/1
1 TABLET ORAL DAILY
COMMUNITY

## 2025-07-16 NOTE — ANESTHESIA PREPROCEDURE EVALUATION
Procedure:  REPLACEMENT AORTIC VALVE TRANSCATHETER (TAVR) TRANSFEMORAL W/ 23MM VALVE(ACCESS ON RIGHT) IMTIAZ (Chest)  Cardiac tavr (Chest)    Relevant Problems   CARDIO   (+) Coronary artery disease involving native coronary artery   (+) Essential hypertension   (+) Hyperlipidemia   (+) Mild tricuspid insufficiency   (+) Mitral valve stenosis, mild   (+) Moderate aortic regurgitation   (+) Non-rheumatic mitral regurgitation   (+) Nonrheumatic aortic (valve) stenosis   (+) Premature atrial contractions   (+) Right radial pseudoaneurysm      GI/HEPATIC   (+) Gastroesophageal reflux disease without esophagitis      MUSCULOSKELETAL   (+) Arthritis   (+) Primary osteoarthritis involving multiple joints      NEURO/PSYCH   (+) Current moderate episode of major depressive disorder without prior episode (HCC)        Physical Exam    Airway     Mallampati score: II          Cardiovascular      Dental       Pulmonary      Neurological      Other Findings  post-pubertal.      Anesthesia Plan  ASA Score- 4     Anesthesia Type- general with ASA Monitors.         Additional Monitors: arterial line and central veinous line.    Airway Plan: Oral ETT.    Comment: General anesthesia, endotracheal intubation, standard ASA monitors; large bore IV access (possible Cordis); pre-induction arterial line; TLC CVL; IMTIAZ (no IMTIAZ contraindications noted); plan for post-op PACU, possiblve ICU/vent.    Risks discussed - nausea, discomfort, sore throat, dental damage; rare anesthetic emergencies; patient understands, agrees to proceed.   .       Plan Factors-    Chart reviewed.   Existing labs reviewed.                   Induction- intravenous.    Postoperative Plan- Plan for postoperative opioid use.   Monitoring Plan - Monitoring plan - standard ASA monitoring and IMTIAZ  Post Operative Pain Plan - plan for postoperative opioid use        Informed Consent- Anesthetic plan and risks discussed with patient.  I personally reviewed this patient with the  CRNA. Discussed and agreed on the Anesthesia Plan with the CRNA..      NPO Status:  No vitals data found for the desired time range.

## 2025-07-17 ENCOUNTER — TELEPHONE (OUTPATIENT)
Age: OVER 89
End: 2025-07-17

## 2025-07-17 ENCOUNTER — HOSPITAL ENCOUNTER (INPATIENT)
Facility: HOSPITAL | Age: OVER 89
LOS: 4 days | Discharge: HOME HEALTH | DRG: 266 | End: 2025-07-21
Attending: STUDENT IN AN ORGANIZED HEALTH CARE EDUCATION/TRAINING PROGRAM | Admitting: STUDENT IN AN ORGANIZED HEALTH CARE EDUCATION/TRAINING PROGRAM
Payer: MEDICARE

## 2025-07-17 ENCOUNTER — ANESTHESIA (OUTPATIENT)
Dept: PERIOP | Facility: HOSPITAL | Age: OVER 89
DRG: 266 | End: 2025-07-17
Payer: MEDICARE

## 2025-07-17 ENCOUNTER — APPOINTMENT (OUTPATIENT)
Dept: NON INVASIVE DIAGNOSTICS | Facility: HOSPITAL | Age: OVER 89
DRG: 266 | End: 2025-07-17
Payer: MEDICARE

## 2025-07-17 ENCOUNTER — APPOINTMENT (INPATIENT)
Dept: RADIOLOGY | Facility: HOSPITAL | Age: OVER 89
DRG: 266 | End: 2025-07-17
Attending: PHYSICIAN ASSISTANT
Payer: MEDICARE

## 2025-07-17 DIAGNOSIS — I50.32 CHRONIC HEART FAILURE WITH PRESERVED EJECTION FRACTION (HFPEF) (HCC): ICD-10-CM

## 2025-07-17 DIAGNOSIS — I35.0 NONRHEUMATIC AORTIC (VALVE) STENOSIS: ICD-10-CM

## 2025-07-17 DIAGNOSIS — Z95.2 S/P TAVR (TRANSCATHETER AORTIC VALVE REPLACEMENT): ICD-10-CM

## 2025-07-17 DIAGNOSIS — I25.10 CORONARY ARTERY DISEASE INVOLVING NATIVE CORONARY ARTERY OF NATIVE HEART WITHOUT ANGINA PECTORIS: ICD-10-CM

## 2025-07-17 DIAGNOSIS — I35.9 NONRHEUMATIC AORTIC VALVE DISORDER: ICD-10-CM

## 2025-07-17 DIAGNOSIS — I35.0 NONRHEUMATIC AORTIC (VALVE) STENOSIS: Primary | ICD-10-CM

## 2025-07-17 DIAGNOSIS — Z95.5 S/P DRUG ELUTING CORONARY STENT PLACEMENT: ICD-10-CM

## 2025-07-17 DIAGNOSIS — Z95.2 S/P TAVR (TRANSCATHETER AORTIC VALVE REPLACEMENT): Primary | ICD-10-CM

## 2025-07-17 DIAGNOSIS — I10 ESSENTIAL HYPERTENSION: ICD-10-CM

## 2025-07-17 LAB
ANION GAP SERPL CALCULATED.3IONS-SCNC: 4 MMOL/L (ref 4–13)
BASE EXCESS BLDA CALC-SCNC: -2 MMOL/L (ref -2–3)
BASE EXCESS BLDA CALC-SCNC: 0 MMOL/L (ref -2–3)
BUN SERPL-MCNC: 38 MG/DL (ref 5–25)
CA-I BLD-SCNC: 1.19 MMOL/L (ref 1.12–1.32)
CA-I BLD-SCNC: 1.28 MMOL/L (ref 1.12–1.32)
CALCIUM SERPL-MCNC: 8.7 MG/DL (ref 8.4–10.2)
CHLORIDE SERPL-SCNC: 110 MMOL/L (ref 96–108)
CO2 SERPL-SCNC: 28 MMOL/L (ref 21–32)
CREAT SERPL-MCNC: 0.82 MG/DL (ref 0.6–1.3)
GFR SERPL CREATININE-BSD FRML MDRD: 63 ML/MIN/1.73SQ M
GLUCOSE SERPL-MCNC: 105 MG/DL (ref 65–140)
GLUCOSE SERPL-MCNC: 107 MG/DL (ref 65–140)
GLUCOSE SERPL-MCNC: 120 MG/DL (ref 65–140)
GLUCOSE SERPL-MCNC: 154 MG/DL (ref 65–140)
GLUCOSE SERPL-MCNC: 215 MG/DL (ref 65–140)
GLUCOSE SERPL-MCNC: 98 MG/DL (ref 65–140)
HCO3 BLDA-SCNC: 22.8 MMOL/L (ref 22–28)
HCO3 BLDA-SCNC: 25.5 MMOL/L (ref 24–30)
HCT VFR BLD AUTO: 32.9 % (ref 34.8–46.1)
HCT VFR BLD CALC: 28 % (ref 34.8–46.1)
HCT VFR BLD CALC: 32 % (ref 34.8–46.1)
HGB BLD-MCNC: 10.4 G/DL (ref 11.5–15.4)
HGB BLDA-MCNC: 10.9 G/DL (ref 11.5–15.4)
HGB BLDA-MCNC: 9.5 G/DL (ref 11.5–15.4)
KCT BLD-ACNC: 119 SEC (ref 89–137)
KCT BLD-ACNC: 142 SEC (ref 89–137)
KCT BLD-ACNC: 338 SEC (ref 89–137)
PCO2 BLD: 24 MMOL/L (ref 21–32)
PCO2 BLD: 27 MMOL/L (ref 21–32)
PCO2 BLD: 37.7 MM HG (ref 36–44)
PCO2 BLD: 44.7 MM HG (ref 42–50)
PH BLD: 7.37 [PH] (ref 7.3–7.4)
PH BLD: 7.39 [PH] (ref 7.35–7.45)
PLATELET # BLD AUTO: 139 THOUSANDS/UL (ref 149–390)
PMV BLD AUTO: 11.2 FL (ref 8.9–12.7)
PO2 BLD: 109 MM HG (ref 75–129)
PO2 BLD: 50 MM HG (ref 35–45)
POTASSIUM BLD-SCNC: 3.6 MMOL/L (ref 3.5–5.3)
POTASSIUM BLD-SCNC: 3.7 MMOL/L (ref 3.5–5.3)
POTASSIUM SERPL-SCNC: 3.7 MMOL/L (ref 3.5–5.3)
SAO2 % BLD FROM PO2: 83 % (ref 60–85)
SAO2 % BLD FROM PO2: 98 % (ref 60–85)
SODIUM BLD-SCNC: 141 MMOL/L (ref 136–145)
SODIUM BLD-SCNC: 142 MMOL/L (ref 136–145)
SODIUM SERPL-SCNC: 142 MMOL/L (ref 135–147)
SPECIMEN SOURCE: ABNORMAL
SPECIMEN SOURCE: NORMAL

## 2025-07-17 PROCEDURE — NC001 PR NO CHARGE: Performed by: PHYSICIAN ASSISTANT

## 2025-07-17 PROCEDURE — 71045 X-RAY EXAM CHEST 1 VIEW: CPT

## 2025-07-17 PROCEDURE — 85347 COAGULATION TIME ACTIVATED: CPT

## 2025-07-17 PROCEDURE — 85018 HEMOGLOBIN: CPT | Performed by: PHYSICIAN ASSISTANT

## 2025-07-17 PROCEDURE — B24BZZ4 ULTRASONOGRAPHY OF HEART WITH AORTA, TRANSESOPHAGEAL: ICD-10-PCS | Performed by: ANESTHESIOLOGY

## 2025-07-17 PROCEDURE — 02RF38Z REPLACEMENT OF AORTIC VALVE WITH ZOOPLASTIC TISSUE, PERCUTANEOUS APPROACH: ICD-10-PCS | Performed by: STUDENT IN AN ORGANIZED HEALTH CARE EDUCATION/TRAINING PROGRAM

## 2025-07-17 PROCEDURE — C1769 GUIDE WIRE: HCPCS | Performed by: STUDENT IN AN ORGANIZED HEALTH CARE EDUCATION/TRAINING PROGRAM

## 2025-07-17 PROCEDURE — 84132 ASSAY OF SERUM POTASSIUM: CPT

## 2025-07-17 PROCEDURE — 85014 HEMATOCRIT: CPT

## 2025-07-17 PROCEDURE — 84295 ASSAY OF SERUM SODIUM: CPT

## 2025-07-17 PROCEDURE — 82947 ASSAY GLUCOSE BLOOD QUANT: CPT

## 2025-07-17 PROCEDURE — C1760 CLOSURE DEV, VASC: HCPCS | Performed by: STUDENT IN AN ORGANIZED HEALTH CARE EDUCATION/TRAINING PROGRAM

## 2025-07-17 PROCEDURE — C1751 CATH, INF, PER/CENT/MIDLINE: HCPCS | Performed by: STUDENT IN AN ORGANIZED HEALTH CARE EDUCATION/TRAINING PROGRAM

## 2025-07-17 PROCEDURE — 85014 HEMATOCRIT: CPT | Performed by: PHYSICIAN ASSISTANT

## 2025-07-17 PROCEDURE — 82330 ASSAY OF CALCIUM: CPT

## 2025-07-17 PROCEDURE — C1894 INTRO/SHEATH, NON-LASER: HCPCS | Performed by: STUDENT IN AN ORGANIZED HEALTH CARE EDUCATION/TRAINING PROGRAM

## 2025-07-17 PROCEDURE — 93005 ELECTROCARDIOGRAM TRACING: CPT

## 2025-07-17 PROCEDURE — 33361 REPLACE AORTIC VALVE PERQ: CPT | Performed by: STUDENT IN AN ORGANIZED HEALTH CARE EDUCATION/TRAINING PROGRAM

## 2025-07-17 PROCEDURE — 94664 DEMO&/EVAL PT USE INHALER: CPT

## 2025-07-17 PROCEDURE — 33361 REPLACE AORTIC VALVE PERQ: CPT | Performed by: INTERNAL MEDICINE

## 2025-07-17 PROCEDURE — 82948 REAGENT STRIP/BLOOD GLUCOSE: CPT

## 2025-07-17 PROCEDURE — 93355 ECHO TRANSESOPHAGEAL (TEE): CPT

## 2025-07-17 PROCEDURE — 76377 3D RENDER W/INTRP POSTPROCES: CPT

## 2025-07-17 PROCEDURE — 80048 BASIC METABOLIC PNL TOTAL CA: CPT | Performed by: PHYSICIAN ASSISTANT

## 2025-07-17 PROCEDURE — 82803 BLOOD GASES ANY COMBINATION: CPT

## 2025-07-17 PROCEDURE — 85049 AUTOMATED PLATELET COUNT: CPT | Performed by: PHYSICIAN ASSISTANT

## 2025-07-17 PROCEDURE — 86923 COMPATIBILITY TEST ELECTRIC: CPT

## 2025-07-17 DEVICE — IMPLANTABLE DEVICE: Type: IMPLANTABLE DEVICE | Site: AORTA | Status: FUNCTIONAL

## 2025-07-17 DEVICE — PERCLOSE™ PROSTYLE™ SUTURE-MEDIATED CLOSURE AND REPAIR SYSTEM
Type: IMPLANTABLE DEVICE | Site: ARTERIAL | Status: FUNCTIONAL
Brand: PERCLOSE™ PROSTYLE™

## 2025-07-17 RX ORDER — AMLODIPINE BESYLATE 2.5 MG/1
2.5 TABLET ORAL DAILY
Status: DISCONTINUED | OUTPATIENT
Start: 2025-07-18 | End: 2025-07-21

## 2025-07-17 RX ORDER — PANTOPRAZOLE SODIUM 40 MG/1
40 TABLET, DELAYED RELEASE ORAL DAILY
Status: DISCONTINUED | OUTPATIENT
Start: 2025-07-18 | End: 2025-07-21 | Stop reason: HOSPADM

## 2025-07-17 RX ORDER — HYDRALAZINE HYDROCHLORIDE 20 MG/ML
10 INJECTION INTRAMUSCULAR; INTRAVENOUS EVERY 6 HOURS PRN
Status: DISCONTINUED | OUTPATIENT
Start: 2025-07-17 | End: 2025-07-21 | Stop reason: HOSPADM

## 2025-07-17 RX ORDER — ACETAMINOPHEN 325 MG/1
650 TABLET ORAL EVERY 4 HOURS PRN
Status: DISCONTINUED | OUTPATIENT
Start: 2025-07-17 | End: 2025-07-21 | Stop reason: HOSPADM

## 2025-07-17 RX ORDER — HEPARIN SODIUM 1000 [USP'U]/ML
INJECTION, SOLUTION INTRAVENOUS; SUBCUTANEOUS AS NEEDED
Status: DISCONTINUED | OUTPATIENT
Start: 2025-07-17 | End: 2025-07-17

## 2025-07-17 RX ORDER — BRIMONIDINE TARTRATE 2 MG/ML
1 SOLUTION/ DROPS OPHTHALMIC 2 TIMES DAILY
Status: DISCONTINUED | OUTPATIENT
Start: 2025-07-17 | End: 2025-07-21 | Stop reason: HOSPADM

## 2025-07-17 RX ORDER — LISINOPRIL 10 MG/1
10 TABLET ORAL DAILY
Status: DISCONTINUED | OUTPATIENT
Start: 2025-07-18 | End: 2025-07-18

## 2025-07-17 RX ORDER — ASPIRIN 81 MG/1
81 TABLET, CHEWABLE ORAL DAILY
Status: DISCONTINUED | OUTPATIENT
Start: 2025-07-18 | End: 2025-07-21 | Stop reason: HOSPADM

## 2025-07-17 RX ORDER — CEFAZOLIN SODIUM 2 G/50ML
2000 SOLUTION INTRAVENOUS EVERY 8 HOURS
Status: COMPLETED | OUTPATIENT
Start: 2025-07-17 | End: 2025-07-18

## 2025-07-17 RX ORDER — ROCURONIUM BROMIDE 10 MG/ML
INJECTION, SOLUTION INTRAVENOUS AS NEEDED
Status: DISCONTINUED | OUTPATIENT
Start: 2025-07-17 | End: 2025-07-17

## 2025-07-17 RX ORDER — CALCIUM GLUCONATE 20 MG/ML
2 INJECTION, SOLUTION INTRAVENOUS ONCE AS NEEDED
Status: DISCONTINUED | OUTPATIENT
Start: 2025-07-17 | End: 2025-07-21 | Stop reason: HOSPADM

## 2025-07-17 RX ORDER — LIDOCAINE HYDROCHLORIDE 10 MG/ML
INJECTION, SOLUTION EPIDURAL; INFILTRATION; INTRACAUDAL; PERINEURAL
Status: COMPLETED | OUTPATIENT
Start: 2025-07-17 | End: 2025-07-17

## 2025-07-17 RX ORDER — CHLORHEXIDINE GLUCONATE ORAL RINSE 1.2 MG/ML
15 SOLUTION DENTAL 2 TIMES DAILY
Status: DISCONTINUED | OUTPATIENT
Start: 2025-07-17 | End: 2025-07-21 | Stop reason: HOSPADM

## 2025-07-17 RX ORDER — INSULIN LISPRO 100 [IU]/ML
1-5 INJECTION, SOLUTION INTRAVENOUS; SUBCUTANEOUS
Status: DISCONTINUED | OUTPATIENT
Start: 2025-07-17 | End: 2025-07-21 | Stop reason: HOSPADM

## 2025-07-17 RX ORDER — HEPARIN SODIUM 5000 [USP'U]/ML
5000 INJECTION, SOLUTION INTRAVENOUS; SUBCUTANEOUS EVERY 8 HOURS SCHEDULED
Status: DISCONTINUED | OUTPATIENT
Start: 2025-07-18 | End: 2025-07-21 | Stop reason: HOSPADM

## 2025-07-17 RX ORDER — FUROSEMIDE 40 MG/1
40 TABLET ORAL DAILY
Status: DISCONTINUED | OUTPATIENT
Start: 2025-07-18 | End: 2025-07-21 | Stop reason: HOSPADM

## 2025-07-17 RX ORDER — PROTAMINE SULFATE 10 MG/ML
INJECTION, SOLUTION INTRAVENOUS AS NEEDED
Status: DISCONTINUED | OUTPATIENT
Start: 2025-07-17 | End: 2025-07-17

## 2025-07-17 RX ORDER — HYDROMORPHONE HCL IN WATER/PF 6 MG/30 ML
0.2 PATIENT CONTROLLED ANALGESIA SYRINGE INTRAVENOUS
Status: DISCONTINUED | OUTPATIENT
Start: 2025-07-17 | End: 2025-07-17 | Stop reason: HOSPADM

## 2025-07-17 RX ORDER — MAGNESIUM HYDROXIDE 1200 MG/15ML
LIQUID ORAL AS NEEDED
Status: DISCONTINUED | OUTPATIENT
Start: 2025-07-17 | End: 2025-07-17 | Stop reason: HOSPADM

## 2025-07-17 RX ORDER — BISACODYL 10 MG
10 SUPPOSITORY, RECTAL RECTAL DAILY PRN
Status: DISCONTINUED | OUTPATIENT
Start: 2025-07-17 | End: 2025-07-21 | Stop reason: HOSPADM

## 2025-07-17 RX ORDER — SODIUM CHLORIDE, SODIUM LACTATE, POTASSIUM CHLORIDE, CALCIUM CHLORIDE 600; 310; 30; 20 MG/100ML; MG/100ML; MG/100ML; MG/100ML
125 INJECTION, SOLUTION INTRAVENOUS CONTINUOUS
Status: DISCONTINUED | OUTPATIENT
Start: 2025-07-17 | End: 2025-07-17

## 2025-07-17 RX ORDER — MULTIVIT WITH MINERALS/LUTEIN
125 TABLET ORAL DAILY
Status: DISCONTINUED | OUTPATIENT
Start: 2025-07-18 | End: 2025-07-21 | Stop reason: HOSPADM

## 2025-07-17 RX ORDER — POTASSIUM CHLORIDE 29.8 MG/ML
40 INJECTION INTRAVENOUS ONCE
Status: COMPLETED | OUTPATIENT
Start: 2025-07-17 | End: 2025-07-17

## 2025-07-17 RX ORDER — POLYETHYLENE GLYCOL 3350 17 G/17G
17 POWDER, FOR SOLUTION ORAL DAILY
Status: DISCONTINUED | OUTPATIENT
Start: 2025-07-18 | End: 2025-07-21 | Stop reason: HOSPADM

## 2025-07-17 RX ORDER — SERTRALINE HYDROCHLORIDE 25 MG/1
25 TABLET, FILM COATED ORAL DAILY
Status: DISCONTINUED | OUTPATIENT
Start: 2025-07-17 | End: 2025-07-21 | Stop reason: HOSPADM

## 2025-07-17 RX ORDER — PROPOFOL 10 MG/ML
INJECTION, EMULSION INTRAVENOUS AS NEEDED
Status: DISCONTINUED | OUTPATIENT
Start: 2025-07-17 | End: 2025-07-17

## 2025-07-17 RX ORDER — ONDANSETRON 2 MG/ML
4 INJECTION INTRAMUSCULAR; INTRAVENOUS EVERY 6 HOURS PRN
Status: DISCONTINUED | OUTPATIENT
Start: 2025-07-17 | End: 2025-07-21 | Stop reason: HOSPADM

## 2025-07-17 RX ORDER — FENTANYL CITRATE 50 UG/ML
INJECTION, SOLUTION INTRAMUSCULAR; INTRAVENOUS AS NEEDED
Status: DISCONTINUED | OUTPATIENT
Start: 2025-07-17 | End: 2025-07-17

## 2025-07-17 RX ORDER — DIPHENHYDRAMINE HYDROCHLORIDE 50 MG/ML
12.5 INJECTION, SOLUTION INTRAMUSCULAR; INTRAVENOUS ONCE AS NEEDED
Status: DISCONTINUED | OUTPATIENT
Start: 2025-07-17 | End: 2025-07-17 | Stop reason: HOSPADM

## 2025-07-17 RX ORDER — CLOPIDOGREL BISULFATE 75 MG/1
75 TABLET ORAL DAILY
Status: DISCONTINUED | OUTPATIENT
Start: 2025-07-18 | End: 2025-07-17

## 2025-07-17 RX ORDER — CEFAZOLIN SODIUM 2 G/50ML
2000 SOLUTION INTRAVENOUS ONCE
Status: COMPLETED | OUTPATIENT
Start: 2025-07-17 | End: 2025-07-17

## 2025-07-17 RX ORDER — ATORVASTATIN CALCIUM 40 MG/1
40 TABLET, FILM COATED ORAL
Status: DISCONTINUED | OUTPATIENT
Start: 2025-07-17 | End: 2025-07-21 | Stop reason: HOSPADM

## 2025-07-17 RX ORDER — CHLORHEXIDINE GLUCONATE ORAL RINSE 1.2 MG/ML
15 SOLUTION DENTAL ONCE
Status: COMPLETED | OUTPATIENT
Start: 2025-07-17 | End: 2025-07-17

## 2025-07-17 RX ORDER — FENTANYL CITRATE/PF 50 MCG/ML
25 SYRINGE (ML) INJECTION
Refills: 0 | Status: DISCONTINUED | OUTPATIENT
Start: 2025-07-17 | End: 2025-07-17 | Stop reason: HOSPADM

## 2025-07-17 RX ORDER — CLOPIDOGREL BISULFATE 75 MG/1
75 TABLET ORAL DAILY
Status: DISCONTINUED | OUTPATIENT
Start: 2025-07-18 | End: 2025-07-21 | Stop reason: HOSPADM

## 2025-07-17 RX ORDER — ACETAMINOPHEN 650 MG/1
650 SUPPOSITORY RECTAL EVERY 4 HOURS PRN
Status: DISCONTINUED | OUTPATIENT
Start: 2025-07-17 | End: 2025-07-21 | Stop reason: HOSPADM

## 2025-07-17 RX ORDER — DEXAMETHASONE SODIUM PHOSPHATE 10 MG/ML
INJECTION, SOLUTION INTRAMUSCULAR; INTRAVENOUS AS NEEDED
Status: DISCONTINUED | OUTPATIENT
Start: 2025-07-17 | End: 2025-07-17

## 2025-07-17 RX ORDER — LIDOCAINE HYDROCHLORIDE 10 MG/ML
INJECTION, SOLUTION EPIDURAL; INFILTRATION; INTRACAUDAL; PERINEURAL AS NEEDED
Status: DISCONTINUED | OUTPATIENT
Start: 2025-07-17 | End: 2025-07-17

## 2025-07-17 RX ORDER — ONDANSETRON 2 MG/ML
4 INJECTION INTRAMUSCULAR; INTRAVENOUS ONCE AS NEEDED
Status: DISCONTINUED | OUTPATIENT
Start: 2025-07-17 | End: 2025-07-17 | Stop reason: HOSPADM

## 2025-07-17 RX ORDER — METOPROLOL SUCCINATE 25 MG/1
12.5 TABLET, EXTENDED RELEASE ORAL DAILY
Status: CANCELLED | OUTPATIENT
Start: 2025-07-17

## 2025-07-17 RX ORDER — ONDANSETRON 2 MG/ML
INJECTION INTRAMUSCULAR; INTRAVENOUS AS NEEDED
Status: DISCONTINUED | OUTPATIENT
Start: 2025-07-17 | End: 2025-07-17

## 2025-07-17 RX ADMIN — MUPIROCIN 1 APPLICATION: 20 OINTMENT TOPICAL at 21:14

## 2025-07-17 RX ADMIN — SODIUM CHLORIDE, SODIUM LACTATE, POTASSIUM CHLORIDE, AND CALCIUM CHLORIDE 125 ML/HR: .6; .31; .03; .02 INJECTION, SOLUTION INTRAVENOUS at 11:20

## 2025-07-17 RX ADMIN — ROCURONIUM BROMIDE 30 MG: 10 INJECTION, SOLUTION INTRAVENOUS at 13:07

## 2025-07-17 RX ADMIN — SUGAMMADEX 200 MG: 100 INJECTION, SOLUTION INTRAVENOUS at 14:00

## 2025-07-17 RX ADMIN — PROPOFOL 80 MG: 10 INJECTION, EMULSION INTRAVENOUS at 13:07

## 2025-07-17 RX ADMIN — CEFAZOLIN SODIUM 2000 MG: 2 SOLUTION INTRAVENOUS at 13:07

## 2025-07-17 RX ADMIN — SERTRALINE 25 MG: 25 TABLET, FILM COATED ORAL at 17:56

## 2025-07-17 RX ADMIN — POTASSIUM CHLORIDE 40 MEQ: 29.8 INJECTION, SOLUTION INTRAVENOUS at 17:55

## 2025-07-17 RX ADMIN — LIDOCAINE HYDROCHLORIDE 1 ML: 10 INJECTION, SOLUTION EPIDURAL; INFILTRATION; INTRACAUDAL; PERINEURAL at 13:05

## 2025-07-17 RX ADMIN — CHLORHEXIDINE GLUCONATE 15 ML: 1.2 SOLUTION ORAL at 21:14

## 2025-07-17 RX ADMIN — INSULIN LISPRO 1 UNITS: 100 INJECTION, SOLUTION INTRAVENOUS; SUBCUTANEOUS at 21:14

## 2025-07-17 RX ADMIN — MUPIROCIN 1 APPLICATION: 20 OINTMENT TOPICAL at 11:10

## 2025-07-17 RX ADMIN — ATORVASTATIN CALCIUM 40 MG: 40 TABLET, FILM COATED ORAL at 17:56

## 2025-07-17 RX ADMIN — HYDROMORPHONE HYDROCHLORIDE 0.2 MG: 0.2 INJECTION, SOLUTION INTRAMUSCULAR; INTRAVENOUS; SUBCUTANEOUS at 15:10

## 2025-07-17 RX ADMIN — LIDOCAINE HYDROCHLORIDE 50 MG: 10 INJECTION, SOLUTION EPIDURAL; INFILTRATION; INTRACAUDAL; PERINEURAL at 13:07

## 2025-07-17 RX ADMIN — CHLORHEXIDINE GLUCONATE 15 ML: 1.2 SOLUTION ORAL at 11:10

## 2025-07-17 RX ADMIN — BRIMONIDINE TARTRATE 1 DROP: 2 SOLUTION/ DROPS OPHTHALMIC at 21:15

## 2025-07-17 RX ADMIN — NICARDIPINE HYDROCHLORIDE 0.2 MCG: 2.5 INJECTION, SOLUTION INTRAVENOUS at 13:50

## 2025-07-17 RX ADMIN — PHENYLEPHRINE HYDROCHLORIDE 10 MCG/MIN: 50 INJECTION INTRAVENOUS at 13:30

## 2025-07-17 RX ADMIN — NICARDIPINE HYDROCHLORIDE 5 MG/HR: 2.5 INJECTION, SOLUTION INTRAVENOUS at 13:52

## 2025-07-17 RX ADMIN — ONDANSETRON 4 MG: 2 INJECTION INTRAMUSCULAR; INTRAVENOUS at 13:07

## 2025-07-17 RX ADMIN — DEXAMETHASONE SODIUM PHOSPHATE 10 MG: 10 INJECTION, SOLUTION INTRAMUSCULAR; INTRAVENOUS at 13:07

## 2025-07-17 RX ADMIN — HYDROMORPHONE HYDROCHLORIDE 0.2 MG: 0.2 INJECTION, SOLUTION INTRAMUSCULAR; INTRAVENOUS; SUBCUTANEOUS at 14:56

## 2025-07-17 RX ADMIN — PROTAMINE SULFATE 80 MG: 10 INJECTION, SOLUTION INTRAVENOUS at 13:58

## 2025-07-17 RX ADMIN — FENTANYL CITRATE 25 MCG: 50 INJECTION INTRAMUSCULAR; INTRAVENOUS at 14:45

## 2025-07-17 RX ADMIN — FENTANYL CITRATE 100 MCG: 50 INJECTION INTRAMUSCULAR; INTRAVENOUS at 13:07

## 2025-07-17 RX ADMIN — FENTANYL CITRATE 25 MCG: 50 INJECTION INTRAMUSCULAR; INTRAVENOUS at 14:30

## 2025-07-17 RX ADMIN — CEFAZOLIN SODIUM 2000 MG: 2 SOLUTION INTRAVENOUS at 21:15

## 2025-07-17 RX ADMIN — HEPARIN SODIUM 6000 UNITS: 1000 INJECTION, SOLUTION INTRAVENOUS; SUBCUTANEOUS at 13:43

## 2025-07-17 NOTE — INTERVAL H&P NOTE
H&P reviewed. After examining the patient I find no changes in the patients condition since the H&P had been written.    Vitals:    07/17/25 1000   BP: 159/78   Pulse: 68   Resp: 19   Temp: 97.6 °F (36.4 °C)   SpO2: 98%       Raj Méndez MD  07/17/25  12:31 PM

## 2025-07-17 NOTE — TELEPHONE ENCOUNTER
The hospital call to set up a TCM for the patient who is being discharge tomorrow. Please reach out to Glenda at  [phone] Discharge on July 18, 2025. I try to reach the office and no answer. Thank you

## 2025-07-17 NOTE — ANESTHESIA PROCEDURE NOTES
Procedure Performed: IMTIAZ Anesthesia  Start Time:         Preanesthesia Checklist    Patient identified, IV assessed, risks and benefits discussed, monitors and equipment assessed, procedure being performed at surgeon's request and anesthesia consent obtained.      Procedure    Diagnostic Indications for IMTIAZ:  assessment of surgical repair, defect repair evaluation, hemodynamic monitoring and suspected pericardial effusion. Type of IMTIAZ: interventional IMTIAZ with real time guidance of intracardiac procedure. Images Saved: ultrasound permanent image saved. Physician Requesting Echo: Raj Méndez MD.  Location performed: OR. Intubated. Bite block not placed.   Heart visualized. Insertion of IMTIAZ Probe:  Atraumatic. Probe Type:  Multiplane. Modalities:  3D, color flow mapping, continuous wave Doppler and pulse wave Doppler.      Echocardiographic and Doppler Measurements    PREPROCEDURE    LEFT VENTRICLE:  Systolic Function: normal. Ejection Fraction: 75%.        RIGHT VENTRICLE:  Systolic Function: normal.  Cavity size normal.              AORTIC VALVE:  Leaflets: trileaflet. Leaflet motions restricted. Stenosis: severe. Mean Gradient: 33 mmHg. Peak Gradient: 56 mmHg. Maximum velocity (cm/s): 3.75 m/sec, LVOT vmax 1.1 m/s, DVI ~0.29.        MITRAL VALVE:  Leaflets: normal. Leaflet Motions: restricted. Regurgitation: moderate.   Stenosis: mild. Mean Gradient: 3 mmHg.      TRICUSPID VALVE:  Leaflets: normal. Leaflet Motions: normal.  Regurgitation: moderate.              ASCENDING AORTA:    Dissection not present.      AORTIC ARCH:    dissection not present. Grade 3: atheroma protruding < 0.5 cm into lumen.    DESCENDING AORTA:    Dissection not present. Grade 3: atheroma protruding < 0.5 cm into lumen.                    OTHER ATRIAL FINDINGS:  No JEWEL clot.    ATRIAL SEPTUM:  Intra-atrial septal morphology: no PFO by CFD.                      POSTPROCEDURE    LEFT VENTRICLE: Unchanged .         RIGHT VENTRICLE:  Unchanged .           AORTIC VALVE:   Leaflets: HANK valve in aortic position, well-seated, does not rock, good leaflet excursion and closure, trace-mild PVL and bioprosthetic. Stenosis: AV VTI 34 cm, LVOT VTI 19 cm, INDY 1.34 cm^2. Mean Gradient: 4 mmHg.       MITRAL VALVE: Unchanged .         TRICUSPID VALVE: Unchanged .                    AORTA: Unchanged .  Dissection: Dissection not present.      REMOVAL:  Probe Removal: atraumatic.

## 2025-07-17 NOTE — ANESTHESIA PROCEDURE NOTES
"Arterial Line Insertion    Performed by: Eddy Sheets CRNA  Authorized by: Sabino Marquez MD  Consent: Verbal consent obtained. Written consent obtained  Risks and benefits: risks, benefits and alternatives were discussed  Consent given by: patient  Patient understanding: patient states understanding of the procedure being performed  Patient consent: the patient's understanding of the procedure matches consent given  Procedure consent: procedure consent matches procedure scheduled  Relevant documents: relevant documents present and verified  Required items: required blood products, implants, devices, and special equipment available  Patient identity confirmed: verbally with patient, hospital-assigned identification number and provided demographic data  Time out: Immediately prior to procedure a \"time out\" was called to verify the correct patient, procedure, equipment, support staff and site/side marked as required.  Preparation: Patient was prepped and draped in the usual sterile fashion.  Indications: hemodynamic monitoring  Orientation:  Left  Location: radial arterylidocaine (PF) (XYLOCAINE-MPF) 1 % - Infiltration   1 mL - 7/17/2025 1:05:00 PM  Sedation:  Patient sedated: yes  Sedatives: fentanyl  Vitals: Vital signs were monitored during sedation.    Procedure Details:      Line Type: Arterial Line  Needle gauge: 20  Placement technique:  Palpation  Number of attempts: 1    Post-procedure:  Post-procedure: dressing applied  Waveform: good waveform and waveform confirmed  Post-procedure CNS: normal and unchanged  Patient tolerance: patient tolerated the procedure well with no immediate complications  Comments: Pre-induction          "

## 2025-07-17 NOTE — ANESTHESIA PROCEDURE NOTES
Central Line Insertion    Performed by: Sabino Marquez MD  Authorized by: Sabino Marquez MD    Date/Time: 7/17/2025 1:15 PM  Catheter Type:  triple lumen  Consent: Verbal consent obtained. Written consent obtained  Risks and benefits: risks, benefits and alternatives were discussed  Consent given by: patient  Patient understanding: patient states understanding of the procedure being performed  Patient consent: the patient's understanding of the procedure matches consent given  Procedure consent: procedure consent matches procedure scheduled  Relevant documents: relevant documents present and verified  Required items: required blood products, implants, devices, and special equipment available  Patient identity confirmed: arm band  Indications: vascular access  Catheter size: 7 Fr  Patient position: Trendelenburg  Anesthesia method: ga.  Assessment: blood return through all ports and free fluid flow  Preparation: skin prepped with ChloraPrep  Skin prep agent dried: skin prep agent completely dried prior to procedure  Sterile barriers: all five maximum sterile barriers used - cap, mask, sterile gown, sterile gloves, and large sterile sheet  Hand hygiene: hand hygiene performed prior to central venous catheter insertion  sterile gel and probe cover used in ultrasound-guided central venous catheter insertionultrasound permanent image saved  Vessel of Catheter Tip End: central venous  Number of attempts: 1  Successful placement: yes  Post-procedure: chlorhexidine patch applied, dressing applied and line sutured  Patient tolerance: patient tolerated the procedure well with no immediate complications

## 2025-07-17 NOTE — RESPIRATORY THERAPY NOTE
07/17/25 1841   Respiratory Protocol   Protocol Initiated? Yes   Protocol Selection Airway Clearance   Language Barrier? No   Medical & Social History Reviewed? Yes   Diagnostic Studies Reviewed? Yes   Physical Assessment Performed? Yes   Airway Clearance Plan Incentive Spirometer   Respiratory Assessment   Assessment Type Assess only   General Appearance Alert;Awake   Respiratory Pattern Normal   Chest Assessment Chest expansion symmetrical   Bilateral Breath Sounds Diminished;Clear  (anterior)   Cough Non-productive   Resp Comments Patient s/p TAVR without a history of chronic lung disease or use of inhaled pulmonary medications. She is currently resting in bed, low-flow oxygen in place, offers no respiratory complaints. She was instructed on incentive spirometer with excellent technique observed.   O2 Device NC

## 2025-07-17 NOTE — OP NOTE
OPERATIVE REPORT  PATIENT NAME: Jackie Urbina    :  1935  MRN: [id number]  Pt Location: [facility] OR ROOM 02    SURGERY DATE: 2025    SURGEON: Raj Méndez MD     ASSISTANT: Linda Luu PA-C    CO-SURGEON: Dr. Cortes Rossi     PREOPERATIVE DIAGNOSIS Symptomatic severe aortic stenosis.     POSTOPERATIVE DIAGNOSIS Symptomatic severe aortic stenosis.     NYHA CLASS: 2    CCS CLASS: 2    PROCEDURE Transcatheter aortic valve replacement with a 23 mm Valdez HANK 3 Ultra Resilia bioprosthetic valve via a percutaneous right transfemoral approach.     ANESTHESIA Dr. Sabino Marquez, general endotracheal anesthesia with transesophageal echocardiogram guidance.     CARDIOPULMONARY BYPASS TIME 0.    PACKS/TUBES/DRAINS None.     ESTIMATED BLOOD LOSS: 50 mL    OPERATIVE TECHNIQUE The patient was taken to the operating room and placed supine on the operating table. Following the satisfactory induction of general anesthesia and placement of monitoring lines, the patient was prepped and draped in the usual sterile fashion. A time-out procedure was performed.     The right common femoral artery was accessed percutaneously using Seldinger technique and fluoroscopy.  Two (2) Perclose sutures were deployed in the standard fashion. The right common femoral vein was accessed in a similar fashion and was cannulated with a 6 Estonian sheath. The femoral artery was cannulated with a 7 Estonian sheath. A pigtail catheter was inserted and advanced through the right common femoral artery sheath into the right coronary cusp. Using a series of injections, the angle of deployment was determined. Through the right common femoral vein sheath, a balloon tip temporary pacing catheter was inserted into the right ventricle and its capture was confirmed.     The patient was systemically heparinized. The right common femoral artery sheath was then removed over an extra-stiff wire and the delivery sheath was inserted through the right  common femoral artery and advanced into the aorta. The aortic valve was crossed with a wire.    A 23 mm HANK 3 Ultra Resilia valve was then advanced through the sheath into the aorta and positioned onto the deployment balloon in the aorta and then advanced around the aortic arch and through the annulus of the aortic valve to the appropriate level. At this point, the catheter was desheathed in the standard fashion. The valve was positioned appropriately using a combination of fluoroscopy and transesophageal echocardiogram guidance. During an episode of rapid pacing, balloon deployment of the 23 mm HANK 3 Ultra Resilia valve was performed. Following deployment, the position of the valve was confirmed by fluoroscopy and echocardiography and its position appeared appropriate with trace perivalvular leak.     The valve delivery system was subsequently removed and the sheath was removed from the right common femoral artery while the Perclose sutures were secured and direct pressure was held. Protamine was administered with normalization of the ACT. Pressure was released from the right groin and there was no active bleeding and no evidence of hematoma development. The common femoral vein sheath was removed from the right and pressure was held.     Sponge, needle, and instrument counts were reported as correct by the nursing staff. As the attending surgeon, I was present and scrubbed for all critical portions of this procedure. Final transesophageal echocardiogram demonstrated a well-positioned bioprosthetic transcatheter aortic valve with normal function and trace perivalvular leak.     The case required the expertise of a cardiac surgeon as well as an interventional cardiologist to act as co-surgeons per CMS requirements.      SIGNATURE: Raj Méndez MD  DATE: July 17, 2025  TIME: 2:02 PM

## 2025-07-17 NOTE — PROGRESS NOTES
Quick Note - Cardiothoracic Surgery   Jackie Urbina 89 y.o. female MRN: [id number]  Unit/Bed#: OR POOL Encounter: [id number]      Jackie Urbina underwent transcatheter aortic valve replacement at [facility] today.  Following surgery, 12 lead ECG was completed and a new LBBB was identified.  Electrophysiology was notified via daPulse messenger to confirm this finding.     A repeat ECG was ordered, to be completed in three hours.  The EP PA on call will follow up the results and formally complete a consultation of this finding has not resolved.    Linda Luu PA-C  07/17/25  2:56 PM

## 2025-07-17 NOTE — ANESTHESIA POSTPROCEDURE EVALUATION
Post-Op Assessment Note    CV Status:  Stable  Pain Score: 0    Pain management: adequate    Multimodal analgesia used between 6 hours prior to anesthesia start to PACU discharge    Mental Status:  Alert and awake   Hydration Status:  Euvolemic and stable   PONV Controlled:  Controlled   Airway Patency:  Patent  Two or more mitigation strategies used for obstructive sleep apnea   Post Op Vitals Reviewed: Yes    No anethesia notable event occurred.    Staff: CRNA           Last Filed PACU Vitals:  Vitals Value Taken Time   Temp 97    Pulse 69 07/17/25 14:13   /76    Resp 14    SpO2 94 % 07/17/25 14:13   Vitals shown include unfiled device data.

## 2025-07-18 ENCOUNTER — APPOINTMENT (INPATIENT)
Dept: NON INVASIVE DIAGNOSTICS | Facility: HOSPITAL | Age: OVER 89
DRG: 266 | End: 2025-07-18
Payer: MEDICARE

## 2025-07-18 ENCOUNTER — APPOINTMENT (INPATIENT)
Dept: RADIOLOGY | Facility: HOSPITAL | Age: OVER 89
DRG: 266 | End: 2025-07-18
Payer: MEDICARE

## 2025-07-18 PROBLEM — Z01.89 ENCOUNTER FOR GERIATRIC ASSESSMENT: Status: ACTIVE | Noted: 2025-07-18

## 2025-07-18 PROBLEM — R26.2 AMBULATORY DYSFUNCTION: Status: ACTIVE | Noted: 2025-07-18

## 2025-07-18 LAB
ABO GROUP BLD BPU: NORMAL
ANION GAP SERPL CALCULATED.3IONS-SCNC: 5 MMOL/L (ref 4–13)
AORTIC ROOT: 3.3 CM
AORTIC VALVE MEAN VELOCITY: 14.1 M/S
ASCENDING AORTA: 3.2 CM
AV AREA BY CONTINUOUS VTI: 1.4 CM2
AV AREA PEAK VELOCITY: 1 CM2
AV LVOT MEAN GRADIENT: 4 MMHG
AV LVOT PEAK GRADIENT: 7 MMHG
AV MEAN PRESS GRAD SYS DOP V1V2: 9 MMHG
AV ORIFICE AREA US: 1.36 CM2
AV PEAK GRADIENT: 20 MMHG
AV VELOCITY RATIO: 0.68
AV VMAX SYS DOP: 2.21 M/S
BPU ID: NORMAL
BSA FOR ECHO PROCEDURE: 1.42 M2
BUN SERPL-MCNC: 35 MG/DL (ref 5–25)
CALCIUM SERPL-MCNC: 8.9 MG/DL (ref 8.4–10.2)
CHLORIDE SERPL-SCNC: 109 MMOL/L (ref 96–108)
CO2 SERPL-SCNC: 28 MMOL/L (ref 21–32)
CREAT SERPL-MCNC: 0.86 MG/DL (ref 0.6–1.3)
CROSSMATCH: NORMAL
DOP CALC AO VTI: 46.84 CM
DOP CALC LVOT AREA: 2.01 CM2
DOP CALC LVOT CARDIAC INDEX: 2.78 L/MIN/M2
DOP CALC LVOT CARDIAC OUTPUT: 3.98 L/MIN
DOP CALC LVOT DIAMETER: 1.6 CM
DOP CALC LVOT PEAK VEL VTI: 31.73 CM
DOP CALC LVOT PEAK VEL: 1.3 M/S
DOP CALC LVOT STROKE INDEX: 45.5 ML/M2
DOP CALC LVOT STROKE VOLUME: 63.76
DOP CALC MV VTI: 53.39 CM
E WAVE DECELERATION TIME: 375 MS
E/A RATIO: 1.87
ERYTHROCYTE [DISTWIDTH] IN BLOOD BY AUTOMATED COUNT: 13.5 % (ref 11.6–15.1)
FRACTIONAL SHORTENING: 38 (ref 28–44)
GFR SERPL CREATININE-BSD FRML MDRD: 59 ML/MIN/1.73SQ M
GLUCOSE SERPL-MCNC: 107 MG/DL (ref 65–140)
GLUCOSE SERPL-MCNC: 119 MG/DL (ref 65–140)
GLUCOSE SERPL-MCNC: 121 MG/DL (ref 65–140)
GLUCOSE SERPL-MCNC: 145 MG/DL (ref 65–140)
GLUCOSE SERPL-MCNC: 166 MG/DL (ref 65–140)
HCT VFR BLD AUTO: 33 % (ref 34.8–46.1)
HGB BLD-MCNC: 10.2 G/DL (ref 11.5–15.4)
INTERVENTRICULAR SEPTUM IN DIASTOLE (PARASTERNAL SHORT AXIS VIEW): 2.1 CM
INTERVENTRICULAR SEPTUM: 2.1 CM (ref 0.6–1.1)
LAAS-AP2: 30.8 CM2
LAAS-AP4: 30.5 CM2
LEFT ATRIUM SIZE: 3.7 CM
LEFT ATRIUM VOLUME (MOD BIPLANE): 104 ML
LEFT ATRIUM VOLUME INDEX (MOD BIPLANE): 72.7 ML/M2
LEFT INTERNAL DIMENSION IN SYSTOLE: 2.5 CM (ref 2.1–4)
LEFT VENTRICULAR INTERNAL DIMENSION IN DIASTOLE: 4 CM (ref 3.5–6)
LEFT VENTRICULAR POSTERIOR WALL IN END DIASTOLE: 1.1 CM
LEFT VENTRICULAR STROKE VOLUME: 48 ML
LV EF US.2D.A4C+ESTIMATED: 55 %
LVSV (TEICH): 48 ML
MAGNESIUM SERPL-MCNC: 2.3 MG/DL (ref 1.9–2.7)
MCH RBC QN AUTO: 28.5 PG (ref 26.8–34.3)
MCHC RBC AUTO-ENTMCNC: 30.9 G/DL (ref 31.4–37.4)
MCV RBC AUTO: 92 FL (ref 82–98)
MV E'TISSUE VEL-LAT: 6 CM/S
MV E'TISSUE VEL-SEP: 3 CM/S
MV MEAN GRADIENT: 4 MMHG
MV PEAK A VEL: 0.87 M/S
MV PEAK E VEL: 163 CM/S
MV PEAK GRADIENT: 12 MMHG
MV STENOSIS PRESSURE HALF TIME: 109 MS
MV VALVE AREA BY CONTINUITY EQUATION: 1.19 CM2
MV VALVE AREA P 1/2 METHOD: 2.02
PLATELET # BLD AUTO: 162 THOUSANDS/UL (ref 149–390)
PMV BLD AUTO: 11.2 FL (ref 8.9–12.7)
POTASSIUM SERPL-SCNC: 4.5 MMOL/L (ref 3.5–5.3)
RA PRESSURE ESTIMATED: 8 MMHG
RBC # BLD AUTO: 3.58 MILLION/UL (ref 3.81–5.12)
RIGHT ATRIUM AREA SYSTOLE A4C: 21.9 CM2
RIGHT VENTRICLE ID DIMENSION: 3.8 CM
RV PSP: 68 MMHG
SL CV ECHO LV DYNAMIC OBSTRUCTION PEAK GRADIENT (REST): 26 MMHG
SL CV LEFT ATRIUM LENGTH A2C: 7.2 CM
SL CV LV EF: 65
SL CV PED ECHO LEFT VENTRICLE DIASTOLIC VOLUME (MOD BIPLANE) 2D: 70 ML
SL CV PED ECHO LEFT VENTRICLE SYSTOLIC VOLUME (MOD BIPLANE) 2D: 21 ML
SODIUM SERPL-SCNC: 142 MMOL/L (ref 135–147)
TR MAX PG: 60 MMHG
TR PEAK VELOCITY: 3.9 M/S
TRICUSPID ANNULAR PLANE SYSTOLIC EXCURSION: 2.9 CM
TRICUSPID VALVE PEAK REGURGITATION VELOCITY: 3.89 M/S
UNIT DISPENSE STATUS: NORMAL
UNIT PRODUCT CODE: NORMAL
UNIT PRODUCT VOLUME: 350 ML
UNIT RH: NORMAL
WBC # BLD AUTO: 10.12 THOUSAND/UL (ref 4.31–10.16)

## 2025-07-18 PROCEDURE — 93306 TTE W/DOPPLER COMPLETE: CPT | Performed by: STUDENT IN AN ORGANIZED HEALTH CARE EDUCATION/TRAINING PROGRAM

## 2025-07-18 PROCEDURE — 97163 PT EVAL HIGH COMPLEX 45 MIN: CPT

## 2025-07-18 PROCEDURE — 99222 1ST HOSP IP/OBS MODERATE 55: CPT | Performed by: INTERNAL MEDICINE

## 2025-07-18 PROCEDURE — 80048 BASIC METABOLIC PNL TOTAL CA: CPT | Performed by: PHYSICIAN ASSISTANT

## 2025-07-18 PROCEDURE — 99223 1ST HOSP IP/OBS HIGH 75: CPT

## 2025-07-18 PROCEDURE — 83735 ASSAY OF MAGNESIUM: CPT | Performed by: PHYSICIAN ASSISTANT

## 2025-07-18 PROCEDURE — 99233 SBSQ HOSP IP/OBS HIGH 50: CPT | Performed by: THORACIC SURGERY (CARDIOTHORACIC VASCULAR SURGERY)

## 2025-07-18 PROCEDURE — 93005 ELECTROCARDIOGRAM TRACING: CPT

## 2025-07-18 PROCEDURE — 97167 OT EVAL HIGH COMPLEX 60 MIN: CPT

## 2025-07-18 PROCEDURE — 71045 X-RAY EXAM CHEST 1 VIEW: CPT

## 2025-07-18 PROCEDURE — 94760 N-INVAS EAR/PLS OXIMETRY 1: CPT

## 2025-07-18 PROCEDURE — 94664 DEMO&/EVAL PT USE INHALER: CPT

## 2025-07-18 PROCEDURE — 82948 REAGENT STRIP/BLOOD GLUCOSE: CPT

## 2025-07-18 PROCEDURE — 93306 TTE W/DOPPLER COMPLETE: CPT

## 2025-07-18 PROCEDURE — 85027 COMPLETE CBC AUTOMATED: CPT | Performed by: PHYSICIAN ASSISTANT

## 2025-07-18 RX ORDER — LOSARTAN POTASSIUM 25 MG/1
25 TABLET ORAL DAILY
Status: DISCONTINUED | OUTPATIENT
Start: 2025-07-18 | End: 2025-07-19

## 2025-07-18 RX ORDER — LISINOPRIL 10 MG/1
10 TABLET ORAL ONCE
Status: COMPLETED | OUTPATIENT
Start: 2025-07-18 | End: 2025-07-18

## 2025-07-18 RX ORDER — METOPROLOL SUCCINATE 25 MG/1
12.5 TABLET, EXTENDED RELEASE ORAL DAILY
Status: DISCONTINUED | OUTPATIENT
Start: 2025-07-18 | End: 2025-07-19

## 2025-07-18 RX ORDER — FUROSEMIDE 10 MG/ML
20 INJECTION INTRAMUSCULAR; INTRAVENOUS ONCE
Status: COMPLETED | OUTPATIENT
Start: 2025-07-18 | End: 2025-07-18

## 2025-07-18 RX ORDER — POTASSIUM CHLORIDE 750 MG/1
10 TABLET, EXTENDED RELEASE ORAL ONCE
Status: COMPLETED | OUTPATIENT
Start: 2025-07-18 | End: 2025-07-18

## 2025-07-18 RX ADMIN — ASPIRIN 81 MG CHEWABLE TABLET 81 MG: 81 TABLET CHEWABLE at 09:06

## 2025-07-18 RX ADMIN — POTASSIUM CHLORIDE 10 MEQ: 750 TABLET, EXTENDED RELEASE ORAL at 11:44

## 2025-07-18 RX ADMIN — LISINOPRIL 10 MG: 10 TABLET ORAL at 09:06

## 2025-07-18 RX ADMIN — Medication 125 MG: at 09:07

## 2025-07-18 RX ADMIN — HEPARIN SODIUM 5000 UNITS: 5000 INJECTION INTRAVENOUS; SUBCUTANEOUS at 05:51

## 2025-07-18 RX ADMIN — FUROSEMIDE 40 MG: 40 TABLET ORAL at 09:06

## 2025-07-18 RX ADMIN — ATORVASTATIN CALCIUM 40 MG: 40 TABLET, FILM COATED ORAL at 16:26

## 2025-07-18 RX ADMIN — INSULIN LISPRO 1 UNITS: 100 INJECTION, SOLUTION INTRAVENOUS; SUBCUTANEOUS at 11:46

## 2025-07-18 RX ADMIN — AMLODIPINE BESYLATE 2.5 MG: 2.5 TABLET ORAL at 09:06

## 2025-07-18 RX ADMIN — CHLORHEXIDINE GLUCONATE 15 ML: 1.2 SOLUTION ORAL at 09:08

## 2025-07-18 RX ADMIN — METOPROLOL SUCCINATE 12.5 MG: 25 TABLET, EXTENDED RELEASE ORAL at 11:44

## 2025-07-18 RX ADMIN — MUPIROCIN 1 APPLICATION: 20 OINTMENT TOPICAL at 09:06

## 2025-07-18 RX ADMIN — CHLORHEXIDINE GLUCONATE 15 ML: 1.2 SOLUTION ORAL at 18:31

## 2025-07-18 RX ADMIN — FUROSEMIDE 20 MG: 10 INJECTION, SOLUTION INTRAMUSCULAR; INTRAVENOUS at 11:44

## 2025-07-18 RX ADMIN — HEPARIN SODIUM 5000 UNITS: 5000 INJECTION INTRAVENOUS; SUBCUTANEOUS at 21:09

## 2025-07-18 RX ADMIN — HEPARIN SODIUM 5000 UNITS: 5000 INJECTION INTRAVENOUS; SUBCUTANEOUS at 13:38

## 2025-07-18 RX ADMIN — MUPIROCIN 1 APPLICATION: 20 OINTMENT TOPICAL at 21:09

## 2025-07-18 RX ADMIN — BRIMONIDINE TARTRATE 1 DROP: 2 SOLUTION/ DROPS OPHTHALMIC at 09:08

## 2025-07-18 RX ADMIN — CLOPIDOGREL BISULFATE 75 MG: 75 TABLET, FILM COATED ORAL at 09:06

## 2025-07-18 RX ADMIN — LOSARTAN POTASSIUM 25 MG: 25 TABLET, FILM COATED ORAL at 11:44

## 2025-07-18 RX ADMIN — CEFAZOLIN SODIUM 2000 MG: 2 SOLUTION INTRAVENOUS at 13:35

## 2025-07-18 RX ADMIN — CEFAZOLIN SODIUM 2000 MG: 2 SOLUTION INTRAVENOUS at 05:51

## 2025-07-18 RX ADMIN — PANTOPRAZOLE SODIUM 40 MG: 40 TABLET, DELAYED RELEASE ORAL at 05:51

## 2025-07-18 RX ADMIN — BRIMONIDINE TARTRATE 1 DROP: 2 SOLUTION/ DROPS OPHTHALMIC at 21:08

## 2025-07-18 RX ADMIN — SERTRALINE 25 MG: 25 TABLET, FILM COATED ORAL at 09:06

## 2025-07-18 RX ADMIN — LISINOPRIL 10 MG: 10 TABLET ORAL at 11:44

## 2025-07-18 NOTE — CONSULTS
Cardiology Consult H&P  Jackie Urbina 89 y.o. female MRN: [id number]  Unit/Bed#: PPHP-314-01 Encounter: [id number]  Physician Requesting Consult: Raj Méndez MD  Reason for Consult: S/p TAVR    HPI  89 y.o. female with a history of severe AS who underwent TAVR placement with a 23 mm ES3UR bioprosthetic valve.  She also has a history of CAD s/p LUIS to mid LAD in March, HFpEF, hypertension and incomplete LBBB with first-degree AV block.  She tolerated the procedure well and remained hemodynamically stable.  Postoperative echocardiogram showed an appropriately functioning valve.  Immediately postoperatively she was noted to have some widening of her QRS complex on telemetry that later returned to baseline.  Otherwise her telemetry and postoperative ECG were unremarkable.  Today she was seen and examined at bedside and overall reported feeling well.    Prior Cardiac Imaging  -TTE 7/18/25:    Left Ventricle: Left ventricular cavity size is normal. Wall thickness is normal. There is moderate to severe asymmetric hypertrophy of the basal septal wall. The left ventricular ejection fraction is 65%. Systolic function is normal. Wall motion is normal.    Left Atrium: The atrium is severely dilated (>48 mL/m2).    Right Atrium: The atrium is mildly dilated.    Aortic Valve: There is an Valdez HANK 3 Ultra Resilia  23 mm TAVR bioprosthetic valve. The prosthetic valve appears well-seated and appears to be functioning normally. Prosthetic valve leaflets are not well visualized. There is trace transvalvular regurgitation 2 o'clock position. The gradient recorded across the prosthetic aortic valve is within the expected range. Aortic valve peak velocity is 2.21 m/s. AV mean gradient is 9 mmHg.    Mitral Valve: There is moderate diffuse calcification. There is moderate annular calcification. There is mild regurgitation. There is mild calcific mitral stenosis. MV mean gradient antegrade is 4 mmHg.    Tricuspid Valve:  There is mild regurgitation.    Pulmonic Valve: There is mild regurgitation.    A&P  Assessment & Plan  S/P TAVR (transcatheter aortic valve replacement)  Nonrheumatic aortic (valve) stenosis  S/p successful placement of a 23 mm TAVR bioprosthetic valve.  Postop echocardiogram showing trace transvalvular regurg.  Otherwise the valve was well-seated and functioning properly with an expected prosthetic gradient.  Continue DAPT with aspirin and Plavix.  Restart home Toprol XL 12.5 mg daily.  Continue home Norvasc 2.5 mg daily.  Transition lisinopril to losartan 25 mg daily due to cough.  Continue statin.  Monitor electrolytes and replete as needed.  Close telemetry monitoring.  He does have a history of an incomplete LBBB which converted to a new LBBB immediately postoperatively and shortly after returned to an incomplete LBBB.  Will monitor telemetry closely overnight.  If no further changes she should be okay for discharge without further monitoring.  If her QRS again lengthens we will plan for a 1 week outpatient ZIO.  Coronary artery disease involving native coronary artery  S/p LUIS to mid LAD in March 2025.  Also has a  of D3 and mid circumflex.  Continue aspirin, statin and Plavix.  Essential hypertension  Continue antihypertensive regimen as above.  Hyperlipidemia  Continue statin.  Chronic heart failure with preserved ejection fraction (HFpEF) ([facility])  Wt Readings from Last 3 Encounters:   07/18/25 49.4 kg (109 lb)   07/01/25 49.3 kg (108 lb 11.2 oz)   04/10/25 49 kg (108 lb)   Continue p.o. Lasix 40 mg daily.  Monitor I's and O's, volume status and renal function.           Intake/Output Summary (Last 24 hours) at 7/18/2025 1407  Last data filed at 7/18/2025 1101  Gross per 24 hour   Intake 1258 ml   Output 1375 ml   Net -117 ml         Edd Escalera MD  -PGY-5 Cardiology Fellow  -Jamar text enabled      ======================================================    Physical exam  Vitals: Blood pressure 166/72,  "pulse 65, temperature 97.5 °F (36.4 °C), temperature source Oral, resp. rate 17, height 4' 11\" (1.499 m), weight 49.4 kg (109 lb), SpO2 99%.  Gen: Well appearing  Psych: AOx3  Skin: Intact  Neck: Supple  Cardiac: S1, S2, regular rate, no S3 or S4 appreciated. No murmurs. +2 PT, radial pulses. No peripheral edema. No carotid bruits.  Resp: CTABL. No crackles  Abd: Nontender, nondistended  Rheum: No joint deformities in UE or LE    ======================================================    TREADMILL STRESS  No results found for this or any previous visit.     ----------------------------------------------------------------------------------------------  NUCLEAR STRESS TEST: No results found for this or any previous visit.    No results found for this or any previous visit.      --------------------------------------------------------------------------------  CATH:  No results found for this or any previous visit.    --------------------------------------------------------------------------------  ECHO:   Results for orders placed during the hospital encounter of 05/14/19    Echo complete with contrast if indicated    Narrative  [address] [id number] [phone]    Transthoracic Echocardiogram  2D, M-mode, Doppler, and Color Doppler    Study date:  14-May-2019    Patient: EDGAR FORBES  MR number: [id number]  Account number: [id number]  : 1935  Age: 83 years  Gender: Female  Status: Outpatient  Location: Echo lab  Height: 59 in  Weight: 112.9 lb  BP: 126/ 64 mmHg    Indications: Aortic Stenosis    Diagnoses: 424.1 - AORTIC VALVE DISORDER    Sonographer:  LJ Aly  Primary Physician:  Tom Howell MD  Referring Physician:  Tom Howell MD  Group:  [facility]  Interpreting Physician:  Karolina Little DO    SUMMARY    LEFT VENTRICLE:  Systolic function was normal by visual assessment. Ejection fraction was estimated to be " 60 %.  There were no regional wall motion abnormalities.  There was moderate concentric hypertrophy.  Doppler parameters were consistent with elevated mean left atrial filling pressure.    LEFT ATRIUM:  The atrium was markedly dilated.    MITRAL VALVE:  There was mild regurgitation.    AORTIC VALVE:  The valve was trileaflet. Leaflets exhibited moderately increased thickness, moderate calcification, and moderately reduced cuspal separation.  There was moderate stenosis.  There was moderate regurgitation.  Valve mean gradient was 22 mmHg.    TRICUSPID VALVE:  There was moderate regurgitation.  Pulmonary artery systolic pressure was within the normal range.    COMPARISONS:  Comparison was made with the previous study of 25-May-2018. Aortic stenosis has mildly worsened.    HISTORY: PRIOR HISTORY: HTN, CHF    PROCEDURE: The procedure was performed in the echo lab. This was a routine study. The transthoracic approach was used. The study included complete 2D imaging, M-mode, complete spectral Doppler, and color Doppler. The heart rate was 52 bpm,  at the start of the study. Image quality was good.    LEFT VENTRICLE: Size was normal. Systolic function was normal by visual assessment. Ejection fraction was estimated to be 60 %. There were no regional wall motion abnormalities. There was moderate concentric hypertrophy. No evidence of  apical thrombus. DOPPLER: Doppler parameters were consistent with elevated mean left atrial filling pressure.    RIGHT VENTRICLE: The size was normal. Systolic function was normal. Wall thickness was normal. DOPPLER: Systolic pressure was within the normal range.    LEFT ATRIUM: The atrium was markedly dilated. APPENDAGE: The size was normal.    RIGHT ATRIUM: Size was at the upper limits of normal.    MITRAL VALVE: There was annular calcification. Valve structure was normal. There was normal leaflet separation. No echocardiographic evidence for prolapse. DOPPLER: The transmitral velocity was  within the normal range. There was no  evidence for stenosis. There was mild regurgitation.    AORTIC VALVE: The valve was trileaflet. Leaflets exhibited moderately increased thickness, moderate calcification, and moderately reduced cuspal separation. DOPPLER: Transaortic velocity was increased due to valvular stenosis. There was  moderate stenosis. There was moderate regurgitation.    TRICUSPID VALVE: The valve structure was normal. There was normal leaflet separation. DOPPLER: The transtricuspid velocity was within the normal range. There was moderate regurgitation. Pulmonary artery systolic pressure was within the  normal range. Estimated peak PA pressure was 34 mmHg.    PULMONIC VALVE: Leaflets exhibited normal thickness, no calcification, and normal cuspal separation. DOPPLER: The transpulmonic velocity was within the normal range. There was no regurgitation.    PERICARDIUM: There was no thickening. There was no pericardial effusion. The pericardium was normal in appearance.    AORTA: The root exhibited normal size.    SYSTEMIC VEINS: IVC: The inferior vena cava was normal in size.    PULMONARY ARTERY: The size was normal. The morphology appeared normal.    MEASUREMENT TABLES    DOPPLER MEASUREMENTS  Aortic valve   (Reference normals)  Peak gradient   41 mmHg   (--)  Mean gradient   22 mmHg   (--)  Valve area, cont   0.9 cmï¾²   (--)    SYSTEM MEASUREMENT TABLES    2D mode  AoR Diam 2D: 3.3 cm  LA Diam (2D): 4.2 cm  LA/Ao (2D): 1.27  FS (2D Teich): 30.5 %  IVSd (2D): 1.29 cm  LVDEV: 87.2 cmï¾³  LVESV: 36.4 cmï¾³  LVIDd(2D): 4.39 cm  LVISd (2D): 3.05 cm  LVOT Area 2D: 2.84 cmï¾²  LVPWd (2D): 1.27 cm  SV (Teich): 50.8 cmï¾³    Apical four chamber  LVEF A4C: 61 %    Unspecified Scan Mode  INDY Cont Eq (Peak Hiren): 0.96 cmï¾²  INDY Cont Eq (VTI): 0.8 cmï¾²  LVOT (VTI): 22 cm  LVOT Diam.: 1.9 cm  LVOT Vmax: 1070 mm/s  LVOT Vmax; Mean: 1070 mm/s  Peak Grad.; Mean: 5 mm[Hg]  SV (LVOT): 62 cmï¾³  VTI;Mean: 3 mm[Hg]  MV  Peak A Hiren: 916 mm/s  MV Peak E Hiren. Mean: 965 mm/s  MVA (PHT): 3.14 cmï¾²  PHT: 70 ms  Max P mm[Hg]  V Max: 2610 mm/s  Vmax: 2500 mm/s  RA Area: 18.1 cmï¾²  RA Volume: 53.3 cmï¾³  TAPSE: 3.7 cm    [facility]    Prepared and electronically signed by    Karolina Little DO  Signed 15-May-2019 14:08:11    No results found for this or any previous visit.    --------------------------------------------------------------------------------  HOLTER  No results found for this or any previous visit.    --------------------------------------------------------------------------------  CAROTIDS  Results for orders placed during the hospital encounter of 25    VAS carotid complete study    Interpretation Summary  [facility] REPORT  CLINICAL:  Indications:  Patient presents for a general health evaluation secondary to future TAVR.  Patient is asymptomatic at this time.  Operative History:  No prior heart or vascular surgery  Risk Factors  The patient has history of HTN.  Clinical  Right Pressure:  166/ mm Hg, Left Pressure:  170/ mm Hg.    FINDINGS:    Right        Impression  PSV  EDV (cm/s)  Direction of Flow  Ratio  Dist. ICA                 63          22                      0.94  Mid. ICA                 119          27                      1.78  Prox. ICA    1 - 49%      65          14                      0.98  Dist CCA                  67          11  Mid CCA                   67          14                      1.05  Prox CCA                  64           5  Ext Carotid               84           0                      1.25  Prox Vert                 43          15  Antegrade  Subclavian               102           0    Left         Impression  PSV  EDV (cm/s)  Direction of Flow  Ratio  Dist. ICA                 94          21                      1.39  Mid. ICA                 126          32                      1.86  Prox. ICA    1 - 49%     139           36                      2.06  Dist CCA                  59          18  Mid CCA                   68          13                      0.89  Prox CCA                  76          13  Ext Carotid              135          26                      1.99  Prox Vert                 39          10  Antegrade  Subclavian                95           0        CONCLUSION:    Impression  RIGHT:  There is <50% stenosis noted in the internal carotid artery. Plaque is  heterogenous and irregular.  Vertebral artery flow is antegrade. There is no significant subclavian artery  disease.    LEFT:  There is <50% stenosis noted in the internal carotid artery. Plaque is  heterogenous and irregular.  Vertebral artery flow is antegrade. There is no significant subclavian artery  disease.    SIGNATURE:  Electronically Signed by: MARY MAYORGA MD on 2025-03-20 10:28:07 PM       [unfilled]   ======================================================      Review of Systems   Constitutional:  Negative for activity change, appetite change, chills, diaphoresis, fatigue and fever.   HENT:  Negative for congestion, ear discharge, ear pain, hearing loss, sinus pressure, sinus pain and sore throat.    Eyes:  Negative for pain, discharge, redness and itching.   Respiratory:  Negative for cough, chest tightness, shortness of breath and wheezing.    Cardiovascular:  Negative for chest pain, palpitations and leg swelling.   Gastrointestinal:  Negative for abdominal distention, abdominal pain, blood in stool, constipation, diarrhea, nausea and vomiting.   Genitourinary:  Negative for difficulty urinating, dysuria, flank pain and frequency.   Musculoskeletal:  Negative for arthralgias, back pain and gait problem.   Skin:  Negative for color change, pallor and rash.   Neurological:  Negative for dizziness, weakness, light-headedness, numbness and headaches.   Psychiatric/Behavioral:  Negative for agitation, behavioral problems, confusion and decreased  concentration.      ROS as noted above, otherwise 12 point review of systems was performed and is negative.     Historical Information   Past Medical History[1]  Past Surgical History[2]  Social History     Substance and Sexual Activity   Alcohol Use Yes    Comment: rarely     Social History     Substance and Sexual Activity   Drug Use No     Tobacco Use History[3]  Family History: Family History[4]    Meds/Allergies   Hospital Medications:   Current Facility-Administered Medications:     acetaminophen (TYLENOL) rectal suppository 650 mg, Q4H PRN    acetaminophen (TYLENOL) tablet 650 mg, Q4H PRN    amLODIPine (NORVASC) tablet 2.5 mg, Daily    ascorbic acid (VITAMIN C) tablet 125 mg, Daily    aspirin chewable tablet 81 mg, Daily    atorvastatin (LIPITOR) tablet 40 mg, Daily With Dinner    bisacodyl (DULCOLAX) rectal suppository 10 mg, Daily PRN    brimonidine tartrate 0.2 % ophthalmic solution 1 drop, BID    calcium gluconate 2 g in sodium chloride 0.9% 100 mL (premix), Once PRN    chlorhexidine (PERIDEX) 0.12 % oral rinse 15 mL, BID    clopidogrel (PLAVIX) tablet 75 mg, Daily    furosemide (LASIX) tablet 40 mg, Daily    heparin (porcine) subcutaneous injection 5,000 Units, Q8H MAXIM    hydrALAZINE (APRESOLINE) injection 10 mg, Q6H PRN    insulin lispro (HumALOG/ADMELOG) 100 units/mL subcutaneous injection 1-5 Units, HS    insulin lispro (HumALOG/ADMELOG) 100 units/mL subcutaneous injection 1-5 Units, TID AC **AND** Fingerstick Glucose (POCT), TID AC    losartan (COZAAR) tablet 25 mg, Daily    metoprolol succinate (TOPROL-XL) 24 hr tablet 12.5 mg, Daily    mupirocin (BACTROBAN) 2 % nasal ointment 1 Application, Q12H MAXIM    niCARdipine (CARDENE) 25 mg (STANDARD CONCENTRATION) in sodium chloride 0.9% 250 mL, Titrated, Last Rate: Stopped (07/17/25 2114)    ondansetron (ZOFRAN) injection 4 mg, Q6H PRN    pantoprazole (PROTONIX) EC tablet 40 mg, Daily    polyethylene glycol (MIRALAX) packet 17 g, Daily    sertraline  "(ZOLOFT) tablet 25 mg, Daily  Home Medications:   Medications Prior to Admission:     amLODIPine (NORVASC) 2.5 mg tablet    aspirin (ECOTRIN LOW STRENGTH) 81 mg EC tablet    atorvastatin (LIPITOR) 40 mg tablet    brimonidine tartrate 0.2 % ophthalmic solution    clopidogrel (PLAVIX) 75 mg tablet    furosemide (LASIX) 20 mg tablet    lisinopril (ZESTRIL) 10 mg tablet    metoprolol succinate (TOPROL-XL) 25 mg 24 hr tablet    Multiple Vitamins-Minerals (PRESERVISION AREDS PO)    multivitamin (THERAGRAN) TABS    mupirocin (BACTROBAN) 2 % ointment    omeprazole (PriLOSEC) 20 mg delayed release capsule    sertraline (ZOLOFT) 25 mg tablet    triamcinolone (KENALOG) 0.1 % cream    Ascorbic Acid (vitamin C) 100 MG tablet    benzonatate (TESSALON PERLES) 100 mg capsule    Allergies[5]    Objective   Vitals: Blood pressure 166/72, pulse 65, temperature 97.5 °F (36.4 °C), temperature source Oral, resp. rate 17, height 4' 11\" (1.499 m), weight 49.4 kg (109 lb), SpO2 99%.  Orthostatic Blood Pressures      Flowsheet Row Most Recent Value   Blood Pressure 166/72 filed at 07/18/2025 1005   Patient Position - Orthostatic VS Sitting filed at 07/18/2025 0743              Intake/Output Summary (Last 24 hours) at 7/18/2025 1407  Last data filed at 7/18/2025 1101  Gross per 24 hour   Intake 1258 ml   Output 1375 ml   Net -117 ml       Invasive Devices       Central Venous Catheter Line  Duration             CVC Central Lines 07/17/25 1 day              Peripheral Intravenous Line  Duration             Peripheral IV 07/17/25 Dorsal (posterior);Left Forearm 1 day                      Lab Results: I have personally reviewed pertinent lab results.    Results from last 7 days   Lab Units 07/18/25  0353 07/17/25  1426 07/17/25  1348   WBC Thousand/uL 10.12  --   --    HEMOGLOBIN g/dL 10.2* 10.4*  --    I STAT HEMOGLOBIN g/dl  --   --  9.5*   HEMATOCRIT % 33.0* 32.9*  --    HEMATOCRIT, ISTAT %  --   --  28*   PLATELETS Thousands/uL 162 139*  --  " "    Results from last 7 days   Lab Units 07/18/25  0353 07/17/25  1426 07/17/25  1348   POTASSIUM mmol/L 4.5 3.7  --    CHLORIDE mmol/L 109* 110*  --    CO2 mmol/L 28 28  --    CO2, I-STAT mmol/L  --   --  24   BUN mg/dL 35* 38*  --    CREATININE mg/dL 0.86 0.82  --    GLUCOSE, ISTAT mg/dl  --   --  105   CALCIUM mg/dL 8.9 8.7  --          Results from last 7 days   Lab Units 07/18/25  0353   MAGNESIUM mg/dL 2.3             [1]   Past Medical History:  Diagnosis Date    Anesthesia complication     .Yrs ago had \"anxiety\" reaction not sure while sedated, pt states sensitive to anesthesia effects    Anxiety     stress at home    Arthritis     Cataract, right     Last Assessed:5/11/16    Eye hemorrhage     left eye currently 5/12/16    History of shingles 05/2015    Hypertension     Mitral valve stenosis, mild    [2]   Past Surgical History:  Procedure Laterality Date    CARDIAC CATHETERIZATION N/A 3/28/2025    Procedure: Cardiac RHC/LHC;  Surgeon: Darwin Meyer DO;  Location: [facility];  Service: Cardiology    CARDIAC CATHETERIZATION N/A 3/28/2025    Procedure: Cardiac PCI;  Surgeon: Darwin Meyer DO;  Location:  CARDIAC CATH LAB;  Service: Cardiology    CARDIAC CATHETERIZATION N/A 7/17/2025    Procedure: Cardiac tavr;  Surgeon: Cortes Rossi MD;  Location:  MAIN OR;  Service: Cardiology    CARPAL TUNNEL RELEASE Left     CATARACT EXTRACTION      CATARACT EXTRACTION W/ INTRAOCULAR LENS IMPLANT Left 10/11/2016    Procedure: EXTRACTION EXTRACAPSULAR CATARACT PHACO INTRAOCULAR LENS (IOL);  Surgeon: Wale Roth MD;  Location: [facility] MAIN OR;  Service:     CERVICAL CONE BIOPSY      COLONOSCOPY      EYE SURGERY Left     muscle repair    WA ARTHRP KNE CONDYLE&PLATU MEDIAL&LAT COMPARTMENTS Right 12/15/2020    Procedure: ARTHROPLASTY KNEE TOTAL;  Surgeon: Greg Mitchell DO;  Location: [facility] MAIN OR;  Service: Orthopedics    WA OPTX FEM SHFT FX W/INSJ IMED IMPLT W/WO SCREW Right 07/23/2019    " Procedure: INSERTION NAIL IM FEMUR ANTEGRADE (TROCHANTERIC);  Surgeon: Greg Mitchell DO;  Location: [facility] MAIN OR;  Service: Orthopedics    VA REPLACE AORTIC VALVE OPENFEMORAL ARTERY APPROACH N/A 2025    Procedure: REPLACEMENT AORTIC VALVE TRANSCATHETER (TAVR) TRANSFEMORAL W/ 23MM VALVE(ACCESS ON RIGHT) IMTIAZ;  Surgeon: Raj Méndez MD;  Location:  MAIN OR;  Service: Cardiac Surgery    VA XCAPSL CTRC RMVL INSJ IO LENS PROSTH W/O ECP Right 2016    Procedure: EXTRACTION EXTRACAPSULAR CATARACT PHACO INTRAOCULAR LENS (IOL);  Surgeon: Wale Roth MD;  Location: [facility] MAIN OR;  Service: Ophthalmology    TONSILLECTOMY     [3]   Social History  Tobacco Use   Smoking Status Never   Smokeless Tobacco Never   [4]   Family History  Problem Relation Name Age of Onset    Heart disease Mother          MI  at age 69    Arthritis Mother      GI problems Father          bleeding ulcer    Arthritis Sister      Sleep apnea Sister      Irritable bowel syndrome Sister      Cancer Sister          skin cancer    Heart disease Brother          CABG (5)    Cancer Brother          skin cancer    Heart disease Maternal Aunt     [5]   Allergies  Allergen Reactions    Indocin [Indomethacin] Other (See Comments)     hypertension

## 2025-07-18 NOTE — PLAN OF CARE
Problem: PHYSICAL THERAPY ADULT  Goal: Performs mobility at highest level of function for planned discharge setting.  See evaluation for individualized goals.  Description: Treatment/Interventions: LE strengthening/ROM, Elevations, Therapeutic exercise, Endurance training, Equipment eval/education, Bed mobility, Gait training, Spoke to nursing, Spoke to case management, Spoke to advanced practitioner, OT  Equipment Recommended: Cane (provided to the pt)       See flowsheet documentation for full assessment, interventions and recommendations.  Note: Prognosis: Good  Problem List: Decreased strength, Decreased endurance, Impaired balance, Decreased mobility  Assessment: Pt is 89 y.o. female admitted with Dx of Symptomatic severe aortic stenosis and underwent Transcatheter aortic valve replacement with a 23 mm Valdez HANK 3 Ultra Resilia bioprosthetic valve via a percutaneous right transfemoral approach on 7/17/2025. Postop course included: LBBB; being monitored. Pt 's comorbidities affecting POC include: Anxiety, Arthritis, Hypertension, PAC, episode of major depressive disorder and Mitral valve stenosis, mild.and personal factors of: advanced age and living alone. Pt's clinical presentation is currently unstable/unpredictable which is evident in ongoing telem monitoring, abn lab values, suppl O2 needs, and ongoing postop management. Pt presents w/ min overall weakness, decreased functional endurance and inconsistent amb balance and gait patterns but w/ overall observed mobility status appearing to be near premorbid level. Will cont to follow pt in PT for progressive mobilization to max level of (I), endurance, and safety. Otherwise, anticipate pt will return home upon D/C when medically cleared; D/C recommendations are outlined below. Will cont to follow until then.        Rehab Resource Intensity Level, PT: III (Minimum Resource Intensity)    See flowsheet documentation for full assessment.

## 2025-07-18 NOTE — ASSESSMENT & PLAN NOTE
S/p TAVR on 07/18/25 with CT Surgery in setting of aortic valve stenosis  POD#1  Chest XR pending   Maintain neuro protective lifestyle :   Keep physically, mentally and socially active   Lower BMI   Increase physical exercise   Recommend Mediterranean/MIND diet   Monitor good control of blood pressure, blood sugar and cholesterol

## 2025-07-18 NOTE — ASSESSMENT & PLAN NOTE
ECHO on 01/30/25 showed EF of 50%, aortic valves are moderately thickened, moderately calcified, severely reduced mobility, mild regurgitation, severe stenosis  S/p TAVR on 07/18  Repeat ECHO pending

## 2025-07-18 NOTE — CONSULTS
Consultation - Geriatric Medicine   Name: Jackie Urbina 89 y.o. female I MRN: [id number]  Unit/Bed#: [id number]-314-01 I Date of Admission: 7/17/2025   Date of Service: 7/18/2025 I Hospital Day: 1   Inpatient consult to Gerontology for /[facility]  Consult performed by: JUAN Garcia  Consult ordered by: Linda Luu PA-C        Physician Requesting Evaluation: Raj Méndez MD   Reason for Evaluation / Principal Problem: s/p TAVR    Assessment & Plan  Nonrheumatic aortic (valve) stenosis  ECHO on 01/30/25 showed EF of 50%, aortic valves are moderately thickened, moderately calcified, severely reduced mobility, mild regurgitation, severe stenosis  S/p TAVR on 07/18  Repeat ECHO pending  S/P TAVR (transcatheter aortic valve replacement)  S/p TAVR on 07/18/25 with CT Surgery in setting of aortic valve stenosis  POD#1  Chest XR pending   Maintain neuro protective lifestyle :   Keep physically, mentally and socially active   Lower BMI   Increase physical exercise   Recommend Mediterranean/MIND diet   Monitor good control of blood pressure, blood sugar and cholesterol  Encounter for geriatric assessment  No history of prior memory issues or cognitive impairment   Alert and oriented x 4 on exam today  Remains independent with ADLs/IADLs, lives home alone, still drives, able to manage medications at home  Most recent TSH on 11/05/2024 noted to be 2.535  No vitamin B12 level on file  Recommend checking vitamin b12 level outpatient with PCP, supplementation for level < 400  No imaging of head or brain to review  No MOCA charted in epic   Keep physically, mentally, and socially active    Monitor blood pressure with those diagnosed with hypertension, studies show that lowering blood pressure can help prevent cognitive decline    Ambulatory dysfunction  1 recent fall  Patient admits to recent fall prior to TAVR procedure, fell while changing a curtain in the shower, states she hit her head, denies loss of  consiousness   Ambulates with cane intermittently at baseline   Assess patient frequently for physical needs, encourage use of assistant devices as needed and directed by PT/OT  Identify cognitive and physical deficits and behaviors that affect risk of falls  Consider moving patient closer to nursing station to monitor more closely for impulsive behavior which may increase risk of falls  [place] fall and safety precautions   Educate patient/family on patient safety including physical limitations and importance of using call bell for assistance   Modify environment to reduce risk of injury including disconnecting from pole when not in use, ensuring adequate lighting in room and restroom, ensuring that path to restroom is clear and free of trip hazards  PT/OT consulted to assist with strengthening/mobility and assist with discharge planning to appropriate level of care  Out of bed as tolerated    Chronic heart failure with preserved ejection fraction (HFpEF) (Formerly [facility])  Wt Readings from Last 3 Encounters:   07/18/25 49.4 kg (109 lb)   07/01/25 49.3 kg (108 lb 11.2 oz)   04/10/25 49 kg (108 lb)     ECHO on 01/30/25 with EF of 50%  Follow with [facility], last seen on 04/10/2025  PTA medication: furosemide 40 mg daily       Current moderate episode of major depressive disorder without prior episode (HCC)  Patient has history of depression   Mood appears stable on exam today   Continue supportive care   Continue sertraline 25 mg daily      History of Present Illness   Hx and PE limited by: N/A  HPI: Jackie Urbina is a 89 y.o. year old female who presents with medical problems including, not limited to, hypertension, aortic valve stenosis, s/p TAVR, CHF, hyperlipidemia, GERD, CAD, and MDD.    Jackie presented to the hospital for an elective TAVR procedure with Cardiothoracic Surgery in setting of aortic valve stenosis.  Prior to admission patient and family reporting to CT surgery symptoms of worsening fatigue,  decreased activity tolerance, BYRD, and increased daytime somnolence.  She is seen today for a geriatric consult following her TAVR procedure.      Upon entering the room she is resting in bed comfortable.  Alert and oriented x 4.  Currently denies pain.  She lives independently, remains independent of ADLs/IADLs.  States she sometimes uses a cane.  Recently had a fall a couple days prior to her TAVR procedure.  She states she was trying to change the curtain in the shower and fell back into the shower hitting her head.  She denies pain from the fall, states her head does not hurt.  Was not checked out after her fall.  She does still drive.  She states she has some family that live close to her, her son lives close but works out of town, other family members that live close are worse health wise.  She states she manages her own medications at home.      Review of Systems   Constitutional:  Positive for activity change and fatigue. Negative for appetite change, fever and unexpected weight change.   HENT:  Negative for congestion.    Eyes:  Negative for pain, discharge and visual disturbance.   Respiratory:  Negative for cough and shortness of breath.    Cardiovascular:  Negative for chest pain and palpitations.   Gastrointestinal:  Negative for abdominal pain, constipation and diarrhea.   Genitourinary:  Negative for difficulty urinating, dysuria, hematuria and urgency.   Musculoskeletal:  Positive for gait problem. Negative for arthralgias.   Skin:  Negative for color change.   Neurological:  Negative for syncope and weakness.   Psychiatric/Behavioral:  Positive for sleep disturbance. Negative for confusion.    All other systems reviewed and are negative.          Medical History Review: I have reviewed the patient's PMH, PSH, Social History, Family History, Meds, and Allergies   Historical Information   Past Medical History[1]  Past Surgical History[2]  Social History[3]  E-Cigarette/Vaping    E-Cigarette Use Never  User      E-Cigarette/Vaping Substances    Nicotine No     THC No     CBD No      Family History[4]  Social History[5]    Current Facility-Administered Medications:     acetaminophen (TYLENOL) rectal suppository 650 mg, Q4H PRN    acetaminophen (TYLENOL) tablet 650 mg, Q4H PRN    amLODIPine (NORVASC) tablet 2.5 mg, Daily    ascorbic acid (VITAMIN C) tablet 125 mg, Daily    aspirin chewable tablet 81 mg, Daily    atorvastatin (LIPITOR) tablet 40 mg, Daily With Dinner    bisacodyl (DULCOLAX) rectal suppository 10 mg, Daily PRN    brimonidine tartrate 0.2 % ophthalmic solution 1 drop, BID    calcium gluconate 2 g in sodium chloride 0.9% 100 mL (premix), Once PRN    ceFAZolin (ANCEF) IVPB (premix in dextrose) 2,000 mg 50 mL, Q8H, Last Rate: 2,000 mg (07/18/25 2881)    chlorhexidine (PERIDEX) 0.12 % oral rinse 15 mL, BID    clopidogrel (PLAVIX) tablet 75 mg, Daily    furosemide (LASIX) tablet 40 mg, Daily    heparin (porcine) subcutaneous injection 5,000 Units, Q8H MAXIM    hydrALAZINE (APRESOLINE) injection 10 mg, Q6H PRN    insulin lispro (HumALOG/ADMELOG) 100 units/mL subcutaneous injection 1-5 Units, HS    insulin lispro (HumALOG/ADMELOG) 100 units/mL subcutaneous injection 1-5 Units, TID AC **AND** Fingerstick Glucose (POCT), TID AC    losartan (COZAAR) tablet 25 mg, Daily    metoprolol succinate (TOPROL-XL) 24 hr tablet 12.5 mg, Daily    mupirocin (BACTROBAN) 2 % nasal ointment 1 Application, Q12H MAXIM    niCARdipine (CARDENE) 25 mg (STANDARD CONCENTRATION) in sodium chloride 0.9% 250 mL, Titrated, Last Rate: Stopped (07/17/25 2114)    ondansetron (ZOFRAN) injection 4 mg, Q6H PRN    pantoprazole (PROTONIX) EC tablet 40 mg, Daily    polyethylene glycol (MIRALAX) packet 17 g, Daily    sertraline (ZOLOFT) tablet 25 mg, Daily  Indocin [indomethacin]    Meds/Allergies   Home medication review  -Sertraline 25 mg tablet daily  -Plavix 75 mg tablet daily  Aspirin 81 mg tablet daily  -Atorvastatin 40 mg tablet daily with  dinner  -Omeprazole 20 mg delayed release capsule daily  -Furosemide 20 mg tablet, take 2 tablets daily  -Amlodipine 2.5 mg tablet daily  -Lisinopril 10 mg tablet daily  -Metoprolol succinate 12.5 mg tablet daily  Preservision AREDs daily  Multivitamin daily  Personally confirmed with patient      Objective :  Temp:  [96.7 °F (35.9 °C)-98.1 °F (36.7 °C)] 97.5 °F (36.4 °C)  HR:  [53-91] 65  BP: (107-166)/(53-72) 166/72  Resp:  [10-29] 17  SpO2:  [90 %-100 %] 99 %  O2 Device: Nasal cannula  Nasal Cannula O2 Flow Rate (L/min):  [2 L/min] 2 L/min    Physical Exam  Vitals and nursing note reviewed.   Constitutional:       General: She is not in acute distress.     Appearance: Normal appearance. She is well-developed.   HENT:      Head: Normocephalic and atraumatic.     Eyes:      Conjunctiva/sclera: Conjunctivae normal.       Cardiovascular:      Rate and Rhythm: Normal rate.      Heart sounds: No murmur heard.  Pulmonary:      Effort: Pulmonary effort is normal. No respiratory distress.      Breath sounds: Normal breath sounds.   Abdominal:      Palpations: Abdomen is soft.      Tenderness: There is no abdominal tenderness.     Musculoskeletal:         General: No swelling.      Cervical back: Neck supple.     Skin:     General: Skin is warm and dry.      Capillary Refill: Capillary refill takes less than 2 seconds.      Findings: Bruising present.     Neurological:      Mental Status: She is alert and oriented to person, place, and time. Mental status is at baseline.     Psychiatric:         Mood and Affect: Mood normal.         Behavior: Behavior normal.         Thought Content: Thought content normal.         Judgment: Judgment normal.           Lab Results: I have reviewed the following results:CBC/BMP:   .     07/17/25  1348 07/17/25  1426 07/18/25  0353   WBC  --   --  10.12   HGB 9.5*   < > 10.2*   HCT 28*   < > 33.0*   PLT  --    < > 162   SODIUM  --    < > 142   K  --    < > 4.5   CL  --    < > 109*   CO2 24    "< > 28   BUN  --    < > 35*   CREATININE  --    < > 0.86   GLUC  --    < > 145*   CAIONIZED 1.19  --   --    MG  --   --  2.3    < > = values in this interval not displayed.    , TSH:       Imaging Results Review: I reviewed radiology reports from this admission including: chest xray and Echocardiogram.  Other Study Results Review: EKG was reviewed.     Therapies:   Basic Mobility Inpatient Raw Score: 12  -[facility] Goal: 4: Move to chair/commode  -[facility] Achieved: 7: Walk 25 feet or more      VTE Prophylaxis: VTE covered by:  heparin (porcine), Subcutaneous, 5,000 Units at 07/18/25 0551     and Sequential compression device (Venodyne)     Code Status: Level 1 - Full Code      Family and Social Support: Son: Immanuel, DIL: Emili, Sister: Alberta      Goals of Care: TBD    I have spent a total time of 75 minutes in caring for this patient on the day of the visit/encounter including Diagnostic results, Prognosis, Risks and benefits of tx options, Instructions for management, Patient and family education, Importance of tx compliance, Risk factor reductions, Impressions, Counseling / Coordination of care, Documenting in the medical record, Reviewing/placing orders in the medical record (including tests, medications, and/or procedures), Obtaining or reviewing history  , and Communicating with other healthcare professionals .         [1]   Past Medical History:  Diagnosis Date    Anesthesia complication     .Yrs ago had \"anxiety\" reaction not sure while sedated, pt states sensitive to anesthesia effects    Anxiety     stress at home    Arthritis     Cataract, right     Last Assessed:5/11/16    Eye hemorrhage     left eye currently 5/12/16    History of shingles 05/2015    Hypertension     Mitral valve stenosis, mild    [2]   Past Surgical History:  Procedure Laterality Date    CARDIAC CATHETERIZATION N/A 3/28/2025    Procedure: Cardiac RHC/LHC;  Surgeon: Darwin Meyer DO;  Location: [facility];  Service: " Cardiology    CARDIAC CATHETERIZATION N/A 3/28/2025    Procedure: Cardiac PCI;  Surgeon: Darwin Meyer DO;  Location:  CARDIAC CATH LAB;  Service: Cardiology    CARPAL TUNNEL RELEASE Left     CATARACT EXTRACTION      CATARACT EXTRACTION W/ INTRAOCULAR LENS IMPLANT Left 10/11/2016    Procedure: EXTRACTION EXTRACAPSULAR CATARACT PHACO INTRAOCULAR LENS (IOL);  Surgeon: Wale Roth MD;  Location: [facility] MAIN OR;  Service:     CERVICAL CONE BIOPSY      COLONOSCOPY      EYE SURGERY Left     muscle repair    CO ARTHRP KNE CONDYLE&PLATU MEDIAL&LAT COMPARTMENTS Right 12/15/2020    Procedure: ARTHROPLASTY KNEE TOTAL;  Surgeon: Greg Mitchell DO;  Location: [facility] MAIN OR;  Service: Orthopedics    CO OPTX FEM SHFT FX W/INSJ IMED IMPLT W/WO SCREW Right 2019    Procedure: INSERTION NAIL IM FEMUR ANTEGRADE (TROCHANTERIC);  Surgeon: Greg Mitchell DO;  Location: [facility] MAIN OR;  Service: Orthopedics    CO XCAPSL CTRC RMVL INSJ IO LENS PROSTH W/O ECP Right 2016    Procedure: EXTRACTION EXTRACAPSULAR CATARACT PHACO INTRAOCULAR LENS (IOL);  Surgeon: Wale Roth MD;  Location: [facility] MAIN OR;  Service: Ophthalmology    TONSILLECTOMY     [3]   Social History  Tobacco Use    Smoking status: Never    Smokeless tobacco: Never   Vaping Use    Vaping status: Never Used   Substance and Sexual Activity    Alcohol use: Yes     Comment: rarely    Drug use: No   [4]   Family History  Problem Relation Name Age of Onset    Heart disease Mother          MI  at age 69    Arthritis Mother      GI problems Father          bleeding ulcer    Arthritis Sister      Sleep apnea Sister      Irritable bowel syndrome Sister      Cancer Sister          skin cancer    Heart disease Brother          CABG (5)    Cancer Brother          skin cancer    Heart disease Maternal Aunt     [5]   Social History  Tobacco Use    Smoking status: Never    Smokeless tobacco: Never   Vaping Use    Vaping status: Never Used   Substance and Sexual  Activity    Alcohol use: Yes     Comment: rarely    Drug use: No

## 2025-07-18 NOTE — ASSESSMENT & PLAN NOTE
No history of prior memory issues or cognitive impairment   Alert and oriented x 4 on exam today  Remains independent with ADLs/IADLs, lives home alone, still drives, able to manage medications at home  Most recent TSH on 11/05/2024 noted to be 2.535  No vitamin B12 level on file  Recommend checking vitamin b12 level outpatient with PCP, supplementation for level < 400  No imaging of head or brain to review  No MOCA charted in epic   Keep physically, mentally, and socially active    Monitor blood pressure with those diagnosed with hypertension, studies show that lowering blood pressure can help prevent cognitive decline

## 2025-07-18 NOTE — ASSESSMENT & PLAN NOTE
Wt Readings from Last 3 Encounters:   07/18/25 49.4 kg (109 lb)   07/01/25 49.3 kg (108 lb 11.2 oz)   04/10/25 49 kg (108 lb)     ECHO on 01/30/25 with EF of 50%  Follow with  [facility], last seen on 04/10/2025  PTA medication: furosemide 40 mg daily

## 2025-07-18 NOTE — ASSESSMENT & PLAN NOTE
Patient has history of depression   Mood appears stable on exam today   Continue supportive care   Continue sertraline 25 mg daily

## 2025-07-18 NOTE — ASSESSMENT & PLAN NOTE
S/p LUIS to mid LAD in March 2025.  Also has a  of D3 and mid circumflex.  Continue aspirin, statin and Plavix.

## 2025-07-18 NOTE — ASSESSMENT & PLAN NOTE
Wt Readings from Last 3 Encounters:   07/18/25 49.4 kg (109 lb)   07/01/25 49.3 kg (108 lb 11.2 oz)   04/10/25 49 kg (108 lb)   Continue p.o. Lasix 40 mg daily.  Monitor I's and O's, volume status and renal function.

## 2025-07-18 NOTE — PLAN OF CARE
Problem: OCCUPATIONAL THERAPY ADULT  Goal: Performs self-care activities at highest level of function for planned discharge setting.  See evaluation for individualized goals.  Description: Treatment Interventions: ADL retraining, Endurance training, Functional transfer training, Continued evaluation, Energy conservation, Activityengagement          See flowsheet documentation for full assessment, interventions and recommendations.   Outcome: Progressing  Note: Limitation: Decreased ADL status, Decreased endurance, Decreased self-care trans, Decreased high-level ADLs  Prognosis: Good  Assessment: Pt is a 89 y.o. female who was admitted to [facility] on 7/17/2025 with S/P TAVR (transcatheter aortic valve replacement). Pt seen for an OT evaluation per active OT orders.  Pt  has a past medical history of Anesthesia complication, Anxiety, Arthritis, Cataract, right, Eye hemorrhage, History of shingles, Hypertension, and Mitral valve stenosis, mild. Pt reports living alone in 2  w/ ramped entrance, FFOS to bed/bath, tub shower w/ shower chair, standard toilet.  Pta, pt was independent w/ ADL, and functional mobility with SPC at baseline, receives assistance for IADL and is (+) . Currently, pt is Mod I for UB ADL, Supervision for LB ADL, performed transfers w/ Mod I w/ SPC, and short household distance functional mobility w/ Supervision w/ SPC. Pt currently presents impairments including -steps to enter environment, limited home support, difficulty performing ADLS, and difficulty performing IADLS  activity tolerance, endurance, standing balance/tolerance, and safety . These impairments, as well as pt's fatigue and cardiac/sternal precautions limit pt's ability to safely engage in baseline areas of occupation such as bathing, dressing, toileting, and functional mobility/transfers.   The patient's raw score on the AM-PAC Daily Activity Inpatient Short Form is 21. A raw score of greater than or equal to 19  suggests the patient may benefit from discharge to home. Please refer to the recommendation of the Occupational Therapist for safe discharge planning.   Pt would benefit from continued acute OT services throughout hospital course and following D/C. Moving forward, OT interventions 2-3x per week to increase safety and participation in ADLs, transfers, and functional mobility. From OT standpoint, recommend pt to return home with increased social support and level III services upon D/C. Pt was left supine in bed with alarm on and all needs within reach.     Rehab Resource Intensity Level, OT: III (Minimum Resource Intensity)

## 2025-07-18 NOTE — ANESTHESIA POSTPROCEDURE EVALUATION
Post-Op Assessment Note            No anethesia notable event occurred.    Comments: uneventful post procedure course        Last Filed PACU Vitals:  Vitals Value Taken Time   Temp 96.7 °F (35.9 °C) 07/17/25 14:11   Pulse 72 07/17/25 16:36   /61 07/17/25 16:30   Resp 10 07/17/25 16:36   SpO2 98 % 07/17/25 16:36   Vitals shown include unfiled device data.    Modified Rocco:     Vitals Value Taken Time   Activity 2 07/17/25 16:15   Respiration 2 07/17/25 16:15   Circulation 2 07/17/25 16:15   Consciousness 1 07/17/25 16:15   Oxygen Saturation 1 07/17/25 16:15     Modified Rocco Score: 8

## 2025-07-18 NOTE — ASSESSMENT & PLAN NOTE
1 recent fall  Patient admits to recent fall prior to TAVR procedure, fell while changing a curtain in the shower, states she hit her head, denies loss of consiousness   Ambulates with cane intermittently at baseline   Assess patient frequently for physical needs, encourage use of assistant devices as needed and directed by PT/OT  Identify cognitive and physical deficits and behaviors that affect risk of falls  Consider moving patient closer to nursing station to monitor more closely for impulsive behavior which may increase risk of falls  [place] fall and safety precautions   Educate patient/family on patient safety including physical limitations and importance of using call bell for assistance   Modify environment to reduce risk of injury including disconnecting from pole when not in use, ensuring adequate lighting in room and restroom, ensuring that path to restroom is clear and free of trip hazards  PT/OT consulted to assist with strengthening/mobility and assist with discharge planning to appropriate level of care  Out of bed as tolerated

## 2025-07-18 NOTE — ASSESSMENT & PLAN NOTE
S/p successful placement of a 23 mm TAVR bioprosthetic valve.  Postop echocardiogram showing trace transvalvular regurg.  Otherwise the valve was well-seated and functioning properly with an expected prosthetic gradient.  Continue DAPT with aspirin and Plavix.  Restart home Toprol XL 12.5 mg daily.  Continue home Norvasc 2.5 mg daily.  Transition lisinopril to losartan 25 mg daily due to cough.  Continue statin.  Monitor electrolytes and replete as needed.  Close telemetry monitoring.  He does have a history of an incomplete LBBB which converted to a new LBBB immediately postoperatively and shortly after returned to an incomplete LBBB.  Will monitor telemetry closely overnight.  If no further changes she should be okay for discharge without further monitoring.  If her QRS again lengthens we will plan for a 1 week outpatient ZIO.

## 2025-07-18 NOTE — PLAN OF CARE
Problem: PAIN - ADULT  Goal: Verbalizes/displays adequate comfort level or baseline comfort level  Description: Interventions:  - Encourage patient to monitor pain and request assistance  - Assess pain using appropriate pain scale  - Administer analgesics as ordered based on type and severity of pain and evaluate response  - Implement non-pharmacological measures as appropriate and evaluate response  - Consider cultural and social influences on pain and pain management  - Notify physician/advanced practitioner if interventions unsuccessful or patient reports new pain  - Educate patient/family on pain management process including their role and importance of  reporting pain   - Provide non-pharmacologic/complimentary pain relief interventions  Outcome: Progressing     Problem: INFECTION - ADULT  Goal: Absence or prevention of progression during hospitalization  Description: INTERVENTIONS:  - Assess and monitor for signs and symptoms of infection  - Monitor lab/diagnostic results  - Monitor all insertion sites, i.e. indwelling lines, tubes, and drains  - Monitor endotracheal if appropriate and nasal secretions for changes in amount and color  - Sidney appropriate cooling/warming therapies per order  - Administer medications as ordered  - Instruct and encourage patient and family to use good hand hygiene technique  - Identify and instruct in appropriate isolation precautions for identified infection/condition  Outcome: Progressing  Goal: Absence of fever/infection during neutropenic period  Description: INTERVENTIONS:  - Monitor WBC  - Perform strict hand hygiene  - Limit to healthy visitors only  - No plants, dried, fresh or silk flowers with miller in patient room  Outcome: Progressing     Problem: SAFETY ADULT  Goal: Patient will remain free of falls  Description: INTERVENTIONS:  - Educate patient/family on patient safety including physical limitations  - Instruct patient to call for assistance with activity   -  Consider consulting OT/PT to assist with strengthening/mobility based on AM PAC & JH-HLM score  - Consult OT/PT to assist with strengthening/mobility   - Keep Call bell within reach  - Keep bed low and locked with side rails adjusted as appropriate  - Keep care items and personal belongings within reach  - Initiate and maintain comfort rounds  - Make Fall Risk Sign visible to staff  - Offer Toileting every 2 Hours, in advance of need  - Initiate/Maintain bed alarm  - Obtain necessary fall risk management equipment:   - Apply yellow socks and bracelet for high fall risk patients  - Consider moving patient to room near nurses station  Outcome: Progressing  Goal: Maintain or return to baseline ADL function  Description: INTERVENTIONS:  -  Assess patient's ability to carry out ADLs; assess patient's baseline for ADL function and identify physical deficits which impact ability to perform ADLs (bathing, care of mouth/teeth, toileting, grooming, dressing, etc.)  - Assess/evaluate cause of self-care deficits   - Assess range of motion  - Assess patient's mobility; develop plan if impaired  - Assess patient's need for assistive devices and provide as appropriate  - Encourage maximum independence but intervene and supervise when necessary  - Involve family in performance of ADLs  - Assess for home care needs following discharge   - Consider OT consult to assist with ADL evaluation and planning for discharge  - Provide patient education as appropriate  - Monitor functional capacity and physical performance, use of AM PAC & JH-HLM   - Monitor gait, balance and fatigue with ambulation    Outcome: Progressing  Goal: Maintains/Returns to pre admission functional level  Description: INTERVENTIONS:  - Perform AM-PAC 6 Click Basic Mobility/ Daily Activity assessment daily.  - Set and communicate daily mobility goal to care team and patient/family/caregiver.   - Collaborate with rehabilitation services on mobility goals if  consulted  - Perform Range of Motion 2 times a day.  - Reposition patient every 2 hours.  - Dangle patient 3 times a day  - Stand patient 2 times a day  - Ambulate patient 2 times a day  - Out of bed to chair 3 times a day   - Out of bed for meals 3 times a day  - Out of bed for toileting  - Record patient progress and toleration of activity level   Outcome: Progressing     Problem: DISCHARGE PLANNING  Goal: Discharge to home or other facility with appropriate resources  Description: INTERVENTIONS:  - Identify barriers to discharge w/patient and caregiver  - Arrange for needed discharge resources and transportation as appropriate  - Identify discharge learning needs (meds, wound care, etc.)  - Arrange for interpretive services to assist at discharge as needed  - Refer to Case Management Department for coordinating discharge planning if the patient needs post-hospital services based on physician/advanced practitioner order or complex needs related to functional status, cognitive ability, or social support system  Outcome: Progressing     Problem: Knowledge Deficit  Goal: Patient/family/caregiver demonstrates understanding of disease process, treatment plan, medications, and discharge instructions  Description: Complete learning assessment and assess knowledge base.  Interventions:  - Provide teaching at level of understanding  - Provide teaching via preferred learning methods  Outcome: Progressing

## 2025-07-18 NOTE — PROGRESS NOTES
Progress Note - Cardiac Surgery   Jackie Urbina 89 y.o. female MRN: [id number]  Unit/Bed#: PPHP-314-01 Encounter: [id number]    Aortic stenosis, Non-Rheumatic. S/P transfemoral transcatheter aortic valve replacement; POD # 1    24 Hour Events: No need for cardene. Has not urinated this AM- bladder scan for 323. Post op with new LBBB (), repeat with LBBB (). This AM SB at 59 with recurrent 1st degree (hx of ILBBB), Home Toprol 25 mg held.     Denies CP/SOB, remains on 2 L NC, using IS. Tolerating diet. PT/OT recommend home PT/OT.      Medications:   Scheduled Meds:  Current Facility-Administered Medications   Medication Dose Route Frequency Provider Last Rate    acetaminophen  650 mg Rectal Q4H PRN Linda Luu PA-C      acetaminophen  650 mg Oral Q4H PRN Linda Luu PA-C      amLODIPine  2.5 mg Oral Daily Linda Luu PA-C      ascorbic acid  125 mg Oral Daily Linda Luu PA-C      aspirin  81 mg Oral Daily Linda Luu PA-C      atorvastatin  40 mg Oral Daily With Dinner Linda Luu PA-C      bisacodyl  10 mg Rectal Daily PRN Linda Luu PA-C      brimonidine tartrate  1 drop Both Eyes BID Linda Luu PA-C      calcium gluconate  2 g Intravenous Once PRN Linda Luu PA-C      cefazolin  2,000 mg Intravenous Q8H Linda Luu PA-C 2,000 mg (07/18/25 0551)    chlorhexidine  15 mL Mouth/Throat BID Linda Luu PA-C      clopidogrel  75 mg Oral Daily Linda Luu PA-C      furosemide  40 mg Oral Daily Linda Luu PA-C      heparin (porcine)  5,000 Units Subcutaneous Q8H [facility] Linda Luu PA-C      hydrALAZINE  10 mg Intravenous Q6H PRN Linda Luu PA-C      insulin lispro  1-5 Units Subcutaneous HS Linda Luu PA-C      insulin lispro  1-5 Units Subcutaneous TID AC Linda Luu PA-C      lisinopril  10 mg Oral Daily Linda Luu PA-C      mupirocin  1 Application Nasal Q12H [facility] Linda Luu PA-C      niCARdipine  1-15 mg/hr  "Intravenous Titrated Linda Luu PA-C Stopped (07/17/25 2114)    ondansetron  4 mg Intravenous Q6H PRN Linda Luu PA-C      pantoprazole  40 mg Oral Daily Linda Luu PA-C      polyethylene glycol  17 g Oral Daily Linda Luu PA-C      sertraline  25 mg Oral Daily Linda Luu PA-C       Continuous Infusions:niCARdipine, 1-15 mg/hr, Last Rate: Stopped (07/17/25 2114)      PRN Meds:.  acetaminophen    acetaminophen    bisacodyl    calcium gluconate    hydrALAZINE    ondansetron    Vitals:   Vitals:    07/18/25 0600 07/18/25 0715 07/18/25 0743 07/18/25 1005   BP: 139/63  166/72 166/72   BP Location: Right arm  Right arm    Pulse: 55  65 65   Resp: 12  17    Temp:  97.5 °F (36.4 °C)     TempSrc:  Oral     SpO2: 100%  99%    Weight: 49.8 kg (109 lb 12.8 oz)   49.4 kg (109 lb)   Height:    4' 11\" (1.499 m)   Afebrile, HR 50-60s    Telemetry: NSR appears wide- LBBB; Heart Rate: 62    Respiratory:   SpO2: SpO2: 99 %; 2 LPM    Intake/Output:     Intake/Output Summary (Last 24 hours) at 7/18/2025 1038  Last data filed at 7/18/2025 0839  Gross per 24 hour   Intake 2140 ml   Output 1025 ml   Net 1115 ml        Weights:   Weight (last 2 days)       Date/Time Weight    07/18/25 1005 49.4 (109)    07/18/25 0600 49.8 (109.8)    07/17/25 1000 48.5 (106.9)          Admit weight: up 3 lbs    Results:   Results from last 7 days   Lab Units 07/18/25  0353 07/17/25  1426 07/17/25  1348 07/17/25  1318 07/11/25  1338   WBC Thousand/uL 10.12  --   --   --  6.83   HEMOGLOBIN g/dL 10.2* 10.4*  --   --  11.9   I STAT HEMOGLOBIN g/dl  --   --  9.5*   < >  --    HEMATOCRIT % 33.0* 32.9*  --   --  37.2   HEMATOCRIT, ISTAT %  --   --  28*   < >  --    PLATELETS Thousands/uL 162 139*  --   --  171    < > = values in this interval not displayed.     Results from last 7 days   Lab Units 07/18/25  0353 07/17/25  1426 07/17/25  1348 07/17/25  1318 07/11/25  1338   POTASSIUM mmol/L 4.5 3.7  --   --  4.1   CHLORIDE mmol/L 109* " 110*  --   --  105   CO2 mmol/L 28 28  --   --  28   CO2, I-STAT mmol/L  --   --  24   < >  --    BUN mg/dL 35* 38*  --   --  29*   CREATININE mg/dL 0.86 0.82  --   --  0.88   GLUCOSE, ISTAT mg/dl  --   --  105   < >  --    CALCIUM mg/dL 8.9 8.7  --   --  9.6    < > = values in this interval not displayed.     Results from last 7 days   Lab Units 07/11/25  1338   INR  1.02     Recent Labs     07/18/25  0353   MG 2.3     Point of care glucose: 115 - 250 (no hx of DM)    Studies:    ECG: SB att 59 with 1st degree of 210.    CXR: line in correct place, no ptx/effusion    Echocardiogram: to be done today    Results Review Statement: I personally reviewed the following image studies in PACS and associated radiology reports: as above. My interpretation of the radiology images/reports is: as above.    Invasive Lines/Tubes:  Invasive Devices       Central Venous Catheter Line  Duration             CVC Central Lines 07/17/25 <1 day              Peripheral Intravenous Line  Duration             Peripheral IV 07/17/25 Dorsal (posterior);Left Forearm <1 day                    Physical Exam:    General: No acute distress and Normal appearance  HEENT/NECK:  Normocephalic. Atraumatic.  no jugular venous distention.    Cardiac: Regular rate and rhythm and No murmurs/rubs/gallops  Pulmonary:  Breath sounds clear bilaterally and No rales/rhonchi/wheezes  Abdomen:  Non-tender, Non-distended, and Normal bowel sounds  Incisions: Groin puncture sites clean, dry, and intact without hematoma  Extremities: Extremities warm/dry, DP pulses 2+ bilaterally, and No edema B/L  Neuro: Alert and oriented X 3 and No focal deficits  Skin: Warm/Dry, without rashes or lesions.    Assessment:  Active Problems:    Essential hypertension    Nonrheumatic aortic (valve) stenosis    Chronic heart failure with preserved ejection fraction (HFpEF) ([facility])    Hyperlipidemia    Dyslipidemia    Gastroesophageal reflux disease without esophagitis    Coronary artery  disease involving native coronary artery    S/P TAVR (transcatheter aortic valve replacement)       Aortic stenosis, Non-Rheumatic. S/P transfemoral transcatheter aortic valve replacement; POD # 1    Plan:    Cardiac:     Ischemic cardiomyopathy, EF 50%    Hx of ILBBB, post op new LBBB, resolved today with SB (hx) at 59 with recurrent 1st degree- cardiology, recs to restart Toprol XL 12.5 mg    CAD s/p LUIS -mLAD;  D2 & mCirc (3/28/25)     HTN Regimen: SBP increasing, most recent .  -Continue home Norvasc 2.5mg daily  -Transition Lisinopril 10 mg to losartan 25 mg due to cough (give additional dose with elevated systolic)      TAVR anticoagulation regimen: ASA/Plavix (LUIS)    Postoperative transthoracic echocardiogram:  Echo to be completed today    Continue Statin therapy    Maintain central IV access today for medications requiring central IV access    Continue Subcutaneous Heparin for DVT prophylaxis    Pulmonary:     Acute post-op pulmonary insufficiency; Requiring 2 Liters via nasal cannula, secondary to atelectasis and pulmonary vascular congestion. Continue incentive spirometry/coughing/deep breathing exercises.  Wean supplemental oxygen as tolerated for saturation > 90%    Renal:   CKD (baseline Cr 0.6-0.8, GFR 60-70), at baseline Cr 0.86, GRF 59    Intake/Output net: +1 L/24 hours (UOP 1L, up 3 lbs)    Diuretic Regimen:  Continue home lasix 40 mg QD, dose additional 20 lasix IV x 1 w/ k supplementation    Neuro:    Neurologically intact; No active issues     Incisional pain well-controlled; Continue prn Tylenol    GI:    Cardiac diet, with 1800 mL fluid restriction    Tolerating diet without complaint    Continue stool softeners and prn suppository    Continue GI prophylaxis    Endo:     Glucose well-controlled with insulin sliding scale coverage    7.  Hematology:     Post-operative blood count acceptable; Trend prn    8.  Disposition:    Gerontology consultation ordered for routine assessment of  TAVR patient over 65 years old    Anticipated discharge date: Cleared by cardiology for discharge with zio if no issues   Discharge today pending no issues with restart of BB, UOP, echo      VTE Pharmacologic Prophylaxis: Heparin  VTE Mechanical Prophylaxis: sequential compression device    Collaborative rounds completed with supervising physician  Plan of care discussed with bedside nurse    SIGNATURE: Mellissa Morrison PA-C  DATE: July 18, 2025  TIME: 10:38 AM

## 2025-07-18 NOTE — OCCUPATIONAL THERAPY NOTE
Occupational Therapy Evaluation     Patient Name: Jackie Ubrina  Today's Date: 7/18/2025  Problem List  Principal Problem:    S/P TAVR (transcatheter aortic valve replacement)  Active Problems:    Essential hypertension    Nonrheumatic aortic (valve) stenosis    Chronic heart failure with preserved ejection fraction (HFpEF) (HCC)    Current moderate episode of major depressive disorder without prior episode (HCC)    Hyperlipidemia    Dyslipidemia    Gastroesophageal reflux disease without esophagitis    Coronary artery disease involving native coronary artery    Ambulatory dysfunction    Encounter for geriatric assessment    Past Medical History  Past Medical History[1]  Past Surgical History  Past Surgical History[2]      07/18/25 0846   OT Last Visit   OT Visit Date 07/18/25   Note Type   Note type Evaluation   Pain Assessment   Pain Assessment Tool 0-10   Pain Score No Pain   Restrictions/Precautions   Weight Bearing Precautions Per Order No   Other Precautions Cardiac/sternal;Chair Alarm;Bed Alarm;Multiple lines;Telemetry;O2;Fall Risk   Home Living   Type of Home House   Home Layout Two level;Bed/bath upstairs;Ramped entrance   Bathroom Shower/Tub Tub/shower unit   Bathroom Toilet Standard   Bathroom Equipment Shower chair   Bathroom Accessibility Accessible   Home Equipment Walker;Cane  (uses SPC pta)   Additional Comments Pt reports living alone in 2 SH w/ ramped entrance, FFOS to bed/bath, tub shower w/ shower chair, standard toilet.   Prior Function   Level of [place] Independent with ADLs;Independent with functional mobility;Needs assistance with IADLS   Lives With (S)  Alone   Receives Help From Family   IADLs Independent with driving;Independent with meal prep;Independent with medication management  (Pt reports family does assist w/ IADL when they can, but she also completes herself.)   Falls in the last 6 months 1 to 4  (1 fall)   Vocational Retired   Lifestyle   Autonomy Pta pt reports being  "independent in ADL and functional mobility, receives assistance for IADL, (+) .   Reciprocal Relationships son and dtr in law   Service to Others retired   Intrinsic Gratification sleeping   General   Family/Caregiver Present No   Subjective   Subjective \"I don't do too much\"   ADL   Where Assessed Chair   Eating Assistance 6  Modified independent   Grooming Assistance 6  Modified Independent   UB Bathing Assistance 6  Modified Independent   LB Bathing Assistance 5  Supervision/Setup   UB Dressing Assistance 6  Modified independent   LB Dressing Assistance 5  Supervision/Setup   Toileting Assistance  5  Supervision/Setup   Functional Assistance 5  Supervision/Setup   Additional Comments Pt reports her son will help get her \"setup\" at home upon D/C.   Bed Mobility   Sit to Supine 5  Supervision   Additional items Increased time required   Additional Comments Pt greeted OOB in chair, required Supervision for sit>supine, left supine in bed with alarm on and all needs within reach.   Transfers   Sit to Stand 6  Modified independent   Stand to Sit 6  Modified independent   Additional Comments w/ SPC   Functional Mobility   Functional Mobility 5  Supervision   Additional Comments Pt performed short household distance functional mobility w/ Supervision w/ SPC.   Additional items SPC   Balance   Static Sitting Good   Dynamic Sitting Fair +   Static Standing Fair   Dynamic Standing Fair -   Ambulatory Fair -   Activity Tolerance   Activity Tolerance Patient limited by fatigue   Medical Staff Made Aware Co-eval with PT due to medical complexity and co-morbidities, OTR present.   Nurse Made Aware RN cleared for therapy.   RUE Assessment   RUE Assessment WFL   LUE Assessment   LUE Assessment WFL   Hand Function   Gross Motor Coordination Functional   Fine Motor Coordination Functional   Sensation   Light Touch No apparent deficits   Vision-Basic Assessment   Visual History Macular degeneration;Cataracts  (Pt reports " having difficulty reading up close due to cataracts and macular degeneration.)   Patient Visual Report Blurring of print when reading   Cognition   Overall Cognitive Status WFL   Arousal/Participation Alert;Cooperative   Attention Attends with cues to redirect   Orientation Level Oriented X4   Memory Decreased recall of precautions   Following Commands Follows all commands and directions without difficulty   Comments Pt pleasant and cooperative to therapy, has good safety awareness although decreased recall of precautions, pt provided education at end of session.   Assessment   Limitation Decreased ADL status;Decreased endurance;Decreased self-care trans;Decreased high-level ADLs   Prognosis Good   Assessment Pt is a 89 y.o. female who was admitted to [facility] on 7/17/2025 with S/P TAVR (transcatheter aortic valve replacement). Pt seen for an OT evaluation per active OT orders.  Pt  has a past medical history of Anesthesia complication, Anxiety, Arthritis, Cataract, right, Eye hemorrhage, History of shingles, Hypertension, and Mitral valve stenosis, mild. Pt reports living alone in 2  w/ ramped entrance, FFOS to bed/bath, tub shower w/ shower chair, standard toilet.  Pta, pt was independent w/ ADL, and functional mobility with SPC at baseline, receives assistance for IADL and is (+) . Currently, pt is Mod I for UB ADL, Supervision for LB ADL, performed transfers w/ Mod I w/ SPC, and short household distance functional mobility w/ Supervision w/ SPC. Pt currently presents impairments including -steps to enter environment, limited home support, difficulty performing ADLS, and difficulty performing IADLS  activity tolerance, endurance, standing balance/tolerance, and safety . These impairments, as well as pt's fatigue and cardiac/sternal precautions limit pt's ability to safely engage in baseline areas of occupation such as bathing, dressing, toileting, and functional mobility/transfers.   The  patient's raw score on the AM-PAC Daily Activity Inpatient Short Form is 21. A raw score of greater than or equal to 19 suggests the patient may benefit from discharge to home. Please refer to the recommendation of the Occupational Therapist for safe discharge planning.   Pt would benefit from continued acute OT services throughout hospital course and following D/C. Moving forward, OT interventions 2-3x per week to increase safety and participation in ADLs, transfers, and functional mobility. From OT standpoint, recommend pt to return home with increased social support and level III services upon D/C. Pt was left supine in bed with alarm on and all needs within reach.   Goals   Patient Goals to feel good   LTG Time Frame 10-14   Long Term Goal #1 see below   Plan   Treatment Interventions ADL retraining;Endurance training;Functional transfer training;Continued evaluation;Energy conservation;Activityengagement   Goal Expiration Date 08/01/25   OT Frequency 2-3x/wk   Discharge Recommendation   Rehab Resource Intensity Level, OT III (Minimum Resource Intensity)   AM-PAC Daily Activity Inpatient   Lower Body Dressing 3   Bathing 3   Toileting 3   Upper Body Dressing 4   Grooming 4   Eating 4   Daily Activity Raw Score 21   Daily Activity Standardized Score (Calc for Raw Score >=11) 44.27   AM-PAC Applied Cognition Inpatient   Following a Speech/Presentation 4   Understanding Ordinary Conversation 4   Taking Medications 4   Remembering Where Things Are Placed or Put Away 3   Remembering List of 4-5 Errands 3   Taking Care of Complicated Tasks 3   Applied Cognition Raw Score 21   Applied Cognition Standardized Score 44.3   End of Consult   Education Provided Yes  (Pt received and verbalized understanding of Recovering from Minimally Invasive Cardiac Surgery packet.)   Patient Position at End of Consult Supine;Bed/Chair alarm activated;All needs within reach   Nurse Communication Nurse aware of consult       OT  "Goals:    Pt will be Independent in UB ADLs by end of hospital stay.    Pt will be Mod I in LB ADLs by end of hospital stay.    Pt will be Mod I in grooming tasks while standing at sink side.     Pt will perform functional transfers Independently and increased safety awareness by end of hospital stay.    Pt will increase standing tolerance to 5 minutes to safely engage in self-care ADLs at the sink.    Pt will be Mod I in functional mobility with/without any required AD to promote participation in self care tasks by end of hospital stay.    Pt will receive and verbalize understanding of energy conservation techniques to integrate into ADL tasks.     Pt will undergo ongoing cognitive assessment during therapy session while in hospital.    HALEY Laboy                 [1]   Past Medical History:  Diagnosis Date    Anesthesia complication     .Yrs ago had \"anxiety\" reaction not sure while sedated, pt states sensitive to anesthesia effects    Anxiety     stress at home    Arthritis     Cataract, right     Last Assessed:5/11/16    Eye hemorrhage     left eye currently 5/12/16    History of shingles 05/2015    Hypertension     Mitral valve stenosis, mild    [2]   Past Surgical History:  Procedure Laterality Date    CARDIAC CATHETERIZATION N/A 3/28/2025    Procedure: Cardiac RHC/LHC;  Surgeon: Darwin Meyer DO;  [facility];  Service: Cardiology    CARDIAC CATHETERIZATION N/A 3/28/2025    Procedure: Cardiac PCI;  Surgeon: Darwin Meyer DO;  [facility];  Service: Cardiology    CARDIAC CATHETERIZATION N/A 7/17/2025    Procedure: Cardiac tavr;  Surgeon: Cortes Rossi MD;  Location:  MAIN OR;  Service: Cardiology    CARPAL TUNNEL RELEASE Left     CATARACT EXTRACTION      CATARACT EXTRACTION W/ INTRAOCULAR LENS IMPLANT Left 10/11/2016    Procedure: EXTRACTION EXTRACAPSULAR CATARACT PHACO INTRAOCULAR LENS (IOL);  Surgeon: Wale Roth MD;  Location: [facility] MAIN OR; "  Service:     CERVICAL CONE BIOPSY      COLONOSCOPY      EYE SURGERY Left     muscle repair    WV ARTHRP KNE CONDYLE&PLATU MEDIAL&LAT COMPARTMENTS Right 12/15/2020    Procedure: ARTHROPLASTY KNEE TOTAL;  Surgeon: Greg Mitchell DO;  Location: [facility] MAIN OR;  Service: Orthopedics    WV OPTX FEM SHFT FX W/INSJ IMED IMPLT W/WO SCREW Right 07/23/2019    Procedure: INSERTION NAIL IM FEMUR ANTEGRADE (TROCHANTERIC);  Surgeon: Greg Mitchell DO;  Location: [facility] MAIN OR;  Service: Orthopedics    WV REPLACE AORTIC VALVE OPENFEMORAL ARTERY APPROACH N/A 7/17/2025    Procedure: REPLACEMENT AORTIC VALVE TRANSCATHETER (TAVR) TRANSFEMORAL W/ 23MM VALVE(ACCESS ON RIGHT) IMTIAZ;  Surgeon: Raj Méndez MD;  Location:  MAIN OR;  Service: Cardiac Surgery    WV XCAPSL CTRC RMVL INSJ IO LENS PROSTH W/O ECP Right 05/17/2016    Procedure: EXTRACTION EXTRACAPSULAR CATARACT PHACO INTRAOCULAR LENS (IOL);  Surgeon: Wale Roth MD;  Location: [facility] MAIN OR;  Service: Ophthalmology    TONSILLECTOMY

## 2025-07-18 NOTE — DISCHARGE INSTR - AVS FIRST PAGE
Transcatheter Aortic Valve Replacement (TAVR)    What you need to know about a TAVR:     A TAVR is a procedure to replace your aortic valve. It is done without removing your old valve. The aortic valve opens and closes to let blood flow from your heart to the rest of your body.   Your valve has been replaced with a tissue valve. The tissue has been made from the lining that surrounds the heart of a cow.    Recovering from surgery:     There may be bruising or pain in your groin, where the valve was inserted. You may take over the counter acetaminophen (Tylenol) as needed for discomfort.  Your incision is sealed with surgical glue. This will fall off after a few weeks. Do not pick this off.   Do not drive for two weeks  Do not lift over 25 pounds for two weeks.   Shower every day. Keep your incision dry. Do not apply lotions, powders, or ointments to your incision.    Blood thinners after surgery:     You have been prescribed blood thinners to help prevent blood clots. Blood clots can cause strokes, heart attacks, and death.   While taking any blood thinner, watch for bleeding and bruising. Watch for blood in your urine and bowel movements. Use a soft washcloth on your skin, and a soft toothbrush to brush your teeth. If you shave, use an electric shaver. Do not play contact sports.    Do not start or stop any other medicines unless your healthcare provider tells you to do so. (Many medicines cannot be used with blood thinners)    Take your blood thinner exactly as prescribed by your healthcare provider. Do not skip a dose or take less than prescribed. Tell your provider right away if you forget to take your blood thinner, or if you took too much.    Call your doctor if:     You develop any bleeding from the incisions in your groin.   Your leg feels numb, cool, or looks pale.   You feel dizzy or lightheaded  Your groin puncture is red, swollen, or draining pus.  Your groin puncture looks more bruised/swollen or  you have new bruising on the side of your leg.   You have nausea or are vomiting.    Antibiotic Prophylaxis:     After TAVR, you are at an increased risk for developing a valve infection with many common dental procedures. Bacteria that normally lives in your mouth can cross into your bloodstream with any dental work (even cleanings). The immune system normally kills these bacteria, but antibiotic prophylaxis provides extra protection.   Inform your dentist that you have had a TAVR  Do not schedule routine dental cleaning for six months following surgery.   Antibiotics will be prescribed by your dentist, prior to your procedure

## 2025-07-18 NOTE — PHYSICAL THERAPY NOTE
Physical Therapy Evaluation     Patient's Name: Jackie Urbina    Admitting Diagnosis  Nonrheumatic aortic (valve) stenosis [I35.0]  Chronic heart failure with preserved ejection fraction (HFpEF) ([facility]) [I50.32]  Coronary artery disease involving native coronary artery of native heart without angina pectoris [I25.10]  S/P drug eluting coronary stent placement [Z95.5]    Problem List  Problem List[1]    Past Medical History  Past Medical History[2]    Past Surgical History  Past Surgical History[3]       07/18/25 0847   PT Last Visit   PT Visit Date 07/18/25   Note Type   Note type Evaluation   Pain Assessment   Pain Assessment Tool FLACC   Pain Location/Orientation Location: Hip   Pain Onset/Description Onset: Ongoing;Frequency: Intermittent;Descriptor: Aching;Descriptor: Discomfort;Descriptor: Sore   Effect of Pain on Daily Activities discomfort w/ bed mob   Patient's Stated Pain Goal No pain   Hospital Pain Intervention(s) Repositioned;Ambulation/increased activity;Emotional support   Pain Rating: FLACC (Rest) - Face 0   Pain Rating: FLACC (Rest) - Legs 0   Pain Rating: FLACC (Rest) - Activity 0   Pain Rating: FLACC (Rest) - Cry 0   Pain Rating: FLACC (Rest) - Consolability 0   Score: FLACC (Rest) 0   Pain Rating: FLACC (Activity) - Face 1   Pain Rating: FLACC (Activity) - Legs 0   Pain Rating: FLACC (Activity) - Activity 0   Pain Rating: FLACC (Activity) - Cry 1   Pain Rating: FLACC (Activity) - Consolability 1   Score: FLACC (Activity) 3   Restrictions/Precautions   Other Precautions Cardiac/sternal;Bed Alarm;Chair Alarm;Multiple lines;Telemetry;O2   Home Living   Type of Home House   Home Layout Two level;Ramped entrance   Home Equipment Cane   Prior Function   Level of [place] Independent with functional mobility  (amb w/ SPC at times)   Lives With Alone   Receives Help From Family   Falls in the last 6 months 1 to 4   General   Additional Pertinent History cleared for assessment by breanna   Cognition    Overall Cognitive Status WFL   Arousal/Participation Alert   Orientation Level Oriented to person;Oriented to place;Oriented to situation   Memory Decreased recall of precautions   Following Commands Follows one step commands without difficulty   Subjective   Subjective Alert; in the chair; agreeable to mobilize   RUE Assessment   RUE Assessment WFL  (AROM)   LUE Assessment   LUE Assessment WFL  (AROM)   RLE Assessment   RLE Assessment WFL  (AROM)   Strength RLE   RLE Overall Strength   (fair +/ good -)   LLE Assessment   LLE Assessment WFL  (AROM)   Strength LLE   LLE Overall Strength   (fair +/ good -)   Bed Mobility   Sit to Supine 5  Supervision   Additional items Increased time required   Transfers   Sit to Stand 6  Modified independent   Stand to Sit 6  Modified independent   Ambulation/Elevation   Gait pattern Excessively slow;Short stride;Inconsistent addy  (occasional sway w/ no overt LOB)   Gait Assistance 5  Supervision   Additional items Verbal cues   Assistive Device SPC  (provided to cont using it at home)   Distance 2 x 30 ft w/ steps negotiation in between   Stair Management Assistance 5  Supervision   Additional items Verbal cues  (discussed sequencing)   Stair Management Technique One rail L;Step to pattern;Foreward;Backward;Nonreciprocal;With cane   Number of Stairs 6  (1 step x 6 trials)   Balance   Static Sitting Good   Dynamic Sitting Fair +   Static Standing Fair   Dynamic Standing Fair -   Ambulatory Fair -   Activity Tolerance   Activity Tolerance Patient limited by fatigue   Medical Staff Made Aware spoke to RALF Malloy w/ CT sx; Co-kayla performed w/ OTR due to complexity of medical status and multiple comorbidities   Nurse Made Aware spoke to JUSTUS Alvarenga   Assessment   Prognosis Good   Problem List Decreased strength;Decreased endurance;Impaired balance;Decreased mobility   Assessment Pt is 89 y.o. female admitted with Dx of Symptomatic severe aortic stenosis and underwent  Transcatheter aortic valve replacement with a 23 mm Valdez HANK 3 Ultra Resilia bioprosthetic valve via a percutaneous right transfemoral approach on 7/17/2025. Postop course included: LBBB; being monitored. Pt 's comorbidities affecting POC include: Anxiety, Arthritis, Hypertension, PAC, episode of major depressive disorder and Mitral valve stenosis, mild.and personal factors of: advanced age and living alone. Pt's clinical presentation is currently unstable/unpredictable which is evident in ongoing telem monitoring, abn lab values, suppl O2 needs, and ongoing postop management. Pt presents w/ min overall weakness, decreased functional endurance and inconsistent amb balance and gait patterns but w/ overall observed mobility status appearing to be near premorbid level. Will cont to follow pt in PT for progressive mobilization to max level of (I), endurance, and safety. Otherwise, anticipate pt will return home upon D/C when medically cleared; D/C recommendations are outlined below. Will cont to follow until then.   Goals   Patient Goals to go home   STG Expiration Date 07/23/25   Short Term Goal #1 3-5 days. Pt will amb 150 ft w/ no AD <--> SPC, mod (I) in order to initiate return to community amb status. Pt will negotiate 14 steps w/ hand rail and SPC PRN, mod (I) in order to assure safe navigation between the levels of premorbid living environment. Pt will achieve mod (I) level w/ bed mob in order to facilitate safety with OOB and back to bed transitions in own living environment. Pt will participate in LE therex and balance activities to max progression w/ mobility skills.   PT Treatment Day 0   Plan   Treatment/Interventions LE strengthening/ROM;Elevations;Therapeutic exercise;Endurance training;Equipment eval/education;Bed mobility;Gait training;Spoke to nursing;Spoke to case management;Spoke to advanced practitioner;OT   PT Frequency 3-5x/wk   Discharge Recommendation   Rehab Resource Intensity Level, PT  III (Minimum Resource Intensity)   Equipment Recommended Cane  (provided to the pt)   AM-PAC Basic Mobility Inpatient   Turning in Flat Bed Without Bedrails 4   Lying on Back to Sitting on Edge of Flat Bed Without Bedrails 3   Moving Bed to Chair 3   Standing Up From Chair Using Arms 4   Walk in Room 3   Climb 3-5 Stairs With Railing 3   Basic Mobility Inpatient Raw Score 20   Basic Mobility Standardized Score 43.99   [facility] Highest Level Of Mobility   -HLM Goal 6: Walk 10 steps or more   -HLM Achieved 7: Walk 25 feet or more   Modified Luttrell Scale   Modified Rashmi Scale 3   End of Consult   Patient Position at End of Consult Supine;Bed/Chair alarm activated;All needs within reach         Mars Stokes, PT         [1]   Patient Active Problem List  Diagnosis    Essential hypertension    Nonrheumatic aortic (valve) stenosis    Atrial dilatation, bilateral    Mild tricuspid insufficiency    Non-rheumatic mitral regurgitation    Chronic heart failure with preserved ejection fraction (HFpEF) (HCC)    Localized edema    Other fatigue    Arthritis    Mitral valve stenosis, mild    Hypokalemia    Current moderate episode of major depressive disorder without prior episode (HCC)    Hyperlipidemia    Status post total right knee replacement using cement    Dyslipidemia    Lymphedema of both lower extremities    Moderate aortic regurgitation    Primary osteoarthritis involving multiple joints    Gastroesophageal reflux disease without esophagitis    Hypersomnia with long sleep time, idiopathic    Premature atrial contractions    Elevated troponin    Influenza A virus present    Lymphedema/ volume overload    Abnormal CT of the chest    Coronary artery disease involving native coronary artery    Hematoma of arm, right, initial encounter    Right radial pseudoaneurysm    S/P drug eluting coronary stent placement    S/P TAVR (transcatheter aortic valve replacement)    Ambulatory dysfunction   [2]   Past Medical  "History:  Diagnosis Date    Anesthesia complication     .Yrs ago had \"anxiety\" reaction not sure while sedated, pt states sensitive to anesthesia effects    Anxiety     stress at home    Arthritis     Cataract, right     Last Assessed:5/11/16    Eye hemorrhage     left eye currently 5/12/16    History of shingles 05/2015    Hypertension     Mitral valve stenosis, mild    [3]   Past Surgical History:  Procedure Laterality Date    CARDIAC CATHETERIZATION N/A 3/28/2025    Procedure: Cardiac RHC/LHC;  Surgeon: Darwin Meyer DO;  [facility];  Service: Cardiology    CARDIAC CATHETERIZATION N/A 3/28/2025    Procedure: Cardiac PCI;  Surgeon: Darwin Meyer DO;  [facility];  Service: Cardiology    CARPAL TUNNEL RELEASE Left     CATARACT EXTRACTION      CATARACT EXTRACTION W/ INTRAOCULAR LENS IMPLANT Left 10/11/2016    Procedure: EXTRACTION EXTRACAPSULAR CATARACT PHACO INTRAOCULAR LENS (IOL);  Surgeon: Wale Roth MD;  Location: [facility] MAIN OR;  Service:     CERVICAL CONE BIOPSY      COLONOSCOPY      EYE SURGERY Left     muscle repair    HI ARTHRP KNE CONDYLE&PLATU MEDIAL&LAT COMPARTMENTS Right 12/15/2020    Procedure: ARTHROPLASTY KNEE TOTAL;  Surgeon: Greg Mitchell DO;  Location: [facility]IN [facility];  Service: Orthopedics    HI OPTX FEM SHFT FX W/INSJ IMED IMPLT W/WO SCREW Right 07/23/2019    Procedure: INSERTION NAIL IM FEMUR ANTEGRADE (TROCHANTERIC);  Surgeon: Greg Mitchell DO;  Location: [facility]IN OR;  Service: Orthopedics    HI XCAPSL CTRC RMVL INSJ IO LENS PROSTH W/O ECP Right 05/17/2016    Procedure: EXTRACTION EXTRACAPSULAR CATARACT PHACO INTRAOCULAR LENS (IOL);  Surgeon: Wale Roth MD;  Location: [facility] MAIN OR;  Service: Ophthalmology    TONSILLECTOMY       "

## 2025-07-19 LAB
ANION GAP SERPL CALCULATED.3IONS-SCNC: 4 MMOL/L (ref 4–13)
ATRIAL RATE: 59 BPM
BUN SERPL-MCNC: 31 MG/DL (ref 5–25)
CALCIUM SERPL-MCNC: 8.7 MG/DL (ref 8.4–10.2)
CHLORIDE SERPL-SCNC: 107 MMOL/L (ref 96–108)
CO2 SERPL-SCNC: 30 MMOL/L (ref 21–32)
CREAT SERPL-MCNC: 0.76 MG/DL (ref 0.6–1.3)
ERYTHROCYTE [DISTWIDTH] IN BLOOD BY AUTOMATED COUNT: 13.6 % (ref 11.6–15.1)
GFR SERPL CREATININE-BSD FRML MDRD: 69 ML/MIN/1.73SQ M
GLUCOSE SERPL-MCNC: 104 MG/DL (ref 65–140)
GLUCOSE SERPL-MCNC: 105 MG/DL (ref 65–140)
GLUCOSE SERPL-MCNC: 127 MG/DL (ref 65–140)
GLUCOSE SERPL-MCNC: 97 MG/DL (ref 65–140)
GLUCOSE SERPL-MCNC: 99 MG/DL (ref 65–140)
HCT VFR BLD AUTO: 34.5 % (ref 34.8–46.1)
HGB BLD-MCNC: 10.6 G/DL (ref 11.5–15.4)
MCH RBC QN AUTO: 28.9 PG (ref 26.8–34.3)
MCHC RBC AUTO-ENTMCNC: 30.7 G/DL (ref 31.4–37.4)
MCV RBC AUTO: 94 FL (ref 82–98)
P AXIS: 76 DEGREES
PLATELET # BLD AUTO: 147 THOUSANDS/UL (ref 149–390)
PMV BLD AUTO: 11.6 FL (ref 8.9–12.7)
POTASSIUM SERPL-SCNC: 4.2 MMOL/L (ref 3.5–5.3)
PR INTERVAL: 210 MS
QRS AXIS: -39 DEGREES
QRSD INTERVAL: 110 MS
QT INTERVAL: 448 MS
QTC INTERVAL: 443 MS
RBC # BLD AUTO: 3.67 MILLION/UL (ref 3.81–5.12)
SODIUM SERPL-SCNC: 141 MMOL/L (ref 135–147)
T WAVE AXIS: 83 DEGREES
VENTRICULAR RATE: 59 BPM
WBC # BLD AUTO: 9.69 THOUSAND/UL (ref 4.31–10.16)

## 2025-07-19 PROCEDURE — 93010 ELECTROCARDIOGRAM REPORT: CPT | Performed by: INTERNAL MEDICINE

## 2025-07-19 PROCEDURE — 80048 BASIC METABOLIC PNL TOTAL CA: CPT | Performed by: PHYSICIAN ASSISTANT

## 2025-07-19 PROCEDURE — 99233 SBSQ HOSP IP/OBS HIGH 50: CPT | Performed by: THORACIC SURGERY (CARDIOTHORACIC VASCULAR SURGERY)

## 2025-07-19 PROCEDURE — 82948 REAGENT STRIP/BLOOD GLUCOSE: CPT

## 2025-07-19 PROCEDURE — 93005 ELECTROCARDIOGRAM TRACING: CPT

## 2025-07-19 PROCEDURE — 85027 COMPLETE CBC AUTOMATED: CPT | Performed by: PHYSICIAN ASSISTANT

## 2025-07-19 RX ORDER — LOSARTAN POTASSIUM 50 MG/1
50 TABLET ORAL DAILY
Status: DISCONTINUED | OUTPATIENT
Start: 2025-07-19 | End: 2025-07-20

## 2025-07-19 RX ORDER — LOSARTAN POTASSIUM 50 MG/1
50 TABLET ORAL DAILY
Refills: 0 | Status: CANCELLED | OUTPATIENT
Start: 2025-07-20

## 2025-07-19 RX ORDER — ACETAMINOPHEN 325 MG/1
650 TABLET ORAL EVERY 4 HOURS PRN
Status: CANCELLED
Start: 2025-07-19

## 2025-07-19 RX ADMIN — MUPIROCIN 1 APPLICATION: 20 OINTMENT TOPICAL at 21:36

## 2025-07-19 RX ADMIN — ASPIRIN 81 MG CHEWABLE TABLET 81 MG: 81 TABLET CHEWABLE at 09:07

## 2025-07-19 RX ADMIN — HEPARIN SODIUM 5000 UNITS: 5000 INJECTION INTRAVENOUS; SUBCUTANEOUS at 05:32

## 2025-07-19 RX ADMIN — CHLORHEXIDINE GLUCONATE 15 ML: 1.2 SOLUTION ORAL at 21:35

## 2025-07-19 RX ADMIN — ATORVASTATIN CALCIUM 40 MG: 40 TABLET, FILM COATED ORAL at 17:25

## 2025-07-19 RX ADMIN — HEPARIN SODIUM 5000 UNITS: 5000 INJECTION INTRAVENOUS; SUBCUTANEOUS at 16:23

## 2025-07-19 RX ADMIN — PANTOPRAZOLE SODIUM 40 MG: 40 TABLET, DELAYED RELEASE ORAL at 05:32

## 2025-07-19 RX ADMIN — CLOPIDOGREL BISULFATE 75 MG: 75 TABLET, FILM COATED ORAL at 09:07

## 2025-07-19 RX ADMIN — HEPARIN SODIUM 5000 UNITS: 5000 INJECTION INTRAVENOUS; SUBCUTANEOUS at 21:36

## 2025-07-19 RX ADMIN — AMLODIPINE BESYLATE 2.5 MG: 2.5 TABLET ORAL at 09:07

## 2025-07-19 RX ADMIN — FUROSEMIDE 40 MG: 40 TABLET ORAL at 09:07

## 2025-07-19 RX ADMIN — SERTRALINE 25 MG: 25 TABLET, FILM COATED ORAL at 09:06

## 2025-07-19 RX ADMIN — BRIMONIDINE TARTRATE 1 DROP: 2 SOLUTION/ DROPS OPHTHALMIC at 21:36

## 2025-07-19 RX ADMIN — METOPROLOL SUCCINATE 12.5 MG: 25 TABLET, EXTENDED RELEASE ORAL at 09:06

## 2025-07-19 RX ADMIN — Medication 125 MG: at 09:09

## 2025-07-19 RX ADMIN — CHLORHEXIDINE GLUCONATE 15 ML: 1.2 SOLUTION ORAL at 09:08

## 2025-07-19 RX ADMIN — BRIMONIDINE TARTRATE 1 DROP: 2 SOLUTION/ DROPS OPHTHALMIC at 09:09

## 2025-07-19 RX ADMIN — LOSARTAN POTASSIUM 50 MG: 50 TABLET, FILM COATED ORAL at 09:06

## 2025-07-19 RX ADMIN — MUPIROCIN 1 APPLICATION: 20 OINTMENT TOPICAL at 09:08

## 2025-07-19 NOTE — CASE MANAGEMENT
Case Management Assessment & Discharge Planning Note    Patient name Jackie Urbina  Location [facility]01 MRN [id number]  : 1935 Date 2025       Current Admission Date: 2025  Current Admission Diagnosis:S/P TAVR (transcatheter aortic valve replacement)   Patient Active Problem List    Diagnosis Date Noted    Ambulatory dysfunction 2025    Encounter for geriatric assessment 2025    S/P TAVR (transcatheter aortic valve replacement) 2025    S/P drug eluting coronary stent placement 2025    Right radial pseudoaneurysm 2025    Hematoma of arm, right, initial encounter 2025    Coronary artery disease involving native coronary artery 2025    Abnormal CT of the chest 2025    Elevated troponin 2025    Influenza A virus present 2025    Lymphedema/ volume overload 2025    Hypersomnia with long sleep time, idiopathic 2023    Premature atrial contractions 2023    Gastroesophageal reflux disease without esophagitis 2023    Primary osteoarthritis involving multiple joints 2022    Moderate aortic regurgitation 2022    Dyslipidemia 2021    Lymphedema of both lower extremities 2021    Status post total right knee replacement using cement 12/15/2020    Hyperlipidemia 2020    Current moderate episode of major depressive disorder without prior episode ([facility]) 2019    Hypokalemia 2019    Mitral valve stenosis, mild     Arthritis 10/19/2018    Chronic heart failure with preserved ejection fraction (HFpEF) ([facility]) 05/10/2018    Localized edema 05/10/2018    Other fatigue 05/10/2018    Nonrheumatic aortic (valve) stenosis 2016    Atrial dilatation, bilateral 2016    Mild tricuspid insufficiency 2016    Non-rheumatic mitral regurgitation 2016    Essential hypertension 2013      LOS (days): 2  Geometric Mean LOS (GMLOS) (days): 1.3  Days to GMLOS:-0.6      OBJECTIVE:    Risk of Unplanned Readmission Score: 20.23         Current admission status: Inpatient       Preferred Pharmacy:   [id number] - [place], [place] - [address]  [address] [address]  Phone: [phone] Fax: [phone]    [facility] - [place], [place] - [address]  [address]  [place] [address]  Phone: [phone] Fax: [phone]    Primary Care Provider: Tom Howell MD    Primary Insurance: MEDICARE  Secondary Insurance: BLUE CROSS    ASSESSMENT:  Patient Information  Admitted from:: Home  Mental Status: Alert  During Assessment patient was accompanied by: Not accompanied during assessment  Assessment information provided by:: Patient  Support Systems: Self, Children, Friends/neighbors  County of Residence: [place]  What city do you live in?: [place]  Home entry access options. Select all that apply.: Ramp  Type of Current Residence: 2 story home  Upon entering residence, is there a bedroom on the main floor (no further steps)?: No  A bedroom is located on the following floor levels of residence (select all that apply):: 2nd Floor  Upon entering residence, is there a bathroom on the main floor (no further steps)?: No  Indicate which floors of current residence have a bathroom (select all the apply):: 2nd Floor  Number of steps to 2nd floor from main floor: One Flight  Living Arrangements: Lives Alone  Is patient a ?: No    Activities of Daily Living Prior to Admission  Functional Status: Independent  Completes ADLs independently?: Yes  Ambulates independently?: Yes  Does patient use assisted devices?: Yes (amb w/o AD baseline, occasional cane use)  Does patient currently own DME?: Yes  What DME does the patient currently own?: Shower Chair, Walker, Straight Cane  Does patient have a history of Outpatient Therapy (PT/OT)?: Yes  Does the patient have a history of Short-Term Rehab?: No  Does  patient have a history of HHC?: Yes (pt does not recall agency name)  Does patient currently have HHC?: No    Patient Information Continued  Income Source: Pension/MCFP  Does patient have prescription coverage?: Yes  Does patient receive dialysis treatments?: No  Does patient have a history of substance abuse?: No    Means of Transportation  Means of Transport to [facility]:: Drives Self, Family transport    DISCHARGE DETAILS:    Discharge planning discussed with:: pt bedside  Freedom of Choice: Yes  Comments - Freedom of Choice: discussed  CM contacted family/caregiver?: No- see comments (a&ox4)  Were Treatment Team discharge recommendations reviewed with patient/caregiver?: Yes  Did patient/caregiver verbalize understanding of patient care needs?: Yes  Were patient/caregiver advised of the risks associated with not following Treatment Team discharge recommendations?: Yes    Requested Home Health Care         Is the patient interested in HHC at discharge?: Yes (blanket referral to in-network providers sent via Aidin w/ patient permission)  Home Health Discipline requested:: Occupational Therapy, Physical Therapy  HHA External Referral Reason (only applicable if external HHA name selected): Services not provided in network or near patient location ([facility] does not service patient's area)  Home Health Follow-Up Provider:: PCP  Home Health Services Needed:: Evaluate Functional Status and Safety, Gait/ADL Training, Strengthening/Theraputic Exercises to Improve Function  Homebound Criteria Met:: Requires the Assistance of Another Person for Safe Ambulation or to Leave the Home, Uses an Assist Device (i.e. cane, walker, etc)  Supporting Clincal Findings:: Limited Endurance    Other Referral/Resources/Interventions Provided:  Interventions: HHC     Treatment Team Recommendation: Home with Home Health Care  Expected Discharge Disposition: Home Health Services     Transport at Discharge : Family

## 2025-07-19 NOTE — PLAN OF CARE
Problem: PAIN - ADULT  Goal: Verbalizes/displays adequate comfort level or baseline comfort level  Description: Interventions:  - Encourage patient to monitor pain and request assistance  - Assess pain using appropriate pain scale  - Administer analgesics as ordered based on type and severity of pain and evaluate response  - Implement non-pharmacological measures as appropriate and evaluate response  - Consider cultural and social influences on pain and pain management  - Notify physician/advanced practitioner if interventions unsuccessful or patient reports new pain  - Educate patient/family on pain management process including their role and importance of  reporting pain   - Provide non-pharmacologic/complimentary pain relief interventions  Outcome: Progressing     Problem: SAFETY ADULT  Goal: Maintain or return to baseline ADL function  Description: INTERVENTIONS:  -  Assess patient's ability to carry out ADLs; assess patient's baseline for ADL function and identify physical deficits which impact ability to perform ADLs (bathing, care of mouth/teeth, toileting, grooming, dressing, etc.)  - Assess/evaluate cause of self-care deficits   - Assess range of motion  - Assess patient's mobility; develop plan if impaired  - Assess patient's need for assistive devices and provide as appropriate  - Encourage maximum independence but intervene and supervise when necessary  - Involve family in performance of ADLs  - Assess for home care needs following discharge   - Consider OT consult to assist with ADL evaluation and planning for discharge  - Provide patient education as appropriate  - Monitor functional capacity and physical performance, use of AM PAC & JH-HLM   - Monitor gait, balance and fatigue with ambulation    Outcome: Progressing

## 2025-07-19 NOTE — CASE MANAGEMENT
Case Management Discharge Planning Note    Patient name Jackie Urbina  Location [facility]01 MRN [id number]  : 1935 Date 2025       Current Admission Date: 2025  Current Admission Diagnosis:S/P TAVR (transcatheter aortic valve replacement)   Patient Active Problem List    Diagnosis Date Noted    Ambulatory dysfunction 2025    Encounter for geriatric assessment 2025    S/P TAVR (transcatheter aortic valve replacement) 2025    S/P drug eluting coronary stent placement 2025    Right radial pseudoaneurysm 2025    Hematoma of arm, right, initial encounter 2025    Coronary artery disease involving native coronary artery 2025    Abnormal CT of the chest 2025    Elevated troponin 2025    Influenza A virus present 2025    Lymphedema/ volume overload 2025    Hypersomnia with long sleep time, idiopathic 2023    Premature atrial contractions 2023    Gastroesophageal reflux disease without esophagitis 2023    Primary osteoarthritis involving multiple joints 2022    Moderate aortic regurgitation 2022    Dyslipidemia 2021    Lymphedema of both lower extremities 2021    Status post total right knee replacement using cement 12/15/2020    Hyperlipidemia 2020    Current moderate episode of major depressive disorder without prior episode ([facility]) 2019    Hypokalemia 2019    Mitral valve stenosis, mild     Arthritis 10/19/2018    Chronic heart failure with preserved ejection fraction (HFpEF) (H[facility]) 05/10/2018    Localized edema 05/10/2018    Other fatigue 05/10/2018    Nonrheumatic aortic (valve) stenosis 2016    Atrial dilatation, bilateral 2016    Mild tricuspid insufficiency 2016    Non-rheumatic mitral regurgitation 2016    Essential hypertension 2013      LOS (days): 2  Geometric Mean LOS (GMLOS) (days): 1.3  Days to GMLOS:-0.8     OBJECTIVE:  Risk of  Unplanned Readmission Score: 19.47       Current admission status: Inpatient   Preferred Pharmacy:   [id number] - [place], [place] - [address] [facility]  [address] [address]  Phone: [phone] Fax: [phone]    [facility] - [place] - [address]  [address]  [place] [address]  Phone: [phone] Fax: [phone]    Primary Care Provider: Tom Howell MD    Primary Insurance: MEDICARE  Secondary Insurance: BLUE CROSS    DISCHARGE DETAILS:  Requested Home Health Care         Is the patient interested in HHC at discharge?: Yes  Home Health Discipline requested:: Occupational Therapy, Physical Therapy, Nursing  Home Health Agency Name:: [facility]  Home Health Services Needed:: Heart Failure Management, Evaluate Functional Status and Safety, Gait/ADL Training, Strengthening/Theraputic Exercises to Improve Function    Treatment Team Recommendation: Home with Home Health Care   Transport at Discharge : Family

## 2025-07-19 NOTE — PROGRESS NOTES
Progress Note - Cardiac Surgery   Jackie Urbina 89 y.o. female MRN: [id number]  Unit/Bed#: [facility]314-01 Encounter: [id number]    Aortic stenosis, Non-Rheumatic. S/P transfemoral transcatheter aortic valve replacement; POD # 2    24 Hour Events: Doing ok. NSR with LBBB, stable. No other complaints.     Medications:   Scheduled Meds:  Current Facility-Administered Medications   Medication Dose Route Frequency Provider Last Rate    acetaminophen  650 mg Rectal Q4H PRN Linda Luu PA-C      acetaminophen  650 mg Oral Q4H PRN Linda Luu PA-C      amLODIPine  2.5 mg Oral Daily Linda Luu PA-C      ascorbic acid  125 mg Oral Daily Linda Luu PA-C      aspirin  81 mg Oral Daily Linda Luu PA-C      atorvastatin  40 mg Oral Daily With Dinner Linda Luu PA-C      bisacodyl  10 mg Rectal Daily PRN Linda Luu PA-C      brimonidine tartrate  1 drop Both Eyes BID Linda Luu PA-C      calcium gluconate  2 g Intravenous Once PRN Linda Luu PA-C      chlorhexidine  15 mL Mouth/Throat BID Linda Luu PA-C      clopidogrel  75 mg Oral Daily Linda Luu PA-C      furosemide  40 mg Oral Daily Linda Luu PA-C      heparin (porcine)  5,000 Units Subcutaneous Q8H [facility] Linda Luu PA-C      hydrALAZINE  10 mg Intravenous Q6H PRN Linda Luu PA-C      insulin lispro  1-5 Units Subcutaneous HS Linda Luu PA-C      insulin lispro  1-5 Units Subcutaneous TID AC Linda Luu PA-C      losartan  25 mg Oral Daily Mellissa Morrison PA-C      metoprolol succinate  12.5 mg Oral Daily Mellissa Morrison PA-C      mupirocin  1 Application Nasal Q12H [facility] Linda Luu PA-C      niCARdipine  1-15 mg/hr Intravenous Titrated Linda Luu PA-C Stopped (07/17/25 2114)    ondansetron  4 mg Intravenous Q6H PRN Linda Luu PA-C      pantoprazole  40 mg Oral Daily Linda Luu PA-C      polyethylene glycol  17 g Oral Daily Linda Luu PA-C      sertraline  25 mg  Oral Daily Linda Luu PA-C       Continuous Infusions:niCARdipine, 1-15 mg/hr, Last Rate: Stopped (07/17/25 2114)      PRN Meds:.  acetaminophen    acetaminophen    bisacodyl    calcium gluconate    hydrALAZINE    ondansetron    Vitals:   Vitals:    07/18/25 2100 07/18/25 2254 07/19/25 0300 07/19/25 0600   BP:  124/60 140/67    BP Location:  Left arm Left arm    Pulse:  58 56    Resp:  19 18    Temp:  98.1 °F (36.7 °C) 98.4 °F (36.9 °C)    TempSrc:  Oral Oral    SpO2: 95% 93% 94%    Weight:    49.8 kg (109 lb 12.6 oz)   Height:       Afebrile, HR 50-60s    Telemetry: NSR; Heart Rate: 83 with LBBB     Respiratory:   SpO2: SpO2: 94 %; RA    Intake/Output:     Intake/Output Summary (Last 24 hours) at 7/19/2025 0651  Last data filed at 7/19/2025 0600  Gross per 24 hour   Intake 478 ml   Output 1650 ml   Net -1172 ml        Weights:   Weight (last 2 days)       Date/Time Weight    07/19/25 0600 49.8 (109.79)    07/18/25 1005 49.4 (109)    07/18/25 0600 49.8 (109.8)    07/17/25 1000 48.5 (106.9)          Admit weight: up 3 lbs    Results:   Results from last 7 days   Lab Units 07/19/25  0411 07/18/25  0353 07/17/25  1426   WBC Thousand/uL 9.69 10.12  --    HEMOGLOBIN g/dL 10.6* 10.2* 10.4*   HEMATOCRIT % 34.5* 33.0* 32.9*   PLATELETS Thousands/uL 147* 162 139*     Results from last 7 days   Lab Units 07/19/25  0411 07/18/25  0353 07/17/25  1426 07/17/25  1348   POTASSIUM mmol/L 4.2 4.5 3.7  --    CHLORIDE mmol/L 107 109* 110*  --    CO2 mmol/L 30 28 28  --    CO2, I-STAT mmol/L  --   --   --  24   BUN mg/dL 31* 35* 38*  --    CREATININE mg/dL 0.76 0.86 0.82  --    GLUCOSE, ISTAT mg/dl  --   --   --  105   CALCIUM mg/dL 8.7 8.9 8.7  --            Recent Labs     07/18/25  0353   MG 2.3     Point of care glucose: 105 - 166 (no hx of DM)    Studies:    ECG: SB att 59 with 1st degree of 210.    CXR: line in correct place, no ptx/effusion    Echocardiogram:    Left Ventricle: Left ventricular cavity size is  normal. Wall thickness is normal. There is moderate to severe asymmetric hypertrophy of the basal septal wall. The left ventricular ejection fraction is 65%. Systolic function is normal. Wall motion is normal.    Left Atrium: The atrium is severely dilated (>48 mL/m2).    Right Atrium: The atrium is mildly dilated.    Aortic Valve: There is an Valdez HANK 3 Ultra Resilia  23 mm TAVR bioprosthetic valve. The prosthetic valve appears well-seated and appears to be functioning normally. Prosthetic valve leaflets are not well visualized. There is trace transvalvular regurgitation 2 o'clock position. The gradient recorded across the prosthetic aortic valve is within the expected range. Aortic valve peak velocity is 2.21 m/s. AV mean gradient is 9 mmHg.    Mitral Valve: There is moderate diffuse calcification. There is moderate annular calcification. There is mild regurgitation. There is mild calcific mitral stenosis. MV mean gradient antegrade is 4 mmHg.    Tricuspid Valve: There is mild regurgitation.    Pulmonic Valve: There is mild regurgitation.       Results Review Statement: I personally reviewed the following image studies in PACS and associated radiology reports: as above. My interpretation of the radiology images/reports is: as above.    Invasive Lines/Tubes:  Invasive Devices       Central Venous Catheter Line  Duration             CVC Central Lines 07/17/25 1 day              Peripheral Intravenous Line  Duration             Peripheral IV 07/17/25 Dorsal (posterior);Left Forearm 1 day                    Physical Exam:    General: No acute distress, Alert, and Normal appearance  HEENT/NECK:  Normocephalic. Atraumatic.  no jugular venous distention.    Cardiac: Regular rate and rhythm and No murmurs/rubs/gallops  Pulmonary:  Breath sounds clear bilaterally and No rales/rhonchi/wheezes  Abdomen:  Non-tender, Non-distended, and Normal bowel sounds  Incisions: Groin puncture sites clean, dry, and intact without  hematoma  Extremities: Extremities warm/dry and No edema B/L  Neuro: Alert and oriented X 3, Sensation is grossly intact, and No focal deficits  Skin: Warm/Dry, without rashes or lesions.       Assessment:  Principal Problem:    S/P TAVR (transcatheter aortic valve replacement)  Active Problems:    Essential hypertension    Nonrheumatic aortic (valve) stenosis    Chronic heart failure with preserved ejection fraction (HFpEF) ([facility])    Current moderate episode of major depressive disorder without prior episode (HCC)    Hyperlipidemia    Dyslipidemia    Gastroesophageal reflux disease without esophagitis    Coronary artery disease involving native coronary artery    Ambulatory dysfunction    Encounter for geriatric assessment       Aortic stenosis, Non-Rheumatic. S/P transfemoral transcatheter aortic valve replacement; POD # 2    Plan:    Cardiac:     Ischemic cardiomyopathy, EF 50%    Hx of ILBBB, post op new LBBB, resolved post op with SB 50s-60s with recurrent 1st degree, LBBB again this morning, cardiology following possible d/c with Zio  Cont Toprol XL 12.5 mg    CAD s/p LUIS -mLAD;  D2 & mCirc (3/28/25)     HTN Regimen: -140  -Continue home Norvasc 2.5mg daily  -Transitioned Lisinopril 10 mg to losartan 25 mg due to cough, increase to 50mg      TAVR anticoagulation regimen: ASA/Plavix (LUIS)    Postoperative transthoracic echocardiogram:  Echo completed; Well seated AV, without PVL trace TVL    Continue Statin therapy    D/c TLC    Continue Subcutaneous Heparin for DVT prophylaxis    Pulmonary:     Good Room air oxygen saturation; Continue incentive spirometry/Coughing/Deep breathing exercises    Renal:   CKD (baseline Cr 0.6-0.8, GFR 60-70), at baseline Cr 0.76, GRF 69    Intake/Output net: -1.1 L/24 hours (UOP 1.6L, up 3 lbs)    Diuretic Regimen:  Continue home lasix 40 mg QD    Neuro:    Neurologically intact; No active issues     Incisional pain well-controlled; Continue prn Tylenol    GI:    Cardiac  diet, with 1800 mL fluid restriction    Tolerating diet without complaint    Continue stool softeners and prn suppository    Continue GI prophylaxis    Endo:     Glucose well-controlled with insulin sliding scale coverage    7.  Hematology:     Post-operative blood count acceptable; Trend prn    8.  Disposition:    Gerontology consultation ordered for routine assessment of TAVR patient over 65 years old    Anticipated discharge date: Today +/1 Zio per cardiology         VTE Pharmacologic Prophylaxis: Heparin  VTE Mechanical Prophylaxis: sequential compression device    Collaborative rounds completed with supervising physician  Plan of care discussed with bedside nurse    SIGNATURE: Mae Leon PA-C  DATE: July 19, 2025  TIME: 6:51 AM

## 2025-07-20 LAB
ANION GAP SERPL CALCULATED.3IONS-SCNC: 5 MMOL/L (ref 4–13)
ATRIAL RATE: 59 BPM
BUN SERPL-MCNC: 23 MG/DL (ref 5–25)
CALCIUM SERPL-MCNC: 8.7 MG/DL (ref 8.4–10.2)
CHLORIDE SERPL-SCNC: 107 MMOL/L (ref 96–108)
CO2 SERPL-SCNC: 29 MMOL/L (ref 21–32)
CREAT SERPL-MCNC: 0.66 MG/DL (ref 0.6–1.3)
ERYTHROCYTE [DISTWIDTH] IN BLOOD BY AUTOMATED COUNT: 13.5 % (ref 11.6–15.1)
GFR SERPL CREATININE-BSD FRML MDRD: 78 ML/MIN/1.73SQ M
GLUCOSE SERPL-MCNC: 103 MG/DL (ref 65–140)
GLUCOSE SERPL-MCNC: 106 MG/DL (ref 65–140)
GLUCOSE SERPL-MCNC: 116 MG/DL (ref 65–140)
GLUCOSE SERPL-MCNC: 93 MG/DL (ref 65–140)
GLUCOSE SERPL-MCNC: 97 MG/DL (ref 65–140)
HCT VFR BLD AUTO: 33.3 % (ref 34.8–46.1)
HGB BLD-MCNC: 10.5 G/DL (ref 11.5–15.4)
MCH RBC QN AUTO: 29 PG (ref 26.8–34.3)
MCHC RBC AUTO-ENTMCNC: 31.5 G/DL (ref 31.4–37.4)
MCV RBC AUTO: 92 FL (ref 82–98)
P AXIS: 83 DEGREES
PLATELET # BLD AUTO: 135 THOUSANDS/UL (ref 149–390)
PMV BLD AUTO: 11.8 FL (ref 8.9–12.7)
POTASSIUM SERPL-SCNC: 3.7 MMOL/L (ref 3.5–5.3)
PR INTERVAL: 262 MS
QRS AXIS: -10 DEGREES
QRSD INTERVAL: 126 MS
QT INTERVAL: 426 MS
QTC INTERVAL: 421 MS
RBC # BLD AUTO: 3.62 MILLION/UL (ref 3.81–5.12)
SODIUM SERPL-SCNC: 141 MMOL/L (ref 135–147)
T WAVE AXIS: 107 DEGREES
VENTRICULAR RATE: 59 BPM
WBC # BLD AUTO: 7.49 THOUSAND/UL (ref 4.31–10.16)

## 2025-07-20 PROCEDURE — 85027 COMPLETE CBC AUTOMATED: CPT | Performed by: PHYSICIAN ASSISTANT

## 2025-07-20 PROCEDURE — 93010 ELECTROCARDIOGRAM REPORT: CPT | Performed by: INTERNAL MEDICINE

## 2025-07-20 PROCEDURE — 82948 REAGENT STRIP/BLOOD GLUCOSE: CPT

## 2025-07-20 PROCEDURE — 99233 SBSQ HOSP IP/OBS HIGH 50: CPT | Performed by: THORACIC SURGERY (CARDIOTHORACIC VASCULAR SURGERY)

## 2025-07-20 PROCEDURE — 80048 BASIC METABOLIC PNL TOTAL CA: CPT | Performed by: PHYSICIAN ASSISTANT

## 2025-07-20 PROCEDURE — 99232 SBSQ HOSP IP/OBS MODERATE 35: CPT | Performed by: INTERNAL MEDICINE

## 2025-07-20 PROCEDURE — 93005 ELECTROCARDIOGRAM TRACING: CPT

## 2025-07-20 RX ORDER — LOSARTAN POTASSIUM 50 MG/1
100 TABLET ORAL DAILY
Status: DISCONTINUED | OUTPATIENT
Start: 2025-07-20 | End: 2025-07-21 | Stop reason: HOSPADM

## 2025-07-20 RX ADMIN — PANTOPRAZOLE SODIUM 40 MG: 40 TABLET, DELAYED RELEASE ORAL at 06:29

## 2025-07-20 RX ADMIN — SERTRALINE 25 MG: 25 TABLET, FILM COATED ORAL at 08:39

## 2025-07-20 RX ADMIN — HEPARIN SODIUM 5000 UNITS: 5000 INJECTION INTRAVENOUS; SUBCUTANEOUS at 21:34

## 2025-07-20 RX ADMIN — Medication 125 MG: at 11:47

## 2025-07-20 RX ADMIN — BRIMONIDINE TARTRATE 1 DROP: 2 SOLUTION/ DROPS OPHTHALMIC at 21:34

## 2025-07-20 RX ADMIN — HEPARIN SODIUM 5000 UNITS: 5000 INJECTION INTRAVENOUS; SUBCUTANEOUS at 06:29

## 2025-07-20 RX ADMIN — FUROSEMIDE 40 MG: 40 TABLET ORAL at 08:39

## 2025-07-20 RX ADMIN — AMLODIPINE BESYLATE 2.5 MG: 2.5 TABLET ORAL at 08:39

## 2025-07-20 RX ADMIN — CHLORHEXIDINE GLUCONATE 15 ML: 1.2 SOLUTION ORAL at 08:39

## 2025-07-20 RX ADMIN — ASPIRIN 81 MG CHEWABLE TABLET 81 MG: 81 TABLET CHEWABLE at 08:39

## 2025-07-20 RX ADMIN — CHLORHEXIDINE GLUCONATE 15 ML: 1.2 SOLUTION ORAL at 21:34

## 2025-07-20 RX ADMIN — LOSARTAN POTASSIUM 100 MG: 50 TABLET, FILM COATED ORAL at 08:39

## 2025-07-20 RX ADMIN — ATORVASTATIN CALCIUM 40 MG: 40 TABLET, FILM COATED ORAL at 16:39

## 2025-07-20 RX ADMIN — BRIMONIDINE TARTRATE 1 DROP: 2 SOLUTION/ DROPS OPHTHALMIC at 08:39

## 2025-07-20 RX ADMIN — CLOPIDOGREL BISULFATE 75 MG: 75 TABLET, FILM COATED ORAL at 08:39

## 2025-07-20 NOTE — PROGRESS NOTES
Progress Note - Cardiac Surgery   Jackie Urbina 89 y.o. female MRN: [id number]  Unit/Bed#: PPHP-321-01 Encounter: [id number]    Aortic stenosis, Non-Rheumatic. S/P transfemoral transcatheter aortic valve replacement; POD # 3    24 Hour Events: Feels ok. Somewhat deconditioned. Needs to ambulate. Groins c/d/I.     Medications:   Scheduled Meds:  Current Facility-Administered Medications   Medication Dose Route Frequency Provider Last Rate    acetaminophen  650 mg Rectal Q4H PRN Linda Luu PA-C      acetaminophen  650 mg Oral Q4H PRN Linda Luu PA-C      amLODIPine  2.5 mg Oral Daily Linda Luu PA-C      ascorbic acid  125 mg Oral Daily Linda Luu PA-C      aspirin  81 mg Oral Daily Linda Luu PA-C      atorvastatin  40 mg Oral Daily With Dinner Linda Luu PA-C      bisacodyl  10 mg Rectal Daily PRN Linda Luu PA-C      brimonidine tartrate  1 drop Both Eyes BID Linda Luu PA-C      calcium gluconate  2 g Intravenous Once PRN Linda Luu PA-C      chlorhexidine  15 mL Mouth/Throat BID Linda Luu PA-C      clopidogrel  75 mg Oral Daily Linda Luu PA-C      furosemide  40 mg Oral Daily Linda Luu PA-C      heparin (porcine)  5,000 Units Subcutaneous Q8H [facility] Linda Luu PA-C      hydrALAZINE  10 mg Intravenous Q6H PRN Linda Luu PA-C      insulin lispro  1-5 Units Subcutaneous HS Linda Luu PA-C      insulin lispro  1-5 Units Subcutaneous TID AC Linda Luu PA-C      losartan  50 mg Oral Daily Mae Leon PA-C      niCARdipine  1-15 mg/hr Intravenous Titrated Linda Luu PA-C Stopped (07/17/25 2114)    ondansetron  4 mg Intravenous Q6H PRN Linda Luu PA-C      pantoprazole  40 mg Oral Daily Linda Luu PA-C      polyethylene glycol  17 g Oral Daily Linda Luu PA-C      sertraline  25 mg Oral Daily Linda Kerecman, PA-C       Continuous Infusions:niCARdipine, 1-15 mg/hr, Last Rate: Stopped (07/17/25  )      PRN Meds:.  acetaminophen    acetaminophen    bisacodyl    calcium gluconate    hydrALAZINE    ondansetron    Vitals:   Vitals:    25 1857 25 2213 25 0240 25 0600   BP: (!) 141/47 158/57 163/63    BP Location:       Pulse: 64 59 61    Resp:       Temp: 98 °F (36.7 °C) 97.9 °F (36.6 °C) 97.8 °F (36.6 °C)    TempSrc:       SpO2: 95% 93% 95%    Weight:    49.7 kg (109 lb 9.1 oz)   Height:           Telemetry: NSR/SB 50-60s    Respiratory:   SpO2: SpO2: 95 %; RA    Intake/Output:     Intake/Output Summary (Last 24 hours) at 2025 0712  Last data filed at 2025 0404  Gross per 24 hour   Intake 658 ml   Output 1000 ml   Net -342 ml        Weights:   Weight (last 2 days)       Date/Time Weight    25 0600 49.7 (109.57)    25 06 49.8 (109.79)    25 1005 49.4 (109)    25 06 49.8 (109.8)          Admit weight: up 3 lbs    Results:   Results from last 7 days   Lab Units 25  0404 25  0411 25  0353   WBC Thousand/uL 7.49 9.69 10.12   HEMOGLOBIN g/dL 10.5* 10.6* 10.2*   HEMATOCRIT % 33.3* 34.5* 33.0*   PLATELETS Thousands/uL 135* 147* 162     Results from last 7 days   Lab Units 25  0404 25  0411 25  0353 25  1426 25  1348   POTASSIUM mmol/L 3.7 4.2 4.5   < >  --    CHLORIDE mmol/L 107 107 109*   < >  --    CO2 mmol/L 29 30 28   < >  --    CO2, I-STAT mmol/L  --   --   --   --  24   BUN mg/dL 23 31* 35*   < >  --    CREATININE mg/dL 0.66 0.76 0.86   < >  --    GLUCOSE, ISTAT mg/dl  --   --   --   --  105   CALCIUM mg/dL 8.7 8.7 8.9   < >  --     < > = values in this interval not displayed.           Recent Labs     25  0353   MG 2.3     Point of care glucose: 93 - 127 (no hx of DM)    Studies:  EC/19 NSR with 1st degree and LBBB     ECG: SB att 59 with 1st degree of 210.    CXR: line in correct place, no ptx/effusion    Echocardiogram:    Left Ventricle: Left ventricular cavity size is normal.  Wall thickness is normal. There is moderate to severe asymmetric hypertrophy of the basal septal wall. The left ventricular ejection fraction is 65%. Systolic function is normal. Wall motion is normal.    Left Atrium: The atrium is severely dilated (>48 mL/m2).    Right Atrium: The atrium is mildly dilated.    Aortic Valve: There is an Valdez HANK 3 Ultra Resilia  23 mm TAVR bioprosthetic valve. The prosthetic valve appears well-seated and appears to be functioning normally. Prosthetic valve leaflets are not well visualized. There is trace transvalvular regurgitation 2 o'clock position. The gradient recorded across the prosthetic aortic valve is within the expected range. Aortic valve peak velocity is 2.21 m/s. AV mean gradient is 9 mmHg.    Mitral Valve: There is moderate diffuse calcification. There is moderate annular calcification. There is mild regurgitation. There is mild calcific mitral stenosis. MV mean gradient antegrade is 4 mmHg.    Tricuspid Valve: There is mild regurgitation.    Pulmonic Valve: There is mild regurgitation.       Results Review Statement: I personally reviewed the following image studies in PACS and associated radiology reports: as above. My interpretation of the radiology images/reports is: as above.    Invasive Lines/Tubes:  Invasive Devices       Central Venous Catheter Line  Duration             CVC Central Lines 07/17/25 2 days              Peripheral Intravenous Line  Duration             Peripheral IV 07/17/25 Dorsal (posterior);Left Forearm 2 days                    Physical Exam:    General: No acute distress, Alert, and Normal appearance  HEENT/NECK:  Normocephalic. Atraumatic.  no jugular venous distention.    Cardiac: Regular rate and rhythm and No murmurs/rubs/gallops  Pulmonary:  Breath sounds clear bilaterally and No rales/rhonchi/wheezes  Abdomen:  Non-tender, Non-distended, and Normal bowel sounds  Incisions: Groin puncture sites clean, dry, and intact without  hematoma  Extremities: Extremities warm/dry and No edema B/L  Neuro: Alert and oriented X 3, Sensation is grossly intact, and No focal deficits  Skin: Warm/Dry, without rashes or lesions.       Assessment:  Principal Problem:    S/P TAVR (transcatheter aortic valve replacement)  Active Problems:    Essential hypertension    Nonrheumatic aortic (valve) stenosis    Chronic heart failure with preserved ejection fraction (HFpEF) ([facility])    Current moderate episode of major depressive disorder without prior episode ([facility])    Hyperlipidemia    Dyslipidemia    Gastroesophageal reflux disease without esophagitis    Coronary artery disease involving native coronary artery    Ambulatory dysfunction    Encounter for geriatric assessment       Aortic stenosis, Non-Rheumatic. S/P transfemoral transcatheter aortic valve replacement; POD # 3    Plan:    Cardiac:     Ischemic cardiomyopathy, EF 50%    Hx of ILBBB, post op new LBBB, resolved post op with SB 50s-60s with recurrent 1st degree, LBBB again tyesterday morning, cardiology following, d/c with Zio  Toprol held  ECG today with resolution of LBBB, cont to hold BB, monitor another 24hrs    CAD s/p LUIS -mLAD;  D2 & mCirc (3/28/25)     HTN Regimen: -140  -Continue home Norvasc 2.5mg daily  -Transitioned Lisinopril 10 mg to losartan 25 mg due to cough, increase to 100mg      TAVR anticoagulation regimen: ASA/Plavix (LUIS)    Postoperative transthoracic echocardiogram:  Echo completed; Well seated AV, without PVL trace TVL    Continue Statin therapy    D/c TLC    Continue Subcutaneous Heparin for DVT prophylaxis    Pulmonary:     Good Room air oxygen saturation; Continue incentive spirometry/Coughing/Deep breathing exercises    Renal:   CKD (baseline Cr 0.6-0.8, GFR 60-70)  Cr 0.66 from 0.76    Intake/Output net: -342 mL/24 hours (UOP 1L)    Diuretic Regimen:  Continue home lasix 40 mg QD    Neuro:    Neurologically intact; No active issues     Incisional pain  well-controlled; Continue prn Tylenol    GI:    Cardiac diet, with 1800 mL fluid restriction    Tolerating diet without complaint  + BM postoperatively    Continue stool softeners and prn suppository    Continue GI prophylaxis    Endo:     Glucose well-controlled with insulin sliding scale coverage    7.  Hematology:     Post-operative blood count acceptable; Trend prn    8.  Disposition:    Gerontology consultation ordered for routine assessment of TAVR patient over 65 years old    Anticipated discharge date: tomorrow, monitor rhythm another 24hrs per cardiology, if LBBB returns again will get EP involved. Cont to hold BB. Needs to walk/do steps, home with Zio per Cards        VTE Pharmacologic Prophylaxis: Heparin  VTE Mechanical Prophylaxis: sequential compression device    Collaborative rounds completed with supervising physician  Plan of care discussed with bedside nurse    SIGNATURE: Mae Leon PA-C  DATE: July 20, 2025  TIME: 7:12 AM

## 2025-07-20 NOTE — ASSESSMENT & PLAN NOTE
S/p successful placement of a 23 mm TAVR bioprosthetic valve.  Postop echocardiogram showing trace transvalvular regurg.  Otherwise the valve was well-seated and functioning properly with an expected prosthetic gradient.  Continue DAPT with aspirin and Plavix.  Continue aspirin and statin  He does have a history of an incomplete LBBB which converted to a new LBBB immediately postoperatively and shortly after returned to an incomplete LBBB.  Recurrent LBBB yesterday therefore low-dose BB discontinued.  Plan for ZIO at discharge

## 2025-07-20 NOTE — PROGRESS NOTES
Progress Note - Cardiology   Name: Jackie Ubrina 89 y.o. female I MRN: [id number]  Unit/Bed#: PPHP-321-01 I Date of Admission: 7/17/2025   Date of Service: 7/20/2025 I Hospital Day: 3     Assessment & Plan  S/P TAVR (transcatheter aortic valve replacement)  Nonrheumatic aortic (valve) stenosis  S/p successful placement of a 23 mm TAVR bioprosthetic valve.  Postop echocardiogram showing trace transvalvular regurg.  Otherwise the valve was well-seated and functioning properly with an expected prosthetic gradient.  Continue DAPT with aspirin and Plavix.  Continue aspirin and statin  He does have a history of an incomplete LBBB which converted to a new LBBB immediately postoperatively and shortly after returned to an incomplete LBBB.  Recurrent LBBB yesterday therefore low-dose BB discontinued.  Plan for ZIO at discharge  Coronary artery disease involving native coronary artery  S/p LUIS to mid LAD in March 2025.  Also has a  of D3 and mid circumflex.  Continue aspirin, statin and Plavix.  Chronic heart failure with preserved ejection fraction (HFpEF) ([facility])  Final intraoperative IMITAZ EF 75%  Currently on Lasix 40 mg daily.  Net output -909 mL  Essential hypertension  Elevated blood pressures this morning on amlodipine 2.5 mg daily and losartan 100 mg daily  Hyperlipidemia  Continue statin.    Plan/Recommendations:  Patient with recurrent LBBB yesterday, improved today  Hold beta-blocker continue to monitor  Patient will need a Zio at discharge. Will coordinate with the EP for tomorrow.  Continue remaining cardiac medications  Continue monitor on telemetry    ___________________________________________________________    Subjective    Patient seen and examined.  No acute events overnight.    Review of Systems   Constitutional: Negative. Negative for chills.   Cardiovascular:  Negative for chest pain, dyspnea on exertion, leg swelling, near-syncope, orthopnea, palpitations, paroxysmal nocturnal dyspnea and syncope.  "  Respiratory: Negative.  Negative for cough, shortness of breath and wheezing.    Endocrine: Negative.    Hematologic/Lymphatic: Negative.    Skin: Negative.    Musculoskeletal: Negative.    Gastrointestinal: Negative.  Negative for diarrhea, nausea and vomiting.   Neurological:  Negative for dizziness, light-headedness and weakness.   Psychiatric/Behavioral: Negative.  Negative for altered mental status.    All other systems reviewed and are negative.      Objective:   Vitals: Blood pressure 163/55, pulse (!) 54, temperature 97.8 °F (36.6 °C), resp. rate 18, height 4' 11\" (1.499 m), weight 49.7 kg (109 lb 9.1 oz), SpO2 97%.,     Wt Readings from Last 3 Encounters:   25 49.7 kg (109 lb 9.1 oz)   25 49.3 kg (108 lb 11.2 oz)   04/10/25 49 kg (108 lb)        Lab Results   Component Value Date    CREATININE 0.66 2025    CREATININE 0.76 2025    CREATININE 0.86 2025         Body mass index is 22.13 kg/m².,     Systolic (24hrs), Av , Min:141 , Max:163     Diastolic (24hrs), Av, Min:47, Max:65          Intake/Output Summary (Last 24 hours) at 2025 0847  Last data filed at 2025 0837  Gross per 24 hour   Intake 658 ml   Output 1450 ml   Net -792 ml     Weight (last 2 days)       Date/Time Weight    25 0600 49.7 (109.57)    25 0600 49.8 (109.79)    25 1005 49.4 (109)    25 0600 49.8 (109.8)              Telemetry Review: No significant arrhythmias seen on telemetry review.       Physical Exam  Vitals and nursing note reviewed.   Constitutional:       General: She is not in acute distress.     Appearance: She is well-developed.   HENT:      Head: Normocephalic and atraumatic.   Neck:      Vascular: No JVD.     Cardiovascular:      Rate and Rhythm: Normal rate and regular rhythm.      Heart sounds: Normal heart sounds. No murmur heard.     No friction rub.   Pulmonary:      Effort: Pulmonary effort is normal. No respiratory distress.      Breath sounds: " Normal breath sounds. No wheezing or rales.   Abdominal:      General: Bowel sounds are normal. There is no distension.      Palpations: Abdomen is soft.      Tenderness: There is no abdominal tenderness.     Musculoskeletal:         General: No tenderness. Normal range of motion.      Cervical back: Normal range of motion and neck supple.      Right lower leg: No edema.      Left lower leg: No edema.     Skin:     General: Skin is warm and dry.      Findings: No erythema.     Neurological:      Mental Status: She is alert and oriented to person, place, and time.     Psychiatric:         Mood and Affect: Mood normal.         Behavior: Behavior normal.         Thought Content: Thought content normal.         Judgment: Judgment normal.         LABORATORY RESULTS      CBC with diff:   Results from last 7 days   Lab Units 07/20/25  0404 07/19/25  0411 07/18/25  0353 07/17/25  1426 07/17/25  1348 07/17/25  1318   WBC Thousand/uL 7.49 9.69 10.12  --   --   --    HEMOGLOBIN g/dL 10.5* 10.6* 10.2* 10.4*  --   --    I STAT HEMOGLOBIN g/dl  --   --   --   --  9.5* 10.9*   HEMATOCRIT % 33.3* 34.5* 33.0* 32.9*  --   --    HEMATOCRIT, ISTAT %  --   --   --   --  28* 32*   MCV fL 92 94 92  --   --   --    PLATELETS Thousands/uL 135* 147* 162 139*  --   --    RBC Million/uL 3.62* 3.67* 3.58*  --   --   --    MCH pg 29.0 28.9 28.5  --   --   --    MCHC g/dL 31.5 30.7* 30.9*  --   --   --    RDW % 13.5 13.6 13.5  --   --   --    MPV fL 11.8 11.6 11.2 11.2  --   --        CMP:  Results from last 7 days   Lab Units 07/20/25  0404 07/19/25  0411 07/18/25  0353 07/17/25  1426 07/17/25  1348 07/17/25  1318   POTASSIUM mmol/L 3.7 4.2 4.5 3.7  --   --    CHLORIDE mmol/L 107 107 109* 110*  --   --    CO2 mmol/L 29 30 28 28  --   --    CO2, I-STAT mmol/L  --   --   --   --  24 27   BUN mg/dL 23 31* 35* 38*  --   --    CREATININE mg/dL 0.66 0.76 0.86 0.82  --   --    GLUCOSE, ISTAT mg/dl  --   --   --   --  105 98   CALCIUM mg/dL 8.7 8.7 8.9  8.7  --   --    EGFR ml/min/1.73sq m 78 69 59 63  --   --        BMP:  Results from last 7 days   Lab Units 07/20/25  0404 07/19/25  0411 07/18/25  0353 07/17/25  1426 07/17/25  1348 07/17/25  1318   POTASSIUM mmol/L 3.7 4.2 4.5 3.7  --   --    CHLORIDE mmol/L 107 107 109* 110*  --   --    CO2 mmol/L 29 30 28 28  --   --    CO2, I-STAT mmol/L  --   --   --   --  24 27   BUN mg/dL 23 31* 35* 38*  --   --    CREATININE mg/dL 0.66 0.76 0.86 0.82  --   --    GLUCOSE, ISTAT mg/dl  --   --   --   --  105 98   CALCIUM mg/dL 8.7 8.7 8.9 8.7  --   --        Lab Results   Component Value Date    NTBNP 820 (H) 05/22/2018             Results from last 7 days   Lab Units 07/18/25  0353   MAGNESIUM mg/dL 2.3                           Lipid Profile:   Lab Results   Component Value Date    CHOL 191 10/13/2015     Lab Results   Component Value Date    HDL 33 (L) 01/30/2025    HDL 53 09/13/2024    HDL 53 05/02/2023     Lab Results   Component Value Date    LDLCALC 75 01/30/2025    LDLCALC 127 (H) 09/13/2024    LDLCALC 120 (H) 05/02/2023     Lab Results   Component Value Date    TRIG 92 01/30/2025    TRIG 97 09/13/2024    TRIG 88 05/02/2023         Meds/Allergies   all current active meds have been reviewed    Medications Prior to Admission:     amLODIPine (NORVASC) 2.5 mg tablet    aspirin (ECOTRIN LOW STRENGTH) 81 mg EC tablet    atorvastatin (LIPITOR) 40 mg tablet    brimonidine tartrate 0.2 % ophthalmic solution    clopidogrel (PLAVIX) 75 mg tablet    furosemide (LASIX) 20 mg tablet    lisinopril (ZESTRIL) 10 mg tablet    metoprolol succinate (TOPROL-XL) 25 mg 24 hr tablet    Multiple Vitamins-Minerals (PRESERVISION AREDS PO)    multivitamin (THERAGRAN) TABS    mupirocin (BACTROBAN) 2 % ointment    omeprazole (PriLOSEC) 20 mg delayed release capsule    sertraline (ZOLOFT) 25 mg tablet    triamcinolone (KENALOG) 0.1 % cream    Ascorbic Acid (vitamin C) 100 MG tablet    benzonatate (TESSALON PERLES) 100 mg capsule    niCARdipine,  1-15 mg/hr, Last Rate: Stopped (07/17/25 2118)        Counseling / Coordination of Care  Total floor / unit time spent today 20 minutes.  Greater than 50% of total time was spent with the patient and / or family counseling and / or coordination of care.      ** Please Note: Dragon 360 Dictation voice to text software may have been used in the creation of this document. **

## 2025-07-21 ENCOUNTER — TRANSITIONAL CARE MANAGEMENT (OUTPATIENT)
Age: OVER 89
End: 2025-07-21

## 2025-07-21 ENCOUNTER — CLINICAL SUPPORT (OUTPATIENT)
Dept: CARDIOLOGY CLINIC | Facility: CLINIC | Age: OVER 89
End: 2025-07-21
Payer: MEDICARE

## 2025-07-21 ENCOUNTER — TELEPHONE (OUTPATIENT)
Age: OVER 89
End: 2025-07-21

## 2025-07-21 VITALS
OXYGEN SATURATION: 98 % | TEMPERATURE: 98 F | WEIGHT: 109.35 LBS | HEART RATE: 61 BPM | SYSTOLIC BLOOD PRESSURE: 136 MMHG | RESPIRATION RATE: 18 BRPM | DIASTOLIC BLOOD PRESSURE: 49 MMHG | BODY MASS INDEX: 22.04 KG/M2 | HEIGHT: 59 IN

## 2025-07-21 DIAGNOSIS — I44.7 LEFT BUNDLE BRANCH BLOCK: Primary | ICD-10-CM

## 2025-07-21 DIAGNOSIS — Z95.2 S/P TAVR (TRANSCATHETER AORTIC VALVE REPLACEMENT): ICD-10-CM

## 2025-07-21 DIAGNOSIS — I49.1 PREMATURE ATRIAL CONTRACTIONS: ICD-10-CM

## 2025-07-21 LAB
ATRIAL RATE: 57 BPM
ATRIAL RATE: 58 BPM
ATRIAL RATE: 59 BPM
ATRIAL RATE: 59 BPM
ATRIAL RATE: 61 BPM
ATRIAL RATE: 83 BPM
GLUCOSE SERPL-MCNC: 100 MG/DL (ref 65–140)
GLUCOSE SERPL-MCNC: 103 MG/DL (ref 65–140)
P AXIS: 62 DEGREES
P AXIS: 67 DEGREES
P AXIS: 68 DEGREES
P AXIS: 73 DEGREES
P AXIS: 78 DEGREES
P AXIS: 84 DEGREES
PR INTERVAL: 210 MS
PR INTERVAL: 214 MS
PR INTERVAL: 290 MS
PR INTERVAL: 300 MS
PR INTERVAL: 302 MS
PR INTERVAL: 306 MS
QRS AXIS: -44 DEGREES
QRS AXIS: -61 DEGREES
QRS AXIS: 12 DEGREES
QRS AXIS: 16 DEGREES
QRS AXIS: 2 DEGREES
QRS AXIS: 30 DEGREES
QRSD INTERVAL: 118 MS
QRSD INTERVAL: 126 MS
QRSD INTERVAL: 128 MS
QRSD INTERVAL: 130 MS
QRSD INTERVAL: 132 MS
QRSD INTERVAL: 162 MS
QT INTERVAL: 406 MS
QT INTERVAL: 428 MS
QT INTERVAL: 430 MS
QT INTERVAL: 438 MS
QT INTERVAL: 440 MS
QT INTERVAL: 458 MS
QTC INTERVAL: 422 MS
QTC INTERVAL: 423 MS
QTC INTERVAL: 428 MS
QTC INTERVAL: 433 MS
QTC INTERVAL: 461 MS
QTC INTERVAL: 477 MS
T WAVE AXIS: 103 DEGREES
T WAVE AXIS: 117 DEGREES
T WAVE AXIS: 120 DEGREES
T WAVE AXIS: 135 DEGREES
T WAVE AXIS: 144 DEGREES
T WAVE AXIS: 99 DEGREES
VENTRICULAR RATE: 57 BPM
VENTRICULAR RATE: 58 BPM
VENTRICULAR RATE: 59 BPM
VENTRICULAR RATE: 59 BPM
VENTRICULAR RATE: 61 BPM
VENTRICULAR RATE: 83 BPM

## 2025-07-21 PROCEDURE — 82948 REAGENT STRIP/BLOOD GLUCOSE: CPT

## 2025-07-21 PROCEDURE — 97116 GAIT TRAINING THERAPY: CPT

## 2025-07-21 PROCEDURE — 93010 ELECTROCARDIOGRAM REPORT: CPT | Performed by: INTERNAL MEDICINE

## 2025-07-21 PROCEDURE — 99232 SBSQ HOSP IP/OBS MODERATE 35: CPT | Performed by: INTERNAL MEDICINE

## 2025-07-21 PROCEDURE — NC001 PR NO CHARGE: Performed by: THORACIC SURGERY (CARDIOTHORACIC VASCULAR SURGERY)

## 2025-07-21 PROCEDURE — 93246 EXT ECG>7D<15D RECORDING: CPT | Performed by: INTERNAL MEDICINE

## 2025-07-21 PROCEDURE — 93005 ELECTROCARDIOGRAM TRACING: CPT

## 2025-07-21 PROCEDURE — 99238 HOSP IP/OBS DSCHRG MGMT 30/<: CPT | Performed by: PHYSICIAN ASSISTANT

## 2025-07-21 RX ORDER — POLYETHYLENE GLYCOL 3350 17 G/17G
17 POWDER, FOR SOLUTION ORAL DAILY PRN
Start: 2025-07-21

## 2025-07-21 RX ORDER — POTASSIUM CHLORIDE 750 MG/1
10 TABLET, EXTENDED RELEASE ORAL DAILY
Status: DISCONTINUED | OUTPATIENT
Start: 2025-07-21 | End: 2025-07-21 | Stop reason: HOSPADM

## 2025-07-21 RX ORDER — AMLODIPINE BESYLATE 2.5 MG/1
5 TABLET ORAL DAILY
Qty: 30 TABLET | Refills: 0 | Status: SHIPPED | OUTPATIENT
Start: 2025-07-21

## 2025-07-21 RX ORDER — LOSARTAN POTASSIUM 100 MG/1
100 TABLET ORAL DAILY
Qty: 30 TABLET | Refills: 2 | Status: SHIPPED | OUTPATIENT
Start: 2025-07-22

## 2025-07-21 RX ORDER — AMLODIPINE BESYLATE 5 MG/1
5 TABLET ORAL DAILY
Status: DISCONTINUED | OUTPATIENT
Start: 2025-07-22 | End: 2025-07-21 | Stop reason: HOSPADM

## 2025-07-21 RX ORDER — ACETAMINOPHEN 325 MG/1
650 TABLET ORAL EVERY 4 HOURS PRN
Start: 2025-07-21

## 2025-07-21 RX ADMIN — HEPARIN SODIUM 5000 UNITS: 5000 INJECTION INTRAVENOUS; SUBCUTANEOUS at 05:27

## 2025-07-21 RX ADMIN — Medication 125 MG: at 08:17

## 2025-07-21 RX ADMIN — LOSARTAN POTASSIUM 100 MG: 50 TABLET, FILM COATED ORAL at 08:14

## 2025-07-21 RX ADMIN — FUROSEMIDE 40 MG: 40 TABLET ORAL at 08:14

## 2025-07-21 RX ADMIN — ASPIRIN 81 MG CHEWABLE TABLET 81 MG: 81 TABLET CHEWABLE at 08:13

## 2025-07-21 RX ADMIN — CHLORHEXIDINE GLUCONATE 15 ML: 1.2 SOLUTION ORAL at 08:13

## 2025-07-21 RX ADMIN — POTASSIUM CHLORIDE 10 MEQ: 750 TABLET, EXTENDED RELEASE ORAL at 11:28

## 2025-07-21 RX ADMIN — AMLODIPINE BESYLATE 2.5 MG: 2.5 TABLET ORAL at 08:14

## 2025-07-21 RX ADMIN — PANTOPRAZOLE SODIUM 40 MG: 40 TABLET, DELAYED RELEASE ORAL at 05:27

## 2025-07-21 RX ADMIN — SERTRALINE 25 MG: 25 TABLET, FILM COATED ORAL at 08:14

## 2025-07-21 RX ADMIN — CLOPIDOGREL BISULFATE 75 MG: 75 TABLET, FILM COATED ORAL at 08:13

## 2025-07-21 RX ADMIN — BRIMONIDINE TARTRATE 1 DROP: 2 SOLUTION/ DROPS OPHTHALMIC at 08:19

## 2025-07-21 NOTE — DISCHARGE SUMMARY
Discharge Summary - Cardiac Surgery   Jackie Urbina 89 y.o. female MRN: [id number]  Unit/Bed#: [facility]-321-01 Encounter: [id number]    Admission Date: 7/17/2025     Discharge Date: 07/21/25    Admitting Diagnosis:   Nonrheumatic aortic (valve) stenosis [I35.0]  Chronic heart failure with preserved ejection fraction (HFpEF) ([facility]) [I50.32]  Coronary artery disease involving native coronary artery of native heart without angina pectoris [I25.10]  S/P drug eluting coronary stent placement [Z95.5]    Primary Discharge Diagnosis:   Severe AS S/P TF TAVR    Secondary Discharge Diagnosis:   NSR w/ ILBBB, CAD s/p LUIS -mLAD;  D2 & mCirc (3/28/25), chronic diastolic CHF, HTN, Hyperlipidemia, DTA Saccular Aneurysm (1.8), Depression, Hypersomnia, B/L LE Lymphedema, GERD, OA, RUL Pulm Nodule, Shingles, Rt Radial Pseudoaneurysm S/P cath (treated w/compression), s/p LCTR, RTKR, RTHR, B/L Cataract sx, T&A   - post op new LBBB, resolved, home zio monitor arranged    Attending: Raj Méndez M.D.    Consulting Physician(s):   Cardiology  Gerontology    Procedures Performed:   Procedure(s):  REPLACEMENT AORTIC VALVE TRANSCATHETER (TAVR) TRANSFEMORAL W/ 23MM VALVE(ACCESS ON RIGHT) IMTIAZ  Cardiac tavr     Hospital Course:   The patient was seen in consultation prior to this admission for evaluation of severe AS.  Risks and benefits of transfemoral transcatheter aortic valve replacement were discussed in detail, and patient was agreeable.  Routine preoperative evaluation was completed and informed consent was obtained prior to admission.    7/17: Elective admission for TF TAVR # 23mm Osmel 3 UR via R approach. Extubated and transferred to PACU supported with no gtts. DP pulses palpable b/l. Restarted on home Norvasc 2.5mg daily, Lisinopril 10mg daily, Toprol XL 12.5mg q12, Lipitor 40mg PO daily, Plavix, Zoloft, Vit C and eye gtt. ECG shows NSR with new LBBB - EP consulted and repeat EKG in 3 hrs ordered. Gerontology and cardiology  consulted.     7/18: Complaint of feeling weak and persistent cough since preop, Denies CP/SOB, weaned to RA, using IS. Tolerating diet. PT/OT recommend home PT/OT. Issues urinating early POD 1, but resolved by afternoon. Post op with new LBBB (), repeat with LBBB (). This AM SB at 59 with recurrent 1st degree (hx of ILBBB), Home Toprol 25 mg held. Discuss with cardiology to restart home Toprol 12.5 mg, cardiology will determine the need for zio jeison. SBP elevated without AM BB dose, transition to losartan 25 mg today with cough, continue home norvasc. Echo completed with trace PVL. Weaned to RA, Cr at baseline, +1 L/24 hours (UOP 1L, up 3 lbs), continue home lasix 40 mg QD, dose additional 20 lasix IV x 1 w/ k supplementation. PM: HR 66, BP better controlled, 133/58.      7/19: NSR with LBBB . Hold BB. Cont. norvasc and increase losartan to 50mg QD (stopped ACE 2/2 cough). RA. D/c TLC. ECHO ok. Cont. lasix 40mg po qd. SSI. PT/OT. Keep today, monitor rhythm. Home tomorrow with Zio     7/20: SB 50-60s, LBBB resolved on ECG this morning. Cont to hold Toprol. Increase losartan, cont norvasc. ASA/Plavix. Cardiology wants to monitor 1 more day, if LBBB returns consult EP, will need Zio on home. Labs stable, PT/OT, needs to walk more, home tomorrow with Zio     7/21: Ambulating well with RW. Rhythm stable. Cont to hold home metoprolol. Cont norvasc 5mg daily, losartan 100mg daily, lasix 40mg daily. Cardiology cleared for home with Zio patch. [facility] VN. Home today.     Condition at Discharge:   good     Discharge Physical Exam:    Please see the documented physical exam from this morning's progress note for details.    Discharge Data:  Results from last 7 days   Lab Units 07/20/25  0404 07/19/25  0411 07/18/25  0353   WBC Thousand/uL 7.49 9.69 10.12   HEMOGLOBIN g/dL 10.5* 10.6* 10.2*   HEMATOCRIT % 33.3* 34.5* 33.0*   PLATELETS Thousands/uL 135* 147* 162     Results from last 7 days   Lab  Units 07/20/25  0404 07/19/25  0411 07/18/25  0353 07/17/25  1426 07/17/25  1348   POTASSIUM mmol/L 3.7 4.2 4.5   < >  --    CHLORIDE mmol/L 107 107 109*   < >  --    CO2 mmol/L 29 30 28   < >  --    CO2, I-STAT mmol/L  --   --   --   --  24   BUN mg/dL 23 31* 35*   < >  --    CREATININE mg/dL 0.66 0.76 0.86   < >  --    GLUCOSE, ISTAT mg/dl  --   --   --   --  105   CALCIUM mg/dL 8.7 8.7 8.9   < >  --     < > = values in this interval not displayed.           Discharge instructions/Information to patient and family:   See after visit summary for information provided to patient and family.      Jackie Urbina was educated on restrictions regarding driving and lifting, and techniques of proper incisional care.  They were specifically counselled on signs and symptoms of an incisional infection, and advised to contact our service immediately should they develop fevers, sweats, chill, redness or drainage at the site of any incisions.    Provisions for Follow-Up Care:  See after visit summary for information related to follow-up care and any pertinent home health orders.      Disposition:  Home    Planned Readmission:   No    Discharge Medications:  See after visit summary for reconciled discharge medications provided to patient and family.      Jackie Urbina was provided contact information and scheduled a follow up appointment with Raj Méndez M.D.  Additionally, follow up appointments have been scheduled for their primary care physician and primary cardiologist.  Contact information was provided.    Jackie Urbina was counseled on the importance of avoiding tobacco products.  As with all patients whom have undergone open heart surgery, tobacco cessation medication was contraindicated at the time of discharge.     ACE/ARB was Prescribed at discharge    Beta Blocker was Contraindicated secondary to bradycardia/heart block    Aspirin was resumed/Prescribed at discharge    Statin was resumed/Prescribed at  discharge    Plavix was resumed/prescribed at discharge for recent CAD s/p PCI and for TAVR antithrombotic therapy      The patient was discharge/resumed on ongoing diuretic therapy with Furosemide, 40 mg, PO QD.  They were advised to continue these medications unless otherwise directed.     The patient was informed that following their postoperative surgical evaluation, they will be referred to outpatient cardiac rehabilitation.  They were counseled that this program is run by specialists who will help them safely strengthen their heart and prevent more heart disease.  Cardiac rehabilitation will include exercise, relaxation, stress management, and heart-healthy nutrition.  Caregivers will also check to make sure their medication regimen is working.    During this admission, the patient was questioned on their use of tobacco, alcohol, and illicit/non-prescription drug use in the  previous 24 months. Jackie AMARO Carlitos states that they have not used any of these substances in this time frame.    I spent 30 minutes discharging the patient. This time was spent on the day of discharge. I had direct contact with the patient on the day of discharge. Additional documentation is required if more than 30 minutes were spent on discharge.     SIGNATURE: Steve Evans PA-C  DATE: July 21, 2025  TIME: 12:12 PM

## 2025-07-21 NOTE — ASSESSMENT & PLAN NOTE
BP elevated at 179/60.  Outpatient BP regimen; amlodipine 2.5 mg daily, lisinopril 10 mg daily, and metoprolol succinate 12.5 mg daily.  Inpatient BP regimen; amlodipine 2.5 mg daily, and losartan 100 mg daily.

## 2025-07-21 NOTE — ASSESSMENT & PLAN NOTE
S/p LUIS to mid LAD in March 2025.  Also has a  of D3 and mid circumflex.  On aspirin 81 mg daily, clopidogrel 75 mg daily, and atorvastatin 40 mg daily.  BB on hold, see above..

## 2025-07-21 NOTE — PLAN OF CARE
Problem: PHYSICAL THERAPY ADULT  Goal: Performs mobility at highest level of function for planned discharge setting.  See evaluation for individualized goals.  Description: Treatment/Interventions: Spoke to nursing, Spoke to case management, Family  Equipment Recommended: Aleksandre       See flowsheet documentation for full assessment, interventions and recommendations.  7/21/2025 1404 by Mars Stokes PT  Outcome: Adequate for Discharge  Note: Prognosis: Good  Problem List: Decreased strength, Decreased endurance, Impaired balance, Decreased mobility  Assessment: Pt demonstrated overall improvement in balance, endurance and all aspects of observed mobility progressing to mod (I) level on level surfaces (incl amb w/ SPC) and (S) on the steps; no overt uncorrected LOB, gross knee buckling, or excessive swaying observed during the session; no increased discomfort or excessive fatigue expressed; pt appeared to be comfortable at the end of session; overall, cont to anticipate pt will return home upon D/C from PT/mobility stand point and when medically cleared; no other immediate PT needs otherwise identified while in the hospital; pt informed and concurred; pt expressed no concerns about going home from mobility stand point (already medically cleared); will therefore sign off        Rehab Resource Intensity Level, PT: III (Minimum Resource Intensity)    See flowsheet documentation for full assessment.

## 2025-07-21 NOTE — CASE MANAGEMENT
Case Management Discharge Planning Note    Patient name Jackie Urbina  Location [facility]-321/[facility]-321-01 MRN [id number]  : 1935 Date 2025       Current Admission Date: 2025  Current Admission Diagnosis:S/P TAVR (transcatheter aortic valve replacement)   Patient Active Problem List    Diagnosis Date Noted    Left bundle branch block 2025    Ambulatory dysfunction 2025    Encounter for geriatric assessment 2025    S/P TAVR (transcatheter aortic valve replacement) 2025    S/P drug eluting coronary stent placement 2025    Right radial pseudoaneurysm 2025    Hematoma of arm, right, initial encounter 2025    Coronary artery disease involving native coronary artery 2025    Abnormal CT of the chest 2025    Elevated troponin 2025    Influenza A virus present 2025    Lymphedema/ volume overload 2025    Hypersomnia with long sleep time, idiopathic 2023    Premature atrial contractions 2023    Gastroesophageal reflux disease without esophagitis 2023    Primary osteoarthritis involving multiple joints 2022    Moderate aortic regurgitation 2022    Dyslipidemia 2021    Lymphedema of both lower extremities 2021    Status post total right knee replacement using cement 12/15/2020    Hyperlipidemia 2020    Current moderate episode of major depressive disorder without prior episode ([facility]) 2019    Hypokalemia 2019    Mitral valve stenosis, mild     Arthritis 10/19/2018    Chronic heart failure with preserved ejection fraction (HFpEF) ([facility]) 05/10/2018    Localized edema 05/10/2018    Other fatigue 05/10/2018    Nonrheumatic aortic (valve) stenosis 2016    Atrial dilatation, bilateral 2016    Mild tricuspid insufficiency 2016    Non-rheumatic mitral regurgitation 2016    Essential hypertension 2013      LOS (days): 4  Geometric Mean LOS (GMLOS) (days): 2.6  Days  to GMLOS:-1.4     OBJECTIVE:  Risk of Unplanned Readmission Score: 18.22       Current admission status: Inpatient   Preferred Pharmacy:   [id number] - [place], [place] - [address]  [address] [address]  Phone: [phone] Fax: [phone]    [facility] - [place], [place] - [address]  [address]  [place] [address]  Phone: [phone] Fax: [phone]    Primary Care Provider: Tom Howell MD    Primary Insurance: MEDICARE  Secondary Insurance: BLUE CROSS    DISCHARGE DETAILS:    Discharge planning discussed with:: pt bedside      Requested Home Health Care         Is the patient interested in HHC at discharge?: Yes  Home Health Discipline requested:: Nursing, Occupational Therapy, Physical Therapy  [facility] Health Agency Name:: [facility]     Treatment Team Recommendation: Home with Home Health Care  Expected Discharge Disposition: Home Health Services     Transport at Discharge : Family      IMM Given (Date):: 07/21/25 (019)  IMM Given to:: Patient

## 2025-07-21 NOTE — ASSESSMENT & PLAN NOTE
Lipid profile 1/30/2025; cholesterol 126, triglyceride 92, HDL 33 and LDL calculated 75.  On atorvastatin 40 mg daily.    Plan  PT denies any specific cardiac or respiratory complaints of same.  No current supplemental O2 requirements with stable O2 saturations.  Euvolemic on exam.  Continue furosemide 40 mg daily.  Add K-Dur 10 meq daily.  Telemetry reviewed, maintaining SB/NSR with heart rates in the 50s to low 60s with occasional PACs/PVCs.  BB remains on hold.  BP elevated last recorded 179/60.  Increase amlodipine from 2.5 to 5 mg daily and continue losartan 100 mg daily.  Continue DAPT with low-dose aspirin/clopidogrel 75 mg daily.  Continue high intensity statin therapy.  We will arrange follow-up with cardiology on DC.  Plan for 1 week live Zio patch monitor as an outpatient.

## 2025-07-21 NOTE — ASSESSMENT & PLAN NOTE
Has a history of an incomplete LBBB which converted to a new LBBB immediately postoperatively and shortly after returned to an incomplete LBBB.  Recurrent LBBB as of 7/19 - therefore low-dose BB discontinued.    Telemetry review-predominant SB/NSR with heart rates in the 50s to low 60s.  Occasional PACs/PVCs.  ECG 7/21/2025; SB, rate 57 bpm, LBBB,  ms.

## 2025-07-21 NOTE — PHYSICAL THERAPY NOTE
PHYSICAL THERAPY NOTE          Patient Name: Jackie Urbina  Today's Date: 7/21/2025 07/21/25 1347   PT Last Visit   PT Visit Date 07/21/25   Note Type   Note Type Treatment   Pain Assessment   Pain Assessment Tool 0-10   Pain Score No Pain   Restrictions/Precautions   Other Precautions Cardiac/sternal;Hard of hearing   General   Chart Reviewed Yes   Additional Pertinent History cleared for Tx session by nsastrid (being D/C'd)   Response to Previous Treatment Patient with no complaints from previous session.   Family/Caregiver Present Yes   Cognition   Overall Cognitive Status WFL   Arousal/Participation Alert;Cooperative   Attention Within functional limits   Orientation Level Oriented to person;Oriented to place;Oriented to situation   Memory Decreased recall of precautions   Following Commands Follows one step commands without difficulty   Subjective   Subjective Pt is observed standing in the room/dressed w/ family present; reports being D/C'd; agreeable to try steps prior to D/C   Transfers   Sit to Stand 6  Modified independent   Stand to Sit 6  Modified independent   Ambulation/Elevation   Gait pattern Short stride;Foward flexed;Inconsistent addy  (no overt LOB)   Gait Assistance 6  Modified independent   Assistive Device SPC   Distance 70 ft + 2 x 10 ft + 70 ft w/ seated rest period and steps negotiation in between   Stair Management Assistance 5  Supervision   Additional items Verbal cues  (discussed sequencing and technique)   Stair Management Technique One rail L;Step to pattern;Foreward;Nonreciprocal; With cane   Number of Stairs 7   Balance   Static Sitting Good   Dynamic Sitting Fair +   Static Standing Fair +   Dynamic Standing Fair   Ambulatory Fair   Activity Tolerance   Activity Tolerance Patient tolerated treatment well   Nurse Made Aware spoke to JUSTUS Hughes   Assessment   Assessment Pt demonstrated overall  improvement in balance, endurance and all aspects of observed mobility progressing to mod (I) level on level surfaces (incl amb w/ SPC) and (S) on the steps; no overt uncorrected LOB, gross knee buckling, or excessive swaying observed during the session; no increased discomfort or excessive fatigue expressed; pt appeared to be comfortable at the end of session; overall, cont to anticipate pt will return home upon D/C from PT/mobility stand point and when medically cleared; no other immediate PT needs otherwise identified while in the hospital; pt informed and concurred; pt expressed no concerns about going home from mobility stand point (already medically cleared); will therefore sign off   Goals   Patient Goals to return home   PT Treatment Day 1   Plan   Treatment/Interventions Spoke to nursing;Spoke to case management;Family   Progress Discontinue PT   PT Frequency Other (Comment)  (D/C PT)   Discharge Recommendation   Rehab Resource Intensity Level, PT III (Minimum Resource Intensity)   Equipment Recommended Cane   AM-PAC Basic Mobility Inpatient   Turning in Flat Bed Without Bedrails 4   Lying on Back to Sitting on Edge of Flat Bed Without Bedrails 4   Moving Bed to Chair 4   Standing Up From Chair Using Arms 4   Walk in Room 4   Climb 3-5 Stairs With Railing 3   Basic Mobility Inpatient Raw Score 23   Basic Mobility Standardized Score 50.88   [facility] Highest Level Of Mobility   -HLM Goal 7: Walk 25 feet or more   -HLM Achieved 7: Walk 25 feet or more   Education   Education Provided Mobility training;Assistive device   Patient Demonstrates verbal understanding   End of Consult   Patient Position at End of Consult Other (comment)  (transport chair)     Mars Stokes

## 2025-07-21 NOTE — PROGRESS NOTES
Progress Note - Cardiac Surgery   Jackie Urbina 89 y.o. female MRN: [id number]  Unit/Bed#: [facility]PHP-321-01 Encounter: [id number]    Aortic stenosis, Non-Rheumatic. S/P transfemoral transcatheter aortic valve replacement; POD # 4    24 Hour Events: No events overnight. No complaints.       Medications:   Scheduled Meds:  Current Facility-Administered Medications   Medication Dose Route Frequency Provider Last Rate    acetaminophen  650 mg Rectal Q4H PRN Linda Luu PA-C      acetaminophen  650 mg Oral Q4H PRN Linda Luu PA-C      amLODIPine  2.5 mg Oral Daily Linda Luu PA-C      ascorbic acid  125 mg Oral Daily Linda Luu PA-C      aspirin  81 mg Oral Daily Linda Luu PA-C      atorvastatin  40 mg Oral Daily With Dinner Linda Luu PA-C      bisacodyl  10 mg Rectal Daily PRN Linda Luu PA-C      brimonidine tartrate  1 drop Both Eyes BID Linda Luu PA-C      calcium gluconate  2 g Intravenous Once PRN Linda Luu PA-C      chlorhexidine  15 mL Mouth/Throat BID Linda Luu PA-C      clopidogrel  75 mg Oral Daily Linda Luu PA-C      furosemide  40 mg Oral Daily Linda Luu PA-C      heparin (porcine)  5,000 Units Subcutaneous Q8H [facility] Linda Luu PA-C      hydrALAZINE  10 mg Intravenous Q6H PRN Linda Luu PA-C      insulin lispro  1-5 Units Subcutaneous HS Linda Luu PA-C      insulin lispro  1-5 Units Subcutaneous TID AC Linda Luu PA-C      losartan  100 mg Oral Daily Mae Leon PA-C      niCARdipine  1-15 mg/hr Intravenous Titrated Linda Luu PA-C Stopped (07/17/25 2114)    ondansetron  4 mg Intravenous Q6H PRN Linda Luu PA-C      pantoprazole  40 mg Oral Daily Linda Luu PA-C      polyethylene glycol  17 g Oral Daily Linda Luu PA-C      sertraline  25 mg Oral Daily Linda Luu PA-C       Continuous Infusions:niCARdipine, 1-15 mg/hr, Last Rate: Stopped (07/17/25 2114)      PRN Meds:.   acetaminophen    acetaminophen    bisacodyl    calcium gluconate    hydrALAZINE    ondansetron    Vitals:   Vitals:    07/20/25 2255 07/21/25 0320 07/21/25 0644 07/21/25 0730   BP: 141/55 144/51  (!) 179/60   Pulse: 60 58  59   Resp: 14 14     Temp: 98.4 °F (36.9 °C) 98.1 °F (36.7 °C)  98 °F (36.7 °C)   TempSrc:       SpO2: 93% 97%  98%   Weight:   49.6 kg (109 lb 5.6 oz)    Height:           Telemetry: SR w 1st avb; Heart Rate: 61    Respiratory:   SpO2: SpO2: 98 %; Room Air    Intake/Output:     Intake/Output Summary (Last 24 hours) at 7/21/2025 0859  Last data filed at 7/21/2025 0830  Gross per 24 hour   Intake 318 ml   Output 700 ml   Net -382 ml        Weights:   Weight (last 2 days)       Date/Time Weight    07/21/25 0644 49.6 (109.35)    07/20/25 0600 49.7 (109.57)    07/19/25 0600 49.8 (109.79)          Results:   Results from last 7 days   Lab Units 07/20/25  0404 07/19/25  0411 07/18/25  0353   WBC Thousand/uL 7.49 9.69 10.12   HEMOGLOBIN g/dL 10.5* 10.6* 10.2*   HEMATOCRIT % 33.3* 34.5* 33.0*   PLATELETS Thousands/uL 135* 147* 162     Results from last 7 days   Lab Units 07/20/25  0404 07/19/25  0411 07/18/25  0353 07/17/25  1426 07/17/25  1348   POTASSIUM mmol/L 3.7 4.2 4.5   < >  --    CHLORIDE mmol/L 107 107 109*   < >  --    CO2 mmol/L 29 30 28   < >  --    CO2, I-STAT mmol/L  --   --   --   --  24   BUN mg/dL 23 31* 35*   < >  --    CREATININE mg/dL 0.66 0.76 0.86   < >  --    GLUCOSE, ISTAT mg/dl  --   --   --   --  105   CALCIUM mg/dL 8.7 8.7 8.9   < >  --     < > = values in this interval not displayed.     Invasive Lines/Tubes:  Invasive Devices       Peripheral Intravenous Line  Duration             Peripheral IV 07/17/25 Dorsal (posterior);Left Forearm 3 days                    Physical Exam:    General: No acute distress, Alert, and Normal appearance  HEENT/NECK:  No rmocephalic. Atraumatic.  No jugular venous distention.    Cardiac: Regular rate and rhythm  Pulmonary:  Breath sounds clear  bilaterally  Abdomen:  Non-tender, Non-distended, and Normal bowel sounds  Incisions: Groin puncture sites clean, dry, and intact without hematoma  Extremities: Extremities warm/dry  Neuro: Alert and oriented X 3  Skin: Warm/Dry, without rashes or lesions.    Assessment:  Principal Problem:    S/P TAVR (transcatheter aortic valve replacement)  Active Problems:    Essential hypertension    Nonrheumatic aortic (valve) stenosis    Chronic heart failure with preserved ejection fraction (HFpEF) ([facility])    Current moderate episode of major depressive disorder without prior episode (H[facility])    Hyperlipidemia    Dyslipidemia    Gastroesophageal reflux disease without esophagitis    Coronary artery disease involving native coronary artery    Ambulatory dysfunction    Encounter for geriatric assessment       Aortic stenosis, Non-Rheumatic. S/P transfemoral transcatheter aortic valve replacement; POD # 4    Plan:    Cardiac:   LVEF 50%,   Hx of ILBBB had new LBBB postoperatively now resolved,   Bradycardiac in the 50 - 60s - off beta blocker since OR   Cardiology planning zio at discharge   EKG this AM with NSR 1st AVB  On ASA and Plavix   HTN regiman: norvasc 2.5 mg daily - increase to 5 mg, losartan 100 mg daily    Pulmonary:     Good Room air oxygen saturation; Continue incentive spirometry/Coughing/Deep breathing exercises    Renal:     Normal preoperative renal function, on lasix 40 mg po daily    Neuro:    Neurologically intact; No active issues     Incisional pain well-controlled; Continue prn Tylenol    GI:    Cardiac diet, with 1800 mL fluid restriction    Tolerating diet without complaint    Continue stool softeners and prn suppository    Continue GI prophylaxis    Endo:     Glucose well-controlled with insulin sliding scale coverage    7.  Hematology:     Post-operative blood count acceptable; Trend prn    8.  Disposition: Anticipated discharge date: home today with zio patch per cardiology       VTE Pharmacologic  Prophylaxis: sq heparin  VTE Mechanical Prophylaxis: sequential compression device    Collaborative rounds completed with supervising physician  Plan of care discussed with bedside nurse    SIGNATURE: Luanne Manuel PA-C  DATE: July 21, 2025  TIME: 8:59 AM

## 2025-07-21 NOTE — RESTORATIVE TECHNICIAN NOTE
Restorative Technician Note      Patient Name: Jackie Urbina     Restorative Tech Visit Date: 07/21/25  Note Type: Mobility  Patient Position Upon Consult: Bedside chair  Activity Performed: Ambulated  Assistive Device: Roller walker  Patient Position at End of Consult: Bed/Chair alarm activated; All needs within reach; Bedside chair

## 2025-07-21 NOTE — PROGRESS NOTES
Cardiology Progress Note - Jackie Urbina 89 y.o. female MRN: [id number]    Unit/Bed#: PPHP-321-01 Encounter: [id number]        Assessment & Plan  S/P TAVR (transcatheter aortic valve replacement)  Nonrheumatic aortic (valve) stenosis  S/p successful placement of a 23 mm TAVR bioprosthetic valve.  Postop echocardiogram showing trace transvalvular regurg.  Otherwise the valve was well-seated and functioning properly with an expected prosthetic gradient.  Continue DAPT with aspirin and Plavix.  Continue aspirin and statin  He does have a history of an incomplete LBBB which converted to a new LBBB immediately postoperatively and shortly after returned to an incomplete LBBB.  Recurrent LBBB yesterday therefore low-dose BB discontinued.  Plan for ZIO at discharge  Left bundle branch block  Has a history of an incomplete LBBB which converted to a new LBBB immediately postoperatively and shortly after returned to an incomplete LBBB.  Recurrent LBBB as of 7/19 - therefore low-dose BB discontinued.    Telemetry review-predominant SB/NSR with heart rates in the 50s to low 60s.  Occasional PACs/PVCs.  ECG 7/21/2025; SB, rate 57 bpm, LBBB,  ms.  Coronary artery disease involving native coronary artery  S/p LUIS to mid LAD in March 2025.  Also has a  of D3 and mid circumflex.  On aspirin 81 mg daily, clopidogrel 75 mg daily, and atorvastatin 40 mg daily.  BB on hold, see above..  Chronic heart failure with preserved ejection fraction (HFpEF) (HCC)  Final intraoperative IMTIAZ EF 75%  On oral furosemide 40 mg daily.  Essential hypertension  BP elevated at 179/60.  Outpatient BP regimen; amlodipine 2.5 mg daily, lisinopril 10 mg daily, and metoprolol succinate 12.5 mg daily.  Inpatient BP regimen; amlodipine 2.5 mg daily, and losartan 100 mg daily.  Hyperlipidemia  Lipid profile 1/30/2025; cholesterol 126, triglyceride 92, HDL 33 and LDL calculated 75.  On atorvastatin 40 mg daily.    Plan  PT denies any specific cardiac or  "respiratory complaints of same.  No current supplemental O2 requirements with stable O2 saturations.  Euvolemic on exam.  Continue furosemide 40 mg daily.  Add K-Dur 10 meq daily.  Telemetry reviewed, maintaining SB/NSR with heart rates in the 50s to low 60s with occasional PACs/PVCs.  BB remains on hold.  BP elevated last recorded 179/60.  Increase amlodipine from 2.5 to 5 mg daily and continue losartan 100 mg daily.  Continue DAPT with low-dose aspirin/clopidogrel 75 mg daily.  Continue high intensity statin therapy.  We will arrange follow-up with cardiology on DC.  Plan for 1 week live Zio patch monitor as an outpatient.    Subjective:   Review of Systems   Constitutional: Negative for chills, fever and malaise/fatigue.   Eyes:  Negative for visual disturbance.   Cardiovascular:  Negative for chest pain, dyspnea on exertion, leg swelling, orthopnea and palpitations.   Respiratory:  Negative for cough and shortness of breath.    Gastrointestinal:  Negative for abdominal pain.   Neurological:  Negative for dizziness, headaches and light-headedness.        Objective:     Vitals: Blood pressure (!) 179/60, pulse 59, temperature 98 °F (36.7 °C), resp. rate 14, height 4' 11\" (1.499 m), weight 49.6 kg (109 lb 5.6 oz), SpO2 98%., Body mass index is 22.09 kg/m².,   Orthostatic Blood Pressures      Flowsheet Row Most Recent Value   Blood Pressure 179/60 filed at 07/21/2025 0730   Patient Position - Orthostatic VS Lying filed at 07/19/2025 0300              Intake/Output Summary (Last 24 hours) at 7/21/2025 1009  Last data filed at 7/21/2025 0900  Gross per 24 hour   Intake 318 ml   Output 800 ml   Net -482 ml         Physical Exam:  Physical Exam  Vitals and nursing note reviewed.   Constitutional:       General: She is not in acute distress.     Appearance: She is not diaphoretic.   HENT:      Head: Normocephalic and atraumatic.     Eyes:      General: No scleral icterus.      Cardiovascular:      Rate and Rhythm: Normal " rate and regular rhythm.      Pulses: Normal pulses.      Heart sounds: Normal heart sounds.   Pulmonary:      Effort: Pulmonary effort is normal.      Breath sounds: No wheezing or rales.   Abdominal:      Palpations: Abdomen is soft.      Tenderness: There is no abdominal tenderness.     Musculoskeletal:      Right lower leg: No edema.      Left lower leg: No edema.     Skin:     General: Skin is warm and dry.      Capillary Refill: Capillary refill takes less than 2 seconds.     Neurological:      Mental Status: She is alert and oriented to person, place, and time.     Psychiatric:         Mood and Affect: Mood normal.           Medications:    Current Medications[1]     Labs & Results:        Results from last 7 days   Lab Units 07/20/25  0404 07/19/25  0411 07/18/25  0353   WBC Thousand/uL 7.49 9.69 10.12   HEMOGLOBIN g/dL 10.5* 10.6* 10.2*   HEMATOCRIT % 33.3* 34.5* 33.0*   PLATELETS Thousands/uL 135* 147* 162         Results from last 7 days   Lab Units 07/20/25  0404 07/19/25  0411 07/18/25  0353 07/17/25  1426 07/17/25  1348 07/17/25  1318   POTASSIUM mmol/L 3.7 4.2 4.5   < >  --   --    CHLORIDE mmol/L 107 107 109*   < >  --   --    CO2 mmol/L 29 30 28   < >  --   --    CO2, I-STAT mmol/L  --   --   --   --  24 27   BUN mg/dL 23 31* 35*   < >  --   --    CREATININE mg/dL 0.66 0.76 0.86   < >  --   --    CALCIUM mg/dL 8.7 8.7 8.9   < >  --   --    GLUCOSE, ISTAT mg/dl  --   --   --   --  105 98    < > = values in this interval not displayed.         Results from last 7 days   Lab Units 07/18/25  0353   MAGNESIUM mg/dL 2.3     EKG personally reviewed by JUAN Lopez.        [1]   Current Facility-Administered Medications:     acetaminophen (TYLENOL) rectal suppository 650 mg, 650 mg, Rectal, Q4H PRN, Linda Luu PA-C    acetaminophen (TYLENOL) tablet 650 mg, 650 mg, Oral, Q4H PRN, Linda Luu PA-C    [START ON 7/22/2025] amLODIPine (NORVASC) tablet 5 mg, 5 mg, Oral, Daily, Luanne Arteaga  RALF Manuel    ascorbic acid (VITAMIN C) tablet 125 mg, 125 mg, Oral, Daily, Linda Luu PA-C, 125 mg at 07/21/25 0817    aspirin chewable tablet 81 mg, 81 mg, Oral, Daily, Linda Luu PA-C, 81 mg at 07/21/25 0813    atorvastatin (LIPITOR) tablet 40 mg, 40 mg, Oral, Daily With Dinner, Linda Luu PA-C, 40 mg at 07/20/25 1639    bisacodyl (DULCOLAX) rectal suppository 10 mg, 10 mg, Rectal, Daily PRN, Linda Luu PA-C    brimonidine tartrate 0.2 % ophthalmic solution 1 drop, 1 drop, Both Eyes, BID, Linda Luu PA-C, 1 drop at 07/21/25 0819    calcium gluconate 2 g in sodium chloride 0.9% 100 mL (premix), 2 g, Intravenous, Once PRN, Linda Luu PA-C    chlorhexidine (PERIDEX) 0.12 % oral rinse 15 mL, 15 mL, Mouth/Throat, BID, Linda Luu PA-C, 15 mL at 07/21/25 0813    clopidogrel (PLAVIX) tablet 75 mg, 75 mg, Oral, Daily, Linda Luu PA-C, 75 mg at 07/21/25 0813    furosemide (LASIX) tablet 40 mg, 40 mg, Oral, Daily, Linda Luu PA-C, 40 mg at 07/21/25 0814    heparin (porcine) subcutaneous injection 5,000 Units, 5,000 Units, Subcutaneous, Q8H MAXIM, Linda Luu PA-C, 5,000 Units at 07/21/25 0527    hydrALAZINE (APRESOLINE) injection 10 mg, 10 mg, Intravenous, Q6H PRN, Linda Luu PA-C    insulin lispro (HumALOG/ADMELOG) 100 units/mL subcutaneous injection 1-5 Units, 1-5 Units, Subcutaneous, HS, Linda Luu PA-C, 1 Units at 07/17/25 2114    insulin lispro (HumALOG/ADMELOG) 100 units/mL subcutaneous injection 1-5 Units, 1-5 Units, Subcutaneous, TID AC, 1 Units at 07/18/25 1146 **AND** Fingerstick Glucose (POCT), , , TID AC, Linda Luu PA-C    losartan (COZAAR) tablet 100 mg, 100 mg, Oral, Daily, Mae Leon PA-C, 100 mg at 07/21/25 0814    niCARdipine (CARDENE) 25 mg (STANDARD CONCENTRATION) in sodium chloride 0.9% 250 mL, 1-15 mg/hr, Intravenous, Titrated, Linda Luu PA-C, Stopped at 07/17/25 2114    ondansetron (ZOFRAN) injection 4 mg, 4  mg, Intravenous, Q6H PRN, Linda Luu PA-C    pantoprazole (PROTONIX) EC tablet 40 mg, 40 mg, Oral, Daily, Linda Luu PA-C, 40 mg at 07/21/25 0527    polyethylene glycol (MIRALAX) packet 17 g, 17 g, Oral, Daily, Linda Luu PA-C    sertraline (ZOLOFT) tablet 25 mg, 25 mg, Oral, Daily, Linda Luu PA-C, 25 mg at 07/21/25 0814

## 2025-07-21 NOTE — TELEPHONE ENCOUNTER
VNCUCA from [facility] would like to know if home health orders will be signed by Dr. Howell please follow up at [phone]

## 2025-07-21 NOTE — PROGRESS NOTES
Pt came to the office as directed by Dominick MARTINEZ for a 1 week zio AT placement dx s/p TAVR with new LBBB. To place results to go under Dr. Little. Provided pt and family member detailed information in regards to the monitor.

## 2025-07-22 ENCOUNTER — OFFICE VISIT (OUTPATIENT)
Age: OVER 89
End: 2025-07-22
Payer: MEDICARE

## 2025-07-22 VITALS
HEART RATE: 55 BPM | BODY MASS INDEX: 21.57 KG/M2 | RESPIRATION RATE: 16 BRPM | WEIGHT: 107 LBS | SYSTOLIC BLOOD PRESSURE: 142 MMHG | TEMPERATURE: 96.8 F | OXYGEN SATURATION: 99 % | DIASTOLIC BLOOD PRESSURE: 68 MMHG | HEIGHT: 59 IN

## 2025-07-22 DIAGNOSIS — N28.9 RENAL INSUFFICIENCY: ICD-10-CM

## 2025-07-22 DIAGNOSIS — I50.32 CHRONIC HEART FAILURE WITH PRESERVED EJECTION FRACTION (HFPEF) (HCC): ICD-10-CM

## 2025-07-22 DIAGNOSIS — I44.7 LEFT BUNDLE BRANCH BLOCK: ICD-10-CM

## 2025-07-22 DIAGNOSIS — I25.10 CORONARY ARTERY DISEASE INVOLVING NATIVE CORONARY ARTERY OF NATIVE HEART WITHOUT ANGINA PECTORIS: ICD-10-CM

## 2025-07-22 DIAGNOSIS — I10 ESSENTIAL HYPERTENSION: ICD-10-CM

## 2025-07-22 DIAGNOSIS — I51.7 ATRIAL DILATATION, BILATERAL: ICD-10-CM

## 2025-07-22 DIAGNOSIS — E53.8 FOLATE DEFICIENCY: ICD-10-CM

## 2025-07-22 DIAGNOSIS — Z95.2 S/P TAVR (TRANSCATHETER AORTIC VALVE REPLACEMENT): Primary | ICD-10-CM

## 2025-07-22 DIAGNOSIS — E53.8 VITAMIN B 12 DEFICIENCY: ICD-10-CM

## 2025-07-22 DIAGNOSIS — D64.9 ANEMIA, UNSPECIFIED TYPE: ICD-10-CM

## 2025-07-22 DIAGNOSIS — E61.1 IRON DEFICIENCY: ICD-10-CM

## 2025-07-22 PROCEDURE — 99495 TRANSJ CARE MGMT MOD F2F 14D: CPT | Performed by: INTERNAL MEDICINE

## 2025-07-22 NOTE — PROGRESS NOTES
Transition of Care Visit:  Name: Jackie Urbina      : 1935      MRN: [id number]  Encounter Provider: Tom Howell MD  Encounter Date: 2025   Encounter department: [facility] INTERNAL MEDICINE    Assessment & Plan  Anemia, unspecified type        Orders:    CBC and differential; Future    Renal insufficiency    Orders:    Comprehensive metabolic panel; Future    Iron deficiency    Orders:    TIBC Panel (incl. Iron, TIBC, % Iron Saturation); Future    Vitamin B 12 deficiency    Orders:    Vitamin B12; Future    Folate deficiency    Orders:    Folate; Future    Atrial dilatation, bilateral  Discussed finding of bilateral atrial dilatation with the family and patient today enlargement of upper chambers is secondary to mitral and tricuspid valve regurgitation no evidence of atrial fibrillation at this point         Chronic heart failure with preserved ejection fraction (HFpEF) ([facility])  Wt Readings from Last 3 Encounters:   25 48.5 kg (107 lb)   25 49.6 kg (109 lb 5.6 oz)   25 49.3 kg (108 lb 11.2 oz)   Congestive heart failure diagnosis reviewed during today's visit echocardiogram reviewed expect that some of her congestive heart failure may improve after replacement of aortic valve.  Follow-up echocardiogram has already been requested by cardiology.                 Coronary artery disease involving native coronary artery of native heart without angina pectoris  History of stent placement to LAD no recent symptoms of chest pain palpitations.  Recommend continuation of current low-dose aspirin and Plavix therapy         Essential hypertension  Blood pressure currently 142/68 recommend continuation of current medications with reevaluation in 2 to 3 weeks         Left bundle branch block  Transient left bundle branch block after placement of aortic valve resolved prior to discharge but does have ongoing first-degree AV block currently has Zio in place to evaluate for any  "ongoing issue with bundle branch block         S/P TAVR (transcatheter aortic valve replacement)  Doing well after aortic valve replacement by TAVR.  Continue follow-up with cardiothoracic surgery and cardiology              History of Present Illness     Transitional Care Management Review:   Jackie Urbina is a 89 y.o. female here for TCM follow up.     During the TCM phone call patient stated:  TCM Call (since 7/8/2025)       Date and time call was made  7/21/2025  2:07 PM    Hospital care reviewed  Records reviewed    Patient was hospitialized at  [facility]    Date of Admission  07/17/25    Date of discharge  07/21/25    Diagnosis  S/P TAVR (transcatheter aortic valve replacement)    Disposition  Home          TCM Call (since 7/8/2025)       Scheduled for follow up?  Yes    I have advised the patient to call PCP with any new or worsening symptoms  Angelina Wachter M.A    Support System  Family; Friends          This 89-year-old female patient presents today for a transition of care visit recently hospitalized at [facility] for a TAVR or aortic valve replacement.  She presented there with congestive heart failure and progressive fatigue symptoms along with shortness of breath.  Post procedure for TAVR are patient did have a transient left bundle branch block which evolved into a first-degree AV block.  Was discharged off of her beta-blocker medication and has a ZIO monitor in place at the present time.  Overall seems to be doing quite nicely seems stronger than she was prior to the aortic valve replacement.  She does have mild anemia and an iron level B12 and folic acid level have all been requested.      Review of Systems   Constitutional:  Positive for appetite change and fatigue.   All other systems reviewed and are negative.    Objective   /68   Pulse 55   Temp (!) 96.8 °F (36 °C) (Tympanic Core)   Resp 16   Ht 4' 11\" (1.499 m)   Wt 48.5 kg (107 lb)   SpO2 99%   BMI 21.61 kg/m² "     Physical Exam  Vitals and nursing note reviewed.   Constitutional:       General: She is not in acute distress.     Appearance: She is well-developed.   HENT:      Head: Normocephalic and atraumatic.     Eyes:      Conjunctiva/sclera: Conjunctivae normal.       Cardiovascular:      Rate and Rhythm: Normal rate and regular rhythm.      Heart sounds: Murmur heard.   Pulmonary:      Effort: Pulmonary effort is normal. No respiratory distress.      Breath sounds: Normal breath sounds. No wheezing, rhonchi or rales.   Abdominal:      Palpations: Abdomen is soft.      Tenderness: There is no abdominal tenderness.     Musculoskeletal:      Cervical back: Neck supple.      Right lower leg: Edema present.      Left lower leg: Edema present.     Skin:     General: Skin is warm and dry.      Capillary Refill: Capillary refill takes less than 2 seconds.     Neurological:      Mental Status: She is alert. Mental status is at baseline.     Psychiatric:         Mood and Affect: Mood normal.       Medications have been reviewed by provider in current encounter

## 2025-07-22 NOTE — ASSESSMENT & PLAN NOTE
Transient left bundle branch block after placement of aortic valve resolved prior to discharge but does have ongoing first-degree AV block currently has Zio in place to evaluate for any ongoing issue with bundle branch block

## 2025-07-22 NOTE — ASSESSMENT & PLAN NOTE
Doing well after aortic valve replacement by TAVR.  Continue follow-up with cardiothoracic surgery and cardiology

## 2025-07-22 NOTE — ASSESSMENT & PLAN NOTE
History of stent placement to LAD no recent symptoms of chest pain palpitations.  Recommend continuation of current low-dose aspirin and Plavix therapy

## 2025-07-22 NOTE — ASSESSMENT & PLAN NOTE
Blood pressure currently 142/68 recommend continuation of current medications with reevaluation in 2 to 3 weeks

## 2025-07-22 NOTE — ASSESSMENT & PLAN NOTE
Wt Readings from Last 3 Encounters:   07/22/25 48.5 kg (107 lb)   07/21/25 49.6 kg (109 lb 5.6 oz)   07/01/25 49.3 kg (108 lb 11.2 oz)   Congestive heart failure diagnosis reviewed during today's visit echocardiogram reviewed expect that some of her congestive heart failure may improve after replacement of aortic valve.  Follow-up echocardiogram has already been requested by cardiology.

## 2025-07-22 NOTE — ASSESSMENT & PLAN NOTE
Discussed finding of bilateral atrial dilatation with the family and patient today enlargement of upper chambers is secondary to mitral and tricuspid valve regurgitation no evidence of atrial fibrillation at this point

## 2025-07-24 ENCOUNTER — TELEPHONE (OUTPATIENT)
Dept: LAB | Facility: HOSPITAL | Age: OVER 89
End: 2025-07-24

## 2025-07-26 ENCOUNTER — APPOINTMENT (OUTPATIENT)
Dept: LAB | Facility: HOSPITAL | Age: OVER 89
End: 2025-07-26
Attending: INTERNAL MEDICINE
Payer: MEDICARE

## 2025-07-26 DIAGNOSIS — E53.8 VITAMIN B 12 DEFICIENCY: ICD-10-CM

## 2025-07-26 DIAGNOSIS — E61.1 IRON DEFICIENCY: ICD-10-CM

## 2025-07-26 DIAGNOSIS — E53.8 FOLATE DEFICIENCY: ICD-10-CM

## 2025-07-26 LAB
FOLATE SERPL-MCNC: >22.3 NG/ML
IRON SATN MFR SERPL: 26 % (ref 15–50)
IRON SERPL-MCNC: 91 UG/DL (ref 50–212)
TIBC SERPL-MCNC: 351.4 UG/DL (ref 250–450)
TRANSFERRIN SERPL-MCNC: 251 MG/DL (ref 203–362)
UIBC SERPL-MCNC: 260 UG/DL (ref 155–355)
VIT B12 SERPL-MCNC: 1001 PG/ML (ref 180–914)

## 2025-07-26 PROCEDURE — 82607 VITAMIN B-12: CPT

## 2025-07-26 PROCEDURE — 83540 ASSAY OF IRON: CPT

## 2025-07-26 PROCEDURE — 83550 IRON BINDING TEST: CPT

## 2025-07-26 PROCEDURE — 36415 COLL VENOUS BLD VENIPUNCTURE: CPT

## 2025-07-26 PROCEDURE — 82746 ASSAY OF FOLIC ACID SERUM: CPT

## 2025-07-28 ENCOUNTER — TELEPHONE (OUTPATIENT)
Dept: CARDIAC SURGERY | Facility: CLINIC | Age: OVER 89
End: 2025-07-28

## 2025-07-30 ENCOUNTER — NURSE TRIAGE (OUTPATIENT)
Age: OVER 89
End: 2025-07-30

## 2025-07-31 ENCOUNTER — OFFICE VISIT (OUTPATIENT)
Dept: CARDIOLOGY CLINIC | Facility: CLINIC | Age: OVER 89
End: 2025-07-31
Payer: MEDICARE

## 2025-07-31 VITALS
HEART RATE: 66 BPM | TEMPERATURE: 98.2 F | OXYGEN SATURATION: 97 % | DIASTOLIC BLOOD PRESSURE: 80 MMHG | WEIGHT: 108 LBS | HEIGHT: 59 IN | BODY MASS INDEX: 21.77 KG/M2 | SYSTOLIC BLOOD PRESSURE: 158 MMHG

## 2025-07-31 DIAGNOSIS — I44.7 LEFT BUNDLE BRANCH BLOCK: ICD-10-CM

## 2025-07-31 DIAGNOSIS — Z95.5 S/P DRUG ELUTING CORONARY STENT PLACEMENT: ICD-10-CM

## 2025-07-31 DIAGNOSIS — I05.0 MITRAL VALVE STENOSIS, MILD: ICD-10-CM

## 2025-07-31 DIAGNOSIS — I35.0 NONRHEUMATIC AORTIC (VALVE) STENOSIS: ICD-10-CM

## 2025-07-31 DIAGNOSIS — I10 ESSENTIAL HYPERTENSION: ICD-10-CM

## 2025-07-31 DIAGNOSIS — I25.10 CORONARY ARTERY DISEASE INVOLVING NATIVE CORONARY ARTERY OF NATIVE HEART WITHOUT ANGINA PECTORIS: ICD-10-CM

## 2025-07-31 DIAGNOSIS — E78.5 DYSLIPIDEMIA: ICD-10-CM

## 2025-07-31 DIAGNOSIS — Z95.2 S/P TAVR (TRANSCATHETER AORTIC VALVE REPLACEMENT): ICD-10-CM

## 2025-07-31 DIAGNOSIS — I34.0 NONRHEUMATIC MITRAL VALVE REGURGITATION: ICD-10-CM

## 2025-07-31 DIAGNOSIS — I50.32 CHRONIC HEART FAILURE WITH PRESERVED EJECTION FRACTION (HFPEF) (HCC): ICD-10-CM

## 2025-07-31 PROCEDURE — 99214 OFFICE O/P EST MOD 30 MIN: CPT

## 2025-08-03 ENCOUNTER — APPOINTMENT (EMERGENCY)
Dept: RADIOLOGY | Facility: HOSPITAL | Age: OVER 89
DRG: 565 | End: 2025-08-03
Payer: MEDICARE

## 2025-08-03 ENCOUNTER — HOSPITAL ENCOUNTER (INPATIENT)
Facility: HOSPITAL | Age: OVER 89
LOS: 2 days | Discharge: HOME HEALTH | DRG: 565 | End: 2025-08-06
Attending: EMERGENCY MEDICINE | Admitting: INTERNAL MEDICINE
Payer: MEDICARE

## 2025-08-03 PROBLEM — R52 INTRACTABLE PAIN: Status: ACTIVE | Noted: 2025-08-03

## 2025-08-03 PROBLEM — S43.102A ACROMIOCLAVICULAR JOINT SEPARATION, LEFT, INITIAL ENCOUNTER: Status: ACTIVE | Noted: 2025-08-03

## 2025-08-04 ENCOUNTER — TELEPHONE (OUTPATIENT)
Age: OVER 89
End: 2025-08-04

## 2025-08-04 PROBLEM — M25.512 ACUTE PAIN OF LEFT SHOULDER: Status: ACTIVE | Noted: 2025-08-03

## 2025-08-04 PROBLEM — M12.812 ROTATOR CUFF ARTHROPATHY OF LEFT SHOULDER: Status: ACTIVE | Noted: 2025-08-04

## 2025-08-06 ENCOUNTER — TELEPHONE (OUTPATIENT)
Age: OVER 89
End: 2025-08-06

## 2025-08-07 ENCOUNTER — TRANSITIONAL CARE MANAGEMENT (OUTPATIENT)
Age: OVER 89
End: 2025-08-07

## 2025-08-11 ENCOUNTER — OFFICE VISIT (OUTPATIENT)
Age: OVER 89
End: 2025-08-11
Payer: MEDICARE

## 2025-08-13 ENCOUNTER — TELEPHONE (OUTPATIENT)
Dept: CARDIAC SURGERY | Facility: CLINIC | Age: OVER 89
End: 2025-08-13

## 2025-08-14 ENCOUNTER — APPOINTMENT (EMERGENCY)
Dept: RADIOLOGY | Facility: HOSPITAL | Age: OVER 89
End: 2025-08-14
Payer: MEDICARE

## 2025-08-14 ENCOUNTER — HOSPITAL ENCOUNTER (OUTPATIENT)
Facility: HOSPITAL | Age: OVER 89
Setting detail: OBSERVATION
Discharge: HOME HEALTH | End: 2025-08-15
Attending: STUDENT IN AN ORGANIZED HEALTH CARE EDUCATION/TRAINING PROGRAM | Admitting: FAMILY MEDICINE
Payer: MEDICARE

## 2025-08-15 ENCOUNTER — RESULTS FOLLOW-UP (OUTPATIENT)
Dept: CARDIOLOGY CLINIC | Facility: CLINIC | Age: OVER 89
End: 2025-08-15

## 2025-08-15 PROBLEM — S43.102D: Status: ACTIVE | Noted: 2025-08-03

## 2025-08-15 LAB
CV ZIO BASELINE AVG BPM: 69 BPM
CV ZIO BASELINE BPM HIGH: 156 BPM
CV ZIO BASELINE BPM LOW: 47 BPM
CV ZIO DEVICE ANALYSIS TIME: NORMAL
CV ZIO ECT SVE COUNT: 7110 EPISODES
CV ZIO ECT SVE CPLT COUNT: 429 EPISODES
CV ZIO ECT SVE CPLT FREQ: NORMAL
CV ZIO ECT SVE FREQ: NORMAL
CV ZIO ECT SVE TPLT COUNT: 264 EPISODES
CV ZIO ECT SVE TPLT FREQ: NORMAL
CV ZIO ECT VE COUNT: 5880 EPISODES
CV ZIO ECT VE CPLT COUNT: 46 EPISODES
CV ZIO ECT VE CPLT FREQ: NORMAL
CV ZIO ECT VE FREQ: NORMAL
CV ZIO ECT VE TPLT COUNT: 0 EPISODES
CV ZIO ECT VE TPLT FREQ: 0
CV ZIO ECTOPIC SVE COUPLET RAW PERCENT: 0.13 %
CV ZIO ECTOPIC SVE ISOLATED PERCENT: 1.07 %
CV ZIO ECTOPIC SVE TRIPLET RAW PERCENT: 0.12 %
CV ZIO ECTOPIC VE COUPLET RAW PERCENT: 0.01 %
CV ZIO ECTOPIC VE ISOLATED PERCENT: 0.89 %
CV ZIO ECTOPIC VE TRIPLET RAW PERCENT: 0 %
CV ZIO ENROLLMENT END: NORMAL
CV ZIO ENROLLMENT START: NORMAL
CV ZIO PATIENT EVENTS DIARIES: 0
CV ZIO PATIENT EVENTS TRIGGERS: 0
CV ZIO PAUSE COUNT: 0
CV ZIO PRESCRIPTION STATUS: NORMAL
CV ZIO SVT AVG BPM: 111 BPM
CV ZIO SVT BPM HIGH: 156 BPM
CV ZIO SVT BPM LOW: 76 BPM
CV ZIO SVT COUNT: 35
CV ZIO SVT F EPI AVG BPM: 124 BPM
CV ZIO SVT F EPI BEATS: 4 BEATS
CV ZIO SVT F EPI BPM HIGH: 156 BPM
CV ZIO SVT F EPI BPM LOW: 92 BPM
CV ZIO SVT F EPI DUR: 1.5 SEC
CV ZIO SVT F EPI END: NORMAL
CV ZIO SVT F EPI START: NORMAL
CV ZIO SVT L EPI AVG BPM: 95 BPM
CV ZIO SVT L EPI BEATS: 12 BEATS
CV ZIO SVT L EPI BPM HIGH: 113 BPM
CV ZIO SVT L EPI BPM LOW: 83 BPM
CV ZIO SVT L EPI DUR: 7.4 SEC
CV ZIO SVT L EPI END: NORMAL
CV ZIO SVT L EPI START: NORMAL
CV ZIO TOTAL  ENROLLMENT PERIOD: NORMAL
CV ZIO VT COUNT: 0

## 2025-08-17 PROBLEM — Z01.89 ENCOUNTER FOR GERIATRIC ASSESSMENT: Status: RESOLVED | Noted: 2025-07-18 | Resolved: 2025-08-17

## 2025-08-18 ENCOUNTER — TELEPHONE (OUTPATIENT)
Dept: CARDIAC SURGERY | Facility: CLINIC | Age: OVER 89
End: 2025-08-18

## 2025-08-18 ENCOUNTER — TRANSITIONAL CARE MANAGEMENT (OUTPATIENT)
Age: OVER 89
End: 2025-08-18

## 2025-08-19 ENCOUNTER — HOSPITAL ENCOUNTER (OUTPATIENT)
Dept: NON INVASIVE DIAGNOSTICS | Facility: HOSPITAL | Age: OVER 89
Discharge: HOME | End: 2025-08-19
Attending: NURSE PRACTITIONER
Payer: MEDICARE

## 2025-08-19 ENCOUNTER — OFFICE VISIT (OUTPATIENT)
Dept: CARDIAC SURGERY | Facility: CLINIC | Age: OVER 89
End: 2025-08-19
Payer: MEDICARE

## 2025-08-19 ENCOUNTER — HOSPITAL ENCOUNTER (OUTPATIENT)
Dept: NON INVASIVE DIAGNOSTICS | Facility: HOSPITAL | Age: OVER 89
Discharge: HOME | End: 2025-08-19
Payer: MEDICARE

## 2025-08-19 VITALS
DIASTOLIC BLOOD PRESSURE: 56 MMHG | HEIGHT: 59 IN | WEIGHT: 101 LBS | HEART RATE: 86 BPM | BODY MASS INDEX: 20.36 KG/M2 | SYSTOLIC BLOOD PRESSURE: 105 MMHG

## 2025-08-19 VITALS
DIASTOLIC BLOOD PRESSURE: 74 MMHG | SYSTOLIC BLOOD PRESSURE: 142 MMHG | OXYGEN SATURATION: 100 % | HEIGHT: 59 IN | WEIGHT: 108 LBS | TEMPERATURE: 97.4 F | BODY MASS INDEX: 21.77 KG/M2 | HEART RATE: 91 BPM

## 2025-08-19 DIAGNOSIS — Z95.2 S/P TAVR (TRANSCATHETER AORTIC VALVE REPLACEMENT): ICD-10-CM

## 2025-08-19 DIAGNOSIS — I35.0 NONRHEUMATIC AORTIC (VALVE) STENOSIS: ICD-10-CM

## 2025-08-19 DIAGNOSIS — I71.40 ABDOMINAL AORTIC ANEURYSM (AAA) WITHOUT RUPTURE, UNSPECIFIED PART (HCC): ICD-10-CM

## 2025-08-19 DIAGNOSIS — Z95.2 S/P TAVR (TRANSCATHETER AORTIC VALVE REPLACEMENT): Primary | ICD-10-CM

## 2025-08-19 LAB
AORTIC ROOT: 2.8 CM
AORTIC VALVE MEAN VELOCITY: 11.6 M/S
ASCENDING AORTA: 3 CM
AV AREA BY CONTINUOUS VTI: 1.6 CM2
AV AREA PEAK VELOCITY: 1.5 CM2
AV LVOT MEAN GRADIENT: 2 MMHG
AV LVOT PEAK GRADIENT: 5 MMHG
AV MEAN PRESS GRAD SYS DOP V1V2: 6 MMHG
AV ORIFICE AREA US: 1.56 CM2
AV PEAK GRADIENT: 11 MMHG
AV VELOCITY RATIO: 0.69
AV VMAX SYS DOP: 1.68 M/S
BSA FOR ECHO PROCEDURE: 1.38 M2
DOP CALC AO VTI: 28.26 CM
DOP CALC LVOT AREA: 2.27 CM2
DOP CALC LVOT CARDIAC INDEX: 2.19 L/MIN/M2
DOP CALC LVOT CARDIAC OUTPUT: 3.02 L/MIN
DOP CALC LVOT DIAMETER: 1.7 CM
DOP CALC LVOT PEAK VEL VTI: 19.39 CM
DOP CALC LVOT PEAK VEL: 1.09 M/S
DOP CALC LVOT STROKE INDEX: 31.2 ML/M2
DOP CALC LVOT STROKE VOLUME: 43.99
DOP CALC MV VTI: 48.75 CM
FRACTIONAL SHORTENING: 40 (ref 28–44)
INTERVENTRICULAR SEPTUM IN DIASTOLE (PARASTERNAL SHORT AXIS VIEW): 1.7 CM
INTERVENTRICULAR SEPTUM: 1.7 CM (ref 0.6–1.1)
LAAS-AP2: 27.3 CM2
LAAS-AP4: 30.8 CM2
LEFT ATRIUM SIZE: 4.1 CM
LEFT ATRIUM VOLUME (MOD BIPLANE): 99 ML
LEFT ATRIUM VOLUME INDEX (MOD BIPLANE): 71.7 ML/M2
LEFT INTERNAL DIMENSION IN SYSTOLE: 2.6 CM (ref 2.1–4)
LEFT VENTRICULAR INTERNAL DIMENSION IN DIASTOLE: 4.3 CM (ref 3.5–6)
LEFT VENTRICULAR POSTERIOR WALL IN END DIASTOLE: 1 CM
LEFT VENTRICULAR STROKE VOLUME: 57 ML
LV EF US.2D.A4C+ESTIMATED: 66 %
LVSV (TEICH): 57 ML
MV MEAN GRADIENT: 5 MMHG
MV PEAK GRADIENT: 14 MMHG
MV STENOSIS PRESSURE HALF TIME: 72 MS
MV VALVE AREA BY CONTINUITY EQUATION: 0.9 CM2
MV VALVE AREA P 1/2 METHOD: 3.06
QRS AXIS: -24 DEGREES
QRSD INTERVAL: 116 MS
QT INTERVAL: 352 MS
QTC INTERVAL: 454 MS
RA PRESSURE ESTIMATED: 8 MMHG
RIGHT ATRIUM AREA SYSTOLE A4C: 18.4 CM2
RIGHT VENTRICLE ID DIMENSION: 2.7 CM
RV PSP: 42 MMHG
SL CV LEFT ATRIUM LENGTH A2C: 6.8 CM
SL CV LV EF: 65
SL CV PED ECHO LEFT VENTRICLE DIASTOLIC VOLUME (MOD BIPLANE) 2D: 81 ML
SL CV PED ECHO LEFT VENTRICLE SYSTOLIC VOLUME (MOD BIPLANE) 2D: 24 ML
T WAVE AXIS: 121 DEGREES
TR MAX PG: 34 MMHG
TR PEAK VELOCITY: 2.9 M/S
TRICUSPID ANNULAR PLANE SYSTOLIC EXCURSION: 2.3 CM
TRICUSPID VALVE PEAK REGURGITATION VELOCITY: 2.92 M/S
VENTRICULAR RATE: 100 BPM

## 2025-08-19 PROCEDURE — 93306 TTE W/DOPPLER COMPLETE: CPT

## 2025-08-19 PROCEDURE — 93010 ELECTROCARDIOGRAM REPORT: CPT | Performed by: INTERNAL MEDICINE

## 2025-08-19 PROCEDURE — 93005 ELECTROCARDIOGRAM TRACING: CPT

## 2025-08-19 PROCEDURE — 93306 TTE W/DOPPLER COMPLETE: CPT | Performed by: STUDENT IN AN ORGANIZED HEALTH CARE EDUCATION/TRAINING PROGRAM

## 2025-08-19 PROCEDURE — 99214 OFFICE O/P EST MOD 30 MIN: CPT | Performed by: STUDENT IN AN ORGANIZED HEALTH CARE EDUCATION/TRAINING PROGRAM

## 2025-08-21 ENCOUNTER — OFFICE VISIT (OUTPATIENT)
Dept: OBGYN CLINIC | Facility: CLINIC | Age: OVER 89
End: 2025-08-21
Payer: MEDICARE

## 2025-08-21 VITALS — HEIGHT: 59 IN | BODY MASS INDEX: 21.57 KG/M2 | WEIGHT: 107 LBS

## 2025-08-21 DIAGNOSIS — M19.012 OSTEOARTHRITIS OF LEFT GLENOHUMERAL JOINT: Primary | ICD-10-CM

## 2025-08-21 PROCEDURE — 20610 DRAIN/INJ JOINT/BURSA W/O US: CPT

## 2025-08-21 PROCEDURE — 99214 OFFICE O/P EST MOD 30 MIN: CPT | Performed by: ORTHOPAEDIC SURGERY

## 2025-08-21 RX ORDER — ROPIVACAINE HYDROCHLORIDE 5 MG/ML
4 INJECTION, SOLUTION EPIDURAL; INFILTRATION; PERINEURAL
Status: COMPLETED | OUTPATIENT
Start: 2025-08-21 | End: 2025-08-21

## 2025-08-21 RX ORDER — LISINOPRIL 10 MG/1
TABLET ORAL
COMMUNITY
Start: 2025-08-17

## 2025-08-21 RX ORDER — TRIAMCINOLONE ACETONIDE 40 MG/ML
40 INJECTION, SUSPENSION INTRA-ARTICULAR; INTRAMUSCULAR
Status: COMPLETED | OUTPATIENT
Start: 2025-08-21 | End: 2025-08-21

## 2025-08-21 RX ADMIN — ROPIVACAINE HYDROCHLORIDE 4 ML: 5 INJECTION, SOLUTION EPIDURAL; INFILTRATION; PERINEURAL at 10:15

## 2025-08-21 RX ADMIN — TRIAMCINOLONE ACETONIDE 40 MG: 40 INJECTION, SUSPENSION INTRA-ARTICULAR; INTRAMUSCULAR at 10:15

## 2025-08-22 ENCOUNTER — TELEMEDICINE (OUTPATIENT)
Age: OVER 89
End: 2025-08-22
Payer: MEDICARE

## 2025-08-22 DIAGNOSIS — F32.1 CURRENT MODERATE EPISODE OF MAJOR DEPRESSIVE DISORDER WITHOUT PRIOR EPISODE (HCC): ICD-10-CM

## 2025-08-22 DIAGNOSIS — S43.102D ACROMIOCLAVICULAR JOINT SEPARATION, LEFT, SUBSEQUENT ENCOUNTER: Primary | ICD-10-CM

## 2025-08-22 DIAGNOSIS — Z95.2 S/P TAVR (TRANSCATHETER AORTIC VALVE REPLACEMENT): ICD-10-CM

## 2025-08-22 PROCEDURE — 99495 TRANSJ CARE MGMT MOD F2F 14D: CPT | Performed by: INTERNAL MEDICINE

## (undated) DEVICE — BETHLEHEM MAJOR GENERAL PACK: Brand: CARDINAL HEALTH

## (undated) DEVICE — Device

## (undated) DEVICE — TRUMATCH DRILL GUIDE KIT: Brand: TRUMATCH

## (undated) DEVICE — ASTOUND SURGICAL GOWN, XXX LARGE, X-LONG: Brand: CONVERTORS

## (undated) DEVICE — 2.5MM REAMING ROD WITH BALL TIP/950MM-STERILE

## (undated) DEVICE — ADHESIVE SKIN HIGH VISCOSITY EXOFIN 1ML

## (undated) DEVICE — INTENDED FOR TISSUE SEPARATION, AND OTHER PROCEDURES THAT REQUIRE A SHARP SURGICAL BLADE TO PUNCTURE OR CUT.: Brand: BARD-PARKER ® CARBON RIB-BACK BLADES

## (undated) DEVICE — GLOVE SRG BIOGEL 8.5

## (undated) DEVICE — 3000CC GUARDIAN II: Brand: GUARDIAN

## (undated) DEVICE — SUT STRATAFIX SPIRAL 3-0 PGA/PCL 30 X 30 CM SXMD2B410

## (undated) DEVICE — CHLORAPREP HI-LITE 26ML ORANGE

## (undated) DEVICE — PREP SURGICAL PURPREP 26ML

## (undated) DEVICE — GLIDESHEATH SLENDER STAINLESS STEEL KIT: Brand: GLIDESHEATH SLENDER

## (undated) DEVICE — BASIC DOUBLE BASIN 2-LF: Brand: MEDLINE INDUSTRIES, INC.

## (undated) DEVICE — DUAL CUT SAGITTAL BLADE

## (undated) DEVICE — ORTHOPEDIC PACK: Brand: CARDINAL HEALTH

## (undated) DEVICE — PINNACLE INTRODUCER SHEATH: Brand: PINNACLE

## (undated) DEVICE — REM POLYHESIVE ADULT PATIENT RETURN ELECTRODE: Brand: VALLEYLAB

## (undated) DEVICE — SKIN MARKER DUAL TIP WITH RULER CAP, FLEXIBLE RULER AND LABELS: Brand: DEVON

## (undated) DEVICE — TEDS THIGH MED REG

## (undated) DEVICE — COBAN 4 IN STERILE

## (undated) DEVICE — SPONGE LAP 18 X 18 IN

## (undated) DEVICE — DRAPE ISOLATION

## (undated) DEVICE — HANDPIECE SET WITH HIGH FLOW TIP AND SUCTION TUBE: Brand: INTERPULSE

## (undated) DEVICE — GUIDEWIRE .035 260CM 3MM J TIP

## (undated) DEVICE — DRESSING MEPILEX AG BORDER 4 X 10 IN

## (undated) DEVICE — GOWN,SLEEVE,STERILE,W/CSR WRAP,1/P: Brand: MEDLINE

## (undated) DEVICE — DRAPE SHEET X-LG

## (undated) DEVICE — DRESSING MEPILEX AG BORDER 4 X 4 IN

## (undated) DEVICE — TRAY FOLEY 16FR URIMETER SURESTEP

## (undated) DEVICE — THERMOFLECT BLANKET, L, 25EA                               TS THERMOFLECT BLANKET, 48" X 84", SILVER, 5/BG, 5 BG/CS NW: Brand: THERMOFLECT

## (undated) DEVICE — SUT VICRYL 2-0 CT-1 27 IN J259H

## (undated) DEVICE — BALLOON NC EUPHORA 3 X 20MM

## (undated) DEVICE — LIGHT GLOVE GREEN

## (undated) DEVICE — PROXIMATE SKIN STAPLERS (35 WIDE) CONTAINS 35 STAINLESS STEEL STAPLES (FIXED HEAD): Brand: PROXIMATE

## (undated) DEVICE — RADIFOCUS GLIDEWIRE: Brand: GLIDEWIRE

## (undated) DEVICE — CATH GUIDE LAUNCHER 5FR EBU 3.0

## (undated) DEVICE — AMPLATZ EXTRA STIFF WIRE GUIDE: Brand: AMPLATZ

## (undated) DEVICE — FABRIC REINFORCED, SURGICAL GOWN, XL: Brand: CONVERTORS

## (undated) DEVICE — GLOVE SRG BIOGEL 8

## (undated) DEVICE — DGW .035 FC STR 260CM TEF: Brand: EMERALD

## (undated) DEVICE — GLOVE SRG BIOGEL ORTHOPEDIC 8.5

## (undated) DEVICE — ANTIBACTERIAL UNDYED BRAIDED (POLYGLACTIN 910), SYNTHETIC ABSORBABLE SUTURE: Brand: COATED VICRYL

## (undated) DEVICE — CAPIT KNEE ATTUNE FB CEMENT - DEPUY

## (undated) DEVICE — REPEL LITE CUT REST SURGICAL GLV LNRS X-LG: Brand: REPEL

## (undated) DEVICE — VEST SURGEON DISPOSABLE

## (undated) DEVICE — RUNTHROUGH NS EXTRA FLOPPY PTCA GUIDEWIRE: Brand: RUNTHROUGH

## (undated) DEVICE — 3M™ IOBAN™ 2 ANTIMICROBIAL INCISE DRAPE 6650EZ: Brand: IOBAN™ 2

## (undated) DEVICE — GLOVE INDICATOR PI UNDERGLOVE SZ 8.5 BLUE

## (undated) DEVICE — CUFF TOURNIQUET 34 X 4 IN QUICK CONNECT DISP 1BLA

## (undated) DEVICE — HOOD: Brand: FLYTE, SURGICOOL

## (undated) DEVICE — DRESSING MEPILEX AG BORDER 4 X 12 IN

## (undated) DEVICE — GLOVE SRG BIOGEL 7.5

## (undated) DEVICE — GLOVE INDICATOR PI UNDERGLOVE SZ 7.5 BLUE

## (undated) DEVICE — GUIDEWIRE WHOLEY .035 145 CM FLOP STR TIP

## (undated) DEVICE — SPECIMEN CONTAINER STERILE PEEL PACK

## (undated) DEVICE — SUT STRATAFIX SPIRAL 1-0  CT-1 30 X 30CM SXPD2B403

## (undated) DEVICE — BALLOON EUPHORA RX 2 X 15MM

## (undated) DEVICE — EXOFIN PRECISION PEN HIGH VISCOSITY TOPICAL SKIN ADHESIVE: Brand: EXOFIN PRECISION PEN, 1G

## (undated) DEVICE — HEAVY DUTY TABLE COVER: Brand: CONVERTORS

## (undated) DEVICE — LINER FOOT PAD STERILE DISPOSABLE

## (undated) DEVICE — CATH F5 INF PIG145 110CM 6SH: Brand: INFINITI

## (undated) DEVICE — CORONARY IMAGING CATHETER: Brand: OPTICROSS™ HD

## (undated) DEVICE — TRUMATCH PERSONALIZED SOLUTIONS PIN GUIDE FEMUR - CT KNEE FOR USE WITH: RIGHT: Brand: TRUMATCH

## (undated) DEVICE — 3M™ STERI-DRAPE™ U-DRAPE 1015: Brand: STERI-DRAPE™

## (undated) DEVICE — GLOVE INDICATOR PI UNDERGLOVE SZ 8 BLUE

## (undated) DEVICE — CARDIOVASCULAR SPLIT DRAPE: Brand: CONVERTORS

## (undated) DEVICE — SUT VICRYL 0 CP-1 27 IN J267H

## (undated) DEVICE — RADIFOCUS OPTITORQUE ANGIOGRAPHIC CATHETER: Brand: OPTITORQUE

## (undated) DEVICE — TIBURON EXTREMITY SHEET: Brand: CONVERTORS

## (undated) DEVICE — NEEDLE SPINAL 20G X 3.5 LF

## (undated) DEVICE — DGW .035 FC J3MM 150CM TEF: Brand: EMERALD

## (undated) DEVICE — BANDAGE, ESMARK LF STR 6"X9' (20/CS): Brand: CYPRESS

## (undated) DEVICE — INSTRUMENT POUCH: Brand: CONVERTORS

## (undated) DEVICE — PACK GENERAL LF

## (undated) DEVICE — 4.0MM THREE-FLUTED DRILL BIT QC/195MM

## (undated) DEVICE — SWAN-GANZ BIPOLAR PACING CATHETER: Brand: SWAN-GANZ

## (undated) DEVICE — ADHESIVE SKIN CLOSR DERMABOND PRINEO

## (undated) DEVICE — DRAPE C-ARMOUR

## (undated) DEVICE — SPINNING CEMENT MIXING BOWL

## (undated) DEVICE — 3.2MM GUIDE WIRE 400MM